# Patient Record
Sex: FEMALE | Race: WHITE | NOT HISPANIC OR LATINO | Employment: OTHER | URBAN - METROPOLITAN AREA
[De-identification: names, ages, dates, MRNs, and addresses within clinical notes are randomized per-mention and may not be internally consistent; named-entity substitution may affect disease eponyms.]

---

## 2017-02-07 ENCOUNTER — TRANSFERRED RECORDS (OUTPATIENT)
Dept: HEALTH INFORMATION MANAGEMENT | Facility: CLINIC | Age: 77
End: 2017-02-07

## 2017-03-03 ENCOUNTER — THERAPY VISIT (OUTPATIENT)
Dept: PHYSICAL THERAPY | Facility: CLINIC | Age: 77
End: 2017-03-03
Payer: COMMERCIAL

## 2017-03-03 DIAGNOSIS — R26.89 BALANCE PROBLEMS: Primary | ICD-10-CM

## 2017-03-03 DIAGNOSIS — M54.50 LUMBAR PAIN: ICD-10-CM

## 2017-03-03 PROCEDURE — 97110 THERAPEUTIC EXERCISES: CPT | Mod: GP | Performed by: PHYSICAL THERAPIST

## 2017-03-03 PROCEDURE — 97161 PT EVAL LOW COMPLEX 20 MIN: CPT | Mod: GP | Performed by: PHYSICAL THERAPIST

## 2017-03-03 NOTE — PROGRESS NOTES
Initial evaluation was completed.  Subjective:            Patient was seen for 3 visits last fall to improve gait and balance.  She was instructed in exercise and drills to perform at home and was doing this regularly.  She returns for follow up visit today stating that she performed the exercises last evening and noted gradual onset of right low back pain about an hour later.  She is unsure if the exercises caused the problem but notes 6/10 right LBP today with weight bearing on the right leg and with posture changes.  She is unsure how to proceed with the exercises and other ADL.  Pain radiates form the right low back to the buttock but not beyond.  She is comfortable sitting, standing or lying still.  Bending to dress is painful and limited.  She hs had similar flares of LBP in the past..        Pain is described as aching and is intermittent    Pain is worse during the day.   and relieved by rest, heat and NSAID's.  Since onset symptoms are unchanged.        General health as reported by patient is good.                      Red flags:  None as reported by the patient.                      Objective:    System         Lumbar/SI Evaluation  ROM:    AROM Lumbar:   Flexion:           Reach to knees with pain  Ext:                      Side Bend:        Left:  40%    Right:  40%  Rotation:           Left:  80%    Right:  80%  Side Glide:        Left:     Right:           Lumbar Myotomes:  normal                Lumbar Dermtomes:  normal                Neural Tension/Mobility:  Lumbar:  Normal                                                             General     ROS    Assessment/Plan:      Patient is a 76 year old female with lumbar complaints.  She appears to have a common flare of right LBP.  She has point tenderness of the LS junction.  Pain responded to gentle ROM and heat and will hopefully settle down in a week or 2.  I asked her to stop previous home exercises until the flare settles down.  Patient has the  following significant findings with corresponding treatment plan.                Diagnosis 1:  lumbar  Pain -  self management  Decreased ROM/flexibility - manual therapy, therapeutic exercise and home program  Decreased function - therapeutic activities and home program    Therapy Evaluation Codes:   1) History comprised of:   Personal factors that impact the plan of care:      None.    Comorbidity factors that impact the plan of care are:      Osteoarthritis.     Medications impacting care: Pain.  2) Examination of Body Systems comprised of:   Body structures and functions that impact the plan of care:      Lumbar spine.   Activity limitations that impact the plan of care are:      Bending, Walking and transitions.  3) Clinical presentation characteristics are:   Stable/Uncomplicated.  4) Decision-Making    Low complexity using standardized patient assessment instrument and/or measureable assessment of functional outcome.  Cumulative Therapy Evaluation is: Low complexity.    Previous and current functional limitations:  (See Goal Flow Sheet for this information)    Short term and Long term goals: (See Goal Flow Sheet for this information)     Communication ability:  Patient appears to be able to clearly communicate and understand verbal and written communication and follow directions correctly.  Treatment Explanation - The following has been discussed with the patient:   RX ordered/plan of care  Anticipated outcomes  Possible risks and side effects  This patient would benefit from PT intervention to resume normal activities.   Rehab potential is good.    Frequency:  1 X week, once daily  Duration:  for 2-5 weeks  Discharge Plan:  Achieve all LTG.  Independent in home treatment program.  Reach maximal therapeutic benefit.    Please refer to the daily flowsheet for treatment today, total treatment time and time spent performing 1:1 timed codes.

## 2017-03-03 NOTE — MR AVS SNAPSHOT
After Visit Summary   3/3/2017    Chiquita Berry    MRN: [id number]           Patient Information     Date Of Birth          1940        Visit Information        Provider Department      3/3/2017 12:40 PM Brayden Moser PT [facility] Physical Therapy        Today's Diagnoses     Balance problems    -  1    Lumbar pain           Follow-ups after your visit        Your next 10 appointments already scheduled     Mar 10, 2017 12:40 PM [facility] Spine with Brayden Moser PT   [facility] ([facility]  )    [address] [place] [id number] [phone]              Who to contact     If you have questions or need follow up information about today's clinic visit or your schedule please contact [facility] PHYSICAL THERAPY directly at [phone].  Normal or non-critical lab and imaging results will be communicated to you by MyChart, letter or phone within 4 business days after the clinic has received the results. If you do not hear from us within 7 days, please contact the clinic through [employer] or phone. If you have a critical or abnormal lab result, we will notify you by phone as soon as possible.  Submit refill requests through [employer] or call your pharmacy and they will forward the refill request to us. Please allow 3 business days for your refill to be completed.          Additional Information About Your Visit        MyChart Information     [employer] gives you secure access to your electronic health record. If you see a primary care provider, you can also send messages to your care team and make appointments. If you have questions, please call your primary care clinic.  If you do not have a primary care provider, please call [phone] and they will assist you.        Care EveryWhere ID     This is your Care EveryWhere ID. This could be used  by other organizations to access your [facility] medical records  [id number]        Your Vitals Were     Last Period                   (LMP Unknown)            Blood Pressure from Last 3 Encounters:   09/15/16 100/60   09/08/16 100/60   08/22/16 130/68    Weight from Last 3 Encounters:   09/15/16 59.4 kg (131 lb)   09/08/16 59.9 kg (132 lb)   08/22/16 59 kg (130 lb)              We Performed the Following     HC PT EVAL, LOW COMPLEXITY     HOT OR COLD PACKS THERAPY     [facility] INITIAL EVAL REPORT     THERAPEUTIC EXERCISES        Primary Care Provider Office Phone # Fax #    Christina Allie Crawford [phone]       [facility] [place] [facility] [address]  [facility] [id number]        Thank you!     Thank you for choosing [facility] PHYSICAL THERAP[facility]  for your care. Our goal is always to provide you with excellent care. Hearing back from our patients is one way we can continue to improve our services. Please take a few minutes to complete the written survey that you may receive in the mail after your visit with us. Thank you!             Your Updated Medication List - Protect others around you: Learn how to safely use, store and throw away your medicines at www.disposemymeds.org.          This list is accurate as of: 3/3/17  4:26 PM.  Always use your most recent med list.                   Brand Name Dispense Instructions for use    aspirin 81 MG tablet      Take 1 tablet by mouth daily.       atorvastatin 20 MG tablet    LIPITOR    90 tablet    Take 1 tablet (20 mg) by mouth daily       CALCIUM PO      Take  by mouth.       IBUPROFEN PO      Take 200 mg by mouth every 8 hours as needed       * MULTIVITAL Tabs      Take 1 tablet by mouth daily.       * PRESERVISION AREDS 2 Caps      Take 2 capsules by mouth daily       polyethylene glycol Packet    MIRALAX/GLYCOLAX     Take 1 packet by mouth daily       TYLENOL ARTHRITIS EXT RELIEF OR      Take 500 mg by mouth  daily       vitamin D 2000 UNITS tablet      Take 1 tablet by mouth daily.       zolpidem 5 MG tablet    AMBIEN    30 tablet    TAKE ONE TABLET BY MOUTH EVERY NIGHT AS NEEDED KEVIN       * Notice:  This list has 2 medication(s) that are the same as other medications prescribed for you. Read the directions carefully, and ask your doctor or other care provider to review them with you.

## 2017-03-06 NOTE — PROGRESS NOTES
Subjective:                                       Pertinent medical history includes:  Osteoarthritis and history of fractures.  Medical allergies: yes (Latex).  Other surgeries include:  Orthopedic surgery (Elbow).    Current occupation is Retired Nurse.                Oswestry Score: 24.44 %                 Objective:    System    Physical Exam    General     ROS    Assessment/Plan:

## 2017-03-10 ENCOUNTER — THERAPY VISIT (OUTPATIENT)
Dept: PHYSICAL THERAPY | Facility: CLINIC | Age: 77
End: 2017-03-10
Payer: COMMERCIAL

## 2017-03-10 DIAGNOSIS — M54.50 LUMBAR PAIN: ICD-10-CM

## 2017-03-10 DIAGNOSIS — R26.89 BALANCE PROBLEMS: ICD-10-CM

## 2017-03-10 PROCEDURE — 97140 MANUAL THERAPY 1/> REGIONS: CPT | Mod: GP | Performed by: PHYSICAL THERAPIST

## 2017-03-10 PROCEDURE — 97110 THERAPEUTIC EXERCISES: CPT | Mod: GP | Performed by: PHYSICAL THERAPIST

## 2017-03-24 ENCOUNTER — THERAPY VISIT (OUTPATIENT)
Dept: PHYSICAL THERAPY | Facility: CLINIC | Age: 77
End: 2017-03-24
Payer: COMMERCIAL

## 2017-03-24 DIAGNOSIS — M54.50 LUMBAR PAIN: ICD-10-CM

## 2017-03-24 DIAGNOSIS — R26.89 BALANCE PROBLEMS: ICD-10-CM

## 2017-03-24 PROCEDURE — 97112 NEUROMUSCULAR REEDUCATION: CPT | Mod: GP | Performed by: PHYSICAL THERAPIST

## 2017-03-24 PROCEDURE — 97110 THERAPEUTIC EXERCISES: CPT | Mod: GP | Performed by: PHYSICAL THERAPIST

## 2017-03-31 DIAGNOSIS — E78.5 HYPERLIPIDEMIA WITH TARGET LDL LESS THAN 100: Chronic | ICD-10-CM

## 2017-03-31 NOTE — TELEPHONE ENCOUNTER
Atorvastatin     Last Written Prescription Date: 1/28/16  Last Fill Quantity: 90, # refills: 4  Last Office Visit with [facility], P or [facility] prescribing provider: 9/15/16       Lab Results   Component Value Date    CHOL 151 06/08/2016     Lab Results   Component Value Date    HDL 51 06/08/2016     Lab Results   Component Value Date    LDL 86 06/08/2016     Lab Results   Component Value Date    TRIG 68 06/08/2016     Lab Results   Component Value Date    CHOLHDLRATIO 3.1 06/01/2015

## 2017-04-04 RX ORDER — ATORVASTATIN CALCIUM 20 MG/1
TABLET, FILM COATED ORAL
Qty: 90 TABLET | Refills: 3 | OUTPATIENT
Start: 2017-04-04

## 2017-04-05 DIAGNOSIS — E78.5 HYPERLIPIDEMIA WITH TARGET LDL LESS THAN 100: Chronic | ICD-10-CM

## 2017-04-05 NOTE — TELEPHONE ENCOUNTER
atorvastatin (LIPITOR) 20 MG tablet     Last Written Prescription Date: 1/28/2016  Last Fill Quantity: 90, # refills: 4  Last Office Visit with G, P or [facility] prescribing provider: 9/15/2016       Lab Results   Component Value Date    CHOL 151 06/08/2016     Lab Results   Component Value Date    HDL 51 06/08/2016     Lab Results   Component Value Date    LDL 86 06/08/2016     Lab Results   Component Value Date    TRIG 68 06/08/2016     Lab Results   Component Value Date    CHOLHDLRATIO 3.1 06/01/2015

## 2017-04-06 RX ORDER — ATORVASTATIN CALCIUM 20 MG/1
TABLET, FILM COATED ORAL
Qty: 90 TABLET | Refills: 0 | Status: SHIPPED | OUTPATIENT
Start: 2017-04-06 | End: 2017-06-26

## 2017-04-06 NOTE — TELEPHONE ENCOUNTER
Prescription approved per [facility], [facility] or [facility] refill protocol.  Carline Clarke RN  [facility] [employer]

## 2017-06-14 ENCOUNTER — HOSPITAL ENCOUNTER (OUTPATIENT)
Dept: MAMMOGRAPHY | Facility: CLINIC | Age: 77
Discharge: HOME | End: 2017-06-14
Attending: FAMILY MEDICINE | Admitting: FAMILY MEDICINE
Payer: MEDICARE

## 2017-06-14 DIAGNOSIS — Z12.31 VISIT FOR SCREENING MAMMOGRAM: ICD-10-CM

## 2017-06-14 PROCEDURE — 77063 BREAST TOMOSYNTHESIS BI: CPT

## 2017-06-23 ENCOUNTER — OFFICE VISIT (OUTPATIENT)
Dept: FAMILY MEDICINE | Facility: CLINIC | Age: 77
End: 2017-06-23
Payer: COMMERCIAL

## 2017-06-23 VITALS
BODY MASS INDEX: 24.48 KG/M2 | SYSTOLIC BLOOD PRESSURE: 98 MMHG | TEMPERATURE: 96.9 F | RESPIRATION RATE: 14 BRPM | HEART RATE: 79 BPM | HEIGHT: 62 IN | OXYGEN SATURATION: 98 % | DIASTOLIC BLOOD PRESSURE: 52 MMHG | WEIGHT: 133 LBS

## 2017-06-23 DIAGNOSIS — Z00.00 ROUTINE GENERAL MEDICAL EXAMINATION AT A HEALTH CARE FACILITY: Primary | ICD-10-CM

## 2017-06-23 DIAGNOSIS — L29.3 PRURITUS OF GENITAL ORGANS: ICD-10-CM

## 2017-06-23 DIAGNOSIS — E78.00 HYPERCHOLESTEROLEMIA: ICD-10-CM

## 2017-06-23 DIAGNOSIS — Z78.0 ASYMPTOMATIC POSTMENOPAUSAL STATUS: ICD-10-CM

## 2017-06-23 DIAGNOSIS — F51.01 PRIMARY INSOMNIA: Chronic | ICD-10-CM

## 2017-06-23 DIAGNOSIS — R73.02 GLUCOSE INTOLERANCE (IMPAIRED GLUCOSE TOLERANCE): ICD-10-CM

## 2017-06-23 LAB
ALT SERPL W P-5'-P-CCNC: 30 U/L (ref 0–50)
ANION GAP SERPL CALCULATED.3IONS-SCNC: 9 MMOL/L (ref 3–14)
BUN SERPL-MCNC: 14 MG/DL (ref 7–30)
CALCIUM SERPL-MCNC: 9.1 MG/DL (ref 8.5–10.1)
CHLORIDE SERPL-SCNC: 106 MMOL/L (ref 94–109)
CO2 SERPL-SCNC: 25 MMOL/L (ref 20–32)
CREAT SERPL-MCNC: 0.62 MG/DL (ref 0.52–1.04)
GFR SERPL CREATININE-BSD FRML MDRD: NORMAL ML/MIN/1.7M2
GLUCOSE SERPL-MCNC: 94 MG/DL (ref 70–99)
LDLC SERPL DIRECT ASSAY-MCNC: 102 MG/DL
POTASSIUM SERPL-SCNC: 3.9 MMOL/L (ref 3.4–5.3)
SODIUM SERPL-SCNC: 140 MMOL/L (ref 133–144)

## 2017-06-23 PROCEDURE — G0439 PPPS, SUBSEQ VISIT: HCPCS | Performed by: FAMILY MEDICINE

## 2017-06-23 PROCEDURE — 83721 ASSAY OF BLOOD LIPOPROTEIN: CPT | Performed by: FAMILY MEDICINE

## 2017-06-23 PROCEDURE — 80048 BASIC METABOLIC PNL TOTAL CA: CPT | Performed by: FAMILY MEDICINE

## 2017-06-23 PROCEDURE — 36415 COLL VENOUS BLD VENIPUNCTURE: CPT | Performed by: FAMILY MEDICINE

## 2017-06-23 PROCEDURE — 84460 ALANINE AMINO (ALT) (SGPT): CPT | Performed by: FAMILY MEDICINE

## 2017-06-23 RX ORDER — TRIAMCINOLONE ACETONIDE 5 MG/G
CREAM TOPICAL
Qty: 30 G | Refills: 1 | Status: SHIPPED | OUTPATIENT
Start: 2017-06-23 | End: 2019-02-19

## 2017-06-23 NOTE — PROGRESS NOTES
SUBJECTIVE:                                                            Chiquita Berry is a 76 year old female who presents for Preventive Visit.  Are you in the first 12 months of your Medicare Part B coverage?  No    Healthy Habits:    Do you get at least three servings of calcium containing foods daily (dairy, green leafy vegetables, etc.)? yes    Amount of exercise or daily activities, outside of work: 0 day(s) per week    Problems taking medications regularly No    Medication side effects: No    Have you had an eye exam in the past two years? yes    Do you see a dentist twice per year? no    Do you have sleep apnea, excessive snoring or daytime drowsiness?yes              Reviewed and updated as needed this visit by clinical staff  Tobacco  Allergies  Meds  Med Hx  Surg Hx  Fam Hx  Soc Hx        Reviewed and updated as needed this visit by Provider        Social History   Substance Use Topics     Smoking status: Never Smoker     Smokeless tobacco: Never Used     Alcohol use 0.0 oz/week     0 Standard drinks or equivalent per week      Comment: Occ glass of wine - less than once per month       The patient does not drink >3 drinks per day nor >7 drinks per week.    Today's PHQ-2 Score:   PHQ-2 ( 1999 [employer]) 6/23/2017 9/15/2016   Q1: Little interest or pleasure in doing things 0 0   Q2: Feeling down, depressed or hopeless 0 0   PHQ-2 Score 0 0   Q1: Little interest or pleasure in doing things - -   Q2: Feeling down, depressed or hopeless - -   PHQ-2 Score - -       Do you feel safe in your environment - Yes    Do you have a Health Care Directive?: Yes: Advance Directive has been received and scanned.    Current providers sharing in care for this patient include:   Patient Care Team:  Christina Crawford MD as PCP - General (Family Practice)      Hearing impairment: No    Ability to successfully perform activities of daily living: Yes, no assistance needed     Fall risk:  Fallen 2 or more  "times in the past year?: No  Any fall with injury in the past year?: No    Home safety:  none identified      The following health maintenance items are reviewed in Epic and correct as of today:  Health Maintenance   Topic Date Due     DEXA Q2 YR  04/25/2016     INFLUENZA VACCINE (SYSTEM ASSIGNED)  09/01/2017     FALL RISK ASSESSMENT  09/15/2017     MAMMO Q2 YR  06/14/2019     ADVANCE DIRECTIVE PLANNING Q5 YRS  12/11/2020     LIPID SCREEN Q5 YR FEMALE (SYSTEM ASSIGNED)  06/08/2021     TETANUS IMMUNIZATION (SYSTEM ASSIGNED)  01/27/2022     PNEUMOCOCCAL  Completed              ROS:  C: NEGATIVE for fever, chills, change in weight  I: NEGATIVE for worrisome rashes, moles or lesions  E: NEGATIVE for vision changes or irritation  E/M: NEGATIVE for ear, mouth and throat problems  R: NEGATIVE for significant cough or SOB  B: NEGATIVE for masses, tenderness or discharge  CV: NEGATIVE for chest pain, palpitations or peripheral edema  GI: NEGATIVE for nausea, abdominal pain, heartburn, or change in bowel habits  : NEGATIVE for frequency, dysuria, or hematuria  M: NEGATIVE for significant arthralgias or myalgia  N: NEGATIVE for weakness, dizziness or paresthesias  E: NEGATIVE for temperature intolerance, skin/hair changes  H: NEGATIVE for bleeding problems  P: NEGATIVE for changes in mood or affect    Problem list, Medication list, Allergies, and Medical/Social/Surgical histories reviewed in [facility] and updated as appropriate.  Labs reviewed in EPIC  OBJECTIVE:                                                            BP 98/52  Pulse 79  Temp 96.9  F (36.1  C) (Tympanic)  Resp 14  Ht 5' 2\" (1.575 m)  Wt 133 lb (60.3 kg)  LMP  (LMP Unknown)  SpO2 98%  Breastfeeding? No  BMI 24.33 kg/m2 Estimated body mass index is 24.33 kg/(m^2) as calculated from the following:    Height as of this encounter: 5' 2\" (1.575 m).    Weight as of this encounter: 133 lb (60.3 kg).  EXAM:   GENERAL APPEARANCE: healthy, alert and no " "distress  EYES: Eyes grossly normal to inspection, PERRL and conjunctivae and sclerae normal  NECK: no adenopathy, no asymmetry, masses, or scars and thyroid normal to palpation  RESP: lungs clear to auscultation - no rales, rhonchi or wheezes  BREAST: normal without masses, tenderness or nipple discharge and no palpable axillary masses or adenopathy  CV: regular rate and rhythm, normal S1 S2, no S3 or S4, no murmur, click or rub, no peripheral edema and peripheral pulses strong  ABDOMEN: soft, nontender, no hepatosplenomegaly, no masses and bowel sounds normal  MS: no musculoskeletal defects are noted and gait is age appropriate without ataxia  SKIN: no suspicious lesions or rashes  NEURO: Normal strength and tone, sensory exam grossly normal, mentation intact and speech normal  PSYCH: mentation appears normal and affect normal/bright  LYMPH: Negative CCOA nodes and thyroid and inguinal nodes       ASSESSMENT / PLAN:                                                                ICD-10-CM    1. Routine general medical examination at a health care facility Z00.00    2. Primary insomnia F51.01    3. Glucose intolerance (impaired glucose tolerance) R73.02 Basic metabolic panel   4. Hypercholesterolemia E78.00 ALT     LDL cholesterol direct   5. Pruritus of genital organs L29.3 triamcinolone (KENALOG) 0.5 % cream   6. Asymptomatic postmenopausal status Z78.0 DEXA HIP/PELVIS/SPINE - Future       End of Life Planning:  Patient currently has an advanced directive: Yes.  Practitioner is supportive of decision.    COUNSELING:  Reviewed preventive health counseling, as reflected in patient instructions       Regular exercise       Healthy diet/nutrition        Estimated body mass index is 24.33 kg/(m^2) as calculated from the following:    Height as of this encounter: 5' 2\" (1.575 m).    Weight as of this encounter: 133 lb (60.3 kg).     reports that she has never smoked. She has never used smokeless " tobacco.      Appropriate preventive services were discussed with this patient, including applicable screening as appropriate for cardiovascular disease, diabetes, osteopenia/osteoporosis, and glaucoma.  As appropriate for age/gender, discussed screening for colorectal cancer, prostate cancer, breast cancer, and cervical cancer. Checklist reviewing preventive services available has been given to the patient.    Reviewed patients plan of care and provided an AVS. The Basic Care Plan (routine screening as documented in Health Maintenance) for Chiquita meets the Care Plan requirement. This Care Plan has been established and reviewed with the Patient.    Counseling Resources:  ATP IV Guidelines  Pooled Cohorts Equation Calculator  Breast Cancer Risk Calculator  FRAX Risk Assessment  ICSI Preventive Guidelines  Dietary Guidelines for Americans, 2010  [employer]'s MyPlate  ASA Prophylaxis  Lung CA Screening    Patient Instructions     Preventive Health Recommendations    Female Ages 65 +    Yearly exam:     See your health care provider every year in order to  o Review health changes.   o Discuss preventive care.    o Review your medicines if your doctor has prescribed any.      You no longer need a yearly Pap test unless you've had an abnormal Pap test in the past 10 years. If you have vaginal symptoms, such as bleeding or discharge, be sure to talk with your provider about a Pap test.      Every 1 to 2 years, have a mammogram.  If you are over 69, talk with your health care provider about whether or not you want to continue having screening mammograms.      Every 10 years, have a colonoscopy. Or, have a yearly FIT test (stool test). These exams will check for colon cancer.       Have a cholesterol test every 5 years, or more often if your doctor advises it.       Have a diabetes test (fasting glucose) every three years. If you are at risk for diabetes, you should have this test more often.       At age 65, have a bone density  scan (DEXA) to check for osteoporosis (brittle bone disease).    Shots:    Get a flu shot each year.    Get a tetanus shot every 10 years.    Talk to your doctor about your pneumonia vaccines. There are now two you should receive - Pneumovax (PPSV 23) and Prevnar (PCV 13).    Talk to your doctor about the shingles vaccine.    Talk to your doctor about the hepatitis B vaccine.    Nutrition:     Eat at least 5 servings of fruits and vegetables each day.      Eat whole-grain bread, whole-wheat pasta and brown rice instead of white grains and rice.      Talk to your provider about Calcium and Vitamin D.     Lifestyle    Exercise at least 150 minutes a week (30 minutes a day, 5 days a week). This will help you control your weight and prevent disease.      Limit alcohol to one drink per day.      No smoking.       Wear sunscreen to prevent skin cancer.       See your dentist twice a year for an exam and cleaning.      See your eye doctor every 1 to 2 years to screen for conditions such as glaucoma, macular degeneration and cataracts.    1. Please do your breast exam every mo, when you  Change the  calendar page or set an alarm on your cell phone Do a  visual check for dimples, inversion or indentation or any different position of the nipple Feel manually  for any 1cm or larger  size mass ie about the size of an almond Be sure to cover the entire area of both breasts : this extends back to the back on either side and from the collar bone to the bottom of the breasts where you can begin to feel ribs.    2. . Schedule your mammo at [facility]  At [address] at [phone]      3. Antifungal cream  And hydrocortisone cream at the [employer]      Steroid creams   Over the counter hydrocortisone cream-weak steroid -may apply 2-4 X a day   Clobetasol is very strong   Could try the in between kenalog cream Rx   Decreases red, itch and scaling     Cover  The steroid cream with vaseline intensive care  Then kb  wrap and hold it on with a cut open  Sock or sleeve of a T-shirt         Christina Crawford MD  [facility]

## 2017-06-23 NOTE — MR AVS SNAPSHOT
After Visit Summary   6/23/2017    Chiquita Berry    MRN: [id number]           Patient Information     Date Of Birth          1940        Visit Information        Provider Department      6/23/2017 9:00 AM Christina Crawford MD [facility]        Today's Diagnoses     Routine general medical examination at a health care facility    -  1    Primary insomnia        Glucose intolerance (impaired glucose tolerance)        Hypercholesterolemia        Pruritus of genital organs        Asymptomatic postmenopausal status          Care Instructions      Preventive Health Recommendations    Female Ages 65 +    Yearly exam:     See your health care provider every year in order to  o Review health changes.   o Discuss preventive care.    o Review your medicines if your doctor has prescribed any.      You no longer need a yearly Pap test unless you've had an abnormal Pap test in the past 10 years. If you have vaginal symptoms, such as bleeding or discharge, be sure to talk with your provider about a Pap test.      Every 1 to 2 years, have a mammogram.  If you are over 69, talk with your health care provider about whether or not you want to continue having screening mammograms.      Every 10 years, have a colonoscopy. Or, have a yearly FIT test (stool test). These exams will check for colon cancer.       Have a cholesterol test every 5 years, or more often if your doctor advises it.       Have a diabetes test (fasting glucose) every three years. If you are at risk for diabetes, you should have this test more often.       At age 65, have a bone density scan (DEXA) to check for osteoporosis (brittle bone disease).    Shots:    Get a flu shot each year.    Get a tetanus shot every 10 years.    Talk to your doctor about your pneumonia vaccines. There are now two you should receive - Pneumovax (PPSV 23) and Prevnar (PCV 13).    Talk to your doctor about the shingles  vaccine.    Talk to your doctor about the hepatitis B vaccine.    Nutrition:     Eat at least 5 servings of fruits and vegetables each day.      Eat whole-grain bread, whole-wheat pasta and brown rice instead of white grains and rice.      Talk to your provider about Calcium and Vitamin D.     Lifestyle    Exercise at least 150 minutes a week (30 minutes a day, 5 days a week). This will help you control your weight and prevent disease.      Limit alcohol to one drink per day.      No smoking.       Wear sunscreen to prevent skin cancer.       See your dentist twice a year for an exam and cleaning.      See your eye doctor every 1 to 2 years to screen for conditions such as glaucoma, macular degeneration and cataracts.    1. Please do your breast exam every mo, when you  Change the  calendar page or set an alarm on your cell phone Do a  visual check for dimples, inversion or indentation or any different position of the nipple Feel manually  for any 1cm or larger  size mass ie about the size of an almond Be sure to cover the entire area of both breasts : this extends back to the back on either side and from the collar bone to the bottom of the breasts where you can begin to feel ribs.    2. . Schedule your mammo at [facility]  At [address] at [phone]      3. Antifungal cream  And hydrocortisone cream at the [employer]      Steroid creams   Over the counter hydrocortisone cream-weak steroid -may apply 2-4 X a day   Clobetasol is very strong   Could try the in between kenalog cream Rx   Decreases red, itch and scaling     Cover  The steroid cream with vaseline intensive care  Then saran wrap and hold it on with a cut open  Sock or sleeve of a T-shirt             Follow-ups after your visit        Future tests that were ordered for you today     Open Future Orders        Priority Expected Expires Ordered    DEXA HIP/PELVIS/SPINE - Future Routine  6/23/2018 6/23/2017            Who to contact      "If you have questions or need follow up information about today's clinic visit or your schedule please contact [facility] directly at [phone].  Normal or non-critical lab and imaging results will be communicated to you by MyChart, letter or phone within 4 business days after the clinic has received the results. If you do not hear from us within 7 days, please contact the clinic through [employer] or phone. If you have a critical or abnormal lab result, we will notify you by phone as soon as possible.  Submit refill requests through [employer] or call your pharmacy and they will forward the refill request to us. Please allow 3 business days for your refill to be completed.          Additional Information About Your Visit        [employer] Information     [employer] gives you secure access to your electronic health record. If you see a primary care provider, you can also send messages to your care team and make appointments. If you have questions, please call your primary care clinic.  If you do not have a primary care provider, please call [phone] and they will assist you.        Care EveryWhere ID     This is your Care EveryWhere ID. This could be used by other organizations to access your [place] medical records  [id number]        Your Vitals Were     Pulse Temperature Respirations Height Last Period Pulse Oximetry    79 96.9  F (36.1  C) (Tympanic) 14 5' 2\" (1.575 m) (LMP Unknown) 98%    Breastfeeding? BMI (Body Mass Index)                No 24.33 kg/m2           Blood Pressure from Last 3 Encounters:   06/23/17 98/52   09/15/16 100/60   09/08/16 100/60    Weight from Last 3 Encounters:   06/23/17 133 lb (60.3 kg)   09/15/16 131 lb (59.4 kg)   09/08/16 132 lb (59.9 kg)              We Performed the Following     ALT     Basic metabolic panel     LDL cholesterol direct          Today's Medication Changes          These changes are accurate as of: 6/23/17  9:58 AM.  If you have any " questions, ask your nurse or doctor.               Start taking these medicines.        Dose/Directions    triamcinolone 0.5 % cream   Commonly known as:  KENALOG   Used for:  Pruritus of genital organs   Started by:  Christina Crawford MD        Apply sparingly to affected area three times daily.   Quantity:  30 g   Refills:  1            Where to get your medicines      These medications were sent to [facility] [address]  [address] [place] [address], [facility] [address]     Phone:  [phone]     triamcinolone 0.5 % cream                Primary Care Provider Office Phone # Fax #    Christina Crawford [phone]       [facility] [address]  [facility] [address]        Equal Access to Services     GREGORY TAPIA AH: Hadii maximo ku hadasho Soomaali, waaxda luqadaha, qaybta kaalmada adeegyada, waxay pau salcedo . So [facility] [phone].    ATENCIÓN: Si habla español, tiene a skinner disposición servicios gratuitos de asistencia lingüística. Llame al [phone].    We comply with applicable federal civil rights laws and [place] laws. We do not discriminate on the basis of race, color, national origin, age, disability sex, sexual orientation or gender identity.            Thank you!     Thank you for choosing [facility]  for your care. Our goal is always to provide you with excellent care. Hearing back from our patients is one way we can continue to improve our services. Please take a few minutes to complete the written survey that you may receive in the mail after your visit with us. Thank you!             Your Updated Medication List - Protect others around you: Learn how to safely use, store and throw away your medicines at www.disposemymeds.org.          This list is accurate as of: 6/23/17  9:58 AM.  Always use your most recent med list.                   Brand Name Dispense Instructions  for use Diagnosis    aspirin 81 MG tablet      Take 1 tablet by mouth daily.        atorvastatin 20 MG tablet    LIPITOR    90 tablet    TAKE 1 TABLET (20 MG) BY MOUTH DAILY    Hyperlipidemia with target LDL less than 100       CALCIUM PO      Take  by mouth.        IBUPROFEN PO      Take 200 mg by mouth every 8 hours as needed        * MULTIVITAL Tabs      Take 1 tablet by mouth daily.        * PRESERVISION AREDS 2 Caps      Take 2 capsules by mouth daily        polyethylene glycol Packet    MIRALAX/GLYCOLAX     Take 1 packet by mouth daily        triamcinolone 0.5 % cream    KENALOG    30 g    Apply sparingly to affected area three times daily.    Pruritus of genital organs       TYLENOL ARTHRITIS EXT RELIEF OR      Take 500 mg by mouth daily        vitamin D 2000 UNITS tablet      Take 1 tablet by mouth daily.        zolpidem 5 MG tablet    AMBIEN    30 tablet    TAKE ONE TABLET BY MOUTH EVERY NIGHT AS NEEDED FORSLEEP    Transient insomnia       * Notice:  This list has 2 medication(s) that are the same as other medications prescribed for you. Read the directions carefully, and ask your doctor or other care provider to review them with you.

## 2017-06-23 NOTE — NURSING NOTE
"Chief Complaint   Patient presents with     Wellness Visit     BP 98/52  Pulse 79  Temp 96.9  F (36.1  C) (Tympanic)  Resp 14  Ht 5' 2\" (1.575 m)  Wt 133 lb (60.3 kg)  LMP  (LMP Unknown)  SpO2 98%  Breastfeeding? No  BMI 24.33 kg/m2 Estimated body mass index is 24.33 kg/(m^2) as calculated from the following:    Height as of this encounter: 5' 2\" (1.575 m).    Weight as of this encounter: 133 lb (60.3 kg).  BP completed using cuff size: regular   Jennifer Salmeron CMA    Health Maintenance Due   Topic Date Due     DEXA Q2 YR  04/25/2016     Health Maintenance reviewed at today's visit patient asked to schedule/complete:   None, Health Maintenance up to date.    "

## 2017-06-23 NOTE — PATIENT INSTRUCTIONS
Preventive Health Recommendations    Female Ages 65 +    Yearly exam:     See your health care provider every year in order to  o Review health changes.   o Discuss preventive care.    o Review your medicines if your doctor has prescribed any.      You no longer need a yearly Pap test unless you've had an abnormal Pap test in the past 10 years. If you have vaginal symptoms, such as bleeding or discharge, be sure to talk with your provider about a Pap test.      Every 1 to 2 years, have a mammogram.  If you are over 69, talk with your health care provider about whether or not you want to continue having screening mammograms.      Every 10 years, have a colonoscopy. Or, have a yearly FIT test (stool test). These exams will check for colon cancer.       Have a cholesterol test every 5 years, or more often if your doctor advises it.       Have a diabetes test (fasting glucose) every three years. If you are at risk for diabetes, you should have this test more often.       At age 65, have a bone density scan (DEXA) to check for osteoporosis (brittle bone disease).    Shots:    Get a flu shot each year.    Get a tetanus shot every 10 years.    Talk to your doctor about your pneumonia vaccines. There are now two you should receive - Pneumovax (PPSV 23) and Prevnar (PCV 13).    Talk to your doctor about the shingles vaccine.    Talk to your doctor about the hepatitis B vaccine.    Nutrition:     Eat at least 5 servings of fruits and vegetables each day.      Eat whole-grain bread, whole-wheat pasta and brown rice instead of white grains and rice.      Talk to your provider about Calcium and Vitamin D.     Lifestyle    Exercise at least 150 minutes a week (30 minutes a day, 5 days a week). This will help you control your weight and prevent disease.      Limit alcohol to one drink per day.      No smoking.       Wear sunscreen to prevent skin cancer.       See your dentist twice a year for an exam and cleaning.      See your  eye doctor every 1 to 2 years to screen for conditions such as glaucoma, macular degeneration and cataracts.    1. Please do your breast exam every mo, when you  Change the  calendar page or set an alarm on your cell phone Do a  visual check for dimples, inversion or indentation or any different position of the nipple Feel manually  for any 1cm or larger  size mass ie about the size of an almond Be sure to cover the entire area of both breasts : this extends back to the back on either side and from the collar bone to the bottom of the breasts where you can begin to feel ribs.    2. . Schedule your mammo at [facility]  At [address] at [phone]      3. Antifungal cream  And hydrocortisone cream at the [employer] [facility]      Steroid creams   Over the counter hydrocortisone cream-weak steroid -may apply 2-4 X a day   Clobetasol is very strong   Could try the in between kenalog cream Rx   Decreases red, itch and scaling     Cover  The steroid cream with vaseline intensive care  Then saran wrap and hold it on with a cut open  Sock or sleeve of a T-shirt

## 2017-06-23 NOTE — LETTER
[facility] [address]  [address] [facility]  [address]  [facility] [address]  [phone]                                                                                                           Chiquita Johanna Jamal  [address]   [facility] [address]    June 24, 2017      Dear Chiquita,    The results of your recent tests were reviewed and are enclosed.   They are all normal     THE FOLLOWING ARE EXPLANATIONS OF SOME OF OUR LAB TESTS     YOU DID NOT NECESSARILY HAVE ALL OF THESE DONE     Hgb is the blood iron level   WBC means White Blood Cells   Platelets are small blood cells that help with forming the blood clots along with other blood factors.   Electrolytes are Sodium, Potassium, Calcium, Magnesium, Phosphorus.   Liver tests are: AST, ALT, Bilirubin, Alkaline Phosphatase.   Kidney tests are Creatinine, GFR.   HDL Cholesterol - is the good cholesterol and it is good to have it high.   LDL cholesterol is the bad cholesterol and it is good to have it low.   It is recommended to have LDL less than 130 for people with hypertension and to have it less than 100 for people with heart disease, diabetes and chronic kidney disease.   Triglycerides are another type of lipid that can cause heart disease, like the cholesterol and should be kept low   Thyroid tests are TSH, T4, T3   Glucose is sugar.   A1c is a test that gives us an idea about how well was controlled the diabetes for the last 3 months.   PSA stands for Prostate Specific Antigen and it can be elevated with prostate cancer or prostate inflammation.     Please continue on the same medications     Cholesterol and sugar are just a little high   Results for orders placed or performed in visit on 06/23/17   ALT   Result Value Ref Range    ALT 30 0 - 50 U/L   LDL cholesterol direct   Result Value Ref Range    LDL Cholesterol Direct 102 (H) <100 mg/dL   Basic metabolic panel   Result Value Ref Range    Sodium 140  133 - 144 mmol/L    Potassium 3.9 3.4 - 5.3 mmol/L    Chloride 106 94 - 109 mmol/L    Carbon Dioxide 25 20 - 32 mmol/L    Anion Gap 9 3 - 14 mmol/L    Glucose 94 70 - 99 mg/dL    Urea Nitrogen 14 7 - 30 mg/dL    Creatinine 0.62 0.52 - 1.04 mg/dL    GFR Estimate >90  Non  GFR Calc   >60 mL/min/1.7m2    GFR Estimate If Black >90   GFR Calc   >60 mL/min/1.7m2    Calcium 9.1 8.5 - 10.1 mg/dL           Thank you for choosing [facility].  We appreciate the opportunity to serve you and look forward to supporting your healthcare needs in the future.    If you have any questions or concerns, please call me or my staff at [phone].      Sincerely,    Christina Crawford MD

## 2017-06-26 DIAGNOSIS — F51.02 TRANSIENT INSOMNIA: ICD-10-CM

## 2017-06-26 DIAGNOSIS — E78.5 HYPERLIPIDEMIA WITH TARGET LDL LESS THAN 100: Chronic | ICD-10-CM

## 2017-06-26 NOTE — TELEPHONE ENCOUNTER
Reason for Call: Medication refill:    Do you use a [facility]?  Name of the pharmacy and phone number for the current request: [facility], [place] - [address] [place] [address]    Name of the medication requested: atorvastatin (LIPITOR) 20 MG tablet & zolpidem (AMBIEN) 5 MG tablet    Other request: Patient was seen in office on 6/23/17 and refills were to be sent to the pharmacy at that time, however they were not.    Can we leave a detailed message on this number? YES    Phone number patient can be reached at: Home number on file [phone] (home)    Best Time: Anytime    Call taken on 6/26/2017 at 11:31 AM by Gagandeep Zee

## 2017-06-28 RX ORDER — ATORVASTATIN CALCIUM 20 MG/1
TABLET, FILM COATED ORAL
Qty: 90 TABLET | Refills: 3 | Status: SHIPPED | OUTPATIENT
Start: 2017-06-28 | End: 2018-05-31

## 2017-06-28 RX ORDER — ZOLPIDEM TARTRATE 5 MG/1
TABLET ORAL
Qty: 30 TABLET | Refills: 0 | Status: SHIPPED | OUTPATIENT
Start: 2017-06-28 | End: 2017-12-26

## 2017-06-28 NOTE — TELEPHONE ENCOUNTER
lipitor     Last Written Prescription Date: 4-6-17  Last Fill Quantity: 90, # refills: 0  Last Office Visit with [facility], [facility] or [facility] prescribing provider: 6-23-17       Lab Results   Component Value Date    CHOL 151 06/08/2016     Lab Results   Component Value Date    HDL 51 06/08/2016     Lab Results   Component Value Date     06/23/2017    LDL 86 06/08/2016     Lab Results   Component Value Date    TRIG 68 06/08/2016     Lab Results   Component Value Date    CHOLHDLRATIO 3.1 06/01/2015       ______________________________________________________  Controlled Substance Refill Request for Ambien  Problem List Complete:  No     PROVIDER TO CONSIDER COMPLETION OF PROBLEM LIST AND OVERVIEW/CONTROLLED SUBSTANCE AGREEMENT    Last Written Prescription Date:  12-9-16  Last Fill Quantity: 30,   # refills: 0    Last Office Visit with [facility] primary care provider: 6-23-17    Future Office visit:     Controlled substance agreement on file: No.     Processing:  Fax Rx to [facility]   checked in past 6 months?  No, route to RN     RX [place] reviewed:  reviewed- no concerns 6-28-17    [place]:  [email or web address]

## 2017-11-15 ENCOUNTER — TELEPHONE (OUTPATIENT)
Dept: FAMILY MEDICINE | Facility: CLINIC | Age: 77
End: 2017-11-15

## 2017-12-17 ENCOUNTER — OFFICE VISIT (OUTPATIENT)
Dept: URGENT CARE | Facility: URGENT CARE | Age: 77
End: 2017-12-17
Payer: COMMERCIAL

## 2017-12-17 VITALS
HEART RATE: 93 BPM | SYSTOLIC BLOOD PRESSURE: 139 MMHG | TEMPERATURE: 98 F | DIASTOLIC BLOOD PRESSURE: 76 MMHG | BODY MASS INDEX: 24.71 KG/M2 | OXYGEN SATURATION: 94 % | RESPIRATION RATE: 20 BRPM | WEIGHT: 135.1 LBS

## 2017-12-17 DIAGNOSIS — J18.9 PNEUMONIA OF RIGHT LOWER LOBE DUE TO INFECTIOUS ORGANISM: Primary | ICD-10-CM

## 2017-12-17 PROCEDURE — 99214 OFFICE O/P EST MOD 30 MIN: CPT | Performed by: FAMILY MEDICINE

## 2017-12-17 RX ORDER — ALBUTEROL SULFATE 90 UG/1
1-2 AEROSOL, METERED RESPIRATORY (INHALATION) EVERY 4 HOURS PRN
Qty: 1 INHALER | Refills: 0 | Status: SHIPPED | OUTPATIENT
Start: 2017-12-17 | End: 2019-02-19

## 2017-12-17 RX ORDER — DOXYCYCLINE 100 MG/1
100 CAPSULE ORAL 2 TIMES DAILY
Qty: 20 CAPSULE | Refills: 0 | Status: SHIPPED | OUTPATIENT
Start: 2017-12-17 | End: 2017-12-26

## 2017-12-17 NOTE — PROGRESS NOTES
SUBJECTIVE:  Chief Complaint   Patient presents with     URI     deep cough x 5 days which seems to get worse yesterday. nose bleed x today      Chiquita Berry is a 77 year old female who presents to the clinic today with a chief complaint of cough   for 5 day(s).  Has had new onset chest tightness, heaviness for 1 day  Patient denies shortness of breath., central chest pain., pleuritic chest pain and wheezing.  Her cough is described as persistent, daytime, nightime and productive mucoid.    The patient's symptoms are moderate and worsening.  Associated symptoms include malaise. The patient's symptoms are exacerbated by exercise  Patient has been using nothing  to improve symptoms.    Past Medical History:   Diagnosis Date     Anxiety      Cataract     bilateral     GERD (gastroesophageal reflux disease)      Hyperlipidemia LDL goal < 100      Insomnia     using ambien once a month     Kyphosis      Osteoporosis      Palpitations     resolved     Scapula fracture 4/5/2015    right - didn't need surgery - sling     Scoliosis        ALLERGIES:  Review of patient's allergies indicates no known allergies.      Current Outpatient Prescriptions on File Prior to Visit:  zolpidem (AMBIEN) 5 MG tablet TAKE ONE TABLET BY MOUTH EVERY NIGHT AS NEEDED FORSLEEP   atorvastatin (LIPITOR) 20 MG tablet TAKE 1 TABLET (20 MG) BY MOUTH DAILY   triamcinolone (KENALOG) 0.5 % cream Apply sparingly to affected area three times daily.   IBUPROFEN PO Take 200 mg by mouth every 8 hours as needed    polyethylene glycol (MIRALAX/GLYCOLAX) packet Take 1 packet by mouth daily   Multiple Vitamins-Minerals (MULTIVITAL) TABS Take 1 tablet by mouth daily.   Acetaminophen (TYLENOL ARTHRITIS EXT RELIEF OR) Take 500 mg by mouth daily    CALCIUM PO Take  by mouth.   aspirin 81 MG tablet Take 1 tablet by mouth daily.   Cholecalciferol (VITAMIN D) 2000 UNITS tablet Take 1 tablet by mouth daily.   Multiple Vitamins-Minerals (PRESERVISION AREDS 2) CAPS  Take 2 capsules by mouth daily     No current facility-administered medications on file prior to visit.     Social History   Substance Use Topics     Smoking status: Never Smoker     Smokeless tobacco: Never Used     Alcohol use 0.0 oz/week     0 Standard drinks or equivalent per week      Comment: Occ glass of wine - less than once per month       Family History   Problem Relation Age of Onset     Alzheimer Disease Mother      CEREBROVASCULAR DISEASE Mother      CANCER Father      stomach     Breast Cancer Sister      Breast Cancer Sister      DIABETES No family hx of      Coronary Artery Disease No family hx of      Hypertension No family hx of      Hyperlipidemia No family hx of      Colon Cancer No family hx of      Prostate Cancer No family hx of      Other Cancer No family hx of      Depression No family hx of      Anxiety Disorder No family hx of      MENTAL ILLNESS No family hx of      Substance Abuse No family hx of      Anesthesia Reaction No family hx of      Asthma No family hx of      OSTEOPOROSIS No family hx of      Genetic Disorder No family hx of      Thyroid Disease No family hx of      Obesity No family hx of      Unknown/Adopted No family hx of          ROS  CONSTITUTIONAL:NEGATIVE for fever, chills, change in weight  INTEGUMENTARY/SKIN: NEGATIVE for worrisome rashes, moles or lesions  EYES: NEGATIVE for vision changes or irritation  ENT/MOUTH: NEGATIVE for ear, mouth and throat problems  GI: NEGATIVE for nausea, abdominal pain, heartburn, or change in bowel habits    OBJECTIVE:  /76  Pulse 93  Temp 98  F (36.7  C) (Oral)  Resp 20  Wt 135 lb 1.6 oz (61.3 kg)  LMP  (LMP Unknown)  SpO2 94%  BMI 24.71 kg/m2  GENERAL APPEARANCE: alert, moderate distress and cooperative  EYES: EOMI,  PERRL, conjunctiva clear  HENT: ear canals and TM's normal.  Nose and mouth without ulcers, erythema or lesions  NECK: supple, nontender, no lymphadenopathy  RESP: crackles right base otherwise clear,  No  rales, ronchi, wheezes  CV: regular rates and rhythm, normal S1 S2, no murmur noted  NEURO: Normal strength and tone, sensory exam grossly normal,  normal speech and mentation  SKIN: no suspicious lesions or rashes    Chest X-ray:   Not performed-  Clinically she has pneumonia RLL    ASSESSMENT:    Pneumonia of right lower lobe due to infectious organism (H)      - doxycycline monohydrate 100 MG capsule; Take 1 capsule (100 mg) by mouth 2 times daily  - albuterol (PROAIR HFA/PROVENTIL HFA/VENTOLIN HFA) 108 (90 BASE) MCG/ACT Inhaler; Inhale 1-2 puffs into the lungs every 4 hours as needed for shortness of breath / dyspnea or wheezing       Symptomatic measures encouraged, humidified air, plenty of fluids.  Patient may consider OTC expectorant and/or cough suppressant to treat symptoms.  Return if worsening  Follow-up with primary care in 2-3 days for re-evaluation

## 2017-12-17 NOTE — PATIENT INSTRUCTIONS
Treating Pneumonia  Pneumonia is an infection of one or both of the lungs. Pneumonia:    Is usually caused by either a virus or a bacteria    Can be very serious, especially in infants, young children, and older adults. It s also serious for those with other long-term health problems or weakened immune systems.    Is sometimes treated at home and sometimes in the hospital    Antibiotic medicines  Antibiotics may be prescribed for pneumonia caused by bacteria. They may be pills (oral medicines), or shots (injections). Or they may be given by IV (intravenously) into a vein. If you are taking oral medicines at home:    Fill your prescription and start taking your medicine as soon as you can.    You will likely start to feel better in a day or 2, but don t stop taking the antibiotic.    Use a pill organizer to help you remember to take your medicine.    Let your healthcare provider know if you have side effects.    Take your medicine exactly as directed on the label. Talk to your provider or pharmacist if you have any questions.  Antiviral medicines  Antiviral medicine may be prescribed for pneumonia caused by a virus. For example, antiviral medicine may be prescribed for pneumonia caused by the flu virus. Antibiotics do not work against viruses. If you are taking antiviral medicine at home:    Fill your prescription and start taking your medicine as soon as you can.    Talk with your provider or pharmacist about possible side effects.    Take the medicine exactly as instructed.  To relieve symptoms  There are many medicines that can help relieve symptoms of pneumonia. Some are prescription and some are over-the-counter.  Your healthcare provider may recommend:    Acetaminophen or ibuprofen to lower your fever and to lessen headache or other pain    Cough medicine to loosen mucus or to reduce coughing  Make sure you check with your healthcare provider or pharmacist before taking any over-the-counter  medicines.  Special treatments  If you are hospitalized for pneumonia, you may have other therapies, including:    Inhaled medicines to help with breathing or chest congestion    Supplemental oxygen to increase low oxygen levels  Drink fluids and eat healthy  You should eat healthy to help your body fight the infection. Drinking a lot of fluids helps to replace fluids lost from fever and to loosen mucus in your chest.    Diet. Make healthy food choices, including fruits and vegetables, lean meats and other proteins, 100% whole grain and low- or no-fat dairy products.    Fluids. Drink at least 6 to 8 tall glasses a day. Water and 100% fruit or vegetable juice are best.  Get plenty of rest and sleep  You may be more tired than usual for a while. It is important to get enough sleep at night. It s also important to rest during the day. Talk with your healthcare provider if coughing or other symptoms are interfering with your sleep.  Preventing the spread of germs  The best thing you can do to prevent spreading germs is to wash your hands often. You should:    Rub your hand with soap and water for 20 to 30 seconds.    Clean in between your fingers, the backs of your hands, and around your nails.    Dry your hands on a separate towel or use paper towels.  You should also:    Keep alcohol-based hand  nearby.    Make sure you also clean surfaces that you touch. Use a product that kills all types of germs.    Stay away from others until you are feeling better.  When to call your healthcare provider  Call your healthcare provider if you have any of the following:    Symptoms get worse    Fever continues    Shortness of breath gets worse    Increased mucus or mucus that is darker in color    Coughing gets worse    Lips or fingers are bluish in color    Side effects from your medicine   Date Last Reviewed: 12/1/2016 2000-2017 [employer] [address] [place] [address], [place] [address]. All rights reserved.  This information is not intended as a substitute for professional medical care. Always follow your healthcare professional's instructions.

## 2017-12-17 NOTE — NURSING NOTE
"Chief Complaint   Patient presents with     URI     deep cough x 5 days which seems to get worse yesterday. nose bleed x today        Initial /76  Pulse 93  Temp 98  F (36.7  C) (Oral)  Resp 20  Wt 135 lb 1.6 oz (61.3 kg)  LMP  (LMP Unknown)  SpO2 94%  BMI 24.71 kg/m2 Estimated body mass index is 24.71 kg/(m^2) as calculated from the following:    Height as of 6/23/17: 5' 2\" (1.575 m).    Weight as of this encounter: 135 lb 1.6 oz (61.3 kg).  Medication Reconciliation: complete    "

## 2017-12-17 NOTE — MR AVS SNAPSHOT
After Visit Summary   12/17/2017    Chiquita Berry    MRN: [id number]           Patient Information     Date Of Birth          1940        Visit Information        Provider Department      12/17/2017 9:30 AM Surekha Ulloa MD [facility]        Today's Diagnoses     Pneumonia of right lower lobe due to infectious organism (H)    -  1      Care Instructions      Treating Pneumonia  Pneumonia is an infection of one or both of the lungs. Pneumonia:    Is usually caused by either a virus or a bacteria    Can be very serious, especially in infants, young children, and older adults. It s also serious for those with other long-term health problems or weakened immune systems.    Is sometimes treated at home and sometimes in the hospital    Antibiotic medicines  Antibiotics may be prescribed for pneumonia caused by bacteria. They may be pills (oral medicines), or shots (injections). Or they may be given by IV (intravenously) into a vein. If you are taking oral medicines at home:    Fill your prescription and start taking your medicine as soon as you can.    You will likely start to feel better in a day or 2, but don t stop taking the antibiotic.    Use a pill organizer to help you remember to take your medicine.    Let your healthcare provider know if you have side effects.    Take your medicine exactly as directed on the label. Talk to your provider or pharmacist if you have any questions.  Antiviral medicines  Antiviral medicine may be prescribed for pneumonia caused by a virus. For example, antiviral medicine may be prescribed for pneumonia caused by the flu virus. Antibiotics do not work against viruses. If you are taking antiviral medicine at home:    Fill your prescription and start taking your medicine as soon as you can.    Talk with your provider or pharmacist about possible side effects.    Take the medicine exactly as instructed.  To relieve symptoms  There  are many medicines that can help relieve symptoms of pneumonia. Some are prescription and some are over-the-counter.  Your healthcare provider may recommend:    Acetaminophen or ibuprofen to lower your fever and to lessen headache or other pain    Cough medicine to loosen mucus or to reduce coughing  Make sure you check with your healthcare provider or pharmacist before taking any over-the-counter medicines.  Special treatments  If you are hospitalized for pneumonia, you may have other therapies, including:    Inhaled medicines to help with breathing or chest congestion    Supplemental oxygen to increase low oxygen levels  Drink fluids and eat healthy  You should eat healthy to help your body fight the infection. Drinking a lot of fluids helps to replace fluids lost from fever and to loosen mucus in your chest.    Diet. Make healthy food choices, including fruits and vegetables, lean meats and other proteins, 100% whole grain and low- or no-fat dairy products.    Fluids. Drink at least 6 to 8 tall glasses a day. Water and 100% fruit or vegetable juice are best.  Get plenty of rest and sleep  You may be more tired than usual for a while. It is important to get enough sleep at night. It s also important to rest during the day. Talk with your healthcare provider if coughing or other symptoms are interfering with your sleep.  Preventing the spread of germs  The best thing you can do to prevent spreading germs is to wash your hands often. You should:    Rub your hand with soap and water for 20 to 30 seconds.    Clean in between your fingers, the backs of your hands, and around your nails.    Dry your hands on a separate towel or use paper towels.  You should also:    Keep alcohol-based hand  nearby.    Make sure you also clean surfaces that you touch. Use a product that kills all types of germs.    Stay away from others until you are feeling better.  When to call your healthcare provider  Call your healthcare  provider if you have any of the following:    Symptoms get worse    Fever continues    Shortness of breath gets worse    Increased mucus or mucus that is darker in color    Coughing gets worse    Lips or fingers are bluish in color    Side effects from your medicine   Date Last Reviewed: 12/1/2016 2000-2017 [employer] [address] [place] [address]. All rights reserved. This information is not intended as a substitute for professional medical care. Always follow your healthcare professional's instructions.                Follow-ups after your visit        Who to contact     If you have questions or need follow up information about today's clinic visit or your schedule please contact [facility] directly at [phone].  Normal or non-critical lab and imaging results will be communicated to you by [employer], letter or phone within 4 business days after the clinic has received the results. If you do not hear from us within 7 days, please contact the clinic through [employer] or phone. If you have a critical or abnormal lab result, we will notify you by phone as soon as possible.  Submit refill requests through [employer] or call your pharmacy and they will forward the refill request to us. Please allow 3 business days for your refill to be completed.          Additional Information About Your Visit        [employer] Information     [employer] gives you secure access to your electronic health record. If you see a primary care provider, you can also send messages to your care team and make appointments. If you have questions, please call your primary care clinic.  If you do not have a primary care provider, please call [phone] and they will assist you.        Care EveryWhere ID     This is your Care EveryWhere ID. This could be used by other organizations to access your [place] medical records  [id number]        Your Vitals Were     Pulse Temperature Respirations Last  Period Pulse Oximetry BMI (Body Mass Index)    93 98  F (36.7  C) (Oral) 20 (LMP Unknown) 94% 24.71 kg/m2       Blood Pressure from Last 3 Encounters:   12/17/17 139/76   06/23/17 98/52   09/15/16 100/60    Weight from Last 3 Encounters:   12/17/17 135 lb 1.6 oz (61.3 kg)   06/23/17 133 lb (60.3 kg)   09/15/16 131 lb (59.4 kg)              Today, you had the following     No orders found for display         Today's Medication Changes          These changes are accurate as of: 12/17/17 10:23 AM.  If you have any questions, ask your nurse or doctor.               Start taking these medicines.        Dose/Directions    albuterol 108 (90 BASE) MCG/ACT Inhaler   Commonly known as:  PROAIR HFA/PROVENTIL HFA/VENTOLIN HFA   Used for:  Pneumonia of right lower lobe due to infectious organism (H)   Started by:  Surekha Ulloa MD        Dose:  1-2 puff   Inhale 1-2 puffs into the lungs every 4 hours as needed for shortness of breath / dyspnea or wheezing   Quantity:  1 Inhaler   Refills:  0       doxycycline monohydrate 100 MG capsule   Used for:  Pneumonia of right lower lobe due to infectious organism (H)   Started by:  Surekha Ulloa MD        Dose:  100 mg   Take 1 capsule (100 mg) by mouth 2 times daily   Quantity:  20 capsule   Refills:  0            Where to get your medicines      These medications were sent to [address] [address]     Phone:  [phone]     albuterol 108 (90 BASE) MCG/ACT Inhaler    doxycycline monohydrate 100 MG capsule                Primary Care Provider Office Phone # Fax #    Christina Crawford [phone] [address]  [facility] [address]        Equal Access to Services     KEYLA TAPIA AH: Farida Hui, waaxda luqadaha, qaybta kaalmaalma jeffery, mike castillo. [facility] [phone].    ATENCIÓN: Si dawson daniels skinner  disposición servicios gratuitos de asistencia lingüística. Prem esquivel [phone].    We comply with applicable federal civil rights laws and [place] laws. We do not discriminate on the basis of race, color, national origin, age, disability, sex, sexual orientation, or gender identity.            Thank you!     Thank you for choosing [facility]  for your care. Our goal is always to provide you with excellent care. Hearing back from our patients is one way we can continue to improve our services. Please take a few minutes to complete the written survey that you may receive in the mail after your visit with us. Thank you!             Your Updated Medication List - Protect others around you: Learn how to safely use, store and throw away your medicines at www.disposemymeds.org.          This list is accurate as of: 12/17/17 10:23 AM.  Always use your most recent med list.                   Brand Name Dispense Instructions for use Diagnosis    albuterol 108 (90 BASE) MCG/ACT Inhaler    PROAIR HFA/PROVENTIL HFA/VENTOLIN HFA    1 Inhaler    Inhale 1-2 puffs into the lungs every 4 hours as needed for shortness of breath / dyspnea or wheezing    Pneumonia of right lower lobe due to infectious organism (H)       aspirin 81 MG tablet      Take 1 tablet by mouth daily.        atorvastatin 20 MG tablet    LIPITOR    90 tablet    TAKE 1 TABLET (20 MG) BY MOUTH DAILY    Hyperlipidemia with target LDL less than 100       CALCIUM PO      Take  by mouth.        doxycycline monohydrate 100 MG capsule     20 capsule    Take 1 capsule (100 mg) by mouth 2 times daily    Pneumonia of right lower lobe due to infectious organism (H)       IBUPROFEN PO      Take 200 mg by mouth every 8 hours as needed        * MULTIVITAL Tabs      Take 1 tablet by mouth daily.        * PRESERVISION AREDS 2 Caps      Take 2 capsules by mouth daily        polyethylene glycol Packet    MIRALAX/GLYCOLAX     Take 1 packet by mouth  daily        triamcinolone 0.5 % cream    KENALOG    30 g    Apply sparingly to affected area three times daily.    Pruritus of genital organs       TYLENOL ARTHRITIS EXT RELIEF OR      Take 500 mg by mouth daily        vitamin D 2000 UNITS tablet      Take 1 tablet by mouth daily.        zolpidem 5 MG tablet    AMBIEN    30 tablet    TAKE ONE TABLET BY MOUTH EVERY NIGHT AS NEEDED FORSLEEP    Transient insomnia       * Notice:  This list has 2 medication(s) that are the same as other medications prescribed for you. Read the directions carefully, and ask your doctor or other care provider to review them with you.

## 2017-12-26 ENCOUNTER — RADIANT APPOINTMENT (OUTPATIENT)
Dept: GENERAL RADIOLOGY | Facility: CLINIC | Age: 77
End: 2017-12-26
Attending: PHYSICIAN ASSISTANT
Payer: COMMERCIAL

## 2017-12-26 ENCOUNTER — OFFICE VISIT (OUTPATIENT)
Dept: FAMILY MEDICINE | Facility: CLINIC | Age: 77
End: 2017-12-26
Payer: COMMERCIAL

## 2017-12-26 VITALS
DIASTOLIC BLOOD PRESSURE: 72 MMHG | RESPIRATION RATE: 16 BRPM | OXYGEN SATURATION: 95 % | WEIGHT: 134 LBS | TEMPERATURE: 100.1 F | SYSTOLIC BLOOD PRESSURE: 128 MMHG | HEART RATE: 99 BPM | BODY MASS INDEX: 24.51 KG/M2

## 2017-12-26 DIAGNOSIS — Z87.01 HISTORY OF PNEUMONIA: Primary | ICD-10-CM

## 2017-12-26 DIAGNOSIS — F51.02 TRANSIENT INSOMNIA: ICD-10-CM

## 2017-12-26 PROCEDURE — 99214 OFFICE O/P EST MOD 30 MIN: CPT | Performed by: PHYSICIAN ASSISTANT

## 2017-12-26 PROCEDURE — 71020 XR CHEST 2 VW: CPT

## 2017-12-26 RX ORDER — ZOLPIDEM TARTRATE 5 MG/1
TABLET ORAL
Qty: 30 TABLET | Refills: 1 | Status: SHIPPED | OUTPATIENT
Start: 2017-12-26 | End: 2018-05-31
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
## 2017-12-26 NOTE — MR AVS SNAPSHOT
After Visit Summary   12/26/2017    Chiquita Berry    MRN: [id number]           Patient Information     Date Of Birth          1940        Visit Information        Provider Department      12/26/2017 1:30 PM Tami Vasquez PA-C [facility]        Today's Diagnoses     History of pneumonia    -  1    Transient insomnia           Follow-ups after your visit        Who to contact     If you have questions or need follow up information about today's clinic visit or your schedule please contact [facility] directly at [phone].  Normal or non-critical lab and imaging results will be communicated to you by MyChart, letter or phone within 4 business days after the clinic has received the results. If you do not hear from us within 7 days, please contact the clinic through DiObexhart or phone. If you have a critical or abnormal lab result, we will notify you by phone as soon as possible.  Submit refill requests through Digium or call your pharmacy and they will forward the refill request to us. Please allow 3 business days for your refill to be completed.          Additional Information About Your Visit        MyChart Information     Digium gives you secure access to your electronic health record. If you see a primary care provider, you can also send messages to your care team and make appointments. If you have questions, please call your primary care clinic.  If you do not have a primary care provider, please call [phone] and they will assist you.        Care EveryWhere ID     This is your Care EveryWhere ID. This could be used by other organizations to access your [place] medical records  [id number]        Your Vitals Were     Pulse Temperature Respirations Last Period Pulse Oximetry BMI (Body Mass Index)    99 100.1  F (37.8  C) (Tympanic) 16 (LMP Unknown) 95% 24.51 kg/m2       Blood Pressure from Last 3 Encounters:    12/26/17 128/72   12/17/17 139/76   06/23/17 98/52    Weight from Last 3 Encounters:   12/26/17 134 lb (60.8 kg)   12/17/17 135 lb 1.6 oz (61.3 kg)   06/23/17 133 lb (60.3 kg)              We Performed the Following     XR Chest 2 Views          Where to get your medicines      Some of these will need a paper prescription and others can be bought over the counter.  Ask your nurse if you have questions.     Bring a paper prescription for each of these medications     zolpidem 5 MG tablet          Primary Care Provider Office Phone # Fax #    Christina Allie Crawford [phone]       [address] [facility] [address]        Equal Access to Services     GREGORY TAPIA : Farida holdeno Sosanjuanita, waaxda luqadaha, qaybta kaalmada adeshelleyyaalma, mike castillo. So [facility] [phone].    ATENCIÓN: Si habla español, tiene a skinner disposición servicios gratuitos de asistencia lingüística. Llame al [phone].    We comply with applicable federal civil rights laws and [place] laws. We do not discriminate on the basis of race, color, national origin, age, disability, sex, sexual orientation, or gender identity.            Thank you!     Thank you for choosing [facility]  for your care. Our goal is always to provide you with excellent care. Hearing back from our patients is one way we can continue to improve our services. Please take a few minutes to complete the written survey that you may receive in the mail after your visit with us. Thank you!             Your Updated Medication List - Protect others around you: Learn how to safely use, store and throw away your medicines at www.disposemymeds.org.          This list is accurate as of: 12/26/17 11:59 PM.  Always use your most recent med list.                   Brand Name Dispense Instructions for use Diagnosis    albuterol 108 (90 BASE) MCG/ACT Inhaler    PROAIR HFA/PROVENTIL HFA/VENTOLIN HFA     1 Inhaler    Inhale 1-2 puffs into the lungs every 4 hours as needed for shortness of breath / dyspnea or wheezing    Pneumonia of right lower lobe due to infectious organism (H)       aspirin 81 MG tablet      Take 1 tablet by mouth daily.        atorvastatin 20 MG tablet    LIPITOR    90 tablet    TAKE 1 TABLET (20 MG) BY MOUTH DAILY    Hyperlipidemia with target LDL less than 100       CALCIUM PO      Take  by mouth.        IBUPROFEN PO      Take 200 mg by mouth every 8 hours as needed        * MULTIVITAL Tabs      Take 1 tablet by mouth daily.        * PRESERVISION AREDS 2 Caps      Take 2 capsules by mouth daily        polyethylene glycol Packet    MIRALAX/GLYCOLAX     Take 1 packet by mouth daily        triamcinolone 0.5 % cream    KENALOG    30 g    Apply sparingly to affected area three times daily.    Pruritus of genital organs       TYLENOL ARTHRITIS EXT RELIEF OR      Take 500 mg by mouth daily        vitamin D 2000 UNITS tablet      Take 1 tablet by mouth daily.        zolpidem 5 MG tablet    AMBIEN    30 tablet    TAKE ONE TABLET BY MOUTH EVERY NIGHT AS NEEDED FORSLEEP    Transient insomnia       * Notice:  This list has 2 medication(s) that are the same as other medications prescribed for you. Read the directions carefully, and ask your doctor or other care provider to review them with you.

## 2017-12-26 NOTE — NURSING NOTE
"Chief Complaint   Patient presents with     Urgent Care     f/u pne       Initial /72  Pulse 99  Temp 100.1  F (37.8  C) (Tympanic)  Resp 16  Wt 134 lb (60.8 kg)  LMP  (LMP Unknown)  SpO2 95%  BMI 24.51 kg/m2 Estimated body mass index is 24.51 kg/(m^2) as calculated from the following:    Height as of 6/23/17: 5' 2\" (1.575 m).    Weight as of this encounter: 134 lb (60.8 kg).  Medication Reconciliation: complete    "

## 2018-01-26 ENCOUNTER — DOCUMENTATION ONLY (OUTPATIENT)
Dept: OTHER | Facility: CLINIC | Age: 78
End: 2018-01-26

## 2018-01-26 DIAGNOSIS — Z71.89 ACP (ADVANCE CARE PLANNING): Chronic | ICD-10-CM

## 2018-03-23 ENCOUNTER — TRANSFERRED RECORDS (OUTPATIENT)
Dept: HEALTH INFORMATION MANAGEMENT | Facility: CLINIC | Age: 78
End: 2018-03-23

## 2018-05-04 ENCOUNTER — OFFICE VISIT (OUTPATIENT)
Dept: URGENT CARE | Facility: URGENT CARE | Age: 78
End: 2018-05-04
Payer: COMMERCIAL

## 2018-05-04 VITALS
SYSTOLIC BLOOD PRESSURE: 126 MMHG | HEART RATE: 100 BPM | TEMPERATURE: 100 F | WEIGHT: 134.9 LBS | BODY MASS INDEX: 24.67 KG/M2 | RESPIRATION RATE: 16 BRPM | DIASTOLIC BLOOD PRESSURE: 70 MMHG | OXYGEN SATURATION: 92 %

## 2018-05-04 DIAGNOSIS — R05.8 PRODUCTIVE COUGH: ICD-10-CM

## 2018-05-04 DIAGNOSIS — R50.9 FEVER AND CHILLS: Primary | ICD-10-CM

## 2018-05-04 DIAGNOSIS — M25.561 ACUTE PAIN OF RIGHT KNEE: ICD-10-CM

## 2018-05-04 DIAGNOSIS — Z87.01 HISTORY OF PNEUMONIA: ICD-10-CM

## 2018-05-04 PROCEDURE — 99214 OFFICE O/P EST MOD 30 MIN: CPT | Performed by: FAMILY MEDICINE

## 2018-05-04 RX ORDER — AZITHROMYCIN 250 MG/1
TABLET, FILM COATED ORAL
Qty: 6 TABLET | Refills: 0 | Status: SHIPPED | OUTPATIENT
Start: 2018-05-04 | End: 2019-02-19

## 2018-05-04 NOTE — MR AVS SNAPSHOT
After Visit Summary   5/4/2018    Chiquita Berry    MRN: [id number]           Patient Information     Date Of Birth          1940        Visit Information        Provider Department      5/4/2018 10:45 AM Gilberto Flores,  [facility]        Today's Diagnoses     Fever and chills    -  1    Productive cough        History of pneumonia        Acute pain of right knee           Follow-ups after your visit        Who to contact     If you have questions or need follow up information about today's clinic visit or your schedule please contact [facility] directly at [phone].  Normal or non-critical lab and imaging results will be communicated to you by Socratichart, letter or phone within 4 business days after the clinic has received the results. If you do not hear from us within 7 days, please contact the clinic through Socratichart or phone. If you have a critical or abnormal lab result, we will notify you by phone as soon as possible.  Submit refill requests through Sustainability Roundtable or call your pharmacy and they will forward the refill request to us. Please allow 3 business days for your refill to be completed.          Additional Information About Your Visit        MyChart Information     Sustainability Roundtable gives you secure access to your electronic health record. If you see a primary care provider, you can also send messages to your care team and make appointments. If you have questions, please call your primary care clinic.  If you do not have a primary care provider, please call [phone] and they will assist you.        Care EveryWhere ID     This is your Care EveryWhere ID. This could be used by other organizations to access your [place] medical records  [id number]        Your Vitals Were     Pulse Temperature Respirations Last Period Pulse Oximetry BMI (Body Mass Index)    100 100  F (37.8  C) (Oral) 16 (LMP Unknown) 92% 24.67 kg/m2       Blood  Pressure from Last 3 Encounters:   05/04/18 126/70   12/26/17 128/72   12/17/17 139/76    Weight from Last 3 Encounters:   05/04/18 134 lb 14.4 oz (61.2 kg)   12/26/17 134 lb (60.8 kg)   12/17/17 135 lb 1.6 oz (61.3 kg)              Today, you had the following     No orders found for display         Today's Medication Changes          These changes are accurate as of 5/4/18 11:08 AM.  If you have any questions, ask your nurse or doctor.               Start taking these medicines.        Dose/Directions    azithromycin 250 MG tablet   Commonly known as:  ZITHROMAX   Used for:  Fever and chills, Productive cough, History of pneumonia   Started by:  Gilberto Flores, DO        Two tablets first day, then one tablet daily for four days.   Quantity:  6 tablet   Refills:  0            Where to get your medicines      These medications were sent to [address] [address]     Phone:  [phone]     azithromycin 250 MG tablet                Primary Care Provider Office Phone # Fax #    Christina Allie Crawford [phone]       [address]  [facility] [address]        Equal Access to Services     GREGORY TAPIA AH: Hadii maximo ku hadasho Soomaali, waaxda luqadaha, qaybta kaalmada adeegyada, waxay pau castillo. So [facility] [phone].    ATENCIÓN: Si habla español, tiene a skinner disposición servicios gratuitos de asistencia lingüística. Llame al [phone].    We comply with applicable federal civil rights laws and [place] laws. We do not discriminate on the basis of race, color, national origin, age, disability, sex, sexual orientation, or gender identity.            Thank you!     Thank you for choosing [facility]  for your care. Our goal is always to provide you with excellent care. Hearing back from our patients is one way we can continue to improve our services. Please take a  few minutes to complete the written survey that you may receive in the mail after your visit with us. Thank you!             Your Updated Medication List - Protect others around you: Learn how to safely use, store and throw away your medicines at www.disposemymeds.org.          This list is accurate as of 5/4/18 11:08 AM.  Always use your most recent med list.                   Brand Name Dispense Instructions for use Diagnosis    albuterol 108 (90 Base) MCG/ACT Inhaler    PROAIR HFA/PROVENTIL HFA/VENTOLIN HFA    1 Inhaler    Inhale 1-2 puffs into the lungs every 4 hours as needed for shortness of breath / dyspnea or wheezing    Pneumonia of right lower lobe due to infectious organism (H)       aspirin 81 MG tablet      Take 1 tablet by mouth daily.        atorvastatin 20 MG tablet    LIPITOR    90 tablet    TAKE 1 TABLET (20 MG) BY MOUTH DAILY    Hyperlipidemia with target LDL less than 100       azithromycin 250 MG tablet    ZITHROMAX    6 tablet    Two tablets first day, then one tablet daily for four days.    Fever and chills, Productive cough, History of pneumonia       CALCIUM PO      Take  by mouth.        IBUPROFEN PO      Take 200 mg by mouth every 8 hours as needed        * MULTIVITAL Tabs      Take 1 tablet by mouth daily.        * PRESERVISION AREDS 2 Caps      Take 2 capsules by mouth daily        polyethylene glycol Packet    MIRALAX/GLYCOLAX     Take 1 packet by mouth daily        triamcinolone 0.5 % cream    KENALOG    30 g    Apply sparingly to affected area three times daily.    Pruritus of genital organs       TYLENOL ARTHRITIS EXT RELIEF OR      Take 500 mg by mouth daily        vitamin D 2000 units tablet      Take 1 tablet by mouth daily.        zolpidem 5 MG tablet    AMBIEN    30 tablet    TAKE ONE TABLET BY MOUTH EVERY NIGHT AS NEEDED FORSLEEP    Transient insomnia       * Notice:  This list has 2 medication(s) that are the same as other medications prescribed for you. Read the directions  carefully, and ask your doctor or other care provider to review them with you.

## 2018-05-04 NOTE — PROGRESS NOTES
SUBJECTIVE: Chiquita Berry is a 77 year old female presenting with a chief complaint of nasal congestion, cough  and rt knee pain.  Onset of symptoms was day(s) ago.  Course of illness is worsening.    Severity moderate  Current and Associated symptoms: none  Treatment measures tried include Tylenol/Ibuprofen.  Predisposing factors include None.    Past Medical History:   Diagnosis Date     Anxiety      Cataract     bilateral     GERD (gastroesophageal reflux disease)      Hyperlipidemia LDL goal < 100      Insomnia     using ambien once a month     Kyphosis      Osteoporosis      Palpitations     resolved     Scapula fracture 4/5/2015    right - didn't need surgery - sling     Scoliosis      No Known Allergies  Social History   Substance Use Topics     Smoking status: Never Smoker     Smokeless tobacco: Never Used     Alcohol use 0.0 oz/week     0 Standard drinks or equivalent per week      Comment: Occ glass of wine - less than once per month       ROS:  SKIN: no rash  GI: no vomiting    OBJECTIVE:  /70  Pulse 100  Temp 100  F (37.8  C) (Oral)  Resp 16  Wt 134 lb 14.4 oz (61.2 kg)  LMP  (LMP Unknown)  SpO2 92%  BMI 24.67 kg/m2   GENERAL APPEARANCE: healthy, alert and no distress  EYES: EOMI,  PERRL, conjunctiva clear  HENT: ear canals and TM's normal.  Nose and mouth without ulcers, erythema or lesions  NECK: supple, nontender, no lymphadenopathy  RESP: lungs clear to auscultation - no rales, rhonchi or wheezes  SKIN: no suspicious lesions or rashes  Rt knee pain with rom and palpation      ICD-10-CM    1. Fever and chills R50.9 azithromycin (ZITHROMAX) 250 MG tablet   2. Productive cough R05 azithromycin (ZITHROMAX) 250 MG tablet   3. History of pneumonia Z87.01 azithromycin (ZITHROMAX) 250 MG tablet   4. Acute pain of right knee M25.561      Ice/Tylenol  Fluids/Rest, f/u if worse/not any better

## 2018-05-07 ENCOUNTER — RADIANT APPOINTMENT (OUTPATIENT)
Dept: GENERAL RADIOLOGY | Facility: CLINIC | Age: 78
End: 2018-05-07
Attending: PHYSICIAN ASSISTANT
Payer: COMMERCIAL

## 2018-05-07 ENCOUNTER — OFFICE VISIT (OUTPATIENT)
Dept: URGENT CARE | Facility: URGENT CARE | Age: 78
End: 2018-05-07
Payer: COMMERCIAL

## 2018-05-07 VITALS
DIASTOLIC BLOOD PRESSURE: 68 MMHG | BODY MASS INDEX: 24.53 KG/M2 | RESPIRATION RATE: 16 BRPM | TEMPERATURE: 99.7 F | HEART RATE: 85 BPM | SYSTOLIC BLOOD PRESSURE: 120 MMHG | WEIGHT: 134.1 LBS | OXYGEN SATURATION: 97 %

## 2018-05-07 DIAGNOSIS — J84.10 PULMONARY FIBROSIS (H): ICD-10-CM

## 2018-05-07 DIAGNOSIS — Z87.01 HX OF BACTERIAL PNEUMONIA: ICD-10-CM

## 2018-05-07 DIAGNOSIS — R09.89 CHEST CONGESTION: ICD-10-CM

## 2018-05-07 DIAGNOSIS — R05.8 PRODUCTIVE COUGH: ICD-10-CM

## 2018-05-07 DIAGNOSIS — Z87.01 HX OF BACTERIAL PNEUMONIA: Primary | ICD-10-CM

## 2018-05-07 PROCEDURE — 99214 OFFICE O/P EST MOD 30 MIN: CPT | Performed by: PHYSICIAN ASSISTANT

## 2018-05-07 PROCEDURE — 71046 X-RAY EXAM CHEST 2 VIEWS: CPT | Mod: FY

## 2018-05-07 RX ORDER — PREDNISONE 10 MG/1
10 TABLET ORAL 3 TIMES DAILY
Qty: 15 TABLET | Refills: 0 | Status: SHIPPED | OUTPATIENT
Start: 2018-05-07 | End: 2019-02-19

## 2018-05-07 RX ORDER — AMOXICILLIN AND CLAVULANATE POTASSIUM 500; 125 MG/1; MG/1
1 TABLET, FILM COATED ORAL 3 TIMES DAILY
Qty: 30 TABLET | Refills: 0 | Status: SHIPPED | OUTPATIENT
Start: 2018-05-07 | End: 2018-05-31

## 2018-05-07 NOTE — MR AVS SNAPSHOT
After Visit Summary   5/7/2018    Chiquita Berry    MRN: [id number]           Patient Information     Date Of Birth          1940        Visit Information        Provider Department      5/7/2018 4:40 PM Jose Stinson PA-C [facility]        Today's Diagnoses     Hx of bacterial pneumonia    -  1    Chest congestion        Productive cough        Pulmonary fibrosis (H)           Follow-ups after your visit        Who to contact     If you have questions or need follow up information about today's clinic visit or your schedule please contact [facility] directly at [phone].  Normal or non-critical lab and imaging results will be communicated to you by Altair Prephart, letter or phone within 4 business days after the clinic has received the results. If you do not hear from us within 7 days, please contact the clinic through Altair Prephart or phone. If you have a critical or abnormal lab result, we will notify you by phone as soon as possible.  Submit refill requests through Qualiall or call your pharmacy and they will forward the refill request to us. Please allow 3 business days for your refill to be completed.          Additional Information About Your Visit        MyChart Information     Qualiall gives you secure access to your electronic health record. If you see a primary care provider, you can also send messages to your care team and make appointments. If you have questions, please call your primary care clinic.  If you do not have a primary care provider, please call [phone] and they will assist you.        Care EveryWhere ID     This is your Care EveryWhere ID. This could be used by other organizations to access your [place] medical records  [id number]        Your Vitals Were     Pulse Temperature Respirations Last Period Pulse Oximetry BMI (Body Mass Index)    85 99.7  F (37.6  C) (Oral) 16 (LMP Unknown) 97% 24.53 kg/m2       Blood  Pressure from Last 3 Encounters:   05/07/18 120/68   05/04/18 126/70   12/26/17 128/72    Weight from Last 3 Encounters:   05/07/18 134 lb 1.6 oz (60.8 kg)   05/04/18 134 lb 14.4 oz (61.2 kg)   12/26/17 134 lb (60.8 kg)                 Today's Medication Changes          These changes are accurate as of 5/7/18  5:21 PM.  If you have any questions, ask your nurse or doctor.               Start taking these medicines.        Dose/Directions    amoxicillin-clavulanate 500-125 MG per tablet   Commonly known as:  AUGMENTIN   Used for:  Hx of bacterial pneumonia, Chest congestion, Productive cough   Started by:  Jose Stinson PA-C        Dose:  1 tablet   Take 1 tablet by mouth 3 times daily   Quantity:  30 tablet   Refills:  0       predniSONE 10 MG tablet   Commonly known as:  DELTASONE   Used for:  Pulmonary fibrosis (H)   Started by:  Jose Stinson PA-C        Dose:  10 mg   Take 1 tablet (10 mg) by mouth 3 times daily for 5 days   Quantity:  15 tablet   Refills:  0            Where to get your medicines      These medications were sent to [address] [address]     Phone:  [phone]     amoxicillin-clavulanate 500-125 MG per tablet    predniSONE 10 MG tablet                Primary Care Provider Office Phone # Fax #    Christina Allie Crawford [phone]       [address] [facility] [address]        Equal Access to Services     [facility] AH: Hadii aad ku hadasho Soomaali, waaxda luqadaha, qaybta kaalmada adeegyada, waxay pau castillo. So [facility] [phone].    ATENCIÓN: Si habla español, tiene a skinner disposición servicios gratuitos de asistencia lingüística. Llame al [phone].    We comply with applicable federal civil rights laws and [place] laws. We do not discriminate on the basis of race, color, national origin, age, disability, sex, sexual orientation, or gender identity.             Thank you!     Thank you for choosing [facility]  for your care. Our goal is always to provide you with excellent care. Hearing back from our patients is one way we can continue to improve our services. Please take a few minutes to complete the written survey that you may receive in the mail after your visit with us. Thank you!             Your Updated Medication List - Protect others around you: Learn how to safely use, store and throw away your medicines at www.disposemymeds.org.          This list is accurate as of 5/7/18  5:21 PM.  Always use your most recent med list.                   Brand Name Dispense Instructions for use Diagnosis    albuterol 108 (90 Base) MCG/ACT Inhaler    PROAIR HFA/PROVENTIL HFA/VENTOLIN HFA    1 Inhaler    Inhale 1-2 puffs into the lungs every 4 hours as needed for shortness of breath / dyspnea or wheezing    Pneumonia of right lower lobe due to infectious organism (H)       amoxicillin-clavulanate 500-125 MG per tablet    AUGMENTIN    30 tablet    Take 1 tablet by mouth 3 times daily    Hx of bacterial pneumonia, Chest congestion, Productive cough       aspirin 81 MG tablet      Take 1 tablet by mouth daily.        atorvastatin 20 MG tablet    LIPITOR    90 tablet    TAKE 1 TABLET (20 MG) BY MOUTH DAILY    Hyperlipidemia with target LDL less than 100       azithromycin 250 MG tablet    ZITHROMAX    6 tablet    Two tablets first day, then one tablet daily for four days.    Fever and chills, Productive cough, History of pneumonia       CALCIUM PO      Take  by mouth.        IBUPROFEN PO      Take 200 mg by mouth every 8 hours as needed        * MULTIVITAL Tabs      Take 1 tablet by mouth daily.        * PRESERVISION AREDS 2 Caps      Take 2 capsules by mouth daily        polyethylene glycol Packet    MIRALAX/GLYCOLAX     Take 1 packet by mouth daily        predniSONE 10 MG tablet    DELTASONE    15 tablet    Take 1 tablet (10 mg) by mouth 3 times  daily for 5 days    Pulmonary fibrosis (H)       triamcinolone 0.5 % cream    KENALOG    30 g    Apply sparingly to affected area three times daily.    Pruritus of genital organs       TYLENOL ARTHRITIS EXT RELIEF OR      Take 500 mg by mouth daily        vitamin D 2000 units tablet      Take 1 tablet by mouth daily.        zolpidem 5 MG tablet    AMBIEN    30 tablet    TAKE ONE TABLET BY MOUTH EVERY NIGHT AS NEEDED FORSLEEP    Transient insomnia       * Notice:  This list has 2 medication(s) that are the same as other medications prescribed for you. Read the directions carefully, and ask your doctor or other care provider to review them with you.

## 2018-05-07 NOTE — PROGRESS NOTES
SUBJECTIVE:   Chiquita Berry is a 77 year old female presenting with a chief complaint of chest congestion, ongoing coughing that's deeper in her chest.  Onset of symptoms was over 1 week ago .  Course of illness is worsening.    Severity moderate  Current and Associated symptoms: chest congestion, productive cough at times, wheezing  Treatment measures tried include zpak.  Predisposing factors include hx of pneumonia, atelectasis and scarring on lungs.    Past Medical History:   Diagnosis Date     Anxiety      Cataract     bilateral     GERD (gastroesophageal reflux disease)      Hyperlipidemia LDL goal < 100      Insomnia     using ambien once a month     Kyphosis      Osteoporosis      Palpitations     resolved     Scapula fracture 4/5/2015    right - didn't need surgery - sling     Scoliosis      ALLERGIES   No Known Allergies      Social History   Substance Use Topics     Smoking status: Never Smoker     Smokeless tobacco: Never Used     Alcohol use 0.0 oz/week     0 Standard drinks or equivalent per week      Comment: Occ glass of wine - less than once per month       ROS:  CONSTITUTIONAL:NEGATIVE for fever, chills, change in weight  INTEGUMENTARY/SKIN: NEGATIVE for worrisome rashes, moles or lesions  ENT/MOUTH: POSITIVE for mild sinus congestion  RESP:Positive for productive cough, deep harsh cough  CV: NEGATIVE for chest pain, palpitations or peripheral edema  GI: NEGATIVE for nausea, abdominal pain, heartburn, or change in bowel habits  MUSCULOSKELETAL: NEGATIVE for significant arthralgias or myalgia  NEURO: NEGATIVE for weakness, dizziness or paresthesias    OBJECTIVE  :/68  Pulse 85  Temp 99.7  F (37.6  C) (Oral)  Resp 16  Wt 134 lb 1.6 oz (60.8 kg)  LMP  (LMP Unknown)  SpO2 97%  BMI 24.53 kg/m2  GENERAL APPEARANCE: healthy, alert and no distress  EYES: EOMI,  PERRL, conjunctiva clear  HENT: ear canals and TM's normal.  Nose and mouth without ulcers, erythema or lesions  NECK: supple,  nontender, no lymphadenopathy  RESP: lungs clear to auscultation - no rales, rhonchi or wheezes  CV: regular rates and rhythm, normal S1 S2, no murmur noted  ABDOMEN:  soft, nontender, no HSM or masses and bowel sounds normal  NEURO: Normal strength and tone, sensory exam grossly normal,  normal speech and mentation  SKIN: no suspicious lesions or rashes    Chest xray Negative for acute findings, read by Jose BURNETT at time of visit.    ASSESSMENT/PLAN:      ICD-10-CM    1. Hx of bacterial pneumonia Z87.01 XR Chest 2 Views     amoxicillin-clavulanate (AUGMENTIN) 500-125 MG per tablet   2. Chest congestion R09.89 XR Chest 2 Views     amoxicillin-clavulanate (AUGMENTIN) 500-125 MG per tablet   3. Productive cough R05 XR Chest 2 Views     amoxicillin-clavulanate (AUGMENTIN) 500-125 MG per tablet   4. Pulmonary fibrosis (H) J84.10 predniSONE (DELTASONE) 10 MG tablet       May use mucinex  Increase fluids  Follow up with PCP as needed  See orders in Epic

## 2018-05-09 ENCOUNTER — NURSE TRIAGE (OUTPATIENT)
Dept: NURSING | Facility: CLINIC | Age: 78
End: 2018-05-09

## 2018-05-09 ENCOUNTER — TELEPHONE (OUTPATIENT)
Dept: URGENT CARE | Facility: URGENT CARE | Age: 78
End: 2018-05-09

## 2018-05-09 NOTE — TELEPHONE ENCOUNTER
FYI: Notified pt of Provider Suyapa Soto Note.  Pt has decided to stop antibiotic because of diarrhea, but will continue taking prednisone. Pt states she will call back tomorrow if diarrhea has not gotten better.

## 2018-05-09 NOTE — TELEPHONE ENCOUNTER
Clinic Action Needed:  Yes, callback  [facility] Triage Call  Presenting Problem:    Chiquita has a respiratory infection and was given medication by MD Jose Stinson Augmentin and is having diarrhea.  Chiquita is requesting to speak with MD Stinson about Augmentin.    Chiquita also has questions regarding prednisone.  Please phone Chiquita at [phone].      Routed to:  RN Pool  Please be sure to close this encounter once this patient's issue/question has been addressed.    Chiquita Riddle RN/[facility]

## 2018-05-09 NOTE — TELEPHONE ENCOUNTER
Chiquita has a respiratory infection and was given medication by MD Jose Stinson Augmentin and is having diarrhea.  Chiquita is requesting to speak with MD Stinson about Augmentin.    Chiquita also has questions regarding prednisone.  Please phone Chiquita at [phone].

## 2018-05-10 ENCOUNTER — NURSE TRIAGE (OUTPATIENT)
Dept: NURSING | Facility: CLINIC | Age: 78
End: 2018-05-10

## 2018-05-10 NOTE — TELEPHONE ENCOUNTER
I called and spoke with someone in the urgent care.  Dr Stinson is gone for the day. Advised patient to call back tomorrow, Friday, to see if Dr Stinson can talk with her. The diarrhea has cleared. She had one loose stool today. Prednisone prescribed, only enough for three days dispensed from pharmacy. Pill bottle doesn't match what was dispensed. Two pills to take today then she's done with the prednisone. She hasn't started the replacement antibiotic. She wants to wait until she can talk with Dr Stinson.  She did have 4 days of a z pack so far. She still has a cough and denies shortness of breath.  She did sleep all night last night.  Advised to call back tomorrow morning around 11 a.m. If she hasn't heard from Dr Stinson by then. Advised to call back if anything worsens or changes.   Nayely Blanc RN-[facility]

## 2018-05-10 NOTE — TELEPHONE ENCOUNTER
"\"They said to phone back today and talk to Dr Stinson today.\" One day of antibiotic then diarrhea all night so stopped the antibiotic. Started probiotics. Needs to talk about prednisone and does she start another antibiotic? Dr Stinson, please call her.  Please call patient before 12 noon or she will call back.  Nayely Blanc RN-[facility]  "

## 2018-05-10 NOTE — TELEPHONE ENCOUNTER
They said to phone back today and talk to Dr Stinson today. One day of antibiotic then diarrhea all night so stopped the antibiotic. Started probiotics. Needs to talk about prednisone and does she start another antibiotic? Dr Stinson, please call her. Epic encounter added to and rerouted to the urgent care for Dr Stinson to call the patient back.  Nayely Blanc RN-[facility]

## 2018-05-11 ENCOUNTER — TELEPHONE (OUTPATIENT)
Dept: URGENT CARE | Facility: URGENT CARE | Age: 78
End: 2018-05-11

## 2018-05-11 ENCOUNTER — NURSE TRIAGE (OUTPATIENT)
Dept: NURSING | Facility: CLINIC | Age: 78
End: 2018-05-11

## 2018-05-11 NOTE — TELEPHONE ENCOUNTER
Clinic Action Needed:  Yes, callback  FNA Triage Call  Presenting Problem:    Chiquita was into [facility] on Monday and has questions on medication Amox/clav and is taking and then developed severe diarrhea and she stopped taking medication.  Chiquita was also prescribed Prednisone and finished medication Chiquita was given only three days.  Pill bottle said 1 tablet by mouth 3 times daily for five days, but there were only enough pills  For three days.  FNA contacted [facility] and RN will speak with MD and call patient back.  Chiquita is requesting to speak with MD Jose Stinson.  Please phone Chiquita.      Routed to:  RN Pool  Please be sure to close this encounter once this patient's issue/question has been addressed.    Chiquita Riddle RN/[place] [facility]

## 2018-05-11 NOTE — TELEPHONE ENCOUNTER
Routing to melissa porras, pt wants to know what she should do next. She wants to speak with Dr. melissa porras. Please review and advise.

## 2018-05-11 NOTE — TELEPHONE ENCOUNTER
Chiquita was into [facility] on Monday and has questions on medication Amox/clav and is taking and then developed severe diarrhea and she stopped taking medication.  Chiquita was also prescribed Prednisone and finished medication Chiquita was given only three days.  Pill bottle said 1 tablet by mouth 3 times daily for five days, but there were only enough pills  For three days.  FNA contacted [facility] and RN will speak with MD and call patient back.  Chiquita is requesting to speak with MD Jose Stinson.  Please phone Chiquita.

## 2018-05-12 RX ORDER — AMOXICILLIN 500 MG/1
500 CAPSULE ORAL 3 TIMES DAILY
Qty: 30 CAPSULE | Refills: 0 | Status: SHIPPED | OUTPATIENT
Start: 2018-05-12 | End: 2018-05-31

## 2018-05-31 ENCOUNTER — OFFICE VISIT (OUTPATIENT)
Dept: FAMILY MEDICINE | Facility: CLINIC | Age: 78
End: 2018-05-31
Payer: COMMERCIAL

## 2018-05-31 VITALS
DIASTOLIC BLOOD PRESSURE: 60 MMHG | HEIGHT: 62 IN | SYSTOLIC BLOOD PRESSURE: 120 MMHG | TEMPERATURE: 98.8 F | WEIGHT: 131 LBS | HEART RATE: 83 BPM | BODY MASS INDEX: 24.11 KG/M2 | OXYGEN SATURATION: 94 % | RESPIRATION RATE: 18 BRPM

## 2018-05-31 DIAGNOSIS — G47.00 PERSISTENT INSOMNIA: ICD-10-CM

## 2018-05-31 DIAGNOSIS — R19.7 DIARRHEA, UNSPECIFIED TYPE: ICD-10-CM

## 2018-05-31 DIAGNOSIS — R73.02 GLUCOSE INTOLERANCE (IMPAIRED GLUCOSE TOLERANCE): ICD-10-CM

## 2018-05-31 DIAGNOSIS — E78.00 HYPERCHOLESTEROLEMIA: ICD-10-CM

## 2018-05-31 DIAGNOSIS — R26.9 ABNORMAL GAIT: ICD-10-CM

## 2018-05-31 DIAGNOSIS — Z78.9 NONSMOKER: ICD-10-CM

## 2018-05-31 DIAGNOSIS — J20.9 ACUTE BRONCHITIS, UNSPECIFIED ORGANISM: Primary | ICD-10-CM

## 2018-05-31 DIAGNOSIS — R09.82 POST-NASAL DRIP: ICD-10-CM

## 2018-05-31 DIAGNOSIS — R26.89 BALANCE PROBLEMS: ICD-10-CM

## 2018-05-31 LAB
ALT SERPL W P-5'-P-CCNC: 26 U/L (ref 0–50)
GLUCOSE SERPL-MCNC: 101 MG/DL (ref 70–99)
LDLC SERPL DIRECT ASSAY-MCNC: 106 MG/DL

## 2018-05-31 PROCEDURE — 82947 ASSAY GLUCOSE BLOOD QUANT: CPT | Performed by: FAMILY MEDICINE

## 2018-05-31 PROCEDURE — 36415 COLL VENOUS BLD VENIPUNCTURE: CPT | Performed by: FAMILY MEDICINE

## 2018-05-31 PROCEDURE — 84460 ALANINE AMINO (ALT) (SGPT): CPT | Performed by: FAMILY MEDICINE

## 2018-05-31 PROCEDURE — 99214 OFFICE O/P EST MOD 30 MIN: CPT | Performed by: FAMILY MEDICINE

## 2018-05-31 PROCEDURE — 83721 ASSAY OF BLOOD LIPOPROTEIN: CPT | Performed by: FAMILY MEDICINE

## 2018-05-31 RX ORDER — ZOLPIDEM TARTRATE 5 MG/1
TABLET ORAL
Qty: 30 TABLET | Refills: 0 | Status: SHIPPED | OUTPATIENT
Start: 2018-05-31 | End: 2019-08-13

## 2018-05-31 RX ORDER — ATORVASTATIN CALCIUM 20 MG/1
TABLET, FILM COATED ORAL
Qty: 90 TABLET | Refills: 3 | Status: SHIPPED | OUTPATIENT
Start: 2018-05-31 | End: 2019-04-16

## 2018-05-31 NOTE — MR AVS SNAPSHOT
After Visit Summary   5/31/2018    Chiquita Berry    MRN: [id number]           Patient Information     Date Of Birth          1940        Visit Information        Provider Department      5/31/2018 10:20 AM Christina Crawford MD [facility]        Today's Diagnoses     Acute bronchitis, unspecified organism    -  1    Post-nasal drip        Nonsmoker        Glucose intolerance (impaired glucose tolerance)        Hypercholesterolemia        Balance problems        Abnormal gait          Care Instructions    1.  Eat calcium : dairy and greens  Do not take calcium in pill form as it can plaque on the heart arteries and cause kidney stones    2. Shingrex is a 2 shot series that prevents shingles 97% of the time, as opposed to the old shingles shot that only prevented it at 40-50%  It costs less for medicare at a pharmacy  You should get it starting at 50 yrs old     3.  Run a cold air vaporizer as much as possible. If you cannot,  boil water and breath the warm vapors 2-3 times a day to try to open up the sinuses take 2400mgm of guaifenesin per 24 hours   You can do this by taking  Mucinex plain blue  1200 mg  One tablet twice a day (This may come as 600mg/tablet and you need to take 2 tabs twice a day) or you could buy the cheaper  generic 400mgm / tab and take 2 tablets 3 x a day or 1 and 1/2 tablets 4 x a day . .Guaifenesin is  the major component of most cough syrups, because it makes the mucus less thick, and therefore it drains out better and you are less likely to cough from it dripping on the back of your throat.  Irrigate the  nose with plain water under the kitchen sink faucet or the shower.  Nilda pots, spray bottles, etc accumulate bacteria and are not recommended.   The tickle in the throat is also helped by gargling with vinegar and honey mixture, or pop or mouth wash as these coat the throat.  Please try to rinse teeth with water after using  these .   Do not use sudafed or pseudephedrine as it dries the mucus up so it is harder to get it out, and it can raise your BP               Follow-ups after your visit        Additional Services     [facility] PT, HAND, AND CHIROPRACTIC REFERRAL       **This order will print in the [facility] Scheduling Office**    Physical Therapy, Hand Therapy and Chiropractic Care are available through:    *[facility]  *[facility]  *[facility]    Call one number to schedule at any of the above locations: [phone].    Your provider has referred you to: As Indicated:     Indication/Reason for Referral:   Onset of Illness:   Therapy Orders: Evaluate and Treat  Special Programs:   Special Request:     Khris Pinedo      Additional Comments for the Therapist or Chiropractor:     Please be aware that coverage of these services is subject to the terms and limitations of your health insurance plan.  Call member services at your health plan with any benefit or coverage questions.      Please bring the following to your appointment:    *Your personal calendar for scheduling future appointments  *Comfortable clothing                  Who to contact     If you have questions or need follow up information about today's clinic visit or your schedule please contact [facility] directly at [phone].  Normal or non-critical lab and imaging results will be communicated to you by MyChart, letter or phone within 4 business days after the clinic has received the results. If you do not hear from us within 7 days, please contact the clinic through MyChart or phone. If you have a critical or abnormal lab result, we will notify you by phone as soon as possible.  Submit refill requests through [employer] or call your pharmacy and they will forward the refill request to us. Please allow 3 business days for your refill to be completed.          Additional Information About Your  "Visit        MyChart Information     [employer] gives you secure access to your electronic health record. If you see a primary care provider, you can also send messages to your care team and make appointments. If you have questions, please call your primary care clinic.  If you do not have a primary care provider, please call [phone] and they will assist you.        Care EveryWhere ID     This is your Care EveryWhere ID. This could be used by other organizations to access your [place] medical records  [id number]        Your Vitals Were     Pulse Temperature Respirations Height Last Period Pulse Oximetry    83 98.8  F (37.1  C) (Tympanic) 18 5' 2\" (1.575 m) (LMP Unknown) 94%    Breastfeeding? BMI (Body Mass Index)                No 23.96 kg/m2           Blood Pressure from Last 3 Encounters:   05/31/18 120/60   05/07/18 120/68   05/04/18 126/70    Weight from Last 3 Encounters:   05/31/18 131 lb (59.4 kg)   05/07/18 134 lb 1.6 oz (60.8 kg)   05/04/18 134 lb 14.4 oz (61.2 kg)              We Performed the Following     ALT     Glucose     [facility] PT, HAND, AND CHIROPRACTIC REFERRAL     LDL cholesterol direct        Primary Care Provider Office Phone # Fax #    Christina Allie Crawford [phone]       [address] [facility] [address]        Equal Access to Services     [facility]: Hadii aad ku hadasho Soomaali, waaxda luqadaha, qaybta kaalmada adeegyada, mike salcedo . So [facility] [phone].    ATENCIÓN: Si habla español, tiene a skinner disposición servicios gratuitos de asistencia lingüística. Llame al [phone].    We comply with applicable federal civil rights laws and [place] laws. We do not discriminate on the basis of race, color, national origin, age, disability, sex, sexual orientation, or gender identity.            Thank you!     Thank you for choosing [facility]  for your care. Our goal is always to provide you with " excellent care. Hearing back from our patients is one way we can continue to improve our services. Please take a few minutes to complete the written survey that you may receive in the mail after your visit with us. Thank you!             Your Updated Medication List - Protect others around you: Learn how to safely use, store and throw away your medicines at www.disposemymeds.org.          This list is accurate as of 5/31/18 10:38 AM.  Always use your most recent med list.                   Brand Name Dispense Instructions for use Diagnosis    albuterol 108 (90 Base) MCG/ACT Inhaler    PROAIR HFA/PROVENTIL HFA/VENTOLIN HFA    1 Inhaler    Inhale 1-2 puffs into the lungs every 4 hours as needed for shortness of breath / dyspnea or wheezing    Pneumonia of right lower lobe due to infectious organism (H)       aspirin 81 MG tablet      Take 1 tablet by mouth daily.        atorvastatin 20 MG tablet    LIPITOR    90 tablet    TAKE 1 TABLET (20 MG) BY MOUTH DAILY    Hyperlipidemia with target LDL less than 100       CALCIUM PO      Take  by mouth.        IBUPROFEN PO      Take 200 mg by mouth every 8 hours as needed        * MULTIVITAL Tabs      Take 1 tablet by mouth daily.        * PRESERVISION AREDS 2 Caps      Take 2 capsules by mouth daily        polyethylene glycol Packet    MIRALAX/GLYCOLAX     Take 1 packet by mouth daily        triamcinolone 0.5 % cream    KENALOG    30 g    Apply sparingly to affected area three times daily.    Pruritus of genital organs       TYLENOL ARTHRITIS EXT RELIEF OR      Take 500 mg by mouth daily        vitamin D 2000 units tablet      Take 1 tablet by mouth daily.        zolpidem 5 MG tablet    AMBIEN    30 tablet    TAKE ONE TABLET BY MOUTH EVERY NIGHT AS NEEDED FORSLEEP    Transient insomnia       * Notice:  This list has 2 medication(s) that are the same as other medications prescribed for you. Read the directions carefully, and ask your doctor or other care provider to review them  with you.

## 2018-05-31 NOTE — LETTER
May 31, 2018      Chiquita Berry  [address]   [facility] [address]        Dear ,    We are writing to inform you of your test results.    They are all normal     THE FOLLOWING ARE EXPLANATIONS OF SOME OF OUR LAB TESTS     YOU DID NOT NECESSARILY HAVE ALL OF THESE DONE     Hgb is the blood iron level   WBC means White Blood Cells   Platelets are small blood cells that help with forming the blood clots along with other blood factors.   Electrolytes are Sodium, Potassium, Calcium, Magnesium, Phosphorus.   Liver tests are: AST, ALT, Bilirubin, Alkaline Phosphatase.   Kidney tests are Creatinine, GFR.   HDL Cholesterol - is the good cholesterol and it is good to have it high.   LDL cholesterol is the bad cholesterol and it is good to have it low.   It is recommended to have LDL less than 130 for people with hypertension and to have it less than 100 for people with heart disease, diabetes and chronic kidney disease.   Triglycerides are another type of lipid that can cause heart disease, like the cholesterol and should be kept low   Thyroid tests are TSH, T4, T3   Glucose is sugar.   A1c is a test that gives us an idea about how well was controlled the diabetes for the last 3 months.   PSA stands for Prostate Specific Antigen and it can be elevated with prostate cancer or prostate inflammation.     Please continue on the same medications     All normal or pretty close !!!     Resulted Orders   LDL cholesterol direct   Result Value Ref Range    LDL Cholesterol Direct 106 (H) <100 mg/dL      Comment:      Above desirable:  100-129 mg/dl  Borderline High:  130-159 mg/dL  High:             160-189 mg/dL  Very high:       >189 mg/dl     ALT   Result Value Ref Range    ALT 26 0 - 50 U/L   Glucose   Result Value Ref Range    Glucose 101 (H) 70 - 99 mg/dL      Comment:      Fasting specimen       If you have any questions or concerns, please call the clinic at the number listed above.        Sincerely,        Christina Crawford MD

## 2018-05-31 NOTE — PROGRESS NOTES
.  Please see attached lab results  They are all normal     THE FOLLOWING ARE EXPLANATIONS OF SOME OF OUR LAB TESTS     YOU DID NOT NECESSARILY HAVE ALL OF THESE DONE     Hgb is the blood iron level  WBC means White Blood Cells  Platelets are small blood cells that help with forming the blood clots along with other blood factors.  Electrolytes are Sodium, Potassium, Calcium, Magnesium, Phosphorus.  Liver tests are: AST, ALT, Bilirubin, Alkaline Phosphatase.  Kidney tests are Creatinine, GFR.  HDL Cholesterol - is the good cholesterol and it is good to have it high.  LDL cholesterol is the bad cholesterol and it is good to have it low.  It is recommended to have LDL less than 130 for people with hypertension and to have it less than 100 for people with heart disease, diabetes and chronic kidney disease.  Triglycerides are another type of lipid that can cause heart disease, like the cholesterol and should be kept low   Thyroid tests are TSH, T4, T3  Glucose is sugar.  A1c is a test that gives us an idea about how well was controlled the diabetes for the last 3 months.   PSA stands for Prostate Specific Antigen and it can be elevated with prostate cancer or prostate inflammation.    Please continue on the same medications    All normal or pretty close !!!

## 2018-05-31 NOTE — PATIENT INSTRUCTIONS
1.  Eat calcium : dairy and greens  Do not take calcium in pill form as it can plaque on the heart arteries and cause kidney stones    2. Shingrex is a 2 shot series that prevents shingles 97% of the time, as opposed to the old shingles shot that only prevented it at 40-50%  It costs less for medicare at a pharmacy  You should get it starting at 50 yrs old     3.  Run a cold air vaporizer as much as possible. If you cannot,  boil water and breath the warm vapors 2-3 times a day to try to open up the sinuses take 2400mgm of guaifenesin per 24 hours   You can do this by taking  Mucinex plain blue  1200 mg  One tablet twice a day (This may come as 600mg/tablet and you need to take 2 tabs twice a day) or you could buy the cheaper  generic 400mgm / tab and take 2 tablets 3 x a day or 1 and 1/2 tablets 4 x a day . .Guaifenesin is  the major component of most cough syrups, because it makes the mucus less thick, and therefore it drains out better and you are less likely to cough from it dripping on the back of your throat.  Irrigate the  nose with plain water under the kitchen sink faucet or the shower.  Nilda pots, spray bottles, etc accumulate bacteria and are not recommended.   The tickle in the throat is also helped by gargling with vinegar and honey mixture, or pop or mouth wash as these coat the throat.  Please try to rinse teeth with water after using these .   Do not use sudafed or pseudephedrine as it dries the mucus up so it is harder to get it out, and it can raise your BP     Discussed all with pt  And the patient expresses understanding  This was a virus and now hangs on with PND   The abx did not work so not a bacteria    All CXRs = wnl

## 2018-05-31 NOTE — NURSING NOTE
"Chief Complaint   Patient presents with     URI     /60  Pulse 83  Temp 98.8  F (37.1  C) (Tympanic)  Resp 18  Ht 5' 2\" (1.575 m)  Wt 131 lb (59.4 kg)  LMP  (LMP Unknown)  SpO2 94%  Breastfeeding? No  BMI 23.96 kg/m2 Estimated body mass index is 23.96 kg/(m^2) as calculated from the following:    Height as of this encounter: 5' 2\" (1.575 m).    Weight as of this encounter: 131 lb (59.4 kg).  BP completed using cuff size: regular   Jennifer Salmeron CMA    Health Maintenance Due   Topic Date Due     DEXA Q2 YR  04/25/2016     FALL RISK ASSESSMENT  06/23/2018     Health Maintenance reviewed at today's visit patient asked to schedule/complete:   None, Health Maintenance up to date.    "

## 2018-05-31 NOTE — PROGRESS NOTES
SUBJECTIVE:   Chiquita Berry is a 77 year old female who presents to clinic today for the following health issues:    ED/UC Follow-Up    Facility:  [facility]  Date of visit: 05/4/18 and 05/07/18  Reason for visit: URI  Current Status: Getting Better    ENT Symptoms             Symptoms: cc Present Absent Comment   Fever/Chills  X     Fatigue  X     Muscle Aches  X     Eye Irritation   X    Sneezing   X    Nasal Varinder/Drg   X    Sinus Pressure/Pain   X    Loss of smell   X    Dental pain   X    Sore Throat   X    Swollen Glands   X    Ear Pain/Fullness   X    Cough  X     Wheeze   X    Chest Pain   X    Shortness of breath   X    Rash   X    Other  X  Diarrhea after augmentin      Symptom duration:  05/01/18   Symptom severity:  Severe   Treatments tried:  Azithromycin, Amoxicillin and Augmentin--> diarrhea   None made a difference    Contacts:  None     Diarrhea from the Augmentin       Duration: since 5-7-18     Description:       Consistency of stool: runny and loose       Blood in stool: no        Number of loose stools past 24 hours: 6    Intensity:  moderate    Accompanying signs and symptoms:       Fever: no        Nausea/vomitting: no        Abdominal pain: no        Weight loss: no     History (recent antibiotics or travel/ill contacts/med changes/testing done): no    Precipitating or alleviating factors: stopped augmentin    Therapies tried and outcome: 0    Insomnia      Duration: life long but worse with recent cough     Description  Frequency of insomnia:  nightly  Time to fall asleep: 30 minutes  Middle of night awakening:  nightly  Early morning awakening:  nightly    Accompanying signs and symptoms:  none    History  Similar episodes in past:  YES  Previous evaluation/sleep study:  no     Precipitating or alleviating factors:  New stressful situation: no   Caffeine intake after lunchtime: no   OTC decongestants: no   Any new medications: no       Therapies tried and outcome: none      Glucose  Intolerance      Patient is checking blood sugars: not at all    FBS to 110    Diabetic concerns: None     Symptoms of hypoglycemia (low blood sugar): none     Paresthesias (numbness or burning in feet) or sores: No     Date of last diabetic eye exam: 2017    BP Readings from Last 2 Encounters:   05/31/18 120/60   05/07/18 120/68     LDL Cholesterol Calculated (mg/dL)   Date Value   06/08/2016 86   12/11/2015 93     LDL Cholesterol Direct (mg/dL)   Date Value   05/31/2018 106 (H)   06/23/2017 102 (H)     Hyperlipidemia:LDL  Follow-Up      Rate your low fat/cholesterol diet?: good    Taking statin?  Yes, no muscle aches from 20mgm atorvastatin    Other lipid medications/supplements?:  None    BALANCE & GAIT DISTURBANCE      With hx of frequent falls  -really helped by PT bal/strength/gait training a yr ago  -is falling more and more frequently again        Problem list and histories reviewed & adjusted, as indicated.  Additional history: as documented    Labs reviewed in EPIC    Reviewed and updated as needed this visit by clinical staff  Tobacco  Allergies  Meds  Problems       Reviewed and updated as needed this visit by Provider  Allergies  Meds  Problems         ROS:  CONSTITUTIONAL: NEGATIVE for fever, chills, change in weight  INTEGUMENTARY/SKIN: NEGATIVE for worrisome rashes, moles or lesions  EYES: NEGATIVE for vision changes or irritation  ENT/MOUTH: NEGATIVE for ear, mouth and throat problems  RESP:POSITIVE for , cough-non productive and cough-productive  BREAST: NEGATIVE for masses, tenderness or discharge  CV: NEGATIVE for chest pain, palpitations or peripheral edema  GI: NEGATIVE for nausea, abdominal pain, heartburn, or change in bowel habits  : NEGATIVE for frequency, dysuria, or hematuria  MUSCULOSKELETAL: NEGATIVE for significant arthralgias or myalgia  NEURO: NEGATIVE for weakness, dizziness or paresthesias--more falls   ENDOCRINE: NEGATIVE for temperature intolerance, skin/hair  "changes  HEME: NEGATIVE for bleeding problems  PSYCHIATRIC: NEGATIVE for changes in mood or affect    OBJECTIVE:     /60  Pulse 83  Temp 98.8  F (37.1  C) (Tympanic)  Resp 18  Ht 5' 2\" (1.575 m)  Wt 131 lb (59.4 kg)  LMP  (LMP Unknown)  SpO2 94%  Breastfeeding? No  BMI 23.96 kg/m2  Body mass index is 23.96 kg/(m^2).  GENERAL: healthy, alert and no distress; slim elderly energetic   EYES: Eyes grossly normal to inspection, PERRL and conjunctivae and sclerae normal  HENT: ear canals and TM's normal, nose and mouth without ulcers or lesions  NECK: no adenopathy, no asymmetry, masses, or scars and thyroid normal to palpation  RESP: lungs clear to auscultation - no rales, rhonchi or wheezes  CV: regular rate and rhythm, normal S1 S2, no S3 or S4, no murmur, click or rub, no peripheral edema and peripheral pulses strong  MS: no gross musculoskeletal defects noted, no edema  SKIN: no suspicious lesions or rashes  NEURO: Normal strength and tone, mentation intact and speech normal  PSYCH: mentation appears normal, affect normal/bright    Diagnostic Test Results:  Results for orders placed or performed in visit on 05/31/18   LDL cholesterol direct   Result Value Ref Range    LDL Cholesterol Direct 106 (H) <100 mg/dL   ALT   Result Value Ref Range    ALT 26 0 - 50 U/L   Glucose   Result Value Ref Range    Glucose 101 (H) 70 - 99 mg/dL       ASSESSMENT/PLAN:               ICD-10-CM    1. Acute bronchitis, unspecified organism J20.9    2. Post-nasal drip R09.82    3. Diarrhea, unspecified type from augmentin R19.7    4. Nonsmoker Z78.9    5. Glucose intolerance (impaired glucose tolerance) R73.02 Glucose   6. Hypercholesterolemia E78.00 LDL cholesterol direct     ALT   7. Balance problems R26.89 [facility] REFERRAL   8. Abnormal gait R26.9 [facility] PT, HAND, AND CHIROPRACTIC REFERRAL   9. Persistent insomnia G47.00        Patient Instructions   1.  Eat calcium : dairy and greens  Do not take " calcium in pill form as it can plaque on the heart arteries and cause kidney stones    2. Shingrex is a 2 shot series that prevents shingles 97% of the time, as opposed to the old shingles shot that only prevented it at 40-50%  It costs less for medicare at a pharmacy  You should get it starting at 50 yrs old     3.  Run a cold air vaporizer as much as possible. If you cannot,  boil water and breath the warm vapors 2-3 times a day to try to open up the sinuses take 2400mgm of guaifenesin per 24 hours   You can do this by taking  Mucinex plain blue  1200 mg  One tablet twice a day (This may come as 600mg/tablet and you need to take 2 tabs twice a day) or you could buy the cheaper  generic 400mgm / tab and take 2 tablets 3 x a day or 1 and 1/2 tablets 4 x a day . .Guaifenesin is  the major component of most cough syrups, because it makes the mucus less thick, and therefore it drains out better and you are less likely to cough from it dripping on the back of your throat.  Irrigate the  nose with plain water under the kitchen sink faucet or the shower.  Nilda pots, spray bottles, etc accumulate bacteria and are not recommended.   The tickle in the throat is also helped by gargling with vinegar and honey mixture, or pop or mouth wash as these coat the throat.  Please try to rinse teeth with water after using these .   Do not use sudafed or pseudephedrine as it dries the mucus up so it is harder to get it out, and it can raise your BP     Discussed all with pt  And the patient expresses understanding  This was a virus and now hangs on with PND   The abx did not work so not a bacteria    All CXRs = wnl       Christina Crawford MD  [facility]    DISCUSSION    -pt with chronic cough no  help with any abx   -so is obviously viral and a cough from PND   -needs to do the above to get rid of the mucus and explained to pt     Christina Crawford MD

## 2018-06-05 ENCOUNTER — THERAPY VISIT (OUTPATIENT)
Dept: PHYSICAL THERAPY | Facility: CLINIC | Age: 78
End: 2018-06-05
Attending: FAMILY MEDICINE
Payer: COMMERCIAL

## 2018-06-05 DIAGNOSIS — R26.89 BALANCE PROBLEMS: ICD-10-CM

## 2018-06-05 DIAGNOSIS — M62.81 GENERALIZED MUSCLE WEAKNESS: Primary | ICD-10-CM

## 2018-06-05 PROCEDURE — 97161 PT EVAL LOW COMPLEX 20 MIN: CPT | Mod: GP | Performed by: PHYSICAL THERAPIST

## 2018-06-05 PROCEDURE — 97110 THERAPEUTIC EXERCISES: CPT | Mod: GP | Performed by: PHYSICAL THERAPIST

## 2018-06-05 NOTE — PROGRESS NOTES
[facility] Initial Evaluation  Subjective:  Patient is a 77 year old female presenting with rehab general hpi. The history is provided by the patient. No  was used.   General   Condition requiring PT:  Poor balance and weakness  Associated condition:  Other  Chronicity:  New  Onset date of current episode/exacerbation comment:  Patient reports having a respiratory infection 05/01/2018 that continued through the month of May--became weakened throughout this time.  She reports no falls.  She lives alone in an apartment where she does not need to do stairs.  She has noticed slight balance issues since being sick--notices her path wavers a bit when she walks  Site of Pain: LBP.  Pain quality:  Aching  Frequency:  Intermittent  Number scale:  2/10  Associated symptoms:  Loss of strength  Pain is:  The same all the time  Exacerbated by: walking 25 minutes (previously able to do 30 minutes), stairs, difficulty getting up from lower surfaces.  Symptoms relieved by:  Nothing  Progression since onset:  Unchanged  Special testing: none.  Previous treatment: none.  General health as reported by patient:  Good  Please check all that apply to your current or past medical history:  Osteoarthritis and history of fractures  Medical allergies:  No  Other surgeries:  Orthopedic surgery (elbow)  Occupation comment:  Retired  Barriers at home/work:  Nothing  Red flags:  None as reported by the patient                      Objective:    Gait:  Flexed posture, lean to L, decreased ayan              Physical Exam        General Evaluation:        Lower Extremity Strength:  normal (with MMT; functional weakness noted with squatting, steps, and gait)                      Balance:    Single Leg Stance--Eyes Open:  Left: 18/30 sec    Right: 12/30 sec      Sit to Stand Test: 13/reps    Posture:  Posture wnl general: poor posture in sitting, flexed.                                                ROS    Assessment/Plan:    Patient is a 77 year old female with weakness and balance complaints.  She has no history of falls.  She has weakness in her LEs most noted with functional mobility of squatting, steps, and walking.  Balance tests are fairly good and not indicative of any major falls risk at this time.  She would benefit from treatment focusing on general LE strengthening and balance exercises to improve her stability with mobility and gait.      Patient has the following significant findings with corresponding treatment plan.                Diagnosis 1:  Weakness and balance deficits  Decreased ROM/flexibility - therapeutic exercise and home program  Decreased strength - therapeutic exercise, therapeutic activities and home program  Decreased function - therapeutic activities and home program  Impaired posture - neuro re-education and home program    Therapy Evaluation Codes:   1) History comprised of:   Personal factors that impact the plan of care:      None.    Comorbidity factors that impact the plan of care are:      None.     Medications impacting care: None.  2) Examination of Body Systems comprised of:   Body structures and functions that impact the plan of care:      LEs.   Activity limitations that impact the plan of care are:      Standing and Walking.  3) Clinical presentation characteristics are:   Stable/Uncomplicated.  4) Decision-Making    Low complexity using standardized patient assessment instrument and/or measureable assessment of functional outcome.  Cumulative Therapy Evaluation is: Low complexity.    Previous and current functional limitations:  (See Goal Flow Sheet for this information)    Short term and Long term goals: (See Goal Flow Sheet for this information)     Communication ability:  Patient appears to be able to clearly communicate and understand verbal and written communication and follow directions correctly.  Treatment Explanation - The following has been discussed  with the patient:   RX ordered/plan of care  Anticipated outcomes  Possible risks and side effects  This patient would benefit from PT intervention to resume normal activities.   Rehab potential is good.    Frequency:  1 X week, once daily  Duration:  for 4 weeks tapering to 2 X a month over 1 month  Discharge Plan:  Achieve all LTG.  Independent in home treatment program.  Reach maximal therapeutic benefit.    Please refer to the daily flowsheet for treatment today, total treatment time and time spent performing 1:1 timed codes.

## 2018-06-05 NOTE — MR AVS SNAPSHOT
After Visit Summary   6/5/2018    Chiquita Berry    MRN: [id number]           Patient Information     Date Of Birth          1940        Visit Information        Provider Department      6/5/2018 10:00 AM Anjelica Martinez, URSULA [facility]        Today's Diagnoses     Generalized muscle weakness    -  1    Balance problems           Follow-ups after your visit        Your next 10 appointments already scheduled     Jun 12, 2018 11:40 AM CDT   ELIO Extremity with Anjelica Martinez PT   [facility] ([facility]  )    [address] [place] [address] [address]  [facility] [id number] [phone]            Jun 19, 2018  1:20 PM CDT   ELIO Extremity with Anjelica Martinez PT   [facility] ([facility]  )    [address] [place] [address] [address]  [facility] [id number] [phone]              Who to contact     If you have questions or need follow up information about today's clinic visit or your schedule please contact [facility] directly at [phone].  Normal or non-critical lab and imaging results will be communicated to you by [employer], letter or phone within 4 business days after the clinic has received the results. If you do not hear from us within 7 days, please contact the clinic through [employer] or phone. If you have a critical or abnormal lab result, we will notify you by phone as soon as possible.  Submit refill requests through [employer] or call your pharmacy and they will forward the refill request to us. Please allow 3 business days for your refill to be completed.          Additional Information About Your Visit        MyChart Information     [employer] gives you secure access to your electronic health record. If you see a primary care provider, you can also send messages to your care team and make appointments. If  you have questions, please call your primary care clinic.  If you do not have a primary care provider, please call [phone] and they will assist you.        Care EveryWhere ID     This is your Care EveryWhere ID. This could be used by other organizations to access your [facility] medical records  [id number]        Your Vitals Were     Last Period                   (LMP Unknown)            Blood Pressure from Last 3 Encounters:   05/31/18 120/60   05/07/18 120/68   05/04/18 126/70    Weight from Last 3 Encounters:   05/31/18 59.4 kg (131 lb)   05/07/18 60.8 kg (134 lb 1.6 oz)   05/04/18 61.2 kg (134 lb 14.4 oz)              We Performed the Following     ELIO Inital Eval Report     PT Eval, Low Complexity (620[address])     Therapeutic Exercises        Primary Care Provider Office Phone # Fax #    Christina Allie Crawford [phone]       [address] [facility] [address]        Equal Access to Services     GREGORY TAPIA : Hadii aad ku hadasho Soomaali, waaxda luqadaha, qaybta kaalmada adeegyada, waxay pau salcedo . So [facility] [phone].    ATENCIÓN: Si habla español, tiene a skinner disposición servicios gratuitos de asistencia lingüística. Llame al [phone].    We comply with applicable federal civil rights laws and [place] laws. We do not discriminate on the basis of race, color, national origin, age, disability, sex, sexual orientation, or gender identity.            Thank you!     Thank you for choosing [facility]  for your care. Our goal is always to provide you with excellent care. Hearing back from our patients is one way we can continue to improve our services. Please take a few minutes to complete the written survey that you may receive in the mail after your visit with us. Thank you!             Your Updated Medication List - Protect others around you: Learn how to safely use, store and throw away your  medicines at www.disposemymeds.org.          This list is accurate as of 6/5/18  4:35 PM.  Always use your most recent med list.                   Brand Name Dispense Instructions for use Diagnosis    albuterol 108 (90 Base) MCG/ACT Inhaler    PROAIR HFA/PROVENTIL HFA/VENTOLIN HFA    1 Inhaler    Inhale 1-2 puffs into the lungs every 4 hours as needed for shortness of breath / dyspnea or wheezing    Pneumonia of right lower lobe due to infectious organism (H)       aspirin 81 MG tablet      Take 1 tablet by mouth daily.        atorvastatin 20 MG tablet    LIPITOR    90 tablet    TAKE 1 TABLET (20 MG) BY MOUTH DAILY        IBUPROFEN PO      Take 200 mg by mouth every 8 hours as needed        * MULTIVITAL Tabs      Take 1 tablet by mouth daily.        * PRESERVISION AREDS 2 Caps      Take 2 capsules by mouth daily        polyethylene glycol Packet    MIRALAX/GLYCOLAX     Take 1 packet by mouth daily        triamcinolone 0.5 % cream    KENALOG    30 g    Apply sparingly to affected area three times daily.    Pruritus of genital organs       TYLENOL ARTHRITIS EXT RELIEF OR      Take 500 mg by mouth daily        vitamin D 2000 units tablet      Take 1 tablet by mouth daily.        zolpidem 5 MG tablet    AMBIEN    30 tablet    TAKE ONE TABLET BY MOUTH EVERY NIGHT AS NEEDED FORSLEEP        * Notice:  This list has 2 medication(s) that are the same as other medications prescribed for you. Read the directions carefully, and ask your doctor or other care provider to review them with you.

## 2018-06-12 ENCOUNTER — THERAPY VISIT (OUTPATIENT)
Dept: PHYSICAL THERAPY | Facility: CLINIC | Age: 78
End: 2018-06-12
Payer: COMMERCIAL

## 2018-06-12 DIAGNOSIS — M62.81 GENERALIZED MUSCLE WEAKNESS: ICD-10-CM

## 2018-06-12 DIAGNOSIS — R26.89 BALANCE PROBLEMS: ICD-10-CM

## 2018-06-12 PROCEDURE — 97530 THERAPEUTIC ACTIVITIES: CPT | Mod: GP | Performed by: PHYSICAL THERAPIST

## 2018-06-12 PROCEDURE — 97112 NEUROMUSCULAR REEDUCATION: CPT | Mod: GP | Performed by: PHYSICAL THERAPIST

## 2018-06-12 PROCEDURE — 97110 THERAPEUTIC EXERCISES: CPT | Mod: GP | Performed by: PHYSICAL THERAPIST

## 2018-07-03 ENCOUNTER — THERAPY VISIT (OUTPATIENT)
Dept: PHYSICAL THERAPY | Facility: CLINIC | Age: 78
End: 2018-07-03
Payer: COMMERCIAL

## 2018-07-03 DIAGNOSIS — M62.81 GENERALIZED MUSCLE WEAKNESS: ICD-10-CM

## 2018-07-03 DIAGNOSIS — R26.89 BALANCE PROBLEMS: ICD-10-CM

## 2018-07-03 PROCEDURE — 97530 THERAPEUTIC ACTIVITIES: CPT | Mod: GP | Performed by: PHYSICAL THERAPIST

## 2018-07-03 PROCEDURE — 97112 NEUROMUSCULAR REEDUCATION: CPT | Mod: GP | Performed by: PHYSICAL THERAPIST

## 2018-07-03 PROCEDURE — 97110 THERAPEUTIC EXERCISES: CPT | Mod: GP | Performed by: PHYSICAL THERAPIST

## 2018-07-03 NOTE — MR AVS SNAPSHOT
"              After Visit Summary   7/3/2018    Chiquita Berry    MRN: [id number]           Patient Information     Date Of Birth          1940        Visit Information        Provider Department      7/3/2018 11:40 AM Anjelica Martinez PT [place] [facility]        Today's Diagnoses     Generalized muscle weakness        Balance problems           Follow-ups after your visit        Your next 10 appointments already scheduled     Jul 06, 2018 10:30 AM CDT   MA SCREENING BILATERAL W/ VONNIE with [id number]   [facility] ([facility])    [address] [place] [address], [address]  [facility] [id number] [phone]           Three-dimensional (3D) mammograms are available at [place] locations in [facility], [place], [place], [facility], [place], [place], and [place]. [facility] locations include [place] and [facility] in [place]. Benefits of 3D mammograms include: - Improved rate of cancer detection - Decreases your chance of having to go back for more tests, which means fewer: - \"False-positive\" results (This means that there is an abnormal area but it isn't cancer.) - Invasive testing procedures, such as a biopsy or surgery - Can provide clearer images of the breast if you have dense breast tissue. 3D mammography is an optional exam that anyone can have with a 2D mammogram. It doesn't replace or take the place of a 2D mammogram. 2D mammograms remain an effective screening test for all women.  Not all insurance companies cover the cost of a 3D mammogram. Check with your insurance.              Who to contact     If you have questions or need follow up information about today's clinic visit or your schedule please contact [place] [facility] directly at [phone].  Normal or non-critical lab and imaging results will be communicated to you by MyChart, letter or phone " within 4 business days after the clinic has received the results. If you do not hear from us within 7 days, please contact the clinic through XIPWIRE or phone. If you have a critical or abnormal lab result, we will notify you by phone as soon as possible.  Submit refill requests through [employer]IPWIRE or call your pharmacy and they will forward the refill request to us. Please allow 3 business days for your refill to be completed.          Additional Information About Your Visit        PingThingsharEmbedly Information     XIPWIRE gives you secure access to your electronic health record. If you see a primary care provider, you can also send messages to your care team and make appointments. If you have questions, please call your primary care clinic.  If you do not have a primary care provider, please call [phone] and they will assist you.        Care EveryWhere ID     This is your Care EveryWhere ID. This could be used by other organizations to access your [place] medical records  [id number]        Your Vitals Were     Last Period                   (LMP Unknown)            Blood Pressure from Last 3 Encounters:   05/31/18 120/60   05/07/18 120/68   05/04/18 126/70    Weight from Last 3 Encounters:   05/31/18 59.4 kg (131 lb)   05/07/18 60.8 kg (134 lb 1.6 oz)   05/04/18 61.2 kg (134 lb 14.4 oz)              We Performed the Following     ELIO Progress Notes Report     Neuromuscular Re-Education     Therapeutic Activities     Therapeutic Exercises        Primary Care Provider Office Phone # Fax #    Christina Allie Crawford [phone]       [address] [facility] [address]        Equal Access to Services     KEYLA TAPIA : Hadii aad ku hadasho Soomaali, waaxda luqadaha, qaybta kaalmada zohraegmonique, mike salcedo . So [facility] [phone].    ATENCIÓN: Si habla español, tiene a skinner disposición servicios gratuitos de asistencia lingüística. Llame al [phone].    We comply with  applicable federal civil rights laws and [place] laws. We do not discriminate on the basis of race, color, national origin, age, disability, sex, sexual orientation, or gender identity.            Thank you!     Thank you for choosing [place] [facility]  for your care. Our goal is always to provide you with excellent care. Hearing back from our patients is one way we can continue to improve our services. Please take a few minutes to complete the written survey that you may receive in the mail after your visit with us. Thank you!             Your Updated Medication List - Protect others around you: Learn how to safely use, store and throw away your medicines at www.disposemymeds.org.          This list is accurate as of 7/3/18  4:52 PM.  Always use your most recent med list.                   Brand Name Dispense Instructions for use Diagnosis    albuterol 108 (90 Base) MCG/ACT Inhaler    PROAIR HFA/PROVENTIL HFA/VENTOLIN HFA    1 Inhaler    Inhale 1-2 puffs into the lungs every 4 hours as needed for shortness of breath / dyspnea or wheezing    Pneumonia of right lower lobe due to infectious organism (H)       aspirin 81 MG tablet      Take 1 tablet by mouth daily.        atorvastatin 20 MG tablet    LIPITOR    90 tablet    TAKE 1 TABLET (20 MG) BY MOUTH DAILY        IBUPROFEN PO      Take 200 mg by mouth every 8 hours as needed        * MULTIVITAL Tabs      Take 1 tablet by mouth daily.        * PRESERVISION AREDS 2 Caps      Take 2 capsules by mouth daily        polyethylene glycol Packet    MIRALAX/GLYCOLAX     Take 1 packet by mouth daily        triamcinolone 0.5 % cream    KENALOG    30 g    Apply sparingly to affected area three times daily.    Pruritus of genital organs       TYLENOL ARTHRITIS EXT RELIEF OR      Take 500 mg by mouth daily        vitamin D 2000 units tablet      Take 1 tablet by mouth daily.        zolpidem 5 MG tablet    AMBIEN    30 tablet    TAKE  ONE TABLET BY MOUTH EVERY NIGHT AS NEEDED KEVIN        * Notice:  This list has 2 medication(s) that are the same as other medications prescribed for you. Read the directions carefully, and ask your doctor or other care provider to review them with you.

## 2018-07-03 NOTE — PROGRESS NOTES
"Subjective:  HPI                    Objective:  System    Physical Exam    General     ROS    Assessment/Plan:    DISCHARGE REPORT    Progress reporting period is from 06/05/2018 to 07/03/2018.       SUBJECTIVE  Subjective: Patient feels she is doing pretty well and has few complaints at this time.  She reports walking 30-40 minutes daily while on her trip out to [place].  She notices she feels like she is more careful at times with walking, especially if she is walking faster or on something uneven.  She had to push her sister in a wheelchair while on her trip and reports this made her LB sore.      Current Pain level: 2/10 (LB).     Initial Pain level: 2/10.   Changes in function:  Adverse reaction to treatment or activity: None    OBJECTIVE  Objective: Balance:  SLS, floor, EO R=32\", L=28\".  Gait:  steady pattern without assistive device, flexed posture, especially thoracic.       ASSESSMENT/PLAN  Patient has been seen for 3 visits with treatment focusing on LE strengthening and balance exercises.  Balance has improved since her initial visit, and she has progressed her repetitions with strengthening exercises.  her gait pattern is steady.  She has a good HEP for continued strength and balance work and should do well continuing with this independently at this point.    Updated problem list and treatment plan: Diagnosis 1:  LE weakness and balance  Decreased function - home program  Impaired posture - therapeutic activities  STG/LTGs have been met or progress has been made towards goals:  Yes (See Goal flow sheet completed today.)  Assessment of Progress: The patient's condition is improving.  Self Management Plans:  Patient has been instructed in a home treatment program.  Patient is independent in a home treatment program.  Patient  has been instructed in self management of symptoms.  Patient is independent in self management of symptoms.  Chiquita continues to require the following intervention to meet STG and " LTG's:  PT intervention is no longer required to meet STG/LTG.    Recommendations:  This patient is ready to be discharged from therapy and continue their home treatment program.    Please refer to the daily flowsheet for treatment today, total treatment time and time spent performing 1:1 timed codes.

## 2018-07-06 ENCOUNTER — HOSPITAL ENCOUNTER (OUTPATIENT)
Dept: MAMMOGRAPHY | Facility: CLINIC | Age: 78
Discharge: HOME | End: 2018-07-06
Attending: FAMILY MEDICINE | Admitting: FAMILY MEDICINE
Payer: MEDICARE

## 2018-07-06 DIAGNOSIS — Z12.31 VISIT FOR SCREENING MAMMOGRAM: ICD-10-CM

## 2018-07-06 PROCEDURE — 77067 SCR MAMMO BI INCL CAD: CPT

## 2019-02-04 ENCOUNTER — OFFICE VISIT (OUTPATIENT)
Dept: CARDIOLOGY | Facility: CLINIC | Age: 79
End: 2019-02-04
Payer: COMMERCIAL

## 2019-02-04 ENCOUNTER — TELEPHONE (OUTPATIENT)
Dept: CARDIOLOGY | Facility: CLINIC | Age: 79
End: 2019-02-04

## 2019-02-04 VITALS
DIASTOLIC BLOOD PRESSURE: 77 MMHG | HEART RATE: 112 BPM | SYSTOLIC BLOOD PRESSURE: 123 MMHG | WEIGHT: 135 LBS | BODY MASS INDEX: 24.84 KG/M2 | HEIGHT: 62 IN

## 2019-02-04 DIAGNOSIS — R00.2 PALPITATIONS: ICD-10-CM

## 2019-02-04 DIAGNOSIS — R00.0 SINUS TACHYCARDIA: Primary | ICD-10-CM

## 2019-02-04 DIAGNOSIS — E78.00 HYPERCHOLESTEROLEMIA: ICD-10-CM

## 2019-02-04 PROCEDURE — 99214 OFFICE O/P EST MOD 30 MIN: CPT | Performed by: INTERNAL MEDICINE

## 2019-02-04 PROCEDURE — 93000 ELECTROCARDIOGRAM COMPLETE: CPT | Performed by: INTERNAL MEDICINE

## 2019-02-04 ASSESSMENT — MIFFLIN-ST. JEOR: SCORE: 1040.61

## 2019-02-04 NOTE — LETTER
2/4/2019    Christina Crawford MD  [address]  [facility] [address]    RE: Chiquita Berry       Dear Colleague,    I had the pleasure of seeing Chiquita Berry in the [facility].    HPI and Plan:   Thank you for allowing me to participate in the care of this delightful patient.  As you know, Chiquita is a 78-year-old female with a history of palpitations in the past with no significant arrhythmias found on the monitor.  Her last visit with us was more than 2 years ago.    A week ago patient workup to go to the bathroom and noted her heart rate was 110 bpm whereas baseline would be in the 80s.  It subsided after several minutes.  This morning around 4:00 she had a similar sensation which persisted until her arrival to our clinic.  EKG show sinus rhythm at a rate 102 bpm with a chronic left bundle branch block.  Chiquita otherwise denies having chest pain, discomfort, shortness of breath, orthopnea or PND.    I reassured Chiquita that given her known normal cardiac structure and function what ever the underlying cause is likely to be benign.  Patient was quite reassured after this discussion.  In the meantime I would like her to wear a 2 weeks Zio patch monitor and will call her with the result and treatment options going forward.  Orders Placed This Encounter   Procedures     EKG 12-lead complete w/read - Clinics (performed today)     Zio Patch Holter Adult Pediatric Greater than 48 hrs       No orders of the defined types were placed in this encounter.      There are no discontinued medications.      Encounter Diagnoses   Name Primary?     Hypercholesterolemia      Sinus tachycardia since 2002 w workup in 2013 and 6-16  Yes     Palpitations        CURRENT MEDICATIONS:  Current Outpatient Medications   Medication Sig Dispense Refill     Acetaminophen (TYLENOL ARTHRITIS EXT RELIEF OR) Take 500 mg by mouth daily        aspirin 81 MG tablet Take 1 tablet by mouth daily.       atorvastatin  (LIPITOR) 20 MG tablet TAKE 1 TABLET (20 MG) BY MOUTH DAILY 90 tablet 3     Cholecalciferol (VITAMIN D) 2000 UNITS tablet Take 1 tablet by mouth daily.       IBUPROFEN PO Take 200 mg by mouth every 8 hours as needed        Multiple Vitamins-Minerals (MULTIVITAL) TABS Take 1 tablet by mouth daily.       Multiple Vitamins-Minerals (PRESERVISION AREDS 2) CAPS Take 2 capsules by mouth daily       polyethylene glycol (MIRALAX/GLYCOLAX) packet Take 1 packet by mouth daily       zolpidem (AMBIEN) 5 MG tablet TAKE ONE TABLET BY MOUTH EVERY NIGHT AS NEEDED FORSLEEP 30 tablet 0     albuterol (PROAIR HFA/PROVENTIL HFA/VENTOLIN HFA) 108 (90 BASE) MCG/ACT Inhaler Inhale 1-2 puffs into the lungs every 4 hours as needed for shortness of breath / dyspnea or wheezing (Patient not taking: Reported on 2/4/2019) 1 Inhaler 0     triamcinolone (KENALOG) 0.5 % cream Apply sparingly to affected area three times daily. (Patient not taking: Reported on 2/4/2019) 30 g 1       ALLERGIES     Allergies   Allergen Reactions     Augmentin Diarrhea       PAST MEDICAL HISTORY:  Past Medical History:   Diagnosis Date     Anxiety      Cataract     bilateral     GERD (gastroesophageal reflux disease)      Hyperlipidemia LDL goal < 100      Insomnia     using ambien once a month     Kyphosis      Osteoporosis      Palpitations     resolved     Scapula fracture 4/5/2015    right - didn't need surgery - sling     Scoliosis        PAST SURGICAL HISTORY:  Past Surgical History:   Procedure Laterality Date     C RAD RESEC TONSIL/PILLARS  1941     CATARACT IOL, RT/LT Right 12/2012    right eye     DENTAL SURGERY  1959    widsom teeth     FRACTURE SURGERY  12/2005    fractured humerus and ulna repair     PHACOEMULSIFICATION CLEAR CORNEA WITH STANDARD INTRAOCULAR LENS IMPLANT Left 11/16/2015    Procedure: PHACOEMULSIFICATION CLEAR CORNEA WITH STANDARD INTRAOCULAR LENS IMPLANT;  Surgeon: Latrell Jessica MD;  Location: [facility]       FAMILY HISTORY:  Family  History   Problem Relation Age of Onset     Alzheimer Disease Mother      Cerebrovascular Disease Mother      Cancer Father         stomach     Breast Cancer Sister      Breast Cancer Sister      Diabetes No family hx of      Coronary Artery Disease No family hx of      Hypertension No family hx of      Hyperlipidemia No family hx of      Colon Cancer No family hx of      Prostate Cancer No family hx of      Other Cancer No family hx of      Depression No family hx of      Anxiety Disorder No family hx of      Mental Illness No family hx of      Substance Abuse No family hx of      Anesthesia Reaction No family hx of      Asthma No family hx of      Osteoporosis No family hx of      Genetic Disorder No family hx of      Thyroid Disease No family hx of      Obesity No family hx of      Unknown/Adopted No family hx of        SOCIAL HISTORY:  Social History     Socioeconomic History     Marital status:      Spouse name: None     Number of children: None     Years of education: None     Highest education level: None   Social Needs     Financial resource strain: None     Food insecurity - worry: None     Food insecurity - inability: None     Transportation needs - medical: None     Transportation needs - non-medical: None   Occupational History     None   Tobacco Use     Smoking status: Never Smoker     Smokeless tobacco: Never Used   Substance and Sexual Activity     Alcohol use: Yes     Alcohol/week: 0.0 oz     Comment: Occ glass of wine - less than once per month     Drug use: No     Sexual activity: No     Comment: never sexually active   Other Topics Concern     Parent/sibling w/ CABG, MI or angioplasty before 65F 55M? No      Service Not Asked     Blood Transfusions Not Asked     Caffeine Concern Not Asked     Occupational Exposure Not Asked     Hobby Hazards Not Asked     Sleep Concern Not Asked     Stress Concern Not Asked     Weight Concern Not Asked     Special Diet No     Back Care Not Asked  "    Exercise Yes     Comment:  everyday     Bike Helmet Not Asked     Seat Belt Not Asked     Self-Exams Not Asked   Social History Narrative     None       Review of Systems:  Skin:  Negative       Eyes:  Positive for glasses    ENT:  Negative      Respiratory:  Negative       Cardiovascular:    palpitations;Positive for    Gastroenterology: Negative      Genitourinary:  Negative      Musculoskeletal:  Positive for arthritis    Neurologic:  Negative      Psychiatric:  Negative      Heme/Lymph/Imm:  Negative      Endocrine:  Negative        Physical Exam:  Vitals: /77   Pulse 112   Ht 1.575 m (5' 2\")   Wt 61.2 kg (135 lb)   LMP  (LMP Unknown)   BMI 24.69 kg/m       Constitutional:  cooperative, alert and oriented, well developed, well nourished, in no acute distress        Skin:  warm and dry to the touch, no apparent skin lesions or masses noted          Head:  normocephalic, no masses or lesions        Eyes:  pupils equal and round, conjunctivae and lids unremarkable, sclera white, no xanthalasma, EOMS intact, no nystagmus        Lymph:No Cervical lymphadenopathy present     ENT:  no pallor or cyanosis, dentition good        Neck:  carotid pulses are full and equal bilaterally, JVP normal, no carotid bruit        Respiratory:  normal breath sounds, clear to auscultation, normal A-P diameter, normal symmetry, normal respiratory excursion, no use of accessory muscles         Cardiac: regular rhythm, normal S1/S2, no S3 or S4, apical impulse not displaced, no murmurs, gallops or rubs                pulses full and equal, no bruits auscultated                                        GI:  abdomen soft, non-tender, BS normoactive, no mass, no HSM, no bruits        Extremities and Muscular Skeletal:  no deformities, clubbing, cyanosis, erythema observed              Neurological:  no gross motor deficits        Psych:    Anxious        Thank you for allowing me to participate in the care of your " patient.    Sincerely,     Tomás Hill MD     [facility]

## 2019-02-04 NOTE — TELEPHONE ENCOUNTER
Patient transferred from triage.  Reporting she woke up this morning at 4am with elevated HR >110 and reports HR has not come down.  Also had episode last week lasting shorter period of time.  Denies any other symptoms at this time.  Requesting appt for evaluation.  Last OV 8/22/16 identifying that patient has history of sinus tachycardia and mild valvular disease did wear a 30 day cardiac monitor starting on 7/15 no final report noted in chart.  It was noted in OV note that monitor showed isolated episode for paroxysmal VT.  Spoke to medical records and they do not have final report  Also spoke to amb EKG and they referred me to get final report from [facility].  Called [facility] and spoke to Connie requesting document.  Provided number and fax number of clinic to obtained document.  ASHLYN Murcia

## 2019-02-04 NOTE — LETTER
2/4/2019    Christina Crawford MD  [address]  [facility] [address]    RE: Chiquita Berry       Dear Colleague,    I had the pleasure of seeing Chiquita Berry in the [facility].    HPI and Plan:   Thank you for allowing me to participate in the care of this delightful patient.  As you know, Chiquita is a 78-year-old female with a history of palpitations in the past with no significant arrhythmias found on the monitor.  Her last visit with us was more than 2 years ago.    A week ago patient workup to go to the bathroom and noted her heart rate was 110 bpm whereas baseline would be in the 80s.  It subsided after several minutes.  This morning around 4:00 she had a similar sensation which persisted until her arrival to our clinic.  EKG show sinus rhythm at a rate 102 bpm with a chronic left bundle branch block.  Chiquita otherwise denies having chest pain, discomfort, shortness of breath, orthopnea or PND.    I reassured Chiquita that given her known normal cardiac structure and function what ever the underlying cause is likely to be benign.  Patient was quite reassured after this discussion.  In the meantime I would like her to wear a 2 weeks Zio patch monitor and will call her with the result and treatment options going forward.  Orders Placed This Encounter   Procedures     EKG 12-lead complete w/read - Clinics (performed today)     Zio Patch Holter Adult Pediatric Greater than 48 hrs       No orders of the defined types were placed in this encounter.      There are no discontinued medications.      Encounter Diagnoses   Name Primary?     Hypercholesterolemia      Sinus tachycardia since 2002 w workup in 2013 and 6-16  Yes     Palpitations        CURRENT MEDICATIONS:  Current Outpatient Medications   Medication Sig Dispense Refill     Acetaminophen (TYLENOL ARTHRITIS EXT RELIEF OR) Take 500 mg by mouth daily        aspirin 81 MG tablet Take 1 tablet by mouth daily.       atorvastatin  (LIPITOR) 20 MG tablet TAKE 1 TABLET (20 MG) BY MOUTH DAILY 90 tablet 3     Cholecalciferol (VITAMIN D) 2000 UNITS tablet Take 1 tablet by mouth daily.       IBUPROFEN PO Take 200 mg by mouth every 8 hours as needed        Multiple Vitamins-Minerals (MULTIVITAL) TABS Take 1 tablet by mouth daily.       Multiple Vitamins-Minerals (PRESERVISION AREDS 2) CAPS Take 2 capsules by mouth daily       polyethylene glycol (MIRALAX/GLYCOLAX) packet Take 1 packet by mouth daily       zolpidem (AMBIEN) 5 MG tablet TAKE ONE TABLET BY MOUTH EVERY NIGHT AS NEEDED FORSLEEP 30 tablet 0     albuterol (PROAIR HFA/PROVENTIL HFA/VENTOLIN HFA) 108 (90 BASE) MCG/ACT Inhaler Inhale 1-2 puffs into the lungs every 4 hours as needed for shortness of breath / dyspnea or wheezing (Patient not taking: Reported on 2/4/2019) 1 Inhaler 0     triamcinolone (KENALOG) 0.5 % cream Apply sparingly to affected area three times daily. (Patient not taking: Reported on 2/4/2019) 30 g 1       ALLERGIES     Allergies   Allergen Reactions     Augmentin Diarrhea       PAST MEDICAL HISTORY:  Past Medical History:   Diagnosis Date     Anxiety      Cataract     bilateral     GERD (gastroesophageal reflux disease)      Hyperlipidemia LDL goal < 100      Insomnia     using ambien once a month     Kyphosis      Osteoporosis      Palpitations     resolved     Scapula fracture 4/5/2015    right - didn't need surgery - sling     Scoliosis        PAST SURGICAL HISTORY:  Past Surgical History:   Procedure Laterality Date     C RAD RESEC TONSIL/PILLARS  1941     CATARACT IOL, RT/LT Right 12/2012    right eye     DENTAL SURGERY  1959    widsom teeth     FRACTURE SURGERY  12/2005    fractured humerus and ulna repair     PHACOEMULSIFICATION CLEAR CORNEA WITH STANDARD INTRAOCULAR LENS IMPLANT Left 11/16/2015    Procedure: PHACOEMULSIFICATION CLEAR CORNEA WITH STANDARD INTRAOCULAR LENS IMPLANT;  Surgeon: Latrell Jessica MD;  Location: [facility]       FAMILY HISTORY:  Family  History   Problem Relation Age of Onset     Alzheimer Disease Mother      Cerebrovascular Disease Mother      Cancer Father         stomach     Breast Cancer Sister      Breast Cancer Sister      Diabetes No family hx of      Coronary Artery Disease No family hx of      Hypertension No family hx of      Hyperlipidemia No family hx of      Colon Cancer No family hx of      Prostate Cancer No family hx of      Other Cancer No family hx of      Depression No family hx of      Anxiety Disorder No family hx of      Mental Illness No family hx of      Substance Abuse No family hx of      Anesthesia Reaction No family hx of      Asthma No family hx of      Osteoporosis No family hx of      Genetic Disorder No family hx of      Thyroid Disease No family hx of      Obesity No family hx of      Unknown/Adopted No family hx of        SOCIAL HISTORY:  Social History     Socioeconomic History     Marital status:      Spouse name: None     Number of children: None     Years of education: None     Highest education level: None   Social Needs     Financial resource strain: None     Food insecurity - worry: None     Food insecurity - inability: None     Transportation needs - medical: None     Transportation needs - non-medical: None   Occupational History     None   Tobacco Use     Smoking status: Never Smoker     Smokeless tobacco: Never Used   Substance and Sexual Activity     Alcohol use: Yes     Alcohol/week: 0.0 oz     Comment: Occ glass of wine - less than once per month     Drug use: No     Sexual activity: No     Comment: never sexually active   Other Topics Concern     Parent/sibling w/ CABG, MI or angioplasty before 65F 55M? No      Service Not Asked     Blood Transfusions Not Asked     Caffeine Concern Not Asked     Occupational Exposure Not Asked     Hobby Hazards Not Asked     Sleep Concern Not Asked     Stress Concern Not Asked     Weight Concern Not Asked     Special Diet No     Back Care Not Asked  "    Exercise Yes     Comment:  everyday     Bike Helmet Not Asked     Seat Belt Not Asked     Self-Exams Not Asked   Social History Narrative     None       Review of Systems:  Skin:  Negative       Eyes:  Positive for glasses    ENT:  Negative      Respiratory:  Negative       Cardiovascular:    palpitations;Positive for    Gastroenterology: Negative      Genitourinary:  Negative      Musculoskeletal:  Positive for arthritis    Neurologic:  Negative      Psychiatric:  Negative      Heme/Lymph/Imm:  Negative      Endocrine:  Negative        Physical Exam:  Vitals: /77   Pulse 112   Ht 1.575 m (5' 2\")   Wt 61.2 kg (135 lb)   LMP  (LMP Unknown)   BMI 24.69 kg/m       Constitutional:  cooperative, alert and oriented, well developed, well nourished, in no acute distress        Skin:  warm and dry to the touch, no apparent skin lesions or masses noted          Head:  normocephalic, no masses or lesions        Eyes:  pupils equal and round, conjunctivae and lids unremarkable, sclera white, no xanthalasma, EOMS intact, no nystagmus        Lymph:No Cervical lymphadenopathy present     ENT:  no pallor or cyanosis, dentition good        Neck:  carotid pulses are full and equal bilaterally, JVP normal, no carotid bruit        Respiratory:  normal breath sounds, clear to auscultation, normal A-P diameter, normal symmetry, normal respiratory excursion, no use of accessory muscles         Cardiac: regular rhythm, normal S1/S2, no S3 or S4, apical impulse not displaced, no murmurs, gallops or rubs                pulses full and equal, no bruits auscultated                                        GI:  abdomen soft, non-tender, BS normoactive, no mass, no HSM, no bruits        Extremities and Muscular Skeletal:  no deformities, clubbing, cyanosis, erythema observed              Neurological:  no gross motor deficits        Psych:    Anxious      CC  No referring provider defined for this encounter.                Thank you " for allowing me to participate in the care of your patient.      Sincerely,     Tomás Hill MD     [facility]    cc:   No referring provider defined for this encounter.

## 2019-02-04 NOTE — PROGRESS NOTES
HPI and Plan:   Thank you for allowing me to participate in the care of this delightful patient.  As you know, Chiquita is a 78-year-old female with a history of palpitations in the past with no significant arrhythmias found on the monitor.  Her last visit with us was more than 2 years ago.    A week ago patient workup to go to the bathroom and noted her heart rate was 110 bpm whereas baseline would be in the 80s.  It subsided after several minutes.  This morning around 4:00 she had a similar sensation which persisted until her arrival to our clinic.  EKG show sinus rhythm at a rate 102 bpm with a chronic left bundle branch block.  Chiquita otherwise denies having chest pain, discomfort, shortness of breath, orthopnea or PND.    I reassured Chiquita that given her known normal cardiac structure and function what ever the underlying cause is likely to be benign.  Patient was quite reassured after this discussion.  In the meantime I would like her to wear a 2 weeks Zio patch monitor and will call her with the result and treatment options going forward.  Orders Placed This Encounter   Procedures     EKG 12-lead complete w/read - Clinics (performed today)     Zio Patch Holter Adult Pediatric Greater than 48 hrs       No orders of the defined types were placed in this encounter.      There are no discontinued medications.      Encounter Diagnoses   Name Primary?     Hypercholesterolemia      Sinus tachycardia since 2002 w workup in 2013 and 6-16  Yes     Palpitations        CURRENT MEDICATIONS:  Current Outpatient Medications   Medication Sig Dispense Refill     Acetaminophen (TYLENOL ARTHRITIS EXT RELIEF OR) Take 500 mg by mouth daily        aspirin 81 MG tablet Take 1 tablet by mouth daily.       atorvastatin (LIPITOR) 20 MG tablet TAKE 1 TABLET (20 MG) BY MOUTH DAILY 90 tablet 3     Cholecalciferol (VITAMIN D) 2000 UNITS tablet Take 1 tablet by mouth daily.       IBUPROFEN PO Take 200 mg by mouth every 8 hours as needed         Multiple Vitamins-Minerals (MULTIVITAL) TABS Take 1 tablet by mouth daily.       Multiple Vitamins-Minerals (PRESERVISION AREDS 2) CAPS Take 2 capsules by mouth daily       polyethylene glycol (MIRALAX/GLYCOLAX) packet Take 1 packet by mouth daily       zolpidem (AMBIEN) 5 MG tablet TAKE ONE TABLET BY MOUTH EVERY NIGHT AS NEEDED FORSLEEP 30 tablet 0     albuterol (PROAIR HFA/PROVENTIL HFA/VENTOLIN HFA) 108 (90 BASE) MCG/ACT Inhaler Inhale 1-2 puffs into the lungs every 4 hours as needed for shortness of breath / dyspnea or wheezing (Patient not taking: Reported on 2/4/2019) 1 Inhaler 0     triamcinolone (KENALOG) 0.5 % cream Apply sparingly to affected area three times daily. (Patient not taking: Reported on 2/4/2019) 30 g 1       ALLERGIES     Allergies   Allergen Reactions     Augmentin Diarrhea       PAST MEDICAL HISTORY:  Past Medical History:   Diagnosis Date     Anxiety      Cataract     bilateral     GERD (gastroesophageal reflux disease)      Hyperlipidemia LDL goal < 100      Insomnia     using ambien once a month     Kyphosis      Osteoporosis      Palpitations     resolved     Scapula fracture 4/5/2015    right - didn't need surgery - sling     Scoliosis        PAST SURGICAL HISTORY:  Past Surgical History:   Procedure Laterality Date     C RAD RESEC TONSIL/PILLARS  1941     CATARACT IOL, RT/LT Right 12/2012    right eye     DENTAL SURGERY  1959    widsom teeth     FRACTURE SURGERY  12/2005    fractured humerus and ulna repair     PHACOEMULSIFICATION CLEAR CORNEA WITH STANDARD INTRAOCULAR LENS IMPLANT Left 11/16/2015    Procedure: PHACOEMULSIFICATION CLEAR CORNEA WITH STANDARD INTRAOCULAR LENS IMPLANT;  Surgeon: Latrell Jessica MD;  Location: [facility]       FAMILY HISTORY:  Family History   Problem Relation Age of Onset     Alzheimer Disease Mother      Cerebrovascular Disease Mother      Cancer Father         stomach     Breast Cancer Sister      Breast Cancer Sister      Diabetes No family hx of       Coronary Artery Disease No family hx of      Hypertension No family hx of      Hyperlipidemia No family hx of      Colon Cancer No family hx of      Prostate Cancer No family hx of      Other Cancer No family hx of      Depression No family hx of      Anxiety Disorder No family hx of      Mental Illness No family hx of      Substance Abuse No family hx of      Anesthesia Reaction No family hx of      Asthma No family hx of      Osteoporosis No family hx of      Genetic Disorder No family hx of      Thyroid Disease No family hx of      Obesity No family hx of      Unknown/Adopted No family hx of        SOCIAL HISTORY:  Social History     Socioeconomic History     Marital status:      Spouse name: None     Number of children: None     Years of education: None     Highest education level: None   Social Needs     Financial resource strain: None     Food insecurity - worry: None     Food insecurity - inability: None     Transportation needs - medical: None     Transportation needs - non-medical: None   Occupational History     None   Tobacco Use     Smoking status: Never Smoker     Smokeless tobacco: Never Used   Substance and Sexual Activity     Alcohol use: Yes     Alcohol/week: 0.0 oz     Comment: Occ glass of wine - less than once per month     Drug use: No     Sexual activity: No     Comment: never sexually active   Other Topics Concern     Parent/sibling w/ CABG, MI or angioplasty before 65F 55M? No      Service Not Asked     Blood Transfusions Not Asked     Caffeine Concern Not Asked     Occupational Exposure Not Asked     Hobby Hazards Not Asked     Sleep Concern Not Asked     Stress Concern Not Asked     Weight Concern Not Asked     Special Diet No     Back Care Not Asked     Exercise Yes     Comment:  everyday     Bike Helmet Not Asked     Seat Belt Not Asked     Self-Exams Not Asked   Social History Narrative     None       Review of Systems:  Skin:  Negative       Eyes:  Positive for glasses   "  ENT:  Negative      Respiratory:  Negative       Cardiovascular:    palpitations;Positive for    Gastroenterology: Negative      Genitourinary:  Negative      Musculoskeletal:  Positive for arthritis    Neurologic:  Negative      Psychiatric:  Negative      Heme/Lymph/Imm:  Negative      Endocrine:  Negative        Physical Exam:  Vitals: /77   Pulse 112   Ht 1.575 m (5' 2\")   Wt 61.2 kg (135 lb)   LMP  (LMP Unknown)   BMI 24.69 kg/m      Constitutional:  cooperative, alert and oriented, well developed, well nourished, in no acute distress        Skin:  warm and dry to the touch, no apparent skin lesions or masses noted          Head:  normocephalic, no masses or lesions        Eyes:  pupils equal and round, conjunctivae and lids unremarkable, sclera white, no xanthalasma, EOMS intact, no nystagmus        Lymph:No Cervical lymphadenopathy present     ENT:  no pallor or cyanosis, dentition good        Neck:  carotid pulses are full and equal bilaterally, JVP normal, no carotid bruit        Respiratory:  normal breath sounds, clear to auscultation, normal A-P diameter, normal symmetry, normal respiratory excursion, no use of accessory muscles         Cardiac: regular rhythm, normal S1/S2, no S3 or S4, apical impulse not displaced, no murmurs, gallops or rubs                pulses full and equal, no bruits auscultated                                        GI:  abdomen soft, non-tender, BS normoactive, no mass, no HSM, no bruits        Extremities and Muscular Skeletal:  no deformities, clubbing, cyanosis, erythema observed              Neurological:  no gross motor deficits        Psych:    Anxious      CC  No referring provider defined for this encounter.              "

## 2019-02-06 ENCOUNTER — HOSPITAL ENCOUNTER (OUTPATIENT)
Dept: CARDIOLOGY | Facility: CLINIC | Age: 79
Discharge: HOME | End: 2019-02-06
Attending: INTERNAL MEDICINE | Admitting: INTERNAL MEDICINE
Payer: COMMERCIAL

## 2019-02-06 DIAGNOSIS — R00.2 PALPITATIONS: ICD-10-CM

## 2019-02-06 PROCEDURE — 0296T ZIO PATCH HOLTER ADULT PEDIATRIC GREATER THAN 48 HRS: CPT

## 2019-02-06 PROCEDURE — 0298T ZIO PATCH HOLTER ADULT PEDIATRIC GREATER THAN 48 HRS: CPT | Performed by: INTERNAL MEDICINE

## 2019-02-15 ENCOUNTER — HOSPITAL ENCOUNTER (EMERGENCY)
Facility: CLINIC | Age: 79
Discharge: HOME | End: 2019-02-15
Attending: EMERGENCY MEDICINE | Admitting: EMERGENCY MEDICINE
Payer: COMMERCIAL

## 2019-02-15 ENCOUNTER — TELEPHONE (OUTPATIENT)
Dept: CARDIOLOGY | Facility: CLINIC | Age: 79
End: 2019-02-15

## 2019-02-15 VITALS
HEART RATE: 99 BPM | HEIGHT: 62 IN | BODY MASS INDEX: 24.29 KG/M2 | DIASTOLIC BLOOD PRESSURE: 81 MMHG | WEIGHT: 132 LBS | SYSTOLIC BLOOD PRESSURE: 158 MMHG | TEMPERATURE: 99 F | RESPIRATION RATE: 16 BRPM | OXYGEN SATURATION: 96 %

## 2019-02-15 DIAGNOSIS — R00.2 PALPITATIONS: ICD-10-CM

## 2019-02-15 LAB
ANION GAP SERPL CALCULATED.3IONS-SCNC: 4 MMOL/L (ref 3–14)
BASOPHILS # BLD AUTO: 0 10E9/L (ref 0–0.2)
BASOPHILS NFR BLD AUTO: 0.4 %
BUN SERPL-MCNC: 16 MG/DL (ref 7–30)
CALCIUM SERPL-MCNC: 8.8 MG/DL (ref 8.5–10.1)
CHLORIDE SERPL-SCNC: 109 MMOL/L (ref 94–109)
CO2 SERPL-SCNC: 29 MMOL/L (ref 20–32)
CREAT SERPL-MCNC: 0.64 MG/DL (ref 0.52–1.04)
DIFFERENTIAL METHOD BLD: NORMAL
EOSINOPHIL # BLD AUTO: 0 10E9/L (ref 0–0.7)
EOSINOPHIL NFR BLD AUTO: 0.4 %
ERYTHROCYTE [DISTWIDTH] IN BLOOD BY AUTOMATED COUNT: 13.4 % (ref 10–15)
GFR SERPL CREATININE-BSD FRML MDRD: 85 ML/MIN/{1.73_M2}
GLUCOSE SERPL-MCNC: 116 MG/DL (ref 70–99)
HCT VFR BLD AUTO: 39.2 % (ref 35–47)
HGB BLD-MCNC: 13.3 G/DL (ref 11.7–15.7)
IMM GRANULOCYTES # BLD: 0 10E9/L (ref 0–0.4)
IMM GRANULOCYTES NFR BLD: 0.2 %
LYMPHOCYTES # BLD AUTO: 2.2 10E9/L (ref 0.8–5.3)
LYMPHOCYTES NFR BLD AUTO: 20.8 %
MCH RBC QN AUTO: 32.6 PG (ref 26.5–33)
MCHC RBC AUTO-ENTMCNC: 33.9 G/DL (ref 31.5–36.5)
MCV RBC AUTO: 96 FL (ref 78–100)
MONOCYTES # BLD AUTO: 0.6 10E9/L (ref 0–1.3)
MONOCYTES NFR BLD AUTO: 5.2 %
NEUTROPHILS # BLD AUTO: 7.7 10E9/L (ref 1.6–8.3)
NEUTROPHILS NFR BLD AUTO: 73 %
NRBC # BLD AUTO: 0 10*3/UL
NRBC BLD AUTO-RTO: 0 /100
PLATELET # BLD AUTO: 279 10E9/L (ref 150–450)
POTASSIUM SERPL-SCNC: 3.7 MMOL/L (ref 3.4–5.3)
RBC # BLD AUTO: 4.08 10E12/L (ref 3.8–5.2)
SODIUM SERPL-SCNC: 142 MMOL/L (ref 133–144)
TROPONIN I SERPL-MCNC: <0.015 UG/L (ref 0–0.04)
WBC # BLD AUTO: 10.5 10E9/L (ref 4–11)

## 2019-02-15 PROCEDURE — 84484 ASSAY OF TROPONIN QUANT: CPT | Performed by: EMERGENCY MEDICINE

## 2019-02-15 PROCEDURE — 85025 COMPLETE CBC W/AUTO DIFF WBC: CPT | Performed by: EMERGENCY MEDICINE

## 2019-02-15 PROCEDURE — 99284 EMERGENCY DEPT VISIT MOD MDM: CPT | Mod: 25

## 2019-02-15 PROCEDURE — 80048 BASIC METABOLIC PNL TOTAL CA: CPT | Performed by: EMERGENCY MEDICINE

## 2019-02-15 PROCEDURE — 25000128 H RX IP 250 OP 636: Performed by: EMERGENCY MEDICINE

## 2019-02-15 PROCEDURE — 96360 HYDRATION IV INFUSION INIT: CPT

## 2019-02-15 PROCEDURE — 93005 ELECTROCARDIOGRAM TRACING: CPT

## 2019-02-15 RX ADMIN — SODIUM CHLORIDE 1000 ML: 9 INJECTION, SOLUTION INTRAVENOUS at 15:52

## 2019-02-15 ASSESSMENT — ENCOUNTER SYMPTOMS
ABDOMINAL PAIN: 0
DIARRHEA: 0
PALPITATIONS: 1
COUGH: 1
SHORTNESS OF BREATH: 0
VOMITING: 0
FEVER: 1

## 2019-02-15 ASSESSMENT — MIFFLIN-ST. JEOR: SCORE: 1027

## 2019-02-15 NOTE — ED AVS SNAPSHOT
Emergency Department  [address] [place] [id number]  Phone:  [phone]  Fax:  [phone]                                    Chiquita Berry   MRN: [id number]    Department:   Emergency Department   Date of Visit:  2/15/2019           After Visit Summary Signature Page    I have received my discharge instructions, and my questions have been answered. I have discussed any challenges I see with this plan with the nurse or doctor.    ..........................................................................................................................................  Patient/Patient Representative Signature      ..........................................................................................................................................  Patient Representative Print Name and Relationship to Patient    ..................................................               ................................................  Date                                   Time    ..........................................................................................................................................  Reviewed by Signature/Title    ...................................................              ..............................................  Date                                               Time          22EPIC Rev 08/18

## 2019-02-15 NOTE — ED PROVIDER NOTES
"  History     Chief Complaint:  Palpitations    HPI   Chiquita Berry is a 78 year old female with a history of hyperlipidemia and anxiety who presents to the ED for evaluation of palpitations. The patient reports that she first noticed the onset of palpitations and elevated heart rate 1.5 weeks ago, on 2/4. She describes these as intermittent and worse in the morning, and they typically resolve with deep breathing and \"bearing down.\" The patient was evaluated for these by her cardiologist, Dr. Simmons, who placed her on a ZioPatch monitor until 2/19. Her palpitations have not been present again until 2 days ago, when she states she developed recurrent palpitations after waking up and going to the bathroom. Today, the patient reports that she was woken from sleep by her palpitations. These were associated with an elevated heart rate in the 120s that persisted over the course of the morning, prompting her to seek evaluation in the ED. Here, the patient states that her palpitations have since resolved. She does report some low-grade fevers, congestion, and cough for the past few days. However, she denies any associated chest pain, shortness of breath, diarrhea, abdominal pain, vomiting, or leg swelling. She also denies any recent medication changes, alcohol use, or caffeine use. Of note, the patient states that she wore a ZioPatch monitor 2 years ago for similar palpitations, which showed evidence of a single PVC per patient report. She does have a cardiology follow up scheduled for 2/27 to review her most recent ZioPatch findings.    Cardiac/PE/DVT Risk Factors:  The patient has a history of hyperlipidemia. She reports no family history of cardiopulmonary issues. The patient denies any personal or familial history of PE, DVT, or clotting disorder. The patient reports no recent travel, surgery, or other immobilizations.  She denies any estrogen use.    Allergies:  Augmentin    Medications:  " "  Albuterol  Aspirin  Lipitor  Miralax  Kenalog  Ambien     Past Medical History:    Anxiety  Cataracts  GERD  Hyperlipidemia  Insomnia  Kyphosis  Osteoporosis  Scoliosis  Mitral regurgitation  Yeast infection  Onychomycosis  Glucose intolerance     Past Surgical History:    T&A  Right cataract IOL  [place] teeth removal  Humerus and ulna repair  Left phacoemulsification clear cornea with standard IOL     Family History:    Alzheimer disease  Cerebrovascular disease  Stomach cancer  Breast cancer    Social History:  Negative for tobacco use.  Occasional alcohol use.    Marital Status:   [4]     Review of Systems   Constitutional: Positive for fever.   HENT: Positive for congestion.    Respiratory: Positive for cough. Negative for shortness of breath.    Cardiovascular: Positive for palpitations. Negative for chest pain and leg swelling.   Gastrointestinal: Negative for abdominal pain, diarrhea and vomiting.   All other systems reviewed and are negative.      Physical Exam     Patient Vitals for the past 24 hrs:   BP Temp Temp src Pulse Heart Rate Resp SpO2 Height Weight   02/15/19 1630 158/81 -- -- 99 104 16 96 % -- --   02/15/19 1600 140/48 -- -- 103 99 13 95 % -- --   02/15/19 1530 -- -- -- -- 106 20 90 % -- --   02/15/19 1500 133/72 -- -- 100 98 17 92 % -- --   02/15/19 1438 160/74 99  F (37.2  C) Oral 112 -- 16 95 % 1.575 m (5' 2\") 59.9 kg (132 lb)        Physical Exam  VS: Reviewed per above  HENT: Mucous membranes moist  EYES: sclera anicteric  CV: Rate as noted, regular rhythm.   RESP: Effort normal. Breath sounds are normal bilaterally.  GI: no tenderness, not distended.  NEURO: Alert, moving all extremities  MSK: No deformity of the extremities, no LE edema  SKIN: Warm and dry    Emergency Department Course   ECG:  Indication: Palpitations  Time: 1426  Vent. Rate 115 bpm. MD interval 152. QRS duration 124. QT/QTc 362/500. P-R-T axis 55 -67 82.  Sinus tachycardia. Possible left atrial enlargement. " Left axis deviation. Possible lateral infarct, age undetermined. Abnormal ECG. Read time: 1524    Laboratory:  CBC: WBC: 10.5, HGB: 13.3, PLT: 279  BMP: Glucose 116 (H), o/w WNL (Creatinine: 0.64)    1549 Troponin: <0.015      Interventions:  1552 NS 1L IV    Emergency Department Course:  EKG obtained in the ED, see results above.     Nursing notes and vitals reviewed. (1527) I performed an exam of the patient as documented above.     IV inserted. Medicine administered as documented above. Blood drawn. This was sent to the lab for further testing, results above.    (1642) I rechecked the patient and discussed the results of her workup thus far.     Findings and plan explained to the Patient. Patient discharged home with instructions regarding supportive care, medications, and reasons to return. The importance of close follow-up was reviewed.     I personally reviewed the laboratory results with the Patient and answered all related questions prior to discharge.         Impression & Plan      Medical Decision Making:  Patient presents to the ER for evaluation of palpitations.  Initial vital signs notable for heart rate of 112.  Exam is unremarkable.  EKG reveals a known left bundle branch block, sinus rhythm with heart rate of 115 but no specific signs of ischemia.  A single troponin was negative.  Without chest pain I have a low suspicion for ACS.  I considered PE although she has no shortness of breath or chest pain.  Electrolytes are unremarkable, hemoglobin is stable.  I did give the patient a liter of IV fluids in the event that she had some hypovolemia contributing to her mild tachycardia.  There is no fever or infectious symptoms to suggest occult infectious process.  Patient does have Zio patch currently which she plans to have interpreted in 4 days.  I think at this point in time patient can follow-up in the outpatient setting.  Close return precautions were discussed.    Diagnosis:    ICD-10-CM    1.  Palpitations R00.2        Disposition:  discharged to home    Scribe Disclosure:  I, Manuela Posada, am serving as a scribe on 2/15/2019 at 3:27 PM to personally document services performed by Jose Phillip MD based on my observations and the provider's statements to me.      Manuela Posada  2/15/2019    EMERGENCY DEPARTMENT       Jose Phillip MD  02/15/19 [id number]

## 2019-02-15 NOTE — DISCHARGE INSTRUCTIONS
Discharge Instructions  Palpitations    Palpitations are an unusual awareness of your heartbeat. People often describe this as the heart skipping, fluttering, racing, irregular, or pounding. At this time, your provider has found no signs that your palpitations are due to a serious or life-threatening condition. However, sometimes there is a serious problem that does not show up right away.    Palpitations can be caused by caffeine, cigarettes, diet pills, energy drinks or supplements, other stimulants, and medications and street drugs. They can also be caused by anxiety, hormone conditions such as high thyroid, and other medical conditions. Sometimes they are a sign of abnormal rhythm in the heart. At this time, your provider did not find any dangerous cause of your symptoms.    Generally, every Emergency Department visit should have a follow-up clinic visit with either a primary or a specialty clinic/provider. Please follow-up as instructed by your emergency provider today.    Return to the Emergency Department if:  You get chest pain or tightness.  You are short of breath.  You get very weak or tired.  You pass out or faint.  Your heart rate is over 120 beats per minute for more than 10 minutes while you are resting.   You have anything else that worries you.    What can I do to help myself?  Fill any prescriptions the provider gave you and take them right away.   Follow your provider?s instructions about the prescription medicines you are on. Sometimes the provider may tell you to stop taking a medicine or change the dose.  If you smoke, this may be a good time to quit! The less you can smoke, the better.  Do not use energy drinks, diet pills, or stimulants. Limit your use of caffeine.  If you were given a prescription for medicine here today, be sure to read all of the information (including the package insert) that comes with your prescription.  This will include important information about the medicine, its  side effects, and any warnings that you need to know about.  The pharmacist who fills the prescription can provide more information and answer questions you may have about the medicine.  If you have questions or concerns that the pharmacist cannot address, please call or return to the Emergency Department.     Remember that you can always come back to the Emergency Department if you are not able to see your regular provider in the amount of time listed above, if you get any new symptoms, or if there is anything that worries you.

## 2019-02-15 NOTE — TELEPHONE ENCOUNTER
Patient called reporting she has noted her HR has been more elevated the last couple of days getting as high as 128. Not sure if it is related to recurrent cold symptoms patient experiencing . Requesting appt.  Informed patient no available appt today.  Recommended that if she was not able to tolerate her symptoms to try to get into her PMD for evaluation or go to the ED.   Denies CP, SOB,  dizziness or lightheadedness.  Patient recently seen by Dr Simmons on 2/4 for recurrent palpitations.  It was noted per note known normal cardiac structure and function what ever the underlying cause is likely to be benign.  HR at OV was 112 and EKG was NSR at 102 bpm.  14 day ZP was placed on 2/6 for further evaluation.  Patient denies feeling anxious at this time.  It was noted that during conversation patient did not know what she should do.  Offered ambulatory EKG and appt next week.  Patient did accept appt with KELSEY on 2/19.  Instructed patient to go to ED if she felt she could not tolerate symptoms.  Patient provided verbal understanding.  Time spent on phone with patient was 30 minutes.  ASHLYN Murcia

## 2019-02-15 NOTE — TELEPHONE ENCOUNTER
Patient left voicemail indicating she wanted to come in for EKG.  Called patient back to schedule this and she indicated she was going to the ER as she felt her HR was more irregular and wanted this to get checked out.  ASHLYN Murcia

## 2019-02-16 LAB — INTERPRETATION ECG - MUSE: NORMAL

## 2019-02-19 ENCOUNTER — OFFICE VISIT (OUTPATIENT)
Dept: CARDIOLOGY | Facility: CLINIC | Age: 79
End: 2019-02-19
Payer: COMMERCIAL

## 2019-02-19 VITALS
HEART RATE: 86 BPM | WEIGHT: 134.1 LBS | HEIGHT: 62 IN | SYSTOLIC BLOOD PRESSURE: 139 MMHG | BODY MASS INDEX: 24.68 KG/M2 | DIASTOLIC BLOOD PRESSURE: 75 MMHG

## 2019-02-19 DIAGNOSIS — R00.2 PALPITATIONS: Primary | ICD-10-CM

## 2019-02-19 PROCEDURE — 99214 OFFICE O/P EST MOD 30 MIN: CPT | Performed by: NURSE PRACTITIONER

## 2019-02-19 RX ORDER — MOXIFLOXACIN 5 MG/ML
1 SOLUTION/ DROPS OPHTHALMIC DAILY
COMMUNITY
Start: 2012-12-04 | End: 2020-12-04

## 2019-02-19 ASSESSMENT — MIFFLIN-ST. JEOR: SCORE: 1036.52

## 2019-02-19 NOTE — PROGRESS NOTES
"HPI:  Chiquita Berry is a 78 year old female who presents after wearing a zio patch for palpitations and tachycardia.  Her past medical history includes hypercholesteremia.  She is a patient of Dr. Simmons's.      In 2016, she wore a zio patch for symptoms associated with palpitations.   She recently saw Dr. Simmons on 2/4/19, with complaints of palpitations her EKG revealed sinus rhythm with left bundle branch block and a ventricular rate of 102 bpm.   She was asked to wear a Zio patch to further evaluate symptoms and heart rhythm.   On 12/15/19 she was in the emergency room for evaluation of palpitations, she did not have a rise in troponin she received a liter of fluid and she was discharged.    ECHO (9/2013) revealed EF 60-65%, mild MR.    She previously seen Dr. Simmons on 2/4/19 with complaints of palpitations and a 14-day Zio patch was placed .  She presented to the emergency room on 2/14/19 for palpitations which felt irregular and her HR was in the 120's.  When she arrived an EKG was done which showed left bundle branch block with a ventricular rate of 115 bpm, her troponin was negative she received a liter of fluid and was discharged.  For the last few days she states she has a feeling better and she is getting over her \"upper respiratory infection\".  At this time she denies chest pain or pressure, dizziness, dyspnea at rest or with exertion, orthopnea, PND, abdominal pain, abdominal or pedal edema.  She denies signs/symptoms of stroke such as visual disturbance, difficulty speaking, facial drooping, confusion, problems with gait, or any new numbness or weakness.      ASSESSMENT AND PLAN    Palpitations  We discussed in great length palpitations.  At this point we are not going to make any interventions.  We will await Zio patch results prior to making any decisions on plan of care.  I tried to provide reassurance that she can continue her normal activity.      Recommendations:    Follow-up based on Zio patch " results      Patient expresses understanding and agreement with the plan.     I appreciate the chance to help with Chiquita Berry Please let me know if you have any questions or concerns.    NIMCO Bojorquez, CNP    This note was completed in part using Dragon voice recognition software. Although reviewed after completion, some word and grammatical errors may occur.    No orders of the defined types were placed in this encounter.    Orders Placed This Encounter   Medications     moxifloxacin (VIGAMOX) 0.5 % ophthalmic solution     Sig: Place 1 drop into both eyes daily     Medications Discontinued During This Encounter   Medication Reason     triamcinolone (KENALOG) 0.5 % cream      predniSONE (DELTASONE) 10 MG tablet      albuterol (PROAIR HFA/PROVENTIL HFA/VENTOLIN HFA) 108 (90 BASE) MCG/ACT Inhaler      azithromycin (ZITHROMAX) 250 MG tablet          No diagnosis found.    CURRENT MEDICATIONS:  Current Outpatient Medications   Medication Sig Dispense Refill     Acetaminophen (TYLENOL ARTHRITIS EXT RELIEF OR) Take 500 mg by mouth daily        aspirin 81 MG tablet Take 1 tablet by mouth daily.       atorvastatin (LIPITOR) 20 MG tablet TAKE 1 TABLET (20 MG) BY MOUTH DAILY 90 tablet 3     Cholecalciferol (VITAMIN D) 2000 UNITS tablet Take 1 tablet by mouth daily.       IBUPROFEN PO Take 200 mg by mouth every 8 hours as needed        moxifloxacin (VIGAMOX) 0.5 % ophthalmic solution Place 1 drop into both eyes daily       Multiple Vitamins-Minerals (MULTIVITAL) TABS Take 1 tablet by mouth daily.       Multiple Vitamins-Minerals (PRESERVISION AREDS 2) CAPS Take 2 capsules by mouth daily       polyethylene glycol (MIRALAX/GLYCOLAX) packet Take 1 packet by mouth daily       zolpidem (AMBIEN) 5 MG tablet TAKE ONE TABLET BY MOUTH EVERY NIGHT AS NEEDED FORSLEEP 30 tablet 0       ALLERGIES     Allergies   Allergen Reactions     Augmentin Diarrhea       PAST MEDICAL HISTORY:  Past Medical History:   Diagnosis Date      Anxiety      Cataract     bilateral     GERD (gastroesophageal reflux disease)      Hyperlipidemia LDL goal < 100      Insomnia     using ambien once a month     Kyphosis      Osteoporosis      Palpitations     resolved     Scapula fracture 4/5/2015    right - didn't need surgery - sling     Scoliosis        PAST SURGICAL HISTORY:  Past Surgical History:   Procedure Laterality Date     C RAD RESEC TONSIL/PILLARS  1941     CATARACT IOL, RT/LT Right 12/2012    right eye     DENTAL SURGERY  1959    widsom teeth     FRACTURE SURGERY  12/2005    fractured humerus and ulna repair     PHACOEMULSIFICATION CLEAR CORNEA WITH STANDARD INTRAOCULAR LENS IMPLANT Left 11/16/2015    Procedure: PHACOEMULSIFICATION CLEAR CORNEA WITH STANDARD INTRAOCULAR LENS IMPLANT;  Surgeon: Latrell Jessica MD;  Location: [facility]       FAMILY HISTORY:  Family History   Problem Relation Age of Onset     Alzheimer Disease Mother      Cerebrovascular Disease Mother      Cancer Father         stomach     Breast Cancer Sister      Breast Cancer Sister      Diabetes No family hx of      Coronary Artery Disease No family hx of      Hypertension No family hx of      Hyperlipidemia No family hx of      Colon Cancer No family hx of      Prostate Cancer No family hx of      Other Cancer No family hx of      Depression No family hx of      Anxiety Disorder No family hx of      Mental Illness No family hx of      Substance Abuse No family hx of      Anesthesia Reaction No family hx of      Asthma No family hx of      Osteoporosis No family hx of      Genetic Disorder No family hx of      Thyroid Disease No family hx of      Obesity No family hx of      Unknown/Adopted No family hx of        SOCIAL HISTORY:  Social History     Socioeconomic History     Marital status:      Spouse name: None     Number of children: None     Years of education: None     Highest education level: None   Social Needs     Financial resource strain: None     Food insecurity -  "worry: None     Food insecurity - inability: None     Transportation needs - medical: None     Transportation needs - non-medical: None   Occupational History     None   Tobacco Use     Smoking status: Never Smoker     Smokeless tobacco: Never Used   Substance and Sexual Activity     Alcohol use: Yes     Alcohol/week: 0.0 oz     Comment: Occ glass of wine - less than once per month     Drug use: No     Sexual activity: No     Comment: never sexually active   Other Topics Concern     Parent/sibling w/ CABG, MI or angioplasty before 65F 55M? No      Service Not Asked     Blood Transfusions Not Asked     Caffeine Concern Not Asked     Occupational Exposure Not Asked     Hobby Hazards Not Asked     Sleep Concern Not Asked     Stress Concern Not Asked     Weight Concern Not Asked     Special Diet No     Back Care Not Asked     Exercise Yes     Comment:  everyday     Bike Helmet Not Asked     Seat Belt Not Asked     Self-Exams Not Asked   Social History Narrative     None       Review of Systems:  Skin:  Negative     Eyes:  Positive for glasses;cataracts;glaucoma  ENT:  Positive for nasal congestion;postnasal drainage;earache  Respiratory:  Negative for shortness of breath;dyspnea on exertion;sleep apnea;CPAP;cough;wheezing  Cardiovascular:  chest pain;Negative for;edema;exercise intolerance;lightheadedness;dizziness Positive for;palpitations;fatigue  Gastroenterology: Negative    Genitourinary:  Negative    Musculoskeletal:  Positive for arthritis  Neurologic:  Negative    Psychiatric:       Heme/Lymph/Imm:  Negative    Endocrine:  Negative      Physical Exam:  Vitals: /75   Pulse 86   Ht 1.575 m (5' 2\")   Wt 60.8 kg (134 lb 1.6 oz)   LMP  (LMP Unknown)   BMI 24.53 kg/m      Constitutional:  cooperative, alert and oriented, well developed, well nourished, in no acute distress        Skin:  warm and dry to the touch, no apparent skin lesions or masses noted        Head:  normocephalic, no masses or " lesions        Eyes:  pupils equal and round        ENT:  no pallor or cyanosis        Neck:  JVP normal        Chest:  clear to auscultation        Cardiac: regular rhythm                  Abdomen:  abdomen soft obese      Vascular: pulses full and equal, no bruits auscultated                                      Extremities and Back:  no edema;no deformities, clubbing, cyanosis, erythema observed        Neurological:  no gross motor deficits          Recent Lab Results:  LIPID RESULTS:  Lab Results   Component Value Date    CHOL 151 06/08/2016    HDL 51 06/08/2016     (H) 05/31/2018    LDL 86 06/08/2016    TRIG 68 06/08/2016    CHOLHDLRATIO 3.1 06/01/2015       LIVER ENZYME RESULTS:  Lab Results   Component Value Date    AST 32 12/11/2015    ALT 26 05/31/2018       CBC RESULTS:  Lab Results   Component Value Date    WBC 10.5 02/15/2019    RBC 4.08 02/15/2019    HGB 13.3 02/15/2019    HCT 39.2 02/15/2019    MCV 96 02/15/2019    MCH 32.6 02/15/2019    MCHC 33.9 02/15/2019    RDW 13.4 02/15/2019     02/15/2019       BMP RESULTS:  Lab Results   Component Value Date     02/15/2019    POTASSIUM 3.7 02/15/2019    CHLORIDE 109 02/15/2019    CO2 29 02/15/2019    ANIONGAP 4 02/15/2019     (H) 02/15/2019    BUN 16 02/15/2019    CR 0.64 02/15/2019    GFRESTIMATED 85 02/15/2019    GFRESTBLACK >90 02/15/2019    SAGE 8.8 02/15/2019        A1C RESULTS:  No results found for: A1C    INR RESULTS:  No results found for: INR        CC  No referring provider defined for this encounter.

## 2019-02-19 NOTE — TELEPHONE ENCOUNTER
After the patient left the clinic today she asked rooming staff if she could resume normal exercises by walking the hallway and doing steps.  Are you able to call her back today and inform her yes she can resume her normal exercises and I would do so.    Thank you,    Radha

## 2019-02-19 NOTE — TELEPHONE ENCOUNTER
Pt made aware that she is ok to resume her normal activities prior to her high heart rate.  Pt thought so, but wanted to make sure.  Did make pt aware that if heart rate is elevated it would be best not to exercise a those time. Pt states understanding. Nora

## 2019-02-19 NOTE — LETTER
"2/19/2019    Christina Crawford MD  [address]  [facility] [address]    RE: Chiquita Berry       Dear Colleague,    I had the pleasure of seeing Chiquita Berry in the [facility].    HPI:  Chiquita Berry is a 78 year old female who presents after wearing a zio patch for palpitations and tachycardia.  Her past medical history includes hypercholesteremia.  She is a patient of Dr. Simmons's.      In 2016, she wore a zio patch for symptoms associated with palpitations.   She recently saw Dr. Simmons on 2/4/19, with complaints of palpitations her EKG revealed sinus rhythm with left bundle branch block and a ventricular rate of 102 bpm.   She was asked to wear a Zio patch to further evaluate symptoms and heart rhythm.   On 12/15/19 she was in the emergency room for evaluation of palpitations, she did not have a rise in troponin she received a liter of fluid and she was discharged.    ECHO (9/2013) revealed EF 60-65%, mild MR.    She previously seen Dr. Simmons on 2/4/19 with complaints of palpitations and a 14-day Zio patch was placed .  She presented to the emergency room on 2/14/19 for palpitations which felt irregular and her HR was in the 120's.  When she arrived an EKG was done which showed left bundle branch block with a ventricular rate of 115 bpm, her troponin was negative she received a liter of fluid and was discharged.  For the last few days she states she has a feeling better and she is getting over her \"upper respiratory infection\".  At this time she denies chest pain or pressure, dizziness, dyspnea at rest or with exertion, orthopnea, PND, abdominal pain, abdominal or pedal edema.  She denies signs/symptoms of stroke such as visual disturbance, difficulty speaking, facial drooping, confusion, problems with gait, or any new numbness or weakness.      ASSESSMENT AND PLAN    Palpitations  We discussed in great length palpitations.  At this point we are not going to make any " interventions.  We will await Zio patch results prior to making any decisions on plan of care.  I tried to provide reassurance that she can continue her normal activity.      Recommendations:    Follow-up based on Zio patch results      Patient expresses understanding and agreement with the plan.     I appreciate the chance to help with Chiquita Berry Please let me know if you have any questions or concerns.    Radha Boogie, APRN, CNP    This note was completed in part using Dragon voice recognition software. Although reviewed after completion, some word and grammatical errors may occur.    No orders of the defined types were placed in this encounter.    Orders Placed This Encounter   Medications     moxifloxacin (VIGAMOX) 0.5 % ophthalmic solution     Sig: Place 1 drop into both eyes daily     Medications Discontinued During This Encounter   Medication Reason     triamcinolone (KENALOG) 0.5 % cream      predniSONE (DELTASONE) 10 MG tablet      albuterol (PROAIR HFA/PROVENTIL HFA/VENTOLIN HFA) 108 (90 BASE) MCG/ACT Inhaler      azithromycin (ZITHROMAX) 250 MG tablet          No diagnosis found.    CURRENT MEDICATIONS:  Current Outpatient Medications   Medication Sig Dispense Refill     Acetaminophen (TYLENOL ARTHRITIS EXT RELIEF OR) Take 500 mg by mouth daily        aspirin 81 MG tablet Take 1 tablet by mouth daily.       atorvastatin (LIPITOR) 20 MG tablet TAKE 1 TABLET (20 MG) BY MOUTH DAILY 90 tablet 3     Cholecalciferol (VITAMIN D) 2000 UNITS tablet Take 1 tablet by mouth daily.       IBUPROFEN PO Take 200 mg by mouth every 8 hours as needed        moxifloxacin (VIGAMOX) 0.5 % ophthalmic solution Place 1 drop into both eyes daily       Multiple Vitamins-Minerals (MULTIVITAL) TABS Take 1 tablet by mouth daily.       Multiple Vitamins-Minerals (PRESERVISION AREDS 2) CAPS Take 2 capsules by mouth daily       polyethylene glycol (MIRALAX/GLYCOLAX) packet Take 1 packet by mouth daily       zolpidem (AMBIEN) 5  MG tablet TAKE ONE TABLET BY MOUTH EVERY NIGHT AS NEEDED FORSLEEP 30 tablet 0       ALLERGIES     Allergies   Allergen Reactions     Augmentin Diarrhea       PAST MEDICAL HISTORY:  Past Medical History:   Diagnosis Date     Anxiety      Cataract     bilateral     GERD (gastroesophageal reflux disease)      Hyperlipidemia LDL goal < 100      Insomnia     using ambien once a month     Kyphosis      Osteoporosis      Palpitations     resolved     Scapula fracture 4/5/2015    right - didn't need surgery - sling     Scoliosis        PAST SURGICAL HISTORY:  Past Surgical History:   Procedure Laterality Date     C RAD RESEC TONSIL/PILLARS  1941     CATARACT IOL, RT/LT Right 12/2012    right eye     DENTAL SURGERY  1959    widsom teeth     FRACTURE SURGERY  12/2005    fractured humerus and ulna repair     PHACOEMULSIFICATION CLEAR CORNEA WITH STANDARD INTRAOCULAR LENS IMPLANT Left 11/16/2015    Procedure: PHACOEMULSIFICATION CLEAR CORNEA WITH STANDARD INTRAOCULAR LENS IMPLANT;  Surgeon: Latrell Jessica MD;  Location: [facility]       FAMILY HISTORY:  Family History   Problem Relation Age of Onset     Alzheimer Disease Mother      Cerebrovascular Disease Mother      Cancer Father         stomach     Breast Cancer Sister      Breast Cancer Sister      Diabetes No family hx of      Coronary Artery Disease No family hx of      Hypertension No family hx of      Hyperlipidemia No family hx of      Colon Cancer No family hx of      Prostate Cancer No family hx of      Other Cancer No family hx of      Depression No family hx of      Anxiety Disorder No family hx of      Mental Illness No family hx of      Substance Abuse No family hx of      Anesthesia Reaction No family hx of      Asthma No family hx of      Osteoporosis No family hx of      Genetic Disorder No family hx of      Thyroid Disease No family hx of      Obesity No family hx of      Unknown/Adopted No family hx of        SOCIAL HISTORY:  Social History     Socioeconomic  "History     Marital status:      Spouse name: None     Number of children: None     Years of education: None     Highest education level: None   Social Needs     Financial resource strain: None     Food insecurity - worry: None     Food insecurity - inability: None     Transportation needs - medical: None     Transportation needs - non-medical: None   Occupational History     None   Tobacco Use     Smoking status: Never Smoker     Smokeless tobacco: Never Used   Substance and Sexual Activity     Alcohol use: Yes     Alcohol/week: 0.0 oz     Comment: Occ glass of wine - less than once per month     Drug use: No     Sexual activity: No     Comment: never sexually active   Other Topics Concern     Parent/sibling w/ CABG, MI or angioplasty before 65F 55M? No      Service Not Asked     Blood Transfusions Not Asked     Caffeine Concern Not Asked     Occupational Exposure Not Asked     Hobby Hazards Not Asked     Sleep Concern Not Asked     Stress Concern Not Asked     Weight Concern Not Asked     Special Diet No     Back Care Not Asked     Exercise Yes     Comment:  everyday     Bike Helmet Not Asked     Seat Belt Not Asked     Self-Exams Not Asked   Social History Narrative     None       Review of Systems:  Skin:  Negative     Eyes:  Positive for glasses;cataracts;glaucoma  ENT:  Positive for nasal congestion;postnasal drainage;earache  Respiratory:  Negative for shortness of breath;dyspnea on exertion;sleep apnea;CPAP;cough;wheezing  Cardiovascular:  chest pain;Negative for;edema;exercise intolerance;lightheadedness;dizziness Positive for;palpitations;fatigue  Gastroenterology: Negative    Genitourinary:  Negative    Musculoskeletal:  Positive for arthritis  Neurologic:  Negative    Psychiatric:       Heme/Lymph/Imm:  Negative    Endocrine:  Negative      Physical Exam:  Vitals: /75   Pulse 86   Ht 1.575 m (5' 2\")   Wt 60.8 kg (134 lb 1.6 oz)   LMP  (LMP Unknown)   BMI 24.53 kg/m   "     Constitutional:  cooperative, alert and oriented, well developed, well nourished, in no acute distress        Skin:  warm and dry to the touch, no apparent skin lesions or masses noted        Head:  normocephalic, no masses or lesions        Eyes:  pupils equal and round        ENT:  no pallor or cyanosis        Neck:  JVP normal        Chest:  clear to auscultation        Cardiac: regular rhythm                  Abdomen:  abdomen soft obese      Vascular: pulses full and equal, no bruits auscultated                                      Extremities and Back:  no edema;no deformities, clubbing, cyanosis, erythema observed        Neurological:  no gross motor deficits          Recent Lab Results:  LIPID RESULTS:  Lab Results   Component Value Date    CHOL 151 06/08/2016    HDL 51 06/08/2016     (H) 05/31/2018    LDL 86 06/08/2016    TRIG 68 06/08/2016    CHOLHDLRATIO 3.1 06/01/2015       LIVER ENZYME RESULTS:  Lab Results   Component Value Date    AST 32 12/11/2015    ALT 26 05/31/2018       CBC RESULTS:  Lab Results   Component Value Date    WBC 10.5 02/15/2019    RBC 4.08 02/15/2019    HGB 13.3 02/15/2019    HCT 39.2 02/15/2019    MCV 96 02/15/2019    MCH 32.6 02/15/2019    MCHC 33.9 02/15/2019    RDW 13.4 02/15/2019     02/15/2019       BMP RESULTS:  Lab Results   Component Value Date     02/15/2019    POTASSIUM 3.7 02/15/2019    CHLORIDE 109 02/15/2019    CO2 29 02/15/2019    ANIONGAP 4 02/15/2019     (H) 02/15/2019    BUN 16 02/15/2019    CR 0.64 02/15/2019    GFRESTIMATED 85 02/15/2019    GFRESTBLACK >90 02/15/2019    SAGE 8.8 02/15/2019        A1C RESULTS:  No results found for: A1C    INR RESULTS:  No results found for: INR        Thank you for allowing me to participate in the care of your patient.    Sincerely,     NIMCO Miramontes [facility]

## 2019-02-19 NOTE — LETTER
"2/19/2019    Christina Crawford MD  [address]  [facility] [address]    RE: Chiquita Berry       Dear Colleague,    I had the pleasure of seeing Chiquita Berry in the [facility].    HPI:  Chiquita Berry is a 78 year old female who presents after wearing a zio patch for palpitations and tachycardia.  Her past medical history includes hypercholesteremia.  She is a patient of Dr. Simmons's.      In 2016, she wore a zio patch for symptoms associated with palpitations.   She recently saw Dr. Simmons on 2/4/19, with complaints of palpitations her EKG revealed sinus rhythm with left bundle branch block and a ventricular rate of 102 bpm.   She was asked to wear a Zio patch to further evaluate symptoms and heart rhythm.   On 12/15/19 she was in the emergency room for evaluation of palpitations, she did not have a rise in troponin she received a liter of fluid and she was discharged.    ECHO (9/2013) revealed EF 60-65%, mild MR.    She previously seen Dr. Simmons on 2/4/19 with complaints of palpitations and a 14-day Zio patch was placed .  She presented to the emergency room on 2/14/19 for palpitations which felt irregular and her HR was in the 120's.  When she arrived an EKG was done which showed left bundle branch block with a ventricular rate of 115 bpm, her troponin was negative she received a liter of fluid and was discharged.  For the last few days she states she has a feeling better and she is getting over her \"upper respiratory infection\".  At this time she denies chest pain or pressure, dizziness, dyspnea at rest or with exertion, orthopnea, PND, abdominal pain, abdominal or pedal edema.  She denies signs/symptoms of stroke such as visual disturbance, difficulty speaking, facial drooping, confusion, problems with gait, or any new numbness or weakness.      ASSESSMENT AND PLAN    Palpitations  We discussed in great length palpitations.  At this point we are not going to make any " interventions.  We will await Zio patch results prior to making any decisions on plan of care.  I tried to provide reassurance that she can continue her normal activity.      Recommendations:    Follow-up based on Zio patch results      Patient expresses understanding and agreement with the plan.     I appreciate the chance to help with Chiquita Berry Please let me know if you have any questions or concerns.    Radha Boogie, APRN, CNP    This note was completed in part using Dragon voice recognition software. Although reviewed after completion, some word and grammatical errors may occur.    No orders of the defined types were placed in this encounter.    Orders Placed This Encounter   Medications     moxifloxacin (VIGAMOX) 0.5 % ophthalmic solution     Sig: Place 1 drop into both eyes daily     Medications Discontinued During This Encounter   Medication Reason     triamcinolone (KENALOG) 0.5 % cream      predniSONE (DELTASONE) 10 MG tablet      albuterol (PROAIR HFA/PROVENTIL HFA/VENTOLIN HFA) 108 (90 BASE) MCG/ACT Inhaler      azithromycin (ZITHROMAX) 250 MG tablet          No diagnosis found.    CURRENT MEDICATIONS:  Current Outpatient Medications   Medication Sig Dispense Refill     Acetaminophen (TYLENOL ARTHRITIS EXT RELIEF OR) Take 500 mg by mouth daily        aspirin 81 MG tablet Take 1 tablet by mouth daily.       atorvastatin (LIPITOR) 20 MG tablet TAKE 1 TABLET (20 MG) BY MOUTH DAILY 90 tablet 3     Cholecalciferol (VITAMIN D) 2000 UNITS tablet Take 1 tablet by mouth daily.       IBUPROFEN PO Take 200 mg by mouth every 8 hours as needed        moxifloxacin (VIGAMOX) 0.5 % ophthalmic solution Place 1 drop into both eyes daily       Multiple Vitamins-Minerals (MULTIVITAL) TABS Take 1 tablet by mouth daily.       Multiple Vitamins-Minerals (PRESERVISION AREDS 2) CAPS Take 2 capsules by mouth daily       polyethylene glycol (MIRALAX/GLYCOLAX) packet Take 1 packet by mouth daily       zolpidem (AMBIEN) 5  MG tablet TAKE ONE TABLET BY MOUTH EVERY NIGHT AS NEEDED FORSLEEP 30 tablet 0       ALLERGIES     Allergies   Allergen Reactions     Augmentin Diarrhea       PAST MEDICAL HISTORY:  Past Medical History:   Diagnosis Date     Anxiety      Cataract     bilateral     GERD (gastroesophageal reflux disease)      Hyperlipidemia LDL goal < 100      Insomnia     using ambien once a month     Kyphosis      Osteoporosis      Palpitations     resolved     Scapula fracture 4/5/2015    right - didn't need surgery - sling     Scoliosis        PAST SURGICAL HISTORY:  Past Surgical History:   Procedure Laterality Date     C RAD RESEC TONSIL/PILLARS  1941     CATARACT IOL, RT/LT Right 12/2012    right eye     DENTAL SURGERY  1959    widsom teeth     FRACTURE SURGERY  12/2005    fractured humerus and ulna repair     PHACOEMULSIFICATION CLEAR CORNEA WITH STANDARD INTRAOCULAR LENS IMPLANT Left 11/16/2015    Procedure: PHACOEMULSIFICATION CLEAR CORNEA WITH STANDARD INTRAOCULAR LENS IMPLANT;  Surgeon: Latrell Jessica MD;  Location: [facility]       FAMILY HISTORY:  Family History   Problem Relation Age of Onset     Alzheimer Disease Mother      Cerebrovascular Disease Mother      Cancer Father         stomach     Breast Cancer Sister      Breast Cancer Sister      Diabetes No family hx of      Coronary Artery Disease No family hx of      Hypertension No family hx of      Hyperlipidemia No family hx of      Colon Cancer No family hx of      Prostate Cancer No family hx of      Other Cancer No family hx of      Depression No family hx of      Anxiety Disorder No family hx of      Mental Illness No family hx of      Substance Abuse No family hx of      Anesthesia Reaction No family hx of      Asthma No family hx of      Osteoporosis No family hx of      Genetic Disorder No family hx of      Thyroid Disease No family hx of      Obesity No family hx of      Unknown/Adopted No family hx of        SOCIAL HISTORY:  Social History     Socioeconomic  "History     Marital status:      Spouse name: None     Number of children: None     Years of education: None     Highest education level: None   Social Needs     Financial resource strain: None     Food insecurity - worry: None     Food insecurity - inability: None     Transportation needs - medical: None     Transportation needs - non-medical: None   Occupational History     None   Tobacco Use     Smoking status: Never Smoker     Smokeless tobacco: Never Used   Substance and Sexual Activity     Alcohol use: Yes     Alcohol/week: 0.0 oz     Comment: Occ glass of wine - less than once per month     Drug use: No     Sexual activity: No     Comment: never sexually active   Other Topics Concern     Parent/sibling w/ CABG, MI or angioplasty before 65F 55M? No      Service Not Asked     Blood Transfusions Not Asked     Caffeine Concern Not Asked     Occupational Exposure Not Asked     Hobby Hazards Not Asked     Sleep Concern Not Asked     Stress Concern Not Asked     Weight Concern Not Asked     Special Diet No     Back Care Not Asked     Exercise Yes     Comment:  everyday     Bike Helmet Not Asked     Seat Belt Not Asked     Self-Exams Not Asked   Social History Narrative     None       Review of Systems:  Skin:  Negative     Eyes:  Positive for glasses;cataracts;glaucoma  ENT:  Positive for nasal congestion;postnasal drainage;earache  Respiratory:  Negative for shortness of breath;dyspnea on exertion;sleep apnea;CPAP;cough;wheezing  Cardiovascular:  chest pain;Negative for;edema;exercise intolerance;lightheadedness;dizziness Positive for;palpitations;fatigue  Gastroenterology: Negative    Genitourinary:  Negative    Musculoskeletal:  Positive for arthritis  Neurologic:  Negative    Psychiatric:       Heme/Lymph/Imm:  Negative    Endocrine:  Negative      Physical Exam:  Vitals: /75   Pulse 86   Ht 1.575 m (5' 2\")   Wt 60.8 kg (134 lb 1.6 oz)   LMP  (LMP Unknown)   BMI 24.53 kg/m   "     Constitutional:  cooperative, alert and oriented, well developed, well nourished, in no acute distress        Skin:  warm and dry to the touch, no apparent skin lesions or masses noted        Head:  normocephalic, no masses or lesions        Eyes:  pupils equal and round        ENT:  no pallor or cyanosis        Neck:  JVP normal        Chest:  clear to auscultation        Cardiac: regular rhythm                  Abdomen:  abdomen soft obese      Vascular: pulses full and equal, no bruits auscultated                                      Extremities and Back:  no edema;no deformities, clubbing, cyanosis, erythema observed        Neurological:  no gross motor deficits          Recent Lab Results:  LIPID RESULTS:  Lab Results   Component Value Date    CHOL 151 06/08/2016    HDL 51 06/08/2016     (H) 05/31/2018    LDL 86 06/08/2016    TRIG 68 06/08/2016    CHOLHDLRATIO 3.1 06/01/2015       LIVER ENZYME RESULTS:  Lab Results   Component Value Date    AST 32 12/11/2015    ALT 26 05/31/2018       CBC RESULTS:  Lab Results   Component Value Date    WBC 10.5 02/15/2019    RBC 4.08 02/15/2019    HGB 13.3 02/15/2019    HCT 39.2 02/15/2019    MCV 96 02/15/2019    MCH 32.6 02/15/2019    MCHC 33.9 02/15/2019    RDW 13.4 02/15/2019     02/15/2019       BMP RESULTS:  Lab Results   Component Value Date     02/15/2019    POTASSIUM 3.7 02/15/2019    CHLORIDE 109 02/15/2019    CO2 29 02/15/2019    ANIONGAP 4 02/15/2019     (H) 02/15/2019    BUN 16 02/15/2019    CR 0.64 02/15/2019    GFRESTIMATED 85 02/15/2019    GFRESTBLACK >90 02/15/2019    SAGE 8.8 02/15/2019        A1C RESULTS:  No results found for: A1C    INR RESULTS:  No results found for: INR        CC  No referring provider defined for this encounter.                  Thank you for allowing me to participate in the care of your patient.      Sincerely,     NIMCO [facility]ontes [facility]    cc:   No  referring provider defined for this encounter.

## 2019-03-05 ENCOUNTER — TELEPHONE (OUTPATIENT)
Dept: CARDIOLOGY | Facility: CLINIC | Age: 79
End: 2019-03-05

## 2019-03-05 NOTE — TELEPHONE ENCOUNTER
Patient called x2 inquiring about results of ZP.  Will reach out to Dr Simmons and have him review.  ASHLYN Murcia

## 2019-03-05 NOTE — TELEPHONE ENCOUNTER
Spoke to patient regarding results of 14 day ZP showing no significant arrhythmias.  Symptoms not a/w specific arrhythmias.  She has no arrhythmias to explain sx.  Patient reports symptoms subsided after her URI.  Also she knows that when she is anxious she feels her heart racing and is working on techniques to calm herself down. Patient provided verbal understanding of above and is very happy that there is nothing serious.  ASHLYN Murcia

## 2019-03-05 NOTE — TELEPHONE ENCOUNTER
No significant arrhythmias noted on her monitor. Her sxs not a/w specific arrhythmias. It does seems that she has no arrhythmias to explain her sxs.

## 2019-04-16 ENCOUNTER — OFFICE VISIT (OUTPATIENT)
Dept: FAMILY MEDICINE | Facility: CLINIC | Age: 79
End: 2019-04-16
Payer: COMMERCIAL

## 2019-04-16 VITALS
WEIGHT: 134 LBS | HEART RATE: 91 BPM | TEMPERATURE: 98.7 F | HEIGHT: 62 IN | BODY MASS INDEX: 24.66 KG/M2 | DIASTOLIC BLOOD PRESSURE: 60 MMHG | SYSTOLIC BLOOD PRESSURE: 100 MMHG | OXYGEN SATURATION: 97 % | RESPIRATION RATE: 12 BRPM

## 2019-04-16 DIAGNOSIS — E78.00 HYPERCHOLESTEROLEMIA: ICD-10-CM

## 2019-04-16 DIAGNOSIS — Z00.00 ROUTINE GENERAL MEDICAL EXAMINATION AT A HEALTH CARE FACILITY: Primary | ICD-10-CM

## 2019-04-16 DIAGNOSIS — R73.02 GLUCOSE INTOLERANCE (IMPAIRED GLUCOSE TOLERANCE): ICD-10-CM

## 2019-04-16 DIAGNOSIS — R00.0 SINUS TACHYCARDIA: ICD-10-CM

## 2019-04-16 DIAGNOSIS — F41.9 ANXIETY: ICD-10-CM

## 2019-04-16 DIAGNOSIS — F51.01 PRIMARY INSOMNIA: ICD-10-CM

## 2019-04-16 DIAGNOSIS — F32.5 MAJOR DEPRESSION IN COMPLETE REMISSION (H): ICD-10-CM

## 2019-04-16 PROCEDURE — 80061 LIPID PANEL: CPT | Performed by: FAMILY MEDICINE

## 2019-04-16 PROCEDURE — 84443 ASSAY THYROID STIM HORMONE: CPT | Performed by: FAMILY MEDICINE

## 2019-04-16 PROCEDURE — 80048 BASIC METABOLIC PNL TOTAL CA: CPT | Performed by: FAMILY MEDICINE

## 2019-04-16 PROCEDURE — 99214 OFFICE O/P EST MOD 30 MIN: CPT | Mod: 25 | Performed by: FAMILY MEDICINE

## 2019-04-16 PROCEDURE — 84460 ALANINE AMINO (ALT) (SGPT): CPT | Performed by: FAMILY MEDICINE

## 2019-04-16 PROCEDURE — G0439 PPPS, SUBSEQ VISIT: HCPCS | Performed by: FAMILY MEDICINE

## 2019-04-16 PROCEDURE — 36415 COLL VENOUS BLD VENIPUNCTURE: CPT | Performed by: FAMILY MEDICINE

## 2019-04-16 RX ORDER — TRAZODONE HYDROCHLORIDE 50 MG/1
50 TABLET, FILM COATED ORAL AT BEDTIME
Qty: 30 TABLET | Refills: 0 | Status: SHIPPED | OUTPATIENT
Start: 2019-04-16 | End: 2020-12-04

## 2019-04-16 RX ORDER — ZOLPIDEM TARTRATE 5 MG/1
TABLET ORAL
Qty: 30 TABLET | Refills: 0 | Status: CANCELLED | OUTPATIENT
Start: 2019-04-16

## 2019-04-16 RX ORDER — ATORVASTATIN CALCIUM 20 MG/1
TABLET, FILM COATED ORAL
Qty: 90 TABLET | Refills: 1 | Status: SHIPPED | OUTPATIENT
Start: 2019-04-16 | End: 2020-01-02

## 2019-04-16 ASSESSMENT — ANXIETY QUESTIONNAIRES
IF YOU CHECKED OFF ANY PROBLEMS ON THIS QUESTIONNAIRE, HOW DIFFICULT HAVE THESE PROBLEMS MADE IT FOR YOU TO DO YOUR WORK, TAKE CARE OF THINGS AT HOME, OR GET ALONG WITH OTHER PEOPLE: NOT DIFFICULT AT ALL
2. NOT BEING ABLE TO STOP OR CONTROL WORRYING: SEVERAL DAYS
GAD7 TOTAL SCORE: 3
3. WORRYING TOO MUCH ABOUT DIFFERENT THINGS: NOT AT ALL
1. FEELING NERVOUS, ANXIOUS, OR ON EDGE: SEVERAL DAYS
5. BEING SO RESTLESS THAT IT IS HARD TO SIT STILL: NOT AT ALL
6. BECOMING EASILY ANNOYED OR IRRITABLE: NOT AT ALL
7. FEELING AFRAID AS IF SOMETHING AWFUL MIGHT HAPPEN: NOT AT ALL

## 2019-04-16 ASSESSMENT — MIFFLIN-ST. JEOR: SCORE: 1036.07

## 2019-04-16 ASSESSMENT — ACTIVITIES OF DAILY LIVING (ADL): CURRENT_FUNCTION: NO ASSISTANCE NEEDED

## 2019-04-16 ASSESSMENT — PATIENT HEALTH QUESTIONNAIRE - PHQ9
5. POOR APPETITE OR OVEREATING: SEVERAL DAYS
SUM OF ALL RESPONSES TO PHQ QUESTIONS 1-9: 1

## 2019-04-16 NOTE — PATIENT INSTRUCTIONS
1. Shingrex is a 2 shot series that prevents shingles 97% of the time, as opposed to the old shingles shot that only prevented it at 40-50%  It costs less for medicare at a pharmacy  You should get it starting at 50 yrs old get the 2nd shot 5-6 mo after the first one  You are due for your 2nd one     2. Please do your breast exam every mo, when you  Change the  calendar page or set an alarm on your cell phone Do a  visual check for dimples, inversion or indentation or any different position of the nipple Feel manually  for any 1cm or larger  size mass ie about the size of an almond Be sure to cover the entire area of both breasts : this extends back to the back on either side and from the collar bone to the bottom of the breasts where you can begin to feel ribs.  Men are advised to do this exam also as they now have a higher rate of breast cancer , like women do .     3. . Schedule your mammo at [facility]  At [address] at [phone]      4. No difference was  Noted by patients in a double blind study when given codeine, tylenol ( acetaminophen) or ibuprofen (all in identical pills). They felt no difference in pain relief. Since ibuprofen and the NSAIDs  causes kidney damage, esophageal damage with heartburn, and can increase the risk of esophageal and stomach cancer and ulcers,and colonic strictures. They also cause increased risk of heart attack .   I recommend that you use tylenol(acetaminophen) for pain. Use the acetaminophen ES  Which has 500mgm/tablet You can take up to 2 tablets 4 times a day as need for pain.  If this is not enough, you can add in ibuprofen or aleve(naprosyn) with 2 glasses of fluid and some food-to protect the stomach and esophagus. Please let us know if you are continuing to take ibuprofen or aleve, as we will need to periodically check your kidney function with a blood test.    5. Stop the ambien  And start   Trazodone 50 mgm or 1/2 tab = 25mgm and do prn sleep but  consider every nite as it is an antianxiety/antidepressant   Use as much melatonin with it as you like     6. The only way known to prevent diabetes or keep it from getting worse is exercise, 20-40 minutes 3 times a day around the time of meals as your insulin is wearing out  You need to get rid of the sugar using your muscles

## 2019-04-16 NOTE — LETTER
April 18, 2019      Chiquita Berry  [address]   [facility] [address]        Dear ,    We are writing to inform you of your test results.    They are all normal     THE FOLLOWING ARE EXPLANATIONS OF SOME OF OUR LAB TESTS     YOU DID NOT NECESSARILY HAVE ALL OF THESE DONE     Hgb is the blood iron level   WBC means White Blood Cells   Platelets are small blood    cells that help with forming the blood clots along with other blood factors.   Electrolytes are Sodium, Potassium, Calcium, Magnesium, Phosphorus.   Liver tests are: AST, ALT, Bilirubin, Alkaline Phosphatase.   Kidney tests are Creatinine, GFR.   HDL Choles   terol - is the good cholesterol and it is good to have it high.   LDL cholesterol is the bad cholesterol and it is good to have it low.   It is recommended to have LDL less than 130 for people with hypertension and to have it less than 100 for people with   heart disease, diabetes and chronic kidney disease.   Triglycerides are another type of lipid that can cause heart disease, like the cholesterol and should be kept low   Thyroid tests are TSH, T4, T3   Glucose is sugar.   A1c is a test that gives us an idea    about how well was controlled the diabetes for the last 3 months.   PSA stands for Prostate Specific Antigen and it can be elevated with prostate cancer or prostate inflammation.     Please continue on the same medications     Resulted Orders   Lipid panel reflex to direct LDL Fasting   Result Value Ref Range    Cholesterol 167 <200 mg/dL    Triglycerides 115 <150 mg/dL    HDL Cholesterol 45 (L) >49 mg/dL    LDL Cholesterol Calculated 99 <100 mg/dL      Comment:      Desirable:       <100 mg/dl    Non HDL Cholesterol 122 <130 mg/dL   ALT   Result Value Ref Range    ALT 22 0 - 50 U/L   TSH with free T4 reflex   Result Value Ref Range    TSH 3.10 0.40 - 4.00 mU/L   Basic metabolic panel   Result Value Ref Range    Sodium 139 133 - 144 mmol/L    Potassium 4.1  3.4 - 5.3 mmol/L    Chloride 107 94 - 109 mmol/L    Carbon Dioxide 26 20 - 32 mmol/L    Anion Gap 6 3 - 14 mmol/L    Glucose 89 70 - 99 mg/dL    Urea Nitrogen 16 7 - 30 mg/dL    Creatinine 0.63 0.52 - 1.04 mg/dL    GFR Estimate 85 >60 mL/min/[1.73_m2]      Comment:      Non  GFR Calc  Starting 12/18/2018, serum creatinine based estimated GFR (eGFR) will be   calculated using the Chronic Kidney Disease Epidemiology Collaboration   (CKD-EPI) equation.      GFR Estimate If Black >90 >60 mL/min/[1.73_m2]      Comment:       GFR Calc  Starting 12/18/2018, serum creatinine based estimated GFR (eGFR) will be   calculated using the Chronic Kidney Disease Epidemiology Collaboration   (CKD-EPI) equation.      Calcium 8.8 8.5 - 10.1 mg/dL       If you have any questions or concerns, please call the clinic at the number listed above.       Sincerely,        Christina Crawford MD

## 2019-04-16 NOTE — PROGRESS NOTES
"SUBJECTIVE:   Chiquita Berry is a 78 year old female who presents for Preventive Visit.    Are you in the first 12 months of your Medicare coverage?  No    Healthy Habits:     In general, how would you rate your overall health?  Good    Frequency of exercise:  6-7 days/week    Duration of exercise:  30-45 minutes    Do you usually eat at least 4 servings of fruit and vegetables a day, include whole grains    & fiber and avoid regularly eating high fat or \"junk\" foods?  Yes    Taking medications regularly:  Yes    Barriers to taking medications:  None    Medication side effects:  None    Ability to successfully perform activities of daily living:  No assistance needed    Home Safety:  No safety concerns identified    Hearing Impairment:  No hearing concerns    In the past 6 months, have you been bothered by leaking of urine? Yes    In general, how would you rate your overall mental or emotional health?  Good      PHQ-2 Total Score: 0    Additional concerns today:  No    Do you feel safe in your environment? Yes    Do you have a Health Care Directive? Yes: Advance Directive has been received and scanned.    Fall risk  Fallen 2 or more times in the past year?: No  Any fall with injury in the past year?: No    Cognitive Screening   1) Repeat 3 items (Leader, Season, Table)    2) Clock draw: NORMAL  3) 3 item recall: Recalls 3 objects  Results: 3 items recalled: COGNITIVE IMPAIRMENT LESS LIKELY    Mini-CogTM Copyright [employer]. Licensed by the author for use in [facility]; reprinted with permission (jayesh@.[facility]). All rights reserved.      Do you have sleep apnea, excessive snoring or daytime drowsiness?: yes    Reviewed and updated as needed this visit by clinical staff  Tobacco  Allergies  Meds  Problems  Med Hx  Surg Hx  Fam Hx  Soc Hx          Reviewed and updated as needed this visit by Provider        Social History     Tobacco Use     Smoking status: Never Smoker     Smokeless tobacco: " Never Used   Substance Use Topics     Alcohol use: Yes     Alcohol/week: 0.0 oz     Comment: Occ glass of wine - less than once per month     If you drink alcohol do you typically have >3 drinks per day or >7 drinks per week? No    Alcohol Use 4/16/2019   Prescreen: >3 drinks/day or >7 drinks/week? Not Applicable   Prescreen: >3 drinks/day or >7 drinks/week? -   No flowsheet data found.    Insomnia      Duration: lifelong    Worse with anxiety     Description  Frequency of insomnia:  nightly  Time to fall asleep: 45 minutes  Middle of night awakening:  nightly  Early morning awakening:  nightly    Accompanying signs and symptoms:  pain    History  Similar episodes in past:  YES  Previous evaluation/sleep study:  no     Precipitating or alleviating factors:  New stressful situation: no   Caffeine intake after lunchtime: no   OTC decongestants: no   Any new medications: no     Therapies tried and outcome: ambien 5mgm 1/2 tab q 2-7 nites     No helpw melatonin--helped her relaxation but still anxious     Depression and Anxiety Follow-Up    Status since last visit: No change -in remission     Other associated symptoms:None    Complicating factors:     Significant life event: No     Current substance abuse: None    PHQ-9  English=0  PHQ-9   Any Language  LETITIA-7=3  Suicide Assessment Five-step Evaluation and Treatment (SAFE-T)    Current providers sharing in care for this patient include:   Patient Care Team:  Christina Crawford MD as PCP - General (Family Practice)  Christina Crawford MD as Assigned PCP    The following health maintenance items are reviewed in Epic and correct as of today:  Health Maintenance   Topic Date Due     DEXA Q2 YR  04/25/2016     FALL RISK ASSESSMENT  06/23/2018     INFLUENZA VACCINE (1) 09/01/2018     PHQ-9 Q6 MONTHS  10/16/2019     MEDICARE ANNUAL WELLNESS VISIT  04/12/2020     LIPID MONITORING Q1 YEAR  04/16/2020     MAMMO Q2 YR  07/06/2020     DTAP/TDAP/TD IMMUNIZATION (2  "- Td) 01/27/2022     ADVANCE DIRECTIVE PLANNING Q5 YRS  01/26/2023     DEPRESSION ACTION PLAN  Completed     PNEUMOCOCCAL IMMUNIZATION 65+ LOW/MEDIUM RISK  Completed     ZOSTER IMMUNIZATION  Completed     IPV IMMUNIZATION  Aged Out     MENINGITIS IMMUNIZATION  Aged Out     Labs reviewed in EPIC      Review of Systems  CONSTITUTIONAL: NEGATIVE for fever, chills, change in weight  INTEGUMENTARY/SKIN: NEGATIVE for worrisome rashes, moles or lesions  EYES: NEGATIVE for vision changes or irritation  ENT/MOUTH: NEGATIVE for ear, mouth and throat problems  RESP: NEGATIVE for significant cough or SOB  BREAST: NEGATIVE for masses, tenderness or discharge  CV: NEGATIVE for chest pain, palpitations or peripheral edema  GI: NEGATIVE for nausea, abdominal pain, heartburn, or change in bowel habits  : NEGATIVE for frequency, dysuria, or hematuria  MUSCULOSKELETAL: NEGATIVE for significant arthralgias ; stiff  myalgia  NEURO: NEGATIVE for weakness, dizziness or paresthesias  ENDOCRINE: NEGATIVE for temperature intolerance, skin/hair changes  HEME: NEGATIVE for bleeding problems  PSYCHIATRIC: NEGATIVE for changes in mood or affect    OBJECTIVE:   /60   Pulse 91   Temp 98.7  F (37.1  C) (Tympanic)   Resp 12   Ht 1.575 m (5' 2\")   Wt 60.8 kg (134 lb)   LMP  (LMP Unknown)   SpO2 97%   Breastfeeding? No   BMI 24.51 kg/m   Estimated body mass index is 24.51 kg/m  as calculated from the following:    Height as of this encounter: 1.575 m (5' 2\").    Weight as of this encounter: 60.8 kg (134 lb).  Physical Exam  GENERAL APPEARANCE: healthy, alert and no distress  EYES: Eyes grossly normal to inspection, PERRL and conjunctivae and sclerae normal  NECK: no adenopathy, no asymmetry, masses, or scars and thyroid normal to palpation  RESP: lungs clear to auscultation - no rales, rhonchi or wheezes  BREAST: normal without masses, tenderness or nipple discharge and no palpable axillary masses or adenopathy  CV: regular rate and " rhythm, normal S1 S2, no S3 or S4, no murmur, click or rub, no peripheral edema and peripheral pulses strong  ABDOMEN: soft, nontender, no hepatosplenomegaly, no masses and bowel sounds normal  MS: no musculoskeletal defects are noted and gait is age appropriate without ataxia  SKIN: no suspicious lesions or rashes  NEURO: Normal strength and tone, sensory exam grossly normal, mentation intact and speech normal  PSYCH: mentation appears normal and affect normal/bright    Diagnostic Test Results:  Results for orders placed or performed during the hospital encounter of 02/15/19   CBC with platelets differential   Result Value Ref Range    WBC 10.5 4.0 - 11.0 10e9/L    RBC Count 4.08 3.8 - 5.2 10e12/L    Hemoglobin 13.3 11.7 - 15.7 g/dL    Hematocrit 39.2 35.0 - 47.0 %    MCV 96 78 - 100 fl    MCH 32.6 26.5 - 33.0 pg    MCHC 33.9 31.5 - 36.5 g/dL    RDW 13.4 10.0 - 15.0 %    Platelet Count 279 150 - 450 10e9/L    Diff Method Automated Method     % Neutrophils 73.0 %    % Lymphocytes 20.8 %    % Monocytes 5.2 %    % Eosinophils 0.4 %    % Basophils 0.4 %    % Immature Granulocytes 0.2 %    Nucleated RBCs 0 0 /100    Absolute Neutrophil 7.7 1.6 - 8.3 10e9/L    Absolute Lymphocytes 2.2 0.8 - 5.3 10e9/L    Absolute Monocytes 0.6 0.0 - 1.3 10e9/L    Absolute Eosinophils 0.0 0.0 - 0.7 10e9/L    Absolute Basophils 0.0 0.0 - 0.2 10e9/L    Abs Immature Granulocytes 0.0 0 - 0.4 10e9/L    Absolute Nucleated RBC 0.0    Basic metabolic panel   Result Value Ref Range    Sodium 142 133 - 144 mmol/L    Potassium 3.7 3.4 - 5.3 mmol/L    Chloride 109 94 - 109 mmol/L    Carbon Dioxide 29 20 - 32 mmol/L    Anion Gap 4 3 - 14 mmol/L    Glucose 116 (H) 70 - 99 mg/dL    Urea Nitrogen 16 7 - 30 mg/dL    Creatinine 0.64 0.52 - 1.04 mg/dL    GFR Estimate 85 >60 mL/min/[1.73_m2]    GFR Estimate If Black >90 >60 mL/min/[1.73_m2]    Calcium 8.8 8.5 - 10.1 mg/dL   Troponin I   Result Value Ref Range    Troponin I ES <0.015 0.000 - 0.045 ug/L   EKG  "12-lead, tracing only   Result Value Ref Range    Interpretation ECG Click View Image link to view waveform and result        ASSESSMENT / PLAN:       ICD-10-CM    1. Routine general medical examination at a health care facility Z00.00    2. Primary insomnia F51.01 traZODone (DESYREL) 50 MG tablet   3. Anxiety F41.9    4. 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 Hypercholesterolemia E78.00 Lipid panel reflex to direct LDL Fasting     ALT     atorvastatin (LIPITOR) 20 MG tablet   6. Glucose intolerance (impaired glucose tolerance) R73.02 Basic metabolic panel   7. Sinus tachycardia since 2002 w workup in 2013 and 6-16  R00.0 TSH with free T4 reflex       End of Life Planning:  Patient currently has an advanced directive: Yes.  Practitioner is supportive of decision.    COUNSELING:  Reviewed preventive health counseling, as reflected in patient instructions       Regular exercise       Healthy diet/nutrition       Vision screening    BP Readings from Last 1 Encounters:   04/16/19 100/60     Estimated body mass index is 24.51 kg/m  as calculated from the following:    Height as of this encounter: 1.575 m (5' 2\").    Weight as of this encounter: 60.8 kg (134 lb).           reports that she has never smoked. She has never used smokeless tobacco.      Appropriate preventive services were discussed with this patient, including applicable screening as appropriate for cardiovascular disease, diabetes, osteopenia/osteoporosis, and glaucoma.  As appropriate for age/gender, discussed screening for colorectal cancer, prostate cancer, breast cancer, and cervical cancer. Checklist reviewing preventive services available has been given to the patient.    Reviewed patients plan of care and provided an AVS. The Basic Care Plan (routine screening as documented in Health Maintenance) for Chiquita meets the Care Plan requirement. This Care Plan has been established and reviewed with the Patient    Patient Instructions   1. " Shingrex is a 2 shot series that prevents shingles 97% of the time, as opposed to the old shingles shot that only prevented it at 40-50%  It costs less for medicare at a pharmacy  You should get it starting at 50 yrs old get the 2nd shot 5-6 mo after the first one  You are due for your 2nd one     2. Please do your breast exam every mo, when you  Change the  calendar page or set an alarm on your cell phone Do a  visual check for dimples, inversion or indentation or any different position of the nipple Feel manually  for any 1cm or larger  size mass ie about the size of an almond Be sure to cover the entire area of both breasts : this extends back to the back on either side and from the collar bone to the bottom of the breasts where you can begin to feel ribs.  Men are advised to do this exam also as they now have a higher rate of breast cancer , like women do .     3. . Schedule your mammo at [facility]  At [address] at [phone]      4. No difference was  Noted by patients in a double blind study when given codeine, tylenol ( acetaminophen) or ibuprofen (all in identical pills). They felt no difference in pain relief. Since ibuprofen and the NSAIDs  causes kidney damage, esophageal damage with heartburn, and can increase the risk of esophageal and stomach cancer and ulcers,and colonic strictures. They also cause increased risk of heart attack .   I recommend that you use tylenol(acetaminophen) for pain. Use the acetaminophen ES  Which has 500mgm/tablet You can take up to 2 tablets 4 times a day as need for pain.  If this is not enough, you can add in ibuprofen or aleve(naprosyn) with 2 glasses of fluid and some food-to protect the stomach and esophagus. Please let us know if you are continuing to take ibuprofen or aleve, as we will need to periodically check your kidney function with a blood test.    5. Stop the ambien  And start   Trazodone 50 mgm or 1/2 tab = 25mgm and do prn sleep but  consider every nite as it is an antianxiety/antidepressant   Use as much melatonin with it as you like     6. The only way known to prevent diabetes or keep it from getting worse is exercise, 20-40 minutes 3 times a day around the time of meals as your insulin is wearing out  You need to get rid of the sugar using your muscles       Discussed all with pt  And the patient expresses understanding  That she still has insomnia on an addictive drug = ambien which also is borderline not safe at her elderly age  Also has anxiety , whichshe states is the main caus e of the insomnia   Will try trazodone for all the above and explained to pt that it is nonaddictive , doesn't have the wakening at nite side effects she has experienced and is for anxiety and depression  Needs to considr taking it every nite , not just PRN   .    Counseling Resources:  ATP IV Guidelines  Pooled Cohorts Equation Calculator  Breast Cancer Risk Calculator  FRAX Risk Assessment  ICSI Preventive Guidelines  Dietary Guidelines for Americans, 2010  USDA's MyPlate  ASA Prophylaxis  Lung CA Screening    Christina Crawford MD  [facility]    Identified Health Risks:

## 2019-04-16 NOTE — LETTER
My Depression Action Plan  Name: Chiquita Berry   Date of Birth 1940  Date: 4/16/2019    My doctor: Christina Crawford   My clinic: [address] [address]  [phone]          GREEN    ZONE   Good Control    What it looks like:     Things are going generally well. You have normal up s and down s. You may even feel depressed from time to time, but bad moods usually last less than a day.   What you need to do:  1. Continue to care for yourself (see self care plan)  2. Check your depression survival kit and update it as needed  3. Follow your physician s recommendations including any medication.  4. Do not stop taking medication unless you consult with your physician first.           YELLOW         ZONE Getting Worse    What it looks like:     Depression is starting to interfere with your life.     It may be hard to get out of bed; you may be starting to isolate yourself from others.    Symptoms of depression are starting to last most all day and this has happened for several days.     You may have suicidal thoughts but they are not constant.   What you need to do:     1. Call your care team, your response to treatment will improve if you keep your care team informed of your progress. Yellow periods are signs an adjustment may need to be made.     2. Continue your self-care, even if you have to fake it!    3. Talk to someone in your support network    4. Open up your depression survival kit           RED    ZONE Medical Alert - Get Help    What it looks like:     Depression is seriously interfering with your life.     You may experience these or other symptoms: You can t get out of bed most days, can t work or engage in other necessary activities, you have trouble taking care of basic hygiene, or basic responsibilities, thoughts of suicide or death that will not go away, self-injurious behavior.     What  you need to do:  1. Call your care team and request a same-day appointment. If they are not available (weekends or after hours) call your local crisis line, emergency room or 911.            Depression Self Care Plan / Survival Kit    Self-Care for Depression  Here s the deal. Your body and mind are really not as separate as most people think.  What you do and think affects how you feel and how you feel influences what you do and think. This means if you do things that people who feel good do, it will help you feel better.  Sometimes this is all it takes.  There is also a place for medication and therapy depending on how severe your depression is, so be sure to consult with your medical provider and/ or Behavioral Health Consultant if your symptoms are worsening or not improving.     In order to better manage my stress, I will:    Exercise  Get some form of exercise, every day. This will help reduce pain and release endorphins, the  feel good  chemicals in your brain. This is almost as good as taking antidepressants!  This is not the same as joining a gym and then never going! (they count on that by the way ) It can be as simple as just going for a walk or doing some gardening, anything that will get you moving.      Hygiene   Maintain good hygiene (Get out of bed in the morning, Make your bed, Brush your teeth, Take a shower, and Get dressed like you were going to work, even if you are unemployed).  If your clothes don't fit try to get ones that do.    Diet  I will strive to eat foods that are good for me, drink plenty of water, and avoid excessive sugar, caffeine, alcohol, and other mood-altering substances.  Some foods that are helpful in depression are: complex carbohydrates, B vitamins, flaxseed, fish or fish oil, fresh fruits and vegetables.    Psychotherapy  I agree to participate in Individual Therapy (if recommended).    Medication  If prescribed medications, I agree to take them.  Missing doses can result  in serious side effects.  I understand that drinking alcohol, or other illicit drug use, may cause potential side effects.  I will not stop my medication abruptly without first discussing it with my provider.    Staying Connected With Others  I will stay in touch with my friends, family members, and my primary care provider/team.    Use your imagination  Be creative.  We all have a creative side; it doesn t matter if it s oil painting, sand castles, or mud pies! This will also kick up the endorphins.    Witness Beauty  (AKA stop and smell the roses) Take a look outside, even in mid-winter. Notice colors, textures. Watch the squirrels and birds.     Service to others  Be of service to others.  There is always someone else in need.  By helping others we can  get out of ourselves  and remember the really important things.  This also provides opportunities for practicing all the other parts of the program.    Humor  Laugh and be silly!  Adjust your TV habits for less news and crime-drama and more comedy.    Control your stress  Try breathing deep, massage therapy, biofeedback, and meditation. Find time to relax each day.     My support system    Clinic Contact:  Phone number:    Contact 1:  Phone number:    Contact 2:  Phone number:    Amish/:  Phone number:    Therapist:  Phone number:    Local crisis center:    Phone number:    Other community support:  Phone number:

## 2019-04-16 NOTE — NURSING NOTE
"Chief Complaint   Patient presents with     Medicare Visit     /60   Pulse 91   Temp 98.7  F (37.1  C) (Tympanic)   Resp 12   Ht 1.575 m (5' 2\")   Wt 60.8 kg (134 lb)   LMP  (LMP Unknown)   SpO2 97%   Breastfeeding? No   BMI 24.51 kg/m   Estimated body mass index is 24.51 kg/m  as calculated from the following:    Height as of this encounter: 1.575 m (5' 2\").    Weight as of this encounter: 60.8 kg (134 lb).  BP completed using cuff size: regular   Jennifer Salmeron CMA    Health Maintenance Due   Topic Date Due     DEXA Q2 YR  04/25/2016     FALL RISK ASSESSMENT  06/23/2018     INFLUENZA VACCINE (1) 09/01/2018     ZOSTER IMMUNIZATION (3 of 3) 11/12/2018     Health Maintenance reviewed at today's visit patient asked to schedule/complete:   Immunizations:  Patient agrees to schedule    "

## 2019-04-17 LAB
ALT SERPL W P-5'-P-CCNC: 22 U/L (ref 0–50)
CHOLEST SERPL-MCNC: 167 MG/DL
HDLC SERPL-MCNC: 45 MG/DL
LDLC SERPL CALC-MCNC: 99 MG/DL
NONHDLC SERPL-MCNC: 122 MG/DL
TRIGL SERPL-MCNC: 115 MG/DL
TSH SERPL DL<=0.005 MIU/L-ACNC: 3.1 MU/L (ref 0.4–4)

## 2019-04-17 ASSESSMENT — ANXIETY QUESTIONNAIRES: GAD7 TOTAL SCORE: 3

## 2019-04-18 LAB
ANION GAP SERPL CALCULATED.3IONS-SCNC: 6 MMOL/L (ref 3–14)
BUN SERPL-MCNC: 16 MG/DL (ref 7–30)
CALCIUM SERPL-MCNC: 8.8 MG/DL (ref 8.5–10.1)
CHLORIDE SERPL-SCNC: 107 MMOL/L (ref 94–109)
CO2 SERPL-SCNC: 26 MMOL/L (ref 20–32)
CREAT SERPL-MCNC: 0.63 MG/DL (ref 0.52–1.04)
GFR SERPL CREATININE-BSD FRML MDRD: 85 ML/MIN/{1.73_M2}
GLUCOSE SERPL-MCNC: 89 MG/DL (ref 70–99)
POTASSIUM SERPL-SCNC: 4.1 MMOL/L (ref 3.4–5.3)
SODIUM SERPL-SCNC: 139 MMOL/L (ref 133–144)

## 2019-07-10 ENCOUNTER — HOSPITAL ENCOUNTER (OUTPATIENT)
Dept: MAMMOGRAPHY | Facility: CLINIC | Age: 79
Discharge: HOME | End: 2019-07-10
Attending: FAMILY MEDICINE | Admitting: FAMILY MEDICINE
Payer: COMMERCIAL

## 2019-07-10 DIAGNOSIS — Z12.31 VISIT FOR SCREENING MAMMOGRAM: ICD-10-CM

## 2019-07-10 PROCEDURE — 77063 BREAST TOMOSYNTHESIS BI: CPT

## 2019-08-13 ENCOUNTER — OFFICE VISIT (OUTPATIENT)
Dept: FAMILY MEDICINE | Facility: CLINIC | Age: 79
End: 2019-08-13
Payer: COMMERCIAL

## 2019-08-13 VITALS
BODY MASS INDEX: 24.29 KG/M2 | HEIGHT: 62 IN | TEMPERATURE: 98.5 F | OXYGEN SATURATION: 95 % | WEIGHT: 132 LBS | DIASTOLIC BLOOD PRESSURE: 80 MMHG | RESPIRATION RATE: 14 BRPM | SYSTOLIC BLOOD PRESSURE: 120 MMHG | HEART RATE: 68 BPM

## 2019-08-13 DIAGNOSIS — M85.80 OSTEOPENIA, UNSPECIFIED LOCATION: ICD-10-CM

## 2019-08-13 DIAGNOSIS — J84.10 PULMONARY FIBROSIS (H): ICD-10-CM

## 2019-08-13 DIAGNOSIS — J41.0 SIMPLE CHRONIC BRONCHITIS (H): Primary | ICD-10-CM

## 2019-08-13 DIAGNOSIS — L57.0 AK (ACTINIC KERATOSIS): ICD-10-CM

## 2019-08-13 DIAGNOSIS — F32.5 MAJOR DEPRESSION IN COMPLETE REMISSION (H): ICD-10-CM

## 2019-08-13 DIAGNOSIS — R09.02 HYPOXIA: ICD-10-CM

## 2019-08-13 DIAGNOSIS — F51.01 PRIMARY INSOMNIA: ICD-10-CM

## 2019-08-13 PROCEDURE — 99214 OFFICE O/P EST MOD 30 MIN: CPT | Performed by: FAMILY MEDICINE

## 2019-08-13 RX ORDER — LATANOPROST 50 UG/ML
1 SOLUTION/ DROPS OPHTHALMIC EVERY EVENING
Refills: 2 | COMMUNITY
Start: 2019-08-12

## 2019-08-13 ASSESSMENT — ANXIETY QUESTIONNAIRES
2. NOT BEING ABLE TO STOP OR CONTROL WORRYING: NOT AT ALL
7. FEELING AFRAID AS IF SOMETHING AWFUL MIGHT HAPPEN: NOT AT ALL
5. BEING SO RESTLESS THAT IT IS HARD TO SIT STILL: NOT AT ALL
GAD7 TOTAL SCORE: 0
1. FEELING NERVOUS, ANXIOUS, OR ON EDGE: NOT AT ALL
IF YOU CHECKED OFF ANY PROBLEMS ON THIS QUESTIONNAIRE, HOW DIFFICULT HAVE THESE PROBLEMS MADE IT FOR YOU TO DO YOUR WORK, TAKE CARE OF THINGS AT HOME, OR GET ALONG WITH OTHER PEOPLE: NOT DIFFICULT AT ALL
6. BECOMING EASILY ANNOYED OR IRRITABLE: NOT AT ALL
3. WORRYING TOO MUCH ABOUT DIFFERENT THINGS: NOT AT ALL

## 2019-08-13 ASSESSMENT — PATIENT HEALTH QUESTIONNAIRE - PHQ9
SUM OF ALL RESPONSES TO PHQ QUESTIONS 1-9: 2
5. POOR APPETITE OR OVEREATING: NOT AT ALL

## 2019-08-13 ASSESSMENT — MIFFLIN-ST. JEOR: SCORE: 1027

## 2019-08-13 NOTE — PROGRESS NOTES
Subjective     Chiquita Berry is a 78 year old female who presents to clinic today for the following health issues:    HPI     ENT Symptoms             Symptoms: cc Present Absent Comment   Fever/Chills   x    Fatigue  x     Muscle Aches   x    Eye Irritation   x    Sneezing   x    Nasal Varinder/Drg   x    Sinus Pressure/Pain   x    Loss of smell   x    Dental pain   x    Sore Throat   x    Swollen Glands   x    Ear Pain/Fullness   x    Cough  x     Wheeze       Chest Pain  x     Shortness of breath   x    Rash   x    Other   x      Symptom duration:  x 4 weeks   Symptom severity:  Moderate   Treatments tried:  Mucinex  1200mgm bid for 10 d    Contacts:  None   Hx of 4-6 wk URIs in past --last = 2017  Is getting better   Nonsmoker   Has Pulmonary Fibrosis by hx yrs ago but 5-18 CXR = wnl  HYPOXIA   - 957%   -chronic     Insomnia      Duration: lifelong     Description  Frequency of insomnia:  nightly  Time to fall asleep: 30 minutes  Middle of night awakening:  nightly  Early morning awakening:  nightly    Accompanying signs and symptoms:  none    History  Similar episodes in past:  YES  Previous evaluation/sleep study:  no     Precipitating or alleviating factors:  New stressful situation: no   Caffeine intake after lunchtime: no   OTC decongestants: no   Any new medications: no     Therapies tried and outcome: ambien in past    3 melatonin    Avoids screens at nite     Trazodone --> dizzy at 50mgm so will try 25mgm     Rash: Seborrheic Keratoses       Duration: yrs     Description  Location: trunkal   Itching: mild    Intensity:  mild    Accompanying signs and symptoms: None    History (similar episodes/previous evaluation): None    Precipitating or alleviating factors:  New exposures:  None  Recent travel: no      Therapies tried and outcome: none    OSTEOPENIA       Duration:  Per hx from pt per DEXA yrs ago    Description (location/character/radiation): above     Intensity:  mild, no fractures     Accompanying  signs and symptoms: 0    History (similar episodes/previous evaluation): None    Precipitating or alleviating factors: age    Therapies tried and outcome: None           Depression and Anxiety Follow-Up    How are you doing with your depression since your last visit? Improved to remission    How are you doing with your anxiety since your last visit?  Improved     Are you having other symptoms that might be associated with depression or anxiety? No    Have you had a significant life event? No     Do you have any concerns with your use of alcohol or other drugs? No    Social History     Tobacco Use     Smoking status: Never Smoker     Smokeless tobacco: Never Used   Substance Use Topics     Alcohol use: Yes     Alcohol/week: 0.0 oz     Comment: Occ glass of wine - less than once per month     Drug use: No     PHQ 4/16/2019 8/13/2019   PHQ-9 Total Score 1 2   Q9: Thoughts of better off dead/self-harm past 2 weeks Not at all Not at all     LETITIA-7 SCORE 4/16/2019 8/13/2019   Total Score 3 0           Suicide Assessment Five-step Evaluation and Treatment (SAFE-T)  Reviewed and updated as needed this visit by Provider  Tobacco  Allergies  Meds  Problems  Med Hx  Surg Hx  Fam Hx         Review of Systems   ROS COMP: CONSTITUTIONAL: NEGATIVE for fever, chills, change in weight  INTEGUMENTARY/SKIN: NEGATIVE for worrisome rashes, moles or lesions POS scaley lesionsof trunk   EYES: NEGATIVE for vision changes or irritation  ENT/MOUTH: NEGATIVE for ear, mouth and throat problems  RESP:POSITIVE for , cough-productive and Hx chronic bronchitis  BREAST: NEGATIVE for masses, tenderness or discharge  CV: NEGATIVE for chest pain, palpitations or peripheral edema  GI: NEGATIVE for nausea, abdominal pain, heartburn, or change in bowel habits  : NEGATIVE for frequency, dysuria, or hematuria  MUSCULOSKELETAL: NEGATIVE for significant arthralgias or myalgia  NEURO: NEGATIVE for weakness, dizziness or paresthesias  ENDOCRINE:  "NEGATIVE for temperature intolerance, skin/hair changes  HEME: NEGATIVE for bleeding problems  PSYCHIATRIC: NEGATIVE for changes in mood or affect      Objective    /80   Pulse 68   Temp 98.5  F (36.9  C) (Tympanic)   Resp 14   Ht 1.575 m (5' 2\")   Wt 59.9 kg (132 lb)   LMP  (LMP Unknown)   SpO2 95%   Breastfeeding? No   BMI 24.14 kg/m    Body mass index is 24.14 kg/m .  Physical Exam   GENERAL: healthy, alert and no distress  EYES: Eyes grossly normal to inspection, PERRL and conjunctivae and sclerae normal  HENT: ear canals and TM's normal, nose and mouth without ulcers or lesions  NECK: no adenopathy, no asymmetry, masses, or scars and thyroid normal to palpation  RESP: lungs clear to auscultation - no rales, rhonchi or wheezes  CV: regular rate and rhythm, normal S1 S2, no S3 or S4, no murmur, click or rub, no peripheral edema and peripheral pulses strong  MS: no gross musculoskeletal defects noted, no edema  SKIN: no suspicious lesions or rashes   LYMPH: Negative CCOA nodes and thyroid and inguinal nodes   NEURO: Normal strength and tone, mentation intact and speech normal  PSYCH: mentation appears normal, affect normal/bright    Diagnostic Test Results:  Labs reviewed in Epic        Assessment & Plan       ICD-10-CM    1. Simple chronic bronchitis (H)w exacerbation for 4 wks  J41.0 COPD ACTION PLAN   2. Pulmonary fibrosis (H) in past w wnl CXR in 5-18  J84.10    3. Hypoxia-chronic at 96-7% R09.02    4. AK (actinic keratosis) L57.0    5. Osteopenia, unspecified location M85.80    6. Primary insomnia F51.01    7. 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  Run a cold air vaporizer as much as possible. If you cannot,  boil water and breath the warm vapors 2-3 times a day to try to open up the sinuses take 2400mgm of guaifenesin per 24 hours   You can do this by taking  Mucinex plain blue  1200 mg  One tablet twice a day (This may come as 600mg/tablet and " you need to take 2 tabs twice a day) or you could buy the cheaper  generic 400mgm / tab and take 2 tablets 3 x a day or 1 and 1/2 tablets 4 x a day . .Guaifenesin is  the major component of most cough syrups, because it makes the mucus less thick, and therefore it drains out better and you are less likely to cough from it dripping on the back of your throat.  Irrigate the  nose with plain water under the kitchen sink faucet or the shower.  Nilda pots, spray bottles, etc accumulate bacteria and are not recommended.   The tickle in the throat is also helped by gargling with vinegar and honey mixture, or pop or mouth wash as these coat the throat.  Please try to rinse teeth with water after using these .   Do not use sudafed or pseudephedrine as it dries the mucus up so it is harder to get it out, and it can raise your BP     3. You can take as much melatonin as you need & try 1/2 of;the 50mgm trazodone     discussed :  1. As is getting better, will not give MDI or abx   2. Osteopenia   As no fractures and doesn't want to take th meds, will not get a DEXA   3  Insomnia   -will try the 1/2 of 50mgm trazodone and melatonin now at 3 tabs--can increase this     Return in about 3 months (around 11/13/2019) for BP Recheck, cholesterol, impaired glucose.    Christina Crawford MD  [facility]

## 2019-08-13 NOTE — PATIENT INSTRUCTIONS
1.  Run a cold air vaporizer as much as possible. If you cannot,  boil water and breath the warm vapors 2-3 times a day to try to open up the sinuses take 2400mgm of guaifenesin per 24 hours   You can do this by taking  Mucinex plain blue  1200 mg  One tablet twice a day (This may come as 600mg/tablet and you need to take 2 tabs twice a day) or you could buy the cheaper  generic 400mgm / tab and take 2 tablets 3 x a day or 1 and 1/2 tablets 4 x a day . .Guaifenesin is  the major component of most cough syrups, because it makes the mucus less thick, and therefore it drains out better and you are less likely to cough from it dripping on the back of your throat.  Irrigate the  nose with plain water under the kitchen sink faucet or the shower.  Nilda pots, spray bottles, etc accumulate bacteria and are not recommended.   The tickle in the throat is also helped by gargling with vinegar and honey mixture, or pop or mouth wash as these coat the throat.  Please try to rinse teeth with water after using these .   Do not use sudafed or pseudephedrine as it dries the mucus up so it is harder to get it out, and it can raise your BP     3. You can take as much melatonin as you need & try 1/2 of;the 50mgm trazodone

## 2019-08-13 NOTE — NURSING NOTE
"Chief Complaint   Patient presents with     URI     /80   Pulse 68   Temp 98.5  F (36.9  C) (Tympanic)   Resp 14   Ht 1.575 m (5' 2\")   Wt 59.9 kg (132 lb)   LMP  (LMP Unknown)   SpO2 95%   Breastfeeding? No   BMI 24.14 kg/m   Estimated body mass index is 24.14 kg/m  as calculated from the following:    Height as of this encounter: 1.575 m (5' 2\").    Weight as of this encounter: 59.9 kg (132 lb).  BP completed using cuff size: regular   Jennifer Salmeron CMA    Health Maintenance Due   Topic Date Due     DEXA  04/25/2016     Health Maintenance reviewed at today's visit patient asked to schedule/complete:   None, Health Maintenance up to date.    "

## 2019-08-13 NOTE — LETTER
My COPD Action Plan     Name: Chiquita Berry    YOB: 1940   Date: 8/13/2019    My doctor: Christina Crawford MD   My clinic: [address] [id number] [phone]  My Controller Medicine: 0   Dose: 0     My Rescue Medicine: 0   Dose: 0     My Flare Up Medicine: 0   Dose: 0     My COPD Severity: Mild = FeV1 > 80%      Use of Oxygen: Oxygen Not Prescribed      Make sure you've had your pneumonia   vaccines.          GREEN ZONE       Doing well today      Usual level of activity and exercise    Usual amount of cough and mucus    No shortness of breath    Usual level of health (thinking clearly, sleeping well, feel like eating) Actions:      Take daily medicines    Use oxygen as prescribed    Follow regular exercise and diet plan    Avoid cigarette smoke and other irritants that harm the lungs           YELLOW ZONE          Having a bad day or flare up      Short of breath more than usual    A lot more sputum (mucus) than usual    Sputum looks yellow, green, tan, brown or bloody    More coughing or wheezing    Fever or chills    Less energy; trouble completing activities    Trouble thinking or focusing    Using quick relief inhaler or nebulizer more often    Poor sleep; symptoms wake me up    Do not feel like eating Actions:      Get plenty of rest    Take daily medicines    Use quick relief inhaler every -- hours    If you use oxygen, call you doctor to see if you should adjust your oxygen    Do breathing exercises or other things to help you relax    Let a loved one, friend or neighbor know you are feeling worse    Call your care team if you have 2 or more symptoms.  Start taking steroids or antibiotics if directed by your care team           RED ZONE       Need medical care now      Severe shortness of breath (feel you can't breathe)    Fever, chills    Not enough breath to do any activity    Trouble coughing up  mucus, walking or talking    Blood in mucus    Frequent coughing   Rescue medicines are not working    Not able to sleep because of breathing    Feel confused or drowsy    Chest pain    Actions:      Call your health care team.  If you cannot reach your care team, call 911 or go to the emergency room.        Annual Reminders:  Meet with Care Team, Flu Shot every Fall  Pharmacy: [place], [place] - [address] [place] [address]

## 2019-08-14 ASSESSMENT — ANXIETY QUESTIONNAIRES: GAD7 TOTAL SCORE: 0

## 2019-10-08 ENCOUNTER — OFFICE VISIT (OUTPATIENT)
Dept: FAMILY MEDICINE | Facility: CLINIC | Age: 79
End: 2019-10-08
Payer: COMMERCIAL

## 2019-10-08 ENCOUNTER — DOCUMENTATION ONLY (OUTPATIENT)
Dept: OTHER | Facility: CLINIC | Age: 79
End: 2019-10-08

## 2019-10-08 VITALS
RESPIRATION RATE: 14 BRPM | WEIGHT: 135 LBS | HEART RATE: 76 BPM | SYSTOLIC BLOOD PRESSURE: 124 MMHG | DIASTOLIC BLOOD PRESSURE: 70 MMHG | TEMPERATURE: 99.1 F | BODY MASS INDEX: 24.69 KG/M2

## 2019-10-08 DIAGNOSIS — M41.9 SCOLIOSIS OF LUMBOSACRAL SPINE, UNSPECIFIED SCOLIOSIS TYPE: ICD-10-CM

## 2019-10-08 DIAGNOSIS — M40.205 KYPHOSIS OF THORACOLUMBAR REGION, UNSPECIFIED KYPHOSIS TYPE: ICD-10-CM

## 2019-10-08 DIAGNOSIS — E78.00 HYPERCHOLESTEROLEMIA: ICD-10-CM

## 2019-10-08 DIAGNOSIS — R26.9 ABNORMAL GAIT: ICD-10-CM

## 2019-10-08 DIAGNOSIS — M54.41 ACUTE BILATERAL LOW BACK PAIN WITH RIGHT-SIDED SCIATICA: Primary | ICD-10-CM

## 2019-10-08 DIAGNOSIS — R73.01 IMPAIRED FASTING GLUCOSE: ICD-10-CM

## 2019-10-08 PROCEDURE — 84460 ALANINE AMINO (ALT) (SGPT): CPT | Performed by: FAMILY MEDICINE

## 2019-10-08 PROCEDURE — 36415 COLL VENOUS BLD VENIPUNCTURE: CPT | Performed by: FAMILY MEDICINE

## 2019-10-08 PROCEDURE — 99214 OFFICE O/P EST MOD 30 MIN: CPT | Performed by: FAMILY MEDICINE

## 2019-10-08 PROCEDURE — 82947 ASSAY GLUCOSE BLOOD QUANT: CPT | Performed by: FAMILY MEDICINE

## 2019-10-08 PROCEDURE — 80061 LIPID PANEL: CPT | Performed by: FAMILY MEDICINE

## 2019-10-08 NOTE — PROGRESS NOTES
Subjective     Chiquita Berry is a 79 year old female who presents to clinic today for the following health issues:    HPI   Musculoskeletal problem/pain: Rt hip Pain   With Hx of LBP recur for yrs -tho now quiet   With lifelong  Severe Kyphoscoliosis causing abnormal gait       Duration: 2 weeks of Rt hip pain     Description  Location: rt hip and rt knee pain    Intensity:  moderate    Accompanying signs and symptoms: pain came on when helping sister clean out her house, dull ache, muscle tightness and spasms    History  Previous similar problem: no   Previous evaluation:  none    Precipitating or alleviating factors:  Trauma or overuse: no   Aggravating factors include: walking and climbing stairs    Therapies tried and outcome: acetaminophen    Glucose Intolerance   Follow-up      How often are you checking your blood sugar? Not at all    HI fbs     What time of day are you checking your blood sugars (select all that apply)?      Have you had any blood sugars above 200?  No    Have you had any blood sugars below 70?  No    What symptoms do you notice when your blood sugar is low?  None    What concerns do you have today about your diabetes? None     Do you have any of these symptoms? (Select all that apply)  No numbness or tingling in feet.  No redness, sores or blisters on feet.  No complaints of excessive thirst.  No reports of blurry vision.  No significant changes to weight.     Have you had a diabetic eye exam in the last 12 months? No    BP Readings from Last 2 Encounters:   10/08/19 124/70   08/13/19 120/80     LDL Cholesterol Calculated (mg/dL)   Date Value   10/08/2019 90   04/16/2019 99       Diabetes Management Resources  Hyperlipidemia>LDL Follow-Up      Are you having any of the following symptoms? (Select all that apply)  No complaints of shortness of breath, chest pain or pressure.  No increased sweating or nausea with activity.  No left-sided neck or arm pain.  No complaints of pain in calves  when walking 1-2 blocks.    Are you regularly taking any medication or supplement to lower your cholesterol?   Yes- lipitor 20 mgm    Are you having muscle aches or other side effects that you think could be caused by your cholesterol lowering medication?  No            Reviewed and updated as needed this visit by Provider         Review of Systems   ROS COMP: CONSTITUTIONAL: NEGATIVE for fever, chills, change in weight  INTEGUMENTARY/SKIN: NEGATIVE for worrisome rashes, moles or lesions  EYES: NEGATIVE for vision changes or irritation  ENT/MOUTH: NEGATIVE for ear, mouth and throat problems  RESP: NEGATIVE for significant cough or SOB  BREAST: NEGATIVE for masses, tenderness or discharge  CV: NEGATIVE for chest pain, palpitations or peripheral edema  GI: NEGATIVE for nausea, abdominal pain, heartburn, or change in bowel habits  : NEGATIVE for frequency, dysuria, or hematuria  MUSCULOSKELETAL:POSITIVE  for , back pain and joint stiffness Rt hip   NEURO: NEGATIVE for weakness, dizziness or paresthesias  ENDOCRINE: NEGATIVE for temperature intolerance, skin/hair changes  HEME: NEGATIVE for bleeding problems  PSYCHIATRIC: NEGATIVE for changes in mood or affect      Objective    LMP  (LMP Unknown)   There is no height or weight on file to calculate BMI.  Physical Exam   GENERAL: healthy, alert and no distress  EYES: Eyes grossly normal to inspection, PERRL and conjunctivae and sclerae normal  RESP: lungs clear to auscultation - no rales, rhonchi or wheezes  CV: regular rate and rhythm, normal S1 S2, no S3 or S4, no murmur, click or rub, no peripheral edema and peripheral pulses strong  MS: no gross musculoskeletal defects noted, no edema POS kyphoscoliosis with abnormal gait   SKIN: no suspicious lesions or rashes  NEURO: Normal strength and tone, mentation intact and speech normal  PSYCH: mentation appears normal, affect normal/bright    Diagnostic Test Results:  Labs reviewed in Epic        Assessment & Plan        ICD-10-CM    1. Acute bilateral low back pain since 2000  with right-sided sciatica since lifting late 9-19 M54.41 [facility]   2. Kyphosis of thoracolumbar region, unspecified kyphosis type M40.205 [facility]   3. Scoliosis of lumbosacral spine, unspecified scoliosis type M41.9 [facility]   4. Abnormal gait R26.9 [facility]   5. Impaired fasting glucose R73.01 Glucose   6. Hypercholesterolemia E78.00 ALT     Lipid panel reflex to direct LDL Fasting          Patient Instructions   1. Get your hi dose flu shot           Return in about 7 months (around 5/8/2020) for Physical Exam.    Christina Crawford MD  [facility]      DISCUSSION   the below is the sequence of events causing the Rt hip pain :  1. Abnormal gait from the below   2. Sciatica from the below   Musculoskeletal problem/pain: Rt hip Pain   With Hx of LBP recur for yrs -tho now quiet   With lifelong  Severe Kyphoscoliosis causing abnormal gait

## 2019-10-08 NOTE — LETTER
October 10, 2019      Chiquita Berry  [address]   [facility] [address]        Dear ,    We are writing to inform you of your test results.    Please see attached lab results   They are all normal     THE FOLLOWING ARE EXPLANATIONS OF SOME OF OUR LAB TESTS     YOU DID NOT NECESSARILY HAVE ALL OF THESE DONE     Hgb is the blood iron level   WBC means White Blood Cells   Platelets are small blood    cells that help with forming the blood clots along with other blood factors.   Electrolytes are Sodium, Potassium, Calcium, Magnesium, Phosphorus.   Liver tests are: AST, ALT, Bilirubin, Alkaline Phosphatase.   Kidney tests are Creatinine, GFR.   HDL Choles   terol - is the good cholesterol and it is good to have it high.   LDL cholesterol is the bad cholesterol and it is good to have it low.   It is recommended to have LDL less than 130 for people with hypertension and to have it less than 100 for people with   heart disease, diabetes and chronic kidney disease.   Triglycerides are another type of lipid that can cause heart disease, like the cholesterol and should be kept low  ####these are only a little high and do vary with diet ###     Thyroid tests are TSH, T4, T3   Glucose is sugar.   A1c is a test that gives us an idea    about how well was controlled the diabetes for the last 3 months.   PSA stands for Prostate Specific Antigen and it can be elevated with prostate cancer or prostate inflammation.     Please continue on the same medications     Resulted Orders   ALT   Result Value Ref Range    ALT 32 0 - 50 U/L   Lipid panel reflex to direct LDL Fasting   Result Value Ref Range    Cholesterol 174 <200 mg/dL    Triglycerides 197 (H) <150 mg/dL      Comment:      Borderline high:  150-199 mg/dl  High:             200-499 mg/dl  Very high:       >499 mg/dl      HDL Cholesterol 45 (L) >49 mg/dL    LDL Cholesterol Calculated 90 <100 mg/dL      Comment:      Desirable:       <100  mg/dl    Non HDL Cholesterol 129 <130 mg/dL   Glucose   Result Value Ref Range    Glucose 79 70 - 99 mg/dL       If you have any questions or concerns, please call the clinic at the number listed above.       Sincerely,        Christina Crawford MD

## 2019-10-09 ENCOUNTER — TELEPHONE (OUTPATIENT)
Dept: FAMILY MEDICINE | Facility: CLINIC | Age: 79
End: 2019-10-09

## 2019-10-09 ENCOUNTER — THERAPY VISIT (OUTPATIENT)
Dept: PHYSICAL THERAPY | Facility: CLINIC | Age: 79
End: 2019-10-09
Attending: FAMILY MEDICINE
Payer: COMMERCIAL

## 2019-10-09 DIAGNOSIS — R26.9 ABNORMAL GAIT: ICD-10-CM

## 2019-10-09 DIAGNOSIS — M41.9 SCOLIOSIS OF LUMBOSACRAL SPINE, UNSPECIFIED SCOLIOSIS TYPE: ICD-10-CM

## 2019-10-09 DIAGNOSIS — M54.41 ACUTE RIGHT-SIDED LOW BACK PAIN WITH RIGHT-SIDED SCIATICA: ICD-10-CM

## 2019-10-09 DIAGNOSIS — M40.205 KYPHOSIS OF THORACOLUMBAR REGION, UNSPECIFIED KYPHOSIS TYPE: ICD-10-CM

## 2019-10-09 DIAGNOSIS — G89.29 CHRONIC LEFT SHOULDER PAIN: Primary | ICD-10-CM

## 2019-10-09 DIAGNOSIS — M54.41 ACUTE BILATERAL LOW BACK PAIN WITH RIGHT-SIDED SCIATICA: ICD-10-CM

## 2019-10-09 DIAGNOSIS — M25.512 CHRONIC LEFT SHOULDER PAIN: Primary | ICD-10-CM

## 2019-10-09 LAB
ALT SERPL W P-5'-P-CCNC: 32 U/L (ref 0–50)
CHOLEST SERPL-MCNC: 174 MG/DL
GLUCOSE SERPL-MCNC: 79 MG/DL (ref 70–99)
HDLC SERPL-MCNC: 45 MG/DL
LDLC SERPL CALC-MCNC: 90 MG/DL
NONHDLC SERPL-MCNC: 129 MG/DL
TRIGL SERPL-MCNC: 197 MG/DL

## 2019-10-09 PROCEDURE — 97110 THERAPEUTIC EXERCISES: CPT | Mod: GP | Performed by: PHYSICAL THERAPIST

## 2019-10-09 PROCEDURE — 97161 PT EVAL LOW COMPLEX 20 MIN: CPT | Mod: GP | Performed by: PHYSICAL THERAPIST

## 2019-10-09 NOTE — TELEPHONE ENCOUNTER
Reason for Call: Request for an order or referral:    Order or referral being requested: REFERRAL    Date needed: as soon as possible    Has the patient been seen by the PCP for this problem? YES    Additional comments: pt has order for [facility] alread for lower back but wants order amended to add left shoulder also.    Phone number Patient can be reached at:  Home number on file [phone] (home)    Best Time:  any    Can we leave a detailed message on this number?  YES    Call taken on 10/9/2019 at 2:33 PM by ADRIANA MALONE

## 2019-10-09 NOTE — PROGRESS NOTES
[facility] Initial Evaluation -- Lumbar    Date: October 9, 2019  Chiquita Berry is a 79 year old female with a lumbar condition.   Referral: GP  Work mechanical stresses:  retired  Employment status:  retired  Leisure mechanical stresses: walking, normal household activities  Functional disability score (EITAN/STarT Back):  26%, 3/9--LOW  VAS score (0-10): 0-4/10  Patient goals/expectations:  To get the pain to go away.    HISTORY:    Present symptoms: R hip/buttock, knee, occasionally R calf  Pain quality (sharp/shooting/stabbing/aching/burning/cramping):  aching   Paresthesia (yes/no):  no    Present since (onset date): 09/23/2019.     Symptoms (improving/unchanging/worsening):  improving.     Symptoms commenced as a result of: moving boxes   Condition occurred in the following environment:   At her sister's house     Symptoms at onset (back/thigh/leg): R hip/buttock  Constant symptoms (back/thigh/leg): none  Intermittent symptoms (back/thigh/leg): R hip/buttock, R knee    Symptoms are made worse with the following: Always Rising, walking 30 minutes, always stairs, sometimes bending   Symptoms are made better with the following: Sometimes Sitting    Disturbed sleep (yes/no):  Yes, wakes occasionally--losing less than 1 hour/night Sleeping postures (prone/sup/side R/L): L side, supine    Previous episodes (0/1-5/6-10/11+): 3-4 Year of first episode: many years ago    Previous history: ongoing pain and stiffness in LB for years  Previous treatments: PT--helpful      Specific Questions:  Cough/Sneeze/Strain (pos/neg): neg  Bowel/Bladder (normal/abnormal): normal  Gait (normal/abnormal): abnormal--flexed posture, kyphotic, increased lateral sway  Medications (nil/NSAIDS/analg/steroids/anticoag/other):  Other - cholesterol  Medical allergies:  none  General health (excellent/good/fair/poor):  good  Pertinent medical history:  History of fractures and Osteoarthritis  Imaging  "(None/Xray/MRI/Other):  None recent  Recent or major surgery (yes/no):  No--history of elbow surgery  Night pain (yes/no): no  Accidents (yes/no): no  Unexplained weight loss (yes/no): no  Barriers at home: no  Other red flags: no    EXAMINATION    Posture:   Sitting (good/fair/poor): poor  Standing (good/fair/poor):poor  Lordosis (red/acc/normal): red  Correction of posture (better/worse/no effect): NE    Lateral Shift (right/left/nil): nil  Relevant (yes/no):  na  Other Observations: increased thoracic kyphosis, scoliosis L convex    Neurological:    Motor deficit:  normal  Reflexes:  Not assessed  Sensory deficit:  normal  Dural signs:  Not assessed    Movement Loss:   Santhosh Mod Min Nil Pain   Flexion    x NE   Extension x    NE--\"feels good\"   Side Gliding R    x NE   Side Gliding L    x NE     Test Movements:   During: produces, abolishes, increases, decreases, no effect, centralizing, peripheralizing   After: better, worse, no better, no worse, no effect, centralized, peripheralized    Pretest symptoms standing:    Symptoms During Symptoms After ROM increased ROM decreased No Effect   FIS        Rep FIS        EIS        Rep EIS        Pretest symptoms lying: R LB/buttock pain 2/10    Symptoms During Symptoms After ROM increased ROM decreased No Effect   DEVIKA        Rep DEVIKA        EIL        Rep EIL Repeated CHARISSA--decreases Better      x   If required, pretest symptoms:    Symptoms During Symptoms After ROM increased ROM decreased No Effect   SGIS - R        Rep SGIS - R        SGIS - L        Rep SGIS - L          Static Tests:  Sitting slouched:    Sitting erect:    Standing slouched   Standing erect:    Lying prone in extension:  Decreases but then increases with sustained--NW after Long sitting:      Other Tests:     Provisional Classification:  Derangement - Asymmetrical, unilateral, symptoms below knee    Principle of Management:  Education:  Posture--avoid flexion, avoid recliner and soft chairs, use of " lumbar roll in sitting, POC, treatment rationale   Equipment provided:  none  Mechanical therapy (Y/N):  y   Extension principle:  Repeated CHARISSA x10 reps, every 2 hours, may do ANA when unable to lie down  Lateral Principle:    Flexion principle:    Other:      ASSESSMENT/PLAN:    Patient is a 79 year old female with lumbar complaints.  Provisional classification of derangement with directional preference for extension.  She had decreased pain which remained better after repeated prone on elbows exercises today.  She will try at home to asses further.  Treatment will focus on directional preference exercises to decrease pain and improve mobility and function.    Patient has the following significant findings with corresponding treatment plan.                Diagnosis 1:  R LB/buttock, knee  Pain -  self management, education, directional preference exercise and home program  Decreased ROM/flexibility - manual therapy, therapeutic exercise and home program  Decreased function - therapeutic activities and home program  Impaired posture - neuro re-education and home program    Cumulative Therapy Evaluation is: Low complexity.    Previous and current functional limitations:  (See Goal Flow Sheet for this information)    Short term and Long term goals: (See Goal Flow Sheet for this information)     Communication ability:  Patient appears to be able to clearly communicate and understand verbal and written communication and follow directions correctly.  Treatment Explanation - The following has been discussed with the patient:   RX ordered/plan of care  Anticipated outcomes  Possible risks and side effects  This patient would benefit from PT intervention to resume normal activities.   Rehab potential is good.    Frequency:  1 X week, once daily  Duration:  for 2 weeks tapering to 2 X a month over 2 months  Discharge Plan:  Achieve all LTG.  Independent in home treatment program.  Reach maximal therapeutic  benefit.    Please refer to the daily flowsheet for treatment today, total treatment time and time spent performing 1:1 timed codes.

## 2019-10-10 NOTE — RESULT ENCOUNTER NOTE
.  Please see attached lab results  They are all normal     THE FOLLOWING ARE EXPLANATIONS OF SOME OF OUR LAB TESTS     YOU DID NOT NECESSARILY HAVE ALL OF THESE DONE     Hgb is the blood iron level  WBC means White Blood Cells  Platelets are small blood   cells that help with forming the blood clots along with other blood factors.  Electrolytes are Sodium, Potassium, Calcium, Magnesium, Phosphorus.  Liver tests are: AST, ALT, Bilirubin, Alkaline Phosphatase.  Kidney tests are Creatinine, GFR.  HDL Choles  terol - is the good cholesterol and it is good to have it high.  LDL cholesterol is the bad cholesterol and it is good to have it low.  It is recommended to have LDL less than 130 for people with hypertension and to have it less than 100 for people with   heart disease, diabetes and chronic kidney disease.  Triglycerides are another type of lipid that can cause heart disease, like the cholesterol and should be kept low  ####these are only a little high and do vary with diet ###    Thyroid tests are TSH, T4, T3  Glucose is sugar.  A1c is a test that gives us an idea   about how well was controlled the diabetes for the last 3 months.   PSA stands for Prostate Specific Antigen and it can be elevated with prostate cancer or prostate inflammation.    Please continue on the same medications

## 2019-10-16 ENCOUNTER — THERAPY VISIT (OUTPATIENT)
Dept: PHYSICAL THERAPY | Facility: CLINIC | Age: 79
End: 2019-10-16
Attending: FAMILY MEDICINE
Payer: COMMERCIAL

## 2019-10-16 DIAGNOSIS — M54.41 ACUTE RIGHT-SIDED LOW BACK PAIN WITH RIGHT-SIDED SCIATICA: ICD-10-CM

## 2019-10-16 PROCEDURE — 97110 THERAPEUTIC EXERCISES: CPT | Mod: GP | Performed by: PHYSICAL THERAPIST

## 2019-10-24 ENCOUNTER — THERAPY VISIT (OUTPATIENT)
Dept: PHYSICAL THERAPY | Facility: CLINIC | Age: 79
End: 2019-10-24
Attending: FAMILY MEDICINE
Payer: COMMERCIAL

## 2019-10-24 DIAGNOSIS — G89.29 CHRONIC LEFT SHOULDER PAIN: ICD-10-CM

## 2019-10-24 DIAGNOSIS — M25.512 CHRONIC LEFT SHOULDER PAIN: ICD-10-CM

## 2019-10-24 DIAGNOSIS — M54.41 ACUTE RIGHT-SIDED LOW BACK PAIN WITH RIGHT-SIDED SCIATICA: ICD-10-CM

## 2019-10-24 PROCEDURE — 97161 PT EVAL LOW COMPLEX 20 MIN: CPT | Mod: GP | Performed by: PHYSICAL THERAPIST

## 2019-10-24 PROCEDURE — 97110 THERAPEUTIC EXERCISES: CPT | Mod: GP | Performed by: PHYSICAL THERAPIST

## 2019-10-24 NOTE — PROGRESS NOTES
[facility] Initial Evaluation -- Upper Extremity    Evaluation Date: October 24, 2019  Chiquita Berry is a 79 year old female with a L shoulder condition.   Referral: IM  Work mechanical stresses: retired  Employment status:  retired  Leisure mechanical stresses: walking  Functional disability score (SPADI):   VAS score (0-10): 0-4/10  Handedness (R/L):  R  Patient goals/expectations:  To be able to move my arm without pain    HISTORY    Present symptoms: L lateral shoulder, upper arm.    Pain quality (sharp/shooting/stabbing/aching/burning/cramping):  sharp    Present since (onset date):  Many years--MD orders dated 10/09/2019   Symptoms (improving/unchanging/worsening):  unchanging.    Symptoms commenced as a result of: unknown   Condition occurred in the following environment: unknown    Symptoms at onset: L shoulder and upper arm pain  Paresthesia (yes/no):  no  Spinal history: yes, neck   Cough/Sneeze (pos/neg):  neg    Constant symptoms: none   Intermittent symptoms: L shoulder, upper arm    Symptoms are worse with the following: lifting L arm out to the side, reaching behind her back   Symptoms are better with the following: Other - not moving L arm    Continued use makes the pain (better/worse/no effect): no effect    Disturbed night (yes/no):  no    Pain at rest (yes/no): no  Site (neck/shoulder/elbow/wrist/hand): na    Other questions (swelling/catching/clicking/locking/subluxing):  none    Previous episodes: ongoing L shoulder pain for years  Previous treatments: none    Specific Questions:  General health (excellent/good/fair/poor):  good  Pertinent medical history includes: History of fractures and Osteoarthritis  Medications (nil/NSAIDS/analg/steroids/anticoag/other):  Other - cholesterol  Medical allergies:  none  Imaging (None/Xray/MRI/Other): none  Recent or major surgery (yes/no): no  Night pain (yes/no): no  Accidents (yes/no): no  Unexplained weight loss (yes/no):  no  Barriers at home: no  Other red flags: no    Sites for physical examination (neck/shoulder/elbow/wrist/hand): L shoulder    EXAMINATION    Posture:  Sitting (good/fair/poor): poor  Correction of posture (better/worse/no effect/NA): na  Standing (good/fair/poor): fair  Other observations:  Kyphotic, fwd head posture    Neurological (NA/motor/sensory/reflexes/dural): na    Baselines (pain or functional activity): L shoulder pain with abduction, IR/ext    Extremities (Shoulder/Elbow/Wrist/Hand): L shoulder    Movement Loss Santhosh Mod Min Nil Pain   Flexion   x  ERP   Extension   x  ERP   Abduction   x  ERP, PDM   Internal Rotation   x  ERP   External Rotation    x NE     Supination        Pronation        Radial Deviation        Ulnar Deviation           Passive Movement (+/- overpressure)/(PDM/ERP):  ERP flexion and abduction OP, IR/ext OP, mild limitations in flex, abd, IR/ext, and ext  Resisted Test Response (pain): Strength L shoulder 5/5 flex, abd, IR, ER--mild pain resisted flexion and abduction  Other Tests: none    Spine:  Movement Loss:   Effect of repeated movements:   Effect of static positioning:   Spine testing (not relevant/relevant/secondary problem): not relevant    Baseline Symptoms: no L shoulder pain at rest  Repeated Tests Symptom Response Mechanical Response   Active/Passive movement, resisted test, functional test During - Produce, Abolish, Increase, Decrease, NE After -   Better, Worse, NB, NW, NE Effect -   ? or ? ROM, strength or key functional test No Effect   Repeated L shoulder extension by PT in standing No Effect, increases extension ROM    Better    Less pain flexion and IR/ext, improved IR/ext ROM    Repeated L shoulder extension with wand No Effect, increases extension ROM    Better    Less pain flexion and IR/ext, improved IR/ext ROM                  Effect of static positioning                  Provisional Classification (Extremity/Spine): Extremity - Derangement      Principle of  Management:  Education:  POC, treatment rationale, shoulder derangement  Equipment provided:  none  Exercise and dosage:  Repeated L shoulder extension with wand x10-15 reps, 4-5x/day    ASSESSMENT/PLAN:    Patient is a 79 year old female with left side shoulder complaints.  Provisional classification of shoulder derangement with directional preference for extension.  She had a good response to repeated L shoulder extension exercises with decreased pain and improved ROM noted.  She should make good progress with directional preference exercises to improve mechanics, decrease pain, and improve mobility and function.      Patient has the following significant findings with corresponding treatment plan.                Diagnosis 1:  L shoulder pain  Pain -  self management, education, directional preference exercise and home program  Decreased ROM/flexibility - therapeutic exercise and home program  Decreased function - therapeutic activities and home program  Impaired posture - neuro re-education and home program    Cumulative Therapy Evaluation is: Low complexity.    Previous and current functional limitations:  (See Goal Flow Sheet for this information)    Short term and Long term goals: (See Goal Flow Sheet for this information)     Communication ability:  Patient appears to be able to clearly communicate and understand verbal and written communication and follow directions correctly.  Treatment Explanation - The following has been discussed with the patient:   RX ordered/plan of care  Anticipated outcomes  Possible risks and side effects  This patient would benefit from PT intervention to resume normal activities.   Rehab potential is good.    Frequency:  1 X week, once daily  Duration:  for 4 weeks tapering to 2 X a month over 1 month  Discharge Plan:  Achieve all LTG.  Independent in home treatment program.  Reach maximal therapeutic benefit.    Please refer to the daily flowsheet for treatment today, total  treatment time and time spent performing 1:1 timed codes.

## 2019-10-31 ENCOUNTER — THERAPY VISIT (OUTPATIENT)
Dept: PHYSICAL THERAPY | Facility: CLINIC | Age: 79
End: 2019-10-31
Payer: COMMERCIAL

## 2019-10-31 DIAGNOSIS — G89.29 CHRONIC LEFT SHOULDER PAIN: ICD-10-CM

## 2019-10-31 DIAGNOSIS — M54.41 ACUTE RIGHT-SIDED LOW BACK PAIN WITH RIGHT-SIDED SCIATICA: ICD-10-CM

## 2019-10-31 DIAGNOSIS — M25.512 CHRONIC LEFT SHOULDER PAIN: ICD-10-CM

## 2019-10-31 PROCEDURE — 97110 THERAPEUTIC EXERCISES: CPT | Mod: GP | Performed by: PHYSICAL THERAPIST

## 2019-10-31 PROCEDURE — 97530 THERAPEUTIC ACTIVITIES: CPT | Mod: GP | Performed by: PHYSICAL THERAPIST

## 2019-11-02 ENCOUNTER — HEALTH MAINTENANCE LETTER (OUTPATIENT)
Age: 79
End: 2019-11-02

## 2019-12-02 ENCOUNTER — THERAPY VISIT (OUTPATIENT)
Dept: PHYSICAL THERAPY | Facility: CLINIC | Age: 79
End: 2019-12-02
Payer: COMMERCIAL

## 2019-12-02 DIAGNOSIS — G89.29 CHRONIC LEFT SHOULDER PAIN: ICD-10-CM

## 2019-12-02 DIAGNOSIS — M25.512 CHRONIC LEFT SHOULDER PAIN: ICD-10-CM

## 2019-12-02 DIAGNOSIS — M54.41 ACUTE RIGHT-SIDED LOW BACK PAIN WITH RIGHT-SIDED SCIATICA: ICD-10-CM

## 2019-12-02 PROCEDURE — 97110 THERAPEUTIC EXERCISES: CPT | Mod: GP | Performed by: PHYSICAL THERAPIST

## 2019-12-02 PROCEDURE — 97112 NEUROMUSCULAR REEDUCATION: CPT | Mod: GP | Performed by: PHYSICAL THERAPIST

## 2019-12-02 PROCEDURE — 97530 THERAPEUTIC ACTIVITIES: CPT | Mod: GP | Performed by: PHYSICAL THERAPIST

## 2019-12-02 NOTE — PROGRESS NOTES
"Subjective:  HPI                     Objective:  System    Physical Exam    General     ROS    Assessment/Plan:    PROGRESS  REPORT    Progress reporting period is from 10/09/2019 to 12/02/2019.       SUBJECTIVE  Subjective: \"The sciatica pain is mostly gone.  I have some days where I still feel it, but it is so much better.\"  Patient reports the pain in her R side that she feels now is mostly in the R LB/buttock area if she feels it at all.  She is able to walk for 30 minutes without significant pain.  She can do stairs now reciprocally without R knee pain.  Her L shoulder is doing better, but she still has pain here at times.  Reaching behind her back is her main complaint-mild pain 2/10.  She feels her exercises are helpful and plans to continue with them independently at this point.      Current Pain level: 0/10.     Initial Pain level: (0-4/10).   Changes in function:  Yes, improved walking tolerance, improved stairs without pain, improved reaching.  Adverse reaction to treatment or activity: None    OBJECTIVE  Objective: L shoulder pain with IR/ext and ROM to T10.  Improved to T8-9 after repeated IR/ext.  Pt to do at home x10 reps, 3-4x/day.  IR/ext also improved after repeated t-ext in sitting.  Pt to try at home.  Lumbar AROM:  flexion WNL, NE; extension 50%, NE.       ASSESSMENT/PLAN  Patient has been seen for 5 visits to address R lumbar radiculopathy and 3 visits to address L shoulder pain.  She has made good progress in both areas with directional preference exercises.  She reports decreased pain and improved function overall as a result of the exercises.  She has a good understanding of her HEP and should do well continuing with this independently at this point.  She will follow-up only if needed and will be discharged if no additional visits are scheduled within the next 1-2 months.    Updated problem list and treatment plan: Diagnosis 1:  R lumbar radiculopathy  Pain -  self management, education, " directional preference exercise and home program  Decreased ROM/flexibility - home program  Decreased function - home program  Impaired posture - home program  Diagnosis 2:  L shoulder pain   Pain -  self management, education, directional preference exercise and home program  Decreased ROM/flexibility - home program  Decreased function - home program  Impaired posture - home program  STG/LTGs have been met or progress has been made towards goals:  Yes (See Goal flow sheet completed today.)  Assessment of Progress: The patient's condition is improving.  Self Management Plans:  Patient has been instructed in a home treatment program.  Patient is independent in a home treatment program.  Patient  has been instructed in self management of symptoms.  Patient is independent in self management of symptoms.  Chiquita continues to require the following intervention to meet STG and LTG's:  PT intervention is no longer required to meet STG/LTG.    Recommendations:  Patient will continue with her HEP independently at this point and return for a follow-up visit if needed.  If no further follow-up visits are scheduled, patient will be discharged from PT.      Please refer to the daily flowsheet for treatment today, total treatment time and time spent performing 1:1 timed codes.

## 2019-12-31 DIAGNOSIS — E78.00 HYPERCHOLESTEROLEMIA: ICD-10-CM

## 2020-01-02 RX ORDER — ATORVASTATIN CALCIUM 20 MG/1
TABLET, FILM COATED ORAL
Qty: 90 TABLET | Refills: 1 | Status: SHIPPED | OUTPATIENT
Start: 2020-01-02 | End: 2020-07-02

## 2020-01-02 NOTE — TELEPHONE ENCOUNTER
"Requested Prescriptions   Pending Prescriptions Disp Refills     atorvastatin (LIPITOR) 20 MG tablet    Last Written Prescription Date:  04/16/2019  Last Fill Quantity: 90 tablet,  # refills: 1   Last office visit: 10/8/2019 with prescribing provider:  JACQUELIN Crawford   Future Office Visit:     90 tablet 1     Sig: TAKE 1 TABLET (20 MG) BY MOUTH DAILY       Statins Protocol Passed - 12/31/2019  4:31 PM        Passed - LDL on file in past 12 months     Recent Labs   Lab Test 10/08/19  1200   LDL 90             Passed - No abnormal creatine kinase in past 12 months     No lab results found.             Passed - Recent (12 mo) or future (30 days) visit within the authorizing provider's specialty     Patient has had an office visit with the authorizing provider or a provider within the authorizing providers department within the previous 12 mos or has a future within next 30 days. See \"Patient Info\" tab in inbasket, or \"Choose Columns\" in Meds & Orders section of the refill encounter.              Passed - Medication is active on med list        Passed - Patient is age 18 or older        Passed - No active pregnancy on record        Passed - No positive pregnancy test in past 12 months           "

## 2020-03-26 NOTE — PROGRESS NOTES
Patient is discharged from PT per progress note dated 12/02/2019 as she did not require further follow-up visits.

## 2020-07-02 DIAGNOSIS — E78.00 HYPERCHOLESTEROLEMIA: ICD-10-CM

## 2020-07-02 RX ORDER — ATORVASTATIN CALCIUM 20 MG/1
TABLET, FILM COATED ORAL
Qty: 90 TABLET | Refills: 0 | Status: SHIPPED | OUTPATIENT
Start: 2020-07-02 | End: 2020-10-01

## 2020-07-22 ENCOUNTER — HOSPITAL ENCOUNTER (OUTPATIENT)
Dept: MAMMOGRAPHY | Facility: CLINIC | Age: 80
Discharge: HOME | End: 2020-07-22
Attending: FAMILY MEDICINE | Admitting: FAMILY MEDICINE
Payer: COMMERCIAL

## 2020-07-22 DIAGNOSIS — Z12.31 VISIT FOR SCREENING MAMMOGRAM: ICD-10-CM

## 2020-07-22 PROCEDURE — 77067 SCR MAMMO BI INCL CAD: CPT

## 2020-08-27 ENCOUNTER — TRANSFERRED RECORDS (OUTPATIENT)
Dept: HEALTH INFORMATION MANAGEMENT | Facility: CLINIC | Age: 80
End: 2020-08-27

## 2020-09-14 ENCOUNTER — VIRTUAL VISIT (OUTPATIENT)
Dept: URGENT CARE | Facility: CLINIC | Age: 80
End: 2020-09-14
Payer: COMMERCIAL

## 2020-09-14 ENCOUNTER — NURSE TRIAGE (OUTPATIENT)
Dept: NURSING | Facility: CLINIC | Age: 80
End: 2020-09-14

## 2020-09-14 DIAGNOSIS — R05.9 COUGH: Primary | ICD-10-CM

## 2020-09-14 PROCEDURE — 99213 OFFICE O/P EST LOW 20 MIN: CPT | Mod: 95 | Performed by: PHYSICIAN ASSISTANT

## 2020-09-14 NOTE — PROGRESS NOTES
"Chiquita Berry is a 80 year old female who is being evaluated via a billable telephone visit.      The patient has been notified of following:     \"This telephone visit will be conducted via a call between you and your physician/provider. We have found that certain health care needs can be provided without the need for a physical exam.  This service lets us provide the care you need with a short phone conversation.  If a prescription is necessary we can send it directly to your pharmacy.  If lab work is needed we can place an order for that and you can then stop by our lab to have the test done at a later time.    Telephone visits are billed at different rates depending on your insurance coverage. During this emergency period, for some insurers they may be billed the same as an in-person visit.  Please reach out to your insurance provider with any questions.    If during the course of the call the physician/provider feels a telephone visit is not appropriate, you will not be charged for this service.\"    Patient has given verbal consent for Telephone visit?  Yes    What phone number would you like to be contacted at? [phone]    How would you like to obtain your AVS? Mail a copy    Subjective   Chiquita Berry is a 80 year old female who presents via phone visit today for the following health issues:  HPI  Acute Illness  Acute illness concerns:   Onset/Duration: 1week  Symptoms:  Fever: no  Chills/Sweats: no  Headache (location?): no  Sinus Pressure: no  Conjunctivitis:  no  Ear Pain: no  Rhinorrhea: no  Congestion: no  Sore Throat: no  Cough: YES-non-productive with post nasal drainage.  No shortness of breath  Wheeze: no  Decreased Appetite: no  Nausea: no  Vomiting: no  Diarrhea: no  Dysuria/Freq.: no  Dysuria or Hematuria: no  Fatigue/Achiness: no  Sick/Strep Exposure: no  Therapies tried and outcome: rest and fluids with some relief    Past Medical History:   Diagnosis Date     Anxiety      Cataract  "    bilateral     GERD (gastroesophageal reflux disease)      Hyperlipidemia LDL goal < 100      Insomnia     using ambien once a month     Kyphosis      Osteoporosis      Palpitations     resolved     Scapula fracture 4/5/2015    right - didn't need surgery - sling     Scoliosis         Allergies   Allergen Reactions     Augmentin Diarrhea     Current Outpatient Medications   Medication     Acetaminophen (TYLENOL ARTHRITIS EXT RELIEF OR)     aspirin 81 MG tablet     atorvastatin (LIPITOR) 20 MG tablet     Cholecalciferol (VITAMIN D) 2000 UNITS tablet     IBUPROFEN PO     latanoprost (XALATAN) 0.005 % ophthalmic solution     melatonin 3 MG CAPS     moxifloxacin (VIGAMOX) 0.5 % ophthalmic solution     Multiple Vitamins-Minerals (MULTIVITAL) TABS     Multiple Vitamins-Minerals (PRESERVISION AREDS 2) CAPS     polyethylene glycol (MIRALAX/GLYCOLAX) packet     traZODone (DESYREL) 50 MG tablet     No current facility-administered medications for this visit.        Review of Systems   CONSTITUTIONAL: NEGATIVE for fever, chills, change in weight  INTEGUMENTARY/SKIN: NEGATIVE for worrisome rashes, moles or lesions  EYES: NEGATIVE for vision changes or irritation  ENT/MOUTH: NEGATIVE for ear, mouth and throat problems  CV: NEGATIVE for chest pain, palpitations or peripheral edema  GI: NEGATIVE for nausea, abdominal pain, heartburn, or change in bowel habits  MUSCULOSKELETAL: NEGATIVE for significant arthralgias or myalgia     Objective      Vitals:  No vitals were obtained today due to virtual visit.  healthy, alert, no distress and cooperative  PSYCH: Alert and oriented times 3; coherent speech, normal   rate and volume, able to articulate logical thoughts, able   to abstract reason, no tangential thoughts, no hallucinations   or delusions  Her affect is normal and pleasant  RESP: No cough, no audible wheezing, able to talk in full sentences  Remainder of exam unable to be completed due to telephone visits      Assessment &  Plan   Cough:  This appears to be mild with post nasal drainage present.  No fevers, shortness of breath, fevers or hemoptysis. Will send for COVID19 testing.  Tylenol as needed for pain/fever, robitussin as needed for cough.  Recommend self quarantine until it has been at least 14days since onset of symptoms with improvement and until she has been fever free for 3days without the use of anti-pyretics.  To the ER if worsening cough, shortness of breath, wheezing, fevers or chest pain.  -     Symptomatic COVID-19 Virus (Coronavirus) by PCR; Future           Roxanne See NELSON Pemberton  [facility]    Phone call duration:  12 minutes              .

## 2020-09-14 NOTE — TELEPHONE ENCOUNTER
"Patient calling requesting to know if she should have COVID 19 testing?  Reporting \"dry cough.\" Symptoms starting 9/13/20. Cough is dry and occasional.  Afebrile.    Transferred to Central Scheduling.    Mishel Zepeda RN  [facility]        COVID 19 Nurse Triage Plan/Patient Instructions    Please be aware that novel coronavirus (COVID-19) may be circulating in the community. If you develop symptoms such as fever, cough, or SOB or if you have concerns about the presence of another infection including coronavirus (COVID-19), please contact your health care provider or visit [email or web address].     Disposition/Instructions    Virtual Visit with provider recommended. Reference Visit Selection Guide.    Thank you for taking steps to prevent the spread of this virus.  o Limit your contact with others.  o Wear a simple mask to cover your cough.  o Wash your hands well and often.    Resources    [facility]: About COVID-19: [email or web address]    CDC: What to Do If You're Sick: www.cdc.gov/coronavirus/2019-ncov/about/steps-when-sick.html    CDC: Ending Home Isolation: www.cdc.gov/coronavirus/2019-ncov/hcp/disposition-in-home-patients.html     CDC: Caring for Someone: www.cdc.gov/coronavirus/2019-ncov/if-you-are-sick/care-for-someone.html     [facility]: Interim Guidance for Hospital Discharge to Home: www.health.[facility].mn.us/diseases/coronavirus/hcp/hospdischarge.pdf    [facility] clinical trials (COVID-19 research studies): clinicalaffairs.[facility]/n-clinical-trials     Below are the COVID-19 hotlines at the [place] Department of Health ([facility]). Interpreters are available.   o For health questions: Call 855-265-3179 or 1-157.955.3579 (7 a.m. to 7 p.m.)  o For questions about schools and childcare: Call 118-576-8085 or 1-975.484.5475 (7 a.m. to 7 p.m.)                        Reason for Disposition    HIGH RISK patient (e.g., age > 64 years, diabetes, heart or lung disease, weak immune " system) (Exception: Has already been evaluated by healthcare provider and has no new or worsening symptoms)    Additional Information    Negative: SEVERE difficulty breathing (e.g., struggling for each breath, speaks in single words)    Negative: Difficult to awaken or acting confused (e.g., disoriented, slurred speech)    Negative: Bluish (or gray) lips or face now    Negative: Shock suspected (e.g., cold/pale/clammy skin, too weak to stand, low BP, rapid pulse)    Negative: Sounds like a life-threatening emergency to the triager    Negative: [1] COVID-19 exposure AND [2] no symptoms    Negative: COVID-19 and Breastfeeding, questions about    Negative: [1] Adult with possible COVID-19 symptoms AND [2] triager concerned about severity of symptoms or other causes    Negative: SEVERE or constant chest pain or pressure (Exception: mild central chest pain, present only when coughing)    Negative: MODERATE difficulty breathing (e.g., speaks in phrases, SOB even at rest, pulse 100-120)    Negative: Patient sounds very sick or weak to the triager    Negative: MILD difficulty breathing (e.g., minimal/no SOB at rest, SOB with walking, pulse <100)    Negative: Chest pain or pressure    Negative: Fever > 103 F (39.4 C)    Negative: [1] Fever > 101 F (38.3 C) AND [2] age > 60    Negative: [1] Fever > 100.0 F (37.8 C) AND [2] bedridden (e.g., nursing home patient, CVA, chronic illness, recovering from surgery)    Protocols used: CORONAVIRUS (COVID-19) DIAGNOSED OR [id number] 8.4.20

## 2020-09-15 DIAGNOSIS — R05.9 COUGH: ICD-10-CM

## 2020-09-15 PROCEDURE — U0003 INFECTIOUS AGENT DETECTION BY NUCLEIC ACID (DNA OR RNA); SEVERE ACUTE RESPIRATORY SYNDROME CORONAVIRUS 2 (SARS-COV-2) (CORONAVIRUS DISEASE [COVID-19]), AMPLIFIED PROBE TECHNIQUE, MAKING USE OF HIGH THROUGHPUT TECHNOLOGIES AS DESCRIBED BY CMS-2020-01-R: HCPCS | Performed by: PHYSICIAN ASSISTANT

## 2020-09-16 LAB
SARS-COV-2 RNA SPEC QL NAA+PROBE: NOT DETECTED
SPECIMEN SOURCE: NORMAL

## 2020-10-01 DIAGNOSIS — E78.00 HYPERCHOLESTEROLEMIA: ICD-10-CM

## 2020-10-01 RX ORDER — ATORVASTATIN CALCIUM 20 MG/1
TABLET, FILM COATED ORAL
Qty: 90 TABLET | Refills: 1 | Status: SHIPPED | OUTPATIENT
Start: 2020-10-01 | End: 2020-12-04

## 2020-10-18 ENCOUNTER — ALLIED HEALTH/NURSE VISIT (OUTPATIENT)
Dept: NURSING | Facility: CLINIC | Age: 80
End: 2020-10-18
Payer: COMMERCIAL

## 2020-10-18 DIAGNOSIS — Z23 NEED FOR PROPHYLACTIC VACCINATION AND INOCULATION AGAINST INFLUENZA: Primary | ICD-10-CM

## 2020-10-18 PROCEDURE — G0008 ADMIN INFLUENZA VIRUS VAC: HCPCS

## 2020-10-18 PROCEDURE — 90662 IIV NO PRSV INCREASED AG IM: CPT

## 2020-11-14 ENCOUNTER — HEALTH MAINTENANCE LETTER (OUTPATIENT)
Age: 80
End: 2020-11-14

## 2020-11-18 ENCOUNTER — TELEPHONE (OUTPATIENT)
Dept: FAMILY MEDICINE | Facility: CLINIC | Age: 80
End: 2020-11-18

## 2020-11-18 NOTE — TELEPHONE ENCOUNTER
"Reason for Call:  Other call back    Detailed comments: Pt got a [employer] message that stated \"You have 1 or more health services needed\".  Pt wants to know what services she needs.    Phone Number Patient can be reached at: Home number on file [phone] (home)    Best Time: anytime    Can we leave a detailed message on this number? YES    Call taken on 11/18/2020 at 12:32 PM by MOSES LOPEZ    "

## 2020-11-18 NOTE — TELEPHONE ENCOUNTER
Called pt and let her know she is due for annual wellness, dexa, and lipid.  Pt is transferring care to [facility], she will call and make appointments there.

## 2020-12-03 PROBLEM — R09.02 HYPOXIA: Status: RESOLVED | Noted: 2019-08-13 | Resolved: 2020-12-03

## 2020-12-03 PROBLEM — G89.29 CHRONIC LEFT SHOULDER PAIN: Status: RESOLVED | Noted: 2019-10-24 | Resolved: 2020-12-03

## 2020-12-03 PROBLEM — M54.50 LUMBAR PAIN: Status: RESOLVED | Noted: 2017-03-03 | Resolved: 2020-12-03

## 2020-12-03 PROBLEM — M54.41 ACUTE RIGHT-SIDED LOW BACK PAIN WITH RIGHT-SIDED SCIATICA: Status: RESOLVED | Noted: 2019-10-09 | Resolved: 2020-12-03

## 2020-12-03 PROBLEM — M25.512 CHRONIC LEFT SHOULDER PAIN: Status: RESOLVED | Noted: 2019-10-24 | Resolved: 2020-12-03

## 2020-12-03 PROBLEM — M54.41 ACUTE BILATERAL LOW BACK PAIN WITH RIGHT-SIDED SCIATICA: Status: RESOLVED | Noted: 2019-10-08 | Resolved: 2020-12-03

## 2020-12-03 PROBLEM — J84.10 PULMONARY FIBROSIS (H): Status: RESOLVED | Noted: 2019-08-13 | Resolved: 2020-12-03

## 2020-12-03 PROBLEM — G47.00 PERSISTENT INSOMNIA: Status: RESOLVED | Noted: 2018-05-31 | Resolved: 2020-12-03

## 2020-12-03 PROBLEM — Z78.9 NONSMOKER: Status: RESOLVED | Noted: 2018-05-31 | Resolved: 2020-12-03

## 2020-12-03 PROBLEM — M62.81 GENERALIZED MUSCLE WEAKNESS: Status: RESOLVED | Noted: 2018-06-05 | Resolved: 2020-12-03

## 2020-12-03 PROBLEM — E78.00 HYPERCHOLESTEROLEMIA: Status: RESOLVED | Noted: 2018-05-31 | Resolved: 2020-12-03

## 2020-12-04 ENCOUNTER — VIRTUAL VISIT (OUTPATIENT)
Dept: INTERNAL MEDICINE | Facility: CLINIC | Age: 80
End: 2020-12-04
Payer: COMMERCIAL

## 2020-12-04 DIAGNOSIS — Z76.89 ENCOUNTER TO ESTABLISH CARE: ICD-10-CM

## 2020-12-04 DIAGNOSIS — E55.9 VITAMIN D DEFICIENCY: Primary | ICD-10-CM

## 2020-12-04 DIAGNOSIS — Z13.1 SCREENING FOR DIABETES MELLITUS: ICD-10-CM

## 2020-12-04 DIAGNOSIS — Z78.0 ASYMPTOMATIC MENOPAUSE: ICD-10-CM

## 2020-12-04 PROCEDURE — 99212 OFFICE O/P EST SF 10 MIN: CPT | Mod: 95 | Performed by: INTERNAL MEDICINE

## 2020-12-04 SDOH — ECONOMIC STABILITY: TRANSPORTATION INSECURITY
IN THE PAST 12 MONTHS, HAS LACK OF TRANSPORTATION KEPT YOU FROM MEETINGS, WORK, OR FROM GETTING THINGS NEEDED FOR DAILY LIVING?: NOT ASKED

## 2020-12-04 SDOH — ECONOMIC STABILITY: FOOD INSECURITY: WITHIN THE PAST 12 MONTHS, YOU WORRIED THAT YOUR FOOD WOULD RUN OUT BEFORE YOU GOT MONEY TO BUY MORE.: NOT ASKED

## 2020-12-04 SDOH — ECONOMIC STABILITY: INCOME INSECURITY: HOW HARD IS IT FOR YOU TO PAY FOR THE VERY BASICS LIKE FOOD, HOUSING, MEDICAL CARE, AND HEATING?: NOT ASKED

## 2020-12-04 SDOH — HEALTH STABILITY: MENTAL HEALTH: HOW MANY STANDARD DRINKS CONTAINING ALCOHOL DO YOU HAVE ON A TYPICAL DAY?: 1 OR 2

## 2020-12-04 SDOH — ECONOMIC STABILITY: FOOD INSECURITY: WITHIN THE PAST 12 MONTHS, THE FOOD YOU BOUGHT JUST DIDN'T LAST AND YOU DIDN'T HAVE MONEY TO GET MORE.: NOT ASKED

## 2020-12-04 SDOH — HEALTH STABILITY: MENTAL HEALTH: HOW OFTEN DO YOU HAVE 6 OR MORE DRINKS ON ONE OCCASION?: NEVER

## 2020-12-04 SDOH — ECONOMIC STABILITY: TRANSPORTATION INSECURITY
IN THE PAST 12 MONTHS, HAS THE LACK OF TRANSPORTATION KEPT YOU FROM MEDICAL APPOINTMENTS OR FROM GETTING MEDICATIONS?: NOT ASKED

## 2020-12-04 SDOH — HEALTH STABILITY: MENTAL HEALTH: HOW OFTEN DO YOU HAVE A DRINK CONTAINING ALCOHOL?: MONTHLY OR LESS

## 2020-12-04 NOTE — PROGRESS NOTES
"Chiquita Berry is a 80 year old female who is being evaluated via a billable telephone visit.      The patient has been notified of following:     \"This telephone visit will be conducted via a call between you and your physician/provider. We have found that certain health care needs can be provided without the need for a physical exam.  This service lets us provide the care you need with a short phone conversation.  If a prescription is necessary we can send it directly to your pharmacy.  If lab work is needed we can place an order for that and you can then stop by our lab to have the test done at a later time.    Telephone visits are billed at different rates depending on your insurance coverage. During this emergency period, for some insurers they may be billed the same as an in-person visit.  Please reach out to your insurance provider with any questions.    If during the course of the call the physician/provider feels a telephone visit is not appropriate, you will not be charged for this service.\"    Patient has given verbal consent for Telephone visit?  Yes    What phone number would you like to be contacted at? [phone]    How would you like to obtain your AVS? MyChart    TELEPHONE VISIT                                                      SUBJECTIVE                                                      HPI: Chiquita Berry is a very pleasant 80 year old female who requested a telephone visit to establish care:    PMH, PSH, FH, SH, medications, allergies, immunizations, and preventative health measures reviewed and updated as appropriate.    Patient is due for fasting labs and a DEXA, though she is hesitant to restart Fosamax if it is indicated.    ASSESSMENT/PLAN                                                      (E55.9) Vitamin D deficiency  (primary encounter diagnosis)  (Z13.1) Screening for diabetes mellitus  Plan: Fasting labs ordered- patient will be contacted to schedule.      (Z78.0) " Asymptomatic menopause  Plan: DEXA ordered - patient will be contacted to schedule.      (Z76.89) Encounter to establish care  Comment: PMH, PSH, FH, SH, medications, allergies, immunizations, and preventative health measures reviewed and updated as appropriate.    Total time of call between patient and provider was 8 minutes.    Vesna Olivera MD   [address] [address]  T: [phone], F: [phone]    (Note was completed, in part, with [employer] voice-recognition software. Documentation reviewed, but some grammatical, spelling, and word errors may remain.)

## 2020-12-21 ENCOUNTER — ANCILLARY PROCEDURE (OUTPATIENT)
Dept: BONE DENSITY | Facility: CLINIC | Age: 80
End: 2020-12-21
Attending: INTERNAL MEDICINE
Payer: COMMERCIAL

## 2020-12-21 DIAGNOSIS — Z78.0 ASYMPTOMATIC MENOPAUSE: ICD-10-CM

## 2020-12-21 DIAGNOSIS — E55.9 VITAMIN D DEFICIENCY: ICD-10-CM

## 2020-12-21 DIAGNOSIS — Z13.1 SCREENING FOR DIABETES MELLITUS: ICD-10-CM

## 2020-12-21 LAB
ALBUMIN SERPL-MCNC: 3.6 G/DL (ref 3.4–5)
ALP SERPL-CCNC: 85 U/L (ref 40–150)
ALT SERPL W P-5'-P-CCNC: 24 U/L (ref 0–50)
ANION GAP SERPL CALCULATED.3IONS-SCNC: 1 MMOL/L (ref 3–14)
AST SERPL W P-5'-P-CCNC: 23 U/L (ref 0–45)
BILIRUB SERPL-MCNC: 0.5 MG/DL (ref 0.2–1.3)
BUN SERPL-MCNC: 16 MG/DL (ref 7–30)
CALCIUM SERPL-MCNC: 9 MG/DL (ref 8.5–10.1)
CHLORIDE SERPL-SCNC: 108 MMOL/L (ref 94–109)
CO2 SERPL-SCNC: 31 MMOL/L (ref 20–32)
CREAT SERPL-MCNC: 0.66 MG/DL (ref 0.52–1.04)
DEPRECATED CALCIDIOL+CALCIFEROL SERPL-MC: 40 UG/L (ref 20–75)
GFR SERPL CREATININE-BSD FRML MDRD: 83 ML/MIN/{1.73_M2}
GLUCOSE SERPL-MCNC: 102 MG/DL (ref 70–99)
POTASSIUM SERPL-SCNC: 4.8 MMOL/L (ref 3.4–5.3)
PROT SERPL-MCNC: 7.3 G/DL (ref 6.8–8.8)
SODIUM SERPL-SCNC: 140 MMOL/L (ref 133–144)

## 2020-12-21 PROCEDURE — 36415 COLL VENOUS BLD VENIPUNCTURE: CPT | Performed by: INTERNAL MEDICINE

## 2020-12-21 PROCEDURE — 82306 VITAMIN D 25 HYDROXY: CPT | Performed by: INTERNAL MEDICINE

## 2020-12-21 PROCEDURE — 77085 DXA BONE DENSITY AXL VRT FX: CPT | Performed by: INTERNAL MEDICINE

## 2020-12-21 PROCEDURE — 80053 COMPREHEN METABOLIC PANEL: CPT | Performed by: INTERNAL MEDICINE

## 2021-02-01 ENCOUNTER — VIRTUAL VISIT (OUTPATIENT)
Dept: INTERNAL MEDICINE | Facility: CLINIC | Age: 81
End: 2021-02-01
Payer: COMMERCIAL

## 2021-02-01 DIAGNOSIS — M81.0 AGE-RELATED OSTEOPOROSIS WITHOUT CURRENT PATHOLOGICAL FRACTURE: Primary | ICD-10-CM

## 2021-02-01 PROCEDURE — 99213 OFFICE O/P EST LOW 20 MIN: CPT | Mod: 95 | Performed by: INTERNAL MEDICINE

## 2021-02-01 RX ORDER — ALENDRONATE SODIUM 70 MG/1
70 TABLET ORAL
Qty: 12 TABLET | Refills: 3 | Status: SHIPPED | OUTPATIENT
Start: 2021-02-01 | End: 2022-04-12

## 2021-02-01 NOTE — PROGRESS NOTES
TELEPHONE VISIT                                                      ASSESSMENT/PLAN                                                      (M81.0) Age-related osteoporosis without current pathological fracture  (primary encounter diagnosis)  Plan: START Fosamax 70 mg weekly; follow-up DEXA in 2 years.    Total time of call between patient and provider was 10 minutes.    Vesna Olivera MD   [address] [address]  T: [phone], F: [phone]    SUBJECTIVE                                                      Chiquita Berry is a very pleasant 80 year old female who requested a telephone visit to discuss her recent results:    Labs and DEXA results reviewed. Labs within normal limits. DEXA demonstrated osteoporosis and treatment is recommended.  Patient has been on Fosamax twice in the past, currently on holiday. Has tolerated Fosamax - no adverse side effects.    ---    (Note was completed, in part, with [employer] voice-recognition software. Documentation reviewed, but some grammatical, spelling, and word errors may remain.)

## 2021-02-12 ENCOUNTER — IMMUNIZATION (OUTPATIENT)
Dept: NURSING | Facility: CLINIC | Age: 81
End: 2021-02-12
Payer: COMMERCIAL

## 2021-02-12 PROCEDURE — 91300 PR COVID VAC PFIZER DIL RECON 30 MCG/0.3 ML IM: CPT

## 2021-02-12 PROCEDURE — 0001A PR COVID VAC PFIZER DIL RECON 30 MCG/0.3 ML IM: CPT

## 2021-03-05 ENCOUNTER — IMMUNIZATION (OUTPATIENT)
Dept: NURSING | Facility: CLINIC | Age: 81
End: 2021-03-05
Attending: INTERNAL MEDICINE
Payer: COMMERCIAL

## 2021-03-05 PROCEDURE — 91300 PR COVID VAC PFIZER DIL RECON 30 MCG/0.3 ML IM: CPT

## 2021-03-05 PROCEDURE — 0002A PR COVID VAC PFIZER DIL RECON 30 MCG/0.3 ML IM: CPT

## 2021-07-17 ENCOUNTER — OFFICE VISIT (OUTPATIENT)
Dept: URGENT CARE | Facility: URGENT CARE | Age: 81
End: 2021-07-17
Payer: COMMERCIAL

## 2021-07-17 VITALS
BODY MASS INDEX: 24.69 KG/M2 | WEIGHT: 135 LBS | DIASTOLIC BLOOD PRESSURE: 78 MMHG | TEMPERATURE: 98.4 F | SYSTOLIC BLOOD PRESSURE: 145 MMHG | HEART RATE: 93 BPM

## 2021-07-17 DIAGNOSIS — M62.830 BACK MUSCLE SPASM: Primary | ICD-10-CM

## 2021-07-17 PROCEDURE — 99214 OFFICE O/P EST MOD 30 MIN: CPT | Performed by: PHYSICIAN ASSISTANT

## 2021-07-17 RX ORDER — METHOCARBAMOL 750 MG/1
750 TABLET, FILM COATED ORAL 4 TIMES DAILY PRN
Qty: 30 TABLET | Refills: 0 | Status: SHIPPED | OUTPATIENT
Start: 2021-07-17 | End: 2021-07-29

## 2021-07-17 RX ORDER — NAPROXEN 500 MG/1
500 TABLET ORAL 2 TIMES DAILY WITH MEALS
Qty: 60 TABLET | Refills: 0 | Status: ON HOLD | OUTPATIENT
Start: 2021-07-17 | End: 2021-08-02

## 2021-07-17 RX ORDER — IBUPROFEN 200 MG
400 TABLET ORAL EVERY 4 HOURS PRN
COMMUNITY
End: 2021-08-06

## 2021-07-17 NOTE — PATIENT INSTRUCTIONS
(M62.177) Back muscle spasm  (primary encounter diagnosis)  Comment:   Plan: naproxen (NAPROSYN) 500 MG tablet,         methocarbamol (ROBAXIN) 750 MG tablet,         acetaminophen-codeine (TYLENOL #3) 300-30 MG         tablet          Ice to area over thin cloth 20 minutes on and off today     Gentle stretches - start tomorrow if tolerated.     Keep follow up appointment with Dr. Olivera for 7/20/21

## 2021-07-17 NOTE — PROGRESS NOTES
Patient presents with:  Urgent Care: back spasms pain worse since yesterday.      (M62.830) Back muscle spasm  (primary encounter diagnosis)  Comment:   Plan: naproxen (NAPROSYN) 500 MG tablet,         methocarbamol (ROBAXIN) 750 MG tablet,         acetaminophen-codeine (TYLENOL #3) 300-30 MG         tablet          Ice to area over thin cloth 20 minutes on and off today     Gentle stretches - start tomorrow if tolerated.     Keep follow up appointment with Dr. Olivera for 7/20/21.  May benefit from PT        SUBJECTIVE:   Chiquita Berry is a 80 year old female who presents today with low back pain and spasms onset yesterday.  Did do a lot of lifting with groceries and then sat at her computer when she was at home.  Back pain kept her from sleeping well last night.        Past Medical History:   Diagnosis Date     Glaucoma suspect, bilateral      Osteoporosis          Current Outpatient Medications   Medication Sig Dispense Refill     Multiple Vitamins-Iron (DAILY-NIK/IRON/BETA-CAROTENE) TABS TAKE 1 TABLET BY MOUTH DAILY. (Patient not taking: Reported on 10/19/2020) 30 tablet 7     Social History     Tobacco Use     Smoking status: Never Smoker     Smokeless tobacco: Never Used   Substance Use Topics     Alcohol use: Not on file     Family History   Problem Relation Age of Onset     Diabetes Mother      Diabetes Father          ROS:    10 point ROS of systems including Constitutional, Eyes, Respiratory, Cardiovascular, Gastroenterology, Genitourinary, Integumentary, Muscularskeletal, Psychiatric ,neurological were all negative except for pertinent positives noted in my HPI       OBJECTIVE:  BP (!) 145/78   Pulse 93   Temp 98.4  F (36.9  C) (Tympanic)   Wt 61.2 kg (135 lb)   LMP  (LMP Unknown)   BMI 24.69 kg/m    Physical Exam:  GENERAL APPEARANCE: healthy, alert and no distress  RESP: lungs clear to auscultation - no rales, rhonchi or wheezes  CV: regular rates and rhythm, normal S1 S2, no murmur  noted  ABDOMEN:  soft, nontender, no HSM or masses and bowel sounds normal  NEURO: Normal strength and tone, sensory exam grossly normal,  normal speech and mentation  SKIN: no suspicious lesions or rashes  BACK: scoliosis and kyphosis.  Non tender over vertebral bodies.  Paraspinous muscles tender in lumbar region.

## 2021-07-20 ENCOUNTER — TELEPHONE (OUTPATIENT)
Dept: INTERNAL MEDICINE | Facility: CLINIC | Age: 81
End: 2021-07-20

## 2021-07-20 ENCOUNTER — OFFICE VISIT (OUTPATIENT)
Dept: INTERNAL MEDICINE | Facility: CLINIC | Age: 81
End: 2021-07-20
Payer: COMMERCIAL

## 2021-07-20 ENCOUNTER — ANCILLARY PROCEDURE (OUTPATIENT)
Dept: GENERAL RADIOLOGY | Facility: CLINIC | Age: 81
End: 2021-07-20
Attending: INTERNAL MEDICINE
Payer: COMMERCIAL

## 2021-07-20 VITALS
RESPIRATION RATE: 16 BRPM | SYSTOLIC BLOOD PRESSURE: 148 MMHG | BODY MASS INDEX: 24.84 KG/M2 | DIASTOLIC BLOOD PRESSURE: 70 MMHG | HEIGHT: 62 IN | WEIGHT: 135 LBS | OXYGEN SATURATION: 96 % | HEART RATE: 86 BPM | TEMPERATURE: 99.6 F

## 2021-07-20 DIAGNOSIS — M81.0 AGE-RELATED OSTEOPOROSIS WITHOUT CURRENT PATHOLOGICAL FRACTURE: ICD-10-CM

## 2021-07-20 DIAGNOSIS — M54.42 ACUTE BILATERAL LOW BACK PAIN WITH LEFT-SIDED SCIATICA: Primary | ICD-10-CM

## 2021-07-20 DIAGNOSIS — M54.42 ACUTE BILATERAL LOW BACK PAIN WITH LEFT-SIDED SCIATICA: ICD-10-CM

## 2021-07-20 PROCEDURE — 99213 OFFICE O/P EST LOW 20 MIN: CPT | Performed by: INTERNAL MEDICINE

## 2021-07-20 PROCEDURE — 72100 X-RAY EXAM L-S SPINE 2/3 VWS: CPT | Performed by: RADIOLOGY

## 2021-07-20 RX ORDER — PREDNISONE 20 MG/1
TABLET ORAL
Qty: 20 TABLET | Refills: 0 | Status: SHIPPED | OUTPATIENT
Start: 2021-07-20 | End: 2021-07-29

## 2021-07-20 ASSESSMENT — MIFFLIN-ST. JEOR: SCORE: 1030.61

## 2021-07-20 NOTE — PROGRESS NOTES
"  ASSESSMENT/PLAN                                                      (M54.42) Acute bilateral low back pain with left-sided sciatica  (primary encounter diagnosis)  (M81.0) Age-related osteoporosis without current pathological fracture  Comment: patient is at risk for compression fracture.  Plan: lumbar series today; if series is negative for an acute compression fracture, will prescribe a course of oral steroids and refer for physical therapy.    Vesna Olivera MD   [address] [address]  T: [phone], F: [phone]    SUBJECTIVE                                                      Chiquita Berry is a very pleasant 80 year old female who presents for urgent care follow-up:    Patient was seen in urgent care over the weekend for acute onset low back pain and spasms.  Pain and spasm started after lifting a lot of groceries.  Patient was prescribed naproxen, Robaxin, and Tylenol 3 with no relief of symptoms. Patient has been relying on a walker and wheelchair to get around (was ambulating independently prior to this).    PMH significant for osteoporosis on Fosamax.    OBJECTIVE                                                      BP (!) 148/70 (BP Location: Left arm, Patient Position: Chair, Cuff Size: Adult Regular)   Pulse 86   Temp 99.6  F (37.6  C) (Temporal)   Resp 16   Ht 1.575 m (5' 2\")   Wt 61.2 kg (135 lb)   LMP  (LMP Unknown)   SpO2 96%   BMI 24.69 kg/m    Constitutional: well-appearing  Thoracic/lumbar spine: severe scoliosis noted; no crepitus or step-off; no spinal or paraspinal tenderness to palpation  Musculoskeletal: seated in wheelchair; able to stand briefly before pain worsens    ---    (Note documentation was completed, in part, with [employer] voice-recognition software. Documentation was reviewed, but some grammatical, spelling, and word errors may remain.)    "

## 2021-07-23 ENCOUNTER — NURSE TRIAGE (OUTPATIENT)
Dept: INTERNAL MEDICINE | Facility: CLINIC | Age: 81
End: 2021-07-23

## 2021-07-23 DIAGNOSIS — M54.42 ACUTE BILATERAL LOW BACK PAIN WITH LEFT-SIDED SCIATICA: Primary | ICD-10-CM

## 2021-07-23 RX ORDER — TRAMADOL HYDROCHLORIDE 50 MG/1
50-100 TABLET ORAL EVERY 6 HOURS PRN
Qty: 30 TABLET | Refills: 0 | Status: SHIPPED | OUTPATIENT
Start: 2021-07-23 | End: 2021-07-26

## 2021-07-23 NOTE — TELEPHONE ENCOUNTER
Don't think over the counter topicals will help if steroid is not helping.    Will send in some Ultram for pain.

## 2021-07-23 NOTE — TELEPHONE ENCOUNTER
If the prednisone is not helping, she may stop it.     No need to restart muscle relaxer if it didn't help.

## 2021-07-23 NOTE — TELEPHONE ENCOUNTER
Pt is aware and will get prescription .continue Prednisone also ? Should muscle relaxer ? Has gotten a little worse today .Kasia Soto RN

## 2021-07-23 NOTE — TELEPHONE ENCOUNTER
Pt called back, relayed Dr. Olivera's message below.     Pt states that she will keep taking prednisone taper as she thinks it is helping. Will call back on Monday to let us know how she is doing.     Modesta Limon RN

## 2021-07-23 NOTE — TELEPHONE ENCOUNTER
"PCP: patient reports no improvement in pain as office visit on 7/20/21. Patient started prednisone pack, reports understanding prednisone may take a while to start working, asking asdivse for pain management over the weekend. States they are taking tylenol 500 mg 3x a day. Patient is out of tylenol 3 (states it has helped patient with pain before bed, does not take during the day) but is on last pill today. Writer advised heat. Patient reports they stopped taking robaxin, because it previously didn't help with pain. Advised during appt to not atake ibuprofen and aleeve. Patient has PT appt next week. Would a topical analgesia help (ie lidocaine, paient wondering about salonpas)? Please advise.    Patient saw dr Olivera Tuesday  Taking prednisone, on day 3.   Reports experiencing pain.   Using ice, and tylenol  Tylenol: 3x a day 500 mg.  Ibuprofen, on prednisone, to not take.  Robaxin: stopped, not doing any good, prednisone would really help.   Stretches: 'trying to do that\" has PT appt next week.   Stopped aleeve,   Salonpause on back?   Tried heat- will try that.  7/10 with movement - similar pain to when seeing Dr. Garcia   Anything more I should do.   Tylenol with codeine, helps sleep help with pain      Preferred pharmacy: [address]    Callback: [phone]- okay to leave detailed VM    Walter London RN  [facility]    Reason for Disposition    Back pain    Additional Information    Negative: Passed out (i.e., fainted, collapsed and was not responding)    Negative: Shock suspected (e.g., cold/pale/clammy skin, too weak to stand, low BP, rapid pulse)    Negative: Sounds like a life-threatening emergency to the triager    Negative: Major injury to the back (e.g., MVA, fall > 10 feet or 3 meters, penetrating injury, etc.)    Negative: Pain in the upper back over the ribs (rib cage) that radiates (travels) into the chest    Negative: Pain in the upper back " over the ribs (rib cage) and worsened by coughing (or clearly increases with breathing)    Negative: SEVERE back pain of sudden onset and age > 60    Negative: SEVERE abdominal pain (e.g., excruciating)    Negative: Abdominal pain and age > 60    Negative: Unable to urinate (or only a few drops) and bladder feels very full    Negative: Loss of bladder or bowel control (urine or bowel incontinence; wetting self, leaking stool) of new onset    Negative: Numbness (loss of sensation) in groin or rectal area    Negative: Pain radiates into groin, scrotum    Negative: Blood in urine (red, pink, or tea-colored)    Negative: Vomiting and pain over lower ribs of back (i.e., flank - kidney area)    Negative: Weakness of a leg or foot (e.g., unable to bear weight, dragging foot)    Negative: Patient sounds very sick or weak to the triager    Negative: Fever > 100.4 F (38.0 C) and flank pain    Negative: Pain or burning with passing urine (urination)    Negative: SEVERE back pain (e.g., excruciating, unable to do any normal activities) and not improved after pain medicine and CARE ADVICE    Negative: Numbness in an arm or hand (i.e., loss of sensation) and upper back pain    Negative: Numbness in a leg or foot (i.e., loss of sensation)    Negative: High-risk adult (e.g., history of cancer, history of HIV, or history of IV drug abuse)    Negative: Painful rash with multiple small blisters grouped together (i.e., dermatomal distribution or 'band' or 'stripe')    Negative: Pain radiates into the thigh or further down the leg, and in both legs    Negative: Age > 50 and no history of prior similar back pain    Negative: MODERATE back pain (e.g., interferes with normal activities) and present > 3 days    Negative: Pain radiates into the thigh or further down the leg    Negative: Patient wants to be seen    Negative: Back pain lasts > 2 weeks    Negative: Back pain is a chronic symptom (recurrent or ongoing AND lasting > 4  weeks)    Protocols used: BACK PAIN-A-[place]

## 2021-07-26 ENCOUNTER — TELEPHONE (OUTPATIENT)
Dept: INTERNAL MEDICINE | Facility: CLINIC | Age: 81
End: 2021-07-26

## 2021-07-26 NOTE — TELEPHONE ENCOUNTER
Pt calling with an update for Dr. Olivera.     Pt was prescribed Tramadol for back pain; Pt has been taking 50 mg at night and it is helping her back pain. Pt has her first PT appointment this Thursday 7-29-21.    Routing to PCP as ELIZABETH Limon RN

## 2021-07-27 ENCOUNTER — NURSE TRIAGE (OUTPATIENT)
Dept: INTERNAL MEDICINE | Facility: CLINIC | Age: 81
End: 2021-07-27

## 2021-07-27 DIAGNOSIS — M54.42 ACUTE BILATERAL LOW BACK PAIN WITH LEFT-SIDED SCIATICA: Primary | ICD-10-CM

## 2021-07-27 NOTE — TELEPHONE ENCOUNTER
"S: \" Tramadol is not helping her back pain.     O: Patient's  has back spasms started 10 days ago. She has applied a heating pad to her lower back and taking Tramadol  last night and two at lunch time. I'm sleeping on the living room sofa because I cannot get out of bed with my back pain.   A: Lower back, pain at 8. Interfering with ADLs. PT appointment  scheduled on Thursdays.   P: Phone call with Dr. Olivera scheduled at 10:20 am  on Thursday. Encourage patient to continue to take Tramadol as prescribed.   Dr. Olivera, is there a medication change needed for the patient's back pain?         Reason for Disposition    [1] SEVERE back pain (e.g., excruciating, unable to do any normal activities) AND [2] not improved 2 hours after pain medicine    [1] Pain radiates into the thigh or further down the leg AND [2] both legs    [1] Pain radiates into the thigh or further down the leg AND [2] one leg    [1] Age > 50 AND [2] no history of prior similar back pain    [1] MODERATE back pain (e.g., interferes with normal activities) AND [2] present > 3 days    Additional Information    Negative: [1] Fever > 100.0 F (37.8 C) AND [2] flank pain (i.e., in side, below ribs and above hip)    Negative: [1] Pain or burning with passing urine (urination) AND [2] flank pain (i.e., in side, below ribs and above hip)    Protocols used: BACK PAIN-A-    Asia Mckeon RN  -[facility]     "

## 2021-07-28 NOTE — TELEPHONE ENCOUNTER
I have referred her to an orthopedic spine specialist for further evaluation - she will be contacted to schedule an appointment.    I do not have any further recommendations - we have tried multiple medications and physical therapy - all to no avail. Meeting with a specialist is the next step.    (unfortunately I'm not sure a phone visit will be much help and can probably be cancelled)

## 2021-07-29 ENCOUNTER — THERAPY VISIT (OUTPATIENT)
Dept: PHYSICAL THERAPY | Facility: CLINIC | Age: 81
End: 2021-07-29
Payer: COMMERCIAL

## 2021-07-29 ENCOUNTER — VIRTUAL VISIT (OUTPATIENT)
Dept: INTERNAL MEDICINE | Facility: CLINIC | Age: 81
End: 2021-07-29
Payer: COMMERCIAL

## 2021-07-29 DIAGNOSIS — M54.42 LEFT-SIDED LOW BACK PAIN WITH LEFT-SIDED SCIATICA: ICD-10-CM

## 2021-07-29 DIAGNOSIS — M54.42 ACUTE BILATERAL LOW BACK PAIN WITH LEFT-SIDED SCIATICA: Primary | ICD-10-CM

## 2021-07-29 DIAGNOSIS — M54.42 ACUTE BILATERAL LOW BACK PAIN WITH LEFT-SIDED SCIATICA: ICD-10-CM

## 2021-07-29 PROCEDURE — 97161 PT EVAL LOW COMPLEX 20 MIN: CPT | Mod: GP | Performed by: PHYSICAL THERAPIST

## 2021-07-29 PROCEDURE — 99213 OFFICE O/P EST LOW 20 MIN: CPT | Mod: 95 | Performed by: INTERNAL MEDICINE

## 2021-07-29 PROCEDURE — 97110 THERAPEUTIC EXERCISES: CPT | Mod: GP | Performed by: PHYSICAL THERAPIST

## 2021-07-29 RX ORDER — TRAMADOL HYDROCHLORIDE 50 MG/1
50 TABLET ORAL EVERY 6 HOURS PRN
Qty: 30 TABLET | Refills: 1 | Status: ON HOLD | OUTPATIENT
Start: 2021-07-29 | End: 2021-08-02

## 2021-07-29 NOTE — PROGRESS NOTES
TELEPHONE VISIT                                                      ASSESSMENT/PLAN                                                      (M54.42) Acute bilateral low back pain with left-sided sciatica  (primary encounter diagnosis)  Comment: lumbar series demonstrates significant degenerative changes throughout, but no acute pathology; no improvement with NSAIDs, oral steroids, Tylenol 3, and muscle relaxers; mild relief with Ultram.  Plan: refills of Ultram provided; patient is welcome to retry naproxen as needed; patient encouraged to continue to attend physical therapy and perform her home exercise program regularly; spine specialist evaluation scheduled for Monday.    Total time of call between patient and provider was 5 minutes. Provider location: office. Patient location: home.    Vesna Olivera MD   [place] [address] [address] [place] [address]  T: [phone], F: [phone]    SUBJECTIVE                                                      Chiquita Berry is a very pleasant 80 year old female who requested a telephone visit to discuss ongoing back pain:    Patient was seen in urgent care 7/17/2021 for acute onset low back pain. Back pain started after lifting a lot of groceries.  Patient was prescribed naproxen, Robaxin, and Tylenol 3 with no relief of symptoms. Patient was seen by me 7/20/2021 for ongoing back pain. Lumbar series demonstrated degenerative changes throughout, but no acute pathology. Patient was prescribed an oral steroid taper and referred her for physical therapy. Patient contacted me several days later to report no improvement in her back pain with the oral steroid taper. Oral steroid taper stopped and patient was prescribed Ultram. Patient reported no improvement with Ultram and today's appointment was scheduled.    In the interim, patient was referred to neurosurgery for further evaluation and has an appointment scheduled for Monday.    Our telephone visit  was conducted while patient was in the middle of her physical therapy session. Patient reports that she actually did have some mild relief of symptoms with Ultram and needs a refill. Patient is also interested in restarting naproxen, which had been prescribed before to no avail.    ---    (Note was completed, in part, with [employer] voice-recognition software. Documentation reviewed, but some grammatical, spelling, and word errors may remain.)

## 2021-07-29 NOTE — PROGRESS NOTES
"Physical Therapy Initial Evaluation  Subjective:  The history is provided by the patient. No  was used.   Therapist Generated HPI Evaluation  Problem details: Patient was seen in urgent care 7-17-21 for onset of LBP and spasms starting 7-16-21 after lifting groceries and then sitting working on her computer.   Patient was prescribed medication in urgent care without relief.  Patient is now needing to use a walker and wheelchair to get around (was ambulating independently without assistive device prior).  Currently pain is across the LB, left buttock, left LE to the calf.  Pain is constant the the knee 3-8/10, \"achy\" and calf pain is intermittent.  She denies pain right LE.  Symptoms increase with sitting >hour, transfers from sit-stand and supine to sit, immediately with standing (unable to tolerate > a few minutes), losing 3+ hours sleep/night, immediately with walking (even with the walker).  Symptoms decrease with heating pad (used ice initially, slight temporary relief with flexion in sitting (has been doing several times/day). Trial of steroid dosepack, no help.  Has appt with spine specialist 8-2-21.  Patient lives in an apartment by herself, with neighbors to help (have been helping with meal prep).  Patient typically walks about 1 hour/day, has not been able to do.  .                     Pain is the same all the time.  Since onset symptoms are gradually worsening.     Special tests included:  X-ray.  Past treatment: PT for right sciatica late 2019.   Barriers include:  Lives alone.    Patient Health History           General health as reported by patient is good.  Pertinent medical history includes: history of fractures and osteoporosis (elbow and hand).   Red flags:  None as reported by patient.  Medical allergies: none.   Surgeries include:  Orthopedic surgery. Other surgery history details: elbow.    Current medications:  Pain medication.    Current occupation is Retired .              "                          Objective:  Standing Alignment:    Cervical/Thoracic:  Thoracic kyphosis increased and convex thoracic scoliosis R    Lumbar:  Convex scoliosis L (stands and walks with trunk-flexed position; burns from heating pain lumbar )            Gait:  Arrived in wheelchair,;able to stand/walk <2' with walker, unable to stand erect or bear full weight through left LE, maintains ~20 degrees trunk flexion in standing/walking                 Lumbar/SI Evaluation  ROM:    AROM Lumbar:   Flexion:        Mod restriction  Ext:                    10% from neutral   Side Bend:        Left:     Right:   Rotation:           Left:     Right:   Side Glide:        Left:     Right:           Lumbar Myotomes:  not assessed (due to pain)                  Neural Tension/Mobility:      Left side:Slump  negative.     Right side:   Slump  negative.                                                    Moderate difficulty, assist bilateral UEs for sit-stand transfer    Symptoms prior to test movements:  Pain LB to left knee  Correction of sitting posture with small lumbar roll:  Slight decrease pain   ANA with hips against counter:  Minimal ROM, no effect, unable to tolerate standing  Flexion/rotation in left sidelying:  Abolish LE pain, decrease LBP/no better      Assessment/Plan:    Patient is a 80 year old female with lumbar complaints.    Patient has the following significant findings with corresponding treatment plan.                Diagnosis 1:  LBP with left sciatica    Pain -  hot/cold therapy, self management, education and home program  Decreased ROM/flexibility - therapeutic exercise and home program  Inflammation - cold therapy and self management/home program  Impaired gait - assistive devices and home program  Decreased function - therapeutic activities and home program  Impaired posture - neuro re-education and home program    Cumulative Therapy Evaluation is: Low complexity.    Previous and current functional  limitations:  (See Goal Flow Sheet for this information)    Short term and Long term goals: (See Goal Flow Sheet for this information)     Communication ability:  Patient appears to be able to clearly communicate and understand verbal and written communication and follow directions correctly.  Treatment Explanation - The following has been discussed with the patient:   RX ordered/plan of care  Anticipated outcomes  Possible risks and side effects  This patient would benefit from PT intervention to resume normal activities.   Rehab potential is fair.    Frequency:  1 X week, once daily  Duration:  for 6 weeks  Discharge Plan:  Achieve all LTG.  Independent in home treatment program.  Reach maximal therapeutic benefit.    Please refer to the daily flowsheet for treatment today, total treatment time and time spent performing 1:1 timed codes.

## 2021-07-31 ENCOUNTER — HOSPITAL ENCOUNTER (OUTPATIENT)
Facility: CLINIC | Age: 81
Setting detail: OBSERVATION
Discharge: NURSING, REHAB OR LONG-TERM CARE | End: 2021-08-05
Attending: PHYSICIAN ASSISTANT | Admitting: HOSPITALIST
Payer: COMMERCIAL

## 2021-07-31 ENCOUNTER — APPOINTMENT (OUTPATIENT)
Dept: MRI IMAGING | Facility: CLINIC | Age: 81
End: 2021-07-31
Attending: PHYSICIAN ASSISTANT
Payer: COMMERCIAL

## 2021-07-31 DIAGNOSIS — M54.42 ACUTE LEFT-SIDED LOW BACK PAIN WITH LEFT-SIDED SCIATICA: ICD-10-CM

## 2021-07-31 DIAGNOSIS — Z29.9 PREVENTIVE MEASURE: Primary | ICD-10-CM

## 2021-07-31 DIAGNOSIS — S32.040A CLOSED COMPRESSION FRACTURE OF L4 LUMBAR VERTEBRA, INITIAL ENCOUNTER (H): ICD-10-CM

## 2021-07-31 DIAGNOSIS — M54.42 BILATERAL LOW BACK PAIN WITH LEFT-SIDED SCIATICA: ICD-10-CM

## 2021-07-31 PROCEDURE — G0378 HOSPITAL OBSERVATION PER HR: HCPCS

## 2021-07-31 PROCEDURE — 99220 PR INITIAL OBSERVATION CARE,LEVEL III: CPT | Performed by: HOSPITALIST

## 2021-07-31 PROCEDURE — C9803 HOPD COVID-19 SPEC COLLECT: HCPCS

## 2021-07-31 PROCEDURE — 87635 SARS-COV-2 COVID-19 AMP PRB: CPT | Performed by: HOSPITALIST

## 2021-07-31 PROCEDURE — 250N000013 HC RX MED GY IP 250 OP 250 PS 637: Performed by: HOSPITALIST

## 2021-07-31 PROCEDURE — 250N000013 HC RX MED GY IP 250 OP 250 PS 637: Performed by: PHYSICIAN ASSISTANT

## 2021-07-31 PROCEDURE — 99207 PR CDG-MDM COMPONENT: MEETS MODERATE - UP CODED: CPT | Performed by: HOSPITALIST

## 2021-07-31 PROCEDURE — 99285 EMERGENCY DEPT VISIT HI MDM: CPT | Mod: 25

## 2021-07-31 PROCEDURE — 72148 MRI LUMBAR SPINE W/O DYE: CPT

## 2021-07-31 RX ORDER — POLYETHYLENE GLYCOL 3350 17 G/17G
17 POWDER, FOR SOLUTION ORAL DAILY PRN
Status: DISCONTINUED | OUTPATIENT
Start: 2021-07-31 | End: 2021-08-05 | Stop reason: HOSPADM

## 2021-07-31 RX ORDER — BISACODYL 10 MG
10 SUPPOSITORY, RECTAL RECTAL DAILY PRN
Status: DISCONTINUED | OUTPATIENT
Start: 2021-07-31 | End: 2021-08-05 | Stop reason: HOSPADM

## 2021-07-31 RX ORDER — LIDOCAINE 40 MG/G
CREAM TOPICAL
Status: DISCONTINUED | OUTPATIENT
Start: 2021-07-31 | End: 2021-08-05 | Stop reason: HOSPADM

## 2021-07-31 RX ORDER — LIDOCAINE 4 G/G
1 PATCH TOPICAL
Status: DISCONTINUED | OUTPATIENT
Start: 2021-07-31 | End: 2021-08-05 | Stop reason: HOSPADM

## 2021-07-31 RX ORDER — NALOXONE HYDROCHLORIDE 0.4 MG/ML
0.2 INJECTION, SOLUTION INTRAMUSCULAR; INTRAVENOUS; SUBCUTANEOUS
Status: DISCONTINUED | OUTPATIENT
Start: 2021-07-31 | End: 2021-08-05 | Stop reason: HOSPADM

## 2021-07-31 RX ORDER — NALOXONE HYDROCHLORIDE 0.4 MG/ML
0.4 INJECTION, SOLUTION INTRAMUSCULAR; INTRAVENOUS; SUBCUTANEOUS
Status: DISCONTINUED | OUTPATIENT
Start: 2021-07-31 | End: 2021-08-05 | Stop reason: HOSPADM

## 2021-07-31 RX ORDER — IBUPROFEN 600 MG/1
600 TABLET, FILM COATED ORAL EVERY 6 HOURS PRN
Status: DISCONTINUED | OUTPATIENT
Start: 2021-07-31 | End: 2021-08-05 | Stop reason: HOSPADM

## 2021-07-31 RX ORDER — ACETAMINOPHEN 650 MG/1
650 SUPPOSITORY RECTAL EVERY 6 HOURS PRN
Status: DISCONTINUED | OUTPATIENT
Start: 2021-07-31 | End: 2021-08-05 | Stop reason: HOSPADM

## 2021-07-31 RX ORDER — OXYCODONE HYDROCHLORIDE 5 MG/1
5-10 TABLET ORAL EVERY 4 HOURS PRN
Status: DISCONTINUED | OUTPATIENT
Start: 2021-07-31 | End: 2021-08-05 | Stop reason: HOSPADM

## 2021-07-31 RX ORDER — NAPROXEN 500 MG/1
500 TABLET ORAL ONCE
Status: COMPLETED | OUTPATIENT
Start: 2021-07-31 | End: 2021-07-31

## 2021-07-31 RX ORDER — ACETAMINOPHEN 325 MG/1
650 TABLET ORAL EVERY 6 HOURS PRN
Status: DISCONTINUED | OUTPATIENT
Start: 2021-07-31 | End: 2021-08-05 | Stop reason: HOSPADM

## 2021-07-31 RX ORDER — LIDOCAINE 4 G/G
1 PATCH TOPICAL ONCE
Status: COMPLETED | OUTPATIENT
Start: 2021-07-31 | End: 2021-08-01

## 2021-07-31 RX ORDER — ONDANSETRON 2 MG/ML
4 INJECTION INTRAMUSCULAR; INTRAVENOUS EVERY 6 HOURS PRN
Status: DISCONTINUED | OUTPATIENT
Start: 2021-07-31 | End: 2021-08-05 | Stop reason: HOSPADM

## 2021-07-31 RX ORDER — HYDROMORPHONE HCL IN WATER/PF 6 MG/30 ML
.2-.3 PATIENT CONTROLLED ANALGESIA SYRINGE INTRAVENOUS
Status: DISCONTINUED | OUTPATIENT
Start: 2021-07-31 | End: 2021-08-05 | Stop reason: HOSPADM

## 2021-07-31 RX ORDER — ONDANSETRON 4 MG/1
4 TABLET, ORALLY DISINTEGRATING ORAL EVERY 6 HOURS PRN
Status: DISCONTINUED | OUTPATIENT
Start: 2021-07-31 | End: 2021-08-05 | Stop reason: HOSPADM

## 2021-07-31 RX ORDER — POLYETHYLENE GLYCOL 400 2.5 MG/ML
1 SOLUTION/ DROPS OPHTHALMIC DAILY
COMMUNITY

## 2021-07-31 RX ADMIN — NAPROXEN 500 MG: 500 TABLET ORAL at 17:33

## 2021-07-31 RX ADMIN — ACETAMINOPHEN 650 MG: 325 TABLET, FILM COATED ORAL at 23:50

## 2021-07-31 RX ADMIN — OXYCODONE HYDROCHLORIDE 5 MG: 5 TABLET ORAL at 23:50

## 2021-07-31 RX ADMIN — OXYCODONE HYDROCHLORIDE 2.5 MG: 5 TABLET ORAL at 16:08

## 2021-07-31 RX ADMIN — MELATONIN 5 MG TABLET 5 MG: at 23:50

## 2021-07-31 RX ADMIN — OXYCODONE HYDROCHLORIDE 2.5 MG: 5 TABLET ORAL at 17:33

## 2021-07-31 RX ADMIN — LIDOCAINE 1 PATCH: 560 PATCH PERCUTANEOUS; TOPICAL; TRANSDERMAL at 16:08

## 2021-07-31 RX ADMIN — LIDOCAINE 1 PATCH: 246 PATCH TOPICAL at 23:57

## 2021-07-31 ASSESSMENT — ENCOUNTER SYMPTOMS
BACK PAIN: 1
FEVER: 0
NUMBNESS: 1
CHILLS: 0
WEAKNESS: 0

## 2021-07-31 ASSESSMENT — MIFFLIN-ST. JEOR: SCORE: 998.86

## 2021-07-31 NOTE — ED PROVIDER NOTES
History     Chief Complaint:  Back pain    HPI   Chiquita Berry is a 80 year old female who presents with presents emergency department with low back pain.  Patient reports approximately 4 weeks ago she was lifting a heavy pan and had onset of low back pain that has progressively worsened.  She reports radiation of pain down the left leg with intermittent numbness.  Denies focal weakness.  She reports increased difficulty getting around her apartment.  She states she uses a walker and has support of family and friends.  She has been taking Tylenol and tramadol every 6 hours with little relief of symptoms.  She has an appointment with a back specialist on Monday and presents to the emergency department due to intolerable pain and feeling unsafe at home.  Denies loss of bladder/bowel control     Lumbar XR, 7/20/21  IMPRESSION: Five lumbar type vertebrae. Focal kyphosis of the spine  centered at the thoracolumbar junction (T12-L1). Mild left convex  curvature of the lumbar spine centered at L2-L3. Mild degenerative  retrolisthesis of L2 upon L3. Minimal degenerative retrolisthesis of  L1 upon L2, L3 upon L4 and L4 upon L5. Alignment otherwise normal.  Vertebral body heights normal. No evidence for fracture. Loss of  intervertebral disc space height and severe degenerative endplate  spurring at L1-L2, L2-L3 and L3-L4. Facet arthropathy at L4-L5 and  L5-S1.     GAGE VERDUGO MD     Allergies:  Augmentin     Medications:    Acetaminophen (TYLENOL PO)  alendronate (FOSAMAX) 70 MG tablet  Cholecalciferol (VITAMIN D) 2000 UNITS tablet  ibuprofen (ADVIL/MOTRIN) 200 MG tablet  latanoprost (XALATAN) 0.005 % ophthalmic solution  melatonin 3 MG CAPS  Multiple Vitamins-Minerals (PRESERVISION AREDS 2) CAPS  naproxen (NAPROSYN) 500 MG tablet  traMADol (ULTRAM) 50 MG tablet      Past Medical History:    Past Medical History:   Diagnosis Date     Glaucoma suspect, bilateral      Osteoporosis        Past Surgical History:     Past Surgical History:   Procedure Laterality Date     CATARACT IOL, RT/LT Right 2012     OPEN REDUCTION INTERNAL FIXATION HUMERUS DISTAL Right      PHACOEMULSIFICATION CLEAR CORNEA WITH STANDARD INTRAOCULAR LENS IMPLANT Left 11/16/2015    Procedure: PHACOEMULSIFICATION CLEAR CORNEA WITH STANDARD INTRAOCULAR LENS IMPLANT;  Surgeon: Latrell Jessica MD;  Location: [facility]     TONSILLECTOMY & ADENOIDECTOMY          Family History:    family history includes Breast Cancer in her sister; Dementia in her mother; Myocardial Infarction in her father; Stomach Cancer in her father.     Social History:  Patient lives independently.  She was dropped off to the emergency department by a friend.  PCP: Vesna Olivera     Review of Systems   Constitutional: Negative for chills and fever.   Musculoskeletal: Positive for back pain.   Neurological: Positive for numbness. Negative for weakness.   All other systems reviewed and are negative.      Physical Exam     Patient Vitals for the past 24 hrs:   BP Temp Temp src Pulse SpO2 Height Weight   07/31/21 1935 -- -- -- -- 97 % -- --   07/31/21 1934 137/77 -- -- 87 -- -- --   07/31/21 1558 (!) 146/59 97.9  F (36.6  C) Temporal 97 95 % 1.524 m (5') 61.2 kg (135 lb)        General: Sitting in wheelchair. No acute distress.   Head:  Scalp is atraumatic.        Neck:  Normal range of motion.   CV:  Brisk capillary refill to the distal extremities, 2+ DP pulses bilaterally.  Resp:  Non-labored, no retractions or accessory muscle use.  GI:  Abdomen is soft, no distension, no tenderness.   MS:  Normal range of motion. No midline cervical, thoracic, or lumbar tenderness.  Left-sided lumbar paraspinal tenderness.  Skin:  Warm and dry. No rash.   Neuro:  GCS 15; 5/5 strength throughout the bilateral lower extremities (hip flexion/extension, knee flexion/extension, DF/PF); sensation intact to light touch throughout L2-S1 distributions to the lower extremities; 2+ DTRs to the bilateral lower  extremities (patellar, achilles); unable to ambulate with walker due to pain.   Psych:  Awake. Alert.  Appropriate interactions.     Emergency Department Course       Imaging:  Lumbar spine MRI w/o contrast   Preliminary Result   IMPRESSION:   1. Recent-appearing (acute-to-subacute) L4 compression fracture   primarily involving the inferior endplate. Minimal   retropulsion/buckling of the posterior inferior endplate along the   ventral aspect of the spinal canal without significant associated   spinal canal stenosis.   2. Multilevel degenerative changes, as described.   3. No high-grade spinal canal stenosis. Mild multilevel lateral recess   stenosis, as detailed.   4. Mild to moderate foraminal moderate left neural foraminal stenosis   at L4-L5. Mild/mild to moderate multilevel neural foraminal narrowing   elsewhere, as detailed.   5. Mild multilevel spondylolisthesis in the sagittal plane. Moderately   exaggerated kyphosis of the thoracolumbar junction. Mild to moderate   levoconvex curvature.         All imaging results were discussed with the patient who voiced understanding of the findings.    Interventions:  Medications   Lidocaine (LIDOCARE) 4 % Patch 1 patch (1 patch Transdermal Patch/Med Applied 7/31/21 1608)   oxyCODONE IR (ROXICODONE) half-tab 2.5 mg (2.5 mg Oral Given 7/31/21 1608)   oxyCODONE IR (ROXICODONE) half-tab 2.5 mg (2.5 mg Oral Given 7/31/21 1733)   naproxen (NAPROSYN) tablet 500 mg (500 mg Oral Given 7/31/21 1733)        Emergency Department Course:  Past medical records, nursing notes, and vitals reviewed.    I performed an exam of the patient and obtained history, as documented above.    The patient was sent for a lumbar MRI while in the emergency department, findings above.     2030: I rechecked the patient and updated on findings.  2045: Attempted ambulation with walker, though patient was unable to stand secondary to pain.    Patient admitted under the care of Dr. Valentine.    Impression &  Plan      Medical Decision Making:  Chiquita Berry is a 80 year old female presents emergency department with worsening low back pain.  She has been taking Tylenol and tramadol for the pain and over the last few days notes intolerable pain.  She has had difficulty completing activities of daily living and presents due to feeling unsafe at home.  Patient took Tylenol prior to arrival.  Added on naproxen along with oxycodone.  MRI reveals a recent appearing L4 compression fracture.  There is multilevel degenerative changes.  There is no evidence of high-grade spinal canal stenosis.  She felt little pain lying flat, though we attempted ambulation with a walker and was unable to ambulate due to pain.  The patient lives independently and I am concerned about the patient safety going home.  Plan for admission for pain control and PT/OT consult.  The patient does have an appointment on Monday with a back specialist.  I discussed plan with Dr. Valentine who graciously accepted care of the patient.    Diagnosis:    ICD-10-CM    1. Closed compression fracture of L4 lumbar vertebra, initial encounter (H)  S32.040A    2. Bilateral low back pain with left-sided sciatica  M54.42         Disposition:   Admit to observation.    Discharge Medications:  New Prescriptions    No medications on file        7/31/2021   Shayy Cabrera, Shayy Rosas PA-C  07/31/21 7747

## 2021-08-01 ENCOUNTER — APPOINTMENT (OUTPATIENT)
Dept: PHYSICAL THERAPY | Facility: CLINIC | Age: 81
End: 2021-08-01
Attending: INTERNAL MEDICINE
Payer: COMMERCIAL

## 2021-08-01 LAB
ANION GAP SERPL CALCULATED.3IONS-SCNC: 2 MMOL/L (ref 3–14)
BASOPHILS # BLD AUTO: 0.1 10E3/UL (ref 0–0.2)
BASOPHILS NFR BLD AUTO: 1 %
BUN SERPL-MCNC: 14 MG/DL (ref 7–30)
CALCIUM SERPL-MCNC: 8.2 MG/DL (ref 8.5–10.1)
CHLORIDE BLD-SCNC: 106 MMOL/L (ref 94–109)
CO2 SERPL-SCNC: 31 MMOL/L (ref 20–32)
CREAT SERPL-MCNC: 0.65 MG/DL (ref 0.52–1.04)
EOSINOPHIL # BLD AUTO: 0.5 10E3/UL (ref 0–0.7)
EOSINOPHIL NFR BLD AUTO: 6 %
ERYTHROCYTE [DISTWIDTH] IN BLOOD BY AUTOMATED COUNT: 14 % (ref 10–15)
GFR SERPL CREATININE-BSD FRML MDRD: 84 ML/MIN/1.73M2
GLUCOSE BLD-MCNC: 103 MG/DL (ref 70–99)
HCT VFR BLD AUTO: 37.9 % (ref 35–47)
HGB BLD-MCNC: 12.6 G/DL (ref 11.7–15.7)
IMM GRANULOCYTES # BLD: 0 10E3/UL
IMM GRANULOCYTES NFR BLD: 1 %
LYMPHOCYTES # BLD AUTO: 2 10E3/UL (ref 0.8–5.3)
LYMPHOCYTES NFR BLD AUTO: 25 %
MCH RBC QN AUTO: 32.6 PG (ref 26.5–33)
MCHC RBC AUTO-ENTMCNC: 33.2 G/DL (ref 31.5–36.5)
MCV RBC AUTO: 98 FL (ref 78–100)
MONOCYTES # BLD AUTO: 0.9 10E3/UL (ref 0–1.3)
MONOCYTES NFR BLD AUTO: 11 %
NEUTROPHILS # BLD AUTO: 4.8 10E3/UL (ref 1.6–8.3)
NEUTROPHILS NFR BLD AUTO: 56 %
NRBC # BLD AUTO: 0 10E3/UL
NRBC BLD AUTO-RTO: 0 /100
PLATELET # BLD AUTO: 345 10E3/UL (ref 150–450)
POTASSIUM BLD-SCNC: 3.7 MMOL/L (ref 3.4–5.3)
RBC # BLD AUTO: 3.87 10E6/UL (ref 3.8–5.2)
SARS-COV-2 RNA RESP QL NAA+PROBE: NEGATIVE
SODIUM SERPL-SCNC: 139 MMOL/L (ref 133–144)
WBC # BLD AUTO: 8.3 10E3/UL (ref 4–11)

## 2021-08-01 PROCEDURE — 99203 OFFICE O/P NEW LOW 30 MIN: CPT | Performed by: PHYSICIAN ASSISTANT

## 2021-08-01 PROCEDURE — 97161 PT EVAL LOW COMPLEX 20 MIN: CPT | Mod: GP

## 2021-08-01 PROCEDURE — 85025 COMPLETE CBC W/AUTO DIFF WBC: CPT | Performed by: HOSPITALIST

## 2021-08-01 PROCEDURE — G0378 HOSPITAL OBSERVATION PER HR: HCPCS

## 2021-08-01 PROCEDURE — 97530 THERAPEUTIC ACTIVITIES: CPT | Mod: GP

## 2021-08-01 PROCEDURE — 36415 COLL VENOUS BLD VENIPUNCTURE: CPT | Performed by: HOSPITALIST

## 2021-08-01 PROCEDURE — 250N000013 HC RX MED GY IP 250 OP 250 PS 637: Performed by: HOSPITALIST

## 2021-08-01 PROCEDURE — 99226 PR SUBSEQUENT OBSERVATION CARE,LEVEL III: CPT | Performed by: INTERNAL MEDICINE

## 2021-08-01 PROCEDURE — 82374 ASSAY BLOOD CARBON DIOXIDE: CPT | Performed by: HOSPITALIST

## 2021-08-01 PROCEDURE — L0456 TLSO FLEX TRNK SJ-SS PRE CST: HCPCS

## 2021-08-01 PROCEDURE — 97116 GAIT TRAINING THERAPY: CPT | Mod: GP

## 2021-08-01 RX ORDER — ANTIOX #8/OM3/DHA/EPA/LUT/ZEAX 250-2.5 MG
2 CAPSULE ORAL DAILY
Status: DISCONTINUED | OUTPATIENT
Start: 2021-08-01 | End: 2021-08-05 | Stop reason: HOSPADM

## 2021-08-01 RX ORDER — LATANOPROST 50 UG/ML
1 SOLUTION/ DROPS OPHTHALMIC EVERY EVENING
Status: DISCONTINUED | OUTPATIENT
Start: 2021-08-01 | End: 2021-08-05 | Stop reason: HOSPADM

## 2021-08-01 RX ADMIN — ACETAMINOPHEN 650 MG: 325 TABLET, FILM COATED ORAL at 11:33

## 2021-08-01 RX ADMIN — OXYCODONE HYDROCHLORIDE 5 MG: 5 TABLET ORAL at 05:32

## 2021-08-01 RX ADMIN — LIDOCAINE 1 PATCH: 246 PATCH TOPICAL at 20:27

## 2021-08-01 RX ADMIN — LATANOPROST 1 DROP: 50 SOLUTION/ DROPS OPHTHALMIC at 22:35

## 2021-08-01 RX ADMIN — IBUPROFEN 600 MG: 600 TABLET ORAL at 22:34

## 2021-08-01 RX ADMIN — IBUPROFEN 600 MG: 600 TABLET ORAL at 14:53

## 2021-08-01 RX ADMIN — OXYCODONE HYDROCHLORIDE 5 MG: 5 TABLET ORAL at 22:34

## 2021-08-01 RX ADMIN — POLYETHYLENE GLYCOL 3350 17 G: 17 POWDER, FOR SOLUTION ORAL at 08:42

## 2021-08-01 RX ADMIN — ACETAMINOPHEN 650 MG: 325 TABLET, FILM COATED ORAL at 05:31

## 2021-08-01 RX ADMIN — OXYCODONE HYDROCHLORIDE 5 MG: 5 TABLET ORAL at 12:37

## 2021-08-01 RX ADMIN — IBUPROFEN 600 MG: 600 TABLET ORAL at 01:30

## 2021-08-01 RX ADMIN — ACETAMINOPHEN 650 MG: 325 TABLET, FILM COATED ORAL at 17:57

## 2021-08-01 RX ADMIN — OXYCODONE HYDROCHLORIDE 5 MG: 5 TABLET ORAL at 16:20

## 2021-08-01 RX ADMIN — MELATONIN 5 MG TABLET 5 MG: at 22:34

## 2021-08-01 RX ADMIN — OXYCODONE HYDROCHLORIDE 5 MG: 5 TABLET ORAL at 09:33

## 2021-08-01 RX ADMIN — IBUPROFEN 600 MG: 600 TABLET ORAL at 08:42

## 2021-08-01 NOTE — PROGRESS NOTES
OBS GOALS    -diagnostic tests and consults completed and resulted: NOT MET; neurosurgery and CC/SW consults. AM labs to be drawn.     -vital signs normal or at patient baseline: MET    -tolerating oral intake to maintain hydration: MET    -adequate pain control on oral analgesics: MET; will continue to monitor    -returns to baseline functional status: NOT MET; likely will need PT consult    -safe disposition plan has been identified: NOT MET

## 2021-08-01 NOTE — CONSULTS
[facility]    Neurosurgery Consultation     Date of Admission:  7/31/2021  Date of Consult (When I saw the patient): 08/01/21    Assessment & Plan   Chiquita Berry is a 80 year old female who was admitted on 7/31/2021. I was asked to see the patient for L4 compression fracture.  She states that 3 weeks ago, she was lifting some heavy bags of groceries, when she noticed sudden onset pain in her low back.  She did seek treatment with her primary care physician, was given a steroid taper.  This provided temporary improvement.  She then was referred to the spine and brain clinic, but states that over this weekend, her symptoms began to get so bad that she decided she could not wait until her appointment this week.  She presented to the ED for evaluation.  Imaging revealed an acute to subacute compression fracture at L4, with, with approximately 20 to 30% collapse of vertebral body height.  No significant spinal canal stenosis or canal compromise is noted.  She has felt better since being admitted, with the administration of some IV pain medication and some muscle relaxers.  She denies any radiating leg pain or paresthesias at present.  She denies any focal weaknesses in the lower extremes.  No problems with bowel or bladder function.    At this point, we would recommend obtaining a McArthur brace for further support.  Once this is fit, she can work on regaining some mobility with physical and Occupational Therapy.  She can certainly follow-up with us in the office in 6 weeks with repeat imaging.  If her pain does not improve during that interval, we can consider getting her set up for kyphoplasty.  She voiced agreement and understanding.          I have discussed the following assessment and plan with Dr. Garza, who is in agreement with the initial plan and will follow up with further consultation recommendations.    Jose Daniel ABARCA [facility] Neurosurgery  [facility]  [address]  [address] [place] [id number]    Tel [phone]  Pager [phone]        Code Status    Full Code    Reason for Consult   Reason for consult: Back pain    Primary Care Physician   Vesna Olivera    Chief Complaint   Back pain    History is obtained from the patient and electronic health record    History of Present Illness   Chiquita Berry is a 80 year old female who presents with L4 compression fracture.  She states that 3 weeks ago, she was lifting some heavy bags of groceries, when she noticed sudden onset pain in her low back.  She did seek treatment with her primary care physician, was given a steroid taper.  This provided temporary improvement.  She then was referred to the spine and brain clinic, but states that over this weekend, her symptoms began to get so bad that she decided she could not wait until her appointment this week.  She presented to the ED for evaluation.  Imaging revealed an acute to subacute compression fracture at L4, with, with approximately 20 to 30% collapse of vertebral body height.  No significant spinal canal stenosis or canal compromise is noted.  She has felt better since being admitted, with the administration of some IV pain medication and some muscle relaxers.  She denies any radiating leg pain or paresthesias at present.  She denies any focal weaknesses in the lower extremes.  No problems with bowel or bladder function.    Past Medical History   I have reviewed this patient's medical history and updated it with pertinent information if needed.   Past Medical History:   Diagnosis Date     Anxiety      Dyslipidemia      Esophageal reflux      Glaucoma suspect, bilateral      Kyphosis      Mitral regurgitation      Osteoporosis      Palpitations      Scoliosis        Past Surgical History   I have reviewed this patient's surgical history and updated it with pertinent information if needed.  Past Surgical History:   Procedure Laterality  Date     CATARACT IOL, RT/LT Right 2012     OPEN REDUCTION INTERNAL FIXATION HUMERUS DISTAL Right      PHACOEMULSIFICATION CLEAR CORNEA WITH STANDARD INTRAOCULAR LENS IMPLANT Left 11/16/2015    Procedure: PHACOEMULSIFICATION CLEAR CORNEA WITH STANDARD INTRAOCULAR LENS IMPLANT;  Surgeon: Latrell Jessica MD;  Location: [facility]     TONSILLECTOMY & ADENOIDECTOMY         Prior to Admission Medications   Prior to Admission Medications   Prescriptions Last Dose Informant Patient Reported? Taking?   Acetaminophen (TYLENOL PO) prn Self Yes Yes   Sig: Take 500 mg by mouth as needed for mild pain or fever   Cholecalciferol (VITAMIN D) 2000 UNITS tablet 7/31/2021 at Unknown time Self Yes Yes   Sig: Take 1 tablet by mouth daily.   Multiple Vitamin (MULTIVITAMIN ADULT PO) 7/31/2021 at Unknown time Self Yes Yes   Sig: Take 1 tablet by mouth daily   Multiple Vitamins-Minerals (PRESERVISION AREDS 2) CAPS 7/31/2021 at Unknown time Self Yes Yes   Sig: Take 2 capsules by mouth daily   alendronate (FOSAMAX) 70 MG tablet 7/24/2021 at Unknown time Self No Yes   Sig: Take 1 tablet (70 mg) by mouth every 7 days   ibuprofen (ADVIL/MOTRIN) 200 MG tablet prn Self Yes Yes   Sig: Take 400 mg by mouth every 4 hours as needed for mild pain   latanoprost (XALATAN) 0.005 % ophthalmic solution 7/30/2021 at Unknown time Self Yes Yes   Sig: Place 1 drop into both eyes every evening    melatonin 3 MG CAPS prn Self Yes Yes   Sig: Take 3 mg by mouth nightly as needed    naproxen (NAPROSYN) 500 MG tablet 7/31/2021 at Unknown time Self No Yes   Sig: Take 1 tablet (500 mg) by mouth 2 times daily (with meals)   polyethylene glycol 400 (BLINK TEARS) 0.25 % SOLN ophthalmic solution prn Self Yes Yes   Sig: Place 1 drop into both eyes daily as needed for dry eyes   traMADol (ULTRAM) 50 MG tablet 7/31/2021 at Unknown time Self No Yes   Sig: Take 1 tablet (50 mg) by mouth every 6 hours as needed for severe pain      Facility-Administered Medications: None      Allergies   Allergies   Allergen Reactions     Augmentin Diarrhea       Social History   I have reviewed this patient's social history and updated it with pertinent information if needed. Chiquita Berry  reports that she has never smoked. She has never used smokeless tobacco. She reports current alcohol use. She reports that she does not use drugs.    Family History   I have reviewed this patient's family history and updated it with pertinent information if needed.   Family History   Problem Relation Age of Onset     Dementia Mother      Myocardial Infarction Father         later in life     Stomach Cancer Father      Breast Cancer Sister         x2 sisters (post-menopausal)     Diabetes No family hx of      Cerebrovascular Disease No family hx of      Coronary Artery Disease Early Onset No family hx of      Ovarian Cancer No family hx of      Colon Cancer No family hx of        Review of Systems    10 point review of systems is negative with the exception of HPI and PMH.     Physical Exam   Temp: 97.6  F (36.4  C) Temp src: Oral BP: (!) 142/49 Pulse: 79   Resp: 16 SpO2: 93 % O2 Device: None (Room air)    Vital Signs with Ranges  Temp:  [97.2  F (36.2  C)-98  F (36.7  C)] 97.6  F (36.4  C)  Pulse:  [78-97] 79  Resp:  [14-16] 16  BP: (130-146)/(48-77) 142/49  SpO2:  [93 %-97 %] 93 %  135 lbs 0 oz     , Blood pressure (!) 142/49, pulse 79, temperature 97.6  F (36.4  C), temperature source Oral, resp. rate 16, height 1.524 m (5'), weight 61.2 kg (135 lb), SpO2 93 %, not currently breastfeeding.  135 lbs 0 oz  HEENT:  Normocephalic, atraumatic.  PERRLA.  EOM s intact.   Neck:  Supple, non-tender, without lymphadenopathy.  Heart:  No peripheral edema  Lungs:  No SOB  Abdomen:  Soft, non-tender, non-distended.  Skin:  Warm and dry, good capillary refill.  Extremities:  Good radial and dorsalis pedis pulses bilaterally, no edema, cyanosis or clubbing.    NEUROLOGICAL EXAMINATION:     Mental status:  Alert and  Oriented x 3, speech is fluent.  Cranial nerves:  II-XII intact.   Motor:  Strength is 5/5 throughout the upper and lower extremities   Hip Flexor:                Right: 5/5  Left:  5/5  Hip Adductor:             Right:  5/5  Left:  5/5  Hip Abductor:             Right:  5/5  Left:  5/5  Gastroc Soleus:        Right:  5/5  Left:  5/5  Tib/Ant:                      Right:  5/5  Left:  5/5  EHL:                     Right:  5/5  Left:  5/5  Sensation:  intact  Reflexes:   Negative Babinski.  Negative Clonus.          Data   All new lab and imaging data was personally reviewed by me.      MRI:IMPRESSION:  1. Recent-appearing (acute-to-subacute) L4 compression fracture  primarily involving the inferior endplate. Minimal  retropulsion/buckling of the posterior inferior endplate along the  ventral aspect of the spinal canal without significant associated  spinal canal stenosis.  2. Multilevel degenerative changes, as described.  3. No high-grade spinal canal stenosis. Mild multilevel lateral recess  stenosis, as detailed.  4. Mild/mild to moderate multilevel neural foraminal stenosis, as  described.  5. Mild multilevel spondylolisthesis in the sagittal plane. Moderately  exaggerated kyphosis of the thoracolumbar junction. Mild to moderate  levoconvex curvature.    CBC RESULTS:   Recent Labs   Lab Test 08/01/21  0644   WBC 8.3   RBC 3.87   HGB 12.6   HCT 37.9   MCV 98   MCH 32.6   MCHC 33.2   RDW 14.0        Basic Metabolic Panel:  Lab Results   Component Value Date     08/01/2021     12/21/2020      Lab Results   Component Value Date    POTASSIUM 3.7 08/01/2021    POTASSIUM 4.8 12/21/2020     Lab Results   Component Value Date    CHLORIDE 106 08/01/2021    CHLORIDE 108 12/21/2020     Lab Results   Component Value Date    SAGE 8.2 08/01/2021    SAGE 9.0 12/21/2020     Lab Results   Component Value Date    CO2 31 08/01/2021    CO2 31 12/21/2020     Lab Results   Component Value Date    BUN 14 08/01/2021     BUN 16 12/21/2020     Lab Results   Component Value Date    CR 0.65 08/01/2021    CR 0.66 12/21/2020     Lab Results   Component Value Date     08/01/2021     12/21/2020     INR:  No results found for: INR

## 2021-08-01 NOTE — PROGRESS NOTES
Arrived from ED 2300. A&Ox4. VSS on RA. Lower back pain radiates down L leg into calf. Pain controlled w/ oxy, tylenol, ibuprofen, and lidocaine patch. Skin intact--quarter-sized bruise R buttock, redness to lower vertebrae pt reports are burns from heating up her rice pack too hot at home. Pivot to [facility] [place] [place] Ax1-2 w/ GB and walker. Repositions self in bed w/ light assistance. Reg diet. IV SL. Tele: NSR. AM labs pending. Plan for neurosurgery, PT, and SW/CC consults.

## 2021-08-01 NOTE — PLAN OF CARE
[facility]      OUTPATIENT PHYSICAL THERAPY EVALUATION  PLAN OF TREATMENT FOR OUTPATIENT REHABILITATION  (COMPLETE FOR INITIAL CLAIMS ONLY)  Patient's Last Name, First Name, M.I.  YOB: 1940  Chiquita Berry                        Provider's Name  [facility] Medical Record No.  [id number]                               Onset Date:  07/31/21   Start of Care Date:  08/01/21      Type:     _X_PT   ___OT   ___SLP Medical Diagnosis:  Compresssion fracture                        PT Diagnosis:  Impaired ambulation   Visits from SOC:  1   _________________________________________________________________________________  Plan of Treatment/Functional Goals    Planned Interventions: balance training, bed mobility training, home exercise program, gait training, patient/family education, strengthening, transfer training     Goals: See Physical Therapy Goals on Care Plan in [employer] electronic health record.    Therapy Frequency: 3x/week  Predicted Duration of Therapy Intervention: 1 week  _________________________________________________________________________________    I CERTIFY THE NEED FOR THESE SERVICES FURNISHED UNDER        THIS PLAN OF TREATMENT AND WHILE UNDER MY CARE     (Physician co-signature of this document indicates review and certification of the therapy plan).                Certification date from: 08/01/21, Certification date to: 08/08/21    Referring Physician: Dr. Valentine            Initial Assessment        See Physical Therapy evaluation dated 08/01/21 in Epic electronic health record.

## 2021-08-01 NOTE — PROGRESS NOTES
Here to fit Jewitt orthosis.  I tempted fit of Jewitt style orthosis but I didn't have one that was short enough or adjustable that would accommodate the patient's torso without causing pressure at the sternal notch area.  I fit patient with backpack style tlso.  Wear, care and precautions given as well as contact information.  I put in page to neurosx on call to get auth for the change in orthosis.  Fernando GARNER.

## 2021-08-01 NOTE — PROGRESS NOTES
RECEIVING UNIT ED HANDOFF REVIEW    ED Nurse Handoff Report was reviewed by: Starr Fenton RN on July 31, 2021 at 10:17 PM

## 2021-08-01 NOTE — PROGRESS NOTES
[facility]    Hospitalist Progress Note    Assessment & Plan   Chiquita Berry is a 80 year old female with PMHx of dyslipidemia, GERD kyphosis and scoliosis who was admitted under observation status on 7/31/2021 with intractable lower radicular back pain with findings of an acute L4 compression fracture.    Acute to sub-acute L4 compression fracture  * Developed new onset low back pan that started on 7/16/21 after she had been lifting heavy bags of groceries. Described pain in her middle and bilateral lower back, worse on the left side and with radiation down her left leg into the calf muscle. Seen in UC on 7/17 and by PCP on 7/20. Xrays showed DJD. Was initially managed with a steroid taper, NSAIDs, Tylenol #3 and muscle relaxants. Had been doing PT at home but limited ability to participate dt pain. Saw PCP in clinic on 7/29, Tramadol was added. No significant relief was noted so she was ultimately referred to neurosurgery for evaluation.   * Pain continued to worsen at home and she was unable to perform ADLs, prompting evaluation in the ED on 7/31.   * MRI obtained in ED, showed a recent appearing (acute to subacute) L4 compression fracture primarily involving the inferior endplate with minimal retropulsion and without significant associated spinal canal stenosis  * Pain control started on admission with lidoderm patch, Tylenol, ibuprofen, oxycodone and prn IV dilaudid  -- cont pain control with lidoderm patch, prn Tylenol, prn Ibuprofen and prn oxycodone; consider addition of muscle relaxant if spasms persist  -- neurosurgery consult pending  -- PT consulted, anticipate TCU stay will be needed     History of palpitations, mild mitral regurgitation  Had been seen by EP cardiology in the past for evaluation of palpitations. Last echo in 2013 showed preserved LVEF and mild mitral regurgitation. Event monitor did not show any evidence of arrhythmias. Last Zio patch in 2019 showed rare  PACs/PVCs  -- no arrhythmias noted on telemetry so dc'd today     Dyslipidemia  GERD  Not on any specific medications for these conditions.     FEN: no IVFs, lytes stable, regular diet  DVT Prophylaxis: PCDs  Code Status: Full Code    Disposition: Await input per neurosurgery. PT to assess today but anticipate she will need a TCU stay. Likely discharge in 1-2d. SW following.     Hannah Josselinjennifer Aleman    Interval History   Seen this morning. Generally feeling well. Pain better managed. Some spasms. No cp/sob/cough, abd pain/n/v    -Data reviewed today: I reviewed all new labs and imaging results over the last 24 hours. I personally reviewed no images or EKG's today.    Physical Exam   Temp: 97.5  F (36.4  C) Temp src: Oral BP: (!) 145/54 Pulse: 80   Resp: 16 SpO2: 96 % O2 Device: None (Room air)    Vitals:    07/31/21 1558   Weight: 61.2 kg (135 lb)     Vital Signs with Ranges  Temp:  [97.2  F (36.2  C)-98  F (36.7  C)] 97.5  F (36.4  C)  Pulse:  [78-97] 80  Resp:  [14-16] 16  BP: (130-146)/(48-77) 145/54  SpO2:  [93 %-97 %] 96 %  No intake/output data recorded.    Constitutional: Resting comfortably, alert and conversing appropriately, NAD  Respiratory: CTAB, no wheeze/rales/rhonchi, no increased work of breathing  Cardiovascular: HRRR, no MGR, no LE edema  GI: S, NT, ND, +BS  Skin/Integumen: warm/dry  Other:      Medications       lidocaine  1 patch Transdermal Q24h    And     lidocaine   Transdermal Q8H     sodium chloride (PF)  3 mL Intracatheter Q8H       Data   Recent Labs   Lab 08/01/21  0644   WBC 8.3   HGB 12.6   MCV 98         POTASSIUM 3.7   CHLORIDE 106   CO2 31   BUN 14   CR 0.65   ANIONGAP 2*   SAGE 8.2*   *       Recent Results (from the past 24 hour(s))   Lumbar spine MRI w/o contrast    Narrative    MRI LUMBAR SPINE WITHOUT CONTRAST   7/31/2021 7:00 PM     HISTORY: Worsening right-sided low back pain with radiculopathy.    TECHNIQUE: Multiplanar multisequence MRI of the lumbar  spine without  contrast.     COMPARISON: Lumbar spine radiographs dated 7/20/2021.     FINDINGS: Nomenclature is based on 5 lumbar vertebral bodies. There  appears to be a recent (potentially acute to subacute) fracture of the  L4 vertebral body with up to approximately 20-30% vertebral body  height loss. There is a fluid-filled fracture cleft along the anterior  aspect of the inferior endplate. There is mild marrow edema in the  vertebral body, which is probably reactive to the fracture. There is  minimal posterior buckling/retropulsion of the posterior L4 inferior  endplate along the ventral aspect of the spinal canal, without  significant associated spinal canal stenosis/compromise.    Multilevel Schmorl's node/degenerative endplate irregularities are  seen elsewhere, which appear chronic. There is no other significant  vertebral body height loss in the lumbar spine. There is moderately  exaggerated kyphosis at the thoracolumbar junction and mild to  moderate levoconvex curvature centered at approximately L2-L3. There  is also mild presumed degenerative retrolisthesis of L1 on L2 and L2  on L3 and minimal degenerative anterolisthesis of L5 on S1. There is  close apposition of the spinous processes in the mid to lower lumbar  spine, particularly from L3 through L5, nonspecific, but which can be  seen in Baastrup's syndrome in the appropriate setting.    Modic type I degenerative endplate signal changes at T11-T12, T12-L1,  L1-L2 and L3-L4. Modic type II degenerative endplate signal changes at  T11-T12, T12-L1, L1-L2 and L2-L3 predominantly. Diffuse intervertebral  disc desiccation. Normal appearance of the distal spinal cord with the  conus terminating at L1. There appears to be a small nonspecific T2  hyperintense ovoid lesion measuring 9-10 mm in the inferior aspect of  the spleen (series 8 image 3), possibly representing a cyst.  Lobulated/bilobed T2 hyperintense lesion along the inferior pole of  the right  kidney presumably represents a cyst. There is some degree of  fatty atrophy of the lower posterior paraspinous musculature.    Segmental analysis:  T12-L1: Mild disc height loss. No herniation. Posterior central  annular fissure. Mild facet arthropathy. No significant spinal canal  or neural foraminal stenosis.    L1-L2: Moderate disc height loss. Disc bulge with posterior endplate  osteophytic ridging slightly eccentric to the right. Normal facets.  Mild right lateral recess stenosis. No central spinal canal stenosis.  Minimal/mild right neural foraminal narrowing. The left neural foramen  is patent.    L2-L3: Moderate disc height loss. Diffuse disc bulge with posterior  endplate osteophytic ridging which appears relatively symmetric.  Normal facets. No significant spinal canal stenosis. There appears to  be mild right neural foraminal narrowing. The left neural foramen is  not significantly narrowed.    L3-L4: Mild disc height loss. Disc bulge eccentric to the left. There  may be a small superimposed left foraminal protrusion component. Mild  facet arthropathy. Mild left lateral recess narrowing. No spinal canal  stenosis centrally. Mild left neural foraminal stenosis.  Mild-to-moderate right neural foraminal stenosis.    L4-L5: Mild disc height loss. Shallow disc bulge with posterior  endplate osteophytic ridging, slightly eccentric to the left. Minimal  retropulsion of the posterior inferior endplate of L4 along the  ventral aspect of the spinal canal. Mild to moderate left facet  arthropathy. Minimal left lateral recess narrowing. No central spinal  canal stenosis. Mild to moderate bordering on moderate left neural  foraminal stenosis. Mild-to-moderate right neural foraminal stenosis.    L5-S1: Mild disc height loss. Disc bulge with posterior endplate  osteophytic ridging slightly eccentric to the left. Moderate left and  mild right facet arthropathy. No spinal canal stenosis. Mild left  neural foraminal  stenosis. The right neural foramen is patent.      Impression    IMPRESSION:  1. Recent-appearing (acute-to-subacute) L4 compression fracture  primarily involving the inferior endplate. Minimal  retropulsion/buckling of the posterior inferior endplate along the  ventral aspect of the spinal canal without significant associated  spinal canal stenosis.  2. Multilevel degenerative changes, as described.  3. No high-grade spinal canal stenosis. Mild multilevel lateral recess  stenosis, as detailed.  4. Mild/mild to moderate multilevel neural foraminal stenosis, as  described.  5. Mild multilevel spondylolisthesis in the sagittal plane. Moderately  exaggerated kyphosis of the thoracolumbar junction. Mild to moderate  levoconvex curvature.    JOSSIE DANIELLE MD         SYSTEM ID:  [id number]

## 2021-08-01 NOTE — ED PROVIDER NOTES
Emergency Department Attending Supervision Note  7/31/2021  8:59 PM      I evaluated this patient in conjunction with Shayy Cabrera PA-C       Briefly, the patient presented with complains of ongoing low back pain over the past several weeks despite outpatient treatments.  The pain has progressively worsened to the point where she came in to see us today, unable to care for herself at home.  She denies any new weakness or numbness to the legs though she does at times feel a shooting sensation down her leg.  She denies issues with bowel or bladder.  She denies any significant trauma.      On my exam, she is resting comfortably in the bed.  Heart, lungs, and abdominal exams are unremarkable.  There is diffuse tenderness throughout the lower back.  She has normal range of motion throughout the legs with 5 out of 5 strength at the hips, knees, and ankles.  She has strong pulses bilaterally.  No significant edema noted.  Normal affect.    Results:  Labs:   Labs Ordered and Resulted from Time of ED Arrival Up to the Time of Departure from the ED - No data to display    Imaging:   Lumbar spine MRI w/o contrast   Final Result   IMPRESSION:   1. Recent-appearing (acute-to-subacute) L4 compression fracture   primarily involving the inferior endplate. Minimal   retropulsion/buckling of the posterior inferior endplate along the   ventral aspect of the spinal canal without significant associated   spinal canal stenosis.   2. Multilevel degenerative changes, as described.   3. No high-grade spinal canal stenosis. Mild multilevel lateral recess   stenosis, as detailed.   4. Mild/mild to moderate multilevel neural foraminal stenosis, as   described.   5. Mild multilevel spondylolisthesis in the sagittal plane. Moderately   exaggerated kyphosis of the thoracolumbar junction. Mild to moderate   levoconvex curvature.      JOSSIE DANIELLE MD            SYSTEM ID:  [id number]          Interventions:   Medications   lidocaine 1 %  0.1-1 mL (has no administration in time range)   lidocaine (LMX4) cream (has no administration in time range)   sodium chloride (PF) 0.9% PF flush 3 mL (has no administration in time range)   sodium chloride (PF) 0.9% PF flush 3 mL ( Intracatheter Canceled Entry 8/3/21 2201)   acetaminophen (TYLENOL) tablet 650 mg (650 mg Oral Given 8/3/21 2120)     Or   acetaminophen (TYLENOL) Suppository 650 mg ( Rectal See Alternative 8/3/21 2120)   melatonin tablet 5 mg (5 mg Oral Given 8/3/21 2120)   polyethylene glycol (MIRALAX) Packet 17 g (17 g Oral Given 8/2/21 0822)   bisacodyl (DULCOLAX) Suppository 10 mg (has no administration in time range)   ondansetron (ZOFRAN-ODT) ODT tab 4 mg (has no administration in time range)     Or   ondansetron (ZOFRAN) injection 4 mg (has no administration in time range)   oxyCODONE (ROXICODONE) tablet 5-10 mg (5 mg Oral Given 8/4/21 0820)   HYDROmorphone (DILAUDID) injection 0.2-0.3 mg (0.2 mg Intravenous Given 8/2/21 1833)   Lidocaine (LIDOCARE) 4 % Patch 1 patch (1 patch Transdermal Patch/Med Removed 8/4/21 0820)     And   lidocaine patch in PLACE ( Transdermal Patch in Place 8/3/21 2201)   ibuprofen (ADVIL/MOTRIN) tablet 600 mg (600 mg Oral Given 8/3/21 0658)   naloxone (NARCAN) injection 0.2 mg (has no administration in time range)     Or   naloxone (NARCAN) injection 0.4 mg (has no administration in time range)     Or   naloxone (NARCAN) injection 0.2 mg (has no administration in time range)     Or   naloxone (NARCAN) injection 0.4 mg (has no administration in time range)   latanoprost (XALATAN) 0.005 % ophthalmic solution 1 drop (1 drop Both Eyes Given 8/3/21 2156)   PreserVision AREDS 2 CAPS 2 capsule (2 capsules Oral Not Given 8/3/21 0918)   cyclobenzaprine (FLEXERIL) tablet 5-10 mg (5 mg Oral Given 8/3/21 1957)   senna-docusate (SENOKOT-S/PERICOLACE) 8.6-50 MG per tablet 1-2 tablet (1 tablet Oral Not Given 8/4/21 0821)   oxyCODONE IR (ROXICODONE) half-tab 2.5 mg (2.5 mg Oral Given  7/31/21 1608)   Lidocaine (LIDOCARE) 4 % Patch 1 patch ( Transdermal Patch/Med Removed 8/1/21 7726)   oxyCODONE IR (ROXICODONE) half-tab 2.5 mg (2.5 mg Oral Given 7/31/21 1733)   naproxen (NAPROSYN) tablet 500 mg (500 mg Oral Given 7/31/21 1733)        ED course:  I evaluated the patient and performed an exam as above.    My impression is L4 compression fracture.  This patient has evidence of an acute fracture with increasing pain and inability to care for herself at home.  Therefore, she will require admission to the hospital.  My physician assistant spoke with the hospitalist for an inpatient bed.  The patient's questions were answered and she is comfortable with the plan for admission.  There is no indication for further emergent intervention at this time.        Diagnosis    ICD-10-CM    1. Preventive measure  Z29.9 polyethylene glycol (MIRALAX) 17 g packet     SENNA-docusate sodium (SENNA S) 8.6-50 MG tablet   2. Closed compression fracture of L4 lumbar vertebra, initial encounter (H)  S32.040A acetaminophen (TYLENOL) 325 MG tablet     Lidocaine (LIDOCARE) 4 % Patch     cyclobenzaprine (FLEXERIL) 5 MG tablet     oxyCODONE (ROXICODONE) 5 MG tablet   3. Bilateral low back pain with left-sided sciatica  M54.42          Trierweiler, Chad A, MD Trierweiler, Chad A, MD  08/04/21 2506

## 2021-08-01 NOTE — PHARMACY-ADMISSION MEDICATION HISTORY
Pharmacy Medication History  Admission medication history interview status for the 7/31/2021  admission is complete. See EPIC admission navigator for prior to admission medications     Location of Interview: Patient room  Medication history sources: Patient and Surescripts    Significant changes made to the medication list:  None    In the past week, patient estimated taking medication this percent of the time: greater than 90%    Additional medication history information:   Patient takes alendronate every week on Saturday, states last dose taken was last Saturday 7/24/21.  Patient recently finished 12-day prednisone taper earlier this week.  Patient stated that she anticipates her pain management regimen will be changing now that she's in the hospital. She was recently dispensed tramadol in the last few days but says that she's now discontinued it after taking a dose earlier this morning.    Medication reconciliation completed by provider prior to medication history? No    Time spent in this activity: 15 minutes    Prior to Admission medications    Medication Sig Last Dose Taking? Auth Provider   Acetaminophen (TYLENOL PO) Take 500 mg by mouth as needed for mild pain or fever prn Yes Reported, Patient   alendronate (FOSAMAX) 70 MG tablet Take 1 tablet (70 mg) by mouth every 7 days 7/24/2021 at Unknown time Yes Vesna Olivera MD   Cholecalciferol (VITAMIN D) 2000 UNITS tablet Take 1 tablet by mouth daily. 7/31/2021 at Unknown time Yes Reported, Patient   ibuprofen (ADVIL/MOTRIN) 200 MG tablet Take 400 mg by mouth every 4 hours as needed for mild pain prn Yes Reported, Patient   latanoprost (XALATAN) 0.005 % ophthalmic solution Place 1 drop into both eyes every evening  7/30/2021 at Unknown time Yes Reported, Patient   melatonin 3 MG CAPS Take 3 mg by mouth nightly as needed  prn Yes Reported, Patient   Multiple Vitamin (MULTIVITAMIN ADULT PO) Take 1 tablet by mouth daily 7/31/2021 at Unknown time Yes Unknown,  Entered By History   Multiple Vitamins-Minerals (PRESERVISION AREDS 2) CAPS Take 2 capsules by mouth daily 7/31/2021 at Unknown time Yes Reported, Patient   naproxen (NAPROSYN) 500 MG tablet Take 1 tablet (500 mg) by mouth 2 times daily (with meals) prn Yes Suyapa Parra PA-C   polyethylene glycol 400 (BLINK TEARS) 0.25 % SOLN ophthalmic solution Place 1 drop into both eyes daily as needed for dry eyes prn Yes Unknown, Entered By History   traMADol (ULTRAM) 50 MG tablet Take 1 tablet (50 mg) by mouth every 6 hours as needed for severe pain 7/31/2021 at Unknown time Yes Vesna Olivera MD       The information provided in this note is only as accurate as the sources available at the time of update(s)

## 2021-08-01 NOTE — ED NOTES
[facility]  ED Nurse Handoff Report    ED Chief complaint: Back Pain      ED Diagnosis:   Final diagnoses:   Closed compression fracture of L4 lumbar vertebra, initial encounter (H)   Bilateral low back pain with left-sided sciatica       Code Status: Full Code    Allergies:   Allergies   Allergen Reactions     Augmentin Diarrhea       Patient Story:Patient arrived to the ED with progressively worsening weakness. Patient unable to stand for the past few days for any longer then 30 seconds. Patient unable to change out of clothes for several days.    Focused Assessment: none   Treatments and/or interventions provided: see mar/flowsheet  Patient's response to treatments and/or interventions: see flowsheet    To be done/followed up on inpatient unit:     Does this patient have any cognitive concerns?: none    Activity level - Baseline/Home:  none  Activity Level - Current:   [facility]    Patient's Preferred language: English   Needed?: No    Isolation: None  Infection: Not Applicable  Patient tested for COVID 19 prior to admission: YES  Bariatric?: No    Vital Signs:   Vitals:    07/31/21 1558 07/31/21 1934 07/31/21 1935   BP: (!) 146/59 137/77    Pulse: 97 87    Temp: 97.9  F (36.6  C)     TempSrc: Temporal     SpO2: 95%  97%   Weight: 61.2 kg (135 lb)     Height: 1.524 m (5')         Cardiac Rhythm:     Was the PSS-3 completed:   Yes  What interventions are required if any?               Family Comments:   OBS brochure/video discussed/provided to patient/family: N/A              Name of person given brochure if not patient:               Relationship to patient:     For the majority of the shift this patient's behavior was Green.   Behavioral interventions performed were none.    ED NURSE PHONE NUMBER: [phone]

## 2021-08-01 NOTE — PROGRESS NOTES
08/01/21 1130   Quick Adds   Quick Adds Certification   Type of Visit Initial PT Evaluation   Living Environment   People in home alone   Current Living Arrangements apartment   Home Accessibility no concerns   Transportation Anticipated family or friend will provide   Self-Care   Usual Activity Tolerance good   Current Activity Tolerance poor   Regular Exercise No   Equipment Currently Used at Home walker, rolling   Activity/Exercise/Self-Care Comment Pt reports increased difficulty with activity during past three weeks.   Disability/Function   Fall history within last six months no   General Information   Onset of Illness/Injury or Date of Surgery 07/31/21   Referring Physician Dr. Valentine   Patient/Family Therapy Goals Statement (PT) To walk   Pertinent History of Current Problem (include personal factors and/or comorbidities that impact the POC) Pt is an 80 year old female under OBS for low back pain, found to have compression fracture.   Existing Precautions/Restrictions fall;spinal   Cognition   Orientation Status (Cognition) oriented x 4   Affect/Mental Status (Cognition) WFL   Pain Assessment   Patient Currently in Pain Yes, see Vital Sign flowsheet   Range of Motion (ROM)   ROM Quick Adds ROM WFL   Strength   Strength Comments Bilateral hips grossly 3+/5, bilateral knee ext grossly 4-/5, ankle DF 4+/5   Bed Mobility   Comment (Bed Mobility) Min assist   Transfers   Transfer Safety Comments Min assist   Gait/Stairs (Locomotion)   Comment (Gait/Stairs) 5' FWW min assist, forward bent posture, decreased ayan and step length   Balance   Balance Comments Fair in sitting, poor in standing   Clinical Impression   Criteria for Skilled Therapeutic Intervention yes, treatment indicated   PT Diagnosis (PT) Impaired ambulation   Influenced by the following impairments Decreased strength, decreased endurance, decreased balance   Functional limitations due to impairments Difficulty with bed mobility, transfers,  ambulation   Clinical Presentation Stable/Uncomplicated   Clinical Presentation Rationale VSS   Clinical Decision Making (Complexity) low complexity   Therapy Frequency (PT) 3x/week   Predicted Duration of Therapy Intervention (days/wks) 1 week   Planned Therapy Interventions (PT) balance training;bed mobility training;home exercise program;gait training;patient/family education;strengthening;transfer training   Risk & Benefits of therapy have been explained evaluation/treatment results reviewed;care plan/treatment goals reviewed;risks/benefits reviewed;current/potential barriers reviewed;participants voiced agreement with care plan;participants included;patient   PT Discharge Planning    PT Discharge Recommendation (DC Rec) Transitional Care Facility   PT Rationale for DC Rec At baseline, pt lives alone in an apartment with elevator access. Pt reports independence with mobility, however, reports increased difficulty with activities during past several weeks, requiring a lot of assistance from friends. This date, pt is well below baseline and would be a considerable fall risk if she returned to home environment. Pt currently requires assist with all mobility and is only able to ambulate very short distances with assist. Pt will benefit from continued therapy at TCU to address impairments and increase mobility and functional independence prior to returning home. Pt in agreement.   PT Brief overview of current status  Bed mobility min assist, transfers min assist, ambulates 20' FWW min assist   Therapy Certification   Start of care date 08/01/21   Certification date from 08/01/21   Certification date to 08/08/21   Medical Diagnosis Compresssion fracture   Total Evaluation Time   Total Evaluation Time (Minutes) 10

## 2021-08-01 NOTE — H&P
[facility]    History and Physical  Hospitalist       Date of Admission:  7/31/2021  Date of Service (when I saw the patient): 07/31/21    ASSESSMENT  Chiquita Berry is a markedly pleasant 80 year old woman with past medical history that is most notable for kyphosis and scoliosis reportedly; who presents with intractable lower radicular back pain and is found to have suspected acute L4 compression fracture.    PLAN    Suspected acute to sub-acute L4 compression fracture: By MRI tonight; without signs of associated cord compression at present.    -- Observation for relief of pain. Tylenol, Motrin, Lidocaine patch, Oxycodone, IV Dilaudid as needed for pain. Anti-emetics as needed.    -- Neurosurgery consulted for further evaluation    History of palpitations: She has been seen by EP in the past for these; a TTE in 2013 showed preserved LVEF with mild mitral regurgitation. It seems that subsequent event monitor and other testing has been negative for arrhythmia; her most recent zio patch in 2019 showed rare pac's and pvc's.     -- Will order telemetry overnight    Dyslipidemia, GERD: By history; resume any home medications for these when verified    Rule Out COVID-19 infection  This patient was evaluated during a global COVID-19 pandemic, which necessitated consideration that the patient might be at risk for infection with the SARS-CoV-2 virus that causes COVID-19. Applicable protocols for evaluation were followed during the patient's care. Low suspicion for infection.   -follow up COVID-19 PCR test result  -no current indication for precautions    Chief Complaint   Back pain    History is obtained from the patient and the ED physician whom I have spoken with    History of Present Illness   Chiquita Berry is a markedly pleasant 80 year old woman who presents with back pain. This is sharp and aching pain that started acutely 7/16/2021 soon after she had been doing groceries and lifting  "heavy bags. It is through the middle and bilateral lower back but worst on the left side, radiating down the left leg to the calf muscle. It has been constant since onset, exacerbated with spasms of pain when she tries to use a walker to walk at home (where she lives independently). She was seen in an urgent care clinic on 7/17, and then by her PCP on 7/20 at which time plain films were done showing DJD; a steroid taper was started, and she has also been using NSAID's, Tylenol number 3, and muscle relaxants, all with minimal relief. She has been doing PT as able at home as prescribed but it has been hard to comply with that due to pain. She saw her PCP in follow up on 7/29 and Ultram was added, also not with any real relief; an appointment was made for her to be seen in the Neurosurgery Clinic in two days. However at home for the past 2-3 days her pain has worsened even further, to the point she can not bathe herself or do normal activities of life at home; she has severe associated insomnia due to the pain at night time. Thus she came to the ED for further evaluation. She otherwise denies bowel or bladder incontinence, any loss of sensation in her waist, back or legs, or upper back pain, fever, any other antecedent trauma or recent falls, or any other acute complaints.    In the ED, Blood pressure 137/77, pulse 87, temperature 97.9  F (36.6  C), temperature source Temporal, height 1.524 m (5'), weight 61.2 kg (135 lb), SpO2 97 %, not currently breastfeeding.    MRI of the Lumbar Spine showed: \"IMPRESSION:  1. Recent-appearing (acute-to-subacute) L4 compression fracture  primarily involving the inferior endplate. Minimal  retropulsion/buckling of the posterior inferior endplate along the  ventral aspect of the spinal canal without significant associated  spinal canal stenosis.  2. Multilevel degenerative changes, as described.  3. No high-grade spinal canal stenosis. Mild multilevel lateral recess  stenosis, as " "detailed.  4. Mild to moderate foraminal moderate left neural foraminal stenosis  at L4-L5. Mild/mild to moderate multilevel neural foraminal narrowing  elsewhere, as detailed.  5. Mild multilevel spondylolisthesis in the sagittal plane. Moderately  exaggerated kyphosis of the thoracolumbar junction. Mild to moderate  levoconvex curvature.\"    She was given Lidoderm patch, Naprosyn, and Oxycodone in the ED, and reports partial relief of the pain with low dose Oxycodone.    PHYSICAL EXAM  Blood pressure 137/77, pulse 87, temperature 97.9  F (36.6  C), temperature source Temporal, height 1.524 m (5'), weight 61.2 kg (135 lb), SpO2 97 %, not currently breastfeeding.  Constitutional: Alert and oriented to person, place and time; no apparent distress  HEENT: normocephalic moist mucus membranes  Respiratory: lungs clear to auscultation bilaterally  Cardiovascular: regular S1 S2  GI: abdomen soft non tender non distended bowel sounds positive  Lymph/Hematologic: no pallor, no cervical lymphadenopathy  Skin: no rash, good turgor  Musculoskeletal: no clubbing, cyanosis or edema  Neurologic: extra-ocular muscles intact; moves all four extremities  Psychiatric: appropriate affect, insight and judgment     DVT Prophylaxis: Pneumatic Compression Devices  Code Status: Full Code    Disposition: Expected discharge in 0-2 days    Jose Valentine MD    Past Medical History    I have reviewed this patient's medical history and updated it with pertinent information if needed.   Past Medical History:   Diagnosis Date     Anxiety      Dyslipidemia      Esophageal reflux      Glaucoma suspect, bilateral      Kyphosis      Mitral regurgitation      Osteoporosis      Palpitations      Scoliosis        Past Surgical History   I have reviewed this patient's surgical history and updated it with pertinent information if needed.  Past Surgical History:   Procedure Laterality Date     CATARACT IOL, RT/LT Right 2012     OPEN REDUCTION " INTERNAL FIXATION HUMERUS DISTAL Right      PHACOEMULSIFICATION CLEAR CORNEA WITH STANDARD INTRAOCULAR LENS IMPLANT Left 11/16/2015    Procedure: PHACOEMULSIFICATION CLEAR CORNEA WITH STANDARD INTRAOCULAR LENS IMPLANT;  Surgeon: Latrell Jessica MD;  Location: [facility]     TONSILLECTOMY & ADENOIDECTOMY         Prior to Admission Medications   Prior to Admission Medications   Prescriptions Last Dose Informant Patient Reported? Taking?   Acetaminophen (TYLENOL PO)   Yes No   Sig: Take 500 mg by mouth as needed for mild pain or fever   Cholecalciferol (VITAMIN D) 2000 UNITS tablet   Yes No   Sig: Take 1 tablet by mouth daily.   Multiple Vitamins-Minerals (PRESERVISION AREDS 2) CAPS   Yes No   Sig: Take 2 capsules by mouth daily   alendronate (FOSAMAX) 70 MG tablet   No No   Sig: Take 1 tablet (70 mg) by mouth every 7 days   ibuprofen (ADVIL/MOTRIN) 200 MG tablet   Yes No   Sig: Take 400 mg by mouth every 4 hours as needed for mild pain   latanoprost (XALATAN) 0.005 % ophthalmic solution   Yes No   melatonin 3 MG CAPS   Yes No   Sig: Take by mouth as needed   naproxen (NAPROSYN) 500 MG tablet   No No   Sig: Take 1 tablet (500 mg) by mouth 2 times daily (with meals)   traMADol (ULTRAM) 50 MG tablet   No No   Sig: Take 1 tablet (50 mg) by mouth every 6 hours as needed for severe pain      Facility-Administered Medications: None     Allergies   Allergies   Allergen Reactions     Augmentin Diarrhea       Social History   I have reviewed this patient's social history and updated it with pertinent information if needed. Chiquita Berry  reports that she has never smoked. She has never used smokeless tobacco. She reports current alcohol use. She reports that she does not use drugs.    Family History   Family history assessed and, except as above, is non-contributory.    Family History   Problem Relation Age of Onset     Dementia Mother      Myocardial Infarction Father         later in life     Stomach Cancer Father       Breast Cancer Sister         x2 sisters (post-menopausal)     Diabetes No family hx of      Cerebrovascular Disease No family hx of      Coronary Artery Disease Early Onset No family hx of      Ovarian Cancer No family hx of      Colon Cancer No family hx of        Review of Systems   The 10 point Review of Systems is negative other than noted in the HPI or here.     Primary Care Physician   Vesna Olivera    Data   Labs Ordered and Resulted from Time of ED Arrival Up to the Time of Departure from the ED - No data to display    Data reviewed today:  I personally reviewed the lumbar spine MRI image(s) showing L4 compression fracture.    Recent Results (from the past 24 hour(s))   Lumbar spine MRI w/o contrast    Narrative    MRI LUMBAR SPINE WITHOUT CONTRAST   7/31/2021 7:00 PM     HISTORY: Worsening right-sided low back pain with radiculopathy.    TECHNIQUE: Multiplanar multisequence MRI of the lumbar spine without  contrast.     COMPARISON: Lumbar spine radiographs dated 7/20/2021.     FINDINGS: Nomenclature is based on 5 lumbar vertebral bodies. There  appears to be a recent (potentially acute to subacute) fracture of the  L4 vertebral body with up to approximately 20-30% vertebral body  height loss. There is a fluid-filled fracture cleft along the anterior  aspect of the inferior endplate. There is mild marrow edema in the  vertebral body, which is probably reactive to the fracture. There is  minimal posterior buckling/retropulsion of the posterior L4 inferior  endplate along the ventral aspect of the spinal canal, without  significant associated spinal canal stenosis/compromise.    Multilevel Schmorl's node/degenerative endplate irregularities are  seen elsewhere, which appear chronic. There is no other significant  vertebral body height loss in the lumbar spine. There is moderately  exaggerated kyphosis at the thoracolumbar junction and mild to  moderate levoconvex curvature centered at approximately L2-L3.  There  is also mild presumed degenerative retrolisthesis of L1 on L2 and L2  on L3 and minimal degenerative anterolisthesis of L5 on S1. There is  close apposition of the spinous processes in the mid to lower lumbar  spine, particularly from L3 through L5, nonspecific, but which can be  seen in Baastrup's syndrome in the appropriate setting.    Modic type I degenerative endplate signal changes at T11-T12, T12-L1,  L1-L2 and L3-L4. Modic type II degenerative endplate signal changes at  T11-T12, T12-L1, L1-L2 and L2-L3 predominantly. Diffuse intervertebral  disc desiccation. Normal appearance of the distal spinal cord with the  conus terminating at L1. There appears to be a small nonspecific T2  hyperintense ovoid lesion measuring 9-10 mm in the inferior aspect of  the spleen (series 8 image 3), possibly representing a cyst.  Lobulated/bilobed T2 hyperintense lesion along the inferior pole of  the right kidney presumably represents a cyst. There is some degree of  fatty atrophy of the lower posterior paraspinous musculature.    Segmental analysis:  T12-L1: Mild disc height loss. No herniation. Posterior central  annular fissure. Mild facet arthropathy. No significant spinal canal  or neural foraminal stenosis.    L1-L2: Moderate disc height loss. Disc bulge with posterior endplate  osteophytic ridging slightly eccentric to the right. Normal facets.  Mild right lateral recess stenosis. No central spinal canal stenosis.  Minimal/mild right neural foraminal narrowing. The left neural foramen  is patent.    L2-L3: Moderate disc height loss. Diffuse disc bulge with posterior  endplate osteophytic ridging which appears relatively symmetric.  Normal facets. No significant spinal canal stenosis. There appears to  be mild right neural foraminal narrowing. The left neural foramen is  not significantly narrowed.    L3-L4: Mild disc height loss. Disc bulge eccentric to the left. There  may be a small superimposed left foraminal  protrusion component. Mild  facet arthropathy. Mild left lateral recess narrowing. No spinal canal  stenosis centrally. Mild left neural foraminal stenosis.  Mild-to-moderate right neural foraminal stenosis.    L4-L5: Mild disc height loss. Shallow disc bulge with posterior  endplate osteophytic ridging, slightly eccentric to the left. Minimal  retropulsion of the posterior inferior endplate of L4 along the  ventral aspect of the spinal canal. Mild to moderate left facet  arthropathy. Minimal left lateral recess narrowing. No central spinal  canal stenosis. Mild to moderate bordering on moderate left neural  foraminal stenosis. Mild-to-moderate right neural foraminal stenosis.    L5-S1: Mild disc height loss. Disc bulge with posterior endplate  osteophytic ridging slightly eccentric to the left. Moderate left and  mild right facet arthropathy. No spinal canal stenosis. Mild left  neural foraminal stenosis. The right neural foramen is patent.      Impression    IMPRESSION:  1. Recent-appearing (acute-to-subacute) L4 compression fracture  primarily involving the inferior endplate. Minimal  retropulsion/buckling of the posterior inferior endplate along the  ventral aspect of the spinal canal without significant associated  spinal canal stenosis.  2. Multilevel degenerative changes, as described.  3. No high-grade spinal canal stenosis. Mild multilevel lateral recess  stenosis, as detailed.  4. Mild to moderate foraminal moderate left neural foraminal stenosis  at L4-L5. Mild/mild to moderate multilevel neural foraminal narrowing  elsewhere, as detailed.  5. Mild multilevel spondylolisthesis in the sagittal plane. Moderately  exaggerated kyphosis of the thoracolumbar junction. Mild to moderate  levoconvex curvature.

## 2021-08-01 NOTE — PROGRESS NOTES
2 security bags (1 bag w/ refrigerated eyedrops, 1 bag w/ non refrigerated medications) of pt's home medications sent to pharmacy per site policy.

## 2021-08-01 NOTE — CONSULTS
Care Management Initial Consult    General Information  Assessment completed with: Chiquita Madrid  Type of CM/SW Visit: Initial Assessment    Primary Care Provider verified and updated as needed:     Readmission within the last 30 days: no previous admission in last 30 days      Reason for Consult: discharge planning  Advance Care Planning:            Communication Assessment  Patient's communication style: spoken language (English or Bilingual)    Hearing Difficulty or Deaf: no   Wear Glasses or Blind: yes    Cognitive  Cognitive/Neuro/Behavioral:                        Living Environment:   People in home: alone     Current living Arrangements: apartment, independent living facility      Able to return to prior arrangements: yes       Family/Social Support:  Care provided by: self, friend  Provides care for: no one  Marital Status: Single  Other (specify) (relatives and friends)          Description of Support System: Supportive         Current Resources:   Patient receiving home care services:       Community Resources:    Equipment currently used at home: walker, rolling  Supplies currently used at home:      Employment/Financial:  Employment Status: retired        Financial Concerns: No concerns identified           Lifestyle & Psychosocial Needs:  Social Determinants of Health     Tobacco Use: Low Risk      Smoking Tobacco Use: Never Smoker     Smokeless Tobacco Use: Never Used   Alcohol Use: Not At Risk     Frequency of Alcohol Consumption: Monthly or less     Average Number of Drinks: 1 or 2     Frequency of Binge Drinking: Never   Financial Resource Strain:      Difficulty of Paying Living Expenses:    Food Insecurity:      Worried About Running Out of Food in the Last Year:      Ran Out of Food in the Last Year:    Transportation Needs:      Lack of Transportation (Medical):      Lack of Transportation (Non-Medical):    Physical Activity:      Days of Exercise per Week:      Minutes of Exercise per Session:     Stress:      Feeling of Stress :    Social Connections:      Frequency of Communication with Friends and Family:      Frequency of Social Gatherings with Friends and Family:      Attends Mandaeism Services:      Active Member of Clubs or Organizations:      Attends Club or Organization Meetings:      Marital Status:    Intimate Partner Violence:      Fear of Current or Ex-Partner:      Emotionally Abused:      Physically Abused:      Sexually Abused:    Depression: Not at risk     PHQ-2 Score: 0   Housing Stability:      Unable to Pay for Housing in the Last Year:      Number of Places Lived in the Last Year:      Unstable Housing in the Last Year:        Functional Status:  Prior to admission patient needed assistance:              Mental Health Status:          Chemical Dependency Status:                Values/Beliefs:  Spiritual, Cultural Beliefs, Mandaeism Practices, Values that affect care:                 Additional Information:  Consult for discharge planning. Patient admitted on 7/31/21 for compression fracture with a tentative discharge date of 8/2/21. Writer reviewed chart and recommendations for TCU at discharge. Writer notified by therapy that patient had given choices. Writer spoke to patient in her room to confirm choices and to discuss process. Patient noted that [facility] is her first choices and she would like to eventually reside there when she needs more assistance than what she has at her independent apartment now. She stated that she has many friends there and is on their list. Other choices provided are [facility] and [facility]. Patient noted that she can have a friend transport her at discharge if she can get into a vehicle. Referrals sent via DOD. Will keep patient updated.     JULIO Velazquez

## 2021-08-02 PROCEDURE — 99225 PR SUBSEQUENT OBSERVATION CARE,LEVEL II: CPT | Performed by: INTERNAL MEDICINE

## 2021-08-02 PROCEDURE — 250N000013 HC RX MED GY IP 250 OP 250 PS 637: Performed by: HOSPITALIST

## 2021-08-02 PROCEDURE — G0378 HOSPITAL OBSERVATION PER HR: HCPCS

## 2021-08-02 PROCEDURE — 250N000011 HC RX IP 250 OP 636: Performed by: HOSPITALIST

## 2021-08-02 RX ORDER — ACETAMINOPHEN 325 MG/1
650 TABLET ORAL EVERY 6 HOURS PRN
DISCHARGE
Start: 2021-08-02 | End: 2021-08-31

## 2021-08-02 RX ORDER — POLYETHYLENE GLYCOL 3350 17 G/17G
17 POWDER, FOR SOLUTION ORAL DAILY PRN
DISCHARGE
Start: 2021-08-02 | End: 2021-08-18

## 2021-08-02 RX ORDER — CYCLOBENZAPRINE HCL 5 MG
5-10 TABLET ORAL 3 TIMES DAILY PRN
Status: DISCONTINUED | OUTPATIENT
Start: 2021-08-02 | End: 2021-08-05 | Stop reason: HOSPADM

## 2021-08-02 RX ORDER — AMOXICILLIN 250 MG
1-2 CAPSULE ORAL 2 TIMES DAILY
Status: DISCONTINUED | OUTPATIENT
Start: 2021-08-02 | End: 2021-08-05 | Stop reason: HOSPADM

## 2021-08-02 RX ORDER — CYCLOBENZAPRINE HCL 5 MG
5-10 TABLET ORAL 3 TIMES DAILY PRN
Qty: 10 TABLET | Refills: 0 | Status: SHIPPED | OUTPATIENT
Start: 2021-08-02 | End: 2021-08-06 | Stop reason: DRUGHIGH

## 2021-08-02 RX ORDER — AMOXICILLIN 250 MG
1-2 CAPSULE ORAL 2 TIMES DAILY
Status: DISCONTINUED | OUTPATIENT
Start: 2021-08-02 | End: 2021-08-02

## 2021-08-02 RX ORDER — SENNA AND DOCUSATE SODIUM 50; 8.6 MG/1; MG/1
1 TABLET, FILM COATED ORAL 2 TIMES DAILY PRN
DISCHARGE
Start: 2021-08-02 | End: 2021-08-06 | Stop reason: DRUGHIGH

## 2021-08-02 RX ORDER — OXYCODONE HYDROCHLORIDE 5 MG/1
5 TABLET ORAL EVERY 4 HOURS PRN
Qty: 10 TABLET | Refills: 0 | Status: SHIPPED | OUTPATIENT
Start: 2021-08-02 | End: 2021-08-06

## 2021-08-02 RX ORDER — LIDOCAINE 4 G/G
1 PATCH TOPICAL EVERY 24 HOURS
DISCHARGE
Start: 2021-08-02 | End: 2022-04-12

## 2021-08-02 RX ADMIN — OXYCODONE HYDROCHLORIDE 5 MG: 5 TABLET ORAL at 11:36

## 2021-08-02 RX ADMIN — IBUPROFEN 600 MG: 600 TABLET ORAL at 07:29

## 2021-08-02 RX ADMIN — POLYETHYLENE GLYCOL 3350 17 G: 17 POWDER, FOR SOLUTION ORAL at 08:22

## 2021-08-02 RX ADMIN — HYDROMORPHONE HYDROCHLORIDE 0.2 MG: 0.2 INJECTION, SOLUTION INTRAMUSCULAR; INTRAVENOUS; SUBCUTANEOUS at 18:33

## 2021-08-02 RX ADMIN — SENNOSIDES AND DOCUSATE SODIUM 1 TABLET: 8.6; 5 TABLET ORAL at 21:19

## 2021-08-02 RX ADMIN — OXYCODONE HYDROCHLORIDE 5 MG: 5 TABLET ORAL at 07:30

## 2021-08-02 RX ADMIN — OXYCODONE HYDROCHLORIDE 10 MG: 5 TABLET ORAL at 21:25

## 2021-08-02 RX ADMIN — IBUPROFEN 600 MG: 600 TABLET ORAL at 21:20

## 2021-08-02 RX ADMIN — LIDOCAINE 1 PATCH: 246 PATCH TOPICAL at 20:35

## 2021-08-02 RX ADMIN — ACETAMINOPHEN 650 MG: 325 TABLET, FILM COATED ORAL at 18:03

## 2021-08-02 RX ADMIN — ACETAMINOPHEN 650 MG: 325 TABLET, FILM COATED ORAL at 03:11

## 2021-08-02 RX ADMIN — IBUPROFEN 600 MG: 600 TABLET ORAL at 14:01

## 2021-08-02 RX ADMIN — LATANOPROST 1 DROP: 50 SOLUTION/ DROPS OPHTHALMIC at 20:35

## 2021-08-02 RX ADMIN — SENNOSIDES AND DOCUSATE SODIUM 1 TABLET: 8.6; 5 TABLET ORAL at 14:35

## 2021-08-02 RX ADMIN — OXYCODONE HYDROCHLORIDE 5 MG: 5 TABLET ORAL at 03:11

## 2021-08-02 RX ADMIN — MELATONIN 5 MG TABLET 5 MG: at 21:20

## 2021-08-02 RX ADMIN — OXYCODONE HYDROCHLORIDE 5 MG: 5 TABLET ORAL at 15:58

## 2021-08-02 RX ADMIN — ACETAMINOPHEN 650 MG: 325 TABLET, FILM COATED ORAL at 11:35

## 2021-08-02 NOTE — PROVIDER NOTIFICATION
MD Notification    Notified Person: MD    Notified Person Name: Dr. Margarita Aleman    Notification Date/Time: 8/2/21 6763    Notification Interaction: paged MD    Purpose of Notification: pt requesting PRN senna for constipation.    Orders Received: senna ordered    Comments:

## 2021-08-02 NOTE — UTILIZATION REVIEW
"Admission Status; Secondary Review Determination     Admission Date: 7/31/2021  4:35 PM       Under the authority of the Utilization Management Committee, the utilization review process indicated a secondary review on the above patient.  The review outcome is based on review of the medical records, discussions with staff, and applying clinical experience noted on the date of the review.          (x) Observation Status Appropriate - This patient does not meet hospital inpatient criteria and is placed in observation status. If this patient's primary payer is Medicare and was admitted as an inpatient, Condition Code 44 should be used and patient status changed to \"observation\".       RATIONALE FOR DETERMINATION      Brief clinical presentation, information copied from the chart, abbreviated and edited for relevant content:     Plan was to discharge today but patient decline her TCU destination. Staying extra night for disposition planning. No criteria for IP.     Chiquita Berry was admitted on 7/31/2021 for an Acute to sub-acute L4 compression fracture  Developed new onset low back pan that started on 7/16/21 after she had been lifting heavy bags of groceries. Seen in UC on 7/17 and by PCP on 7/20. Xrays showed DJD. Was initially managed with a steroid taper, NSAIDs, Tylenol #3 and muscle relaxants. Had been doing PT at home but limited ability to participate dt pain. Saw PCP in clinic on 7/29, Tramadol was added. No significant relief was noted so she was ultimately referred to neurosurgery for evaluation. Pain continued to worsen at home and she was unable to perform ADLs, prompting evaluation in the ED on 7/31.  MRI obtained in ED, showed a recent appearing (acute to subacute) L4 compression fracture primarily involving the inferior endplate with minimal retropulsion and without significant associated spinal canal stenosis. Admitted to OBS for  Pain control regimen.  Seen by neurosurgery, non-surgical management " recommended. Advised to wear Zeigler brace for additional support.        The severity of illness, intensity of cares provided, risk for adverse outcome, and expected LOS make the care appropriate for observation.       The information on this document is developed by the utilization review team in order for the business office to ensure compliance.  This only denotes the appropriateness of proper admission status and does not reflect the quality of care rendered.         The definitions of Inpatient Status and Observation Status used in making the determination above are those provided in the CMS Coverage Manual, Chapter 1 and Chapter 6, section 70.4.      Sincerely,     Yakelin Soliz MD   Utilization Review/ Case Management  [facility].

## 2021-08-02 NOTE — PROGRESS NOTES
Care Management Follow Up    Length of Stay (days): 0    Expected Discharge Date: 08/02/2021     Concerns to be Addressed: discharge planning     Patient plan of care discussed at interdisciplinary rounds: Yes    Anticipated Discharge Disposition: Skilled Nursing Facilty, Transitional Care     Anticipated Discharge Services:    Anticipated Discharge DME:      Patient/family educated on Medicare website which has current facility and service quality ratings: yes  Education Provided on the Discharge Plan:    Patient/Family in Agreement with the Plan: yes    Referrals Placed by CM/SW: Post Acute Facilities  Private pay costs discussed: private room/amenity fees and insurance costs out of pocket expenses and co-pays    Additional Information:  Patient accepted at [facility] and [facility]. May have a private room avail Tues (1st 20 days covered 100%, $36/day after day 20)    SW to continue to follow and assist with discharge planning.    JULIO Clark  Daytime (8:00am-4:30pm): [phone]  After-Hours SW Pager (4:30pm-11:30pm): [phone]           JULIO Valenzuela

## 2021-08-02 NOTE — PROGRESS NOTES
[facility]    Hospitalist Progress Note    Assessment & Plan   Chiquita Berry is a 80 year old female with PMHx of dyslipidemia, GERD kyphosis and scoliosis who was admitted under observation status on 7/31/2021 with intractable lower radicular back pain with findings of an acute L4 compression fracture.    Acute to sub-acute L4 compression fracture  * Developed new onset low back pan that started on 7/16/21 after she had been lifting heavy bags of groceries. Described pain in her middle and bilateral lower back, worse on the left side and with radiation down her left leg into the calf muscle. Seen in UC on 7/17 and by PCP on 7/20. Xrays showed DJD. Was initially managed with a steroid taper, NSAIDs, Tylenol #3 and muscle relaxants. Had been doing PT at home but limited ability to participate dt pain. Saw PCP in clinic on 7/29, Tramadol was added. No significant relief was noted so she was ultimately referred to neurosurgery for evaluation.   * Pain continued to worsen at home and she was unable to perform ADLs, prompting evaluation in the ED on 7/31.   * MRI obtained in ED, showed a recent appearing (acute to subacute) L4 compression fracture primarily involving the inferior endplate with minimal retropulsion and without significant associated spinal canal stenosis  * Pain control regimen started on admission including: lidoderm patch, Tylenol, ibuprofen, oxycodone prn, Flexeril prn  * Seen by neurosurgery, non-surgical management recommended. Advised to wear Jarratt brace for additional support.   * PT recommended TCU stay, patient in agreement.     -- appears comfortable, cont current pain meds, brace and bowel regimen  -- awaiting TCU placement, SW following     History of palpitations, mild mitral regurgitation  Had been seen by EP cardiology in the past for evaluation of palpitations. Last echo in 2013 showed preserved LVEF and mild mitral regurgitation. Event monitor did not show  any evidence of arrhythmias. Last Zio patch in 2019 showed rare PACs/PVCs. Stable.      Dyslipidemia  GERD  Not on any specific medications for these conditions.     FEN: no IVFs, lytes stable, regular diet  DVT Prophylaxis: PCDs  Code Status: Full Code    Disposition: Medically stable to discharge. Awaiting TCU placement. SW following.     Hannah Aleman    Interval History   Seen this morning. Feeling okay. Pain presently controlled. Asking for bowel regimen. No cp/sob/cough, abd pain/n/v     -Data reviewed today: I reviewed all new labs and imaging results over the last 24 hours. I personally reviewed no images or EKG's today.    Physical Exam   Temp: 98.5  F (36.9  C) Temp src: Oral BP: (!) 141/65 Pulse: 77   Resp: 16 SpO2: 94 % O2 Device: None (Room air)    Vitals:    07/31/21 1558   Weight: 61.2 kg (135 lb)     Vital Signs with Ranges  Temp:  [98  F (36.7  C)-98.6  F (37  C)] 98.5  F (36.9  C)  Pulse:  [77-82] 77  Resp:  [16-20] 16  BP: (124-143)/(48-65) 141/65  SpO2:  [94 %-97 %] 94 %  I/O last 3 completed shifts:  In: 480 [P.O.:480]  Out: -     Constitutional: Resting comfortably, alert and conversing appropriately, NAD  Respiratory: CTAB, no wheeze/rales/rhonchi, no increased work of breathing  Cardiovascular: HRRR, no MGR, no LE edema  GI: S, NT, ND, +BS  Skin/Integumen: warm/dry  Other:      Medications       latanoprost  1 drop Both Eyes QPM     lidocaine  1 patch Transdermal Q24h    And     lidocaine   Transdermal Q8H     PreserVision AREDS 2  2 capsule Oral Daily     senna-docusate  1-2 tablet Oral BID     sodium chloride (PF)  3 mL Intracatheter Q8H       Data   Recent Labs   Lab 08/01/21  0644   WBC 8.3   HGB 12.6   MCV 98         POTASSIUM 3.7   CHLORIDE 106   CO2 31   BUN 14   CR 0.65   ANIONGAP 2*   SAGE 8.2*   *       No results found for this or any previous visit (from the past 24 hour(s)).

## 2021-08-02 NOTE — PROGRESS NOTES
Observation goals PRIOR TO DISCHARGE     Comments: -diagnostic tests and consults completed and resulted: met  -vital signs normal or at patient baseline: met  -tolerating oral intake to maintain hydration: met  -adequate pain control on oral analgesics: met  -returns to baseline functional status: not met   -safe disposition plan has been identified: not met

## 2021-08-02 NOTE — PLAN OF CARE
A&O x4. VSS. Up with A1 GB/W. Tolerating regular diet. IV SL.  Pain controlled with Tylenol and Oxy. Plan to discharge to TCU once placement available. Continue to monitor.

## 2021-08-02 NOTE — PROGRESS NOTES
"Care Management Follow Up      \"Medicare Outpatient Observation Notice\" brochure was given & explained to the patient. Patient verbalized understanding and denies any questions at this time.  JOSE JUAN was faxed to [phone].    Chiquita stated she would prefer to go to [facility].  Her [facility] home referral is pending and she has been accepted to [facility].      The pt will need [facility] transpiration wheel chair ride and she approved of the $75 plus ~ $6 per mileage cost.    Care Coordination SW will continue to follow this case.      Christine Murphy RN, BSN Care Coordinator  [facility]  Mobile: [phone]    "

## 2021-08-02 NOTE — PROGRESS NOTES
[facility]    Neurosurgery  Daily Progress Note    Assessment & Plan      80 year old female who was admitted on 7/31/2021 with increased low back pain, radiographic evidence of acute to subacute compression fracture at L4 with approximately 20 to 30% collapse of vertebral body height, no significant spinal canal stenosis or canal compromise is noted. TLSO brace was recommended and fitted per Orthotics. Today, patient reports her back pain is better managed now with pain medications. Reports some muscular soreness in legs but denies paresthesias or weakness. Therapies evaluated. SW coordinating discharge to TCU.     Plan:  - Continue brace when out of bed  - Follow up with our NSG clinic in 6 weeks with repeat imaging. Pending clinical progress, may consider kyphoplasty.   - Discharge pending TCU placement.   - NSG will sign off. Please contact with any further questions.     Discussed with Dr. Greg Yang, CNP  [facility] Neurosurgery  [address] [address]  Tel [phone]  Pager [phone]      Interval History   Stable     Physical Exam   Temp: 98.4  F (36.9  C) Temp src: Oral BP: (!) 143/51 Pulse: 77   Resp: 16 SpO2: 94 % O2 Device: None (Room air)    Vitals:    07/31/21 1558   Weight: 135 lb (61.2 kg)     Vital Signs with Ranges  Temp:  [97.7  F (36.5  C)-98.6  F (37  C)] 98.4  F (36.9  C)  Pulse:  [77-82] 77  Resp:  [16-20] 16  BP: (124-143)/(48-51) 143/51  SpO2:  [94 %-97 %] 94 %  I/O last 3 completed shifts:  In: 620 [P.O.:620]  Out: -     Mental status:  Alert and Oriented x 3, speech is fluent.  Motor:  Moves all extremities. Strength is 5/5 throughout the upper and lower extremities  Sensation:  Intact  Reflexes:  Negative Babinski.  Negative Clonus.      Medications        latanoprost  1 drop Both Eyes QPM     lidocaine  1 patch Transdermal Q24h    And     lidocaine   Transdermal Q8H      PreserVision AREDS 2  2 capsule Oral Daily     sodium chloride (PF)  3 mL Intracatheter Q8H       Juliette Couch CNP  [facility] Neurosurgery   [address] [facility]  [place] [address]  Tel [phone]  Pager [phone]

## 2021-08-02 NOTE — PLAN OF CARE
Observation goals PRIOR TO DISCHARGE     Comments: -diagnostic tests and consults completed and resulted: met  -vital signs normal or at patient baseline: met  -tolerating oral intake to maintain hydration: met  -adequate pain control on oral analgesics: met  -returns to baseline functional status: not met   -safe disposition plan has been identified: partially met, accepted to [facility] but prefers [facility] homes. Will need transportation arranged still, SW waiting to hear back from [facility].        VSS. RA. A&Ox4. Low back pain radiating to BLE managed with oxycodone, ibuprofen, tylenol. Spasms overnight, obtained flexeril order this morning but no further spasms today. C/o constipation x2 days, miralax and senna given, declining suppository. BS active. Up Ax1 GB walker to commode, chair. Neurosurgery signed off. Awaiting TCU placement.

## 2021-08-02 NOTE — PROGRESS NOTES
CROSS COVER:    Paged regarding patient request to restart home eye drops and home preservision caps.    - Orders placed and will try to use patient's own supply.      Micky Pisano PA-C

## 2021-08-02 NOTE — PROVIDER NOTIFICATION
MD Notification    Notified Person: MD    Notified Person Name: Dr. Pisano    Notification Date/Time: 8/1/2021 1900    Notification Interaction: Web page    Purpose of Notification: Patient requesting her home meds latanoprost eye drops and preservision caps be ordered so they can be labeled by pharmacy to be given here.    Orders Received:    Comments:

## 2021-08-02 NOTE — PROGRESS NOTES
Observation goals PRIOR TO DISCHARGE     Comments: -diagnostic tests and consults completed and resulted: met  -vital signs normal or at patient baseline: met  -tolerating oral intake to maintain hydration: met  -adequate pain control on oral analgesics: met  -returns to baseline functional status: not met   -safe disposition plan has been identified: partially met, accepted to [facility] but prefers [facility]. Will need transportation arranged still, SW involved.

## 2021-08-02 NOTE — PLAN OF CARE
[id number]: A&Ox4. VSS on RA. C/o back pain, given PRN Tylenol and oxycodone with decrease in pain. Up with assist of 1, gait belt, and walker to [facility]. Voiding adequately. Regular diet, tolerating. No BM. IV SL. Fitted for Jewitt brace. PT following. Nursing will continue to monitor.

## 2021-08-03 LAB
GLUCOSE BLDC GLUCOMTR-MCNC: 138 MG/DL (ref 70–99)
GLUCOSE BLDC GLUCOMTR-MCNC: 98 MG/DL (ref 70–99)

## 2021-08-03 PROCEDURE — 99217 PR OBSERVATION CARE DISCHARGE: CPT | Performed by: INTERNAL MEDICINE

## 2021-08-03 PROCEDURE — 250N000013 HC RX MED GY IP 250 OP 250 PS 637: Performed by: HOSPITALIST

## 2021-08-03 PROCEDURE — G0378 HOSPITAL OBSERVATION PER HR: HCPCS

## 2021-08-03 PROCEDURE — 250N000013 HC RX MED GY IP 250 OP 250 PS 637: Performed by: INTERNAL MEDICINE

## 2021-08-03 RX ADMIN — OXYCODONE HYDROCHLORIDE 5 MG: 5 TABLET ORAL at 21:20

## 2021-08-03 RX ADMIN — OXYCODONE HYDROCHLORIDE 5 MG: 5 TABLET ORAL at 10:30

## 2021-08-03 RX ADMIN — LIDOCAINE 1 PATCH: 246 PATCH TOPICAL at 20:04

## 2021-08-03 RX ADMIN — OXYCODONE HYDROCHLORIDE 5 MG: 5 TABLET ORAL at 16:10

## 2021-08-03 RX ADMIN — SENNOSIDES AND DOCUSATE SODIUM 1 TABLET: 8.6; 5 TABLET ORAL at 19:58

## 2021-08-03 RX ADMIN — SENNOSIDES AND DOCUSATE SODIUM 1 TABLET: 8.6; 5 TABLET ORAL at 08:09

## 2021-08-03 RX ADMIN — ACETAMINOPHEN 650 MG: 325 TABLET, FILM COATED ORAL at 16:10

## 2021-08-03 RX ADMIN — ACETAMINOPHEN 650 MG: 325 TABLET, FILM COATED ORAL at 21:20

## 2021-08-03 RX ADMIN — MELATONIN 5 MG TABLET 5 MG: at 21:20

## 2021-08-03 RX ADMIN — IBUPROFEN 600 MG: 600 TABLET ORAL at 06:58

## 2021-08-03 RX ADMIN — CYCLOBENZAPRINE 5 MG: 5 TABLET, FILM COATED ORAL at 19:57

## 2021-08-03 RX ADMIN — CYCLOBENZAPRINE 5 MG: 5 TABLET, FILM COATED ORAL at 04:24

## 2021-08-03 RX ADMIN — LATANOPROST 1 DROP: 50 SOLUTION/ DROPS OPHTHALMIC at 21:56

## 2021-08-03 RX ADMIN — ACETAMINOPHEN 650 MG: 325 TABLET, FILM COATED ORAL at 10:30

## 2021-08-03 NOTE — PLAN OF CARE
Observation goals PRIOR TO DISCHARGE     Comments: -diagnostic tests and consults completed and resulted: NOT MET; SW  Helping w/ discharge planning    -vital signs normal or at patient baseline: MET    -tolerating oral intake to maintain hydration: MET    -adequate pain control on oral analgesics: MET; note pt did receive 1 dose 0.2 IV dilaudid earlier in evening for breakthrough pain    -returns to baseline functional status: NOT MET    -safe disposition plan has been identified: IN PROGRESS: accepted [facility] but [facility] homes. Will need transportation arranged still, SW waiting to hear back from [facility].        [id number]: VSS. RA. A&Ox4. Low back pain radiating to BLE managed with oxycodone, ibuprofen, tylenol, and lido patch. Denies spasms. Large BM this shift.  Up Ax1 GB walker to commode, chair. Neurosurgery signed off. SW following for TCU placement

## 2021-08-03 NOTE — PROGRESS NOTES
Observation Goals      -diagnostic tests and consults completed and resulted -- Met  -vital signs normal or at patient baseline -- Met  -tolerating oral intake to maintain hydration -- Met  -adequate pain control on oral analgesics -- Met  -returns to baseline functional status -- Not met  -safe disposition plan has been identified -- Not met  Nurse to notify provider when observation goals have been met and patient is ready for discharge.

## 2021-08-03 NOTE — PROGRESS NOTES
Observation Goals:    -diagnostic tests and consults completed and resulted: partially met, SW looking forTCU  -vital signs normal or at patient baseline; met  -tolerating oral intake to maintain hydration: met   -adequate pain control on oral analgesics: met  -returns to baseline functional status: not met   -safe disposition plan has been identified: not met   Nurse to notify provider when observation goals have been met and patient is ready for discharge.

## 2021-08-03 NOTE — PLAN OF CARE
Pt A/Ox4. VSS on RA. Back pain controlled with PRN oxy, tylenol and flexeril. Denied muscle spasms this shift. Back brace in room. CMS in tact. Pt feeling anxious about going to TCU. Discharge pending. Awaiting insurance Auth.

## 2021-08-03 NOTE — PROGRESS NOTES
Observation Goals:    -diagnostic tests and consults completed and resulted: partially met, SW looking forTCU  -vital signs normal or at patient baseline; met  -tolerating oral intake to maintain hydration: met   -adequate pain control on oral analgesics: met  -returns to baseline functional status: not met   -safe disposition plan has been identified: not met   Nurse to notify provider when observation goals have been met and patient is ready for discharge.        A/Ox4. VSS. Ax1 GB and walker to BSC. Brace in room for back. Had muscle spasms this shift and PRN Flexeril given to pt, worked well per pt. CMS intact. Pt is waiting for TCU placement. Continue to monitor.

## 2021-08-03 NOTE — PROGRESS NOTES
Care Management Follow Up    Length of Stay (days): 0    Expected Discharge Date: 08/03/2021     Concerns to be Addressed: discharge planning     Patient plan of care discussed at interdisciplinary rounds: Yes    Anticipated Discharge Disposition: Skilled Nursing Facilty, Transitional Care     Anticipated Discharge Services:    Anticipated Discharge DME:      Patient/family educated on Medicare website which has current facility and service quality ratings: yes  Education Provided on the Discharge Plan:    Patient/Family in Agreement with the Plan: yes    Referrals Placed by CM/SW: Post Acute Facilities  Private pay costs discussed: Not applicable    Additional Information:  Writer started prior-auth through [employer] confirmation number [id number]. Writer faxed clinicals. Placement pending prior-auth.       Staci Gaviria, JAYCEW, LSW   Social Work   [facility]

## 2021-08-03 NOTE — DISCHARGE SUMMARY
[facility]    Discharge Summary  Hospitalist    Date of Admission:  7/31/2021  Date of Discharge:  8/3/2021  Discharging Provider: Hannah Aleman    Discharge Diagnoses   Acute to sub-acute L4 compression fracture  ----------------------------------  History of palpitations, mild mitral regurgitation  Dyslipidemia  GERD    History of Present Illness   Chiquita Berry is a 80 year old female with PMHx of dyslipidemia, GERD kyphosis and scoliosis who was admitted under observation status on 7/31/2021 with intractable lower radicular back pain with findings of an acute L4 compression fracture.    Hospital Course   Chiquita Berry was admitted on 7/31/2021.  The following problems were addressed during her hospitalization:    Acute to sub-acute L4 compression fracture  * Developed new onset low back pan that started on 7/16/21 after she had been lifting heavy bags of groceries. Described pain in her middle and bilateral lower back, worse on the left side and with radiation down her left leg into the calf muscle. Seen in UC on 7/17 and by PCP on 7/20. Xrays showed DJD. Was initially managed with a steroid taper, NSAIDs, Tylenol #3 and muscle relaxants. Had been doing PT at home but limited ability to participate dt pain. Saw PCP in clinic on 7/29, Tramadol was added. No significant relief was noted so she was ultimately referred to neurosurgery for evaluation.   * Pain continued to worsen at home and she was unable to perform ADLs, prompting evaluation in the ED on 7/31.   * MRI obtained in ED, showed a recent appearing (acute to subacute) L4 compression fracture primarily involving the inferior endplate with minimal retropulsion and without significant associated spinal canal stenosis  * Pain control regimen started on admission including: lidoderm patch, Tylenol, ibuprofen, oxycodone prn, Flexeril prn  * Seen by neurosurgery, non-surgical management recommended. Advised to wear  [place] brace for additional support.   * PT recommended TCU stay, patient in agreement.     Discharged to TCU in stable condition.   Continue lidoderm patch, Tylenol, prn oxycodone and prn flexeril for sx management.   Continue bowel regimen while on narcotics.   Continue using brace as recommended per neurosurgery.   Should follow up with neurosurgery in clinic in 6 wks with repeat imaging, if pain does not improve will consider kyphoplasty at that time     History of palpitations, mild mitral regurgitation  Had been seen by EP cardiology in the past for evaluation of palpitations. Last echo in 2013 showed preserved LVEF and mild mitral regurgitation. Event monitor did not show any evidence of arrhythmias. Last Zio patch in 2019 showed rare PACs/PVCs. Stable, no concerns this stay     Dyslipidemia  GERD  Not on any specific medications for these conditions.    Hannah Aleman, DO    Code Status   Full Code       Primary Care Physician   Vesna Olivera    Physical Exam   Temp: 98  F (36.7  C) Temp src: Oral BP: 121/49 Pulse: 60   Resp: 16 SpO2: 94 % O2 Device: None (Room air)    Vitals:    07/31/21 1558   Weight: 61.2 kg (135 lb)     Vital Signs with Ranges  Temp:  [96.4  F (35.8  C)-98.5  F (36.9  C)] 98  F (36.7  C)  Pulse:  [60-96] 60  Resp:  [14-18] 16  BP: (117-148)/(49-65) 121/49  SpO2:  [94 %-96 %] 94 %  I/O last 3 completed shifts:  In: 480 [P.O.:480]  Out: -     General: Resting comfortably, alert, conversive, NAD  CVS: HRRR, no MGR, no LE edema  Respiratory: CTAB, no wheeze/rales/rhonchi, no increased work of breathing  GI: S, NT, ND, +BS  Skin: Warm/dry  Neuro: CNs 2-12 intact, no focal motor/sensory deficits    Discharge Disposition   Discharged to rehabilitation facility  Condition at discharge: Stable    Consultations This Hospital Stay   NEUROSURGERY IP CONSULT  ORTHOSIS SPINAL IP CONSULT  PHYSICAL THERAPY ADULT IP CONSULT  OCCUPATIONAL THERAPY ADULT IP CONSULT  CARE MANAGEMENT / SOCIAL  WORK IP CONSULT    Time Spent on this Encounter   I, Hannah Aleman DO, personally saw the patient today and spent greater than 30 minutes discharging this patient.    Discharge Orders      General info for SNF    Length of Stay Estimate: Short Term Care: Estimated # of Days <30  Condition at Discharge: Improving  Level of care:skilled   Rehabilitation Potential: Excellent  Admission H&P remains valid and up-to-date: Yes  Recent Chemotherapy: N/A  Use Nursing Home Standing Orders: Yes     Mantoux instructions    Give two-step Mantoux (PPD) Per Facility Policy Yes     Reason for your hospital stay    Management of your compression fracture     Follow Up and recommended labs and tests    Follow up with your PCP in 1-2 weeks.   Follow up with neurosurgery in clinic as advised.     Activity - Up with nursing assistance     Follow-up and recommended labs and tests     Please follow up at [facility] in 6 weeks with repeat XR.  Please call the clinic at [phone] for scheduling.     Activity    Discharge instructions:  Limit lifting to 10 pounds, no bending/twisting until follow up visit.  Continue wearing brace when out of bed.   No contact sports until after follow up visit. No high impact activities such as running/jogging, snowmobile or 4 warren riding or any other recreational vehicles. Avoid jostling/jarring activities.     Call [facility] at [phone] for any questions or concerns.     Physical Therapy Adult Consult    Evaluate and treat as clinically indicated.    Reason:  lumbar compression fracture     Occupational Therapy Adult Consult    Evaluate and treat as clinically indicated.    Reason:  Lumbar compression fracture     Advance Diet as Tolerated    Follow this diet upon discharge: Regular Diet Adult     Discharge Medications   Current Discharge Medication List      START taking these medications    Details   cyclobenzaprine (FLEXERIL) 5 MG  tablet Take 1-2 tablets (5-10 mg) by mouth 3 times daily as needed for muscle spasms  Qty: 10 tablet, Refills: 0    Associated Diagnoses: Closed compression fracture of L4 lumbar vertebra, initial encounter (H)      Lidocaine (LIDOCARE) 4 % Patch Place 1 patch onto the skin every 24 hours To prevent lidocaine toxicity, patient should be patch free for 12 hrs daily.    Associated Diagnoses: Closed compression fracture of L4 lumbar vertebra, initial encounter (H)      oxyCODONE (ROXICODONE) 5 MG tablet Take 1 tablet (5 mg) by mouth every 4 hours as needed for moderate to severe pain  Qty: 10 tablet, Refills: 0    Associated Diagnoses: Closed compression fracture of L4 lumbar vertebra, initial encounter (H)      polyethylene glycol (MIRALAX) 17 g packet Take 17 g by mouth daily as needed for constipation    Associated Diagnoses: Preventive measure      SENNA-docusate sodium (SENNA S) 8.6-50 MG tablet Take 1 tablet by mouth 2 times daily as needed (constipation)    Associated Diagnoses: Preventive measure         CONTINUE these medications which have CHANGED    Details   acetaminophen (TYLENOL) 325 MG tablet Take 2 tablets (650 mg) by mouth every 6 hours as needed for mild pain or other (and adjunct with moderate or severe pain or per patient request)    Associated Diagnoses: Closed compression fracture of L4 lumbar vertebra, initial encounter (H)         CONTINUE these medications which have NOT CHANGED    Details   alendronate (FOSAMAX) 70 MG tablet Take 1 tablet (70 mg) by mouth every 7 days  Qty: 12 tablet, Refills: 3    Associated Diagnoses: Age-related osteoporosis without current pathological fracture      Cholecalciferol (VITAMIN D) 2000 UNITS tablet Take 1 tablet by mouth daily.      ibuprofen (ADVIL/MOTRIN) 200 MG tablet Take 400 mg by mouth every 4 hours as needed for mild pain      latanoprost (XALATAN) 0.005 % ophthalmic solution Place 1 drop into both eyes every evening   Refills: 2      melatonin 3 MG  CAPS Take 3 mg by mouth nightly as needed       Multiple Vitamin (MULTIVITAMIN ADULT PO) Take 1 tablet by mouth daily      Multiple Vitamins-Minerals (PRESERVISION AREDS 2) CAPS Take 2 capsules by mouth daily      polyethylene glycol 400 (BLINK TEARS) 0.25 % SOLN ophthalmic solution Place 1 drop into both eyes daily as needed for dry eyes         STOP taking these medications       naproxen (NAPROSYN) 500 MG tablet Comments:   Reason for Stopping:         traMADol (ULTRAM) 50 MG tablet Comments:   Reason for Stopping:             Allergies   Allergies   Allergen Reactions     Augmentin Diarrhea     Data   Most Recent 3 CBC's:  Recent Labs   Lab Test 08/01/21  0644 02/15/19  1549   WBC 8.3 10.5   HGB 12.6 13.3   MCV 98 96    279      Most Recent 3 BMP's:  Recent Labs   Lab Test 08/01/21  0644 12/21/20  0939 10/08/19  1200 04/16/19  1122    140  --  139   POTASSIUM 3.7 4.8  --  4.1   CHLORIDE 106 108  --  107   CO2 31 31  --  26   BUN 14 16  --  16   CR 0.65 0.66  --  0.63   ANIONGAP 2* 1*  --  6   SAGE 8.2* 9.0  --  8.8   * 102* 79 89     Results for orders placed or performed during the hospital encounter of 07/31/21   Lumbar spine MRI w/o contrast    Narrative    MRI LUMBAR SPINE WITHOUT CONTRAST   7/31/2021 7:00 PM     HISTORY: Worsening right-sided low back pain with radiculopathy.    TECHNIQUE: Multiplanar multisequence MRI of the lumbar spine without  contrast.     COMPARISON: Lumbar spine radiographs dated 7/20/2021.     FINDINGS: Nomenclature is based on 5 lumbar vertebral bodies. There  appears to be a recent (potentially acute to subacute) fracture of the  L4 vertebral body with up to approximately 20-30% vertebral body  height loss. There is a fluid-filled fracture cleft along the anterior  aspect of the inferior endplate. There is mild marrow edema in the  vertebral body, which is probably reactive to the fracture. There is  minimal posterior buckling/retropulsion of the posterior L4  inferior  endplate along the ventral aspect of the spinal canal, without  significant associated spinal canal stenosis/compromise.    Multilevel Schmorl's node/degenerative endplate irregularities are  seen elsewhere, which appear chronic. There is no other significant  vertebral body height loss in the lumbar spine. There is moderately  exaggerated kyphosis at the thoracolumbar junction and mild to  moderate levoconvex curvature centered at approximately L2-L3. There  is also mild presumed degenerative retrolisthesis of L1 on L2 and L2  on L3 and minimal degenerative anterolisthesis of L5 on S1. There is  close apposition of the spinous processes in the mid to lower lumbar  spine, particularly from L3 through L5, nonspecific, but which can be  seen in Baastrup's syndrome in the appropriate setting.    Modic type I degenerative endplate signal changes at T11-T12, T12-L1,  L1-L2 and L3-L4. Modic type II degenerative endplate signal changes at  T11-T12, T12-L1, L1-L2 and L2-L3 predominantly. Diffuse intervertebral  disc desiccation. Normal appearance of the distal spinal cord with the  conus terminating at L1. There appears to be a small nonspecific T2  hyperintense ovoid lesion measuring 9-10 mm in the inferior aspect of  the spleen (series 8 image 3), possibly representing a cyst.  Lobulated/bilobed T2 hyperintense lesion along the inferior pole of  the right kidney presumably represents a cyst. There is some degree of  fatty atrophy of the lower posterior paraspinous musculature.    Segmental analysis:  T12-L1: Mild disc height loss. No herniation. Posterior central  annular fissure. Mild facet arthropathy. No significant spinal canal  or neural foraminal stenosis.    L1-L2: Moderate disc height loss. Disc bulge with posterior endplate  osteophytic ridging slightly eccentric to the right. Normal facets.  Mild right lateral recess stenosis. No central spinal canal stenosis.  Minimal/mild right neural foraminal  narrowing. The left neural foramen  is patent.    L2-L3: Moderate disc height loss. Diffuse disc bulge with posterior  endplate osteophytic ridging which appears relatively symmetric.  Normal facets. No significant spinal canal stenosis. There appears to  be mild right neural foraminal narrowing. The left neural foramen is  not significantly narrowed.    L3-L4: Mild disc height loss. Disc bulge eccentric to the left. There  may be a small superimposed left foraminal protrusion component. Mild  facet arthropathy. Mild left lateral recess narrowing. No spinal canal  stenosis centrally. Mild left neural foraminal stenosis.  Mild-to-moderate right neural foraminal stenosis.    L4-L5: Mild disc height loss. Shallow disc bulge with posterior  endplate osteophytic ridging, slightly eccentric to the left. Minimal  retropulsion of the posterior inferior endplate of L4 along the  ventral aspect of the spinal canal. Mild to moderate left facet  arthropathy. Minimal left lateral recess narrowing. No central spinal  canal stenosis. Mild to moderate bordering on moderate left neural  foraminal stenosis. Mild-to-moderate right neural foraminal stenosis.    L5-S1: Mild disc height loss. Disc bulge with posterior endplate  osteophytic ridging slightly eccentric to the left. Moderate left and  mild right facet arthropathy. No spinal canal stenosis. Mild left  neural foraminal stenosis. The right neural foramen is patent.      Impression    IMPRESSION:  1. Recent-appearing (acute-to-subacute) L4 compression fracture  primarily involving the inferior endplate. Minimal  retropulsion/buckling of the posterior inferior endplate along the  ventral aspect of the spinal canal without significant associated  spinal canal stenosis.  2. Multilevel degenerative changes, as described.  3. No high-grade spinal canal stenosis. Mild multilevel lateral recess  stenosis, as detailed.  4. Mild/mild to moderate multilevel neural foraminal stenosis,  as  described.  5. Mild multilevel spondylolisthesis in the sagittal plane. Moderately  exaggerated kyphosis of the thoracolumbar junction. Mild to moderate  levoconvex curvature.    JOSSIE DANIELLE MD         SYSTEM ID:  [id number]

## 2021-08-04 PROCEDURE — 250N000013 HC RX MED GY IP 250 OP 250 PS 637: Performed by: INTERNAL MEDICINE

## 2021-08-04 PROCEDURE — 250N000013 HC RX MED GY IP 250 OP 250 PS 637: Performed by: HOSPITALIST

## 2021-08-04 PROCEDURE — 99225 PR SUBSEQUENT OBSERVATION CARE,LEVEL II: CPT | Performed by: INTERNAL MEDICINE

## 2021-08-04 PROCEDURE — G0378 HOSPITAL OBSERVATION PER HR: HCPCS

## 2021-08-04 RX ADMIN — OXYCODONE HYDROCHLORIDE 5 MG: 5 TABLET ORAL at 13:14

## 2021-08-04 RX ADMIN — OXYCODONE HYDROCHLORIDE 5 MG: 5 TABLET ORAL at 17:49

## 2021-08-04 RX ADMIN — ACETAMINOPHEN 650 MG: 325 TABLET, FILM COATED ORAL at 19:48

## 2021-08-04 RX ADMIN — OXYCODONE HYDROCHLORIDE 5 MG: 5 TABLET ORAL at 01:32

## 2021-08-04 RX ADMIN — LIDOCAINE 1 PATCH: 246 PATCH TOPICAL at 19:49

## 2021-08-04 RX ADMIN — OXYCODONE HYDROCHLORIDE 10 MG: 5 TABLET ORAL at 21:49

## 2021-08-04 RX ADMIN — CYCLOBENZAPRINE 5 MG: 5 TABLET, FILM COATED ORAL at 11:42

## 2021-08-04 RX ADMIN — OXYCODONE HYDROCHLORIDE 5 MG: 5 TABLET ORAL at 08:20

## 2021-08-04 RX ADMIN — LATANOPROST 1 DROP: 50 SOLUTION/ DROPS OPHTHALMIC at 19:50

## 2021-08-04 RX ADMIN — CYCLOBENZAPRINE 5 MG: 5 TABLET, FILM COATED ORAL at 17:49

## 2021-08-04 NOTE — PLAN OF CARE
Diagnostic tests and consults completed and resulted  Met  -vital signs normal or at patient baseline  Met  -tolerating oral intake to maintain hydration  Met  -adequate pain control on oral analgesics  Met  -returns to baseline functional status  Not Met  -safe disposition plan has been identified Not Met  Patient is A&OX4, VSS on RA. Pain managed with flexeril, tylenol and Oxy. Lidocaine patch on the back. Cold pack in use as well. Up AX1 with GB and walker. Using BSC. PIV saline locked. Regular diet. Discharge pending TCU placement. SW following. Continue to monitor.

## 2021-08-04 NOTE — PLAN OF CARE
Pt a/o up to bathroom and in room, vss,eatting fine. Medicated for pain wit oxy and flexeril. With relief. Awaiting tcu placement

## 2021-08-04 NOTE — PLAN OF CARE
Physical Therapy Discharge Summary    Reason for therapy discharge:    Discharged to transitional care facility.    Progress towards therapy goal(s). See goals on Care Plan in [facility] electronic health record for goal details.  Goals partially met.  Barriers to achieving goals:   discharge from facility.    Therapy recommendation(s):    Continued therapy is recommended.  Rationale/Recommendations:  eval and treat at TCU.

## 2021-08-04 NOTE — PLAN OF CARE
diagnostic tests and consults completed and resulted  Yes  -vital signs normal or at patient baseline  Yes  -tolerating oral intake to maintain hydration  Yes  -adequate pain control on oral analgesics  Yes  -returns to baseline functional status  No  -safe disposition plan has been identified No pending prior auth    A&OX4, VSS on RA. C/o pain in the low back radiating down to LLE. Pain managed with flexeril, tylenol and Oxy. Lido patch on the back. Ice in use as well. Up AX1 with GB and walker. PIV Sled. Regular diet. Having loose stools, please hold future stool softeners. Discharge pending TCU placement. Continue to monitor.

## 2021-08-04 NOTE — PROGRESS NOTES
Care Management Discharge Note    Discharge Date: 08/04/2021       Discharge Disposition: Skilled Nursing Facilty, Transitional Care - [facility]    Discharge Services:  rehab    Discharge DME:      Discharge Transportation: family or friend will provide    Private pay costs discussed: insurance costs out of pocket expenses and co-pays    PAS Confirmation Code:    Patient/family educated on Medicare website which has current facility and service quality ratings: yes    Education Provided on the Discharge Plan:    Persons Notified of Discharge Plans: pt, TCU, medical team  Patient/Family in Agreement with the Plan: yes    Handoff Referral Completed: No    Additional Information:  [facility] called [facility] for update on case [id number]. This writer was informed that clinicals were received late yesterday afternoon and case is currently still under review. INDU updated TCU, pt and team.    Transport tentatively set up for today at 1930, pending ins auth.    INDU to continue to follow and assist with discharge planning.    JULIO Clark  Daytime (8:00am-4:30pm): [phone]  After-Hours SW Pager (4:30pm-11:30pm): [phone]             JULIO Valenzuela

## 2021-08-04 NOTE — PROGRESS NOTES
diagnostic tests and consults completed and resulted  Yes  -vital signs normal or at patient baseline  Yes  -tolerating oral intake to maintain hydration  Yes  -adequate pain control on oral analgesics  Yes  -returns to baseline functional status  No  -safe disposition plan has been identified No pending prior auth

## 2021-08-04 NOTE — PROGRESS NOTES
[facility]    Hospitalist Progress Note    Assessment & Plan   Chiquita Berry is a 80 year old female with PMHx of dyslipidemia, GERD kyphosis and scoliosis who was admitted under observation status on 7/31/2021 with intractable lower radicular back pain with findings of an acute L4 compression fracture.    Acute to sub-acute L4 compression fracture  * Developed new onset low back pan that started on 7/16/21 after she had been lifting heavy bags of groceries. Described pain in her middle and bilateral lower back, worse on the left side and with radiation down her left leg into the calf muscle. Seen in UC on 7/17 and by PCP on 7/20. Xrays showed DJD. Was initially managed with a steroid taper, NSAIDs, Tylenol #3 and muscle relaxants. Had been doing PT at home but limited ability to participate dt pain. Saw PCP in clinic on 7/29, Tramadol was added. No significant relief was noted so she was ultimately referred to neurosurgery for evaluation.   * Pain continued to worsen at home and she was unable to perform ADLs, prompting evaluation in the ED on 7/31.   * MRI obtained in ED, showed a recent appearing (acute to subacute) L4 compression fracture primarily involving the inferior endplate with minimal retropulsion and without significant associated spinal canal stenosis  * Pain control regimen started on admission including: lidoderm patch, Tylenol, ibuprofen, oxycodone prn, Flexeril prn  * Seen by neurosurgery, non-surgical management recommended. Advised to wear [place] brace for additional support.   * PT recommended TCU stay, patient in agreement.     -- appears comfortable, cont current pain meds, brace and bowel regimen  -- awaiting TCU placement, SW following     History of palpitations, mild mitral regurgitation  Had been seen by EP cardiology in the past for evaluation of palpitations. Last echo in 2013 showed preserved LVEF and mild mitral regurgitation. Event monitor did not show  any evidence of arrhythmias. Last Zio patch in 2019 showed rare PACs/PVCs. Stable.      Dyslipidemia  GERD  Not on any specific medications for these conditions.     FEN: no IVFs, lytes stable, regular diet  DVT Prophylaxis: PCDs  Code Status: Full Code    Disposition: Remains medically stable to discharge. Accepted to TCU as of 8/3 but now awaiting prior auth from insurance. SW following, tentatively has ride scheduled for 1930 this evening if auth has been obtained. If no auth obtained, will need to cancel tonight's ride and re-schedule for tomorrow.     Discharge summary was completed on 8/3/21.     Hannah Aleman      Interval History   Was due to discharge to TCU yesterday but do not yet have prior authorization from insurance so remains hospitalized. Seen this afternoon. No specific complaints. Pain presently controlled. Had soft/loose BM overnight. No cp/sob/cough, abd pain/n/v.    -Data reviewed today: I reviewed all new labs and imaging results over the last 24 hours. I personally reviewed no images or EKG's today.    Physical Exam   Temp: 97.6  F (36.4  C) Temp src: Oral BP: 138/51 Pulse: 76   Resp: 18 SpO2: 92 % O2 Device: None (Room air)    Vitals:    07/31/21 1558   Weight: 61.2 kg (135 lb)     Vital Signs with Ranges  Temp:  [96.1  F (35.6  C)-98.8  F (37.1  C)] 97.6  F (36.4  C)  Pulse:  [] 76  Resp:  [16-18] 18  BP: (121-153)/(44-64) 138/51  SpO2:  [90 %-94 %] 92 %  I/O last 3 completed shifts:  In: 480 [P.O.:480]  Out: -     Constitutional: Resting comfortably, alert and conversing appropriately, NAD  Respiratory: CTAB, no wheeze/rales/rhonchi, no increased work of breathing  Cardiovascular: HRRR, no MGR, no LE edema  GI: S, NT, ND, +BS  Skin/Integumen: warm/dry  Other:      Medications       latanoprost  1 drop Both Eyes QPM     lidocaine  1 patch Transdermal Q24h    And     lidocaine   Transdermal Q8H     PreserVision AREDS 2  2 capsule Oral Daily     senna-docusate  1-2 tablet Oral  BID     sodium chloride (PF)  3 mL Intracatheter Q8H       Data   Recent Labs   Lab 08/03/21  2103 08/03/21  1540 08/01/21  0644   WBC  --   --  8.3   HGB  --   --  12.6   MCV  --   --  98   PLT  --   --  345   NA  --   --  139   POTASSIUM  --   --  3.7   CHLORIDE  --   --  106   CO2  --   --  31   BUN  --   --  14   CR  --   --  0.65   ANIONGAP  --   --  2*   SAGE  --   --  8.2*   GLC 98 138* 103*       No results found for this or any previous visit (from the past 24 hour(s)).

## 2021-08-05 VITALS
TEMPERATURE: 98 F | RESPIRATION RATE: 16 BRPM | DIASTOLIC BLOOD PRESSURE: 51 MMHG | HEIGHT: 60 IN | WEIGHT: 137 LBS | SYSTOLIC BLOOD PRESSURE: 131 MMHG | BODY MASS INDEX: 26.9 KG/M2 | HEART RATE: 94 BPM | OXYGEN SATURATION: 92 %

## 2021-08-05 PROCEDURE — 99207 PR CDG-CODE CATEGORY CHANGED: CPT | Performed by: PHYSICIAN ASSISTANT

## 2021-08-05 PROCEDURE — 250N000013 HC RX MED GY IP 250 OP 250 PS 637: Performed by: PHYSICIAN ASSISTANT

## 2021-08-05 PROCEDURE — 99225 PR SUBSEQUENT OBSERVATION CARE,LEVEL II: CPT | Performed by: PHYSICIAN ASSISTANT

## 2021-08-05 PROCEDURE — 250N000013 HC RX MED GY IP 250 OP 250 PS 637: Performed by: HOSPITALIST

## 2021-08-05 PROCEDURE — G0378 HOSPITAL OBSERVATION PER HR: HCPCS

## 2021-08-05 PROCEDURE — 250N000013 HC RX MED GY IP 250 OP 250 PS 637: Performed by: INTERNAL MEDICINE

## 2021-08-05 RX ORDER — ACETAMINOPHEN 500 MG
1000 TABLET ORAL 2 TIMES DAILY
COMMUNITY
Start: 2021-08-05 | End: 2021-08-06 | Stop reason: DRUGHIGH

## 2021-08-05 RX ORDER — ACETAMINOPHEN 500 MG
1000 TABLET ORAL 2 TIMES DAILY
Status: DISCONTINUED | OUTPATIENT
Start: 2021-08-05 | End: 2021-08-05 | Stop reason: HOSPADM

## 2021-08-05 RX ADMIN — CYCLOBENZAPRINE 5 MG: 5 TABLET, FILM COATED ORAL at 06:33

## 2021-08-05 RX ADMIN — OXYCODONE HYDROCHLORIDE 10 MG: 5 TABLET ORAL at 08:25

## 2021-08-05 RX ADMIN — MELATONIN 5 MG TABLET 5 MG: at 00:13

## 2021-08-05 RX ADMIN — SENNOSIDES AND DOCUSATE SODIUM 1 TABLET: 8.6; 5 TABLET ORAL at 08:25

## 2021-08-05 RX ADMIN — ACETAMINOPHEN 1000 MG: 500 TABLET, FILM COATED ORAL at 13:36

## 2021-08-05 RX ADMIN — OXYCODONE HYDROCHLORIDE 5 MG: 5 TABLET ORAL at 13:36

## 2021-08-05 ASSESSMENT — MIFFLIN-ST. JEOR: SCORE: 1007.93

## 2021-08-05 NOTE — PROGRESS NOTES
Observation Goals:    -diagnostic tests and consults completed and resulted - met  -vital signs normal or at patient baseline - met  -tolerating oral intake to maintain hydration - met  -adequate pain control on oral analgesics - met  -returns to baseline functional status - not met  -safe disposition plan has been identified - not met

## 2021-08-05 NOTE — PLAN OF CARE
AVSS; flexeril for muscle spasm pain with relief; pt up with 1 assist and a gait belt/walker to the bedside commode; CMS intact; pt stated she slept well overnight after melatonin; probable discharge to TCU today.

## 2021-08-05 NOTE — PROGRESS NOTES
Observation Goals:    -diagnostic tests and consults completed and resulted - not met  -vital signs normal or at patient baseline - met  -tolerating oral intake to maintain hydration - met  -adequate pain control on oral analgesics - met  -returns to baseline functional status - not met  -safe disposition plan has been identified - not met

## 2021-08-05 NOTE — PLAN OF CARE
Pt A&Ox4; VSS; c/o lower back pain managed with PO analgesics. Denies any numbness or tingling. Up with assist of 1 person/gait belt/ walker but declined to wear brace; importance of brace education provided. Tolerating PO; voiding without difficulties. Discharge orders received; all instructions reviewed with pt; questions answered. A copy of AVS given to pt. Pt's belongings including home medication returned to pt. Pt discharged from floor via w/c accompanied by [facility] transport to TCU. Discharge paperwork/envelope for TCU sent with pt.

## 2021-08-05 NOTE — PROGRESS NOTES
Care Management Discharge Note    Discharge Date: 08/05/2021       Discharge Disposition: Skilled Nursing Facilty, Transitional Care- [facility]    Discharge Services:  rehab    Discharge DME:      Discharge Transportation: agency    Private pay costs discussed: transportation costs and insurance costs out of pocket expenses and co-pays    PAS Confirmation Code:    Patient/family educated on Medicare website which has current facility and service quality ratings: yes    Education Provided on the Discharge Plan:  rolf  Persons Notified of Discharge Plans: pt, TCU, medical team  Patient/Family in Agreement with the Plan: yes    Handoff Referral Completed: No    Additional Information:  DC orders: sent via DOD at 1408  Scripts: faxed at 1409  PAS:  Trans: [facility] at 1430    SW to continue to follow and assist with discharge planning.    JULIO Clark  Daytime (8:00am-4:30pm): [phone]  After-Hours SW Pager (4:30pm-11:30pm): [phone]               JULIO Valenzuela

## 2021-08-05 NOTE — PROGRESS NOTES
[facility]    Hospitalist Progress Note    Assessment & Plan   Chiquita Berry is a 80 year old female who was admitted on 7/31/2021.     Past medical history significant for Dyslipidemia, GERD, Glaucoma, Kyphosis and Scoliosis who was admitted under observation status on 7/31/2021 with intractable lower radicular back pain with findings of an acute L4 compression fracture.    Acute to sub-acute L4 compression fracture  * Developed new onset low back pan that started on 7/16/21 after she had been lifting heavy bags of groceries. Described pain in her middle and bilateral lower back, worse on the left side and with radiation down her left leg into the calf muscle. Seen in UC on 7/17 and by PCP on 7/20. Xrays showed DJD. Was initially managed with a steroid taper, NSAIDs, Tylenol #3 and muscle relaxants. Had been doing PT at home but limited ability to participate dt pain. Saw PCP in clinic on 7/29, Tramadol was added. No significant relief was noted so she was ultimately referred to neurosurgery for evaluation.   * Pain continued to worsen at home and she was unable to perform ADLs, prompting evaluation in the ED on 7/31.   * MRI obtained in ED, showed a recent appearing (acute to subacute) L4 compression fracture primarily involving the inferior endplate with minimal retropulsion and without significant associated spinal canal stenosis  * Pain control regimen started on admission including:    --Lidoderm patch daily, Scheduled Tylenol 1000 mg BID and PRN Tylenol,  PRN ibuprofen, oxycodone prn, Flexeril prn.  * Seen by neurosurgery, non-surgical management recommended. Advised to wear Ijeoma brace for additional support.   * PT recommended TCU stay, patient in agreement.     -- Appears comfortable, cont current pain meds, brace and bowel regimen.   --Discussed pain control and will schedule APAP and will ensure this is ordered for TCU.    -- Awaiting TCU placement, INDU  following.     History of palpitations, mild mitral regurgitation  Had been seen by EP cardiology in the past for evaluation of palpitations. Last echo in 2013 showed preserved LVEF and mild mitral regurgitation. Event monitor did not show any evidence of arrhythmias. Last Zio patch in 2019 showed rare PACs/PVCs. Stable.      Dyslipidemia  GERD  Not on any specific medications for these conditions.     Glaucoma  -- Resumed on PTA eye drops and multivitamin.      Clinically Significant Risk Factors Present on Admission                     Diet: Regular Diet Adult  Advance Diet as Tolerated     DVT Prophylaxis: Pneumatic Compression Devices   Mcallister Catheter: Not present  Code Status: Full Code     Disposition Plan    Expected discharge: Medically stable to discharge; accepted to TCU but awaiting insurance prior authorization.    Entered: Nikhil Pisano PA-C 08/05/2021, 7:09 AM        The patient's care was discussed with the Patient.      The patient has been discussed with Dr. Lovett, who agrees with the assessment and plan at this time.    Nikhil Pisano PA-C  [facility]  Securely message with the Vocera Web Console (learn more here)  Text page via [employer] Paging/Directory      Interval History   Patient was seated in a chair eating breakfast upon arrival.  She denied fever, chills, chest pain, shortness of breath or abdominal pain.  She mentioned some slight rib pain where the gait belt has been.  She continues to have some back pain and we discussed management and she was in agreement with scheduling Tylenol.      Patient mentioned that she had some looser stools after taking a stool softener Tuesday afternoon/evening.  She is concerned about developing constipation and was advised to take Senokot this morning.      -Data reviewed today: I reviewed all new labs and imaging results over the last 24 hours. I personally reviewed no images or EKG's today.    Physical Exam    Temp: 97.7  F (36.5  C) Temp src: Oral BP: 124/55 Pulse: 91   Resp: 16 SpO2: 95 % O2 Device: None (Room air)    Vitals:    07/31/21 1558   Weight: 61.2 kg (135 lb)     Vital Signs with Ranges  Temp:  [96.8  F (36  C)-98.2  F (36.8  C)] 97.7  F (36.5  C)  Pulse:  [76-91] 91  Resp:  [16-18] 16  BP: (122-138)/(45-55) 124/55  SpO2:  [92 %-95 %] 95 %  I/O last 3 completed shifts:  In: 240 [P.O.:240]  Out: -       Constitutional: Awake, alert, cooperative, no apparent distress.    ENT: Normocephalic, without obvious abnormality, atraumatic, oral pharynx with moist mucus membranes, tonsils without erythema or exudates.  Neck: Supple, symmetrical, trachea midline, no adenopathy.  Pulmonary: No increased work of breathing, good air exchange, clear to auscultation bilaterally, no crackles or wheezing.  Cardiovascular: Regular rate and rhythm, normal S1 and S2, no S3 or S4, and no murmur noted.  GI: Normal bowel sounds, soft, non-distended, non-tender.  Skin/Integumen: Clear.  Neuro: CN II-XII grossly intact.  Psych:  Alert and oriented x 3. Normal affect.  Extremities: No lower extremity edema noted, and calves are non-TTP bilaterally.       Medications       acetaminophen  1,000 mg Oral BID     latanoprost  1 drop Both Eyes QPM     lidocaine  1 patch Transdermal Q24h    And     lidocaine   Transdermal Q8H     PreserVision AREDS 2  2 capsule Oral Daily     senna-docusate  1-2 tablet Oral BID     sodium chloride (PF)  3 mL Intracatheter Q8H       Data   Recent Labs   Lab 08/03/21  2103 08/03/21  1540 08/01/21  0644   WBC  --   --  8.3   HGB  --   --  12.6   MCV  --   --  98   PLT  --   --  345   NA  --   --  139   POTASSIUM  --   --  3.7   CHLORIDE  --   --  106   CO2  --   --  31   BUN  --   --  14   CR  --   --  0.65   ANIONGAP  --   --  2*   SAGE  --   --  8.2*   GLC 98 138* 103*       No results found for this or any previous visit (from the past 24 hour(s)).

## 2021-08-05 NOTE — PLAN OF CARE
diagnostic tests and consults completed and resulted: Met  -vital signs normal or at patient baseline: Met  -tolerating oral intake to maintain hydration: Met  -adequate pain control on oral analgesics: Met  -returns to baseline functional status: Not met  -safe disposition plan has been identified: Not met, discharge tomorrow to TCU pending insurance authorization per SW/CM note    A&Ox4. VSS on RA. Cold applied, lidocaine patch, prn oxycodone x2, prn flexeril x1, & prn Tylenol x1 for c/o LBP. Assist of 1 GB WK. Tolerating reg diet. Voiding in BR, continent of b/b. CORINNA PIV SL. PT consult. Neurosurg consult complete. Pt transport for 7:30 pm tonight cancelled d/t pending insurance auth; SW/CM will set up ride tomorrow to TCU.    Tremfya Pregnancy And Lactation Text: The risk during pregnancy and breastfeeding is uncertain with this medication.

## 2021-08-05 NOTE — DISCHARGE SUMMARY
[facility]  Hospitalist Discharge Summary     Admit Date:  7/31/2021  Discharge Date:     8/5/2021  2:37 PM  Discharging Provider: Nikhil Pisano PA-C    PRIMARY CARE PROVIDER:    Vesna Olivera     DISCHARGE DIAGNOSES:   Acute to sub-acute L4 compression fracture  History of palpitations, mild mitral regurgitation  Dyslipidemia  GERD  Glaucoma     DISCHARGE MEDICATIONS:       Review of your medicines      START taking      Dose / Directions   cyclobenzaprine 5 MG tablet  Commonly known as: FLEXERIL      Dose: 5-10 mg  Take 1-2 tablets (5-10 mg) by mouth 3 times daily as needed for muscle spasms  Quantity: 10 tablet  Refills: 0     Lidocaine 4 % Patch  Commonly known as: LIDOCARE      Dose: 1 patch  Place 1 patch onto the skin every 24 hours To prevent lidocaine toxicity, patient should be patch free for 12 hrs daily.  Refills: 0     oxyCODONE 5 MG tablet  Commonly known as: ROXICODONE      Dose: 5 mg  Take 1 tablet (5 mg) by mouth every 4 hours as needed for moderate to severe pain  Quantity: 10 tablet  Refills: 0     polyethylene glycol 17 g packet  Commonly known as: MIRALAX  Used for: Preventive measure      Dose: 17 g  Take 17 g by mouth daily as needed for constipation  Refills: 0     SENNA-docusate sodium 8.6-50 MG tablet  Commonly known as: SENNA S  Used for: Preventive measure      Dose: 1 tablet  Take 1 tablet by mouth 2 times daily as needed (constipation)  Refills: 0        CONTINUE these medicines which may have CHANGED, or have new prescriptions. If we are uncertain of the size of tablets/capsules you have at home, strength may be listed as something that might have changed.      Dose / Directions   * acetaminophen 325 MG tablet  Commonly known as: TYLENOL  This may have changed:     medication strength    how much to take    when to take this    reasons to take this      Dose: 650 mg  Take 2 tablets (650 mg) by mouth every 6 hours as needed for mild pain or other  (and adjunct with moderate or severe pain or per patient request)  Refills: 0     * acetaminophen 500 MG tablet  Commonly known as: TYLENOL  This may have changed: You were already taking a medication with the same name, and this prescription was added. Make sure you understand how and when to take each.      Dose: 1,000 mg  Take 2 tablets (1,000 mg) by mouth 2 times daily  Refills: 0         * This list has 2 medication(s) that are the same as other medications prescribed for you. Read the directions carefully, and ask your doctor or other care provider to review them with you.            CONTINUE these medicines which have NOT CHANGED      Dose / Directions   alendronate 70 MG tablet  Commonly known as: FOSAMAX  Used for: Age-related osteoporosis without current pathological fracture      Dose: 70 mg  Take 1 tablet (70 mg) by mouth every 7 days  Quantity: 12 tablet  Refills: 3     Blink Tears 0.25 % Soln ophthalmic solution  Generic drug: polyethylene glycol 400      Dose: 1 drop  Place 1 drop into both eyes daily as needed for dry eyes  Refills: 0     ibuprofen 200 MG tablet  Commonly known as: ADVIL/MOTRIN      Dose: 400 mg  Take 400 mg by mouth every 4 hours as needed for mild pain  Refills: 0     latanoprost 0.005 % ophthalmic solution  Commonly known as: XALATAN      Dose: 1 drop  Place 1 drop into both eyes every evening  Refills: 2     melatonin 3 MG Caps      Dose: 3 mg  Take 3 mg by mouth nightly as needed  Refills: 0     MULTIVITAMIN ADULT PO      Dose: 1 tablet  Take 1 tablet by mouth daily  Refills: 0     PreserVision AREDS 2 Caps      Dose: 2 capsule  Take 2 capsules by mouth daily  Refills: 0     vitamin D3 50 mcg (2000 units) tablet  Commonly known as: CHOLECALCIFEROL      Dose: 1 tablet  Take 1 tablet by mouth daily.  Refills: 0        STOP taking    naproxen 500 MG tablet  Commonly known as: NAPROSYN        traMADol 50 MG tablet  Commonly known as: ULTRAM              Where to get your medicines       Some of these will need a paper prescription and others can be bought over the counter. Ask your nurse if you have questions.    Bring a paper prescription for each of these medications    cyclobenzaprine 5 MG tablet    oxyCODONE 5 MG tablet  You don't need a prescription for these medications    acetaminophen 500 MG tablet        ALLERGIES:    Allergies   Allergen Reactions     Augmentin Diarrhea       DISPOSITION:    Discharged to TCU  Condition at discharge: Stable        General info for SNF    Length of Stay Estimate: Short Term Care: Estimated # of Days <30  Condition at Discharge: Improving  Level of care:skilled   Rehabilitation Potential: Excellent  Admission H&P remains valid and up-to-date: Yes  Recent Chemotherapy: N/A  Use Nursing Home Standing Orders: Yes     Mantoux instructions    Give two-step Mantoux (PPD) Per Facility Policy Yes     Reason for your hospital stay    Management of your compression fracture     Follow Up and recommended labs and tests    Follow up with your PCP in 1-2 weeks.   Follow up with neurosurgery in clinic as advised.     Activity - Up with nursing assistance     Follow-up and recommended labs and tests     Please follow up at [facility] in 6 weeks with repeat XR.  Please call the clinic at [phone] for scheduling.     Activity    Discharge instructions:  Limit lifting to 10 pounds, no bending/twisting until follow up visit.  Continue wearing brace when out of bed.   No contact sports until after follow up visit. No high impact activities such as running/jogging, snowmobile or 4 warren riding or any other recreational vehicles. Avoid jostling/jarring activities.     Call [facility] at [phone] for any questions or concerns.     Physical Therapy Adult Consult    Evaluate and treat as clinically indicated.    Reason:  lumbar compression fracture     Occupational Therapy Adult Consult    Evaluate and treat as  clinically indicated.    Reason:  Lumbar compression fracture     Advance Diet as Tolerated    Follow this diet upon discharge: Regular Diet Adult        Consultations This Hospital Stay   NEUROSURGERY IP CONSULT  CARE MANAGEMENT / SOCIAL WORK IP CONSULT  PHYSICAL THERAPY ADULT IP CONSULT  ORTHOSIS SPINAL IP CONSULT  PHYSICAL THERAPY ADULT IP CONSULT  OCCUPATIONAL THERAPY ADULT IP CONSULT     LABORATORY IMAGING AND PROCEDURES:   Laboratory studies that included COVID-19 PCR, CBC with platelet differential, BMP, glucose check x2.    Imaging studies that included Lumbar MRI w/o contrast     PENDING RESULTS:    None     PHYSICAL EXAMINATION ON DAY OF DISCHARGE:    Please review progress note as written earlier today.      BRIEF HISTORY OF PRESENT ILLNESS:    Chiquita Berry is a 80 year old female who was admitted on 7/31/2021.     Past medical history significant for Dyslipidemia, GERD, Glaucoma, Kyphosis and Scoliosis who was admitted under observation status on 7/31/2021 with intractable lower radicular back pain with findings of an acute L4 compression fracture.     HOSPITAL COURSE:     Acute to sub-acute L4 compression fracture  * Developed new onset low back pan that started on 7/16/21 after she had been lifting heavy bags of groceries. Described pain in her middle and bilateral lower back, worse on the left side and with radiation down her left leg into the calf muscle. Seen in UC on 7/17 and by PCP on 7/20. Xrays showed DJD. Was initially managed with a steroid taper, NSAIDs, Tylenol #3 and muscle relaxants. Had been doing PT at home but limited ability to participate dt pain. Saw PCP in clinic on 7/29, Tramadol was added. No significant relief was noted so she was ultimately referred to neurosurgery for evaluation.   * Pain continued to worsen at home and she was unable to perform ADLs, prompting evaluation in the ED on 7/31.   * MRI obtained in ED, showed a recent appearing (acute to subacute) L4  compression fracture primarily involving the inferior endplate with minimal retropulsion and without significant associated spinal canal stenosis  * Pain control regimen started on admission including:    --Lidoderm patch daily, Scheduled Tylenol 1000 mg BID and PRN Tylenol,  PRN ibuprofen, oxycodone prn, Flexeril prn.  * Seen by neurosurgery, non-surgical management recommended. Advised to wear Moseley brace for additional support.   * PT recommended TCU stay, patient in agreement.     -- Appears comfortable, cont current pain meds, brace and bowel regimen.   --Will continue with current regimen and bracing at TCU.       History of palpitations, mild mitral regurgitation  Had been seen by EP cardiology in the past for evaluation of palpitations. Last echo in 2013 showed preserved LVEF and mild mitral regurgitation. Event monitor did not show any evidence of arrhythmias. Last Zio patch in 2019 showed rare PACs/PVCs. Stable.      Dyslipidemia  GERD  Not on any specific medications for these conditions.     Glaucoma  -- Resumed on PTA eye drops and multivitamin.      The patient was discussed with Dr. Lovett who agrees with discharge at this time.     TOTAL DISCHARGE TIME:  INikhil PA-C, personally saw the patient today and spent less than or equal to 30 minutes discharging this patient.    Nikhil Pisano PA-C  [facility]  Securely message with the Vocera Web Console (learn more here)  Text page via [employer] Paging/Directory

## 2021-08-05 NOTE — PLAN OF CARE
Physical Therapy Discharge Summary    Reason for therapy discharge:    Discharged to transitional care facility.    Progress towards therapy goal(s). See goals on Care Plan in [facility] electronic health record for goal details.  Goals not met.  Barriers to achieving goals:   discharge from facility.    Therapy recommendation(s):    Continued therapy is recommended.  Rationale/Recommendations:  eval and treat at TCU.

## 2021-08-05 NOTE — PROGRESS NOTES
Observation Goals:    -diagnostic tests and consults completed and resulted - met  -vital signs normal or at patient baseline - met  -tolerating oral intake to maintain hydration - met  -adequate pain control on oral analgesics - met  -returns to baseline functional status - not met  -safe disposition plan has been identified -met

## 2021-08-05 NOTE — PLAN OF CARE
diagnostic tests and consults completed and resulted: Met  -vital signs normal or at patient baseline: Met  -tolerating oral intake to maintain hydration: Met  -adequate pain control on oral analgesics: Met  -returns to baseline functional status: Not met  -safe disposition plan has been identified: Not met, discharge tomorrow to TCU pending insurance authorization per SW/CM note

## 2021-08-06 ENCOUNTER — TRANSITIONAL CARE UNIT VISIT (OUTPATIENT)
Dept: GERIATRICS | Facility: CLINIC | Age: 81
End: 2021-08-06
Payer: COMMERCIAL

## 2021-08-06 ENCOUNTER — PATIENT OUTREACH (OUTPATIENT)
Dept: INTERNAL MEDICINE | Facility: CLINIC | Age: 81
End: 2021-08-06

## 2021-08-06 ENCOUNTER — DOCUMENTATION ONLY (OUTPATIENT)
Dept: OTHER | Facility: CLINIC | Age: 81
End: 2021-08-06

## 2021-08-06 VITALS
WEIGHT: 134.4 LBS | RESPIRATION RATE: 17 BRPM | TEMPERATURE: 97.9 F | DIASTOLIC BLOOD PRESSURE: 61 MMHG | BODY MASS INDEX: 26.39 KG/M2 | HEIGHT: 60 IN | SYSTOLIC BLOOD PRESSURE: 120 MMHG | HEART RATE: 88 BPM | OXYGEN SATURATION: 94 %

## 2021-08-06 DIAGNOSIS — K21.9 GASTROESOPHAGEAL REFLUX DISEASE, UNSPECIFIED WHETHER ESOPHAGITIS PRESENT: ICD-10-CM

## 2021-08-06 DIAGNOSIS — S32.040D CLOSED COMPRESSION FRACTURE OF L4 LUMBAR VERTEBRA WITH ROUTINE HEALING, SUBSEQUENT ENCOUNTER: Primary | ICD-10-CM

## 2021-08-06 DIAGNOSIS — M80.00XD AGE-RELATED OSTEOPOROSIS WITH CURRENT PATHOLOGICAL FRACTURE WITH ROUTINE HEALING, SUBSEQUENT ENCOUNTER: ICD-10-CM

## 2021-08-06 DIAGNOSIS — M54.50 ACUTE BILATERAL LOW BACK PAIN WITHOUT SCIATICA: ICD-10-CM

## 2021-08-06 DIAGNOSIS — I34.0 MITRAL VALVE INSUFFICIENCY, UNSPECIFIED ETIOLOGY: ICD-10-CM

## 2021-08-06 DIAGNOSIS — E78.5 DYSLIPIDEMIA: ICD-10-CM

## 2021-08-06 DIAGNOSIS — Z87.898 HISTORY OF PALPITATIONS: ICD-10-CM

## 2021-08-06 DIAGNOSIS — H40.003 GLAUCOMA SUSPECT, BILATERAL: ICD-10-CM

## 2021-08-06 DIAGNOSIS — R53.81 PHYSICAL DECONDITIONING: ICD-10-CM

## 2021-08-06 PROBLEM — M54.42 BILATERAL LOW BACK PAIN WITH LEFT-SIDED SCIATICA: Status: RESOLVED | Noted: 2021-07-31 | Resolved: 2021-08-06

## 2021-08-06 PROBLEM — M54.42 LEFT-SIDED LOW BACK PAIN WITH LEFT-SIDED SCIATICA: Status: RESOLVED | Noted: 2021-07-29 | Resolved: 2021-08-06

## 2021-08-06 PROBLEM — S32.040A CLOSED COMPRESSION FRACTURE OF L4 LUMBAR VERTEBRA, INITIAL ENCOUNTER (H): Status: RESOLVED | Noted: 2021-07-31 | Resolved: 2021-08-06

## 2021-08-06 PROCEDURE — 99310 SBSQ NF CARE HIGH MDM 45: CPT | Performed by: NURSE PRACTITIONER

## 2021-08-06 RX ORDER — OXYCODONE HYDROCHLORIDE 5 MG/1
5 TABLET ORAL
COMMUNITY
End: 2021-08-15

## 2021-08-06 RX ORDER — CYCLOBENZAPRINE HCL 5 MG
5 TABLET ORAL
COMMUNITY
End: 2021-08-31

## 2021-08-06 RX ORDER — AMOXICILLIN 250 MG
1 CAPSULE ORAL 2 TIMES DAILY PRN
COMMUNITY
End: 2022-01-01

## 2021-08-06 RX ORDER — ACETAMINOPHEN 325 MG/1
650 TABLET ORAL 4 TIMES DAILY
Status: ON HOLD | COMMUNITY
Start: 2021-08-31 | End: 2023-01-01

## 2021-08-06 ASSESSMENT — MIFFLIN-ST. JEOR
SCORE: 989.32
SCORE: 996.13

## 2021-08-06 NOTE — TELEPHONE ENCOUNTER
What type of discharge? Observation  Risk of Hospital admission or ED visit: 56%  Is a TCM episode required? Yes  When should the patient follow up with PCP? 14 days of discharge.    Enedelia Ng RN  [facility]

## 2021-08-06 NOTE — TELEPHONE ENCOUNTER
Pt was discharged to TCU.  And had hospital f/u appt today with [facility].     Once pt is out of TCU: [facility] , she will need to schedule f/u appt with PCP Dr. Olivera.    Follow up with your PCP in 1-2 weeks.   Follow up with neurosurgery in clinic as advised.

## 2021-08-06 NOTE — PROGRESS NOTES
[facility]  PRIMARY CARE PROVIDER AND CLINIC:  Vesna Olivera MD, [address]  / [facility] [address]  Chief Complaint   Patient presents with     Hospital F/U     [place] Medical Record Number:  [id number]  Place of Service where encounter took place:  [facility] ([facility]) [[id number]]    Chiquita Berry  is a 80 year old  (1940), admitted to the above facility from  [facility]. Hospital stay 7/31/21 through 8/5/21..  Admitted to this facility for  rehab, medical management and nursing care.    HPI:    HPI information obtained from: facility chart records, facility staff, patient report and [facility] chart review.     Brief Summary of Hospital Course: pt initially hurt back and noted pain after lifting some groceries on 7/16--she kerry to her PCP form mid and lower back pain--X-rays showed DJD, put on steroid taper, NSAID, Muscle relaxants and  tylenol #3. Not much improved so PT was added S was tramadol on 7/29--she then worsening and was not able to do ADL's so was brought in to hospital, were xray showed a sub acute to acute L4 Fx on MRI--given lidocaine patch, oxycodone, flexeril  And seen by neurosurgery--treating medically at this time.  Fitted for [employer] brace for support.  Sent for rehab    TODAY DURING EXAM/ROS:  No CP, SOB, Cough, dizziness, fevers, chills, HA, N/V, dysuria or Bowel Abnormalities. Appetite is fair.  Pain and spasm worse in am when starting to move. Said Flexeril has helped with this and the pain helped by oxycodone.  ECG showed a mild Mitral regurg as she has some palpitations off and on.      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  PAST SURGICAL HISTORY:   has a past surgical history that includes cataract iol, rt/lt (Right, 2012); Phacoemulsification clear cornea with standard intraocular lens implant (Left, 11/16/2015); Open reduction internal fixation humerus distal (Right); and tonsillectomy & adenoidectomy.  FAMILY HISTORY: family history includes Breast Cancer in her sister; Dementia in her mother; Myocardial Infarction in her father; Stomach Cancer in her father.  SOCIAL HISTORY:   reports that she has never smoked. She has never used smokeless tobacco. She reports current alcohol use. She reports that she does not use drugs.    Post Discharge Medication Reconciliation Status: discharge medications reconciled and changed, per note/orders     Current Outpatient Medications   Medication Sig Dispense Refill     senna-docusate (SENOKOT-S/PERICOLACE) 8.6-50 MG tablet Take 1 tablet by mouth daily before breakfast AND 1 TAB PO BID PRN       acetaminophen (TYLENOL) 325 MG tablet Take 650 mg by mouth 4 times daily       acetaminophen (TYLENOL) 325 MG tablet Take 2 tablets (650 mg) by mouth every 6 hours as needed for mild pain or other (and adjunct with moderate or severe pain or per patient request)       alendronate (FOSAMAX) 70 MG tablet Take 1 tablet (70 mg) by mouth every 7 days 12 tablet 3     Cholecalciferol (VITAMIN D) 2000 UNITS tablet Take 1 tablet by mouth daily.       cyclobenzaprine (FLEXERIL) 5 MG tablet Take 5 mg by mouth daily before breakfast AND 5 MG PO Q6H PRN       latanoprost (XALATAN) 0.005 % ophthalmic solution Place 1 drop into both eyes every evening   2     Lidocaine (LIDOCARE) 4 % Patch Place 1 patch onto the skin every 24 hours To prevent lidocaine toxicity, patient should be patch free for 12 hrs daily.       melatonin 3 MG CAPS Take 3 mg by mouth At Bedtime        Multiple Vitamin (MULTIVITAMIN ADULT PO) Take 1 tablet by mouth daily       Multiple Vitamins-Minerals (PRESERVISION AREDS  2) CAPS Take 2 capsules by mouth daily       oxyCODONE (ROXICODONE) 5 MG tablet Take 5 mg by mouth daily (with lunch) AND 5 MG PO Q4H PRN       polyethylene glycol (MIRALAX) 17 g packet Take 17 g by mouth daily as needed for constipation       polyethylene glycol 400 (BLINK TEARS) 0.25 % SOLN ophthalmic solution Place 1 drop into both eyes daily as needed for dry eyes             ROS: see above under HPI    Vitals:  /61   Pulse 88   Temp 97.9  F (36.6  C)   Resp 17   Ht 1.524 m (5')   Wt 61 kg (134 lb 6.4 oz)   LMP  (LMP Unknown)   SpO2 94%   BMI 26.25 kg/m    Exam:  GENERAL APPEARANCE:  Alert, in no distress, appears healthy, cooperative  ENT:  Mouth and posterior oropharynx normal, moist mucous membranes, normal hearing acuity  EYES:  EOM, conjunctivae, lids, pupils and irises normal  NECK:  No adenopathy,masses or thyromegaly  RESP:  respiratory effort and palpation of chest normal, lungs clear to auscultation , no respiratory distress  CV:  Palpation and auscultation of heart done , regular rate and rhythm, no murmur, rub, or gallop, no edema, +2 pedal pulses  ABDOMEN:  normal bowel sounds, soft, nontender, no hepatosplenomegaly or other masses  M/S:   Gait and station abnormal TLSO [place] brace in place  SKIN:  Inspection of skin and subcutaneous tissue baseline  NEURO:   Cranial nerves 2-12 are normal tested and grossly at patient's baseline  PSYCH:  oriented X 3, normal insight, judgement and memory, affect and mood normal    Lab/Diagnostic data:  Recent Labs   Lab Test 08/03/21  2103 08/03/21  1540 08/01/21  0644 12/21/20  0939   NA  --   --  139 140   POTASSIUM  --   --  3.7 4.8   CHLORIDE  --   --  106 108   CO2  --   --  31 31   ANIONGAP  --   --  2* 1*   GLC 98 138* 103* 102*   BUN  --   --  14 16   CR  --   --  0.65 0.66   SAGE  --   --  8.2* 9.0     CBC RESULTS: Recent Labs   Lab Test 08/01/21  0644   WBC 8.3   RBC 3.87   HGB 12.6   HCT 37.9   MCV 98   MCH 32.6   MCHC 33.2   RDW 14.0   PLT  345     ASSESSMENT / PLAN:  (S32.040D) Closed compression fracture of L4 lumbar vertebra with routine healing, subsequent encounter:7/16/21  (primary encounter diagnosis)   (M54.5) Acute bilateral low back pain without sciatica   (M80.00XD) Age-related osteoporosis with current pathological fracture with routine healing, subsequent encounter   (R53.81) Physical deconditioning  Comment/Plan: vit D, Fosamax,v--oxycodone, Flexeril, lidocaine.. schedule an early am Flexeril and lunchtime oxycodone. Cont with prns also. Monitor. F/U neurosurgery in 6 weeks prn and xray's --consider Kyphoplasty if needed.     CV ISSUES:  (Z87.898) History of palpitations   (I34.0) Mitral valve insufficiency, unspecified etiology   (E78.5) Dyslipidemia  Comment/Plan: no meds--monitor as outpt    (K21.9) Gastroesophageal reflux disease, unspecified whether esophagitis present  Comment/Plan: no acute issues, quiescent, no meds    (H40.003) Glaucoma suspect, bilateral  Comment/Plan:  Preservision, Blink tears, Xalatan.  monitor.    Total time spent with patient visit at the skilled nursing facility was 45 min including patient visit and review of past records. Greater than 50% of total time spent with counseling and coordinating care due to d/w pt as  To POC,see above..     Electronically signed by:  NIMCO Bain CNP

## 2021-08-09 ENCOUNTER — TRANSITIONAL CARE UNIT VISIT (OUTPATIENT)
Dept: GERIATRICS | Facility: CLINIC | Age: 81
End: 2021-08-09
Payer: COMMERCIAL

## 2021-08-09 VITALS
WEIGHT: 134 LBS | HEART RATE: 93 BPM | DIASTOLIC BLOOD PRESSURE: 70 MMHG | TEMPERATURE: 98 F | BODY MASS INDEX: 26.31 KG/M2 | SYSTOLIC BLOOD PRESSURE: 134 MMHG | RESPIRATION RATE: 18 BRPM | OXYGEN SATURATION: 94 % | HEIGHT: 60 IN

## 2021-08-09 DIAGNOSIS — S32.040D CLOSED COMPRESSION FRACTURE OF L4 LUMBAR VERTEBRA WITH ROUTINE HEALING, SUBSEQUENT ENCOUNTER: Primary | ICD-10-CM

## 2021-08-09 DIAGNOSIS — M80.00XD AGE-RELATED OSTEOPOROSIS WITH CURRENT PATHOLOGICAL FRACTURE WITH ROUTINE HEALING, SUBSEQUENT ENCOUNTER: ICD-10-CM

## 2021-08-09 DIAGNOSIS — K21.9 GASTROESOPHAGEAL REFLUX DISEASE, UNSPECIFIED WHETHER ESOPHAGITIS PRESENT: ICD-10-CM

## 2021-08-09 DIAGNOSIS — H40.003 GLAUCOMA SUSPECT, BILATERAL: ICD-10-CM

## 2021-08-09 DIAGNOSIS — R53.81 PHYSICAL DECONDITIONING: ICD-10-CM

## 2021-08-09 DIAGNOSIS — I34.0 MITRAL VALVE INSUFFICIENCY, UNSPECIFIED ETIOLOGY: ICD-10-CM

## 2021-08-09 DIAGNOSIS — M54.50 ACUTE BILATERAL LOW BACK PAIN WITHOUT SCIATICA: ICD-10-CM

## 2021-08-09 PROCEDURE — 99309 SBSQ NF CARE MODERATE MDM 30: CPT | Performed by: NURSE PRACTITIONER

## 2021-08-09 ASSESSMENT — MIFFLIN-ST. JEOR: SCORE: 994.32

## 2021-08-09 NOTE — PROGRESS NOTES
"[facility]  [place] Medical Record Number:  [id number]  Place of Service where encounter took place:  [facility] ([facility]) [[id number]]  Chief Complaint   Patient presents with     Nursing Home Acute       HPI:    Chiquita Berry  is a 80 year old (1940), who is being seen today for an episodic care visit.  HPI information obtained from: facility chart records, facility staff, patient report and [facility] chart review. Today's concern is: says feeling better, the flexeril helps in the am and needs some oxy right now as worked \"hard\" this am. Says walking helps pain.     Closed compression fracture of L4 lumbar vertebra with routine healing, subsequent encounter  Acute bilateral low back pain without sciatica  Age-related osteoporosis with current pathological fracture with routine healing, subsequent encounter  Physical deconditioning  Mitral valve insufficiency, unspecified etiology  Gastroesophageal reflux disease, unspecified whether esophagitis present  Glaucoma suspect, bilateral      Past Medical and Surgical History reviewed in Epic today.    MEDICATIONS:      Current Outpatient Medications   Medication Sig Dispense Refill     acetaminophen (TYLENOL) 325 MG tablet Take 650 mg by mouth 4 times daily       acetaminophen (TYLENOL) 325 MG tablet Take 2 tablets (650 mg) by mouth every 6 hours as needed for mild pain or other (and adjunct with moderate or severe pain or per patient request)       alendronate (FOSAMAX) 70 MG tablet Take 1 tablet (70 mg) by mouth every 7 days 12 tablet 3     Cholecalciferol (VITAMIN D) 2000 UNITS tablet Take 1 tablet by mouth daily.       cyclobenzaprine (FLEXERIL) 5 MG tablet Take 5 mg by mouth daily before breakfast AND 5 MG PO Q6H PRN       latanoprost (XALATAN) 0.005 % ophthalmic solution Place 1 drop into both eyes every evening   2     Lidocaine (LIDOCARE) 4 % Patch Place 1 patch onto the skin every 24 hours To prevent " lidocaine toxicity, patient should be patch free for 12 hrs daily.       melatonin 3 MG CAPS Take 3 mg by mouth At Bedtime        Multiple Vitamin (MULTIVITAMIN ADULT PO) Take 1 tablet by mouth daily       Multiple Vitamins-Minerals (PRESERVISION AREDS 2) CAPS Take 2 capsules by mouth daily       oxyCODONE (ROXICODONE) 5 MG tablet Take 5 mg by mouth daily (with lunch) AND 5 MG PO Q4H PRN       polyethylene glycol (MIRALAX) 17 g packet Take 17 g by mouth daily as needed for constipation       polyethylene glycol 400 (BLINK TEARS) 0.25 % SOLN ophthalmic solution Place 1 drop into both eyes daily And Q2H PRN       senna-docusate (SENOKOT-S/PERICOLACE) 8.6-50 MG tablet Take 1 tablet by mouth daily before breakfast AND 1 TAB PO BID PRN     .  TODAY DURING EXAM/ROS:  No CP, SOB, Cough, dizziness, fevers, chills, HA, N/V, dysuria or Bowel Abnormalities. Appetite is good.  No pain except in back, has [employer] brace.      Objective:  /70   Pulse 93   Temp 98  F (36.7  C)   Resp 18   Ht 1.524 m (5')   Wt 60.8 kg (134 lb)   LMP  (LMP Unknown)   SpO2 94%   BMI 26.17 kg/m         Exam:  GENERAL APPEARANCE:  Alert, in no distress, appears healthy, cooperative  ENT:  Mouth with moist mucous membranes, normal hearing acuity  EYES:  Conjunctivae, lids, pupils and irises normal  RESP:  respiratory effort of chest normal, lungs clear to auscultation , no respiratory distress  CV:  Auscultation of heart done ,RRR, no murmur, rub, or gallop, no edema, +2 pedal pulses  ABDOMEN:  normal bowel sounds, soft, nontender.  M/S:   Gait and station abnormal TLSO [place] brace in place  SKIN:  Inspection of skin and subcutaneous tissue baseline  NEURO:   Cranial nerves are grossly intact and  at patient's baseline  PSYCH:  oriented X 3, normal insight, judgement and memory, affect and mood normal    Lab/Diagnostic data:  Recent Labs   Lab Test 08/03/21  2103 08/03/21  1540 08/01/21  0644 12/21/20  0939   NA  --   --  139 140    POTASSIUM  --   --  3.7 4.8   CHLORIDE  --   --  106 108   CO2  --   --  31 31   ANIONGAP  --   --  2* 1*   GLC 98 138* 103* 102*   BUN  --   --  14 16   CR  --   --  0.65 0.66   SAGE  --   --  8.2* 9.0     CBC RESULTS: Recent Labs   Lab Test 08/01/21  0644   WBC 8.3   RBC 3.87   HGB 12.6   HCT 37.9   MCV 98   MCH 32.6   MCHC 33.2   RDW 14.0        ASSESSMENT / PLAN:  (S32.040D) Closed compression fracture of L4 lumbar vertebra with routine healing, subsequent encounter:7/16/21  (primary encounter diagnosis)   (M54.5) Acute bilateral low back pain without sciatica   (M80.00XD) Age-related osteoporosis with current pathological fracture with routine healing, subsequent encounter   (R53.81) Physical deconditioning  Comment/Plan: vit D, Fosamax,v--oxycodone, Flexeril, lidocaine.  No changes today.. F/U neurosurgery in 4-6 weeks prn and xray's --consider Kyphoplasty if needed.      (I34.0) Mitral valve insufficiency, unspecified etiology  Comment/Plan: no meds--monitor.    (K21.9) Gastroesophageal reflux disease, unspecified whether esophagitis present  Comment/Plan: no acute issues, quiescent, no med, monitor.    (H40.003) Glaucoma suspect, bilateral  Comment/Plan:  Preservision, Blink tears--can have at bedside to self admin, Xalatan.  monitor.      Electronically signed by:  NIMCO Bain CNP

## 2021-08-12 ENCOUNTER — TRANSITIONAL CARE UNIT VISIT (OUTPATIENT)
Dept: GERIATRICS | Facility: CLINIC | Age: 81
End: 2021-08-12
Payer: COMMERCIAL

## 2021-08-12 VITALS
HEIGHT: 60 IN | DIASTOLIC BLOOD PRESSURE: 61 MMHG | WEIGHT: 134 LBS | HEART RATE: 84 BPM | RESPIRATION RATE: 18 BRPM | BODY MASS INDEX: 26.31 KG/M2 | TEMPERATURE: 98.8 F | OXYGEN SATURATION: 93 % | SYSTOLIC BLOOD PRESSURE: 122 MMHG

## 2021-08-12 DIAGNOSIS — K21.9 GASTROESOPHAGEAL REFLUX DISEASE, UNSPECIFIED WHETHER ESOPHAGITIS PRESENT: ICD-10-CM

## 2021-08-12 DIAGNOSIS — R53.81 PHYSICAL DECONDITIONING: ICD-10-CM

## 2021-08-12 DIAGNOSIS — S32.040D CLOSED COMPRESSION FRACTURE OF L4 LUMBAR VERTEBRA WITH ROUTINE HEALING, SUBSEQUENT ENCOUNTER: Primary | ICD-10-CM

## 2021-08-12 DIAGNOSIS — M54.50 ACUTE BILATERAL LOW BACK PAIN WITHOUT SCIATICA: ICD-10-CM

## 2021-08-12 DIAGNOSIS — M80.00XD AGE-RELATED OSTEOPOROSIS WITH CURRENT PATHOLOGICAL FRACTURE WITH ROUTINE HEALING, SUBSEQUENT ENCOUNTER: ICD-10-CM

## 2021-08-12 PROCEDURE — 99305 1ST NF CARE MODERATE MDM 35: CPT | Mod: AI | Performed by: INTERNAL MEDICINE

## 2021-08-12 ASSESSMENT — MIFFLIN-ST. JEOR: SCORE: 994.32

## 2021-08-12 NOTE — PROGRESS NOTES
Chiquita Berry is a 80 year old female seen August 12, 2021 at [facility] where she was admitted after [facility] hospitalization 7/31-8/5 for L4 compression fracture.   Pt is seen in her room up to chair, pleasant and conversational.   She reports she has kyphosis and scoliosis, was carrying heavy groceries in mid-July and after that developed worsening back pain such that she was unable to manage at home.   She was treated with prednisone, NSAIDs, T#3, but incomplete relief.   MRI showed an acute to subacute L4 compression fracture and she was seen by Neurosurgery who recommended a [place] brace and pain management.     Pt transferred to TCU for ongoing recovery and Rehab.   Continues to have pain with movement and needs assist with ADLs.   Also reports muscle spasms that may cause her leg to give way.   Current regimen of lidocaine patch, acetaminophen, Flexeril and prn oxycodone has been effective.    Her alendronate has been restarted.   Pt otherwise has few medical issues.  Notes she has a h/o tachycardia and palpitations.  Reviewed 2019 ZioPatch results, few PACs/PVCs but no significant arrhythmia.        Past Medical History:   Diagnosis Date     Anxiety      Dyslipidemia      Esophageal reflux      Glaucoma suspect, bilateral      Kyphosis      Mitral regurgitation      Osteoporosis      Palpitations      Scoliosis        Past Surgical History:   Procedure Laterality Date     CATARACT IOL, RT/LT Right 2012     OPEN REDUCTION INTERNAL FIXATION HUMERUS DISTAL Right      PHACOEMULSIFICATION CLEAR CORNEA WITH STANDARD INTRAOCULAR LENS IMPLANT Left 11/16/2015    Procedure: PHACOEMULSIFICATION CLEAR CORNEA WITH STANDARD INTRAOCULAR LENS IMPLANT;  Surgeon: Latrell Jessica MD;  Location:  [facility]     TONSILLECTOMY & ADENOIDECTOMY         Family History   Problem Relation Age of Onset     Dementia Mother      Myocardial Infarction Father         later in life     Stomach Cancer Father       Breast Cancer Sister         x2 sisters (post-menopausal)     Diabetes No family hx of      Cerebrovascular Disease No family hx of      Coronary Artery Disease Early Onset No family hx of      Ovarian Cancer No family hx of      Colon Cancer No family hx of        Social History     Tobacco Use     Smoking status: Never Smoker     Smokeless tobacco: Never Used   Substance Use Topics     Alcohol use: Yes      SH: Lives alone, [place] apartment in [place].   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     Review Of Systems  Skin: negative   Eyes: impaired vision, glasses  Ears/Nose/Throat: hearing loss  Respiratory: No shortness of breath, dyspnea on exertion, cough, or hemoptysis  Cardiovascular: palpitations and tachycardia in setting of stress, per pt.   Gastrointestinal: reflux  Genitourinary: negative  Musculoskeletal: back pain, previously ambulatory without device   Now needs min-mod assist with bed mobility, CGA for transfers, SBA for LB dressing and toileting.  Ambulates 150' with FWW and CGA      Neurologic: negative  Psychiatric: negative  Hematologic/Lymphatic/Immunologic: negative  Endocrine: negative      GENERAL APPEARANCE: alert and no distress, wearing [place] brace  /61   Pulse 84   Temp 98.8  F (37.1  C)   Resp 18   Ht 1.524 m (5')   Wt 60.8 kg (134 lb)   LMP  (LMP Unknown)   SpO2 93%   BMI 26.17 kg/m     HEENT: normocephalic, no lesion or abnormalities  +kyphoscoliosis  NECK: no adenopathy, no asymmetry, masses, or scars and thyroid normal to palpation  RESP: lungs clear to auscultation - no rales, rhonchi or wheezes  CV: regular rate and rhythm, normal S1 S2 @90, occ ectopy     ABDOMEN:  soft, nontender, no HSM or masses and bowel sounds normal  MS: extremities normal- no gross deformities noted, no ext edema  SKIN: no suspicious lesions or rashes  NEURO: Normal strength and tone, sensory exam grossly normal, and speech normal  PSYCH: affect  okay  LYMPHATICS: No cervical,  or supraclavicular nodes    Last Comprehensive Metabolic Panel:  Sodium   Date Value Ref Range Status   08/01/2021 139 133 - 144 mmol/L Final   12/21/2020 140 133 - 144 mmol/L Final     Potassium   Date Value Ref Range Status   08/01/2021 3.7 3.4 - 5.3 mmol/L Final   12/21/2020 4.8 3.4 - 5.3 mmol/L Final     Chloride   Date Value Ref Range Status   08/01/2021 106 94 - 109 mmol/L Final   12/21/2020 108 94 - 109 mmol/L Final     Carbon Dioxide   Date Value Ref Range Status   12/21/2020 31 20 - 32 mmol/L Final     Carbon Dioxide (CO2)   Date Value Ref Range Status   08/01/2021 31 20 - 32 mmol/L Final     Anion Gap   Date Value Ref Range Status   08/01/2021 2 (L) 3 - 14 mmol/L Final   12/21/2020 1 (L) 3 - 14 mmol/L Final     Glucose   Date Value Ref Range Status   08/01/2021 103 (H) 70 - 99 mg/dL Final   12/21/2020 102 (H) 70 - 99 mg/dL Final     GLUCOSE BY METER POCT   Date Value Ref Range Status   08/03/2021 98 70 - 99 mg/dL Final     Urea Nitrogen   Date Value Ref Range Status   08/01/2021 14 7 - 30 mg/dL Final   12/21/2020 16 7 - 30 mg/dL Final     Creatinine   Date Value Ref Range Status   08/01/2021 0.65 0.52 - 1.04 mg/dL Final   12/21/2020 0.66 0.52 - 1.04 mg/dL Final     GFR Estimate   Date Value Ref Range Status   08/01/2021 84 >60 mL/min/1.73m2 Final     Comment:     As of July 11, 2021, eGFR is calculated by the CKD-EPI creatinine equation, without race adjustment. eGFR can be influenced by muscle mass, exercise, and diet. The reported eGFR is an estimation only and is only applicable if the renal function is stable.   12/21/2020 83 >60 mL/min/[1.73_m2] Final     Comment:     Non  GFR Calc  Starting 12/18/2018, serum creatinine based estimated GFR (eGFR) will be   calculated using the Chronic Kidney Disease Epidemiology Collaboration   (CKD-EPI) equation.       Calcium   Date Value Ref Range Status   08/01/2021 8.2 (L) 8.5 - 10.1 mg/dL Final   12/21/2020 9.0 8.5  - 10.1 mg/dL Final     Lab Results   Component Value Date    WBC 8.3 08/01/2021      HGB 12.6 08/01/2021      MCV 98 08/01/2021       08/01/2021      Lumbar spine MRI 7/31/2021    IMPRESSION:  1. Recent-appearing (acute-to-subacute) L4 compression fracture  primarily involving the inferior endplate. Minimal retropulsion/buckling of the posterior inferior endplate along the  ventral aspect of the spinal canal without significant associated spinal canal stenosis.  2. Multilevel degenerative changes, as described.  3. No high-grade spinal canal stenosis. Mild multilevel lateral recess stenosis, as detailed.  4. Mild/mild to moderate multilevel neural foraminal stenosis, as described.  5. Mild multilevel spondylolisthesis in the sagittal plane. Moderately  exaggerated kyphosis of the thoracolumbar junction. Mild to moderate levoconvex curvature.       IMP/PLAN:   (S32.040D) Closed compression fracture of L4 lumbar vertebra with routine healing, subsequent encounter:7/16/21  (primary encounter diagnosis)  (M54.5) Acute bilateral low back pain without sciatica  Comment: and muscle spasms  Plan: pain management with scheduled acetaminophen, lidocaine patch, prn Flexeril and prn oxycodone      [employer] brace when out of bed  Follow up with Neurosurgery as scheduled.         (M80.00XD) Age-related osteoporosis with current pathological fracture with routine healing, subsequent encounter  Comment: kyphoscoliosis     Plan: vit D 2000 units/day  Alendronate 70 mg/week restarted       (R53.81) Physical deconditioning  Comment: after acute injury   Plan: PHYSICAL THERAPY / OCCUPATIONAL THERAPY for transfers, gait, strengthening, ADLs.   Discharge goal is return to her [place] apartment with [facility]     (K21.9) Gastroesophageal reflux disease, unspecified whether esophagitis present  Comment: by hx  Plan: follow, consider restarting acid suppression now that she is back on a bisphosphonate         Leidy Villa MD

## 2021-08-12 NOTE — LETTER
8/12/2021        RE: Chiquita Berry  [address]  [facility] [address]        Chiquita Berry is a 80 year old female [facility] August [facility], [facility] after [facility] hospitalization 7/31-8/5 for L4 compression fracture.   Pt is seen in her room up to chair, pleasant and conversational.   She reports she has kyphosis and scoliosis, was carrying heavy groceries in mid-July and after that developed worsening back pain such that she was unable to manage at home.   She was treated with prednisone, NSAIDs, T#3, but incomplete relief.   MRI showed an acute to subacute L4 compression fracture and she was seen by Neurosurgery who recommended a [place] brace and pain management.     Pt transferred to TCU for ongoing recovery and Rehab.   Continues to have pain with movement and needs assist with ADLs.   Also reports muscle spasms that may cause her leg to give way.   Current regimen of lidocaine patch, acetaminophen, Flexeril and prn oxycodone has been effective.    Her alendronate has been restarted.   Pt otherwise has few medical issues.  Notes she has a h/o tachycardia and palpitations.  Reviewed 2019 ZioPatch results, few PACs/PVCs but no significant arrhythmia.        Past Medical History:   Diagnosis Date     Anxiety      Dyslipidemia      Esophageal reflux      Glaucoma suspect, bilateral      Kyphosis      Mitral regurgitation      Osteoporosis      Palpitations      Scoliosis        Past Surgical History:   Procedure Laterality Date     CATARACT IOL, RT/LT Right 2012     OPEN REDUCTION INTERNAL FIXATION HUMERUS DISTAL Right      PHACOEMULSIFICATION CLEAR CORNEA WITH STANDARD INTRAOCULAR LENS IMPLANT Left 11/16/2015    Procedure: PHACOEMULSIFICATION CLEAR CORNEA WITH STANDARD INTRAOCULAR LENS IMPLANT;  Surgeon: Latrell Jessica MD;  Location: [facility]     TONSILLECTOMY & ADENOIDECTOMY         Family History   Problem Relation Age of Onset      Dementia Mother      Myocardial Infarction Father         later in life     Stomach Cancer Father      Breast Cancer Sister         x2 sisters (post-menopausal)     Diabetes No family hx of      Cerebrovascular Disease No family hx of      Coronary Artery Disease Early Onset No family hx of      Ovarian Cancer No family hx of      Colon Cancer No family hx of        Social History     Tobacco Use     Smoking status: Never Smoker     Smokeless tobacco: Never Used   Substance Use Topics     Alcohol use: Yes      SH: Lives alone, [place] apartment in [place].   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     Review Of Systems  Skin: negative   Eyes: impaired vision, glasses  Ears/Nose/Throat: hearing loss  Respiratory: No shortness of breath, dyspnea on exertion, cough, or hemoptysis  Cardiovascular: palpitations and tachycardia in setting of stress, per pt.   Gastrointestinal: reflux  Genitourinary: negative  Musculoskeletal: back pain, previously ambulatory without device   Now needs min-mod assist with bed mobility, CGA for transfers, SBA for LB dressing and toileting.  Ambulates 150' with FWW and CGA      Neurologic: negative  Psychiatric: negative  Hematologic/Lymphatic/Immunologic: negative  Endocrine: negative      GENERAL APPEARANCE: alert and no distress, wearing [place] brace  /61   Pulse 84   Temp 98.8  F (37.1  C)   Resp 18   Ht 1.524 m (5')   Wt 60.8 kg (134 lb)   LMP  (LMP Unknown)   SpO2 93%   BMI 26.17 kg/m     HEENT: normocephalic, no lesion or abnormalities  +kyphoscoliosis  NECK: no adenopathy, no asymmetry, masses, or scars and thyroid normal to palpation  RESP: lungs clear to auscultation - no rales, rhonchi or wheezes  CV: regular rate and rhythm, normal S1 S2 @90, occ ectopy     ABDOMEN:  soft, nontender, no HSM or masses and bowel sounds normal  MS: extremities normal- no gross deformities noted, no ext edema  SKIN: no suspicious lesions or  rashes  NEURO: Normal strength and tone, sensory exam grossly normal, and speech normal  PSYCH: affect okay  LYMPHATICS: No cervical,  or supraclavicular nodes    Last Comprehensive Metabolic Panel:  Sodium   Date Value Ref Range Status   08/01/2021 139 133 - 144 mmol/L Final   12/21/2020 140 133 - 144 mmol/L Final     Potassium   Date Value Ref Range Status   08/01/2021 3.7 3.4 - 5.3 mmol/L Final   12/21/2020 4.8 3.4 - 5.3 mmol/L Final     Chloride   Date Value Ref Range Status   08/01/2021 106 94 - 109 mmol/L Final   12/21/2020 108 94 - 109 mmol/L Final     Carbon Dioxide   Date Value Ref Range Status   12/21/2020 31 20 - 32 mmol/L Final     Carbon Dioxide (CO2)   Date Value Ref Range Status   08/01/2021 31 20 - 32 mmol/L Final     Anion Gap   Date Value Ref Range Status   08/01/2021 2 (L) 3 - 14 mmol/L Final   12/21/2020 1 (L) 3 - 14 mmol/L Final     Glucose   Date Value Ref Range Status   08/01/2021 103 (H) 70 - 99 mg/dL Final   12/21/2020 102 (H) 70 - 99 mg/dL Final     GLUCOSE BY METER POCT   Date Value Ref Range Status   08/03/2021 98 70 - 99 mg/dL Final     Urea Nitrogen   Date Value Ref Range Status   08/01/2021 14 7 - 30 mg/dL Final   12/21/2020 16 7 - 30 mg/dL Final     Creatinine   Date Value Ref Range Status   08/01/2021 0.65 0.52 - 1.04 mg/dL Final   12/21/2020 0.66 0.52 - 1.04 mg/dL Final     GFR Estimate   Date Value Ref Range Status   08/01/2021 84 >60 mL/min/1.73m2 Final     Comment:     As of July 11, 2021, eGFR is calculated by the CKD-EPI creatinine equation, without race adjustment. eGFR can be influenced by muscle mass, exercise, and diet. The reported eGFR is an estimation only and is only applicable if the renal function is stable.   12/21/2020 83 >60 mL/min/[1.73_m2] Final     Comment:     Non  GFR Calc  Starting 12/18/2018, serum creatinine based estimated GFR (eGFR) will be   calculated using the Chronic Kidney Disease Epidemiology Collaboration   (CKD-EPI) equation.        Calcium   Date Value Ref Range Status   08/01/2021 8.2 (L) 8.5 - 10.1 mg/dL Final   12/21/2020 9.0 8.5 - 10.1 mg/dL Final     Lab Results   Component Value Date    WBC 8.3 08/01/2021      HGB 12.6 08/01/2021      MCV 98 08/01/2021       08/01/2021      Lumbar spine MRI 7/31/2021    IMPRESSION:  1. Recent-appearing (acute-to-subacute) L4 compression fracture  primarily involving the inferior endplate. Minimal retropulsion/buckling of the posterior inferior endplate along the  ventral aspect of the spinal canal without significant associated spinal canal stenosis.  2. Multilevel degenerative changes, as described.  3. No high-grade spinal canal stenosis. Mild multilevel lateral recess stenosis, as detailed.  4. Mild/mild to moderate multilevel neural foraminal stenosis, as described.  5. Mild multilevel spondylolisthesis in the sagittal plane. Moderately  exaggerated kyphosis of the thoracolumbar junction. Mild to moderate levoconvex curvature.       IMP/PLAN:   (S32.040D) Closed compression fracture of L4 lumbar vertebra with routine healing, subsequent encounter:7/16/21  (primary encounter diagnosis)  (M54.5) Acute bilateral low back pain without sciatica  Comment: and muscle spasms  Plan: pain management with scheduled acetaminophen, lidocaine patch, prn Flexeril and prn oxycodone      [place] brace when out of bed  Follow up with Neurosurgery as scheduled.         (M80.00XD) Age-related osteoporosis with current pathological fracture with routine healing, subsequent encounter  Comment: kyphoscoliosis     Plan: vit D 2000 units/day  Alendronate 70 mg/week restarted       (R53.81) Physical deconditioning  Comment: after acute injury   Plan: PHYSICAL THERAPY / OCCUPATIONAL THERAPY for transfers, gait, strengthening, ADLs.   Discharge goal is return to her [place] apartment with [facility]     (K21.9) Gastroesophageal reflux disease, unspecified whether esophagitis present  Comment: by hx  Plan: follow, consider  restarting acid suppression now that she is back on a bisphosphonate         Leidy Villa MD         Sincerely,        Leidy Villa MD

## 2021-08-15 ENCOUNTER — TELEPHONE (OUTPATIENT)
Dept: GERIATRICS | Facility: CLINIC | Age: 81
End: 2021-08-15

## 2021-08-15 DIAGNOSIS — R52 PAIN: Primary | ICD-10-CM

## 2021-08-15 RX ORDER — OXYCODONE HYDROCHLORIDE 5 MG/1
TABLET ORAL
Qty: 30 TABLET | Refills: 0 | Status: SHIPPED | OUTPATIENT
Start: 2021-08-15 | End: 2021-08-23

## 2021-08-15 NOTE — TELEPHONE ENCOUNTER
[facility] TRIAGE ENCOUNTER    Chief Complaint   Patient presents with     Opioid Refill       Chiquiat Berry is a 81 year old  (1940),Nurse called today to report: patient needs more oxycodone. Per  oxycodone has not been refilled recently.    ASSESSMENT/PLAN    Refill sent to [facility] pharmacy    Electronically signed by:   Mishel Mcpherson, NP

## 2021-08-18 ENCOUNTER — TRANSITIONAL CARE UNIT VISIT (OUTPATIENT)
Dept: GERIATRICS | Facility: CLINIC | Age: 81
End: 2021-08-18
Payer: COMMERCIAL

## 2021-08-18 VITALS
DIASTOLIC BLOOD PRESSURE: 64 MMHG | HEART RATE: 80 BPM | RESPIRATION RATE: 18 BRPM | HEIGHT: 60 IN | SYSTOLIC BLOOD PRESSURE: 124 MMHG | OXYGEN SATURATION: 96 % | TEMPERATURE: 97.3 F | WEIGHT: 134 LBS | BODY MASS INDEX: 26.31 KG/M2

## 2021-08-18 DIAGNOSIS — R53.81 PHYSICAL DECONDITIONING: ICD-10-CM

## 2021-08-18 DIAGNOSIS — K59.03 DRUG-INDUCED CONSTIPATION: ICD-10-CM

## 2021-08-18 DIAGNOSIS — S32.040D CLOSED COMPRESSION FRACTURE OF L4 LUMBAR VERTEBRA WITH ROUTINE HEALING, SUBSEQUENT ENCOUNTER: Primary | ICD-10-CM

## 2021-08-18 PROCEDURE — 99309 SBSQ NF CARE MODERATE MDM 30: CPT | Performed by: NURSE PRACTITIONER

## 2021-08-18 RX ORDER — POLYETHYLENE GLYCOL 3350 17 G/17G
17 POWDER, FOR SOLUTION ORAL DAILY
Qty: 510 G | COMMUNITY
Start: 2021-08-18

## 2021-08-18 NOTE — LETTER
8/18/2021        RE: Chiquita Berry  [address]  [facility] [address]        [facility]  [facility] Medical Record Number:  [id number]  Place of Service where encounter took place:  [facility] ([facility]) [[id number]]  Chief Complaint   Patient presents with     Nursing Home Acute       HPI:    Chiquita Berry  is a 81 year old (1940), who is being seen today for an episodic care visit.  HPI information obtained from: facility chart records, facility staff, patient report and [facility] chart review.     Chiquita was visited today while in her room, sitting up in the chair. She reports that the brace if uncomfortable but pain is controlled. She has some muscle spasms in the right thigh, flexeril is working. She is taking oxycodone at night time. We discussed tapering off from that early next week which she agrees with. She is eating well, having some difficulty with bowel movements but finds that the miralax has been helpful. No shortness of breath or chest pain.     Past Medical and Surgical History reviewed in Epic today.    MEDICATIONS:    Current Outpatient Medications   Medication Sig Dispense Refill     acetaminophen (TYLENOL) 325 MG tablet Take 650 mg by mouth 4 times daily       acetaminophen (TYLENOL) 325 MG tablet Take 2 tablets (650 mg) by mouth every 6 hours as needed for mild pain or other (and adjunct with moderate or severe pain or per patient request)       alendronate (FOSAMAX) 70 MG tablet Take 1 tablet (70 mg) by mouth every 7 days 12 tablet 3     Cholecalciferol (VITAMIN D) 2000 UNITS tablet Take 1 tablet by mouth daily.       cyclobenzaprine (FLEXERIL) 5 MG tablet Take 5 mg by mouth daily before breakfast AND 5 MG PO Q6H PRN       latanoprost (XALATAN) 0.005 % ophthalmic solution Place 1 drop into both eyes every evening   2     Lidocaine (LIDOCARE) 4 % Patch Place 1 patch onto the skin every 24 hours To prevent  lidocaine toxicity, patient should be patch free for 12 hrs daily.       melatonin 3 MG CAPS Take 3 mg by mouth At Bedtime        Multiple Vitamin (MULTIVITAMIN ADULT PO) Take 1 tablet by mouth daily       Multiple Vitamins-Minerals (PRESERVISION AREDS 2) CAPS Take 1 capsule by mouth daily        oxyCODONE (ROXICODONE) 5 MG tablet Take 1 tablet (5 mg) by mouth daily (with lunch) AND 1 tablet (5 mg) daily (with dinner). AND 5 MG PO Q4H PRN 30 tablet 0     polyethylene glycol (MIRALAX) 17 g packet Take 17 g by mouth daily as needed for constipation       polyethylene glycol 400 (BLINK TEARS) 0.25 % SOLN ophthalmic solution Place 1 drop into both eyes daily And Q2H PRN       senna-docusate (SENOKOT-S/PERICOLACE) 8.6-50 MG tablet Take 1 tablet by mouth daily before breakfast AND 1 TAB PO BID PRN         REVIEW OF SYSTEMS:  4 point ROS including Respiratory, CV, GI and , other than that noted in the HPI,  is negative    Objective:  /64   Pulse 80   Temp 97.3  F (36.3  C)   Resp 18   Ht 1.524 m (5')   Wt 60.8 kg (134 lb)   LMP  (LMP Unknown)   SpO2 96%   BMI 26.17 kg/m    Exam:  GENERAL APPEARANCE:  Alert, in no distress, pleasant, cooperative, oriented x 4  EYES: no discharge or mattering on lids or lashes noted  ENT:  moist mucous membranes, hearing acuity intact  NECK: supple, symmetrical  RESP: no respiratory distress, Lung sounds clear, patient is on room air  CV:  rate and rhythm regular, no murmur. Edema none in bilateral lower extremities. VASCULAR: warm extremities without open areas.  ABDOMEN: normal bowel sounds, soft, nontender.  M/S:   Gait and station ambulates independently with walker, no tenderness or swelling of the joints; able to move all extremities. Thoracic brace is in place.   SKIN:  Inspection and palpation of skin and subcutaneous tissue: skin warm, dry and intact without rashes  NEURO: no facial asymmetry, no speech deficits and able to follow directions, moves all extremities  symmetrically  PSYCH:  insight and judgement intact, memory intact, affect and mood normal    Labs:   CBC RESULTS: Recent Labs   Lab Test 08/01/21  0644 02/15/19  1549   WBC 8.3 10.5   RBC 3.87 4.08   HGB 12.6 13.3   HCT 37.9 39.2   MCV 98 96   MCH 32.6 32.6   MCHC 33.2 33.9   RDW 14.0 13.4    279       Last Basic Metabolic Panel:  Recent Labs   Lab Test 08/03/21  2103 08/03/21  1540 08/01/21  0644 12/21/20  0939   NA  --   --  139 140   POTASSIUM  --   --  3.7 4.8   CHLORIDE  --   --  106 108   SAGE  --   --  8.2* 9.0   CO2  --   --  31 31   BUN  --   --  14 16   CR  --   --  0.65 0.66   GLC 98 138* 103* 102*       Liver Function Studies -   Recent Labs   Lab Test 12/21/20  0939 10/08/19  1200 12/11/15  0959   PROTTOTAL 7.3  --  7.1   ALBUMIN 3.6  --  3.7   BILITOTAL 0.5  --  0.4   ALKPHOS 85  --  71   AST 23  --  32   ALT 24 32 34       TSH   Date Value Ref Range Status   04/16/2019 3.10 0.40 - 4.00 mU/L Final   12/30/2012 3.19 0.4 - 5.0 mU/L Final     ASSESSMENT/PLAN:  (S32.040D) Closed compression fracture of L4 lumbar vertebra with routine healing, subsequent encounter:7/16/21  (primary encounter diagnosis)  Comment: has some discomfort but controlled with tylenol and oxycodone. Muscle spasms are ongoing which flexeril is helping.   Plan: tylenol. Oxycodone and flexeril. Will work to taper off from the oxy early next week.     (K59.03) Drug-induced constipation  Comment: using the PRN miralax on a daily basis.   Plan: will schedule miralax 17 g daily  --senna 1 tab BID and BID PRN    (R53.81) Physical deconditioning  Comment: secondary to recent hospitalization and underlying medical conditions.   Plan: PT/OT for strengthening. SW for discharge planning.     Electronically signed by:  Jeanine Lieberman, NIMCO CNP             Sincerely,        NIMCO Elizondo CNP

## 2021-08-18 NOTE — PROGRESS NOTES
[facility]  [facility] Medical Record Number:  [id number]  Place of Service where encounter took place:  [facility] ([facility]) [[id number]]  Chief Complaint   Patient presents with     Nursing Home Acute       HPI:    Chiquita Berry  is a 81 year old (1940), who is being seen today for an episodic care visit.  HPI information obtained from: facility chart records, facility staff, patient report and [facility] chart review.     Chiquita was visited today while in her room, sitting up in the chair. She reports that the brace if uncomfortable but pain is controlled. She has some muscle spasms in the right thigh, flexeril is working. She is taking oxycodone at night time. We discussed tapering off from that early next week which she agrees with. She is eating well, having some difficulty with bowel movements but finds that the miralax has been helpful. No shortness of breath or chest pain.     Past Medical and Surgical History reviewed in Epic today.    MEDICATIONS:    Current Outpatient Medications   Medication Sig Dispense Refill     acetaminophen (TYLENOL) 325 MG tablet Take 650 mg by mouth 4 times daily       acetaminophen (TYLENOL) 325 MG tablet Take 2 tablets (650 mg) by mouth every 6 hours as needed for mild pain or other (and adjunct with moderate or severe pain or per patient request)       alendronate (FOSAMAX) 70 MG tablet Take 1 tablet (70 mg) by mouth every 7 days 12 tablet 3     Cholecalciferol (VITAMIN D) 2000 UNITS tablet Take 1 tablet by mouth daily.       cyclobenzaprine (FLEXERIL) 5 MG tablet Take 5 mg by mouth daily before breakfast AND 5 MG PO Q6H PRN       latanoprost (XALATAN) 0.005 % ophthalmic solution Place 1 drop into both eyes every evening   2     Lidocaine (LIDOCARE) 4 % Patch Place 1 patch onto the skin every 24 hours To prevent lidocaine toxicity, patient should be patch free for 12 hrs daily.       melatonin 3 MG CAPS Take 3 mg by mouth At  Bedtime        Multiple Vitamin (MULTIVITAMIN ADULT PO) Take 1 tablet by mouth daily       Multiple Vitamins-Minerals (PRESERVISION AREDS 2) CAPS Take 1 capsule by mouth daily        oxyCODONE (ROXICODONE) 5 MG tablet Take 1 tablet (5 mg) by mouth daily (with lunch) AND 1 tablet (5 mg) daily (with dinner). AND 5 MG PO Q4H PRN 30 tablet 0     polyethylene glycol (MIRALAX) 17 g packet Take 17 g by mouth daily as needed for constipation       polyethylene glycol 400 (BLINK TEARS) 0.25 % SOLN ophthalmic solution Place 1 drop into both eyes daily And Q2H PRN       senna-docusate (SENOKOT-S/PERICOLACE) 8.6-50 MG tablet Take 1 tablet by mouth daily before breakfast AND 1 TAB PO BID PRN         REVIEW OF SYSTEMS:  4 point ROS including Respiratory, CV, GI and , other than that noted in the HPI,  is negative    Objective:  /64   Pulse 80   Temp 97.3  F (36.3  C)   Resp 18   Ht 1.524 m (5')   Wt 60.8 kg (134 lb)   LMP  (LMP Unknown)   SpO2 96%   BMI 26.17 kg/m    Exam:  GENERAL APPEARANCE:  Alert, in no distress, pleasant, cooperative, oriented x 4  EYES: no discharge or mattering on lids or lashes noted  ENT:  moist mucous membranes, hearing acuity intact  NECK: supple, symmetrical  RESP: no respiratory distress, Lung sounds clear, patient is on room air  CV:  rate and rhythm regular, no murmur. Edema none in bilateral lower extremities. VASCULAR: warm extremities without open areas.  ABDOMEN: normal bowel sounds, soft, nontender.  M/S:   Gait and station ambulates independently with walker, no tenderness or swelling of the joints; able to move all extremities. Thoracic brace is in place.   SKIN:  Inspection and palpation of skin and subcutaneous tissue: skin warm, dry and intact without rashes  NEURO: no facial asymmetry, no speech deficits and able to follow directions, moves all extremities symmetrically  PSYCH:  insight and judgement intact, memory intact, affect and mood normal    Labs:   CBC RESULTS:  Recent Labs   Lab Test 08/01/21  0644 02/15/19  1549   WBC 8.3 10.5   RBC 3.87 4.08   HGB 12.6 13.3   HCT 37.9 39.2   MCV 98 96   MCH 32.6 32.6   MCHC 33.2 33.9   RDW 14.0 13.4    279       Last Basic Metabolic Panel:  Recent Labs   Lab Test 08/03/21  2103 08/03/21  1540 08/01/21  0644 12/21/20  0939   NA  --   --  139 140   POTASSIUM  --   --  3.7 4.8   CHLORIDE  --   --  106 108   SAGE  --   --  8.2* 9.0   CO2  --   --  31 31   BUN  --   --  14 16   CR  --   --  0.65 0.66   GLC 98 138* 103* 102*       Liver Function Studies -   Recent Labs   Lab Test 12/21/20  0939 10/08/19  1200 12/11/15  0959   PROTTOTAL 7.3  --  7.1   ALBUMIN 3.6  --  3.7   BILITOTAL 0.5  --  0.4   ALKPHOS 85  --  71   AST 23  --  32   ALT 24 32 34       TSH   Date Value Ref Range Status   04/16/2019 3.10 0.40 - 4.00 mU/L Final   12/30/2012 3.19 0.4 - 5.0 mU/L Final     ASSESSMENT/PLAN:  (S32.040D) Closed compression fracture of L4 lumbar vertebra with routine healing, subsequent encounter:7/16/21  (primary encounter diagnosis)  Comment: has some discomfort but controlled with tylenol and oxycodone. Muscle spasms are ongoing which flexeril is helping.   Plan: tylenol. Oxycodone and flexeril. Will work to taper off from the oxy early next week.     (K59.03) Drug-induced constipation  Comment: using the PRN miralax on a daily basis.   Plan: will schedule miralax 17 g daily  --senna 1 tab BID and BID PRN    (R53.81) Physical deconditioning  Comment: secondary to recent hospitalization and underlying medical conditions.   Plan: PT/OT for strengthening. SW for discharge planning.     Electronically signed by:  Jeanine Lieberman, APRN CNP

## 2021-08-23 ENCOUNTER — TRANSITIONAL CARE UNIT VISIT (OUTPATIENT)
Dept: GERIATRICS | Facility: CLINIC | Age: 81
End: 2021-08-23
Payer: COMMERCIAL

## 2021-08-23 VITALS
HEART RATE: 77 BPM | TEMPERATURE: 98 F | DIASTOLIC BLOOD PRESSURE: 62 MMHG | HEIGHT: 60 IN | SYSTOLIC BLOOD PRESSURE: 117 MMHG | RESPIRATION RATE: 18 BRPM | OXYGEN SATURATION: 93 % | WEIGHT: 134 LBS | BODY MASS INDEX: 26.31 KG/M2

## 2021-08-23 DIAGNOSIS — I34.0 MITRAL VALVE INSUFFICIENCY, UNSPECIFIED ETIOLOGY: ICD-10-CM

## 2021-08-23 DIAGNOSIS — R53.81 PHYSICAL DECONDITIONING: ICD-10-CM

## 2021-08-23 DIAGNOSIS — K21.9 GASTROESOPHAGEAL REFLUX DISEASE, UNSPECIFIED WHETHER ESOPHAGITIS PRESENT: ICD-10-CM

## 2021-08-23 DIAGNOSIS — M80.00XD AGE-RELATED OSTEOPOROSIS WITH CURRENT PATHOLOGICAL FRACTURE WITH ROUTINE HEALING, SUBSEQUENT ENCOUNTER: ICD-10-CM

## 2021-08-23 DIAGNOSIS — M54.50 ACUTE RIGHT-SIDED LOW BACK PAIN WITHOUT SCIATICA: ICD-10-CM

## 2021-08-23 DIAGNOSIS — S32.040D CLOSED COMPRESSION FRACTURE OF L4 LUMBAR VERTEBRA WITH ROUTINE HEALING, SUBSEQUENT ENCOUNTER: Primary | ICD-10-CM

## 2021-08-23 PROCEDURE — 99309 SBSQ NF CARE MODERATE MDM 30: CPT | Performed by: NURSE PRACTITIONER

## 2021-08-23 RX ORDER — OXYCODONE HYDROCHLORIDE 5 MG/1
5 TABLET ORAL EVERY 6 HOURS PRN
COMMUNITY
Start: 2021-08-23 | End: 2021-08-31

## 2021-08-23 ASSESSMENT — MIFFLIN-ST. JEOR: SCORE: 989.32

## 2021-08-23 NOTE — PROGRESS NOTES
[place] [facility]  [place] [facility] Record Number:  [id number]  Place of Service where encounter took place:  [facility] ([facility]) [[id number]]  Chief Complaint   Patient presents with     Nursing Home Acute       HPI:    Chiquita Berry  is a 80 year old (1940), who is being seen today for an episodic care visit.  HPI information obtained from: facility chart records, facility staff, patient report and [facility] chart review. Today's concern is: says feeling better overall, using less pain meds, most of pain in back and right hip area. Up often with PT and walking with TLSO--pain most prevalent in am and early afternoon time     Closed compression fracture of L4 lumbar vertebra with routine healing, subsequent encounter  Acute right-sided low back pain without sciatica  Age-related osteoporosis with current pathological fracture with routine healing, subsequent encounter  Physical deconditioning  Mitral valve insufficiency, unspecified etiology  Gastroesophageal reflux disease, unspecified whether esophagitis present      Past Medical and Surgical History reviewed in Epic today.    MEDICATIONS:      Current Outpatient Medications   Medication Sig Dispense Refill     oxyCODONE (ROXICODONE) 5 MG tablet Take 5 mg by mouth every 6 hours as needed for severe pain Give 2.5 mg po q 6 hr prn 1-5/10 pain or 5mg q 6 hr prn 6-10/10pain       acetaminophen (TYLENOL) 325 MG tablet Take 650 mg by mouth 4 times daily       acetaminophen (TYLENOL) 325 MG tablet Take 2 tablets (650 mg) by mouth every 6 hours as needed for mild pain or other (and adjunct with moderate or severe pain or per patient request)       alendronate (FOSAMAX) 70 MG tablet Take 1 tablet (70 mg) by mouth every 7 days 12 tablet 3     Cholecalciferol (VITAMIN D) 2000 UNITS tablet Take 1 tablet by mouth daily.       cyclobenzaprine (FLEXERIL) 5 MG tablet Take 5 mg by mouth daily before breakfast AND 5 MG PO Q8H PRN        latanoprost (XALATAN) 0.005 % ophthalmic solution Place 1 drop into both eyes every evening   2     Lidocaine (LIDOCARE) 4 % Patch Place 1 patch onto the skin every 24 hours To prevent lidocaine toxicity, patient should be patch free for 12 hrs daily.       melatonin 3 MG CAPS Take 3 mg by mouth At Bedtime And 3mg prn(total of 6mg ok for sleep)       Multiple Vitamin (MULTIVITAMIN ADULT PO) Take 1 tablet by mouth daily       Multiple Vitamins-Minerals (PRESERVISION AREDS 2) CAPS Take 1 capsule by mouth daily        polyethylene glycol (MIRALAX) 17 GM/Dose powder Take 17 g by mouth daily 510 g      polyethylene glycol 400 (BLINK TEARS) 0.25 % SOLN ophthalmic solution Place 1 drop into both eyes daily And Q2H PRN       senna-docusate (SENOKOT-S/PERICOLACE) 8.6-50 MG tablet Take 1 tablet by mouth daily before breakfast AND 1 TAB PO BID PRN     .  TODAY DURING EXAM/ROS:  No CP, SOB, Cough, dizziness, fevers, chills, HA, N/V, dysuria or Bowel Abnormalities. Appetite is good.  No pain except as noted above.    Objective:  /62   Pulse 77   Temp 98  F (36.7  C)   Resp 18   Ht 1.524 m (5')   Wt 60.8 kg (134 lb)   LMP  (LMP Unknown)   SpO2 93%   BMI 26.17 kg/m         Exam:  GENERAL APPEARANCE:  Alert, in no distress, appears healthy, cooperative  ENT:  Mouth with moist mucous membranes, normal hearing acuity  RESP:  respiratory effort of chest normal, lungs CTA , no respiratory distress  CV:  Auscultation of heart done ,RRR, no M,G, R or edema, +2 pedal pulses  ABDOMEN:  normal bowel sounds, soft, nontender.  M/S:  Up with walker and PT--using TLSO Ijeoma brace in place  SKIN:  Inspection of skin and subcutaneous tissue baseline but limited as pt dressed and brace on  NEURO:   Cranial nerves are grossly intact and  at patient's baseline  PSYCH:  oriented X 3, normal insight, judgement and memory, affect and mood normal    Lab/Diagnostic data:  Recent Labs   Lab Test 08/03/21  2103 08/03/21  1540  08/01/21  0644 12/21/20  0939   NA  --   --  139 140   POTASSIUM  --   --  3.7 4.8   CHLORIDE  --   --  106 108   CO2  --   --  31 31   ANIONGAP  --   --  2* 1*   GLC 98 138* 103* 102*   BUN  --   --  14 16   CR  --   --  0.65 0.66   SAGE  --   --  8.2* 9.0     CBC RESULTS: Recent Labs   Lab Test 08/01/21  0644   WBC 8.3   RBC 3.87   HGB 12.6   HCT 37.9   MCV 98   MCH 32.6   MCHC 33.2   RDW 14.0        ASSESSMENT / PLAN:  (S32.040D) Closed compression fracture of L4 lumbar vertebra with routine healing, subsequent encounter:7/16/21  (primary encounter diagnosis)  (M54.5) Acute right-sided low back pain without sciatica   (M80.00XD) Age-related osteoporosis with current pathological fracture with routine healing, subsequent encounter   (R53.81) Physical deconditioning  Comment/Plan: vit D, Fosamax, lidocaine. decrease Flexeril and oxycodone,  F/U neurosurgery in 4-6 weeks prn and xray's --consider Kyphoplasty if needed.      (I34.0) Mitral valve insufficiency, unspecified etiology  Comment/Plan: no meds--monitor.    (K21.9) Gastroesophageal reflux disease, unspecified whether esophagitis present  Comment/Plan: no acute issues, on no med, monitor.    (H40.003) Glaucoma suspect, bilateral  Comment/Plan:  Preservision, Blink tears,  Xalatan.  monitor.      Electronically signed by:  NIMCO Bain CNP

## 2021-08-26 ASSESSMENT — MIFFLIN-ST. JEOR: SCORE: 991.13

## 2021-08-31 ENCOUNTER — DISCHARGE SUMMARY NURSING HOME (OUTPATIENT)
Dept: GERIATRICS | Facility: CLINIC | Age: 81
End: 2021-08-31
Payer: COMMERCIAL

## 2021-08-31 VITALS
WEIGHT: 134.4 LBS | HEIGHT: 60 IN | BODY MASS INDEX: 26.39 KG/M2 | DIASTOLIC BLOOD PRESSURE: 69 MMHG | SYSTOLIC BLOOD PRESSURE: 114 MMHG | OXYGEN SATURATION: 94 % | RESPIRATION RATE: 18 BRPM | TEMPERATURE: 97.3 F | HEART RATE: 94 BPM

## 2021-08-31 DIAGNOSIS — R53.81 PHYSICAL DECONDITIONING: ICD-10-CM

## 2021-08-31 DIAGNOSIS — M54.50 ACUTE RIGHT-SIDED LOW BACK PAIN WITHOUT SCIATICA: ICD-10-CM

## 2021-08-31 DIAGNOSIS — M80.00XD AGE-RELATED OSTEOPOROSIS WITH CURRENT PATHOLOGICAL FRACTURE WITH ROUTINE HEALING, SUBSEQUENT ENCOUNTER: ICD-10-CM

## 2021-08-31 DIAGNOSIS — S32.040D CLOSED COMPRESSION FRACTURE OF L4 LUMBAR VERTEBRA WITH ROUTINE HEALING, SUBSEQUENT ENCOUNTER: Primary | ICD-10-CM

## 2021-08-31 DIAGNOSIS — E78.5 DYSLIPIDEMIA: ICD-10-CM

## 2021-08-31 DIAGNOSIS — K21.9 GASTROESOPHAGEAL REFLUX DISEASE, UNSPECIFIED WHETHER ESOPHAGITIS PRESENT: ICD-10-CM

## 2021-08-31 DIAGNOSIS — Z87.898 HISTORY OF PALPITATIONS: ICD-10-CM

## 2021-08-31 DIAGNOSIS — I34.0 MITRAL VALVE INSUFFICIENCY, UNSPECIFIED ETIOLOGY: ICD-10-CM

## 2021-08-31 DIAGNOSIS — H40.003 GLAUCOMA SUSPECT, BILATERAL: ICD-10-CM

## 2021-08-31 PROCEDURE — 99316 NF DSCHRG MGMT 30 MIN+: CPT | Performed by: NURSE PRACTITIONER

## 2021-08-31 RX ORDER — CALCIUM CARBONATE 500 MG/1
1 TABLET, CHEWABLE ORAL DAILY
COMMUNITY
Start: 2021-08-31 | End: 2021-09-01

## 2021-08-31 RX ORDER — CYCLOBENZAPRINE HCL 5 MG
5 TABLET ORAL 2 TIMES DAILY PRN
COMMUNITY
Start: 2021-08-31 | End: 2021-09-17

## 2021-08-31 NOTE — PROGRESS NOTES
[facility] DISCHARGE SUMMARY    PATIENT'S NAME: Chiquita Berry  YOB: 1940    PRIMARY CARE PROVIDER AND CLINIC RESPONSIBLE AFTER TRANSFER: Vesna Olivera     CODE STATUS: Full Code     TRANSFERRING PROVIDERS: NIMCO Bain CNP, Dr. Leidy Villa MD      DATE OF SNF ADMISSION:  August / 05 / 2021.    DATE OF SNF DISCHARGE (including anticipating DC): September / 01 / 2021.    DISCHARGE DISPOSITION: [facility] Provider    Nursing Facility: [facility] stay 7/31/21 to 8/5/21.     Condition on Discharge:  Improving.    Function:  Ambulates: 250 ft w/fww, Transfers: s mod 1  Cognitive Scores: SLUMS 23/30    Physical Function: Tinetti Balance Assessment: 17/40  and TUG 25  Equipment: walker  DME: Walker    DISCHARGE DIAGNOSIS:      Closed compression fracture of L4 lumbar vertebra with routine healing, subsequent encounter  Acute right-sided low back pain without sciatica  Age-related osteoporosis with current pathological fracture with routine healing, subsequent encounter  Physical deconditioning  History of palpitations  Mitral valve insufficiency, unspecified etiology  Dyslipidemia  Gastroesophageal reflux disease, unspecified whether esophagitis present  Glaucoma suspect, bilateral        HOSPITAL COURSE: pt initially hurt back and noted pain after lifting some groceries on 7/16--she kerry to her PCP form mid and lower back pain--X-rays showed DJD, put on steroid taper, NSAID, Muscle relaxants and  tylenol #3. Not much improved so PT was added S was tramadol on 7/29--she then worsening and was not able to do ADL's so was brought in to hospital, were xray showed a sub acute to acute L4 Fx on MRI--given lidocaine patch, oxycodone, flexeril  And seen by neurosurgery--treating medically at this time.  Fitted for Edfolio brace for support.  Sent for rehab    TCU/SNF COURSE: has progressed with PT and OT--initially a  lot of pain and brace not fitting well.  We have been able to decrease narcs but nighttime and early am still most painful.  Today we discussed decreasing the oxy and flexeril(see current doses) further and have done so.  She is to see Neuro Sx in the next 1-2 weeks.   I d/w her the importance of having friends check in by phone a couple to three times daily if not able to stop by.      PAST MEDICAL HISTORY:  Past Medical History:   Diagnosis Date     Anxiety      Dyslipidemia      Esophageal reflux      Glaucoma suspect, bilateral      Kyphosis      Mitral regurgitation      Osteoporosis      Palpitations      Scoliosis        DISCHARGE MEDICATIONS:  Current Outpatient Medications   Medication Sig Dispense Refill     acetaminophen (TYLENOL) 325 MG tablet Take 650 mg by mouth 4 times daily And 650mg po daily prn pain       alendronate (FOSAMAX) 70 MG tablet Take 1 tablet (70 mg) by mouth every 7 days 12 tablet 3     calcium carbonate (TUMS) 500 MG chewable tablet Take 1 chew tab by mouth daily Give 1 tablet by mouth one time only for MIHAELA-Indigestion for 1 Day Calcium carbonate OR Liquid antacid can be used. NOTE: Do not use magnesium based products in patients with renal failure.       Cholecalciferol (VITAMIN D) 2000 UNITS tablet Take 1 tablet by mouth daily.       cyclobenzaprine (FLEXERIL) 5 MG tablet Take 5 mg by mouth 2 times daily as needed for muscle spasms       latanoprost (XALATAN) 0.005 % ophthalmic solution Place 1 drop into both eyes every evening   2     Lidocaine (LIDOCARE) 4 % Patch Place 1 patch onto the skin every 24 hours To prevent lidocaine toxicity, patient should be patch free for 12 hrs daily.       melatonin 3 MG CAPS Take 3 mg by mouth At Bedtime And 3mg prn(total of 6mg ok for sleep)       Multiple Vitamin (MULTIVITAMIN ADULT PO) Take 1 tablet by mouth daily       Multiple Vitamins-Minerals (PRESERVISION AREDS 2) CAPS Take 1 capsule by mouth daily        oxyCODONE IR (ROXICODONE) 2.5 mg  TABS Take 5 mg by mouth 2 times daily as needed for moderate to severe pain       polyethylene glycol (MIRALAX) 17 GM/Dose powder Take 17 g by mouth daily 510 g      polyethylene glycol 400 (BLINK TEARS) 0.25 % SOLN ophthalmic solution Place 1 drop into both eyes daily And Q2H PRN       senna-docusate (SENOKOT-S/PERICOLACE) 8.6-50 MG tablet Take 1 tablet by mouth 2 times daily as needed for constipation          MEDICATION CHANGES/RATIONALE: see orders in chart and EMR  /69   Pulse 94   Temp 97.3  F (36.3  C)   Resp 18   Ht 1.524 m (5')   Wt 61 kg (134 lb 6.4 oz)   LMP  (LMP Unknown)   SpO2 94%   BMI 26.25 kg/m      TODAY DURING EXAM/ROS:  No CP, SOB, Cough, dizziness, fevers, chills, HA, N/V, dysuria or Bowel Abnormalities. Appetite is good.  No pain except low back and down right leg at times when mostly painful      PHYSICAL EXAM Today:  A & O x 3, NAD. Lungs CTA, non labored. RRR, S1/S2 w/o murmur,gallop or rub.  No edema.  Abdomen soft, nontender, +BT'S. No focal neurological deficits. WEBSTER--up with walker has TLSO brace on.       SNF LABS  Recent Labs   Lab Test 08/01/21  0644 12/21/20  0939    140   POTASSIUM 3.7 4.8   CHLORIDE 106 108   CO2 31 31   ANIONGAP 2* 1*   * 102*   BUN 14 16   CR 0.65 0.66   SAGE 8.2* 9.0     CBC RESULTS: Recent Labs   Lab Test 08/01/21  0644   WBC 8.3   RBC 3.87   HGB 12.6   HCT 37.9   MCV 98   MCH 32.6   MCHC 33.2   RDW 14.0                DISCHARGE PLAN:  Occupational Therapy, Physical Therapy, Registered Nurse, Home Health Aide and From:  [facility] Follow-up with PCP in 7 days: 7 days.    Current [place] or other scheduled appointments:  Future Appointments   Date Time Provider Department Center   9/16/2021  1:00 PM Jey Brar PA-C [facility] ME[facility] referral needed and placed by this provider: none    Pending labs: none     Discharge Treatments:TLSO       TOTAL DISCHARGE TIME:   Greater than 30 minutes    Naz Jackson,  APRN CNP    [facility]          Documentation of Face to Face and Certification for Home Health Services    I certify that patient: Chiquita Berry is under my care and that I, or a nurse practitioner or physician's assistant working with me, had a face-to-face encounter that meets the physician face-to-face encounter requirements with this patient on: 8/31/2021.    This encounter with the patient was in whole, or in part, for the following medical condition, which is the primary reason for home health care:      Closed compression fracture of L4 lumbar vertebra with routine healing, subsequent encounter  Acute right-sided low back pain without sciatica  Age-related osteoporosis with current pathological fracture with routine healing, subsequent encounter  Physical deconditioning  History of palpitations  Mitral valve insufficiency, unspecified etiology  Dyslipidemia  Gastroesophageal reflux disease, unspecified whether esophagitis present  Glaucoma suspect, bilateral  .    I certify that, based on my findings, the following services are medically necessary home health services: Nursing, Occupational Therapy and Physical Therapy.    My clinical findings support the need for the above services because: Nurse is needed: To assess pain management, management of TLSO and other medical issues after changes in medications or other medical regimen. and To teach and train about the disease and treatments for fracture of L4-she has suspected glaucoma illness, because deconditioning and needs safety eval.., Occupational Therapy Services are needed to assess and treat cognitive ability and address ADL safety due to impairment in sight, poor mobility due to pain and TLSO is limiting. and Physical Therapy Services are needed to assess and treat the following functional impairments: see under OT, deconditioning also.    Further, I certify that my clinical findings support that this patient is homebound (i.e.  absences from home require considerable and taxing effort and are for medical reasons or Jew services or infrequently or of short duration when for other reasons) because: Requires assistance of another person or specialized equipment to access medical services because patient: Is unable to walk greater than 250 feet without rest...    Based on the above findings. I certify that this patient is confined to the home and needs intermittent skilled nursing care, physical therapy and/or speech therapy.  The patient is under my care, and I have initiated the establishment of the plan of care.  This patient will be followed by a physician who will periodically review the plan of care.  Physician/Provider to provide follow up care: Vesna Olivera    Attending hospital physician (the Medicare certified [facility] provider): NIMCO Bain CNP  Physician Signature: See electronic signature associated with these discharge orders.  Date: 8/31/2021

## 2021-09-03 ENCOUNTER — TELEPHONE (OUTPATIENT)
Dept: INTERNAL MEDICINE | Facility: CLINIC | Age: 81
End: 2021-09-03

## 2021-09-03 NOTE — PROGRESS NOTES
.  Please see attached lab results  They are all normal     THE FOLLOWING ARE EXPLANATIONS OF SOME OF OUR LAB TESTS     YOU DID NOT NECESSARILY HAVE ALL OF THESE DONE     Hgb is the blood iron level  WBC means White Blood Cells  Platelets are small blood cells that help with forming the blood clots along with other blood factors.  Electrolytes are Sodium, Potassium, Calcium, Magnesium, Phosphorus.  Liver tests are: AST, ALT, Bilirubin, Alkaline Phosphatase.  Kidney tests are Creatinine, GFR.  HDL Cholesterol - is the good cholesterol and it is good to have it high.  LDL cholesterol is the bad cholesterol and it is good to have it low.  It is recommended to have LDL less than 130 for people with hypertension and to have it less than 100 for people with heart disease, diabetes and chronic kidney disease.  Triglycerides are another type of lipid that can cause heart disease, like the cholesterol and should be kept low   Thyroid tests are TSH, T4, T3  Glucose is sugar.  A1c is a test that gives us an idea about how well was controlled the diabetes for the last 3 months.   PSA stands for Prostate Specific Antigen and it can be elevated with prostate cancer or prostate inflammation.    Please continue on the same medications    Cholesterol and sugar are just a little high show

## 2021-09-03 NOTE — TELEPHONE ENCOUNTER
Verbal approval given for the homecare request below. Homecare/Hospice agency to fax orders for provider signature.    Physical Therapy 2 times a week for 3 weeks, then 1 time a week for 3 weeks.    [phone] - [facility] PT, ok to bruno detailed message      Dayna Demarco RN  [facility]

## 2021-09-09 ENCOUNTER — MEDICAL CORRESPONDENCE (OUTPATIENT)
Dept: HEALTH INFORMATION MANAGEMENT | Facility: CLINIC | Age: 81
End: 2021-09-09

## 2021-09-09 DIAGNOSIS — Z53.9 DIAGNOSIS NOT YET DEFINED: Primary | ICD-10-CM

## 2021-09-09 PROCEDURE — G0180 MD CERTIFICATION HHA PATIENT: HCPCS | Performed by: INTERNAL MEDICINE

## 2021-09-12 ENCOUNTER — HEALTH MAINTENANCE LETTER (OUTPATIENT)
Age: 81
End: 2021-09-12

## 2021-09-14 DIAGNOSIS — S32.040D CLOSED COMPRESSION FRACTURE OF L4 LUMBAR VERTEBRA WITH ROUTINE HEALING, SUBSEQUENT ENCOUNTER: Primary | ICD-10-CM

## 2021-09-16 ENCOUNTER — ANCILLARY PROCEDURE (OUTPATIENT)
Dept: GENERAL RADIOLOGY | Facility: CLINIC | Age: 81
End: 2021-09-16
Attending: PHYSICIAN ASSISTANT
Payer: COMMERCIAL

## 2021-09-16 ENCOUNTER — OFFICE VISIT (OUTPATIENT)
Dept: NEUROSURGERY | Facility: CLINIC | Age: 81
End: 2021-09-16
Attending: PHYSICIAN ASSISTANT
Payer: COMMERCIAL

## 2021-09-16 VITALS
DIASTOLIC BLOOD PRESSURE: 75 MMHG | SYSTOLIC BLOOD PRESSURE: 152 MMHG | WEIGHT: 133 LBS | BODY MASS INDEX: 25.97 KG/M2 | HEART RATE: 104 BPM

## 2021-09-16 DIAGNOSIS — S32.040D CLOSED COMPRESSION FRACTURE OF L4 LUMBAR VERTEBRA WITH ROUTINE HEALING, SUBSEQUENT ENCOUNTER: Primary | ICD-10-CM

## 2021-09-16 DIAGNOSIS — S32.040D CLOSED COMPRESSION FRACTURE OF L4 LUMBAR VERTEBRA WITH ROUTINE HEALING, SUBSEQUENT ENCOUNTER: ICD-10-CM

## 2021-09-16 PROCEDURE — 72100 X-RAY EXAM L-S SPINE 2/3 VWS: CPT | Performed by: RADIOLOGY

## 2021-09-16 PROCEDURE — 99213 OFFICE O/P EST LOW 20 MIN: CPT | Performed by: PHYSICIAN ASSISTANT

## 2021-09-16 PROCEDURE — G0463 HOSPITAL OUTPT CLINIC VISIT: HCPCS

## 2021-09-16 NOTE — PROGRESS NOTES
Neurosurgery follow-up    Ms. Berry is an 81-year-old female presents to clinic for follow-up of an L4 compression fracture that was found to be subacute on MRI proximally 6 weeks ago.  She had begun having pain 3 weeks before that after lifting something heavy.  Currently she reports low back pain in a bandlike distribution and pain in her right hip.  The symptoms are worse in the morning and evening.  She is having home physical therapy.  She has been wearing her lumbar brace.       Exam    B/P: 152/75, T: Data Unavailable, P: 104, R: Data Unavailable     Alert and oriented no acute distress  Lumbar spine nontender to palpation or percussion  Right hip pain is painful with internal and external rotation    Imaging    Lumbar x-ray demonstrates stable appearing L4 compression fracture with no change from prior x-ray in July    Assessment    Back pain  Chronic appearing compression fracture on x-ray  Right hip pain      Plan    I recommend the patient continue with physical therapy.  I have also recommended she begin to wean out of her brace that she is no longer having pain on palpation of and she finds appears to be quite uncomfortable with it.  For her hip pain I recommend that we see how she does and she states that her brace, if not improving I would recommend she obtain a hip x-ray and follow-up with orthopedics.    She has requested I compare her recent x-ray to x-rays from 2015 at [facility] orthopedics and I have requested those images and will contact her with results when they are available.      Total time of 30 minutes spent with the patient today in counseling and coordination of care.

## 2021-09-16 NOTE — LETTER
9/16/2021         RE: Chiquita Berry  [address]  [place] [address]        Dear Colleague,    Thank you for referring your patient, Chiquita Berry, to the [facility] [place]. Please see a copy of my visit note below.    Neurosurgery follow-up    Ms. Berry is an 81-year-old female presents to clinic for follow-up of an L4 compression fracture that was found to be subacute on MRI proximally 6 weeks ago.  She had begun having pain 3 weeks before that after lifting something heavy.  Currently she reports low back pain in a bandlike distribution and pain in her right hip.  The symptoms are worse in the morning and evening.  She is having home physical therapy.  She has been wearing her lumbar brace.       Exam    B/P: 152/75, T: Data Unavailable, P: 104, R: Data Unavailable     Alert and oriented no acute distress  Lumbar spine nontender to palpation or percussion  Right hip pain is painful with internal and external rotation    Imaging    Lumbar x-ray demonstrates stable appearing L4 compression fracture with no change from prior x-ray in July    Assessment    Back pain  Chronic appearing compression fracture on x-ray  Right hip pain      Plan    I recommend the patient continue with physical therapy.  I have also recommended she begin to wean out of her brace that she is no longer having pain on palpation of and she finds appears to be quite uncomfortable with it.  For her hip pain I recommend that we see how she does and she states that her brace, if not improving I would recommend she obtain a hip x-ray and follow-up with orthopedics.    She has requested I compare her recent x-ray to x-rays from 2015 at [facility] orthoped[facility] and I have requested those images and will contact her with results when they are available.      Total time of 30 minutes spent with the patient today in counseling and coordination of care.        Again, thank you for  allowing me to participate in the care of your patient.        Sincerely,        Jey Brar PA-C

## 2021-09-17 ENCOUNTER — MEDICAL CORRESPONDENCE (OUTPATIENT)
Dept: HEALTH INFORMATION MANAGEMENT | Facility: CLINIC | Age: 81
End: 2021-09-17

## 2021-09-17 ENCOUNTER — TELEPHONE (OUTPATIENT)
Dept: INTERNAL MEDICINE | Facility: CLINIC | Age: 81
End: 2021-09-17

## 2021-09-17 NOTE — TELEPHONE ENCOUNTER
MARAH Miller - Updated pt chart.  Per homecare nurse, pt d/jeffery Oxycodone and flexeril.  Is currently taking about 1 tramadol a day, but is working on weaning this medication with Neurology possibly - along with weaning back brace.     Would you see any urgency for this pt to have a in clinic or virtual follow up(ie if asking for tramadol refill), or ok to follow with neuro for now?    IF f/u needed, we can call pt to schedule.   Purnima Haines, ASHLYN  [facility] RN Triage Team

## 2021-09-17 NOTE — TELEPHONE ENCOUNTER
No urgency/need to follow-up with me re: her compression fracture from my point of view. She is always welcome to see me as needed/desired.      Can follow with neuro for now.

## 2021-09-23 ENCOUNTER — TELEPHONE (OUTPATIENT)
Dept: INTERNAL MEDICINE | Facility: CLINIC | Age: 81
End: 2021-09-23

## 2021-09-23 DIAGNOSIS — M54.42 ACUTE BILATERAL LOW BACK PAIN WITH LEFT-SIDED SCIATICA: Primary | ICD-10-CM

## 2021-09-23 RX ORDER — TRAMADOL HYDROCHLORIDE 50 MG/1
50 TABLET ORAL EVERY 6 HOURS PRN
Qty: 60 TABLET | Refills: 2 | Status: SHIPPED | OUTPATIENT
Start: 2021-09-23 | End: 2022-04-20

## 2021-09-23 RX ORDER — CELECOXIB 100 MG/1
100 CAPSULE ORAL 2 TIMES DAILY PRN
Qty: 60 CAPSULE | Refills: 3 | Status: SHIPPED | OUTPATIENT
Start: 2021-09-23 | End: 2021-10-08

## 2021-09-23 NOTE — TELEPHONE ENCOUNTER
Pt calling to update us about her health . Since transitional care unit she was told to have Tylenol 650 mg 4x a day and PRN on top of that. She is still experiencing pain. She is using the TENS unit from PT hoping that will help. She is aware of the limit that tylenol has of 3000 mg a day  and she is wondering can she take ibuprofen on occasion to spread it out? She also takes tramadol on occasion okayed by PA at [facility] and she is hoping for a refill. She does not want to have to take so much tylenol. She is wanting to use Ibuprofen and Tramadol PRN/intermittently. She has experienced pain for 6 or 7 on average lately and has been wanting to manage pain a little more. She would rather not have to drive in and wants one of the nurses to update her or care team. Pharmacy pended as well.  Okay to leave detailed message.   Jeanie Cisneros

## 2021-09-24 ENCOUNTER — MEDICAL CORRESPONDENCE (OUTPATIENT)
Dept: HEALTH INFORMATION MANAGEMENT | Facility: CLINIC | Age: 81
End: 2021-09-24

## 2021-09-24 NOTE — TELEPHONE ENCOUNTER
Refill of Ultram provided.    Also prescribed Celebrex with she may use twice a day as needed for pain relief (it's a stronger and longer lasting version of ibuprofen).

## 2021-09-24 NOTE — TELEPHONE ENCOUNTER
Patient called.  Couldn't hear the voice message left yesterday regarding Rx pain meds.     Reviewed message and RXs with patient from yesterday.     Patient stated understanding and agreement with advise/plan.      Ellen MCGRAW, RN      September 24, 2021  2:52 PM

## 2021-10-07 ENCOUNTER — NURSE TRIAGE (OUTPATIENT)
Dept: INTERNAL MEDICINE | Facility: CLINIC | Age: 81
End: 2021-10-07

## 2021-10-07 ENCOUNTER — MEDICAL CORRESPONDENCE (OUTPATIENT)
Dept: HEALTH INFORMATION MANAGEMENT | Facility: CLINIC | Age: 81
End: 2021-10-07
Payer: COMMERCIAL

## 2021-10-07 DIAGNOSIS — M54.42 ACUTE BILATERAL LOW BACK PAIN WITH LEFT-SIDED SCIATICA: Primary | ICD-10-CM

## 2021-10-07 NOTE — TELEPHONE ENCOUNTER
"Patient called today to report red fine rash on face:  both cheeks, nose and forehead.   Denies itchy  Denies fever  Denies trouble swallowing or breathing.      Onset:  Awoke with symptoms today    Patient was advised today by pharmacist.sx :probably\" allergic reaction to Rx Celebrex.  Patient has been taking Celebrex x 10 days for compression fx in back.    Also takes Tramadol (mostly at hs) and Acetaminophen .      Patient had [facility] PHYSICAL THERAPY visit today.    Therapist also informed patient facial rash \"probably due to Celebrex allergy\".    BP: 136/78  HR:  80  Temp 98.5    Patient took her morning dose of Celebrex 100 mg this morning.     Advised patient to stop Celebrex.   Increase clear liquids, shanae water.   Advised Benadryl 25-50mg as directed on label. Warned against driving due to sleepiness SE of that OTC med.      Patient plans to continue Acetaminophen and Tramadol as RX'd and will contact PCP if pain is not controlled or worsens, since no longer taking Celebrex.      Patient advised to monitor symptoms closely and seek med care, ED, if symptoms worsen---shanae trouble swallowing or breathing.        Patient stated understanding and agreement with advise/plan.      Added Celebrex to patient's Allergy List.      Dr Olivera---please review and advise.   [phone]  May leave detailed msg/orders on voice mail.     Reason for Disposition    Mild localized rash    Answer Assessment - Initial Assessment Questions  1. APPEARANCE of RASH: \"Describe the rash.\"       Fine red rash  2. LOCATION: \"Where is the rash located?\"       face  3. NUMBER: \"How many spots are there?\"       Fine rash covering all of face  4. SIZE: \"How big are the spots?\" (Inches, centimeters or compare to size of a coin)    5. ONSET: \"When did the rash start?\"       today  6. ITCHING: \"Does the rash itch?\" If so, ask: \"How bad is the itch?\"  (Scale 1-10; or mild, moderate, severe)     Denies   7. PAIN: \"Does the rash hurt?\" If so, ask: \"How " "bad is the pain?\"  (Scale 1-10; or mild, moderate, severe)  Denies   8. OTHER SYMPTOMS: \"Do you have any other symptoms?\" (e.g., fever)      Denies   9. PREGNANCY: \"Is there any chance you are pregnant?\" \"When was your last menstrual period?\"      na    Protocols used: RASH OR REDNESS - [id number]      "

## 2021-10-08 RX ORDER — IBUPROFEN 400 MG/1
400 TABLET, FILM COATED ORAL EVERY 6 HOURS PRN
Qty: 60 TABLET | Refills: 3 | Status: ON HOLD | OUTPATIENT
Start: 2021-10-08 | End: 2023-01-01

## 2021-10-08 RX ORDER — ACETAMINOPHEN 325 MG/1
650 TABLET ORAL 4 TIMES DAILY
Status: CANCELLED
Start: 2021-10-08

## 2021-10-08 NOTE — TELEPHONE ENCOUNTER
Ibuprofen signed.    Continue Benadryl at night until itching resolves.    May use Claritin or Allegra during the day (less sedating) until itching improves.    Adding Pepcid (H2 blocker that also helps with itching/allergies) twice a day may also help (available over the counter)

## 2021-10-08 NOTE — TELEPHONE ENCOUNTER
PCP:    Patient called requesting permission to replace several daily doses of acetaminophen with ibuprofen (in order to limit her daily dose of acetaminophen). Ibuprofen pended.  She continues to take 1-2 tramadol per day but needs OTC pain meds as well for pain relief.    Patient also requested guidance on continuance of Benadryl.  Took 25 mg TID yesterday and 25 mg this am.  Rash has not extended but has not lessened and still itches.  Instructed patient to continue Benadryl until rash begins to diminish and itching stops and she is able to sleep.    Ok to leave a detailed VM message.    Suyapa Diaz, MSN, RN   [facility]

## 2021-10-13 ENCOUNTER — MEDICAL CORRESPONDENCE (OUTPATIENT)
Dept: HEALTH INFORMATION MANAGEMENT | Facility: CLINIC | Age: 81
End: 2021-10-13
Payer: COMMERCIAL

## 2021-10-13 ENCOUNTER — TRANSFERRED RECORDS (OUTPATIENT)
Dept: HEALTH INFORMATION MANAGEMENT | Facility: CLINIC | Age: 81
End: 2021-10-13

## 2021-10-29 ENCOUNTER — TRANSFERRED RECORDS (OUTPATIENT)
Dept: HEALTH INFORMATION MANAGEMENT | Facility: CLINIC | Age: 81
End: 2021-10-29
Payer: COMMERCIAL

## 2021-11-03 ENCOUNTER — TRANSFERRED RECORDS (OUTPATIENT)
Dept: HEALTH INFORMATION MANAGEMENT | Facility: CLINIC | Age: 81
End: 2021-11-03
Payer: COMMERCIAL

## 2021-11-05 ENCOUNTER — ANCILLARY PROCEDURE (OUTPATIENT)
Dept: MAMMOGRAPHY | Facility: CLINIC | Age: 81
End: 2021-11-05
Attending: INTERNAL MEDICINE
Payer: COMMERCIAL

## 2021-11-05 ENCOUNTER — IMMUNIZATION (OUTPATIENT)
Dept: NURSING | Facility: CLINIC | Age: 81
End: 2021-11-05
Payer: COMMERCIAL

## 2021-11-05 DIAGNOSIS — Z12.31 VISIT FOR SCREENING MAMMOGRAM: ICD-10-CM

## 2021-11-05 PROCEDURE — 77067 SCR MAMMO BI INCL CAD: CPT | Mod: TC | Performed by: RADIOLOGY

## 2021-11-05 PROCEDURE — 77063 BREAST TOMOSYNTHESIS BI: CPT | Mod: TC | Performed by: RADIOLOGY

## 2021-11-05 PROCEDURE — 91300 PR COVID VAC PFIZER DIL RECON 30 MCG/0.3 ML IM: CPT

## 2021-11-05 PROCEDURE — 0004A PR COVID VAC PFIZER DIL RECON 30 MCG/0.3 ML IM: CPT

## 2021-11-11 ENCOUNTER — NURSE TRIAGE (OUTPATIENT)
Dept: NURSING | Facility: CLINIC | Age: 81
End: 2021-11-11
Payer: COMMERCIAL

## 2021-11-11 NOTE — TELEPHONE ENCOUNTER
Was exposed to covid a week ago.  No symptoms.  Other person tested negative.  Fully vaccinated.    7 days at home now    Has back fracture so quarantine at home for 10 days. Cannot drive to be tested.    COVID 19 Nurse Triage Plan/Patient Instructions    Please be aware that novel coronavirus (COVID-19) may be circulating in the community. If you develop symptoms such as fever, cough, or SOB or if you have concerns about the presence of another infection including coronavirus (COVID-19), please contact your health care provider or visit [email or web address].     Disposition/Instructions    Home care recommended. Follow home care protocol based instructions.    Thank you for taking steps to prevent the spread of this virus.  o Limit your contact with others.  o Wear a simple mask to cover your cough.  o Wash your hands well and often.    Resources    [facility]: About COVID-19: [email or web address]    CDC: What to Do If You're Sick: www.cdc.gov/coronavirus/2019-ncov/about/steps-when-sick.html    CDC: Ending Home Isolation: www.cdc.gov/coronavirus/2019-ncov/hcp/disposition-in-home-patients.html     CDC: Caring for Someone: www.cdc.gov/coronavirus/2019-ncov/if-you-are-sick/care-for-someone.html     [facility]: Interim Guidance for Hospital Discharge to Home: www.health.[facility].mn.us/diseases/coronavirus/hcp/hospdischarge.pdf    [facility] clinical trials (COVID-19 research studies): [email or web address]-clinical-trials     Below are the COVID-19 hotlines at the [facility] ([facility]). Interpreters are available.   o For health questions: Call 907-731-6332 or 1-599.955.1160 (7 a.m. to 7 p.m.)  o For questions about schools and childcare: Call 607-597-5160 or 1-147.379.1602 (7 a.m. to 7 p.m.)                   Reason for Disposition    [1] CLOSE CONTACT COVID-19 EXPOSURE within last 14 days AND [2] NO symptoms    Protocols used: CORONAVIRUS (COVID-19) EXPOSURE-A-AH  8.25.2021

## 2021-11-12 ENCOUNTER — TELEPHONE (OUTPATIENT)
Dept: INTERNAL MEDICINE | Facility: CLINIC | Age: 81
End: 2021-11-12
Payer: COMMERCIAL

## 2021-11-12 NOTE — TELEPHONE ENCOUNTER
FYI     Patient wanted to let you know the Ibuprofen is working and she still has pain   The Ibuprofen, tylenol and occasional tramadol is keeping it bearable     Her compression fractions from the July, she wore a brace and she saw peter at [facility] they do not recommend surgery or injections because of the nature of the fracture and that it will just take a while to heal  Patient states she is trying to be patient with that    She was referred to an endocrine specialist to review her bone building medications she has that appointment on 12/23    She completed the home care with PT and OT and she found that very helpful and she states she is doing all the thing she needs to do. It is still painful but she is managing around the home with help of friends   She has long term care insurance so is looking into home care as well so they may be in touch for orders if she qualifies     She also wants to thank you and wish you a happy thanksgiving!     Say Garcia RN

## 2021-12-16 LAB
CREATININE (EXTERNAL): 0.63 MG/DL (ref 0.57–1)
GFR ESTIMATED (EXTERNAL): 84 ML/MIN/1.7
GFR ESTIMATED (IF AFRICAN AMERICAN) (EXTERNAL): 97 ML/MIN/1.7
GLUCOSE (EXTERNAL): 97 MG/DL (ref 65–99)
POTASSIUM (EXTERNAL): 4.2 MMOL/L (ref 3.5–5.2)
TSH SERPL-ACNC: 2.56 UIU/ML (ref 0.3–5)

## 2021-12-23 ENCOUNTER — TRANSFERRED RECORDS (OUTPATIENT)
Dept: HEALTH INFORMATION MANAGEMENT | Facility: CLINIC | Age: 81
End: 2021-12-23
Payer: COMMERCIAL

## 2022-01-01 ENCOUNTER — NURSE TRIAGE (OUTPATIENT)
Dept: INTERNAL MEDICINE | Facility: CLINIC | Age: 82
End: 2022-01-01

## 2022-01-01 ENCOUNTER — TELEPHONE (OUTPATIENT)
Dept: FAMILY MEDICINE | Facility: CLINIC | Age: 82
End: 2022-01-01

## 2022-01-01 ENCOUNTER — HOSPITAL ENCOUNTER (INPATIENT)
Facility: CLINIC | Age: 82
LOS: 6 days | Discharge: HOME HEALTH | DRG: 180 | End: 2022-12-16
Attending: EMERGENCY MEDICINE | Admitting: INTERNAL MEDICINE
Payer: COMMERCIAL

## 2022-01-01 ENCOUNTER — APPOINTMENT (OUTPATIENT)
Dept: PHYSICAL THERAPY | Facility: CLINIC | Age: 82
DRG: 180 | End: 2022-01-01
Payer: COMMERCIAL

## 2022-01-01 ENCOUNTER — VIRTUAL VISIT (OUTPATIENT)
Dept: FAMILY MEDICINE | Facility: CLINIC | Age: 82
End: 2022-01-01
Payer: COMMERCIAL

## 2022-01-01 ENCOUNTER — APPOINTMENT (OUTPATIENT)
Dept: ULTRASOUND IMAGING | Facility: CLINIC | Age: 82
DRG: 180 | End: 2022-01-01
Attending: NURSE PRACTITIONER
Payer: COMMERCIAL

## 2022-01-01 ENCOUNTER — OFFICE VISIT (OUTPATIENT)
Dept: INTERNAL MEDICINE | Facility: CLINIC | Age: 82
End: 2022-01-01
Payer: COMMERCIAL

## 2022-01-01 ENCOUNTER — APPOINTMENT (OUTPATIENT)
Dept: GENERAL RADIOLOGY | Facility: CLINIC | Age: 82
DRG: 180 | End: 2022-01-01
Attending: HOSPITALIST
Payer: COMMERCIAL

## 2022-01-01 ENCOUNTER — VIRTUAL VISIT (OUTPATIENT)
Dept: ONCOLOGY | Facility: CLINIC | Age: 82
End: 2022-01-01
Attending: INTERNAL MEDICINE
Payer: COMMERCIAL

## 2022-01-01 ENCOUNTER — TELEPHONE (OUTPATIENT)
Dept: ONCOLOGY | Facility: CLINIC | Age: 82
End: 2022-01-01

## 2022-01-01 ENCOUNTER — TELEPHONE (OUTPATIENT)
Dept: PHARMACY | Facility: CLINIC | Age: 82
End: 2022-01-01

## 2022-01-01 ENCOUNTER — DOCUMENTATION ONLY (OUTPATIENT)
Dept: PHARMACY | Facility: CLINIC | Age: 82
End: 2022-01-01

## 2022-01-01 ENCOUNTER — APPOINTMENT (OUTPATIENT)
Dept: PHYSICAL THERAPY | Facility: CLINIC | Age: 82
DRG: 180 | End: 2022-01-01
Attending: NURSE PRACTITIONER
Payer: COMMERCIAL

## 2022-01-01 ENCOUNTER — APPOINTMENT (OUTPATIENT)
Dept: GENERAL RADIOLOGY | Facility: CLINIC | Age: 82
DRG: 180 | End: 2022-01-01
Attending: EMERGENCY MEDICINE
Payer: COMMERCIAL

## 2022-01-01 ENCOUNTER — OFFICE VISIT (OUTPATIENT)
Dept: URGENT CARE | Facility: URGENT CARE | Age: 82
End: 2022-01-01
Payer: COMMERCIAL

## 2022-01-01 ENCOUNTER — APPOINTMENT (OUTPATIENT)
Dept: MRI IMAGING | Facility: CLINIC | Age: 82
DRG: 180 | End: 2022-01-01
Attending: INTERNAL MEDICINE
Payer: COMMERCIAL

## 2022-01-01 ENCOUNTER — APPOINTMENT (OUTPATIENT)
Dept: CT IMAGING | Facility: CLINIC | Age: 82
DRG: 180 | End: 2022-01-01
Attending: HOSPITALIST
Payer: COMMERCIAL

## 2022-01-01 ENCOUNTER — TELEPHONE (OUTPATIENT)
Dept: INTERNAL MEDICINE | Facility: CLINIC | Age: 82
End: 2022-01-01

## 2022-01-01 ENCOUNTER — PATIENT OUTREACH (OUTPATIENT)
Dept: INTERNAL MEDICINE | Facility: CLINIC | Age: 82
End: 2022-01-01

## 2022-01-01 ENCOUNTER — ANCILLARY PROCEDURE (OUTPATIENT)
Dept: MAMMOGRAPHY | Facility: CLINIC | Age: 82
End: 2022-01-01
Attending: INTERNAL MEDICINE
Payer: COMMERCIAL

## 2022-01-01 ENCOUNTER — MEDICAL CORRESPONDENCE (OUTPATIENT)
Dept: HEALTH INFORMATION MANAGEMENT | Facility: CLINIC | Age: 82
End: 2022-01-01

## 2022-01-01 ENCOUNTER — TELEPHONE (OUTPATIENT)
Dept: NURSING | Facility: CLINIC | Age: 82
End: 2022-01-01

## 2022-01-01 VITALS
WEIGHT: 135.25 LBS | BODY MASS INDEX: 26.41 KG/M2 | DIASTOLIC BLOOD PRESSURE: 78 MMHG | TEMPERATURE: 99.8 F | OXYGEN SATURATION: 96 % | SYSTOLIC BLOOD PRESSURE: 122 MMHG | HEART RATE: 98 BPM

## 2022-01-01 VITALS
SYSTOLIC BLOOD PRESSURE: 155 MMHG | BODY MASS INDEX: 25.48 KG/M2 | HEIGHT: 62 IN | DIASTOLIC BLOOD PRESSURE: 75 MMHG | WEIGHT: 138.45 LBS | OXYGEN SATURATION: 91 % | HEART RATE: 83 BPM | TEMPERATURE: 98.4 F | RESPIRATION RATE: 16 BRPM

## 2022-01-01 VITALS
SYSTOLIC BLOOD PRESSURE: 193 MMHG | HEART RATE: 110 BPM | DIASTOLIC BLOOD PRESSURE: 94 MMHG | HEIGHT: 60 IN | BODY MASS INDEX: 28 KG/M2 | OXYGEN SATURATION: 93 % | WEIGHT: 142.6 LBS | TEMPERATURE: 98.1 F | RESPIRATION RATE: 30 BRPM

## 2022-01-01 VITALS
HEART RATE: 92 BPM | DIASTOLIC BLOOD PRESSURE: 70 MMHG | SYSTOLIC BLOOD PRESSURE: 124 MMHG | WEIGHT: 138.4 LBS | RESPIRATION RATE: 16 BRPM | BODY MASS INDEX: 27.03 KG/M2 | OXYGEN SATURATION: 95 % | TEMPERATURE: 98.4 F

## 2022-01-01 DIAGNOSIS — F41.9 ANXIETY: Primary | ICD-10-CM

## 2022-01-01 DIAGNOSIS — J90 PLEURAL EFFUSION ON LEFT: ICD-10-CM

## 2022-01-01 DIAGNOSIS — M54.41 CHRONIC BILATERAL LOW BACK PAIN WITH BILATERAL SCIATICA: Primary | ICD-10-CM

## 2022-01-01 DIAGNOSIS — G47.00 INSOMNIA, UNSPECIFIED TYPE: ICD-10-CM

## 2022-01-01 DIAGNOSIS — R09.02 HYPOXIA: Primary | ICD-10-CM

## 2022-01-01 DIAGNOSIS — M54.42 CHRONIC BILATERAL LOW BACK PAIN WITH BILATERAL SCIATICA: Primary | ICD-10-CM

## 2022-01-01 DIAGNOSIS — Z23 HIGH PRIORITY FOR 2019-NCOV VACCINE: ICD-10-CM

## 2022-01-01 DIAGNOSIS — R50.9 FEVER, UNSPECIFIED FEVER CAUSE: ICD-10-CM

## 2022-01-01 DIAGNOSIS — C34.92 PRIMARY ADENOCARCINOMA OF LEFT LUNG (H): Primary | ICD-10-CM

## 2022-01-01 DIAGNOSIS — H92.03 ACUTE EAR PAIN, BILATERAL: Primary | ICD-10-CM

## 2022-01-01 DIAGNOSIS — Z53.9 DIAGNOSIS NOT YET DEFINED: Primary | ICD-10-CM

## 2022-01-01 DIAGNOSIS — M81.0 OSTEOPOROSIS, UNSPECIFIED OSTEOPOROSIS TYPE, UNSPECIFIED PATHOLOGICAL FRACTURE PRESENCE: ICD-10-CM

## 2022-01-01 DIAGNOSIS — H61.23 BILATERAL IMPACTED CERUMEN: ICD-10-CM

## 2022-01-01 DIAGNOSIS — C34.82: ICD-10-CM

## 2022-01-01 DIAGNOSIS — R06.03 RESPIRATORY DISTRESS: ICD-10-CM

## 2022-01-01 DIAGNOSIS — G89.29 CHRONIC BILATERAL LOW BACK PAIN WITH BILATERAL SCIATICA: Primary | ICD-10-CM

## 2022-01-01 DIAGNOSIS — Z12.31 VISIT FOR SCREENING MAMMOGRAM: ICD-10-CM

## 2022-01-01 DIAGNOSIS — Z23 NEED FOR PROPHYLACTIC VACCINATION AND INOCULATION AGAINST INFLUENZA: ICD-10-CM

## 2022-01-01 DIAGNOSIS — R03.0 ELEVATED BLOOD PRESSURE READING: ICD-10-CM

## 2022-01-01 LAB
ALBUMIN SERPL-MCNC: 3.1 G/DL (ref 3.4–5)
ALBUMIN SERPL-MCNC: 3.5 G/DL (ref 3.4–5)
ALP SERPL-CCNC: 64 U/L (ref 40–150)
ALT SERPL W P-5'-P-CCNC: 31 U/L (ref 0–50)
ANION GAP SERPL CALCULATED.3IONS-SCNC: 10 MMOL/L (ref 3–14)
ANION GAP SERPL CALCULATED.3IONS-SCNC: 4 MMOL/L (ref 3–14)
ANION GAP SERPL CALCULATED.3IONS-SCNC: 5 MMOL/L (ref 3–14)
ANION GAP SERPL CALCULATED.3IONS-SCNC: 8 MMOL/L (ref 3–14)
APPEARANCE FLD: ABNORMAL
AST SERPL W P-5'-P-CCNC: 27 U/L (ref 0–45)
ATRIAL RATE - MUSE: 102 BPM
BACTERIA PLR CULT: ABNORMAL
BACTERIA PLR CULT: NO GROWTH
BASOPHILS # BLD AUTO: 0.1 10E3/UL (ref 0–0.2)
BASOPHILS # BLD AUTO: 0.1 10E3/UL (ref 0–0.2)
BASOPHILS NFR BLD AUTO: 1 %
BASOPHILS NFR BLD AUTO: 1 %
BILIRUB SERPL-MCNC: 0.4 MG/DL (ref 0.2–1.3)
BUN SERPL-MCNC: 13 MG/DL (ref 7–30)
BUN SERPL-MCNC: 14 MG/DL (ref 7–30)
BUN SERPL-MCNC: 16 MG/DL (ref 7–30)
BUN SERPL-MCNC: 16 MG/DL (ref 7–30)
CALCIUM SERPL-MCNC: 8.3 MG/DL (ref 8.5–10.1)
CALCIUM SERPL-MCNC: 8.3 MG/DL (ref 8.5–10.1)
CALCIUM SERPL-MCNC: 8.6 MG/DL (ref 8.5–10.1)
CALCIUM SERPL-MCNC: 8.8 MG/DL (ref 8.5–10.1)
CELL COUNT BODY FLUID SOURCE: ABNORMAL
CHLORIDE BLD-SCNC: 104 MMOL/L (ref 94–109)
CHLORIDE BLD-SCNC: 105 MMOL/L (ref 94–109)
CHLORIDE BLD-SCNC: 107 MMOL/L (ref 94–109)
CHLORIDE BLD-SCNC: 99 MMOL/L (ref 94–109)
CO2 SERPL-SCNC: 25 MMOL/L (ref 20–32)
CO2 SERPL-SCNC: 26 MMOL/L (ref 20–32)
CO2 SERPL-SCNC: 28 MMOL/L (ref 20–32)
CO2 SERPL-SCNC: 29 MMOL/L (ref 20–32)
COLOR FLD: ABNORMAL
CREAT SERPL-MCNC: 0.44 MG/DL (ref 0.52–1.04)
CREAT SERPL-MCNC: 0.52 MG/DL (ref 0.52–1.04)
CREAT SERPL-MCNC: 0.56 MG/DL (ref 0.52–1.04)
CREAT SERPL-MCNC: 0.56 MG/DL (ref 0.52–1.04)
DIASTOLIC BLOOD PRESSURE - MUSE: NORMAL MMHG
EOSINOPHIL # BLD AUTO: 0 10E3/UL (ref 0–0.7)
EOSINOPHIL # BLD AUTO: 0.2 10E3/UL (ref 0–0.7)
EOSINOPHIL NFR BLD AUTO: 0 %
EOSINOPHIL NFR BLD AUTO: 2 %
ERYTHROCYTE [DISTWIDTH] IN BLOOD BY AUTOMATED COUNT: 14.1 % (ref 10–15)
ERYTHROCYTE [DISTWIDTH] IN BLOOD BY AUTOMATED COUNT: 14.4 % (ref 10–15)
ERYTHROCYTE [DISTWIDTH] IN BLOOD BY AUTOMATED COUNT: 14.5 % (ref 10–15)
ERYTHROCYTE [DISTWIDTH] IN BLOOD BY AUTOMATED COUNT: 14.6 % (ref 10–15)
FLUAV RNA SPEC QL NAA+PROBE: NEGATIVE
FLUBV RNA RESP QL NAA+PROBE: NEGATIVE
GFR SERPL CREATININE-BSD FRML MDRD: 90 ML/MIN/1.73M2
GFR SERPL CREATININE-BSD FRML MDRD: 90 ML/MIN/1.73M2
GFR SERPL CREATININE-BSD FRML MDRD: >90 ML/MIN/1.73M2
GFR SERPL CREATININE-BSD FRML MDRD: >90 ML/MIN/1.73M2
GLUCOSE BLD-MCNC: 104 MG/DL (ref 70–99)
GLUCOSE BLD-MCNC: 107 MG/DL (ref 70–99)
GLUCOSE BLD-MCNC: 118 MG/DL (ref 70–99)
GLUCOSE BLD-MCNC: 128 MG/DL (ref 70–99)
GLUCOSE BODY FLUID SOURCE: NORMAL
GLUCOSE FLD-MCNC: 101 MG/DL
GRAM STAIN RESULT: NORMAL
HCT VFR BLD AUTO: 36.1 % (ref 35–47)
HCT VFR BLD AUTO: 36.3 % (ref 35–47)
HCT VFR BLD AUTO: 38.8 % (ref 35–47)
HCT VFR BLD AUTO: 40.8 % (ref 35–47)
HGB BLD-MCNC: 11.9 G/DL (ref 11.7–15.7)
HGB BLD-MCNC: 12.3 G/DL (ref 11.7–15.7)
HGB BLD-MCNC: 12.5 G/DL (ref 11.7–15.7)
HGB BLD-MCNC: 13.5 G/DL (ref 11.7–15.7)
IMM GRANULOCYTES # BLD: 0 10E3/UL
IMM GRANULOCYTES # BLD: 0.1 10E3/UL
IMM GRANULOCYTES NFR BLD: 0 %
IMM GRANULOCYTES NFR BLD: 1 %
INTERPRETATION ECG - MUSE: NORMAL
INTERPRETATION: NORMAL
LAB DIRECTOR COMMENTS: ABNORMAL
LAB DIRECTOR DISCLAIMER: ABNORMAL
LAB DIRECTOR INTERPRETATION: ABNORMAL
LAB DIRECTOR METHODOLOGY: ABNORMAL
LAB DIRECTOR RESULTS: ABNORMAL
LD BODY BODY FLUID SOURCE: NORMAL
LDH FLD L TO P-CCNC: 748 U/L
LDH SERPL L TO P-CCNC: 230 U/L (ref 81–234)
LYMPHOCYTES # BLD AUTO: 0.9 10E3/UL (ref 0.8–5.3)
LYMPHOCYTES # BLD AUTO: 1.7 10E3/UL (ref 0.8–5.3)
LYMPHOCYTES NFR BLD AUTO: 16 %
LYMPHOCYTES NFR BLD AUTO: 7 %
LYMPHOCYTES NFR FLD MANUAL: 5 %
MAGNESIUM SERPL-MCNC: 2.2 MG/DL (ref 1.6–2.3)
MAGNESIUM SERPL-MCNC: 2.2 MG/DL (ref 1.6–2.3)
MAGNESIUM SERPL-MCNC: 2.3 MG/DL (ref 1.6–2.3)
MAGNESIUM SERPL-MCNC: 2.3 MG/DL (ref 1.6–2.3)
MCH RBC QN AUTO: 32.7 PG (ref 26.5–33)
MCH RBC QN AUTO: 32.9 PG (ref 26.5–33)
MCH RBC QN AUTO: 33.2 PG (ref 26.5–33)
MCH RBC QN AUTO: 33.5 PG (ref 26.5–33)
MCHC RBC AUTO-ENTMCNC: 32.2 G/DL (ref 31.5–36.5)
MCHC RBC AUTO-ENTMCNC: 33 G/DL (ref 31.5–36.5)
MCHC RBC AUTO-ENTMCNC: 33.1 G/DL (ref 31.5–36.5)
MCHC RBC AUTO-ENTMCNC: 33.9 G/DL (ref 31.5–36.5)
MCV RBC AUTO: 100 FL (ref 78–100)
MCV RBC AUTO: 101 FL (ref 78–100)
MCV RBC AUTO: 102 FL (ref 78–100)
MCV RBC AUTO: 98 FL (ref 78–100)
MONOCYTES # BLD AUTO: 0.6 10E3/UL (ref 0–1.3)
MONOCYTES # BLD AUTO: 0.9 10E3/UL (ref 0–1.3)
MONOCYTES NFR BLD AUTO: 5 %
MONOCYTES NFR BLD AUTO: 9 %
MONOS+MACROS NFR FLD MANUAL: 2 %
NEUTROPHILS # BLD AUTO: 10.5 10E3/UL (ref 1.6–8.3)
NEUTROPHILS # BLD AUTO: 7.8 10E3/UL (ref 1.6–8.3)
NEUTROPHILS NFR BLD AUTO: 71 %
NEUTROPHILS NFR BLD AUTO: 87 %
NEUTS BAND NFR FLD MANUAL: 1 %
NRBC # BLD AUTO: 0 10E3/UL
NRBC # BLD AUTO: 0 10E3/UL
NRBC BLD AUTO-RTO: 0 /100
NRBC BLD AUTO-RTO: 0 /100
NT-PROBNP SERPL-MCNC: 448 PG/ML (ref 0–1800)
OTHER CELLS FLD MANUAL: 92 %
P AXIS - MUSE: 39 DEGREES
PATH REPORT.COMMENTS IMP SPEC: ABNORMAL
PATH REPORT.COMMENTS IMP SPEC: ABNORMAL
PATH REPORT.COMMENTS IMP SPEC: YES
PATH REPORT.FINAL DX SPEC: ABNORMAL
PATH REPORT.GROSS SPEC: ABNORMAL
PATH REPORT.MICROSCOPIC SPEC OTHER STN: ABNORMAL
PATH REPORT.RELEVANT HX SPEC: ABNORMAL
PHOSPHATE SERPL-MCNC: 2.9 MG/DL (ref 2.5–4.5)
PLATELET # BLD AUTO: 360 10E3/UL (ref 150–450)
PLATELET # BLD AUTO: 376 10E3/UL (ref 150–450)
PLATELET # BLD AUTO: 384 10E3/UL (ref 150–450)
PLATELET # BLD AUTO: 404 10E3/UL (ref 150–450)
POTASSIUM BLD-SCNC: 3.4 MMOL/L (ref 3.4–5.3)
POTASSIUM BLD-SCNC: 3.5 MMOL/L (ref 3.4–5.3)
POTASSIUM BLD-SCNC: 3.9 MMOL/L (ref 3.4–5.3)
POTASSIUM BLD-SCNC: 3.9 MMOL/L (ref 3.4–5.3)
POTASSIUM BLD-SCNC: 4.1 MMOL/L (ref 3.4–5.3)
PR INTERVAL - MUSE: 166 MS
PROCALCITONIN SERPL-MCNC: <0.05 NG/ML
PROT FLD-MCNC: 4.2 G/DL
PROT SERPL-MCNC: 6.7 G/DL (ref 6.8–8.8)
PROT SERPL-MCNC: 6.9 G/DL (ref 6.8–8.8)
PROTEIN BODY FLUID SOURCE: NORMAL
QRS DURATION - MUSE: 118 MS
QT - MUSE: 380 MS
QTC - MUSE: 495 MS
R AXIS - MUSE: -47 DEGREES
RBC # BLD AUTO: 3.62 10E6/UL (ref 3.8–5.2)
RBC # BLD AUTO: 3.7 10E6/UL (ref 3.8–5.2)
RBC # BLD AUTO: 3.82 10E6/UL (ref 3.8–5.2)
RBC # BLD AUTO: 4.03 10E6/UL (ref 3.8–5.2)
RSV RNA SPEC NAA+PROBE: NEGATIVE
SARS-COV-2 RNA RESP QL NAA+PROBE: NEGATIVE
SARS-COV-2 RNA RESP QL NAA+PROBE: NEGATIVE
SIGNIFICANT RESULTS: NORMAL
SODIUM SERPL-SCNC: 134 MMOL/L (ref 133–144)
SODIUM SERPL-SCNC: 138 MMOL/L (ref 133–144)
SODIUM SERPL-SCNC: 139 MMOL/L (ref 133–144)
SODIUM SERPL-SCNC: 139 MMOL/L (ref 133–144)
SPECIMEN DESCRIPTION: ABNORMAL
SPECIMEN DESCRIPTION: NORMAL
SYSTOLIC BLOOD PRESSURE - MUSE: NORMAL MMHG
T AXIS - MUSE: 102 DEGREES
TEST DETAILS, MDL: NORMAL
TSH SERPL DL<=0.005 MIU/L-ACNC: 1.39 MU/L (ref 0.4–4)
VENTRICULAR RATE- MUSE: 102 BPM
WBC # BLD AUTO: 10.2 10E3/UL (ref 4–11)
WBC # BLD AUTO: 10.7 10E3/UL (ref 4–11)
WBC # BLD AUTO: 12.1 10E3/UL (ref 4–11)
WBC # BLD AUTO: 9.1 10E3/UL (ref 4–11)
WBC # FLD AUTO: 1753 /UL

## 2022-01-01 PROCEDURE — G0452 MOLECULAR PATHOLOGY INTERPR: HCPCS | Mod: 26 | Performed by: PATHOLOGY

## 2022-01-01 PROCEDURE — 250N000013 HC RX MED GY IP 250 OP 250 PS 637

## 2022-01-01 PROCEDURE — 99223 1ST HOSP IP/OBS HIGH 75: CPT | Mod: AI | Performed by: NURSE PRACTITIONER

## 2022-01-01 PROCEDURE — U0003 INFECTIOUS AGENT DETECTION BY NUCLEIC ACID (DNA OR RNA); SEVERE ACUTE RESPIRATORY SYNDROME CORONAVIRUS 2 (SARS-COV-2) (CORONAVIRUS DISEASE [COVID-19]), AMPLIFIED PROBE TECHNIQUE, MAKING USE OF HIGH THROUGHPUT TECHNOLOGIES AS DESCRIBED BY CMS-2020-01-R: HCPCS | Performed by: NURSE PRACTITIONER

## 2022-01-01 PROCEDURE — 99232 SBSQ HOSP IP/OBS MODERATE 35: CPT | Performed by: HOSPITALIST

## 2022-01-01 PROCEDURE — 83880 ASSAY OF NATRIURETIC PEPTIDE: CPT | Performed by: EMERGENCY MEDICINE

## 2022-01-01 PROCEDURE — 36415 COLL VENOUS BLD VENIPUNCTURE: CPT | Performed by: HOSPITALIST

## 2022-01-01 PROCEDURE — 99213 OFFICE O/P EST LOW 20 MIN: CPT | Mod: 95 | Performed by: NURSE PRACTITIONER

## 2022-01-01 PROCEDURE — 272N000706 US THORACENTESIS

## 2022-01-01 PROCEDURE — 97530 THERAPEUTIC ACTIVITIES: CPT | Mod: GP

## 2022-01-01 PROCEDURE — 250N000011 HC RX IP 250 OP 636: Performed by: NURSE PRACTITIONER

## 2022-01-01 PROCEDURE — 97530 THERAPEUTIC ACTIVITIES: CPT | Mod: GP | Performed by: PHYSICAL THERAPIST

## 2022-01-01 PROCEDURE — 99222 1ST HOSP IP/OBS MODERATE 55: CPT | Performed by: INTERNAL MEDICINE

## 2022-01-01 PROCEDURE — 83735 ASSAY OF MAGNESIUM: CPT | Performed by: INTERNAL MEDICINE

## 2022-01-01 PROCEDURE — 255N000002 HC RX 255 OP 636: Performed by: INTERNAL MEDICINE

## 2022-01-01 PROCEDURE — 71045 X-RAY EXAM CHEST 1 VIEW: CPT

## 2022-01-01 PROCEDURE — 74177 CT ABD & PELVIS W/CONTRAST: CPT

## 2022-01-01 PROCEDURE — 36415 COLL VENOUS BLD VENIPUNCTURE: CPT | Performed by: EMERGENCY MEDICINE

## 2022-01-01 PROCEDURE — C9803 HOPD COVID-19 SPEC COLLECT: HCPCS

## 2022-01-01 PROCEDURE — 88112 CYTOPATH CELL ENHANCE TECH: CPT | Mod: TC | Performed by: NURSE PRACTITIONER

## 2022-01-01 PROCEDURE — 80053 COMPREHEN METABOLIC PANEL: CPT | Performed by: EMERGENCY MEDICINE

## 2022-01-01 PROCEDURE — 84132 ASSAY OF SERUM POTASSIUM: CPT | Performed by: HOSPITALIST

## 2022-01-01 PROCEDURE — 83615 LACTATE (LD) (LDH) ENZYME: CPT | Performed by: NURSE PRACTITIONER

## 2022-01-01 PROCEDURE — 97110 THERAPEUTIC EXERCISES: CPT | Mod: GP

## 2022-01-01 PROCEDURE — 250N000013 HC RX MED GY IP 250 OP 250 PS 637: Performed by: HOSPITALIST

## 2022-01-01 PROCEDURE — G0180 MD CERTIFICATION HHA PATIENT: HCPCS | Performed by: INTERNAL MEDICINE

## 2022-01-01 PROCEDURE — 97161 PT EVAL LOW COMPLEX 20 MIN: CPT | Mod: GP

## 2022-01-01 PROCEDURE — A9585 GADOBUTROL INJECTION: HCPCS | Performed by: INTERNAL MEDICINE

## 2022-01-01 PROCEDURE — 250N000013 HC RX MED GY IP 250 OP 250 PS 637: Performed by: NURSE PRACTITIONER

## 2022-01-01 PROCEDURE — 250N000011 HC RX IP 250 OP 636: Performed by: INTERNAL MEDICINE

## 2022-01-01 PROCEDURE — 93005 ELECTROCARDIOGRAM TRACING: CPT

## 2022-01-01 PROCEDURE — 0W9B3ZZ DRAINAGE OF LEFT PLEURAL CAVITY, PERCUTANEOUS APPROACH: ICD-10-PCS | Performed by: RADIOLOGY

## 2022-01-01 PROCEDURE — 99233 SBSQ HOSP IP/OBS HIGH 50: CPT | Performed by: HOSPITALIST

## 2022-01-01 PROCEDURE — 99285 EMERGENCY DEPT VISIT HI MDM: CPT | Mod: 25

## 2022-01-01 PROCEDURE — 84100 ASSAY OF PHOSPHORUS: CPT | Performed by: HOSPITALIST

## 2022-01-01 PROCEDURE — 82040 ASSAY OF SERUM ALBUMIN: CPT | Performed by: HOSPITALIST

## 2022-01-01 PROCEDURE — 99207 PR INPT ADMISSION FROM CLINIC: CPT | Performed by: NURSE PRACTITIONER

## 2022-01-01 PROCEDURE — 87205 SMEAR GRAM STAIN: CPT | Performed by: NURSE PRACTITIONER

## 2022-01-01 PROCEDURE — 99213 OFFICE O/P EST LOW 20 MIN: CPT | Mod: 25 | Performed by: INTERNAL MEDICINE

## 2022-01-01 PROCEDURE — 81445 SO NEO GSAP 5-50DNA/DNA&RNA: CPT | Performed by: NURSE PRACTITIONER

## 2022-01-01 PROCEDURE — 120N000001 HC R&B MED SURG/OB

## 2022-01-01 PROCEDURE — 71046 X-RAY EXAM CHEST 2 VIEWS: CPT

## 2022-01-01 PROCEDURE — 84157 ASSAY OF PROTEIN OTHER: CPT | Performed by: NURSE PRACTITIONER

## 2022-01-01 PROCEDURE — 99232 SBSQ HOSP IP/OBS MODERATE 35: CPT | Performed by: INTERNAL MEDICINE

## 2022-01-01 PROCEDURE — 0124A COVID-19,PF,PFIZER BOOSTER BIVALENT: CPT | Performed by: INTERNAL MEDICINE

## 2022-01-01 PROCEDURE — 85027 COMPLETE CBC AUTOMATED: CPT | Performed by: HOSPITALIST

## 2022-01-01 PROCEDURE — 97116 GAIT TRAINING THERAPY: CPT | Mod: GP | Performed by: PHYSICAL THERAPIST

## 2022-01-01 PROCEDURE — 87070 CULTURE OTHR SPECIMN AEROBIC: CPT | Performed by: NURSE PRACTITIONER

## 2022-01-01 PROCEDURE — 99231 SBSQ HOSP IP/OBS SF/LOW 25: CPT | Performed by: INTERNAL MEDICINE

## 2022-01-01 PROCEDURE — 82374 ASSAY BLOOD CARBON DIOXIDE: CPT | Performed by: HOSPITALIST

## 2022-01-01 PROCEDURE — 90662 IIV NO PRSV INCREASED AG IM: CPT | Performed by: INTERNAL MEDICINE

## 2022-01-01 PROCEDURE — 88305 TISSUE EXAM BY PATHOLOGIST: CPT | Mod: 26 | Performed by: PATHOLOGY

## 2022-01-01 PROCEDURE — 70553 MRI BRAIN STEM W/O & W/DYE: CPT

## 2022-01-01 PROCEDURE — 87075 CULTR BACTERIA EXCEPT BLOOD: CPT | Performed by: NURSE PRACTITIONER

## 2022-01-01 PROCEDURE — 97116 GAIT TRAINING THERAPY: CPT | Mod: GP

## 2022-01-01 PROCEDURE — 88112 CYTOPATH CELL ENHANCE TECH: CPT | Mod: 26 | Performed by: PATHOLOGY

## 2022-01-01 PROCEDURE — 84443 ASSAY THYROID STIM HORMONE: CPT | Performed by: EMERGENCY MEDICINE

## 2022-01-01 PROCEDURE — 99239 HOSP IP/OBS DSCHRG MGMT >30: CPT | Performed by: HOSPITALIST

## 2022-01-01 PROCEDURE — 85025 COMPLETE CBC W/AUTO DIFF WBC: CPT | Performed by: EMERGENCY MEDICINE

## 2022-01-01 PROCEDURE — 250N000009 HC RX 250: Performed by: INTERNAL MEDICINE

## 2022-01-01 PROCEDURE — 88341 IMHCHEM/IMCYTCHM EA ADD ANTB: CPT | Mod: 26 | Performed by: PATHOLOGY

## 2022-01-01 PROCEDURE — 91312 COVID-19,PF,PFIZER BOOSTER BIVALENT: CPT | Performed by: INTERNAL MEDICINE

## 2022-01-01 PROCEDURE — 85004 AUTOMATED DIFF WBC COUNT: CPT | Performed by: NURSE PRACTITIONER

## 2022-01-01 PROCEDURE — 36415 COLL VENOUS BLD VENIPUNCTURE: CPT | Performed by: NURSE PRACTITIONER

## 2022-01-01 PROCEDURE — 250N000013 HC RX MED GY IP 250 OP 250 PS 637: Performed by: INTERNAL MEDICINE

## 2022-01-01 PROCEDURE — 87206 SMEAR FLUORESCENT/ACID STAI: CPT | Performed by: NURSE PRACTITIONER

## 2022-01-01 PROCEDURE — 82945 GLUCOSE OTHER FLUID: CPT | Performed by: NURSE PRACTITIONER

## 2022-01-01 PROCEDURE — 84145 PROCALCITONIN (PCT): CPT | Performed by: EMERGENCY MEDICINE

## 2022-01-01 PROCEDURE — 88360 TUMOR IMMUNOHISTOCHEM/MANUAL: CPT | Mod: 26 | Performed by: PATHOLOGY

## 2022-01-01 PROCEDURE — 82310 ASSAY OF CALCIUM: CPT | Performed by: HOSPITALIST

## 2022-01-01 PROCEDURE — 88305 TISSUE EXAM BY PATHOLOGIST: CPT | Mod: TC | Performed by: NURSE PRACTITIONER

## 2022-01-01 PROCEDURE — 99213 OFFICE O/P EST LOW 20 MIN: CPT | Mod: CS | Performed by: NURSE PRACTITIONER

## 2022-01-01 PROCEDURE — 99233 SBSQ HOSP IP/OBS HIGH 50: CPT | Performed by: INTERNAL MEDICINE

## 2022-01-01 PROCEDURE — 77063 BREAST TOMOSYNTHESIS BI: CPT | Mod: TC | Performed by: RADIOLOGY

## 2022-01-01 PROCEDURE — 80048 BASIC METABOLIC PNL TOTAL CA: CPT | Performed by: NURSE PRACTITIONER

## 2022-01-01 PROCEDURE — 89051 BODY FLUID CELL COUNT: CPT | Performed by: NURSE PRACTITIONER

## 2022-01-01 PROCEDURE — 77067 SCR MAMMO BI INCL CAD: CPT | Mod: TC | Performed by: RADIOLOGY

## 2022-01-01 PROCEDURE — G0008 ADMIN INFLUENZA VIRUS VAC: HCPCS | Performed by: INTERNAL MEDICINE

## 2022-01-01 PROCEDURE — 83735 ASSAY OF MAGNESIUM: CPT | Performed by: HOSPITALIST

## 2022-01-01 PROCEDURE — 88342 IMHCHEM/IMCYTCHM 1ST ANTB: CPT | Mod: 26 | Performed by: PATHOLOGY

## 2022-01-01 PROCEDURE — 250N000009 HC RX 250: Performed by: RADIOLOGY

## 2022-01-01 PROCEDURE — U0005 INFEC AGEN DETEC AMPLI PROBE: HCPCS | Performed by: NURSE PRACTITIONER

## 2022-01-01 PROCEDURE — 87637 SARSCOV2&INF A&B&RSV AMP PRB: CPT | Performed by: EMERGENCY MEDICINE

## 2022-01-01 PROCEDURE — 99214 OFFICE O/P EST MOD 30 MIN: CPT | Performed by: INTERNAL MEDICINE

## 2022-01-01 PROCEDURE — 97110 THERAPEUTIC EXERCISES: CPT | Mod: GP | Performed by: PHYSICAL THERAPIST

## 2022-01-01 RX ORDER — TRAZODONE HYDROCHLORIDE 50 MG/1
25-50 TABLET, FILM COATED ORAL
Qty: 10 TABLET | Refills: 0 | Status: SHIPPED | OUTPATIENT
Start: 2022-01-01 | End: 2022-01-01

## 2022-01-01 RX ORDER — ONDANSETRON 2 MG/ML
4 INJECTION INTRAMUSCULAR; INTRAVENOUS EVERY 6 HOURS PRN
Status: DISCONTINUED | OUTPATIENT
Start: 2022-01-01 | End: 2022-01-01 | Stop reason: HOSPADM

## 2022-01-01 RX ORDER — ACETAMINOPHEN 325 MG/1
650 TABLET ORAL 4 TIMES DAILY
Status: DISCONTINUED | OUTPATIENT
Start: 2022-01-01 | End: 2022-01-01 | Stop reason: HOSPADM

## 2022-01-01 RX ORDER — TRAMADOL HYDROCHLORIDE 50 MG/1
50 TABLET ORAL EVERY 6 HOURS PRN
Status: DISCONTINUED | OUTPATIENT
Start: 2022-01-01 | End: 2022-01-01 | Stop reason: HOSPADM

## 2022-01-01 RX ORDER — ONDANSETRON 4 MG/1
4 TABLET, ORALLY DISINTEGRATING ORAL EVERY 6 HOURS PRN
Status: DISCONTINUED | OUTPATIENT
Start: 2022-01-01 | End: 2022-01-01 | Stop reason: HOSPADM

## 2022-01-01 RX ORDER — NALOXONE HYDROCHLORIDE 0.4 MG/ML
0.2 INJECTION, SOLUTION INTRAMUSCULAR; INTRAVENOUS; SUBCUTANEOUS
Status: DISCONTINUED | OUTPATIENT
Start: 2022-01-01 | End: 2022-01-01 | Stop reason: HOSPADM

## 2022-01-01 RX ORDER — NALOXONE HYDROCHLORIDE 0.4 MG/ML
0.4 INJECTION, SOLUTION INTRAMUSCULAR; INTRAVENOUS; SUBCUTANEOUS
Status: DISCONTINUED | OUTPATIENT
Start: 2022-01-01 | End: 2022-01-01 | Stop reason: HOSPADM

## 2022-01-01 RX ORDER — MORPHINE SULFATE 2 MG/ML
0.5 INJECTION, SOLUTION INTRAMUSCULAR; INTRAVENOUS EVERY 4 HOURS PRN
Status: DISCONTINUED | OUTPATIENT
Start: 2022-01-01 | End: 2022-01-01 | Stop reason: HOSPADM

## 2022-01-01 RX ORDER — BISACODYL 10 MG
10 SUPPOSITORY, RECTAL RECTAL DAILY PRN
Status: DISCONTINUED | OUTPATIENT
Start: 2022-01-01 | End: 2022-01-01 | Stop reason: HOSPADM

## 2022-01-01 RX ORDER — ABALOPARATIDE 2000 UG/ML
80 INJECTION, SOLUTION SUBCUTANEOUS DAILY
Start: 2022-01-01 | End: 2023-01-01

## 2022-01-01 RX ORDER — LIDOCAINE 40 MG/G
CREAM TOPICAL
Status: DISCONTINUED | OUTPATIENT
Start: 2022-01-01 | End: 2022-01-01 | Stop reason: HOSPADM

## 2022-01-01 RX ORDER — POTASSIUM CHLORIDE 1500 MG/1
40 TABLET, EXTENDED RELEASE ORAL ONCE
Status: COMPLETED | OUTPATIENT
Start: 2022-01-01 | End: 2022-01-01

## 2022-01-01 RX ORDER — ANTIOX #8/OM3/DHA/EPA/LUT/ZEAX 250-2.5 MG
1 CAPSULE ORAL 2 TIMES DAILY
Status: DISCONTINUED | OUTPATIENT
Start: 2022-01-01 | End: 2022-01-01 | Stop reason: CLARIF

## 2022-01-01 RX ORDER — CHOLECALCIFEROL (VITAMIN D3) 50 MCG
50 TABLET ORAL DAILY
Status: DISCONTINUED | OUTPATIENT
Start: 2022-01-01 | End: 2022-01-01 | Stop reason: HOSPADM

## 2022-01-01 RX ORDER — CALCIUM CARBONATE 500 MG/1
500 TABLET, CHEWABLE ORAL 3 TIMES DAILY PRN
Status: DISCONTINUED | OUTPATIENT
Start: 2022-01-01 | End: 2022-01-01 | Stop reason: HOSPADM

## 2022-01-01 RX ORDER — HYDROXYZINE HYDROCHLORIDE 10 MG/1
10 TABLET, FILM COATED ORAL 3 TIMES DAILY PRN
Qty: 30 TABLET | Refills: 1 | Status: SHIPPED | OUTPATIENT
Start: 2022-01-01 | End: 2023-01-01

## 2022-01-01 RX ORDER — TIZANIDINE 2 MG/1
2 TABLET ORAL EVERY 12 HOURS PRN
Status: DISCONTINUED | OUTPATIENT
Start: 2022-01-01 | End: 2022-01-01 | Stop reason: HOSPADM

## 2022-01-01 RX ORDER — AMOXICILLIN 250 MG
2 CAPSULE ORAL 2 TIMES DAILY PRN
Status: DISCONTINUED | OUTPATIENT
Start: 2022-01-01 | End: 2022-01-01 | Stop reason: HOSPADM

## 2022-01-01 RX ORDER — VIT C/E/ZN/COPPR/LUTEIN/ZEAXAN 60 MG-6 MG
1 CAPSULE ORAL 2 TIMES DAILY
Status: DISCONTINUED | OUTPATIENT
Start: 2022-01-01 | End: 2022-01-01 | Stop reason: HOSPADM

## 2022-01-01 RX ORDER — TRAZODONE HYDROCHLORIDE 50 MG/1
50 TABLET, FILM COATED ORAL
Status: DISCONTINUED | OUTPATIENT
Start: 2022-01-01 | End: 2022-01-01 | Stop reason: HOSPADM

## 2022-01-01 RX ORDER — TRAZODONE HYDROCHLORIDE 50 MG/1
25-50 TABLET, FILM COATED ORAL
Qty: 30 TABLET | Refills: 1 | Status: ON HOLD | OUTPATIENT
Start: 2022-01-01 | End: 2023-01-01

## 2022-01-01 RX ORDER — LIDOCAINE HYDROCHLORIDE 10 MG/ML
10 INJECTION, SOLUTION EPIDURAL; INFILTRATION; INTRACAUDAL; PERINEURAL ONCE
Status: COMPLETED | OUTPATIENT
Start: 2022-01-01 | End: 2022-01-01

## 2022-01-01 RX ORDER — IOPAMIDOL 755 MG/ML
71 INJECTION, SOLUTION INTRAVASCULAR ONCE
Status: COMPLETED | OUTPATIENT
Start: 2022-01-01 | End: 2022-01-01

## 2022-01-01 RX ORDER — POLYETHYLENE GLYCOL 3350 17 G/17G
17 POWDER, FOR SOLUTION ORAL DAILY
Status: DISCONTINUED | OUTPATIENT
Start: 2022-01-01 | End: 2022-01-01 | Stop reason: HOSPADM

## 2022-01-01 RX ORDER — GUAIFENESIN 600 MG/1
1200 TABLET, EXTENDED RELEASE ORAL 2 TIMES DAILY PRN
COMMUNITY
End: 2023-01-01

## 2022-01-01 RX ORDER — AMOXICILLIN 250 MG
1 CAPSULE ORAL 2 TIMES DAILY PRN
Status: DISCONTINUED | OUTPATIENT
Start: 2022-01-01 | End: 2022-01-01 | Stop reason: HOSPADM

## 2022-01-01 RX ORDER — LANOLIN ALCOHOL/MO/W.PET/CERES
3 CREAM (GRAM) TOPICAL AT BEDTIME
Status: DISCONTINUED | OUTPATIENT
Start: 2022-01-01 | End: 2022-01-01

## 2022-01-01 RX ORDER — IBUPROFEN 400 MG/1
400 TABLET, FILM COATED ORAL EVERY 6 HOURS PRN
Status: DISCONTINUED | OUTPATIENT
Start: 2022-01-01 | End: 2022-01-01 | Stop reason: HOSPADM

## 2022-01-01 RX ORDER — LATANOPROST 50 UG/ML
1 SOLUTION/ DROPS OPHTHALMIC EVERY EVENING
Status: DISCONTINUED | OUTPATIENT
Start: 2022-01-01 | End: 2022-01-01 | Stop reason: HOSPADM

## 2022-01-01 RX ORDER — CARBOXYMETHYLCELLULOSE SODIUM 5 MG/ML
1 SOLUTION/ DROPS OPHTHALMIC DAILY
Status: DISCONTINUED | OUTPATIENT
Start: 2022-01-01 | End: 2022-01-01 | Stop reason: HOSPADM

## 2022-01-01 RX ORDER — BENZOCAINE/MENTHOL 6 MG-10 MG
LOZENGE MUCOUS MEMBRANE 2 TIMES DAILY
Status: DISCONTINUED | OUTPATIENT
Start: 2022-01-01 | End: 2022-01-01 | Stop reason: HOSPADM

## 2022-01-01 RX ORDER — GADOBUTROL 604.72 MG/ML
6 INJECTION INTRAVENOUS ONCE
Status: COMPLETED | OUTPATIENT
Start: 2022-01-01 | End: 2022-01-01

## 2022-01-01 RX ADMIN — Medication 1 CAPSULE: at 08:01

## 2022-01-01 RX ADMIN — IBUPROFEN 400 MG: 400 TABLET, FILM COATED ORAL at 23:58

## 2022-01-01 RX ADMIN — MELATONIN 5 MG TABLET 5 MG: at 22:25

## 2022-01-01 RX ADMIN — ACETAMINOPHEN 650 MG: 325 TABLET, FILM COATED ORAL at 06:33

## 2022-01-01 RX ADMIN — MORPHINE SULFATE 0.5 MG: 2 INJECTION, SOLUTION INTRAMUSCULAR; INTRAVENOUS at 03:04

## 2022-01-01 RX ADMIN — Medication 1 DROP: at 08:17

## 2022-01-01 RX ADMIN — TRAMADOL HYDROCHLORIDE 50 MG: 50 TABLET, COATED ORAL at 02:44

## 2022-01-01 RX ADMIN — Medication 1 CAPSULE: at 21:53

## 2022-01-01 RX ADMIN — TRAMADOL HYDROCHLORIDE 50 MG: 50 TABLET, COATED ORAL at 05:52

## 2022-01-01 RX ADMIN — ACETAMINOPHEN 650 MG: 325 TABLET, FILM COATED ORAL at 09:43

## 2022-01-01 RX ADMIN — LATANOPROST 1 DROP: 50 SOLUTION OPHTHALMIC at 22:23

## 2022-01-01 RX ADMIN — IBUPROFEN 400 MG: 400 TABLET, FILM COATED ORAL at 02:56

## 2022-01-01 RX ADMIN — IOPAMIDOL 71 ML: 755 INJECTION, SOLUTION INTRAVENOUS at 16:39

## 2022-01-01 RX ADMIN — ACETAMINOPHEN 650 MG: 325 TABLET, FILM COATED ORAL at 21:52

## 2022-01-01 RX ADMIN — TIZANIDINE 2 MG: 2 TABLET ORAL at 22:30

## 2022-01-01 RX ADMIN — Medication 1 DROP: at 08:54

## 2022-01-01 RX ADMIN — MELATONIN TAB 3 MG 3 MG: 3 TAB at 21:53

## 2022-01-01 RX ADMIN — GADOBUTROL 6 ML: 604.72 INJECTION INTRAVENOUS at 21:32

## 2022-01-01 RX ADMIN — LIDOCAINE HYDROCHLORIDE 10 ML: 10 INJECTION, SOLUTION EPIDURAL; INFILTRATION; INTRACAUDAL; PERINEURAL at 10:36

## 2022-01-01 RX ADMIN — TRAZODONE HYDROCHLORIDE 50 MG: 50 TABLET ORAL at 23:58

## 2022-01-01 RX ADMIN — Medication 1 DROP: at 08:18

## 2022-01-01 RX ADMIN — MICONAZOLE NITRATE: 2 POWDER TOPICAL at 11:10

## 2022-01-01 RX ADMIN — Medication 1 CAPSULE: at 08:18

## 2022-01-01 RX ADMIN — Medication 1 CAPSULE: at 08:17

## 2022-01-01 RX ADMIN — ACETAMINOPHEN 650 MG: 325 TABLET, FILM COATED ORAL at 08:17

## 2022-01-01 RX ADMIN — Medication 1 DROP: at 08:16

## 2022-01-01 RX ADMIN — TIZANIDINE 2 MG: 2 TABLET ORAL at 22:25

## 2022-01-01 RX ADMIN — LATANOPROST 1 DROP: 50 SOLUTION OPHTHALMIC at 23:38

## 2022-01-01 RX ADMIN — Medication 1 DROP: at 09:43

## 2022-01-01 RX ADMIN — Medication 50 MCG: at 09:43

## 2022-01-01 RX ADMIN — Medication 50 MCG: at 08:01

## 2022-01-01 RX ADMIN — ACETAMINOPHEN 650 MG: 325 TABLET, FILM COATED ORAL at 20:36

## 2022-01-01 RX ADMIN — ACETAMINOPHEN 650 MG: 325 TABLET, FILM COATED ORAL at 17:26

## 2022-01-01 RX ADMIN — LATANOPROST 1 DROP: 50 SOLUTION OPHTHALMIC at 20:06

## 2022-01-01 RX ADMIN — Medication 1 CAPSULE: at 20:07

## 2022-01-01 RX ADMIN — TIZANIDINE 2 MG: 2 TABLET ORAL at 22:49

## 2022-01-01 RX ADMIN — ACETAMINOPHEN 650 MG: 325 TABLET, FILM COATED ORAL at 11:14

## 2022-01-01 RX ADMIN — TRAZODONE HYDROCHLORIDE 50 MG: 50 TABLET ORAL at 03:03

## 2022-01-01 RX ADMIN — IBUPROFEN 400 MG: 400 TABLET, FILM COATED ORAL at 00:33

## 2022-01-01 RX ADMIN — MELATONIN 5 MG TABLET 5 MG: at 22:43

## 2022-01-01 RX ADMIN — IBUPROFEN 400 MG: 400 TABLET, FILM COATED ORAL at 12:29

## 2022-01-01 RX ADMIN — ACETAMINOPHEN 650 MG: 325 TABLET, FILM COATED ORAL at 08:18

## 2022-01-01 RX ADMIN — IBUPROFEN 400 MG: 400 TABLET, FILM COATED ORAL at 12:21

## 2022-01-01 RX ADMIN — IBUPROFEN 400 MG: 400 TABLET, FILM COATED ORAL at 22:30

## 2022-01-01 RX ADMIN — ACETAMINOPHEN 650 MG: 325 TABLET, FILM COATED ORAL at 15:47

## 2022-01-01 RX ADMIN — SODIUM CHLORIDE 60 ML: 900 INJECTION INTRAVENOUS at 16:40

## 2022-01-01 RX ADMIN — ACETAMINOPHEN 650 MG: 325 TABLET, FILM COATED ORAL at 11:56

## 2022-01-01 RX ADMIN — ACETAMINOPHEN 650 MG: 325 TABLET, FILM COATED ORAL at 15:31

## 2022-01-01 RX ADMIN — TIZANIDINE 2 MG: 2 TABLET ORAL at 22:43

## 2022-01-01 RX ADMIN — Medication 1 CAPSULE: at 22:25

## 2022-01-01 RX ADMIN — Medication 1 CAPSULE: at 20:37

## 2022-01-01 RX ADMIN — Medication 1 CAPSULE: at 08:21

## 2022-01-01 RX ADMIN — Medication 1 DROP: at 08:01

## 2022-01-01 RX ADMIN — ACETAMINOPHEN 650 MG: 325 TABLET, FILM COATED ORAL at 17:01

## 2022-01-01 RX ADMIN — MELATONIN 5 MG TABLET 5 MG: at 22:23

## 2022-01-01 RX ADMIN — Medication 50 MCG: at 08:54

## 2022-01-01 RX ADMIN — Medication 50 MCG: at 08:18

## 2022-01-01 RX ADMIN — MELATONIN TAB 3 MG 3 MG: 3 TAB at 22:49

## 2022-01-01 RX ADMIN — POLYETHYLENE GLYCOL 3350 17 G: 17 POWDER, FOR SOLUTION ORAL at 09:43

## 2022-01-01 RX ADMIN — LATANOPROST 1 DROP: 50 SOLUTION OPHTHALMIC at 20:37

## 2022-01-01 RX ADMIN — POTASSIUM CHLORIDE 40 MEQ: 1500 TABLET, EXTENDED RELEASE ORAL at 23:58

## 2022-01-01 RX ADMIN — ACETAMINOPHEN 650 MG: 325 TABLET, FILM COATED ORAL at 22:17

## 2022-01-01 RX ADMIN — Medication 1 CAPSULE: at 22:23

## 2022-01-01 RX ADMIN — HYDROCORTISONE: 1 CREAM TOPICAL at 11:10

## 2022-01-01 RX ADMIN — Medication 50 MCG: at 08:17

## 2022-01-01 RX ADMIN — MELATONIN 5 MG TABLET 5 MG: at 22:17

## 2022-01-01 RX ADMIN — ACETAMINOPHEN 650 MG: 325 TABLET, FILM COATED ORAL at 08:16

## 2022-01-01 RX ADMIN — LATANOPROST 1 DROP: 50 SOLUTION OPHTHALMIC at 21:53

## 2022-01-01 RX ADMIN — IBUPROFEN 400 MG: 400 TABLET, FILM COATED ORAL at 22:43

## 2022-01-01 RX ADMIN — TIZANIDINE 2 MG: 2 TABLET ORAL at 22:17

## 2022-01-01 RX ADMIN — POLYETHYLENE GLYCOL 3350 17 G: 17 POWDER, FOR SOLUTION ORAL at 08:07

## 2022-01-01 RX ADMIN — HYDROCORTISONE: 1 CREAM TOPICAL at 20:37

## 2022-01-01 RX ADMIN — Medication 1 CAPSULE: at 09:43

## 2022-01-01 RX ADMIN — ACETAMINOPHEN 650 MG: 325 TABLET, FILM COATED ORAL at 22:49

## 2022-01-01 RX ADMIN — ACETAMINOPHEN 650 MG: 325 TABLET, FILM COATED ORAL at 12:25

## 2022-01-01 RX ADMIN — HYDROCORTISONE: 1 CREAM TOPICAL at 09:47

## 2022-01-01 RX ADMIN — TIZANIDINE 2 MG: 2 TABLET ORAL at 22:01

## 2022-01-01 RX ADMIN — ACETAMINOPHEN 650 MG: 325 TABLET, FILM COATED ORAL at 22:24

## 2022-01-01 RX ADMIN — Medication 1 CAPSULE: at 08:54

## 2022-01-01 RX ADMIN — Medication 50 MCG: at 08:16

## 2022-01-01 RX ADMIN — ACETAMINOPHEN 650 MG: 325 TABLET, FILM COATED ORAL at 15:37

## 2022-01-01 RX ADMIN — POLYETHYLENE GLYCOL 3350 17 G: 17 POWDER, FOR SOLUTION ORAL at 08:55

## 2022-01-01 RX ADMIN — IBUPROFEN 400 MG: 400 TABLET, FILM COATED ORAL at 20:13

## 2022-01-01 RX ADMIN — ACETAMINOPHEN 650 MG: 325 TABLET, FILM COATED ORAL at 06:45

## 2022-01-01 ASSESSMENT — ACTIVITIES OF DAILY LIVING (ADL)
ADLS_ACUITY_SCORE: 35
ADLS_ACUITY_SCORE: 37
ADLS_ACUITY_SCORE: 37
ADLS_ACUITY_SCORE: 43
ADLS_ACUITY_SCORE: 40
ADLS_ACUITY_SCORE: 39
ADLS_ACUITY_SCORE: 43
ADLS_ACUITY_SCORE: 39
ADLS_ACUITY_SCORE: 44
ADLS_ACUITY_SCORE: 37
ADLS_ACUITY_SCORE: 42
ADLS_ACUITY_SCORE: 39
ADLS_ACUITY_SCORE: 43
ADLS_ACUITY_SCORE: 40
ADLS_ACUITY_SCORE: 43
ADLS_ACUITY_SCORE: 41
ADLS_ACUITY_SCORE: 37
ADLS_ACUITY_SCORE: 39
DEPENDENT_IADLS:: TRANSPORTATION
ADLS_ACUITY_SCORE: 37
ADLS_ACUITY_SCORE: 37
ADLS_ACUITY_SCORE: 35
ADLS_ACUITY_SCORE: 40
ADLS_ACUITY_SCORE: 43
ADLS_ACUITY_SCORE: 37
ADLS_ACUITY_SCORE: 43
ADLS_ACUITY_SCORE: 39
ADLS_ACUITY_SCORE: 43
ADLS_ACUITY_SCORE: 43
ADLS_ACUITY_SCORE: 37
ADLS_ACUITY_SCORE: 39
ADLS_ACUITY_SCORE: 39
ADLS_ACUITY_SCORE: 41
ADLS_ACUITY_SCORE: 39
ADLS_ACUITY_SCORE: 37
ADLS_ACUITY_SCORE: 43
ADLS_ACUITY_SCORE: 41
ADLS_ACUITY_SCORE: 39
ADLS_ACUITY_SCORE: 43
ADLS_ACUITY_SCORE: 39
ADLS_ACUITY_SCORE: 42
ADLS_ACUITY_SCORE: 39
ADLS_ACUITY_SCORE: 39
ADLS_ACUITY_SCORE: 37
ADLS_ACUITY_SCORE: 39
ADLS_ACUITY_SCORE: 43
ADLS_ACUITY_SCORE: 39
ADLS_ACUITY_SCORE: 39
ADLS_ACUITY_SCORE: 43
ADLS_ACUITY_SCORE: 37
ADLS_ACUITY_SCORE: 43
ADLS_ACUITY_SCORE: 37
ADLS_ACUITY_SCORE: 35
ADLS_ACUITY_SCORE: 42
ADLS_ACUITY_SCORE: 37
ADLS_ACUITY_SCORE: 43
ADLS_ACUITY_SCORE: 38
ADLS_ACUITY_SCORE: 37

## 2022-01-01 ASSESSMENT — ANXIETY QUESTIONNAIRES
GAD7 TOTAL SCORE: 9
GAD7 TOTAL SCORE: 9
2. NOT BEING ABLE TO STOP OR CONTROL WORRYING: NEARLY EVERY DAY
1. FEELING NERVOUS, ANXIOUS, OR ON EDGE: NEARLY EVERY DAY
7. FEELING AFRAID AS IF SOMETHING AWFUL MIGHT HAPPEN: NOT AT ALL
6. BECOMING EASILY ANNOYED OR IRRITABLE: NOT AT ALL
5. BEING SO RESTLESS THAT IT IS HARD TO SIT STILL: NOT AT ALL
3. WORRYING TOO MUCH ABOUT DIFFERENT THINGS: NOT AT ALL

## 2022-01-01 ASSESSMENT — PATIENT HEALTH QUESTIONNAIRE - PHQ9
5. POOR APPETITE OR OVEREATING: NEARLY EVERY DAY
SUM OF ALL RESPONSES TO PHQ QUESTIONS 1-9: 6

## 2022-01-01 ASSESSMENT — ENCOUNTER SYMPTOMS
COUGH: 1
FEVER: 0
SORE THROAT: 0
VOMITING: 0
RHINORRHEA: 1
CHILLS: 0
CHEST TIGHTNESS: 0
SHORTNESS OF BREATH: 1

## 2022-01-02 ENCOUNTER — HEALTH MAINTENANCE LETTER (OUTPATIENT)
Age: 82
End: 2022-01-02

## 2022-01-19 ENCOUNTER — VIRTUAL VISIT (OUTPATIENT)
Dept: INTERNAL MEDICINE | Facility: CLINIC | Age: 82
End: 2022-01-19
Payer: COMMERCIAL

## 2022-01-19 DIAGNOSIS — S32.040D CLOSED COMPRESSION FRACTURE OF L4 LUMBAR VERTEBRA WITH ROUTINE HEALING, SUBSEQUENT ENCOUNTER: ICD-10-CM

## 2022-01-19 DIAGNOSIS — U07.1 INFECTION DUE TO 2019 NOVEL CORONAVIRUS: Primary | ICD-10-CM

## 2022-01-19 PROCEDURE — 99213 OFFICE O/P EST LOW 20 MIN: CPT | Mod: 95 | Performed by: NURSE PRACTITIONER

## 2022-01-19 RX ORDER — ABALOPARATIDE 2000 UG/ML
INJECTION, SOLUTION SUBCUTANEOUS
COMMUNITY
Start: 2022-01-06 | End: 2022-01-19

## 2022-01-19 RX ORDER — TIZANIDINE 2 MG/1
TABLET ORAL
COMMUNITY
Start: 2021-12-31 | End: 2022-05-20

## 2022-01-19 NOTE — PATIENT INSTRUCTIONS
"-Chiquita, here are some information for home cares for your covid infection:    Discharge Instructions for COVID-19 Patients  You have--or may have--COVID-19. Please follow the instructions listed below.   If you have a weakened immune system, discuss with your doctor any other actions you need to take.  How can I protect others?  If you have symptoms (fever, cough, body aches or trouble breathing):    Stay home and away from others (self-isolate) until:  ? Your other symptoms have resolved (gotten better). And   ? You've had no fever--and no medicine that reduces fever--for 1 full day (24 hours). And   ? At least 10 days have passed since your symptoms started. (You may need to wait 20 days. Follow the advice of your care team.)  If you don't show symptoms, but testing showed that you have COVID-19:    Stay home and away from others (self-isolate) until at least 10 days have passed since the date of your first positive COVID-19 test.  During this time    Stay in your own room, even for meals. Use your own bathroom if you can.    Stay away from others in your home. No hugging, kissing or shaking hands. No visitors.    Don't go to work, school or anywhere else.    Clean \"high touch\" surfaces often (doorknobs, counters, handles). Use household cleaning spray or wipes.    You'll find a full list of  on the EPA website: www.epa.gov/pesticide-registration/list-n-disinfectants-use-against-sars-cov-2.    Cover your mouth and nose with a mask or other face covering to avoid spreading germs.    Wash your hands and face often. Use soap and water.    Caregivers in these groups are at risk for severe illness due to COVID-19:  ? People 65 years and older  ? People who live in a nursing home or long-term care facility  ? People with chronic disease (lung, heart, cancer, diabetes, kidney, liver, immunologic)  ? People who have a weakened immune system, including those who:    Are in cancer treatment    Take medicine that " weakens the immune system, such as corticosteroids    Had a bone marrow or organ transplant    Have an immune deficiency    Have poorly controlled HIV or AIDS    Are obese (body mass index of 40 or higher)    Smoke regularly    Caregivers should wear gloves while washing dishes, handling laundry and cleaning bedrooms and bathrooms.    Use caution when washing and drying laundry: Don't shake dirty laundry and use the warmest water setting that you can.    For more tips on managing your health at home, go to www.cdc.gov/coronavirus/2019-ncov/downloads/10Things.pdf.  How can I take care of myself at home?  1. Get lots of rest. Drink extra fluids (unless a doctor has told you not to).  2. Take Tylenol (acetaminophen) for fever or pain. If you have liver or kidney problems, ask your family doctor if it's okay to take Tylenol.   Adults can take either:   ? 650 mg (two 325 mg pills) every 4 to 6 hours, or   ? 1,000 mg (two 500 mg pills) every 8 hours as needed.  ? Note: Don't take more than 3,000 mg in one day. Acetaminophen is found in many medicines (both prescribed and over-the-counter medicines). Read all labels to be sure you don't take too much.   For children, check the Tylenol bottle for the right dose. The dose is based on the child's age or weight.  3. If you have other health problems (like cancer, heart failure, an organ transplant or severe kidney disease): Call your specialty clinic if you don't feel better in the next 2 days.  4. Know when to call 911. Emergency warning signs include:  ? Trouble breathing or shortness of breath  ? Pain or pressure in the chest that doesn't go away  ? Feeling confused like you haven't felt before, or not being able to wake up  ? Bluish-colored lips or face  ? Unable to eat or drink  ? Dizziness  5. Your doctor may have prescribed a blood thinner medicine. Follow their instructions.  Where can I get more information?     [facility] - About COVID-19:    [email or web address]    CDC - What to Do If You're Sick: www.cdc.gov/coronavirus/2019-ncov/about/steps-when-sick.html    CDC - Ending Home Isolation: www.cdc.gov/coronavirus/2019-ncov/hcp/disposition-in-home-patients.html    CDC - Caring for Someone: www.cdc.gov/coronavirus/2019-ncov/if-you-are-sick/care-for-someone.html    [facility] - Interim Guidance for Hospital Discharge to Home: www.health.[facility].mn./diseases/coronavirus/hcp/hospdischarge.pdf    Below are the COVID-19 hotlines at the [place] [facility] ([facility]). Interpreters are available.  ? For health questions: Call 746-640-4888 or 1-283.737.5330 (7 a.m. to 7 p.m.)  ? For questions about schools and childcare: Call 398-577-6100 or 1-974.755.6950 (7 a.m. to 7 p.m.)    For informational purposes only. Not to replace the advice of your health care provider. Clinically reviewed by Dr. Ba Mari.   Copyright   2020 [facility]. All rights reserved. [employer] 421992 - REV 01/05/21.

## 2022-01-19 NOTE — PROGRESS NOTES
Chiquita is a 81 year old who is being evaluated via a billable telephone visit.      What phone number would you like to be contacted at? [phone]  How would you like to obtain your AVS? MyChart    Assessment & Plan     Infection due to 2019 novel coronavirus  -Developed symptoms 4 days ago, home test was positive yesterday  -Her MASSBP score is 2; this does not qualify her for oral antiviral treatment at this time.  This was discussed with the patient.  Overall, it sounds like her symptoms are mild and she is doing well at home  -Advised patient to continue monitoring her symptoms and strict return precautions were discussed  -Reviewed isolation and home cares; patient verbalized an understanding and agreed to plan.    Closed compression fracture of L4 lumbar vertebra with routine healing, subsequent encounter:7/16/21  -Currently recovering at home.  Was seen by endocrinology and was started on a new medication to help with her bone health and fracture healing, called Tymlos- subcutaneous daily.  She is waiting to be instructed by home care nurse how to start this however, she had to cancel that appointment due to new COVID infection  -Continue to follow with endocrinology  56}     BMI:   Estimated body mass index is 25.97 kg/m  as calculated from the following:    Height as of 8/26/21: 1.524 m (5').    Weight as of 9/16/21: 60.3 kg (133 lb).       See Patient Instructions    Return if symptoms worsen or fail to improve.    NIMCO Henson CNP  [facility]    Subjective   Chiquita is a 81 year old who presents for the following health issues       Concern for COVID-19    About how many days ago did these symptoms start? 5   Is this your first visit for this illness? Yes  In the 14 days before your symptoms started, have you had close contact with someone with COVID-19 (Coronavirus)? No  Do you have a fever or chills? No  Are you having new or worsening difficulty breathing?  No  Do you have new or worsening cough? Yes, it's a dry cough.   Have you had any new or unexplained body aches? No    Have you experienced any of the following NEW symptoms?    Headache: No    Sore throat: No    Loss of taste or smell: No    Chest pain: No    Diarrhea: No    Rash: No  What treatments have you tried? Ibuprofen, tylenol  Who do you live with? Alone  Are you, or a household member, a healthcare worker or a ? No  Do you live in a nursing home, group home, or shelter? No  Do you have a way to get food/medications if quarantined? Yes     Patient is seen via telephone visit today for COVID concern.  Patient reports that a few days prior she developed a runny nose-specifically this was on Sunday, 4 days ago.  Yesterday she developed a dry cough.  She asked her friend to get a COVID test from the store, and she took that and this returned positive.  She reports that she had a temperature of 99.2 this morning, a couple of days ago it was 98.8.  She denies any shortness of breath, no loss of taste or smell, no new body aches and pains.  She reports that she is recovering from a lumbar compression fracture and has pain from that, but no new or worsening body aches or pains.  She is completely vaccinated and boosted for COVID.  She is not quite certain where her exposure was.  She has been recovering from her lumbar compression fracture at home and has had friends bring her meals.  Otherwise she has not been out and about; she reports that she wears a mask at all times when she is around others, however, some people who have come over to deliver food have not worn a mask.  She denies any significant underlying respiratory issues, but notes that several years ago she had bronchitis that turned into pneumonia.  Again, she is denying any shortness of breath but does note that her symptoms have mildly progressed since they started.  She denies any nausea or vomiting but did report a soft stool  yesterday.  No overt diarrhea.  She reports that her nurse was supposed to come to her house today to instruct her on how to start a new medication for her bone health that was prescribed by Endocrinology, called Tymlos; states she asked the nurse not to come due to her positive COVID test.  She is wondering if there is anything else she needs to do for her COVID infection.      Review of Systems   CONSTITUTIONAL:NEGATIVE for fever, chills, change in weight and low-grade fever of 99.2  EYES: NEGATIVE for vision changes or irritation  ENT/MOUTH: NEGATIVE for ear, mouth and throat problems; + rhinorrhea  RESP:POSITIVE for cough-non productive and no shortness of breath  CV: NEGATIVE for chest pain, palpitations or peripheral edema  GI: NEGATIVE for nausea, abdominal pain, heartburn; had a very soft stool yesterday but no overt diarrhea  NEURO: NEGATIVE for weakness, dizziness or paresthesias        Objective           Vitals:  No vitals were obtained today due to virtual visit.    Physical Exam   healthy, alert and no distress  PSYCH: Alert and oriented times 3; coherent speech, normal   rate and volume, able to articulate logical thoughts, able   to abstract reason, no tangential thoughts, no hallucinations   or delusions  Her affect is normal  RESP: No cough, no audible wheezing, able to talk in full sentences  Remainder of exam unable to be completed due to telephone visits    -Chart reviewed          Phone call duration: 27:30 minutes

## 2022-02-10 ENCOUNTER — TRANSFERRED RECORDS (OUTPATIENT)
Dept: HEALTH INFORMATION MANAGEMENT | Facility: CLINIC | Age: 82
End: 2022-02-10
Payer: COMMERCIAL

## 2022-02-23 ENCOUNTER — TRANSFERRED RECORDS (OUTPATIENT)
Dept: HEALTH INFORMATION MANAGEMENT | Facility: CLINIC | Age: 82
End: 2022-02-23
Payer: COMMERCIAL

## 2022-03-28 ENCOUNTER — TELEPHONE (OUTPATIENT)
Dept: INTERNAL MEDICINE | Facility: CLINIC | Age: 82
End: 2022-03-28
Payer: COMMERCIAL

## 2022-03-28 NOTE — TELEPHONE ENCOUNTER
Patient called the clinic. She is asking if her future virtual visit on 4/13/22 can be changed to in person? It's scheduled for annual wellness, but she would also like to discuss her back pain from a previous dx compression fracture (already had an X-ray and MRI and saw a specialist). Also, sometimes she has high BP up to 160 or 145 systolic. She denied having headache or dizziness. She did say she has been under a lot of stress this week and has some insomnia - taking melatonin. Advised to keep a log of home BP & HR. Pt would also like provider to check her over head to toe.     If not okay for in person, then pt wants to keep this virtual.     Appointments in Next Year    Apr 13, 2022 11:30 AM  (Arrive by 11:10 AM)  Annual Wellness Visit with Vesna Olivera MD  [facility] ([facility] ) [phone]

## 2022-04-12 PROBLEM — M81.0 OSTEOPOROSIS: Status: ACTIVE | Noted: 2022-04-12

## 2022-04-12 PROBLEM — S32.040D CLOSED COMPRESSION FRACTURE OF L4 LUMBAR VERTEBRA WITH ROUTINE HEALING, SUBSEQUENT ENCOUNTER: Status: RESOLVED | Noted: 2021-08-06 | Resolved: 2022-04-12

## 2022-04-12 PROBLEM — E78.5 DYSLIPIDEMIA: Status: RESOLVED | Noted: 2021-08-06 | Resolved: 2022-04-12

## 2022-04-12 PROBLEM — Z87.898 HISTORY OF PALPITATIONS: Status: RESOLVED | Noted: 2021-08-06 | Resolved: 2022-04-12

## 2022-04-12 PROBLEM — K21.9 GASTROESOPHAGEAL REFLUX DISEASE, UNSPECIFIED WHETHER ESOPHAGITIS PRESENT: Status: RESOLVED | Noted: 2021-08-06 | Resolved: 2022-04-12

## 2022-04-12 PROBLEM — M54.50 ACUTE BILATERAL LOW BACK PAIN WITHOUT SCIATICA: Status: RESOLVED | Noted: 2021-08-06 | Resolved: 2022-04-12

## 2022-04-12 PROBLEM — R53.81 PHYSICAL DECONDITIONING: Status: RESOLVED | Noted: 2021-08-06 | Resolved: 2022-04-12

## 2022-04-12 PROBLEM — M80.00XD AGE-RELATED OSTEOPOROSIS WITH CURRENT PATHOLOGICAL FRACTURE WITH ROUTINE HEALING, SUBSEQUENT ENCOUNTER: Status: RESOLVED | Noted: 2021-08-09 | Resolved: 2022-04-12

## 2022-04-12 PROBLEM — I34.0 MITRAL VALVE INSUFFICIENCY, UNSPECIFIED ETIOLOGY: Status: RESOLVED | Noted: 2021-08-06 | Resolved: 2022-04-12

## 2022-04-13 ENCOUNTER — OFFICE VISIT (OUTPATIENT)
Dept: INTERNAL MEDICINE | Facility: CLINIC | Age: 82
End: 2022-04-13
Payer: COMMERCIAL

## 2022-04-13 VITALS
SYSTOLIC BLOOD PRESSURE: 130 MMHG | HEIGHT: 60 IN | RESPIRATION RATE: 18 BRPM | DIASTOLIC BLOOD PRESSURE: 62 MMHG | HEART RATE: 90 BPM | OXYGEN SATURATION: 95 % | BODY MASS INDEX: 25.91 KG/M2 | WEIGHT: 132 LBS | TEMPERATURE: 98.7 F

## 2022-04-13 DIAGNOSIS — G89.29 CHRONIC BILATERAL LOW BACK PAIN WITH RIGHT-SIDED SCIATICA: ICD-10-CM

## 2022-04-13 DIAGNOSIS — M54.41 CHRONIC BILATERAL LOW BACK PAIN WITH RIGHT-SIDED SCIATICA: ICD-10-CM

## 2022-04-13 DIAGNOSIS — Z00.00 MEDICARE ANNUAL WELLNESS VISIT, SUBSEQUENT: Primary | ICD-10-CM

## 2022-04-13 DIAGNOSIS — Z23 HIGH PRIORITY FOR 2019-NCOV VACCINE: ICD-10-CM

## 2022-04-13 PROCEDURE — 0054A COVID-19,PF,PFIZER (12+ YRS): CPT | Performed by: INTERNAL MEDICINE

## 2022-04-13 PROCEDURE — 91305 COVID-19,PF,PFIZER (12+ YRS): CPT | Performed by: INTERNAL MEDICINE

## 2022-04-13 PROCEDURE — 99397 PER PM REEVAL EST PAT 65+ YR: CPT | Mod: 25 | Performed by: INTERNAL MEDICINE

## 2022-04-13 NOTE — PROGRESS NOTES
ASSESSMENT/PLAN                                                       (Z00.00) Medicare annual wellness visit, subsequent  (primary encounter diagnosis)  Comment: PMH, PSH, FH, SH, medications, allergies, immunizations, and preventative health measures reviewed and updated as appropriate.  Plan: see below for plans.      (Z23) High priority for 2019-nCoV vaccine  Plan: COVID-19 booster #2 given today.    (M54.41,  G89.29) Chronic bilateral low back pain with right-sided sciatica  Comment: refractory to physical therapy and oral analgesics (prescription and OTC); not good surgical candidate due to osteoporosis.  Plan: recommend injection; patient will discuss with her spine surgeon.    Appropriate preventive services were discussed with this patient, including applicable screening as appropriate for cardiovascular disease, diabetes, osteopenia/osteoporosis, and glaucoma.  As appropriate for age/gender, discussed screening for colorectal cancer, prostate cancer, breast cancer, and cervical cancer. Checklist reviewing preventive services available has been given to the patient.    Reviewed patients plan of care. The Basic Care Plan (routine screening as documented in Health Maintenance) for Chiquita Berry meets the Care Plan requirement. This Care Plan has been established and reviewed with the Patient.    Vesna Olivera MD   [address] [address]  T: [phone], F: [phone]    SUBJECTIVE                                                      Chiquita Berry is a very pleasant 81 year old female who presents for her subsequent AWV:    Current providers (other than myself): Kennedy ([facility])    PMH, PSH, FH, SH, medications, allergies, immunizations, preventative health, and health risk assessment reviewed and updated as appropriate.    Past Medical History:   Diagnosis Date     Osteoporosis     followed by outside endocrinologist     Past Surgical History:   Procedure  Laterality Date     CATARACT IOL, RT/LT Bilateral      OPEN REDUCTION INTERNAL FIXATION HUMERUS DISTAL Right      PHACOEMULSIFICATION CLEAR CORNEA WITH STANDARD INTRAOCULAR LENS IMPLANT Left 11/16/2015    Procedure: PHACOEMULSIFICATION CLEAR CORNEA WITH STANDARD INTRAOCULAR LENS IMPLANT;  Surgeon: Latrell Jessica MD;  Location:  [facility]     TONSILLECTOMY & ADENOIDECTOMY       Family History   Problem Relation Age of Onset     Dementia Mother      Myocardial Infarction Father         later in life     Stomach Cancer Father      Breast Cancer Sister         x2 sisters (post-menopausal)     Diabetes No family hx of      Cerebrovascular Disease No family hx of      Coronary Artery Disease Early Onset No family hx of      Ovarian Cancer No family hx of      Colon Cancer No family hx of      Social History     Occupational History     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    No kids.    Walks daily.    Volunteers with Religious regularly.      Allergies   Allergen Reactions     Augmentin Diarrhea     Celebrex [Celecoxib] Rash     Current Outpatient Medications   Medication Sig     Abaloparatide (TYMLOS SC)      acetaminophen (TYLENOL) 325 MG tablet Take 650 mg by mouth 4 times daily And 650mg po daily prn pain     Cholecalciferol (VITAMIN D) 2000 UNITS tablet Take 1 tablet by mouth daily.     ibuprofen (ADVIL/MOTRIN) 400 MG tablet Take 1 tablet (400 mg) by mouth every 6 hours as needed for moderate pain     latanoprost (XALATAN) 0.005 % ophthalmic solution Place 1 drop into both eyes every evening      melatonin 3 MG CAPS Take 3 mg by mouth At Bedtime And 3mg prn(total of 6mg ok for sleep)     Multiple Vitamin (MULTIVITAMIN ADULT PO) Take 1 tablet by mouth daily      Multiple Vitamins-Minerals (PRESERVISION AREDS 2) CAPS Take 1 capsule by mouth daily      polyethylene glycol (MIRALAX) 17 GM/Dose powder Take 17 g by mouth daily     polyethylene glycol 400 (BLINK TEARS) 0.25 % SOLN ophthalmic solution Place 1 drop into both eyes daily And Q2H PRN     senna-docusate (SENOKOT-S/PERICOLACE) 8.6-50 MG tablet Take 1 tablet by mouth 2 times daily as needed for constipation      tiZANidine (ZANAFLEX) 2 MG tablet TAKE 1 TABLET BY MOUTH EVERY 12 HOURS AS NEEDED FOR MUSCLE SPASMS     traMADol (ULTRAM) 50 MG tablet Take 1 tablet (50 mg) by mouth every 6 hours as needed for severe pain     Immunization History   Administered Date(s) Administered     COVID-19,PF,Pfizer (12+ Yrs) 02/12/2021, 03/05/2021, 11/05/2021     COVID-19,PF,Pfizer 12+ Yrs (2022 and After) 04/13/2022     FLUAD(HD)65+ QUAD 12/16/2021     HEPA 10/06/1999     HepB 01/21/2009     Influenza (High Dose) 3 valent vaccine 10/01/2015, 09/08/2016, 11/16/2017, 10/19/2019     Influenza (IIV3) PF 11/01/2013     Influenza, Quad, High Dose, Pf, 65yr+ (Fluzone HD) 10/18/2020     Pneumo Conj 13-V (2010&after) 06/01/2015     Pneumococcal 23 valent 12/15/2008     Tdap (Adacel,Boostrix) 01/27/2012     Zoster vaccine recombinant adjuvanted (SHINGRIX) 09/17/2018, 03/01/2019, 03/18/2019     Zoster vaccine, live 08/29/2015     PREVENTATIVE HEALTH                                                      BMI: within normal limits   Blood pressure: within normal limits   Breast CA screening: screening no longer indicated  Colon CA screening: screening no longer indicated  Lung CA screening: n/a   Dexa: up to date   Screening cholesterol: screening no longer indicated   Screening diabetes: up to date   Alcohol misuse screening: alcohol use reviewed - no intervention indicated at this time  Immunizations: reviewed; COVID-19 booster #2 DUE    HEALTH RISK ASSESSMENT                                                      In general, how would you rate your  "overall physical health? good  Outside of work, how many days during the week do you exercise? 5 days/week  Outside of work, approximately how many minutes a day do you exercise? 15-30 minutes    If you drink alcohol do you typically have >3 drinks per day or >7 drinks per week? No  Do you usually eat at least 4 servings of fruit and vegetables a day, include whole grains & fiber and avoid regularly eating high fat or \"junk\" foods? Yes     Do you have any problems taking medications regularly? No  Do you have any side effects from medications? No    Assistance with daily activities: YES - assistance with housework, laundry, meal preparation, and shopping    Safety concerns: No    Fall risk assessment: completed today (see ambulatory assessments)    Hearing concerns: No    In the past 6 months, have you been bothered by leaking of urine: No    In general, how would you rate your overall mental or emotional health: good    PHQ-2/PHQ-9 assessment: completed today (see ambulatory assessments)    Additional concerns today: YES - ongoing bilateral lower back pain, with right-sided sciatica since compression fracture (L4) last summer; no significant improvement with physical therapy and oral analgesics (prescription and over-the-counter)    OBJECTIVE                                                      /62 (BP Location: Left arm, Patient Position: Chair, Cuff Size: Adult Regular)   Pulse 90   Temp 98.7  F (37.1  C) (Temporal)   Resp 18   Ht 1.524 m (5')   Wt 59.9 kg (132 lb)   LMP  (LMP Unknown)   SpO2 95%   BMI 25.78 kg/m    Constitutional: well-appearing  Head, Ears, and Eyes: normocephalic; normal external auditory canal and pinna; tympanic membranes visualized and normal; normal lids and conjunctivae  Neck: supple, symmetric, no thyromegaly or lymphadenopathy  Respiratory: normal respiratory effort; clear to auscultation bilaterally  Cardiovascular: regular rate and rhythm; no edema  Gastrointestinal: " soft, non-tender, and non-distended; no organomegaly or masses  Musculoskeletal: walks with walker  Psych: normal judgment and insight; normal mood and affect; recent and remote memory intact    Cognitive impairment noted: No  ---  (Note was completed, in part, with J[employer] voice-recognition software. Documentation was reviewed, but some grammatical, spelling, and word errors may remain.)

## 2022-04-13 NOTE — PATIENT INSTRUCTIONS
Please continue physical therapy for your back.    In light of continued pain in spite of conservative measures (physical therapy and pain medications), I think you would be a good candidate for an injection (won't hurt and may help).

## 2022-04-25 ENCOUNTER — NURSE TRIAGE (OUTPATIENT)
Dept: INTERNAL MEDICINE | Facility: CLINIC | Age: 82
End: 2022-04-25
Payer: COMMERCIAL

## 2022-04-25 NOTE — TELEPHONE ENCOUNTER
"Pt called the clinic which a message to Dr. Olivera. She appreciates Dr. Olivera's guidance when she talked with her on 4/13. She did discuss injection into the spine with Dr. Kennedy Jose location. First step was to get an MRI which she has to postpone due to positive COVID exposure. Currently taking Tylenol and Ibuprofen which helps. Did see acupuncture which helps a little bit. Pt will do a home COVID test on 4/27/22 - 5 days after exposure. She will call the clinic if positive.     1. COVID-19 EXPOSURE: \"Please describe how you were exposed to someone with a COVID-19 infection.\"      Visitor helped her move who tested positive on 4/23/22.   2. PLACE of CONTACT: \"Where were you when you were exposed to COVID-19?\" (e.g., home, school, medical waiting room; which city?)     In pt's home -   3. TYPE of CONTACT: \"How much contact was there?\" (e.g., sitting next to, live in same house, work in same office, same building)      Same room  4. DURATION of CONTACT: \"How long were you in contact with the COVID-19 patient?\" (e.g., a few seconds, passed by person, a few minutes, 15 minutes or longer, live with the patient)      Less than 6 feet for several hours.   5. MASK: \"Were you wearing a mask?\" \"Was the other person wearing a mask?\" Note: wearing a mask reduces the risk of an otherwise close contact.      No.   6. DATE of CONTACT: \"When did you have contact with a COVID-19 patient?\" (e.g., how many days ago)      On 4/22/22.  7. COMMUNITY SPREAD: \"Are there lots of cases of COVID-19 (community spread) where you live?\" (See public health department website, if unsure)        Yes.   8. SYMPTOMS: \"Do you have any symptoms?\" (e.g., fever, cough, breathing difficulty, loss of taste or smell)      No current symptoms.   9. VACCINE: \"Have you gotten the COVID-19 vaccine?\" If Yes, ask: \"Which one, how many shots, when did you get it?\"      Yes -  In Epic.   10. BOOSTER: \"Have you received your COVID-19 booster?\" If Yes, ask: " "\"Which one and when did you get it?\"        Last booster on 4/13/22.   11. PREGNANCY OR POSTPARTUM: \"Is there any chance you are pregnant?\" \"When was your last menstrual period?\" \"Did you deliver in the last 2 weeks?\"        NA.   12. HIGH RISK: \"Do you have any heart or lung problems?\" (e.g., asthma , COPD, heart failure) \"Do you have a weak immune system or other risk factors?\" (e.g., HIV positive, chemotherapy, renal failure, diabetes mellitus, sickle cell anemia, obesity)        Had COVID in January - mild case then.   13. TRAVEL: \"Have you traveled out of the country recently?\" If Yes, ask: \"When and where?\"     No travel.     Reason for Disposition    [1] CLOSE CONTACT COVID-19 EXPOSURE within last 14 days AND [2] NO symptoms    Additional Information    Negative: COVID-19 lab test positive    Negative: [1] Lives with someone known to have influenza (flu test positive) AND [2] flu-like symptoms (e.g., cough, runny nose, sore throat, SOB; with or without fever)    Negative: [1] Symptoms of COVID-19 (e.g., cough, fever, SOB, or others) AND [2] doctor (or NP/PA) diagnosed COVID-19 based on symptoms    Negative: [1] Symptoms of COVID-19 (e.g., cough, fever, SOB, or others) AND [2] lives in an area with community spread    Negative: [1] Symptoms of COVID-19 (e.g., cough, fever, SOB, or others) AND [2] within 14 days of EXPOSURE (close contact) with diagnosed or suspected COVID-19 patient    Negative: [1] Symptoms of COVID-19 (e.g., cough, fever, SOB, or others) AND [2] within 14 days of travel from high-risk area for COVID-19 community spread (identified by CDC)    Negative: [1] Difficulty breathing (shortness of breath) occurs AND [2] onset > 14 days after COVID-19 EXPOSURE (Close Contact)    Negative: [1] Cough occurs AND [2] onset > 14 days after COVID-19 EXPOSURE    Negative: [1] Common cold symptoms AND [2] onset > 14 days after COVID-19 EXPOSURE    Negative: COVID-19 vaccine reaction suspected (e.g., fever, " headache, muscle aches) occurring during days 1-3 after getting vaccine    Negative: COVID-19 vaccine, questions about    Negative: [1] CLOSE CONTACT COVID-19 EXPOSURE within last 14 days AND [2] needs COVID-19 lab test to return to work AND [3] NO symptoms    Negative: [1] CLOSE CONTACT COVID-19 EXPOSURE within last 14 days AND [2] exposed person is a  (e.g., police or paramedic) AND [3] NO symptoms    Negative: [1] CLOSE CONTACT COVID-19 EXPOSURE within last 14 days AND [2] exposed person is a healthcare worker who was NOT using all recommended personal protective equipment (e.g., a respirator-N95 mask, eye protection, gloves, and gown) AND [3] NO symptoms    Negative: [1] Living or working in a correctional facility, long-term care facility, or shelter (i.e., congregate setting; densely populated) AND [2] where an outbreak has occurred AND [3] NO symptoms    Negative: [1] CLOSE CONTACT COVID-19 EXPOSURE within last 14 days AND [2] weak immune system (e.g., HIV positive, cancer chemo, splenectomy, organ transplant, chronic steroids) AND [3] NO symptoms    Protocols used: CORONAVIRUS (COVID-19) EXPOSURE-A-OH 1.18.2022

## 2022-04-27 NOTE — TELEPHONE ENCOUNTER
Pt called to report she had a negative COVID-19 home test today. She plans to schedule imaging- MRI test previously cancelled.

## 2022-05-04 ENCOUNTER — TRANSFERRED RECORDS (OUTPATIENT)
Dept: HEALTH INFORMATION MANAGEMENT | Facility: CLINIC | Age: 82
End: 2022-05-04
Payer: COMMERCIAL

## 2022-05-10 ENCOUNTER — TELEPHONE (OUTPATIENT)
Dept: INTERNAL MEDICINE | Facility: CLINIC | Age: 82
End: 2022-05-10
Payer: COMMERCIAL

## 2022-05-10 NOTE — TELEPHONE ENCOUNTER
FYI ONLY:     Pt called - [facility] is giving steroid injection into back tomorrow for back pain     MRI showed her fracture from July healed     States when she saw PCP 4/13/22 - provider encouraged pt having the injection so she wanted to make sure PCP is in the loop and aware she is having it done     States the report will get forwarded to PCP     Needs her vaccine record - had one vaccine done at [facility]     Discussed can go to [employer] to locate or we can print too     She will check on [employer] then would like mailed to her     Mailed immunization report to pt     Alissa MARIE, Triage RN  [facility]

## 2022-05-20 DIAGNOSIS — M54.41 CHRONIC BILATERAL LOW BACK PAIN WITH RIGHT-SIDED SCIATICA: Primary | ICD-10-CM

## 2022-05-20 DIAGNOSIS — G89.29 CHRONIC BILATERAL LOW BACK PAIN WITH RIGHT-SIDED SCIATICA: Primary | ICD-10-CM

## 2022-05-20 RX ORDER — TIZANIDINE 2 MG/1
TABLET ORAL
Qty: 60 TABLET | Refills: 11 | Status: ON HOLD | OUTPATIENT
Start: 2022-05-20 | End: 2023-01-01

## 2022-05-20 NOTE — TELEPHONE ENCOUNTER
Received a call from the patient requesting a refill of her Tizanidine. Patient states she reached out to her spine specialist and the spine specialist told patient she should reach out to her PCP for medication management. Patient states she did get a cortisone injection into her back a week ago which has helped her symptoms but she continues to experience some spasms in her legs.     Dose verified with patient. Medication pended to the requested pharmacy and routing to PCP for review.    Beverly Keys RN

## 2022-08-08 NOTE — TELEPHONE ENCOUNTER
Ox TC, please help schedule appt. Route message back to triage if pt has any further questions or concerns.

## 2022-08-08 NOTE — TELEPHONE ENCOUNTER
TO COVERING PROVIDER:     Pt called     Ears plugged, pain in one ear (left) intermittently- feels congested inside ear /pressure sensation     Has wax in ears that PCP saw in April     Thermometer is broken - not feeling feverish, but not feeling well     Ears but systemically not feeling well     Negative home COVID19 test     No hearing loss concerns     Pain is periodic - laid on left side last night and was uncomfortable    Per protocol, advised OV today/tomorrow. PCP is off this week. Discussed walk in [facility], but pt states she cannot walk that far. Asking what to do, can provider(s) work her in today or tomorrow? Or does she need to go to ?     Please advise      Alissa MARIE Triage RN  [facility]           Reason for Disposition    Pain or discomfort in or around ear is main symptom    All other earaches  (Exceptions: Earache lasting < 1 hour, and earache from air travel.)    Additional Information    Negative: Sounds like a life-threatening emergency to the triager    Negative: Moving the earlobe or touching the ear clearly increases the pain    Negative: Pink or red swelling behind the ear    Negative: Stiff neck (can't touch chin to chest)    Negative: Patient sounds very sick or weak to the triager    Negative: Severe earache pain    Negative: Fever > 103 F (39.4 C)    Negative: Pointed object was inserted into the ear canal (e.g., a pencil, stick, or wire)    Negative: White, yellow, or green discharge    Negative: Diabetes mellitus or a weak immune system (e.g., HIV positive, cancer chemotherapy, transplant patient)    Negative: Bloody discharge or unexplained bleeding from ear canal    Negative: New blurred vision or vision changes    Protocols used: EARWAX-A-OH, EARACHE-A-OH

## 2022-08-09 NOTE — PROGRESS NOTES
"  Assessment & Plan     Acute ear pain, bilateral  Bilateral impacted cerumen  - Bilateral ear flush in clinic- no further pain. TM's without evidence of infection    Fever, unspecified fever cause  - Unclear etiology- exam is unrevealing; no overt evidence of infection on exam  - Will obtain COVID PCR  - For now, symptomatic cares discussed  - RTC PRN symptoms worsen/fail to improve  - Symptomatic; Yes; 8/5/2022 COVID-19 Virus (Coronavirus) by PCR Nose  - Symptomatic; Yes; 8/5/2022 COVID-19 Virus (Coronavirus) by PCR Nose         BMI:   Estimated body mass index is 26.41 kg/m  as calculated from the following:    Height as of 4/13/22: 1.524 m (5').    Weight as of this encounter: 61.3 kg (135 lb 4 oz).       See Patient Instructions    Return for If symptoms worsen/fail to improve.    NIMCO Hairston CNP  [facility]   Chiquita is a 81 year old, presenting for the following health issues:  Ear Problem      HPI     Concern - Ear problem  Onset: 3 days  Description: left ear pain fullness and pressure  Intensity: mild, moderate  Progression of Symptoms:  worsening  Accompanying Signs & Symptoms:   Previous history of similar problem:   Precipitating factors:        Worsened by:   Alleviating factors:        Improved by:   Therapies tried and outcome:  none     Reports a week ago noting that her ears were feeling full/plugged, L>R.  3 days ago more noticeable particularly on left side- more plugged and now painful.  She also reports feeling more \"sick\" over the past 3 days; feverish.  Took a home COVID test on Sunday which was negative (Had COVID infection in January).  She denies SOB, cough; no CP.  She denies sore throat, or nasal congestion. Slight runny nose/sniffle.  Eating/drinking ok. No dizziness.  No diarrhea.  No body aches pains- other than normal OA pain.  She does take Tylenol and Ibuprofen regularly/scheduled for her OA pain.  Denies any recent ill/COVID " exposures- wears a mask when she walks her hallways at home.      Review of Systems   CONSTITUTIONAL:POSITIVE  for feeling feverish at home - no thermometer  INTEGUMENTARY/SKIN: NEGATIVE for worrisome rashes, moles or lesions  EYES: NEGATIVE for vision changes or irritation  ENT/MOUTH: POSITIVE for ear pain bilateral/fullness,  and NEGATIVE for nasal congestion and snoring  RESP:NEGATIVE for significant cough or SOB  CV: NEGATIVE for chest pain, palpitations or peripheral edema  MUSCULOSKELETAL: POSITIVE  for arthralgias OA      Objective    /78   Pulse 98   Wt 61.3 kg (135 lb 4 oz)   LMP  (LMP Unknown)   SpO2 96%   BMI 26.41 kg/m    Body mass index is 26.41 kg/m .     Physical Exam   GENERAL APPEARANCE: healthy, alert and no distress  EYES: Eyes grossly normal to inspection, PERRL and conjunctivae and sclerae normal  HENT: nose and mouth without ulcers or lesions and Bilateral cerumen impaction noted.  After successful ear flush, TM's were non-erythematous  RESP: lungs clear to auscultation - no rales, rhonchi or wheezes  CV: regular rates and rhythm, normal S1 S2, no S3 or S4 and no murmur, click or rub  MS: extremities normal- no gross deformities noted  SKIN: Healing superficial abrasion noted to mid lower thoracic spine; no surrounding erythema.  NEURO: Normal strength and tone, mentation intact and speech normal  PSYCH: mentation appears normal and affect normal/bright              .  ..

## 2022-08-09 NOTE — PATIENT INSTRUCTIONS
-COVID test today- results should be available in 24 hours- someone will be in touch with you regarding results.   -Your exam is otherwise unrevealing  -This could be just a viral syndrome- not COVID.  Please isolate at home until your COVID test has returned  -Continue self cares at home- rest, fluids, Tylenol/Ibuprofen as you are

## 2022-10-12 NOTE — PROGRESS NOTES
ASSESSMENT/PLAN                                                      (M54.42,  M54.41,  G89.29) Chronic bilateral low back pain with bilateral sciatica  (primary encounter diagnosis)  Comment: Under reasonable control with recent lumbar MARILEE, pool physical therapy, Tylenol, ibuprofen, and Ultram.  Plan: continue present management; low threshold for repeat MARILEE.    (Z23) Need for prophylactic vaccination and inoculation against influenza  Plan: flu shot given today.    (Z23) High priority for 2019-nCoV vaccine  Plan: COVID-19 booster given today.    Vesna Olivera MD   [facility]  [address] [place] [id number]  T: [phone], F: [phone]    SUBJECTIVE                                                      Chiquita Berry is a very pleasant 82 year old female who presents for follow-up of her back pain:    Patient suffers from chronic bilateral low back pain with bilateral sciatica. Patient follows with [facility]. Her most recent lumbar spine MRI (4/30/2022) shows severe, multilevel, degenerative changes with relative sparing at the L5-S1 level.  There is a vertebra plana at L4 and anterior wedging at T11, T12, and L1. 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 She underwent a lumbar MARILEE this summer and is currently enrolled in pool physical therapy, both with some improvement in her pain. She is using Tylenol, ibuprofen, and Ultram as needed for pain relief as well.    Patient is also due for her flu shot and COVID-19 booster.    OBJECTIVE                                                      /70   Pulse 92   Temp 98.4  F (36.9  C) (Tympanic)   Resp 16   Wt 62.8 kg (138 lb 6.4 oz)   LMP  (LMP Unknown)   SpO2 95%   BMI 27.03 kg/m    Constitutional: well-appearing  Musculoskeletal: walks with walker    ---    (Note documentation was completed, in part, with [employer] voice-recognition software. Documentation was reviewed, but some grammatical, spelling, and word  errors may remain.)

## 2022-11-29 NOTE — TELEPHONE ENCOUNTER
Patient called complaining URI symptoms     Onset: 4 days ago, worsened last night     Negative COVID19 test this AM     SYMPTOMS: Chest congestion and dry cough, runny nose    COLOR: clear   FEVER: 99.1 F today   No sore throat   No ear ache  No wheezing   No vomiting   Breathing is fine     Triaged per Epic Triage Protocol, gave care advice which patient plans to follow.  See Care advice tab for more information.  Patent to call back if further questions or concerns.    Alissa MARIE, Triage RN  [facility]       Reason for Disposition    Colds with no complications    Additional Information    Negative: SEVERE difficulty breathing (e.g., struggling for each breath, speaks in single words)    Negative: Very weak (can't stand)    Negative: Sounds like a life-threatening emergency to the triager    Negative: Difficulty breathing and not from stuffy nose (e.g., not relieved by cleaning out the nose)    Negative: Runny nose is caused by pollen or other allergies    Negative: Cough is main symptom    Negative: Sore throat is main symptom    Negative: Fever > 103 F (39.4 C)    Negative: Fever > 101 F (38.3 C) and over 60 years of age    Negative: Fever > 100.0 F (37.8 C) and has diabetes mellitus or a weak immune system (e.g., HIV positive, cancer chemotherapy, organ transplant, splenectomy, chronic steroids)    Negative: Fever > 100.0 F (37.8 C) and bedridden (e.g., nursing home patient, stroke, chronic illness, recovering from surgery)    Negative: Patient sounds very sick or weak to the triager    Negative: Fever present > 3 days (72 hours)    Negative: Fever returns after gone for over 24 hours and symptoms worse or not improved    Negative: Sinus pain (not just congestion) and fever    Negative: Earache    Negative: Sinus congestion (pressure, fullness) present > 10 days    Negative: Nasal discharge present > 10 days    Negative: Using nasal washes and pain medicine > 24 hours and  sinus pain (lower forehead, cheekbone, or eye) persists    Negative: Patient wants to be seen    Negative: Sore throat present > 5 days    Protocols used: COMMON COLD-A-OH

## 2022-12-05 NOTE — TELEPHONE ENCOUNTER
Spoke with patient relaying provider message. Patient verbalized understanding and will continue current management of symptoms. Patient also agreed to contact clinic if symptoms worsened.

## 2022-12-05 NOTE — TELEPHONE ENCOUNTER
Received call from patient with update from last triage call. Patient states that overall her sx have improved/stayed the same. Pt reports she is using Mucinex as recommended by her pharmacist.   Coughing intermittent. Dry and non productive. Occassionally a runny nose, blows nose and states it will go away. No coughing at rest.    Pt reports that over the last couple of days she has noticed some slight SOB with exertion. Pt states that at rest she feels fine and after walking her SOB breathes subsides with a few minutes of rest.     Pt states that in the past she has had bronchitis and was given albuterol. Pt states she is not asking for the medication but she is open to trying albuterol inhaler if recommended by PCP. Pt open to any other recommendations from provider.     Can we leave a detailed message on this number? YES  Phone number patient can be reached at: Home number on file [phone] (home)    Pharmacy: [address]    Alicia Johnson RN  [facility] Triage

## 2022-12-05 NOTE — TELEPHONE ENCOUNTER
Sounds like she is recovering.    Continue current management.     No additional interventions recommended at this time.    If symptoms worsen or change, patient should contact us.

## 2022-12-08 NOTE — TELEPHONE ENCOUNTER
Patient called with CC of ongoing symptoms. Patient stated symptoms have not worsened or changed. Writer reviewed PCP's note from 12/05/22 with the patient. Patient expressed verbal understanding and plans to call back with worsening/changed symptoms, or if cough has not resolved within three weeks.    Jacqui Marquez, RN  -[facility]

## 2022-12-10 PROBLEM — J90 PLEURAL EFFUSION ON LEFT: Status: ACTIVE | Noted: 2022-01-01

## 2022-12-10 PROBLEM — R06.03 RESPIRATORY DISTRESS: Status: ACTIVE | Noted: 2022-01-01

## 2022-12-10 PROBLEM — R09.02 HYPOXIA: Status: ACTIVE | Noted: 2022-01-01

## 2022-12-10 PROBLEM — R03.0 ELEVATED BLOOD PRESSURE READING: Status: ACTIVE | Noted: 2022-01-01

## 2022-12-10 NOTE — PROGRESS NOTES
Chief Complaint   Patient presents with     Breathing Problem     Difficulty breathing.         ICD-10-CM    1. Hypoxia  R09.02       2. Respiratory distress  R06.03       After initial assessment of patient paramedics were called emergently to take patient to the emergency room.  Oxygen was applied at 3 L nasal cannula and oxygen level came up to 93%.  Gradually heart rate and respiratory lessened although she was still tachypneic and tachycardic.  Report was given to paramedics and they left to take her [facility].    Subjective     Chiquita Berry is an 82 year old female who presents to clinic today for severe shortness of breath.  She had a cough for a little over 10 days and developed shortness of breath a couple of days ago that has been progressively worsening.    ROS: 10 point ROS neg other than the symptoms noted above in the HPI.     Denies any significant medical history besides osteoporosis and kyphosis.  No history of lung disease or smoking.      Objective    BP (!) 193/94 (BP Location: Left arm, Patient Position: Sitting, Cuff Size: Adult Regular)   Pulse 110   Temp 98.1  F (36.7  C) (Oral)   Resp 30   Ht 1.524 m (5')   Wt 64.7 kg (142 lb 9.6 oz)   LMP  (LMP Unknown)   SpO2 93%   BMI 27.85 kg/m    Nurses notes and VS have been reviewed.    Physical Exam       GENERAL APPEARANCE: alert, moderate distress and pale, not unable to complete a sentence due to dyspnea     EYES: PERRL, EOMI, sclera non-icteric     HENT: oral exam benign, mucus membranes intact, without ulcers or lesions     NECK: no adenopathy or asymmetry, thyroid normal to palpation     RESP: Unable to hear lung sounds on the left, right lung sounds are clear     CV: regular rates and rhythm, no murmurs, rubs, or gallop     ABDOMEN:  soft, nontender, no HSM or masses and bowel sounds normal     MS: extremities normal- no gross deformities noted; normal muscle tone.  Severe kyphosis noted     SKIN: no suspicious  lesions or rashes     NEURO: Normal strength and tone, mentation intact and speech normal     PSYCH: normal thought process; no significant mood disturbance    Patient Instructions   Patient was taken to [facility] emergently via paramedics.      NIMCO Pandya, CNP  [facility] Provider    The use of Dragon/Elevate dictation services may have been used to construct the content in this note; any grammatical or spelling errors are non-intentional. Please contact the author of this note directly if you are in need of any clarification.

## 2022-12-10 NOTE — ED TRIAGE NOTES
From [facility], difficulty breathing for a week and getting worse. 88% RA, difficulty speaking. 3L 94%. Dry cough. Increased SOB with activity. 2 at home COVID tests negative.

## 2022-12-11 NOTE — PROVIDER NOTIFICATION
MD Notification    Notified Person: MD    Notified Person Name: Stacy Chowdhury    Notification Date/Time: 12/11/22 11:18 AM    Notification Interaction:  Vocera message    Purpose of Notification: Pt just got back from her thoracentesis and is asking to eat. Is she able to do so? Thanks!    Orders Received: Regular diet ordered    Comments:

## 2022-12-11 NOTE — H&P
[facility]    History and Physical  Hospitalist       Date of Admission:  12/10/2022    Assessment & Plan   Chiquita Berry is a 82 year old female with PMH significant for osteoporosis and chronic back pain with history of compression fracture who presented to the ED from  due to shortness of breath, non-productive cough, rhinorrhea since 11/29/22. She was found to have a large left-sided pleural effusion of unclear etiology with remainder of lab work-up unremarkable. BNP and procal normal. Viral testing negative. Mild leukocytosis of 12.1 with neutrophils of 10.5. No history of blood clots or malignancy. No prior history of effusion or known heart disease. She was admitted for thoracentesis with IR and further management.      Respiratory Distress  Hypoxia  Pleural Effusion Left  Unclear etiology; suspect viral cause. History of non-productive cough, low-grade temperature 98.4-99F with congestion that has been ongoing since 11/29.   -CXR with large left-sided pleural effusion   -Mild leukocytosis, imaging without consolidation    -Currently afebrile   -Procal normal   -Will defer on antibiotics at this time, unless febrile and clinically worsening  -IR for diagnostic and therapeutic thoracentesis ordered for AM   -Labs ordered  -Regular diet and NPO after midnight  -PRN morphine added for SOB  -Wean oxygen to maintain sats >90%   -Currently on 2 liters nasal cannula and satting 92-94%  -Trend labs--mildly hyponatremic at 134 and will monitor  -Repeat CXR in AM  -BNP normal, could consider echo to r/o cardiac cause, if labs from thoracentesis overall unremarkable        Tachycardia  Hypertension  -History sinus tachycardia dating back to 2002; work-up in 2016 with Cardiology recommended monitoring  -No history of HTN and not on meds previously   -Current symptoms o tachycardia and hypertension likely 2/2 respiratory distress due to pleural effusion  -Telemetry  -Monitor VS  -No  "anti-hypertensive's at this time, as blood pressure and heart rate due to above   -Consider PRN medication, if SBP >180 on 2-3 consecutive blood pressure    readings      Osteoporosis  History Compression Fracture  Chronic Back Pain  -Resume PTA Tramadol, Tylenol and Zanaflex   -Resume PT  -Fall risk  -Needs walker for ambulation       COVID-19 Status  -Negative for COVID-19, RSV and Influenza on 12/10/22        Clinically Significant Risk Factors Present on Admission         # Hyponatremia: Lowest Na = 134 mmol/L in last 2 days, will monitor as appropriate            # Acute Respiratory Failure: Documented O2 saturation < 91%.  Continue supplemental oxygen as needed     # Overweight: Estimated body mass index is 25.97 kg/m  as calculated from the following:    Height as of this encounter: 1.575 m (5' 2\").    Weight as of this encounter: 64.4 kg (142 lb).             DVT Prophylaxis: Pneumatic Compression Devices  Code Status: Full Code    This patient was discussed with Dr. Aleman of the Hospitalist Service who agrees with current plans as outlined above.    Disposition: Anticipate >2 midnight stay for thoracentesis and further management of effusion.     Charlene Mccain NP  [facility]  Securely message with the Vocera Web Console (learn more here)  Text page via [facility] Paging/Directory       Primary Care Physician   Vesna Olivera    Chief Complaint   Hypoxia   Left Pleural Effusion    History is obtained from the patient and review of the EMR.    History of Present Illness   Chiquita Berry is a 82 year old female with PMH significant for osteoporosis and chronic back pain with history of compression fracture who presented to the ED from  due to shortness of breath. The patient reports that she has had a dry cough for the past 2-3 weeks. She tested herself for COVID at home and the tests came back negative. States that she spoke to one of the RN's at [facility] where she is " typically seen due to ongoing complaints of dry, non-productive cough that has not resolved over the past 3 weeks, with an occasional runny nose. She endorses temperatures of 98.4-99F. Per chart review, she had called the RN back on 11/29/22 with these symptoms. She had been told the her symptoms were likely viral and per patient, she was advised to try Mucinex. She did not go into the clinic or  to be evaluated or to be tested for other viruses. She states that her cough did not improve. She began to have shortness of breath over the past 2 days. Her shortness of breath comes on with ambulating and attempting to put on her shoes and socks. She does work with PT on a regular basis for her prior compression fractures. She lives by herself, independently. She does not have family who are able to support her. Her sister resides in a nursing home. She relies on good friends who provide her with support and assist her with any needs. She denies chest pain or pressure. She denies having any fever, chills or flu-like body aches. She denies having pain with inspiration. She denies any history of blood clots and is not on anticoagulation. She denies any recent ill contacts. Denies any history malignancy. No known cardiac history. Denies n/v/d. She takes Miralax daily for constipation. She was seen in UC today with the above complaints. After initial assessment, paramedics were called to transport the patient to the ED. The patient was found to be in respiratory distress and hypoxic. Oxygen was applied at 3 liters nasal cannula. She was tachypneic, tachycardic and hypertensive.     Upon arrival to ED, ECG was obtained and noted sinus tachycardia with fusion complexes, possible left atrial enlargement, left axis deviation, LVH with QRS widening and ST&T wave abnormality. CXR with large left-sided pleural effusion with opacification of the majority of the left hemithorax. BMP unremarkable. BNP normal at 448. TSH normal at  1.39. Procal normal at <0.05. Leukocytosis of 12.1 with elevated neutrophil count. COVID, RSV and Influenza negative. She was admitted to the Hospitalist service for further management of left-sided effusion.       PAST MEDICAL HISTORY  Past Medical History:   Diagnosis Date     Osteoporosis     followed by outside endocrinologist     thoracolumbar kyphophosis        PAST SURGICAL HISTORY  Past Surgical History:   Procedure Laterality Date     CATARACT IOL, RT/LT Bilateral      OPEN REDUCTION INTERNAL FIXATION HUMERUS DISTAL Right      PHACOEMULSIFICATION CLEAR CORNEA WITH STANDARD INTRAOCULAR LENS IMPLANT Left 11/16/2015    Procedure: PHACOEMULSIFICATION CLEAR CORNEA WITH STANDARD INTRAOCULAR LENS IMPLANT;  Surgeon: Latrell Jessica MD;  Location: [facility]     TONSILLECTOMY & ADENOIDECTOMY         HOME MEDICATIONS  Prior to Admission medications    Medication Sig Last Dose Taking? Auth Provider Long Term End Date   Abaloparatide (TYMLOS SC) Inject 80 mcg Subcutaneous daily 12/10/2022 at am Yes Reported, Patient     acetaminophen (TYLENOL) 325 MG tablet Take 650 mg by mouth 4 times daily And 650mg po daily prn pain 12/10/2022 at 1600 Yes Reported, Patient     Cholecalciferol (VITAMIN D) 2000 UNITS tablet Take 1 tablet by mouth daily. 12/10/2022 at am Yes Reported, Patient     guaiFENesin (MUCINEX) 600 MG 12 hr tablet Take 1,200 mg by mouth 2 times daily as needed for congestion 12/10/2022 at am Yes Unknown, Entered By History     ibuprofen (ADVIL/MOTRIN) 400 MG tablet Take 1 tablet (400 mg) by mouth every 6 hours as needed for moderate pain 12/10/2022 at 1200 Yes Vesna Olivera MD     latanoprost (XALATAN) 0.005 % ophthalmic solution Place 1 drop into both eyes every evening  12/9/2022 at pm Yes Reported, Patient Yes    melatonin 3 MG CAPS Take 3 mg by mouth At Bedtime And 3mg prn(total of 6mg ok for sleep) 12/9/2022 at pm Yes Reported, Patient     Multiple Vitamin (MULTIVITAMIN ADULT PO) Take 1 tablet by mouth  "daily 12/10/2022 at am Yes Unknown, Entered By History     Multiple Vitamins-Minerals (PRESERVISION AREDS 2) CAPS Take 1 capsule by mouth 2 times daily 12/10/2022 at am Yes Reported, Patient     polyethylene glycol (MIRALAX) 17 GM/Dose powder Take 17 g by mouth daily 12/10/2022 at am Yes Jeanine Lieberman APRN CNP     polyethylene glycol 400 (BLINK TEARS) 0.25 % SOLN ophthalmic solution Place 1 drop into both eyes daily And Q2H PRN 12/10/2022 at am Yes Unknown, Entered By History     tiZANidine (ZANAFLEX) 2 MG tablet TAKE 1 TABLET BY MOUTH EVERY 12 HOURS AS NEEDED FOR MUSCLE SPASMS 12/9/2022 at pm Yes Vesna Olivera MD     traMADol (ULTRAM) 50 MG tablet TAKE 1 TABLET (50 MG) BY MOUTH EVERY 6 HOURS AS NEEDED FOR SEVERE PAIN 12/10/2022 at 0300 Yes Vesna Olivera MD         ALLERGIES  Allergies   Allergen Reactions     Augmentin Diarrhea     Celebrex [Celecoxib] Rash       SOCIAL HISTORY   reports that she has never smoked. She has never used smokeless tobacco. She reports that she does not currently use alcohol. She reports that she does not use drugs.    FAMILY HISTORY  family history includes Breast Cancer in her sister; Dementia in her mother; Myocardial Infarction in her father; Stomach Cancer in her father.    REVIEW OF SYSTEMS  A 10 point ROS was negative other than the symptoms noted above in the HPI.    Physical Exam   Nursing Notes Reviewed.  BP (!) 179/96   Pulse 107   Temp 98.6  F (37  C) (Oral)   Resp 27   Ht 1.575 m (5' 2\")   Wt 64.4 kg (142 lb)   LMP  (LMP Unknown)   SpO2 92%   BMI 25.97 kg/m     General:  Appears stated age in no acute distress.   Skin:  Warm, dry. No rashes or lesions on exposed skin. Pale   HEENT:  Normocephalic, atraumatic; EOMs grossly intact.  Neck:  Supple.  Chest:  Limited air movement to left lung fields. Right lung fields clear to ausculation with no adventitious sounds noted. Short of breath when speaking in sentences. Nasal cannula  Cardiovascular:  " RRR--tachycardia, no rub or murmur. No peripheral edema.  Abdomen:  Soft, non-tender, non-distended.  Musculoskeletal:  Moves all four extremities. Generalized weakness   Neurological:  CN 2-12 grossly intact.    Data   Data reviewed today:  I personally reviewed   Recent Labs   Lab 12/10/22  1743   WBC 12.1*   HGB 13.5   *         POTASSIUM 3.9   CHLORIDE 99   CO2 25   BUN 16   CR 0.56   ANIONGAP 10   SAGE 8.8   *   ALBUMIN 3.5   PROTTOTAL 6.7*   BILITOTAL 0.4   ALKPHOS 64   ALT 31   AST 27       Imaging:  Recent Results (from the past 24 hour(s))   XR Chest 2 Views    Narrative    EXAM: XR CHEST 2 VIEWS  LOCATION: [facility]  DATE/TIME: 12/10/2022 7:02 PM    INDICATION: hypoxia, diminished throughout L side, recent dry cough  COMPARISON: None.      Impression    IMPRESSION: There is a large left-sided pleural effusion, with opacification of the majority of the left hemithorax, the left lung apex is still aerated. Atherosclerotic calcifications of the aortic arch and descending thoracic aorta are noted. Bibasilar   subsegmental atelectasis is present, the right lung is otherwise clear.

## 2022-12-11 NOTE — PROGRESS NOTES
[facility]    Hospitalist Progress Note      Assessment & Plan   Chiquita Berry is a 82 year old female with PMH significant for osteoporosis and chronic back pain with history of compression fracture who presented to the ED from  due to shortness of breath, non-productive cough, rhinorrhea since 11/29/22. She was found to have a large left-sided pleural effusion of unclear etiology with remainder of lab work-up unremarkable. BNP and procal normal. Viral testing negative. Mild leukocytosis of 12.1 with neutrophils of 10.5. No history of blood clots or malignancy. No prior history of effusion or known heart disease. She was admitted for thoracentesis with IR and further management.     Respiratory Distress  Hypoxia  Pleural Effusion Left, exudative  History of non-productive cough, low-grade temperature 98.4-99F with congestion that has been ongoing since 11/29. On admit CXR with large left-sided pleural effusion. Mild leukocytosis, afebrile, procal normal. BNP normal.  , serum  consistent with exudative.  * 12/11: IR for thoracentesis with 1200ml off, prelim cytology concerning for malignancy   -defer antibiotics at this time, unless febrile and clinically worsening  - Regular diet  - PRN morphine for SOB  - Wean oxygen to maintain sats >90%  - follow up cytology  - given preliminary results from cell count, will check CT CAP to see if there are any nodules/consolidations that were hidden behind fluid or any source of malignancy that would be amenable to biopsy  - she would like home care PT on discharge     Tachycardia  Hypertension  -History sinus tachycardia dating back to 2002; work-up in 2016 with Cardiology recommended monitoring  - monitor BPs, no further tele needing     Osteoporosis  History Compression Fracture  Chronic Back Pain  -Resume PTA Tramadol, Tylenol and Zanaflex   -Needs walker for ambulation     Clinically Significant Risk Factors Present on  "Admission         # Hyponatremia: Lowest Na = 134 mmol/L in last 2 days, will monitor as appropriate  # Hypocalcemia: Lowest Ca = 8.3 mg/dL in last 2 days, will monitor and replace as appropriate           # Acute Respiratory Failure: Documented O2 saturation < 91%.  Continue supplemental oxygen as needed     # Overweight: Estimated body mass index is 25.97 kg/m  as calculated from the following:    Height as of this encounter: 1.575 m (5' 2\").    Weight as of this encounter: 64.4 kg (142 lb).           DVT Prophylaxis: Pneumatic Compression Devices  Code Status: Full Code    Expected Discharge Date: 12/12/2022               Stacy Chowdhury DO  Hospitalist Service  [facility]  Securely message with the Vocera Web Console (learn more here)  Text Page (7am - 6pm) via [employer] Paging/Directory      Interval History   Patient seen and examined. She tolerated her thoracentesis well this morning.     -Data reviewed today: I reviewed all new labs and imaging results over the last 24 hours. I personally reviewed no images or EKG's today.    Physical Exam   Temp: 98.6  F (37  C) Temp src: Oral BP: (!) 144/66 Pulse: 95   Resp: 18 SpO2: 95 % O2 Device: Nasal cannula Oxygen Delivery: 1 LPM  Vitals:    12/10/22 1731   Weight: 64.4 kg (142 lb)     Vital Signs with Ranges  Temp:  [98  F (36.7  C)-98.9  F (37.2  C)] 98.6  F (37  C)  Pulse:  [] 95  Resp:  [18-42] 18  BP: (127-193)/(59-96) 144/66  SpO2:  [89 %-95 %] 95 %  I/O last 3 completed shifts:  In: 240 [P.O.:240]  Out: -     Constitutional: Awake, alert, cooperative, no apparent distress  Respiratory: some crackles at left base, clear on right. On 1LPM nasal cannula  Cardiovascular: Regular rhythm, rates near 100, normal S1 and S2, and no murmur noted  GI: Normal bowel sounds, soft, non-distended, non-tender  Skin/Integumen: No rashes, no cyanosis, no edema  Other:     Medications       acetaminophen  650 mg Oral 4x Daily     artificial tears  1 " drop Both Eyes Daily     iopamidol (ISOVUE-370)  71 mL Intravenous Once     latanoprost  1 drop Both Eyes QPM     melatonin  3 mg Oral At Bedtime     multivitamin  with lutein  1 capsule Oral BID     polyethylene glycol  17 g Oral Daily     sodium chloride 0.9 %  60 mL As instructed Once     sodium chloride (PF)  3 mL Intracatheter Q8H     vitamin D3  50 mcg Oral Daily       Data   Recent Labs   Lab 12/11/22  0518 12/10/22  1743   WBC 10.7 12.1*   HGB 12.3 13.5   MCV 98 101*    404    134   POTASSIUM 3.5 3.9   CHLORIDE 104 99   CO2 26 25   BUN 14 16   CR 0.44* 0.56   ANIONGAP 8 10   SAGE 8.3* 8.8   * 128*   ALBUMIN  --  3.5   PROTTOTAL  --  6.9  6.7*   BILITOTAL  --  0.4   ALKPHOS  --  64   ALT  --  31   AST  --  27       Recent Results (from the past 24 hour(s))   XR Chest 2 Views    Narrative    EXAM: XR CHEST 2 VIEWS  LOCATION: [facility]  DATE/TIME: 12/10/2022 7:02 PM    INDICATION: hypoxia, diminished throughout L side, recent dry cough  COMPARISON: None.      Impression    IMPRESSION: There is a large left-sided pleural effusion, with opacification of the majority of the left hemithorax, the left lung apex is still aerated. Atherosclerotic calcifications of the aortic arch and descending thoracic aorta are noted. Bibasilar   subsegmental atelectasis is present, the right lung is otherwise clear.   US Thoracentesis    Narrative    THORACENTESIS 12/11/2022 10:51 AM    HISTORY: Patient with history of left pleural effusion.    PROCEDURE/FINDINGS:  After obtaining informed consent, the patient was  positioned appropriately. The back was prepped and draped in the usual  sterile manner. Limited ultrasound was performed demonstrating a  simple appearing pleural effusion.    Under ultrasound guidance, a 5 Yoruba Yueh needle was used to access  the left pleural space. A permanent ultrasound image was saved to  document needle position. Thoracentesis was performed.  Approximately  1200 mL lanny fluid was aspirated out. Sample was set aside for  submission to the lab.    The patient tolerated the procedure well. There were no immediate  postprocedure complications. The patient's vital signs were monitored  by radiology nursing staff under my supervision and remained stable  throughout the study.      Impression    IMPRESSION:  Successful left thoracentesis performed.  Sample of fluid  was sent for diagnostic testing. A total of 1200 mL fluid was removed.    DAMIAN ARELLANO MD         SYSTEM ID:  [id number]

## 2022-12-11 NOTE — PROGRESS NOTES
..RECEIVING UNIT ED HANDOFF REVIEW    ED Nurse Handoff Report was reviewed by: Shahid Dan RN on December 10, 2022 at 9:10 PM

## 2022-12-11 NOTE — PHARMACY-ADMISSION MEDICATION HISTORY
Pharmacy Medication History  Admission medication history interview status for the 12/10/2022  admission is complete. See EPIC admission navigator for prior to admission medications     Location of Interview: Patient room  Medication history sources: Patient and Surescripts    Significant changes made to the medication list:  None    In the past week, patient estimated taking medication this percent of the time: greater than 90%    Additional medication history information:   None    Medication reconciliation completed by provider prior to medication history? No    Time spent in this activity: 25 minutes    Prior to Admission medications    Medication Sig Last Dose Taking? Auth Provider Long Term End Date   Abaloparatide (TYMLOS SC) Inject 80 mcg Subcutaneous daily 12/10/2022 at am Yes Reported, Patient     acetaminophen (TYLENOL) 325 MG tablet Take 650 mg by mouth 4 times daily And 650mg po daily prn pain 12/10/2022 at 1600 Yes Reported, Patient     Cholecalciferol (VITAMIN D) 2000 UNITS tablet Take 1 tablet by mouth daily. 12/10/2022 at am Yes Reported, Patient     guaiFENesin (MUCINEX) 600 MG 12 hr tablet Take 1,200 mg by mouth 2 times daily as needed for congestion 12/10/2022 at am Yes Unknown, Entered By History     ibuprofen (ADVIL/MOTRIN) 400 MG tablet Take 1 tablet (400 mg) by mouth every 6 hours as needed for moderate pain 12/10/2022 at 1200 Yes Vesna Olivera MD     latanoprost (XALATAN) 0.005 % ophthalmic solution Place 1 drop into both eyes every evening  12/9/2022 at pm Yes Reported, Patient Yes    melatonin 3 MG CAPS Take 3 mg by mouth At Bedtime And 3mg prn(total of 6mg ok for sleep) 12/9/2022 at pm Yes Reported, Patient     Multiple Vitamin (MULTIVITAMIN ADULT PO) Take 1 tablet by mouth daily 12/10/2022 at am Yes Unknown, Entered By History     Multiple Vitamins-Minerals (PRESERVISION AREDS 2) CAPS Take 1 capsule by mouth 2 times daily 12/10/2022 at am Yes Reported, Patient     polyethylene  glycol (MIRALAX) 17 GM/Dose powder Take 17 g by mouth daily 12/10/2022 at am Yes Jeanine Lieberman APRN CNP     polyethylene glycol 400 (BLINK TEARS) 0.25 % SOLN ophthalmic solution Place 1 drop into both eyes daily And Q2H PRN 12/10/2022 at am Yes Unknown, Entered By History     tiZANidine (ZANAFLEX) 2 MG tablet TAKE 1 TABLET BY MOUTH EVERY 12 HOURS AS NEEDED FOR MUSCLE SPASMS 12/9/2022 at pm Yes Vesna Olivera MD     traMADol (ULTRAM) 50 MG tablet TAKE 1 TABLET (50 MG) BY MOUTH EVERY 6 HOURS AS NEEDED FOR SEVERE PAIN 12/10/2022 at 0300 Yes Vesna Olivera MD         The information provided in this note is only as accurate as the sources available at the time of update(s)

## 2022-12-11 NOTE — ED PROVIDER NOTES
History   Chief Complaint:  Shortness of Breath       The history is provided by the patient.      Chiquita Berry is a 82 year old female with history of hyperlipidemia who presents via EMS with shortness of breath. Patient states that she has had a dry cough for awhile with shortness of breath which has progressively worsened the past few.  Associated runny nose.  She states that her shortness of breath worsened significantly today. She shares that she feels more comfortable laying on her left side. Patient denies any fever, chills, sore throat, vomiting, chest pain/tightness, and leg swelling. Patient also denies any history of asthma, blood clots, chest surgeries, lung problem, and heart problems. Patient is not on any anticoagulants.  Hypoxic at urgent care and sent here on 3L O2.    Review of Systems   Constitutional: Negative for chills and fever.   HENT: Positive for rhinorrhea. Negative for sore throat.    Respiratory: Positive for cough and shortness of breath. Negative for chest tightness.    Cardiovascular: Negative for chest pain and leg swelling.   Gastrointestinal: Negative for vomiting.   All other systems reviewed and are negative.    Allergies:  Augmentin  Celebrex [Celecoxib]  Amoxicillin-Pot Clavulanate     Medications:  Abaloparatide  Latanoprost  Melatonin  Senna-docusate  Tizanidine  Tramadol    Past Medical History:     Osteoporosis  Lumbago  Hyperlipidemia  Cataract  Osteoarthritis     Past Surgical History:    Cataract iol  Open reduction internal fixation humerus distal  Phacoemulsification clear cornea with standard intraocular lens implant  Tonsillectomy and adenoidectomy      Family History:    Mother - dementia  Father - myocardial infarction, stomach cancer    Social History:  Presents alone.  Presents via EMS.   PCP: Vesna Olivera     Physical Exam     Patient Vitals for the past 24 hrs:   BP Temp Temp src Pulse Resp SpO2 Height Weight   12/10/22 1736 -- 98.6  F (37  C) Oral  "-- -- -- -- --   12/10/22 1734 -- -- -- -- -- 94 % -- --   12/10/22 1731 (!) 188/94 -- -- 112 24 (!) 89 % 1.575 m (5' 2\") 64.4 kg (142 lb)       Physical Exam  Eyes:               Sclera white; Pupils are equal and round  ENT:                External ears and nares normal  CV:                  Rate as above with regular rhythm, no BLE edema  Resp:               Breath sounds diminished on the left anteriorly and posteriorly, no wheeze, no cough during H&P  GI:                   Abdomen is soft, non-tender, non-distended                          No rebound tenderness or peritoneal features  MS:                  Moves all extremities  Skin:                Warm and dry  Neuro:             Speech is normal and fluent. No apparent deficit.    Emergency Department Course   ECG  ECG taken at 1750, ECG read at 1841  Sinus tachycardia with fusion complexes  Possible Left atrial enlargement  Left axis deviation  Left ventricular hypertrophy with QRS widening ([place] product)  Inferior infarct, age undetermined  Anterior infarct, age undetermined  ST & T wave abnormality, consider   Change in axis as compared to prior, dated 02/15/19.  Rate 102 bpm. NV interval 166 ms. QRS duration 118 ms. QT/QTc 380/495 ms. P-R-T axes 39 -47 102.     Imaging:  XR Chest 2 Views   Final Result   IMPRESSION: There is a large left-sided pleural effusion, with opacification of the majority of the left hemithorax, the left lung apex is still aerated. Atherosclerotic calcifications of the aortic arch and descending thoracic aorta are noted. Bibasilar    subsegmental atelectasis is present, the right lung is otherwise clear.        Report per radiology    Laboratory:  Labs Ordered and Resulted from Time of ED Arrival to Time of ED Departure   COMPREHENSIVE METABOLIC PANEL - Abnormal       Result Value    Sodium 134      Potassium 3.9      Chloride 99      Carbon Dioxide (CO2) 25      Anion Gap 10      Urea Nitrogen 16      Creatinine 0.56      " Calcium 8.8      Glucose 128 (*)     Alkaline Phosphatase 64      AST 27      ALT 31      Protein Total 6.7 (*)     Albumin 3.5      Bilirubin Total 0.4      GFR Estimate 90     CBC WITH PLATELETS AND DIFFERENTIAL - Abnormal    WBC Count 12.1 (*)     RBC Count 4.03      Hemoglobin 13.5      Hematocrit 40.8       (*)     MCH 33.5 (*)     MCHC 33.1      RDW 14.1      Platelet Count 404      % Neutrophils 87      % Lymphocytes 7      % Monocytes 5      % Eosinophils 0      % Basophils 1      % Immature Granulocytes 0      NRBCs per 100 WBC 0      Absolute Neutrophils 10.5 (*)     Absolute Lymphocytes 0.9      Absolute Monocytes 0.6      Absolute Eosinophils 0.0      Absolute Basophils 0.1      Absolute Immature Granulocytes 0.0      Absolute NRBCs 0.0     INFLUENZA A/B & SARS-COV2 PCR MULTIPLEX - Normal    Influenza A PCR Negative      Influenza B PCR Negative      RSV PCR Negative      SARS CoV2 PCR Negative     NT PROBNP INPATIENT - Normal    N terminal Pro BNP Inpatient 448     TSH WITH FREE T4 REFLEX      Emergency Department Course:     Reviewed:  I reviewed nursing notes, vitals, past medical history and Care Everywhere    Assessments:  1828 I obtained history and examined the patient as noted above.   1908 I rechecked the patient and explained findings.     Consults:  1947 I consulted with IR. Patient will be drained in the morning.   2000 I spoke with Dr. Aleman, Hospitalist, who accepts admission.     Interventions:  Oxygen    Disposition:  The patient was admitted to the hospital under the care of Dr. Aleman.     Impression & Plan   Medical Decision Making:    Diminished lung sounds on the left suggest the possibility of either pleural effusion or pneumothorax.  If either of these are found on her x-ray, then work-up will be brought in to evaluate for other sources of hypoxia and tachycardia including the possibility of PE.  X-ray confirms a large left-sided pleural effusion.  She has no existing history  of CHF or prior occurrence of this.  Cardiac evaluation showed no abnormal labs.  She does not have any severe electrolyte abnormality is or renal failure.  She has had a recent cough and white blood cell count is just mildly elevated at 12.1.  Viral testing is negative.  Procalcitonin added on to evaluate for the possibility of underlying bacterial source of the effusion.  Case discussed with IR.  Sats are stable on supplemental oxygen.  Plan for thoracentesis in the morning.  Blood pressure discussed with hospitalist, they will monitor and assess her prior to considering treatment for it.    Procalcitonin returned <0.05.  Will not empirically treat with antibiotics.    Diagnosis:    ICD-10-CM    1. Hypoxia  R09.02       2. Pleural effusion on left  J90       3. Elevated blood pressure reading  R03.0         Scribe Disclosure:  Dre MINA, am serving as a scribe at 6:26 PM on 12/10/2022 to document services personally performed by Catie Hernandez MD, based on my observations and the provider's statements to me.          Catie Hernandez MD  12/11/22 0000

## 2022-12-11 NOTE — PLAN OF CARE
[id number]. A&Ox4, VSS on 3L at 93%. Patient chronic back pain controlled with tylenol and heat. Up with SBA and walker to the BR. LS on the left side are diminished. Patient on Tele, NSR. NPO since midnight, ex ice chips and meds. Thoracentesis planned for the AM. Continue to monitor.

## 2022-12-11 NOTE — PROGRESS NOTES
12/11/22 1419   Appointment Info   Signing Clinician's Name / Credentials (PT) Anel Acosta, PT, DPT   Living Environment   People in Home alone   Current Living Arrangements apartment   Home Accessibility no concerns   Transportation Anticipated family or friend will provide   Living Environment Comments pt reports good support system with multiple friends   Self-Care   Usual Activity Tolerance good   Current Activity Tolerance moderate   Regular Exercise Yes   Activity/Exercise Type walking;strength training   Exercise Amount/Frequency 30 mins  (daily walking with walking in apt building 30-40 min)   Equipment Currently Used at Home walker, rolling;shower chair  (has lift alert)   Fall history within last six months no   Activity/Exercise/Self-Care Comment pt reports just finished up with aquatic PT for balance and planning on starting land PT. pt reports friends give rides to therapy appointments, friends grocery shop and also has meal delivery service.   General Information   Onset of Illness/Injury or Date of Surgery 12/10/22   Referring Physician Charlene Mccain NP   Patient/Family Therapy Goals Statement (PT) to get stronger   Pertinent History of Current Problem (include personal factors and/or comorbidities that impact the POC) 81y/o F admitted with respiratory distress, hypoxia, pleural effusion, left. S/p thoracentesis 12/11/22. PMH significant for osteoporosis and chronic back pain with history of compression fracture (July 2020 per pt report)   Existing Precautions/Restrictions fall   General Observations resting in bed, NAD, 1 L, SPO2: 93%, HR: 92 bpm   Cognition   Affect/Mental Status (Cognition) WNL   Orientation Status (Cognition) oriented to;person;place;situation   Cognitive Status Comments appears to demo good understanding of her limitations, verbalizes good safety awareness   Pain Assessment   Patient Currently in Pain Yes, see Vital Sign flowsheet  (chronic back pain, 3/10 at rest)    Posture    Posture Forward head position;Kyphosis;Protracted shoulders   Range of Motion (ROM)   Range of Motion ROM is WFL   ROM Comment limited spinal motion due to chronic back pain   Strength (Manual Muscle Testing)   Strength Comments pt reports generalized weakness   Bed Mobility   Comment, (Bed Mobility) IND supine to sit, pt was able to INDly aracely shoes and socks sitting at EOB with figure 4 position   Transfers   Comment, (Transfers) SBA with sit to stand with FWW   Gait/Stairs (Locomotion)   [place] Level (Gait) supervision   Assistive Device (Gait) walker, front-wheeled   Distance in Feet 10   Comment, (Gait/Stairs) decreased speed, decreased step length, flexed forward trunk   Balance   Balance Comments SBA with standing balance with UE support on the FWW   Sensory Examination   Sensory Perception Comments pt denies numbness/tingling   Clinical Impression   Criteria for Skilled Therapeutic Intervention Yes, treatment indicated   PT Diagnosis (PT) impaired gait   Influenced by the following impairments generalized weakness, impaired balance, impaired activity tolerance.   Functional limitations due to impairments impaired IND with functional mobility   Clinical Presentation (PT Evaluation Complexity) Stable/Uncomplicated   Clinical Presentation Rationale clinical judgement   Clinical Decision Making (Complexity) low complexity   Planned Therapy Interventions (PT) balance training;bed mobility training;gait training;home exercise program;patient/family education;home program guidelines;risk factor education;progressive activity/exercise;transfer training;stair training;strengthening   Risk & Benefits of therapy have been explained evaluation/treatment results reviewed;care plan/treatment goals reviewed;risks/benefits reviewed;current/potential barriers reviewed;participants voiced agreement with care plan;participants included;patient   PT Total Evaluation Time   PT Eval, Low Complexity Minutes  (10091) 10   Physical Therapy Goals   PT Frequency Daily   PT Predicted Duration/Target Date for Goal Attainment 12/18/22   PT Goals Transfers;Gait   PT: Transfers Modified independent;Sit to/from stand;Bed to/from chair;Assistive device   PT: Gait Greater than 200 feet;Modified independent;Standard walker   Interventions   Interventions Quick Adds Gait Training;Therapeutic Activity;Therapeutic Procedure   Therapeutic Procedure/Exercise   Ther. Procedure: strength, endurance, ROM, flexibillity Minutes (09502) 13   Symptoms Noted During/After Treatment fatigue;shortness of breath   Treatment Detail/Skilled Intervention Amb with FWW in hallway with SBA, no gross LOB, cues initally for increased proximity to the walker for safety-good return demo. Tolerated amb x 8 min on 1 L NC, slow pace, however no LOB. post amb, SPO2: 92%, HR: 100 bpm. 2 brief standing rest breaks in hallway. Pt encouraged to amb 4x/day in hallways with staff to increased strength and activity tolerance prior to return home. pt verbalized understanding. total distance approx. 320ft   Therapeutic Activity   Therapeutic Activities: dynamic activities to improve functional performance Minutes (60139) 8   Symptoms Noted During/After Treatment None   Treatment Detail/Skilled Intervention Discussed recs for homecare with pt and pt was in agreement. Educated pt on recs for up to chair for all meals and importance of IS to improve resp status. pt verbalized understanding. Pt left in chair with needs in reach and friend at bedside.   PT Discharge Planning   PT Plan continue with ambulation with FWW   PT Discharge Recommendation (DC Rec) home with home care physical therapy   PT Rationale for DC Rec Pt demos some deconditioning, however moving fairly well, anticipate by the time of d/c will be able to return home with current support system. Would recommend home PT at d/c to maximize pt's safety and strength. Pt was in strong agreement to homecare PT   PT  Brief overview of current status SBA with FWW, Recommend amb in halls 4x/day with nursing staff and up to chair for all meals.   Total Session Time   Timed Code Treatment Minutes 21   Total Session Time (sum of timed and untimed services) 31

## 2022-12-11 NOTE — ED NOTES
"[facility]  ED Nurse Handoff Report    ED Chief complaint: Shortness of Breath      ED Diagnosis:   Final diagnoses:   None       Code Status: Full Code    Allergies:   Allergies   Allergen Reactions    Augmentin Diarrhea    Celebrex [Celecoxib] Rash       Patient Story: Comes from [facility] via EMS for increasing SOB and O2 requirement. Does not use O2 at baseline and was found to be 88-89% on RA at clinic. O2 currently 93-94% on 3L NC. Resp: Slightly tachypnic, lungs very diminished to whole left side. Cardiac: ST w/ BBB on monitor. Hypertensive. Neuro: WNL      Treatments and/or interventions provided:   Patient's response to treatments and/or interventions:     To be done/followed up on inpatient unit:      Does this patient have any cognitive concerns?:  None    Activity level - Baseline/Home:  Independent  Activity Level - Current:   Stand with Assist    Patient's Preferred language: English   Needed?: No    Isolation: COVID r/o and special precautions  Infection: COVID r/o and special precautions  Patient tested for COVID 19 prior to admission: YES  Bariatric?: No    Vital Signs:   Vitals:    12/10/22 1731 12/10/22 1734 12/10/22 1736   BP: (!) 188/94     Pulse: 112     Resp: 24     Temp:   98.6  F (37  C)   TempSrc:   Oral   SpO2: (!) 89% 94%    Weight: 64.4 kg (142 lb)     Height: 1.575 m (5' 2\")         Cardiac Rhythm:Cardiac Rhythm: Sinus tachycardia (w/ bbb)    Was the PSS-3 completed:   Yes  What interventions are required if any?               Family Comments: None  OBS brochure/video discussed/provided to patient/family: N/A              Name of person given brochure if not patient:               Relationship to patient:     For the majority of the shift this patient's behavior was Green.   Behavioral interventions performed were .    ED NURSE PHONE NUMBER:          "

## 2022-12-11 NOTE — PLAN OF CARE
[id number]    A&Ox4. VSS on 1L NC. Weaned from 4L. Tele NSR. Went for a thoracentesis at 1030 with 1200ml of fluid drained from L pleural space. SBA w/ walker. PIV SL. Tolerating regular diet. Voiding adequately. Large BM x1. C/o back pain at baseline, managed with scheduled tylenol and heat packs. K+ and Mag WDL, AM redraw scheduled. LS diminished but improving. Plan for CT scan later today. Discharge pending.

## 2022-12-11 NOTE — PROGRESS NOTES
Pt here with Pleural effusion; arrived to the floor from ED ~2130. A&Ox4. Lung sounds diminished on the left side. VSS on 2LPM via nasal canula. Tele NSR. Regular diet but NPO at midnight. Up with walker.  Incontinent of bladder. Reports chronic back pain alleviated with tylenol and heat. Thoracentesis tomorrow. Will continue to monitor

## 2022-12-12 NOTE — PLAN OF CARE
Goal Outcome Evaluation: [id number]       VSS on 1L O2. Walking in  luo X2 this shift with SBA. L thoracentesis puncture site CDI. CT completed. Continue to monitor.

## 2022-12-12 NOTE — CONSULTS
Consult Date: 12/12/2022    REQUESTING PHYSICIAN:    This consult has been requested by Dr. Rosita Chowdhury for pleural effusion suspicious for malignancy.    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  For the last few weeks has been having some cough. Around Thanksgiving time, she started to get slightly more short of breath.  As her shortness of breath continued to get worse, she subsequently sought medical attention.  Multiple investigations were done on 12/10/2022.  -CBC reveals normal hemoglobin.  -CMP essentially normal.  -Normal procalcitonin.  -COVID-19 negative.  -Influenza negative.  -Chest x-ray revealed a large left sided pleural effusion.    The patient admitted to the hospital for further management.  Left thoracocentesis of 1200 mL of lanny-colored fluid was done on 12/11/2022.  Cytology is pending.  Pleural fluid glucose is normal.    The patient had a CT chest, abdomen and pelvis done yesterday.  It revealed moderate left pleural effusion and trace right pleural effusion.  There is ground glass opacity in right upper lobe and 6 mm left upper lobe lung nodule.  There is mild mediastinal lymphadenopathy.  There is a low density lesion within the head of the pancreas.  There is nodularity of left adrenal gland of uncertain significance.    The patient feels slightly better after thoracocentesis.  Her shortness of breath is better.    The patient denies any previous history of cancer.  The patient is a nonsmoker.    REVIEW OF SYSTEMS:  The patient says that she had been doing fairly well until about few weeks ago.  For the last few weeks, she has been more short of breath.  She is also having cough.  No hemoptysis.  She feels weak.  No headache.  No dizziness.  No chest pain.  No abdominal pain.  Some nausea.  Appetite fairly good.  No urinary or bowel complaints.  No bleeding  from any site.    All other review of systems negative.    ALLERGIES:  REVIEWED.    MEDICATIONS:  Reviewed.    PAST MEDICAL HISTORY:    1.  Osteoporosis.  2.  Hyperlipidemia.  3.  Osteoarthritis.  4.  Cataracts.  5.  Humeral fracture, status post ORIF.  6.  Tonsillectomy.    SOCIAL HISTORY:   -No smoking.  -No alcohol use.    FAMILY HISTORY:  Father had gastric cancer.    PHYSICAL EXAMINATION:    GENERAL:  She is alert, oriented x 3.  Not in any respiratory distress.  VITALS:  Reviewed.  Rest of the systems not examined.    LABORATORY DATA:  Reviewed.    ASSESSMENT:   1.  An 82-year-old female with large left pleural effusion, status post thoracocentesis.  2.  Left upper lobe lung nodule, ground-glass opacity in right upper lobe and mild mediastinal lymphadenopathy.  3.  Other medical problems including hypertension and osteoporosis.    RECOMMENDATIONS:    1.  CT scan was personally reviewed.  Images shown to the patient.  I told her there is left pleural effusion.  There are small lung nodules and mild mediastinal lymphadenopathy. I explained to her that my suspicion for this being malignancy is high.  We will wait for cytology.  Hopefully, it will be back in the next 1-2 days.  2.  If cytology is positive for malignancy, we will plan on getting a PET scan.  If cytology is negative, we may consider another thoracocentesis or biopsy of the lung nodule.  3.  The patient had multiple questions.  She wanted to know about treatment and prognosis.  I explained to her this will be discussed once we have the diagnosis.  Oncology will continue to follow.  Case discussed with Dr. Chowdhury.    Thanks for the consult.    TOTAL TIME SPENT:  60 minutes.  Time spent in today's visit, review of chart/investigations today, communicating with other providers and documentation today.    Fer Pantoja MD        D: 12/12/2022   T: 12/12/2022   MT: [id number]    Name:     REFUGIO YADAV  MRN:      [id number]        Account:       [id number]   :      1940           Consult Date: 2022     Document: [id number]

## 2022-12-12 NOTE — PROGRESS NOTES
Pt is alert and oriented, AVSS 94% on 1 liter. Lungs diminished on left. Right hip/back pain controlled with tylenol and ibuprofen. Up with 1 assist.

## 2022-12-12 NOTE — PLAN OF CARE
Pt here with plural effusions. A&O. VSS on 2L. Regular. Up with A1, GB, W - ambulated in halls x2. C/o mild to moderate pain, decreased with tylenol & ibuprofen. Plan for AM chest Xray. Discharge plan pending cytology.

## 2022-12-12 NOTE — PROGRESS NOTES
[facility]    Hospitalist Progress Note      Assessment & Plan   Chiquita Berry is a 82 year old female with PMH significant for osteoporosis and chronic back pain with history of compression fracture who presented to the ED from  due to shortness of breath, non-productive cough, rhinorrhea since 11/29/22. She was found to have a large left-sided pleural effusion of unclear etiology with remainder of lab work-up unremarkable. BNP and procal normal. Viral testing negative. Mild leukocytosis of 12.1 with neutrophils of 10.5. No history of blood clots or malignancy. No prior history of effusion or known heart disease. She was admitted for thoracentesis with IR and further management.     Respiratory Distress  Hypoxia  Pleural Effusion Left, exudative, suspicious for malignancy  History of non-productive cough, low-grade temperature 98.4-99F with congestion that has been ongoing since 11/29. On admit CXR with large left-sided pleural effusion. Mild leukocytosis, afebrile, procal normal. BNP normal.  , serum  consistent with exudative.  * 12/11: IR for thoracentesis with 1200ml off, prelim cytology concerning for malignancy  * 12/11 CT CAP: ground glass opacity RUL. 6mm RACHAEL nodule. Mild mediastinal lymphadenopathy. Lesion in head of pancreas and small lesion in spleen.  - Wean oxygen to maintain sats >90%  - follow up cytology  - repeat CXR in AM, may need to repeat thoracentesis if ongoing difficulty weaning O2.  - [place] oncology appreciated, they will follow patient's cytology. If cytology positive for malignancy, she will need a PET scan.  - she would like home care PT on discharge     Tachycardia  Hypertension  -History sinus tachycardia dating back to 2002; work-up in 2016 with Cardiology recommended monitoring  - monitor BPs     Osteoporosis  History Compression Fracture  Chronic Back Pain  -Resume PTA Tramadol, Tylenol and Zanaflex   -Needs walker for ambulation  "    Clinically Significant Risk Factors         # Hyponatremia: Lowest Na = 134 mmol/L in last 2 days, will monitor as appropriate      # Hypoalbuminemia: Lowest albumin = 3.1 g/dL at 12/11/2022  5:18 AM, will monitor as appropriate            # Overweight: Estimated body mass index is 25.26 kg/m  as calculated from the following:    Height as of this encounter: 1.575 m (5' 2\").    Weight as of this encounter: 62.6 kg (138 lb 1.6 oz)., PRESENT ON ADMISSION         DVT Prophylaxis: Pneumatic Compression Devices  Code Status: Full Code     Expected Discharge Date: 12/14/2022      Destination: home;home with help/services         Stacy Chowdhury, DO  Hospitalist Service  [facility]  Securely message with the Vocera Web Console (learn more here)  Text Page (7am - 6pm) via [facility] Paging/Directory      Interval History   Patient seen and examined. She required a little more oxygen today than yesterday, feels like her effusion might already be building back up. Spent a while at bedside discussing preliminary cytology result and CT CAP results, chance of malignancy. Discussed that once we know what exactly is there, if biopsy is needed or other further work-up that we can then discuss prognosis. She lives alone and stress of this potential diagnosis is hard for her to process.     -Data reviewed today: I reviewed all new labs and imaging results over the last 24 hours. I personally reviewed CT CAP: ground glass opacity RUL. 6mm RACHAEL nodule. Mild mediastinal lymphadenopathy. Lesion in head of pancreas and small lesion in spleen.    Physical Exam   Temp: 98.4  F (36.9  C) Temp src: Oral BP: (!) 143/72 Pulse: 90   Resp: 18 SpO2: 93 % O2 Device: Nasal cannula Oxygen Delivery: 2 LPM  Vitals:    12/10/22 1731 12/12/22 1141   Weight: 64.4 kg (142 lb) 62.6 kg (138 lb 1.6 oz)     Vital Signs with Ranges  Temp:  [97.9  F (36.6  C)-98.9  F (37.2  C)] 98.4  F (36.9  C)  Pulse:  [76-90] 90  Resp:  [16-18] 18  BP: " (121-145)/(56-72) 143/72  SpO2:  [91 %-95 %] 93 %  I/O last 3 completed shifts:  In: 200 [P.O.:200]  Out: -     Constitutional: Awake, alert, cooperative, no apparent distress  Respiratory: decreased breath sounds at base on left with crackles mid lung, clear on right. On 2LPM nasal cannula  Cardiovascular: Regular rhythm and rate, normal S1 and S2, and no murmur noted  GI: Normal bowel sounds, soft, non-distended, non-tender  Skin/Integumen: No rashes, no cyanosis, no edema  Other:     Medications       acetaminophen  650 mg Oral 4x Daily     artificial tears  1 drop Both Eyes Daily     latanoprost  1 drop Both Eyes QPM     melatonin  5 mg Oral At Bedtime     multivitamin  with lutein  1 capsule Oral BID     polyethylene glycol  17 g Oral Daily     sodium chloride (PF)  3 mL Intracatheter Q8H     vitamin D3  50 mcg Oral Daily       Data   Recent Labs   Lab 12/12/22  0750 12/11/22  0518 12/10/22  1743   WBC 9.1 10.7 12.1*   HGB 11.9 12.3 13.5    98 101*    384 404    138 134   POTASSIUM 3.4 3.5 3.9   CHLORIDE 105 104 99   CO2 29 26 25   BUN 16 14 16   CR 0.56 0.44* 0.56   ANIONGAP 5 8 10   SAGE 8.3* 8.3* 8.8   * 118* 128*   ALBUMIN  --  3.1* 3.5   PROTTOTAL  --   --  6.9  6.7*   BILITOTAL  --   --  0.4   ALKPHOS  --   --  64   ALT  --   --  31   AST  --   --  27       No results found for this or any previous visit (from the past 24 hour(s)).

## 2022-12-12 NOTE — PROGRESS NOTES
"SPIRITUAL HEALTH SERVICES Progress Note  [facility]  Unit General Surgery    Saw pt Chiquita Berry per RN request for a Bible. Introduced myself and SHS, then provided Bible, highlighter, and prayer cards.     Patient Understanding of Illness and Goals of Care / Distress - Pt identified a desire for \"God's preparation\" and \"peace\" as she faces a possible new diagnosis of cancer. Pt expressed that if she had to do chemo that \"I would just as well want the Lord to take me.\"     Meaning, Beliefs, and Spirituality - Pt stated that she felt \"calm\" because she has had time to process and has lots of support in her [employer], neighborhood, and friends.  Pt uses her denise to cope, stating \" God has a purpose for my life.\"     Plan of Care - I provided emotional support, reflective conversation, compassionate touch, and prayer.     I plan to follow-up to provide support as details of her diagnosis become available.    DORITA BlackDiv  Chaplain Resident   [phone]   "

## 2022-12-13 NOTE — PLAN OF CARE
Pt is AOx4, on 2L O2, 90% on RA, reports some SOB and BENJAMIN, lungs diminished, L back puncture site CDI, denies pain, assist x1 to BR, difficult to sleep overnight, pending xray in am and cytology result.

## 2022-12-13 NOTE — PLAN OF CARE
Goal Outcome Evaluation:  A&Ox4, VSS on 2 L. Pressure slighly elevated, up to BR, up in chair. Pain manages with tylenol. Chest X-ray completed, MRI the brain scheduled, check list completed and faxed to MRI. Up to BR, A-1 GBW. Had a shower this afternoon with her hair washed. Wean off oxygen: 96% 1L and 94% RA, continuous oximeter. Patient seems to be anxious regarding confirmation of positive malignancy. Will continue monitor.

## 2022-12-13 NOTE — PROGRESS NOTES
[facility]    Hospitalist Progress Note      Assessment & Plan   Chiquita Berry is a 82 year old female with PMH significant for osteoporosis and chronic back pain with history of compression fracture who presented to the ED from  due to shortness of breath, non-productive cough, rhinorrhea since 11/29/22. She was found to have a large left-sided pleural effusion which appears to be malignant     Respiratory Distress  Hypoxia  Pleural Effusion Left, exudative, cytology suspicious for adenocarcinoma  History of non-productive cough, low-grade temperature 98.4-99F with congestion that has been ongoing since 11/29. On admit CXR with large left-sided pleural effusion. Mild leukocytosis, afebrile, procal normal. BNP normal.  , serum  consistent with exudative.  * 12/11: IR for thoracentesis with 1200ml off, prelim cytology concerning for malignancy  * 12/11 CT CAP: ground glass opacity RUL. 6mm RACHAEL nodule. Mild mediastinal lymphadenopathy. Lesion in head of pancreas and small lesion in spleen.  * 12/12 CXR: shows moderate left sided effusion with compressive atelectasis  - Wean oxygen to maintain sats >90%, home oxygen eval on 12/14  - follow up cytology final, additional stains pending  - [place] oncology appreciated  - MRI brain w/wo to rule out further mets  - she will need PET scan after discharge (either from hospital or TCU)  - she would prefer to discharge to TCU given that she will need to have oxygen on discharge, PT will reevaluate on 12/14  - if cannot get TCU, then she would need home care RN/PT/OT on discharge  - pending timing of discharge, would repeat thoracentesis on day of discharge     Tachycardia  Hypertension  -History sinus tachycardia dating back to 2002; work-up in 2016 with Cardiology recommended monitoring  - monitor BPs     Osteoporosis  History Compression Fracture  Chronic Back Pain  -Resume PTA Tramadol, Tylenol and Zanaflex   -Needs walker for  "ambulation     Clinically Significant Risk Factors              # Hypoalbuminemia: Lowest albumin = 3.1 g/dL at 12/11/2022  5:18 AM, will monitor as appropriate            # Overweight: Estimated body mass index is 25.2 kg/m  as calculated from the following:    Height as of this encounter: 1.575 m (5' 2\").    Weight as of this encounter: 62.5 kg (137 lb 12.6 oz)., PRESENT ON ADMISSION         DVT Prophylaxis: Pneumatic Compression Devices  Code Status: Full Code     Expected Discharge Date: 12/14/2022      Destination: home;home with help/services         Stacy Chowdhury DO  Hospitalist Service  [facility]  Securely message with the Vocera Web Console (learn more here)  Text Page (7am - 6pm) via [employer] Paging/Directory      Interval History   Patient seen and examined. Discussed care plan with patient and friend Candelaria over the phone. Discussed limitations of PET scan while in hospital and at TCU. Plan for MRI today for further work-up and further stains pending on pleural fluid sample. She feels some shortness of breath. She does not feel like she can go home without support, wants to go to TCU. Discussed that she will need oxygen long term.  Discussed case with Dr Pantoja, INDU and physical therapy  Spent 40 minutes with >50% time spent evaluating patient, reviewing chart, discussing care plan with patient and above.    -Data reviewed today: I reviewed all new labs and imaging results over the last 24 hours. I personally reviewed CXR: moderate left pleural effusion    Physical Exam   Temp: 99.1  F (37.3  C) Temp src: Oral BP: (!) 154/71 Pulse: 91   Resp: 16 SpO2: 95 % O2 Device: Nasal cannula Oxygen Delivery: 2 LPM  Vitals:    12/10/22 1731 12/12/22 1141 12/13/22 0500   Weight: 64.4 kg (142 lb) 62.6 kg (138 lb 1.6 oz) 62.5 kg (137 lb 12.6 oz)     Vital Signs with Ranges  Temp:  [98.4  F (36.9  C)-99.1  F (37.3  C)] 99.1  F (37.3  C)  Pulse:  [81-91] 91  Resp:  [14-19] 16  BP: (121-158)/(62-85) " 154/71  SpO2:  [93 %-97 %] 95 %  I/O last 3 completed shifts:  In: 980 [P.O.:980]  Out: -     Constitutional: Awake, alert, cooperative, no apparent distress  Respiratory: decreased breath sounds at base on left with crackles mid lung, clear on right. On 2LPM nasal cannula  Cardiovascular: Regular rhythm and rate, normal S1 and S2, and no murmur noted  GI: Normal bowel sounds, soft, non-distended, non-tender  Skin/Integumen: No rashes, no cyanosis, no edema  Other:     Medications       acetaminophen  650 mg Oral 4x Daily     artificial tears  1 drop Both Eyes Daily     latanoprost  1 drop Both Eyes QPM     melatonin  5 mg Oral At Bedtime     multivitamin  with lutein  1 capsule Oral BID     polyethylene glycol  17 g Oral Daily     sodium chloride (PF)  3 mL Intracatheter Q8H     vitamin D3  50 mcg Oral Daily       Data   Recent Labs   Lab 12/13/22  0659 12/12/22  0750 12/11/22  0518 12/10/22  1743   WBC 10.2 9.1 10.7 12.1*   HGB 12.5 11.9 12.3 13.5   * 100 98 101*    360 384 404    139 138 134   POTASSIUM 4.1 3.4 3.5 3.9   CHLORIDE 107 105 104 99   CO2 28 29 26 25   BUN 13 16 14 16   CR 0.52 0.56 0.44* 0.56   ANIONGAP 4 5 8 10   SAGE 8.6 8.3* 8.3* 8.8   * 104* 118* 128*   ALBUMIN  --   --  3.1* 3.5   PROTTOTAL  --   --   --  6.9  6.7*   BILITOTAL  --   --   --  0.4   ALKPHOS  --   --   --  64   ALT  --   --   --  31   AST  --   --   --  27       Recent Results (from the past 24 hour(s))   XR Chest Port 1 View    Narrative    CHEST ONE VIEW  12/13/2022 11:03 AM     HISTORY: Evaluate left pleural fluid accumulation.    COMPARISON: December 10, 2022      Impression    IMPRESSION: Decreased left effusion and associated compressive  atelectasis and/or infiltrate, although this remains at least  moderate. Right lung clear.    JOSSIE HUGHES MD         SYSTEM ID:  [id number]

## 2022-12-13 NOTE — PLAN OF CARE
Goal Outcome Evaluation:      Pt is alert and oriented x 4, assist of one with cares and hygiene tasks. Regular diet, VSS on 2 l of oxygen. Pain was managed with tylenol. Discharge pending.

## 2022-12-13 NOTE — PROGRESS NOTES
Service Date: 2022    SUBJECTIVE:  Ms. Yadav is an 82-year-old female with large left pleural effusion.  Thoracocentesis of 1200 mL of fluid was done.  Cytology is pending.  I spoke to the pathologist.  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    I met with the patient.  She had one of her friends on the phone.    The patient feels weak.  She has some shortness of breath on oxygen.  Some cough.    ASSESSMENT:    1.  An 82-year-old female with malignant pleural effusion.  2.  Multiple abnormalities on the CT scan including lung nodule and mild mediastinal lymphadenopathy.    PLAN:    1.  I had a long discussion with the patient and her friend.  I explained to her that cytology is positive for malignancy.  I discussed this with the pathologist.  Final report is not yet out in the Epic.    Pathology is going to do further immunostains stent to identify the origin of this malignancy.  This is likely lung cancer with malignant pleural effusion.    2.  I explained to the patient that this is stage IV disease because of malignant effusion. I discussed regarding further staging workup.  She will get a brain MRI in the hospital.  We will get a PET scan as outpatient.     3.  Discussed regarding treatment.  Treatment will be palliative chemotherapy.  Immunotherapy or targeted agents may be also use.  Those will be decided depending on PD-L1 staining and NGS panel.    4.  She had multiple questions regarding malignancy and possible treatment.  This will be discussed once we have the final pathology.  Oncology will continue to follow. Case discussed with Dr. Chowdhury.      TOTAL TIME SPENT:  35 minutes.  Time spent in today's visit, review of chart/investigations today, communicating with other providers and documentation today.      Fer Pantoja MD        D: 2022   T: 2022   MT: NURYS    Name:     REFUGIO YADAV  MRN:      [id number]        Account:      [id number]   :      1940           Service  Date: 12/13/2022       Document: [id number]

## 2022-12-14 NOTE — PROGRESS NOTES
"SPIRITUAL HEALTH SERVICES Progress Note  [facility] General Surgery    Saw pt Chiquita Berry per RN referral for emotional support.     Patient/Family Understanding of Illness and Goals of Care - Pt informed me that she has been diagnosed with \"stage four\" lung cancer.   Pt became tearful when processing her feelings and I validated her strength.      Distress and Loss - Pt has general wonderings about \"what is coming next,\"  stating \"there is still a lot of unknowns.\" She expressed a desire to get her will in order.   Pt spoke about a possible TCU placement pending treatment advised, since going home would not be safe overnight.     Pt worried about who will take care of her younger sister. \"My greatest concern was that I wanted to outlive my sister.\"     Strengths, Coping, and Resources - Pt coping with her diagnosis as best as she can. She expressed \"fortiutde of radha in mind and spirit.\" She radha gives her meaning and hope.      Meaning, Beliefs, and Spirituality - Pt surrounded by prayer and support from her \"bible study group\" and pastors of her [employer].     Plan of Care - I provided support that validated emotions and experiences through reading scripture and empathetic listening. I provided compassionate touch and prayed with her. I plan to follow-up as more information becomes available.     SHS remain available.     DORITA BlackDiv  Chaplain Resident   [phone]     Radha mind and spirit  "

## 2022-12-14 NOTE — PROGRESS NOTES
[facility]    Medicine Progress Note - Hospitalist Service    Date of Admission:  12/10/2022    Assessment & Plan    Chiquita Berry is a 82 year old female with PMH significant for osteoporosis and chronic back pain with history of compression fracture who presented to the ED from  due to shortness of breath, non-productive cough, rhinorrhea since 11/29/22. She was found to have a large left-sided pleural effusion which appears to be malignant     Respiratory Distress  Hypoxia  Pleural Effusion Left, exudative, cytology suspicious for adenocarcinoma  History of non-productive cough, low-grade temperature 98.4-99F with congestion that has been ongoing since 11/29. On admit CXR with large left-sided pleural effusion. Mild leukocytosis, afebrile, procal normal. BNP normal.  , serum  consistent with exudative.  * 12/11: IR for thoracentesis with 1200ml off, prelim cytology concerning for malignancy  * 12/11 CT CAP: ground glass opacity RUL. 6mm RACHAEL nodule. Mild mediastinal lymphadenopathy. Lesion in head of pancreas and small lesion in spleen.  * 12/12 CXR: shows moderate left sided effusion with compressive atelectasis  Plan   Wean oxygen to maintain sats >90%, home oxygen eval on 12/14  - follow up cytology final, additional stains pending  - [place] oncology appreciated  - MRI brain w/wo to rule out further mets  - she will need PET scan after discharge (either from hospital or TCU)  - she would prefer to discharge to TCU given that she will need to have oxygen on discharge, PT will reevaluate on 12/14  - if cannot get TCU, then she would need home care RN/PT/OT on discharge  - pending timing of discharge, would repeat thoracentesis on day of discharge   -  Tachycardia  Hypertension  - History sinus tachycardia dating back to 2002; work-up in 2016 with Cardiology recommended monitoring  - monitor BPs     Osteoporosis  History Compression Fracture  Chronic Back  Pain  -Resume PTA Tramadol, Tylenol and Zanaflex   -Needs walker for ambulation    -Patient would like to restart her osteoporotic medication-abaloparatide. She tells me she will like to resume her osteoporosis med - abaloparatide and she will like for me to check with her endocrinologist - Dr. Sousa at [phone] to ensure this is okay to start given her new diagnosis of possible diagnosis of adenocarcinoma of the lung.  Call placed to Dr. Sousa, awaiting callback.          Diet: Regular Diet Adult    DVT Prophylaxis: Pneumatic Compression Devices  Mcallister Catheter: Not present  Central Lines: None  Cardiac Monitoring: None  Code Status: Full Code      Disposition Plan     Expected Discharge Date: 12/15/2022      Destination: home;home with help/services          The patient's care was discussed with the Patient.    Colleen Larson MD  Hospitalist Service  [facility]  Securely message with the Vocera Web Console (learn more here)  Text page via [employer] Paging/Directory         Clinically Significant Risk Factors              # Hypoalbuminemia: Lowest albumin = 3.1 g/dL at 12/11/2022  5:18 AM, will monitor as appropriate                   ______________________________________________________________________    Interval History   Patient sitting in chair, no complaints  She tells me she will like to resume her osteoporosis med - abaloparatide and she will like for me to check with her endocrinologist - Dr. Sousa at [phone] to ensure this is okay to start given her new diagnosis of possible diagnosis of adenocarcinoma of the lung.      Data reviewed today: I reviewed all medications, new labs and imaging results over the last 24 hours. I personally reviewed no images or EKG's today.    Physical Exam   Vital Signs: Temp: 98.6  F (37  C) Temp src: Oral BP: 134/68 Pulse: 79   Resp: 16 SpO2: 92 % O2 Device: Nasal cannula Oxygen Delivery: 1 LPM  Weight: 135 lbs 1.6 oz  General  Appearance: Well appearing for stated age.  Respiratory: CTAB, no rales or ronchi  Cardiovascular: S1, S2 normal, no murmurs  GI: non-tender on palpation, BS present      Data   Recent Labs   Lab 12/13/22  0659 12/12/22  0750 12/11/22  0518 12/10/22  1743   WBC 10.2 9.1 10.7 12.1*   HGB 12.5 11.9 12.3 13.5   * 100 98 101*    360 384 404    139 138 134   POTASSIUM 4.1 3.4 3.5 3.9   CHLORIDE 107 105 104 99   CO2 28 29 26 25   BUN 13 16 14 16   CR 0.52 0.56 0.44* 0.56   ANIONGAP 4 5 8 10   SAGE 8.6 8.3* 8.3* 8.8   * 104* 118* 128*   ALBUMIN  --   --  3.1* 3.5   PROTTOTAL  --   --   --  6.9  6.7*   BILITOTAL  --   --   --  0.4   ALKPHOS  --   --   --  64   ALT  --   --   --  31   AST  --   --   --  27     Recent Results (from the past 24 hour(s))   MR Brain w/o & w Contrast    Narrative    EXAM: MR BRAIN W/O and W CONTRAST  LOCATION: [facility]  DATE/TIME: 12/13/2022 9:39 PM    INDICATION: Metastatic cancer  COMPARISON: None.  CONTRAST: 6mL Gadavist  TECHNIQUE: Routine multiplanar multisequence head MRI without and with intravenous contrast.    FINDINGS:  INTRACRANIAL CONTENTS: No acute or subacute infarct. No mass. No acute hemorrhage. There is retrocerebellar CSF intensity fluid collection consistent with arachnoid cyst measuring 2.6 cm AP by 6.7 cm transverse. There is associated mass effect upon the   cerebellar vermis and left cerebellar hemisphere. Negative for midline shift. Patchy and confluent nonspecific T2/FLAIR hyperintensities within the cerebral white matter most consistent with moderate chronic microvascular ischemic change. Moderate   generalized cerebral atrophy. No hydrocephalus. Normal position of the cerebellar tonsils. No pathologic contrast enhancement.    SELLA: No abnormality accounting for technique.    OSSEOUS STRUCTURES/SOFT TISSUES: Normal marrow signal. The major intracranial vascular flow voids are maintained.     ORBITS: Prior bilateral  cataract surgery. Visualized portions of the orbits are otherwise unremarkable.     SINUSES/MASTOIDS: No paranasal sinus mucosal disease. No middle ear or mastoid effusion.       Impression    IMPRESSION:  1.  No intracranial metastatic disease.  2.  Generalized brain atrophy and presumed microvascular ischemic changes as detailed above.  3.  Retrocerebellar arachnoid cyst as described above.

## 2022-12-14 NOTE — PROGRESS NOTES
Pt breathing is normal, no respiratory distress. Lungs diminished on the left, clear on the right. Sating 95% on 1 liter. 90-91% on room air with activities.   Pt is alert and oriented x 4, AVSS. Up with SBA/1assist. Voiding without difficulty. Blanchable redness noted on back. Mepilex applied.  Discharge pending progress.

## 2022-12-14 NOTE — PROGRESS NOTES
Service Date: 12/14/2022    SUBJECTIVE:  Ms. Berry is an 82-year-old female with newly diagnosed malignant pleural effusion.  She came to the emergency Room because of shortness of breath.  A chest x-ray revealed a large left pleural effusion.  She had a left thoracocentesis done.  I have discussed the case with the pathologist and it is malignant pleural effusion.  Final pathology is pending in [facility].    The patient had a CT chest, abdomen and pelvis.  It revealed moderate pleural effusion, lung nodule and mild mediastinal lymphadenopathy.  She had a brain MRI done, which is negative for any metastatic disease.    Overall, her condition is stable.  Her shortness of breath is better.  She is not on oxygen.  No chest pain.  No abdominal pain.  No nausea or vomiting.  No bleeding.    PHYSICAL EXAMINATION:    GENERAL:  She is alert and oriented x 3. Not in distress.  VITAL SIGNS:  Reviewed.  Rest of systems not examined.    ASSESSMENT:    1.  An 82-year-old female with malignant pleural effusion.  2.  Lung nodules, mild mediastinal lymphadenopathy and malignant effusion.  This is clinically consistent with lung cancer.  3.  Other medical problems including hypertension.    PLAN:    1.  The patient has malignant pleural effusion.  She had thoracocentesis done.  Shortness of breath is better.      I explained to her that effusion can recur.  If it happens, she will need to have a thoracocentesis.  For frequent recurrence, we can consider PleurX catheter.    2.  Final pathology is still pending.  Clinically, this looks like lung cancer.  We will wait for the final path. If it lung cancer.  We will send NGS panel also PD L1 and stenting.    3.  The patient will need a PET scan, which will be done as outpatient.    4.  I explained to her that treatment will be all palliative.  Further discussion regarding treatment once we have the pathology available.  Hematology/Oncology will continue to follow.    Fer Pantoja  MD        D: 2022   T: 2022   MT: NURYS    Name:     REFUGIO YADAV  MRN:      [id number]        Account:      [id number]   :      1940           Service Date: 2022       Document: [id number]

## 2022-12-14 NOTE — PLAN OF CARE
Goal Outcome Evaluation:                      Patient A&0x4. Up with SBA GB and walker.  Lungs diminished on Left side. IS to 500. Thoracentesis site on L back is CDI. No edema noted. CMS intact. No numbness or tingling. On RA. C/o chronic back pain. Tylenol and IB along with Heat used for pain control. DP pulses +2. Patient ws in the the chair for part of t he shift. Tolerating Regular diet. Full code.

## 2022-12-15 NOTE — CONSULTS
Care Management Initial Consult    General Information  Assessment completed with: Patient,    Type of CM/SW Visit: Initial Assessment    Primary Care Provider verified and updated as needed:     Readmission within the last 30 days:        Reason for Consult: discharge planning  Advance Care Planning:            Communication Assessment  Patient's communication style: spoken language (English or Bilingual)             Cognitive  Cognitive/Neuro/Behavioral: WDL                      Living Environment:   People in home: alone     Current living Arrangements: apartment      Able to return to prior arrangements: yes       Family/Social Support:  Care provided by: self, friend  Provides care for: no one  Marital Status: Single  Other (specify) (family and friends)          Description of Support System: Supportive, Involved         Current Resources:   Patient receiving home care services: No     Community Resources: [employer], Other (see comment) (delivered meals through [employer])  Equipment currently used at home: walker, rolling  Supplies currently used at home:      Employment/Financial:  Employment Status: retired        Financial Concerns:             Lifestyle & Psychosocial Needs:  Social Determinants of Health     Tobacco Use: Low Risk      Smoking Tobacco Use: Never     Smokeless Tobacco Use: Never     Passive Exposure: Not on file   Alcohol Use: Not on file   Financial Resource Strain: Not on file   Food Insecurity: Not on file   Transportation Needs: Not on file   Physical Activity: Not on file   Stress: Not on file   Social Connections: Not on file   Intimate Partner Violence: Not on file   Depression: Not on file   Housing Stability: Not on file       Functional Status:  Prior to admission patient needed assistance:   Dependent ADLs:: Ambulation-walker  Dependent IADLs:: Transportation       Mental Health Status:  Mental Health Status: No Current Concerns       Chemical Dependency Status:  Chemical Dependency  Status: No Current Concerns             Values/Beliefs:  Spiritual, Cultural Beliefs, Taoism Practices, Values that affect care: yes  Description of Beliefs that Will Affect Care: denise is extremely important            Additional Information:  Intial consult completed with patient. Has [employer] delivered meals, help of friends for transport and otherwise independent, uses a walker. Accepted by [facility] for RN, PT, OT.     Rupal Waldrop, RN   [facility]   Phone [phone]

## 2022-12-15 NOTE — PLAN OF CARE
UP sba in halls and to chair. Tolerating diet. Pain on right to mid back, tylenol given. Lungs dim. Plan to discharge home when safe. Working with rehab.

## 2022-12-15 NOTE — PROGRESS NOTES
[facility]    Hospitalist Progress Note      Assessment & Plan   Chiquita Berry is a 82 year old female with PMH significant for osteoporosis and chronic back pain with history of compression fracture who presented to the ED from  due to shortness of breath, non-productive cough, rhinorrhea since 11/29/22. She was found to have a large left-sided pleural effusion which appears to be malignant     Respiratory Distress  Hypoxia  Pleural Effusion Left, exudative, cytology suspicious for adenocarcinoma  History of non-productive cough, low-grade temperature 98.4-99F with congestion that has been ongoing since 11/29. On admit CXR with large left-sided pleural effusion. Mild leukocytosis, afebrile, procal normal. BNP normal.  , serum  consistent with exudative.  * 12/11: IR for thoracentesis with 1200ml off, prelim cytology concerning for malignancy  * 12/11 CT CAP: ground glass opacity RUL. 6mm RACHAEL nodule. Mild mediastinal lymphadenopathy. Lesion in head of pancreas and small lesion in spleen.  * 12/12 CXR: shows moderate left sided effusion with compressive atelectasis  * 12/13 MRI Brain: no intracranial metastatic disease. Generalized brain atrophy and presumed microvascular changes. retrocerebellar arachnoid cyst.  - Wean oxygen to maintain sats >90% (maintained on room air on 12/15)  - follow up cytology final, additional stains pending--looks like may be lung adenocarcinoma  - [place] [facility] appreciated  - she will need PET scan after discharge  - will need home care RN/PT/OT on discharge  - pending timing of discharge, would repeat thoracentesis on day of discharge if requiring oxygen  - monitor one more day to ensure she isn't requiring oxygen, then discharge on 12/16 once home care in place     Tachycardia- resolved  Hypertension  History sinus tachycardia dating back to 2002     Osteoporosis  History Compression Fracture  Chronic Back Pain  -Resume PTA Tramadol,  Tylenol and Zanaflex   -Needs walker for ambulation    -Patient would like to restart her osteoporotic medication-abaloparatide prescribed by her endocrinologist - Dr. Sousa at [phone]. Will need to verify this is OK with possible cancer dx.    Clinically Significant Risk Factors              # Hypoalbuminemia: Lowest albumin = 3.1 g/dL at 12/11/2022  5:18 AM, will monitor as appropriate                   DVT Prophylaxis: Pneumatic Compression Devices  Code Status: Full Code     Expected Discharge Date: 12/16/2022      Destination: home;home with help/services         Stacy Chowdhury,   Hospitalist Service  [facility]  Securely message with the Vocera Web Console (learn more here)  Text Page (7am - 6pm) via [employer] Paging/Directory      Interval History   Patient seen and examined. She is doing okay. Walking with therapy, not requiring oxygen. Hopefully she can maintain off oxygen tomorrow as well. If no oxygen, then no need for thoracentesis on day of discharge. Home care arranged, but she also wants to pay someone privately to be in the home at night. She reports she will have someone (a friend) be available for her this weekend. Spoke with Candelaria (friend) via phone and SW as well.    -Data reviewed today: I reviewed all new labs and imaging results over the last 24 hours. I personally reviewed no images or EKG's today.    Physical Exam   Temp: 98  F (36.7  C) Temp src: Oral BP: 137/64 Pulse: 79   Resp: 18 SpO2: 93 % O2 Device: None (Room air)    Vitals:    12/13/22 0500 12/14/22 0500 12/15/22 0545   Weight: 62.5 kg (137 lb 12.6 oz) 61.3 kg (135 lb 1.6 oz) 61.2 kg (134 lb 14.4 oz)     Vital Signs with Ranges  Temp:  [98  F (36.7  C)-98.7  F (37.1  C)] 98  F (36.7  C)  Pulse:  [73-99] 79  Resp:  [16-18] 18  BP: (128-137)/(52-67) 137/64  SpO2:  [90 %-93 %] 93 %  I/O last 3 completed shifts:  In: 360 [P.O.:360]  Out: -     Constitutional: Awake, alert, cooperative, no apparent  distress  Respiratory: decreased breath sounds at base on left up to mid lung, clear on right.  Cardiovascular: Regular rhythm and rate, normal S1 and S2, and no murmur noted  GI: Normal bowel sounds, soft, non-distended, non-tender  Skin/Integumen: No rashes, no cyanosis, no edema  Other:      Medications       acetaminophen  650 mg Oral 4x Daily     artificial tears  1 drop Both Eyes Daily     hydrocortisone   Topical BID     latanoprost  1 drop Both Eyes QPM     melatonin  5 mg Oral At Bedtime     multivitamin  with lutein  1 capsule Oral BID     polyethylene glycol  17 g Oral Daily     sodium chloride (PF)  3 mL Intracatheter Q8H     vitamin D3  50 mcg Oral Daily       Data   Recent Labs   Lab 12/15/22  0803 12/13/22  0659 12/12/22  0750 12/11/22  0518 12/10/22  1743   WBC  --  10.2 9.1 10.7 12.1*   HGB  --  12.5 11.9 12.3 13.5   MCV  --  102* 100 98 101*   PLT  --  376 360 384 404   NA  --  139 139 138 134   POTASSIUM 3.9 4.1 3.4 3.5 3.9   CHLORIDE  --  107 105 104 99   CO2  --  28 29 26 25   BUN  --  13 16 14 16   CR  --  0.52 0.56 0.44* 0.56   ANIONGAP  --  4 5 8 10   SAGE  --  8.6 8.3* 8.3* 8.8   GLC  --  107* 104* 118* 128*   ALBUMIN  --   --   --  3.1* 3.5   PROTTOTAL  --   --   --   --  6.9  6.7*   BILITOTAL  --   --   --   --  0.4   ALKPHOS  --   --   --   --  64   ALT  --   --   --   --  31   AST  --   --   --   --  27       No results found for this or any previous visit (from the past 24 hour(s)).

## 2022-12-15 NOTE — PROGRESS NOTES
Alert and oriented x4. VSS on RA. Up SBA. Pain managed with PRN tramadol. Denies SOB, CMS intact. Voiding well per bathroom, no BM. PIV SL. Cont to monitor

## 2022-12-16 NOTE — PROGRESS NOTES
"SPIRITUAL HEALTH SERVICES Progress Note  [facility]  Unit General Surgery    Saw pt Chiquita Spencer Berry per pt request.    Pt expressed \"worry\" over going home, I prayed with her and comforted her by reading Hector 6:34 & Solitario 4:6-7. Her denise is very important to her and she has a Yazidi community reaching out to her.     Pt expects to be discharged home today.          DORITA BlackDiv  Chaplain Resident   [phone]   " External [place]/External [facility]

## 2022-12-16 NOTE — PLAN OF CARE
Summary: Pleural Effusion  Date & Time: 12/15/22 4550-4694  Orientation: AOx4  POD# N/A  Activity Level: SBA / Walker  Fall Risk: Yes  Behavior & Aggression: Green  Pain Management: Scheduled tylenol, PRN tramadol available   ABNL VS/O2: VSS on RA  ABNL Lab/BG: WNL  Diet: Regular  Bowel/Bladder: Up to bathroom  Drains/Devices: PIV SL  Tests/Procedures: N/A  Anticipated  DC Date: 12/15/22    Other Important Info: Pt is Aox4, up with SBA and a walker. Multiple walks today. Significant Kyphosis, protective mepelix to back. Discharge tomorrow back to apartment with Home RN/PT/OT. Pt plans to have a friend stay and has a life alert.

## 2022-12-16 NOTE — PLAN OF CARE
Goal Outcome Evaluation:      Plan of Care Reviewed With: patient    Overall Patient Progress: improvingOverall Patient Progress: improving     Summary: Pleural Effusion  Date & Time: 12/15/22 - 12/16/22: [id number].  Orientation: A&Ox4  Activity Level: SBA / Walker  Fall Risk: Yes  Behavior & Aggression: Green  Pain Management: Scheduled tylenol, PRN tramadol  ABNL VS/O2: VSS on RA  Diet: Tolerating a regular diet  Bowel/Bladder: Up to bathroom  Drains/Devices: PIV SL  Other Important Info: Significant Kyphosis, protective mepelix to back CDI. Discharge expected to home today with RN/PT/OT help. Continue to [facility]

## 2022-12-16 NOTE — DISCHARGE SUMMARY
[facility]    Discharge Summary  Hospitalist    Date of Admission:  12/10/2022  Date of Discharge:  12/16/2022  Discharging Provider: Stacy Chowdhury  Date of Service (when I saw the patient): 12/16/22    Discharge Diagnoses   Acute hypoxic respiratory failure  Malignant pleural effusion (left)  Lung nodule, mediastinal lymphadenopathy secondary to lung adenocarcinoma  Tachycardia- resolved  Hypertension  Osteoporosis  History Compression Fracture  Chronic Back Pain  Insomnia    History of Present Illness   Chiquita Berry is an 82 year old female with PMH significant for osteoporosis and chronic back pain with history of compression fracture who presented to the ED from  due to shortness of breath, non-productive cough, rhinorrhea since 11/29/22. She was found to have a large left-sided pleural effusion secondary to lung adenocarcinoma    Hospital Course   Chiquita Berry was admitted on 12/10/2022.  The following problems were addressed during her hospitalization:    Acute hypoxic respiratory failure- resolved  Malignant pleural effusion (left)  Lung nodule, mediastinal lymphadenopathy secondary to lung adenocarcinoma  History of non-productive cough, low-grade temperature 98.4-99F with congestion that has been ongoing since 11/29. On admit CXR with large left-sided pleural effusion. Mild leukocytosis, afebrile, procal normal. BNP normal.  , serum  consistent with exudative.  * 12/11: IR for thoracentesis with 1200ml off, cytology consistent with lung adenocarcinoma, low PD-L1 expression.  * 12/11 CT CAP: ground glass opacity RUL. 6mm RACHAEL nodule. Mild mediastinal lymphadenopathy. Lesion in head of pancreas and small lesion in spleen.  * 12/12 CXR: shows moderate left sided effusion with compressive atelectasis  * 12/13 MRI Brain: no intracranial metastatic disease. Generalized brain atrophy and presumed microvascular changes. retrocerebellar arachnoid cyst.  - weaned  off oxygen, recommended she get a pulse ox and monitor her oxygen on discharge. If O2 sat is <90% and has worsening symptoms (cough, SOB, wheeze) she should come to the ED for evaluation for repeat thoracentesis  - eventually if she is requiring frequent thoracentesis, she may need scheduled/standing thoracentesis appointments or perhaps go for pleur-x catheter placement  - further stains from malignant pleural effusion pending  - [place] oncology appreciated, they will follow up with her outpatient and schedule PET scan  - home care RN/PT/OT/HHA on discharge  - follow up with PCP within one week     Tachycardia- resolved  Hypertension  History sinus tachycardia dating back to 2002     Osteoporosis  History Compression Fracture  Chronic Back Pain  -Resume PTA Tramadol, Tylenol and Zanaflex   -Needs walker for ambulation    -Patient would like to restart her osteoporotic medication-abaloparatide prescribed by her endocrinologist - Dr. Victor at [phone]. Will need to verify this is OK once cancer dx finalized    Insomnia  PRN trazodone ordered on discharge    Stacy Chowdhury    Significant Results and Procedures   12/11 left sided thoracentesis: 1200ml off    Pending Results   These results will be followed up by Dr Pantoja, oncology  Unresulted Labs Ordered in the Past 30 Days of this Admission     Date and Time Order Name Status Description    12/10/2022  9:59 PM Anaerobic bacterial culture Preliminary     12/10/2022  9:59 PM Acid-Fast Bacilli Culture and Stain In process           Code Status   Full Code       Primary Care Physician   Vesna Olivera    Physical Exam   Temp: 98.4  F (36.9  C) Temp src: Oral BP: (!) 155/75 Pulse: 83   Resp: 16 SpO2: 91 % O2 Device: None (Room air)    Vitals:    12/14/22 0500 12/15/22 0545 12/16/22 0702   Weight: 61.3 kg (135 lb 1.6 oz) 61.2 kg (134 lb 14.4 oz) 62.8 kg (138 lb 7.2 oz)     Vital Signs with Ranges  Temp:  [97.5  F (36.4  C)-98.4  F (36.9  C)] 98.4  F (36.9   C)  Pulse:  [81-86] 83  Resp:  [16] 16  BP: (143-155)/(70-75) 155/75  SpO2:  [91 %-92 %] 91 %  No intake/output data recorded.    Patient seen and examined. No chest pain, shortness of breath, nausea, vomiting. She has occasional cough. She has maintained her oxygen saturation on room air. She is nervous about discharging to home. Home care is visiting her on discharge and will arrange for home health aide to provide overnight support/monitoring. Reviewed instructions for when to return to the hospital. Stable for discharge to home with home cares    Constitutional: Awake, alert, cooperative, no apparent distress.  Eyes: Conjunctiva and pupils examined and normal.  HEENT: Moist mucous membranes, normal dentition.  Respiratory: decreased breath sounds with some crackles up to mid/upper left lung, clear on right lung.  Cardiovascular: Regular rate and rhythm, normal S1 and S2, and no murmur noted.  GI: Soft, non-distended, non-tender, normal bowel sounds.  Lymph/Hematologic: No anterior cervical or supraclavicular adenopathy.  Skin: No rashes, no cyanosis, no edema.  Musculoskeletal: No joint swelling, erythema or tenderness.  Neurologic: Cranial nerves 2-12 intact, normal strength and sensation.  Psychiatric: Alert, oriented to person, place and time, no obvious anxiety or depression.    Discharge Disposition   Discharged to home with home care RN/PT/OT/HHA  Condition at discharge: Stable    Consultations This Hospital Stay   CARE MANAGEMENT / SOCIAL WORK IP CONSULT  PHYSICAL THERAPY ADULT IP CONSULT  HEMATOLOGY & ONCOLOGY IP CONSULT    Time Spent on this Encounter   IStacy, personally saw the patient today and spent greater than 30 minutes discharging this patient.    Discharge Orders      Home Care Referral      Reason for your hospital stay    Malignant pleural effusion     Follow-up and recommended labs and tests     Follow up with primary care provider, Vesna Olivera, within 7 days for hospital  follow- up.  No follow up labs or test are needed.    Follow up with oncology, Dr Pantoja, to discuss results of tumor work-up studies and PET scan     Activity    Your activity upon discharge: activity as tolerated     Brief Discharge Instructions    1. Take trazodone as needed to help with sleep.    2.  Monitor your oxygen with pulse oximeter. If your reading is less than 90% and you are feeling more short of breath, have worsening cough or wheezing come to the ER, the fluid has likely built up enough to require drainage. The ER will be able to get a thoracentesis (remove fluid off lung). Eventually if the fluid is building up quickly enough, Dr Pantoja may help arrange outpatient scheduled thoracentesis for you.     Monitor and record    Oxygen with pulse oximeter. If your reading is less than 90% and you are feeling more short of breath, have worsening cough or wheezing come to the ER     Diet    Follow this diet upon discharge:  Regular Diet Adult     Discharge Medications   Discharge Medication List as of 12/16/2022  1:58 PM      START taking these medications    Details   traZODone (DESYREL) 50 MG tablet Take 0.5-1 tablets (25-50 mg) by mouth nightly as needed for sleep, Disp-10 tablet, R-0, E-PrescribeFuture refills by PCP Dr. Vesna Olivera with phone number [phone].         CONTINUE these medications which have CHANGED    Details   Abaloparatide (TYMLOS) 3120 MCG/1.56ML SOPN injection Inject 0.04 mLs (80 mcg) Subcutaneous daily Discuss resumption with endocrinologist after plan of care with oncology decided, No Print Out         CONTINUE these medications which have NOT CHANGED    Details   acetaminophen (TYLENOL) 325 MG tablet Take 650 mg by mouth 4 times daily And 650mg po daily prn pain, Historical      Cholecalciferol (VITAMIN D) 2000 UNITS tablet Take 1 tablet by mouth daily., Historical      guaiFENesin (MUCINEX) 600 MG 12 hr tablet Take 1,200 mg by mouth 2 times daily as needed for congestion,  Historical      ibuprofen (ADVIL/MOTRIN) 400 MG tablet Take 1 tablet (400 mg) by mouth every 6 hours as needed for moderate pain, Disp-60 tablet, R-3, E-Prescribe      latanoprost (XALATAN) 0.005 % ophthalmic solution Place 1 drop into both eyes every evening , R-2, Historical      melatonin 3 MG CAPS Take 3 mg by mouth At Bedtime And 3mg prn(total of 6mg ok for sleep), Historical      Multiple Vitamin (MULTIVITAMIN ADULT PO) Take 1 tablet by mouth daily, Historical      Multiple Vitamins-Minerals (PRESERVISION AREDS 2) CAPS Take 1 capsule by mouth 2 times daily, Historical      polyethylene glycol (MIRALAX) 17 GM/Dose powder Take 17 g by mouth daily, Disp-510 g, Historical      polyethylene glycol 400 (BLINK TEARS) 0.25 % SOLN ophthalmic solution Place 1 drop into both eyes daily And Q2H PRN, Historical      tiZANidine (ZANAFLEX) 2 MG tablet TAKE 1 TABLET BY MOUTH EVERY 12 HOURS AS NEEDED FOR MUSCLE SPASMS, Disp-60 tablet, R-11, E-Prescribe      traMADol (ULTRAM) 50 MG tablet TAKE 1 TABLET (50 MG) BY MOUTH EVERY 6 HOURS AS NEEDED FOR SEVERE PAIN, Disp-60 tablet, R-2, E-Prescribe           Allergies   Allergies   Allergen Reactions     Adhesive Tape Dermatitis     Skin Sensitivity to Adhesive ie. Lidocaine patch, tele patches, tapes     Augmentin Diarrhea     Celebrex [Celecoxib] Rash     Data   Most Recent 3 CBC's:  Recent Labs   Lab Test 12/13/22  0659 12/12/22  0750 12/11/22  0518   WBC 10.2 9.1 10.7   HGB 12.5 11.9 12.3   * 100 98    360 384      Most Recent 3 BMP's:  Recent Labs   Lab Test 12/15/22  0803 12/13/22  0659 12/12/22  0750 12/11/22  0518   NA  --  139 139 138   POTASSIUM 3.9 4.1 3.4 3.5   CHLORIDE  --  107 105 104   CO2  --  28 29 26   BUN  --  13 16 14   CR  --  0.52 0.56 0.44*   ANIONGAP  --  4 5 8   SAGE  --  8.6 8.3* 8.3*   GLC  --  107* 104* 118*     Most Recent 2 LFT's:  Recent Labs   Lab Test 12/10/22  1743 12/21/20  0939   AST 27 23   ALT 31 24   ALKPHOS 64 85   BILITOTAL 0.4 0.5      Most Recent INR's and Anticoagulation Dosing History:  Anticoagulation Dose History    There is no flowsheet data to display.       Most Recent 3 Troponin's:  Recent Labs   Lab Test 02/15/19  1549   TROPI <0.015     Most Recent Cholesterol Panel:  Recent Labs   Lab Test 10/08/19  1200   CHOL 174   LDL 90   HDL 45*   TRIG 197*     Most Recent 6 Bacteria Isolates From Any Culture (See EPIC Reports for Culture Details):No lab results found.  Most Recent TSH, T4 and A1c Labs:  Recent Labs   Lab Test 12/10/22  1743   TSH 1.39     Results for orders placed or performed during the hospital encounter of 12/10/22   XR Chest 2 Views    Narrative    EXAM: XR CHEST 2 VIEWS  LOCATION: [facility]  DATE/TIME: 12/10/2022 7:02 PM    INDICATION: hypoxia, diminished throughout L side, recent dry cough  COMPARISON: None.      Impression    IMPRESSION: There is a large left-sided pleural effusion, with opacification of the majority of the left hemithorax, the left lung apex is still aerated. Atherosclerotic calcifications of the aortic arch and descending thoracic aorta are noted. Bibasilar   subsegmental atelectasis is present, the right lung is otherwise clear.   US Thoracentesis    Narrative    THORACENTESIS 12/11/2022 10:51 AM    HISTORY: Patient with history of left pleural effusion.    PROCEDURE/FINDINGS:  After obtaining informed consent, the patient was  positioned appropriately. The back was prepped and draped in the usual  sterile manner. Limited ultrasound was performed demonstrating a  simple appearing pleural effusion.    Under ultrasound guidance, a 5 Urdu Yueh needle was used to access  the left pleural space. A permanent ultrasound image was saved to  document needle position. Thoracentesis was performed. Approximately  1200 mL lanny fluid was aspirated out. Sample was set aside for  submission to the lab.    The patient tolerated the procedure well. There were no immediate  postprocedure  complications. The patient's vital signs were monitored  by radiology nursing staff under my supervision and remained stable  throughout the study.      Impression    IMPRESSION:  Successful left thoracentesis performed.  Sample of fluid  was sent for diagnostic testing. A total of 1200 mL fluid was removed.    DAMIAN ARELLANO MD         SYSTEM ID:  [id number]   CT Chest/Abdomen/Pelvis w Contrast    Narrative    EXAM: CT CHEST/ABDOMEN/PELVIS W CONTRAST  LOCATION: [facility]  DATE/TIME: 12/11/2022 5:07 PM    INDICATION: likely malignancy, recent left effusion appears consistent with cancer  COMPARISON: None.  TECHNIQUE: CT scan of the chest, abdomen, and pelvis was performed following injection of IV contrast. Multiplanar reformats were obtained. Dose reduction techniques were used.   CONTRAST: 71 mL Isovue 370    FINDINGS:   LUNGS AND PLEURA: Moderate left pleural effusion. Groundglass opacity identified in the right upper lobe measuring 14 x 11 mm in size. This is best identified on image #41, series 9. Atelectasis is identified involving the tendon aspect of the left upper   lobe. Pulmonary nodule identified in the left upper lobe measuring 6 mm in size. This is best identified on image #102, series 9. Trace right pleural effusion.    MEDIASTINUM/AXILLAE: 8 mm pretracheal lymph node, best identified on image #46, series 8. 17 x 15 mm precarinal lymph node, best identified on image #62, series 8. Focal subcarinal lymph node measuring 15 x 12 mm. And atelectasis identified in the left   lower lobe. No axillary lymphadenopathy.    CORONARY ARTERY CALCIFICATION: Moderate to severe    HEPATOBILIARY: The gallbladder is distended. The gallbladder has a unusual configuration. The gallbladder actually appears to extend anteriorly the liver extending through a diaphragmatic hernia to the right of midline in the seated within the right   chest. This is best demonstrated on image #18, series 4.  Small amount of free fluid is identified adjacent to the gallbladder. The common bile duct is dilated measuring up to 8 mm in size.    PANCREAS: A low-density lesion I believe is present within the head of the pancreas. This measures 2.4 x 1.9 cm in size. This is best seen on image #94, series 3.    SPLEEN: Low-density lesion identified in the inferior aspect of the spleen measuring 10 mm in size. This is best seen on image #76, series 3.    ADRENAL GLANDS: Nodularity of the left adrenal gland.    KIDNEYS/BLADDER: Multiple renal cysts. No hydronephrosis.    BOWEL: Small and large bowel are normal in caliber. Colonic diverticulosis without evidence of diverticulitis. No free fluid or free air.    LYMPH NODES: Normal.    VASCULATURE: Mild vascular calcifications.    PELVIC ORGANS: Uterus is grossly normal.    MUSCULOSKELETAL: Scoliosis. Degenerative change.      Impression    IMPRESSION:  1.  Moderate left pleural effusion. Trace right pleural effusion.  2.  Ground glass opacity identified in the right upper lobe, follow-up should be performed according to the Fleischner Society criteria.  3.  Pulmonary nodule identified in the left upper lobe. Again, follow-up should be performed according to the Fleischner Society criteria.  4.  Mediastinal lymphadenopathy as described above.  5.  The gallbladder extends into the right chest. Diaphragmatic hernia. Small amount of fluid is identified adjacent to the gallbladder.  6.  Low-density lesion within the head of the pancreas. MRCP recommended for further evaluation.  7.  Nodularity of the left adrenal gland, uncertain significance.   XR Chest Port 1 View    Narrative    CHEST ONE VIEW  12/13/2022 11:03 AM     HISTORY: Evaluate left pleural fluid accumulation.    COMPARISON: December 10, 2022      Impression    IMPRESSION: Decreased left effusion and associated compressive  atelectasis and/or infiltrate, although this remains at least  moderate. Right lung clear.    JOSSIE  MD SAUL         SYSTEM ID:  [id number]   MR Brain w/o & w Contrast    Narrative    EXAM: MR BRAIN W/O and W CONTRAST  LOCATION: [facility]  DATE/TIME: 12/13/2022 9:39 PM    INDICATION: Metastatic cancer  COMPARISON: None.  CONTRAST: 6mL Gadavist  TECHNIQUE: Routine multiplanar multisequence head MRI without and with intravenous contrast.    FINDINGS:  INTRACRANIAL CONTENTS: No acute or subacute infarct. No mass. No acute hemorrhage. There is retrocerebellar CSF intensity fluid collection consistent with arachnoid cyst measuring 2.6 cm AP by 6.7 cm transverse. There is associated mass effect upon the   cerebellar vermis and left cerebellar hemisphere. Negative for midline shift. Patchy and confluent nonspecific T2/FLAIR hyperintensities within the cerebral white matter most consistent with moderate chronic microvascular ischemic change. Moderate   generalized cerebral atrophy. No hydrocephalus. Normal position of the cerebellar tonsils. No pathologic contrast enhancement.    SELLA: No abnormality accounting for technique.    OSSEOUS STRUCTURES/SOFT TISSUES: Normal marrow signal. The major intracranial vascular flow voids are maintained.     ORBITS: Prior bilateral cataract surgery. Visualized portions of the orbits are otherwise unremarkable.     SINUSES/MASTOIDS: No paranasal sinus mucosal disease. No middle ear or mastoid effusion.       Impression    IMPRESSION:  1.  No intracranial metastatic disease.  2.  Generalized brain atrophy and presumed microvascular ischemic changes as detailed above.  3.  Retrocerebellar arachnoid cyst as described above.

## 2022-12-16 NOTE — PROGRESS NOTES
Care Management Follow Up    Length of Stay (days): 5    Expected Discharge Date: 12/16/2022     Concerns to be Addressed:       Patient plan of care discussed at interdisciplinary rounds: Yes    Anticipated Discharge Disposition: Home, Home Care     Anticipated Discharge Services:    Anticipated Discharge DME:      Patient/family educated on Medicare website which has current facility and service quality ratings:    Education Provided on the Discharge Plan:    Patient/Family in Agreement with the Plan: yes    Referrals Placed by CM/SW: Homecare  Private pay costs discussed: Not applicable    Additional Information:  SW went over options for private duty nursing with pt. SW did inform pt that [facility] does not have private pay service options. SW gave information on [employer] for private pay A services.       JULIO Reyes

## 2022-12-16 NOTE — PROGRESS NOTES
"Hematology-Oncology Follow-up Note  [facility]     Today's Date: 12/16/22  Date of Admission:  12/10/2022  Reason for Consult: Pleural effusion  -Lung nodules mediastinal lymphadenopathy-clinically consistent with lung cancer    ASSESSMENT/ PLAN :  Chiquita Berry is a 82 year old female with         Pleural effusion  -Lung nodules mediastinal lymphadenopathy-clinically consistent with lung cancer  -Cytology is pending  -Please schedule with Dr. Pantoja after discharge-I will message our schedulers  Patient will need PET scan which will then be done as outpatient      Okay to discharge from oncology standpoint  Oncology will sign off      INTERIM HISTORY:  Patient resting in bed comfortably .      MEDICATIONS:  Reviewed         PHYSICAL EXAM:  Vital signs:  Temp: 98.4  F (36.9  C) Temp src: Oral BP: (!) 155/75 Pulse: 83   Resp: 16 SpO2: 91 % O2 Device: None (Room air) Oxygen Delivery: 1 LPM Height: 157.5 cm (5' 2\") Weight: 62.8 kg (138 lb 7.2 oz)  Estimated body mass index is 25.32 kg/m  as calculated from the following:    Height as of this encounter: 1.575 m (5' 2\").    Weight as of this encounter: 62.8 kg (138 lb 7.2 oz).      GENERAL/CONSTITUTIONAL: No acute distress.        LABS:  Recent Labs   Lab Test 12/15/22  0803 12/13/22  0659   NA  --  139   POTASSIUM 3.9 4.1   CHLORIDE  --  107   CO2  --  28   ANIONGAP  --  4   BUN  --  13   CR  --  0.52   GLC  --  107*   SAGE  --  8.6     Recent Labs   Lab Test 12/13/22  0659 12/12/22  0750 12/11/22  0518 12/10/22  1743   WBC 10.2 9.1 10.7 12.1*   HGB 12.5 11.9 12.3 13.5    360 384 404   * 100 98 101*   NEUTROPHIL  --   --  71 87     Recent Labs   Lab Test 12/11/22  0518 12/10/22  2258 12/10/22  1743 12/21/20  0939   BILITOTAL  --   --  0.4 0.5   ALKPHOS  --   --  64 85   ALT  --   --  31 24   AST  --   --  27 23   ALBUMIN 3.1*  --  3.5 3.6   LDH  --  230  --   --              Ayden Bob, Nurse Practitioner   [facility]"   [facility]   [phone]    Chart documentation with Dragon Voice recognition Software. Although reviewed after completion, some words and grammatical errors may remain.

## 2022-12-16 NOTE — PLAN OF CARE
Physical Therapy Discharge Summary    Reason for therapy discharge:    Discharged to home with home therapy.    Progress towards therapy goal(s). See goals on Care Plan in [facility] electronic health record for goal details.  Goals met.    Therapy recommendation(s):    Continued therapy is recommended.  Rationale/Recommendations:  Pt is moving very well, has met inpatient PT goals and is walking with a walker well. Agree with plan for home with assist from friends and home PT to address generalized deconditioning and strength with mobility.

## 2022-12-16 NOTE — PROGRESS NOTES
Discharge Note    Patient discharged to home via private vehicle  accompanied by friend.  IV: Discontinued  Prescriptions filled and given to patient/family.   Belongings reviewed and sent with patient.   Home medications returned to patient: NA  Equipment sent with: patient, N/A.   patient verbalizes understanding of discharge instructions. AVS given to patient.

## 2022-12-17 NOTE — TELEPHONE ENCOUNTER
Lindsey calling from [facility] requesting orders for Start of care, PT/OT eval and treat, nursing visits 1x week for 4 weeks, and every other week visit for 4 weeks, bath aide 1x week.     Preferred Call Back Number: [phone]    Pt is discharge from hospital with Pleural effusion with new lung cancer diagnosis    Verified Requestor will send fax to have orders signed.    Prosper Ely RN, BSN  12/17/2022 at 3:20 PM  [facility]

## 2022-12-18 NOTE — TELEPHONE ENCOUNTER
Received a call from [facility] lab regarding positive culture result on pleural fluid that is growing gram-positive bacilli resembling diphtheroids on day 7 of incubation.  Final result pending.    I discussed this case with Dr. Greenfield from infectious disease .  Felt that this was contaminant and cannot be ignored.

## 2022-12-19 NOTE — TELEPHONE ENCOUNTER
What type of discharge? Inpatient  Risk of Hospital admission or ED visit: 56%  Is a TCM episode required? Yes  When should the patient follow up with PCP? 7 days of discharge.    Robb Haro RN  [facility]

## 2022-12-19 NOTE — TELEPHONE ENCOUNTER
"Hospital/TCU/ED for chronic condition Discharge Protocol    \"Hi, my name is Robb Haro RN, a registered nurse, and I am calling from [facility].  I am calling to follow up and see how things are going for you after your recent emergency visit/hospital/TCU stay.\"    Tell me how you are doing now that you are home?\" Checking oximetry with reading 92% or so. Hard time sleeping, thinks Trazodone is helping. Needs to update her will, does not need help with this. Home care aid coming in to be with her. She is feeling weak. Plans on home care RN and PT.           Discharge Instructions    \"Let's review your discharge instructions.  What is/are the follow-up recommendations?  Pt. Response: Follow up with oncologist on Thursday. Plans to have a PET scan.     \"Has an appointment with your primary care provider been scheduled?\"   No (schedule appointment)     Appointments in Next Year    Dec 22, 2022 11:20 AM  Return Visit with Fer Pantoja MD  [facility] [place] ([facility] ) [phone]   Jan 06, 2023 10:30 AM  (Arrive by 10:10 AM)  Provider Visit with Vesna Olivera MD  [facility] ([facility] ) [phone]        \"When you see the provider, I would recommend that you bring your medications with you.\"    Medications    \"Tell me what changed about your medicines when you discharged?\"    Changes to chronic meds?    0-1    \"What questions do you have about your medications?\"    None     New diagnoses of heart failure, COPD, diabetes, or MI?    No              Post Discharge Medication Reconciliation Status: discharge medications reconciled, continue medications without change.    Was MTM referral placed (*Make sure to put transitions as reason for referral)?   No    Call Summary    \"What questions or concerns do you have about your recent visit and your follow-up care?\"  " "   none    \"If you have questions or things don't continue to improve, we encourage you contact us through the main clinic number (give number).  Even if the clinic is not open, triage nurses are available 24/7 to help you.     We would like you to know that our clinic has extended hours (provide information).  We also have urgent care (provide details on closest location and hours/contact info)\"      \"Thank you for your time and take care!\"             "

## 2022-12-22 NOTE — Clinical Note
12/22/2022         RE: Chiquita Berry  [address]  [facility] [address]        Dear Colleague,    Thank you for referring your patient, Chiquita Berry, to the [facility] [place]. Please see a copy of my visit note below.    Chiquita is a 82 year old who is being evaluated via a billable telephone visit.  Currently in [place].    What phone number would you like to be contacted at? [phone].  How would you like to obtain your AVS? MyChart  {PROVIDER LOCATION On-site should be selected for visits conducted from your clinic location or adjoining [facility] hospital, academic office, or other nearby [facility] building. Off-site should be selected for all other provider locations, including home:[id number]}  Distant Location (provider location):  On-site  Phone call duration: 25 minutes  URVASHI Albrecht    ONCOLOGY HISTORY:  Ms. Berry is a female with metastatic lung adenocarcinoma.  1.  Chest x-ray on 12/10/2022 revealed a large left pleural effusion.  -CT chest, abdomen, and pelvis on 12/11/2022 revealed a left pleural effusion.  There is a left upper lobe lung nodule and also ground glass opacity in right upper lobe.  There is mediastinal lymphadenopathy.  There is low density lesion in head of the pancreas and some nodularity of left adrenal gland.  2.  Left thoracocentesis was done on 12/11/2022.  Pathology reveals adenocarcinoma.  Immunostains compatible with metastasis from lung primary.  -TPS of 20-30%.  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  3.  Brain MRI on 12/13/2022 does not reveal any brain metastasis.     SUBJECTIVE: Ms. Berry is an 82-year-old female who was recently seen in the hospital.  She has newly diagnosed lung adenocarcinoma.  NGS panel was not back when I saw her in the hospital.    The patient's overall condition is stable.  She has fatigue.  No headache.  Occasional dizziness.  No chest pain.  No abdominal pain.  No nausea or vomiting.   Appetite IS fairly good.  She has pleural effusion.  She had thoracocentesis done. She does not feel that her shortness of breath is getting worse.     PHYSICAL EXAMINATION:    GENERAL:  She is alert and oriented x 3.  Rest of systems not examined as this is a telephone visit.     ASSESSMENT:    1.  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  2.  Malignant pleural effusion.     PLAN:     1.  I had a long discussion with the patient. She had a couple of friends with her during this telephone visit.  Imaging studies and cytology was reviewed.  I explained to her she has stage IV lung cancer as she has malignant pleural effusion.   TPS score is between 20-30%.  NGS panel is positive for ALK gene rearrangement.    2.  Discussed regarding treatment of lung cancer.  I explained to her this is an incurable situation.  Treatment will help to control disease, prolong life and palliate symptoms.    I discussed regarding treatment.  NGS panel is positive for ALK gene rearrangement.  Discussed regarding different option.  I would recommend alectinib.  Side effects of alectinib were discussed.  She is agreeable for it.  It will be started once approved by insurance company.      I explained to the patient that we will continue to monitor her with scans every 2-3 months.  When there is progression, we will switch her to chemotherapy along with immunotherapy.    3.  The patient has malignant pleural effusion.  It can reaccumulate and cause problems.  I advised her to let us know if she starts having shortness of breath.    4.  The patient is going to get a PET scan.  I will see her after the PET scan.    5.  She had multiple questions, which were all answered.     TOTAL TELEPHONE VISIT TIME:  25 minutes.       Visit Date: 12/22/2022    ONCOLOGY HISTORY:  Ms. Berry is a female with metastatic lung adenocarcinoma.  1.  Chest x-ray on 12/10/2022 revealed a large left pleural effusion.  --  CT chest, abdomen, and pelvis on 12/11/2022 revealed a left pleural effusion.  There is a left upper lobe lung nodule and also ground glass opacity in right upper lobe.  There is mediastinal lymphadenopathy.  There is low density lesion in head of the pancreas and some nodularity of left adrenal gland.  2.  Left thoracocentesis was done on 12/11/2022.  Pathology reveals adenocarcinoma.  Immunostains compatible with metastasis from lung primary.  -- DPS of 20-30%.  -- NGS panel is positive for ALK gene rearrangement.  3.  Brain MRI on 12/13/2022 does not reveal any brain metastasis.    SUBJECTIVE: Ms. Berry is an 82-year-old female who was recently seen in the hospital.  She has newly diagnosed lung adenocarcinoma.  NGS panel was not back when I saw her in the hospital.  The patient's overall condition is stable.  She has fatigue.  No headache.  Occasional dizziness.  No chest pain.  No abdominal pain.  No nausea or vomiting.  Appetite fairly good.  She has pleural effusion.  She had thoracocentesis done. She does not feel that her shortness of breath is getting worse.    PHYSICAL EXAMINATION:    GENERAL:  She is alert and oriented x 3.  Rest of systems not examined as this is a telephone visit.    ASSESSMENT:    1.  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  2.  Malignant pleural effusion.    PLAN:     1.  I had a long discussion with the patient. She had a couple of friends with her during this telephone visit.  Imaging studies and cytology was reviewed.  I explained to her she has stage IV lung cancer as she has malignant pleural effusion.   TPS score is between 20-30%.  NGS panel is positive for ALK gene rearrangement.  2.  Discussed regarding treatment of lung cancer.  I explained to her this is an incurable situation.  Treatment will help to control disease, prolong life and palliate symptoms.   I discussed regarding treatment.  *** *** is positive.  Discussed  regarding different option.  I would recommend alectinib.  Side effects of alectinib were discussed.  She is agreeable for it.  It will be started once approved by insurance company.    I explained to the patient that we will continue to monitor her with scans every 2-3 months.  When there is progression, we will switch her to chemotherapy along with immunotherapy.  3.  The patient has malignant pleural effusion.  It can reaccumulate and cause problems.  I advised her to let us know if she starts having shortness of breath.  4.  The patient is going to get a PET scan.  I will see her after the PET scan.  5.  She had multiple questions, which were all answered.    TOTAL TELEPHONE VISIT TIME:  25 minutes.    Fer Pantoja MD        D: 2022   T: 2022   MT: DIXIE    Name:     REFUGIO YADAV  MRN:      [id number]        Account:    [id number]   :      1940           Visit Date: 2022     Document: [id number]      Again, thank you for allowing me to participate in the care of your patient.        Sincerely,        Fer Pantoja MD

## 2022-12-22 NOTE — PROGRESS NOTES
Oral Chemotherapy Monitoring Program    Lab Monitoring Plan  CBC w/diff + CMP q2wk x 3 months then monthly.  Labs drawn outside of [place]: Patient will have labs drawn through [place] home care services. Following-up with RNCC to ensure these are set up.  Subjective/Objective:  Chiquita Berry is a 82 year old female contacted by phone for an initial visit for oral chemotherapy education. Main counseling points associated with alectinib were reviewed with the patient and education was provided for supportive anti-nausea medication (prochlorperazine). The patient is currently mostly home-bound due spinal stenosis. The patient will be followed by primary care provider for ongoing pain management associated with this condition. She requested labs be drawn through  home care services. She will also share educational materials with her home care nurse, who will follow-up with pharmacy if she has any questions or concerns. Baseline labs from earlier this month reviewed.    ORAL CHEMOTHERAPY 12/22/2022   Assessment Type New Teach;Chart Review   Diagnosis Code Non-Small Cell Lung Cancer   Providers Kit   Clinic Name/Location [facility]   Drug Name Alecensa (alectinib)   Dose 600 mg   Current Schedule BID   Cycle Details Continuous   Planned next cycle start date 12/28/2022   Any new drug interactions? No   Is the dose as ordered appropriate for the patient? Yes       Last PHQ-2 Score on record:   PHQ-2 ( 1999 [employer]) 8/13/2019 4/16/2019   Q1: Little interest or pleasure in doing things 0 0   Q2: Feeling down, depressed or hopeless 0 0   PHQ-2 Score 0 0   PHQ-2 Total Score (12-17 Years)- Positive if 3 or more points; Administer PHQ-A if positive 0 0   Q1: Little interest or pleasure in doing things - Not at all   Q2: Feeling down, depressed or hopeless - Not at all   PHQ-2 Score - 0       Vitals:  BP:   BP Readings from Last 1 Encounters:   12/16/22 (!) 155/75     Wt Readings from Last 1 Encounters:   12/16/22 62.8  "kg (138 lb 7.2 oz)     Estimated body surface area is 1.66 meters squared as calculated from the following:    Height as of 12/10/22: 1.575 m (5' 2\").    Weight as of 12/16/22: 62.8 kg (138 lb 7.2 oz).    Labs:  _  Result Component Current Result Ref Range   Sodium 139 (12/13/2022) 133 - 144 mmol/L     _  Result Component Current Result Ref Range   Potassium 3.9 (12/15/2022) 3.4 - 5.3 mmol/L     _  Result Component Current Result Ref Range   Calcium 8.6 (12/13/2022) 8.5 - 10.1 mg/dL     _  Result Component Current Result Ref Range   Magnesium 2.3 (12/15/2022) 1.6 - 2.3 mg/dL     _  Result Component Current Result Ref Range   Phosphorus 2.9 (12/13/2022) 2.5 - 4.5 mg/dL     _  Result Component Current Result Ref Range   Albumin 3.1 (L) (12/11/2022) 3.4 - 5.0 g/dL     _  Result Component Current Result Ref Range   Urea Nitrogen 13 (12/13/2022) 7 - 30 mg/dL     _  Result Component Current Result Ref Range   Creatinine 0.52 (12/13/2022) 0.52 - 1.04 mg/dL     _  Result Component Current Result Ref Range   AST 27 (12/10/2022) 0 - 45 U/L     _  Result Component Current Result Ref Range   ALT 31 (12/10/2022) 0 - 50 U/L     _  Result Component Current Result Ref Range   Bilirubin Total 0.4 (12/10/2022) 0.2 - 1.3 mg/dL     _  Result Component Current Result Ref Range   WBC Count 10.2 (12/13/2022) 4.0 - 11.0 10e3/uL     _  Result Component Current Result Ref Range   Hemoglobin 12.5 (12/13/2022) 11.7 - 15.7 g/dL     _  Result Component Current Result Ref Range   Platelet Count 376 (12/13/2022) 150 - 450 10e3/uL     No results found for ANC within last 30 days.     _  Result Component Current Result Ref Range   Absolute Neutrophils 7.8 (12/11/2022) 1.6 - 8.3 10e3/uL        Assessment:  Patient is appropriate to start therapy.    Plan:  Basic chemotherapy teaching was reviewed with the patient including indication, start date of therapy, dose, administration, adverse effects, missed doses, food and drug interactions, monitoring, " side effect management, office contact information, and safe handling. Written materials were provided and all questions answered. Will be following up with RNCC regarding getting labs drawn through home care services.    Follow-Up:  Phone call 1 week after initiating therapy to assess for adherence and tolerability.     Rachel Moniz, Pharmacist Intern  December 22, 2022

## 2022-12-22 NOTE — PROGRESS NOTES
Chiquita is a 82 year old who is being evaluated via a billable telephone visit.  Currently in [place].    What phone number would you like to be contacted at? [phone].  How would you like to obtain your AVS? [place]    Distant Location (provider location):  On-site  Phone call duration: 25 minutes  URVASHI Albrecht

## 2022-12-22 NOTE — Clinical Note
12/22/2022         RE: Chiquita Berry  [address]  [facility] [address]        Dear Colleague,    Thank you for referring your patient, Chiquita Berry, to the [facility] [place]. Please see a copy of my visit note below.    Chiquita is a 82 year old who is being evaluated via a billable telephone visit.  Currently in [place].    What phone number would you like to be contacted at? [phone].  How would you like to obtain your AVS? MyChart  {PROVIDER LOCATION On-site should be selected for visits conducted from your clinic location or adjoining [facility] hospital, academic office, or other nearby [facility] building. Off-site should be selected for all other provider locations, including home:[id number]}  Distant Location (provider location):  On-site  Phone call duration: 25 minutes  URVASHI Albrecht    ONCOLOGY HISTORY:  Ms. Berry is a female with metastatic lung adenocarcinoma.  1.  Chest x-ray on 12/10/2022 revealed a large left pleural effusion.  -CT chest, abdomen, and pelvis on 12/11/2022 revealed a left pleural effusion.  There is a left upper lobe lung nodule and also ground glass opacity in right upper lobe.  There is mediastinal lymphadenopathy.  There is low density lesion in head of the pancreas and some nodularity of left adrenal gland.  2.  Left thoracocentesis was done on 12/11/2022.  Pathology reveals adenocarcinoma.  Immunostains compatible with metastasis from lung primary.  -TPS of 20-30%.  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  3.  Brain MRI on 12/13/2022 does not reveal any brain metastasis.     SUBJECTIVE: Ms. Berry is an 82-year-old female who was recently seen in the hospital.  She has newly diagnosed lung adenocarcinoma.  NGS panel was not back when I saw her in the hospital.    The patient's overall condition is stable.  She has fatigue.  No headache.  Occasional dizziness.  No chest pain.  No abdominal pain.  No nausea or vomiting.   Appetite IS fairly good.  She has pleural effusion.  She had thoracocentesis done. She does not feel that her shortness of breath is getting worse.     PHYSICAL EXAMINATION:    GENERAL:  She is alert and oriented x 3.  Rest of systems not examined as this is a telephone visit.     ASSESSMENT:    1.  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  2.  Malignant pleural effusion.     PLAN:     1.  I had a long discussion with the patient. She had a couple of friends with her during this telephone visit.  Imaging studies and cytology was reviewed.  I explained to her she has stage IV lung cancer as she has malignant pleural effusion.   TPS score is between 20-30%.  NGS panel is positive for ALK gene rearrangement.    2.  Discussed regarding treatment of lung cancer.  I explained to her this is an incurable situation.  Treatment will help to control disease, prolong life and palliate symptoms.    I discussed regarding treatment.  NGS panel is positive for ALK gene rearrangement.  Discussed regarding different option.  I would recommend alectinib.  Side effects of alectinib were discussed.  She is agreeable for it.  It will be started once approved by insurance company.      I explained to the patient that we will continue to monitor her with scans every 2-3 months.  When there is progression, we will switch her to chemotherapy along with immunotherapy.    3.  The patient has malignant pleural effusion.  It can reaccumulate and cause problems.  I advised her to let us know if she starts having shortness of breath.    4.  The patient is going to get a PET scan.  I will see her after the PET scan.    5.  She had multiple questions, which were all answered.     TOTAL TELEPHONE VISIT TIME:  25 minutes.       Visit Date: 12/22/2022    ONCOLOGY HISTORY:  Ms. Berry is a female with metastatic lung adenocarcinoma.  1.  Chest x-ray on 12/10/2022 revealed a large left pleural effusion.  --  CT chest, abdomen, and pelvis on 12/11/2022 revealed a left pleural effusion.  There is a left upper lobe lung nodule and also ground glass opacity in right upper lobe.  There is mediastinal lymphadenopathy.  There is low density lesion in head of the pancreas and some nodularity of left adrenal gland.  2.  Left thoracocentesis was done on 12/11/2022.  Pathology reveals adenocarcinoma.  Immunostains compatible with metastasis from lung primary.  -- DPS of 20-30%.  -- NGS panel is positive for ALK gene rearrangement.  3.  Brain MRI on 12/13/2022 does not reveal any brain metastasis.    SUBJECTIVE: Ms. Berry is an 82-year-old female who was recently seen in the hospital.  She has newly diagnosed lung adenocarcinoma.  NGS panel was not back when I saw her in the hospital.  The patient's overall condition is stable.  She has fatigue.  No headache.  Occasional dizziness.  No chest pain.  No abdominal pain.  No nausea or vomiting.  Appetite fairly good.  She has pleural effusion.  She had thoracocentesis done. She does not feel that her shortness of breath is getting worse.    PHYSICAL EXAMINATION:    GENERAL:  She is alert and oriented x 3.  Rest of systems not examined as this is a telephone visit.    ASSESSMENT:    1.  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  2.  Malignant pleural effusion.    PLAN:     1.  I had a long discussion with the patient. She had a couple of friends with her during this telephone visit.  Imaging studies and cytology was reviewed.  I explained to her she has stage IV lung cancer as she has malignant pleural effusion.   TPS score is between 20-30%.  NGS panel is positive for ALK gene rearrangement.  2.  Discussed regarding treatment of lung cancer.  I explained to her this is an incurable situation.  Treatment will help to control disease, prolong life and palliate symptoms.   I discussed regarding treatment.  *** *** is positive.  Discussed  regarding different option.  I would recommend alectinib.  Side effects of alectinib were discussed.  She is agreeable for it.  It will be started once approved by insurance company.    I explained to the patient that we will continue to monitor her with scans every 2-3 months.  When there is progression, we will switch her to chemotherapy along with immunotherapy.  3.  The patient has malignant pleural effusion.  It can reaccumulate and cause problems.  I advised her to let us know if she starts having shortness of breath.  4.  The patient is going to get a PET scan.  I will see her after the PET scan.  5.  She had multiple questions, which were all answered.    TOTAL TELEPHONE VISIT TIME:  25 minutes.    Fer Pantoja MD        D: 2022   T: 2022   MT: DIXIE    Name:     REFUGIO YADAV  MRN:      [id number]        Account:    [id number]   :      1940           Visit Date: 2022     Document: [id number]      Again, thank you for allowing me to participate in the care of your patient.        Sincerely,        Fer Pantoja MD

## 2022-12-22 NOTE — TELEPHONE ENCOUNTER
Prior auth is needed, waiting for  to complete his dictation so I can send that in with the PA request.

## 2022-12-22 NOTE — NURSING NOTE
Patient declined individual allergy and medication review by support staff because pt states that medications and allergies are all up to date since e check in.    Oralia James, VF

## 2022-12-23 NOTE — TELEPHONE ENCOUNTER
PA Initiation    Medication: Alecensa 150mg capsules  Insurance Company: [employer] Clinical Review - Phone [phone] Fax [phone]  Pharmacy Filling the Rx:    Filling Pharmacy Phone:    Filling Pharmacy Fax:    Start Date: 12/22/2022

## 2022-12-23 NOTE — TELEPHONE ENCOUNTER
Patient was given the medication in the hospital.      She is taking 1/2 tablet now at night, she is asking for additional refill.     She has enough until Monday.     Asia Mckeon RN  [facility]

## 2022-12-26 NOTE — TELEPHONE ENCOUNTER
Fer Pantoja MD  You; Isabela Schwartz RN Just now (3:14 PM)     BK  Patient should go to ER when these episodes happen.     Fer Pantoja MD      Called Chiquita and gave her Dr. Pantoja's recommendation and she verbalized understanding.

## 2022-12-26 NOTE — TELEPHONE ENCOUNTER
Prior Authorization Approval    Authorization Effective Date: 9/24/2022  Authorization Expiration Date: 12/23/2023  Medication: Alecensa 150mg capsules- Approved  Approved Dose/Quantity: 240/30  Reference #: [id number]/A     Insurance Company: [employer] Clinical Review - Phone [phone] Fax [phone]  Expected CoPay: $854.49      CoPay Card Available: Not eligible      Foundation Assistance Needed: Yes    Which Pharmacy is filling the prescription (Not needed for infusion/clinic administered):    Pharmacy Notified: No   Patient Notified:  Yes

## 2022-12-26 NOTE — TELEPHONE ENCOUNTER
Free Drug Application Initiated  Medication: Alecensa  Sponsor: [employer]  Phone #: [phone]  Fax #: [phone]  Additional Information: Forms faxed to [employer] 12/28/22

## 2022-12-26 NOTE — TELEPHONE ENCOUNTER
Chiquita called in to report episodes of tachycardia. She reports that it happens every few days. The highest her heart rate has been recently was 110 bpm. She can feel her heart racing when it happens and feels anxious. It stays elevated for about 15 minutes. She uses techniques like deep breathing, prayer, distraction, and relaxation to help bring her heart rate back down. Denies dizziness, nausea, chest pain, difficulty breathing, hot flashes, sweating, or other associated symptoms. She reports that she has had a workup for similar symptoms in the past, and was told that she did not need any further cardiac workup. Per chart review, she had occasional sinus tachycardia and left BBB.     She is feeling good today and her pulse is 87. Her oxygen saturations have remained above 90%, today her oxygen level is 94%.    She is wondering if Dr. Pantoja would prescribe her an anti-anxiety medication that she can use as needed.    Will route to Dr. Pnatoja for recommendations.  Paged Dr. Pantoja at 11:51 AM.    Sheridan Woody RN  Triage Nurse Advisor  [facility]

## 2022-12-26 NOTE — PROGRESS NOTES
ONCOLOGY HISTORY:  Ms. Berry is a female with metastatic lung adenocarcinoma.  1.  Chest x-ray on 12/10/2022 revealed a large left pleural effusion.  -CT chest, abdomen, and pelvis on 12/11/2022 revealed a left pleural effusion.  There is a left upper lobe lung nodule and also ground glass opacity in right upper lobe.  There is mediastinal lymphadenopathy.  There is low density lesion in head of the pancreas and some nodularity of left adrenal gland.  2.  Left thoracocentesis was done on 12/11/2022.  Pathology reveals adenocarcinoma.  Immunostains compatible with metastasis from lung primary.  -TPS of 20-30%.  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  3.  Brain MRI on 12/13/2022 does not reveal any brain metastasis.     SUBJECTIVE: Ms. Berry is an 82-year-old female who was recently seen in the hospital.  She has newly diagnosed lung adenocarcinoma.  NGS panel was not back when I saw her in the hospital.    The patient's overall condition is stable.  She has fatigue.  No headache.  Occasional dizziness.  No chest pain.  No abdominal pain.  No nausea or vomiting.  Appetite IS fairly good.  She has pleural effusion.  She had thoracocentesis done. She does not feel that her shortness of breath is getting worse.     PHYSICAL EXAMINATION:    GENERAL:  She is alert and oriented x 3.  Rest of systems not examined as this is a telephone visit.     ASSESSMENT:    1.  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  2.  Malignant pleural effusion.     PLAN:     1.  I had a long discussion with the patient. She had a couple of friends with her during this telephone visit.  Imaging studies and cytology was reviewed.  I explained to her she has stage IV lung cancer as she has malignant pleural effusion.   TPS score is between 20-30%.  NGS panel is positive for ALK gene rearrangement.    2.  Discussed regarding treatment of lung cancer.  I explained to her this is an incurable  situation.  Treatment will help to control disease, prolong life and palliate symptoms.    I discussed regarding treatment.  NGS panel is positive for ALK gene rearrangement.  Discussed regarding different option.  I would recommend alectinib.  Side effects of alectinib were discussed.  She is agreeable for it.  It will be started once approved by insurance company.      I explained to the patient that we will continue to monitor her with scans every 2-3 months.  When there is progression, we will switch her to chemotherapy along with immunotherapy.    3.  The patient has malignant pleural effusion.  It can reaccumulate and cause problems.  I advised her to let us know if she starts having shortness of breath.    4.  The patient is going to get a PET scan.  I will see her after the PET scan.    5.  She had multiple questions, which were all answered.     TOTAL TELEPHONE VISIT TIME:  25 minutes.

## 2022-12-27 NOTE — TELEPHONE ENCOUNTER
- Patient requesting a medication for anxiety or depression.     Patient recently received Dx of lung cancer during a hospital admission.    Patient reports she has revised her will, is dealing with financial difficulties and legal pieces currently along with the diagnosis of lung cancer.    Pt reports elevated HR and nausea when thinking of stressors.    Pt reports sleeping until 3am  last NOC and unable to go back to sleep, was unable to calm brain/thoughts.     Pt wishes she could feel more peaceful.       - Also requesting 1/6/23 visit to be completed via Telephone instead of OV due to the cold weather and feeling weak from cancer. Pt has home care nurses that can take vitals.               Can we leave a detailed message on this number? YES  Phone number patient can be reached at: Cell number on file:    Telephone Information:   Mobile [phone]       Joann Nelson RN  [facility] Triage

## 2022-12-27 NOTE — TELEPHONE ENCOUNTER
Patient was called with provider's message. She will submit an E-visit now. She really wants something for anxiety tonight.

## 2022-12-27 NOTE — TELEPHONE ENCOUNTER
Yes - we can change her 1/6/22 visit to a virtual visit. A video visit is preferred if possible - she does have MyChart.    We can discuss mood then or she can start an E-visit prior to that to discuss mood.

## 2022-12-27 NOTE — TELEPHONE ENCOUNTER
"Patient called back, she is unable too submit and E-visit and that is creating lots of anxiety for her.     1. CONCERN: \"Did anything happen that prompted you to call today?\"       Yes, new diagnosis of cancer.   2. ANXIETY SYMPTOMS: \"Can you describe how you (your loved one; patient) have been feeling?\" (e.g., tense, restless, panicky, anxious, keyed up, overwhelmed, sense of impending doom).       Feeling increased heart rate, woke up at 3 am and couldn't get back to sleep.   3. ONSET: \"How long have you been feeling this way?\" (e.g., hours, days, weeks)      4 days.   4. SEVERITY: \"How would you rate the level of anxiety?\" (e.g., 0 - 10; or mild, moderate, severe).      8/10   5. FUNCTIONAL IMPAIRMENT: \"How have these feelings affected your ability to do daily activities?\" \"Have you had more difficulty than usual doing your normal daily activities?\" (e.g., getting better, same, worse; self-care, school, work, interactions)      Able to do daily activities. Able to watch TV and talk to people.   6. HISTORY: \"Have you felt this way before?\" \"Have you ever been diagnosed with an anxiety problem in the past?\" (e.g., generalized anxiety disorder, panic attacks, PTSD). If Yes, ask: \"How was this problem treated?\" (e.g., medicines, counseling, etc.)      No to this degree of anxiety.   7. RISK OF HARM - SUICIDAL IDEATION: \"Do you ever have thoughts of hurting or killing yourself?\" If Yes, ask:  \"Do you have these feelings now?\" \"Do you have a plan on how you would do this?\"      No.   8. TREATMENT:  \"What has been done so far to treat this anxiety?\" (e.g., medicines, relaxation strategies). \"What has helped?\"      Deep breathing, music.   9. TREATMENT - THERAPIST: \"Do you have a counselor or therapist? Name?\"      No.   10. POTENTIAL TRIGGERS: \"Do you drink caffeinated beverages (e.g., coffee, madhavi, teas), and how much daily?\" \"Do you drink alcohol or use any drugs?\" \"Have you started any new medicines recently?\"      " "None.  10. PATIENT SUPPORT: \"Who is with you now?\" \"Who do you live with?\" \"Do you have family or friends who you can talk to?\"         Her friend is with her which is helping a lot.   11. OTHER SYMPTOMS: \"Do you have any other symptoms?\" (e.g., feeling depressed, trouble concentrating, trouble sleeping, trouble breathing, palpitations or fast heartbeat, chest pain, sweating, nausea, or diarrhea)        Trouble sleeping, fast heartbeat, no chest pain, no dizziness, no SOB.     Per protocol pt should be seen in 3 days, but she wants a medication today or tomorrow. Discussed to stop checking her HR every minute as that can increase anxiety - patient agreed this is the case. Discussed to focus on having a meal, watching a movie, listening to music, having a conversation about something else rather than her anxiety. Pt said the conversation with triage nurse was helpful, but she still wanted medication.     Pt was transferred to scheduling, scheduled for telephone visit with another clinic provider tomorrow. Pt refused urgent care today as she said she is homebound.     Appointments in Next Year    Dec 28, 2022  2:00 PM  (Arrive by 1:40 PM)  Provider Visit with NIMCO Dawn CNP  [facility] ([facility] ) [phone]   Jan 06, 2023 10:30 AM  (Arrive by 10:10 AM)  Provider Visit with Vesna Olivera MD  [facility] ([facility] ) [phone]        Reason for Disposition    Symptoms of anxiety or panic and has not been evaluated for this by physician    Additional Information    Negative: Difficulty breathing and persists > 10 minutes and not relieved by reassurance provided by triager    Negative: Lightheadedness or dizziness and persists > 10 minutes and not relieved by reassurance provided by triager    Negative: Substance use (drug use) or unhealthy alcohol use, known or suspected, and " feeling very shaky (i.e., visible tremors of hands)    Negative: Patient sounds very sick or weak to the triager    Negative: Patient sounds very upset or troubled to the triager    Negative: Symptoms interfere with work or school    Protocols used: ANXIETY AND PANIC ATTACK-A-OH

## 2022-12-28 NOTE — PROGRESS NOTES
"Chiquita is a 82 year old who is being evaluated via a billable telephone visit.      What phone number would you like to be contacted at? [phone]  How would you like to obtain your AVS? [place]  Distant Location (provider location):  On-site    Assessment & Plan     Anxiety  Significant anxiety reported with new diagnosis of cancer.  Interested in both a daily mediation as well as a PRN medication. Will start sertraline and hydroxyzine as needed for anxiety. Risks and benefits of medication discussed.  Plan to follow up with PCP for monitoring.   - sertraline (ZOLOFT) 50 MG tablet; Take 1/2 tab for 1-2 weeks then increase to 50 mg daily  - hydrOXYzine (ATARAX) 10 MG tablet; Take 1 tablet (10 mg) by mouth 3 times daily as needed for anxiety      Return in about 4 weeks (around 1/25/2023), or if symptoms worsen or fail to improve.    NIMCO Dawn [facility]    Subjective   Chiquita is a 82 year old, presenting for the following health issues:  Anxiety      HPI   See triage note from 12/27/2022    1. CONCERN: \"Did anything happen that prompted you to call today?\"       Yes, new diagnosis of cancer.   2. ANXIETY SYMPTOMS: \"Can you describe how you (your loved one; patient) have been feeling?\" (e.g., tense, restless, panicky, anxious, keyed up, overwhelmed, sense of impending doom).       Feeling increased heart rate, woke up at 3 am and couldn't get back to sleep.   3. ONSET: \"How long have you been feeling this way?\" (e.g., hours, days, weeks)      4 days.   4. SEVERITY: \"How would you rate the level of anxiety?\" (e.g., 0 - 10; or mild, moderate, severe).      8/10   5. FUNCTIONAL IMPAIRMENT: \"How have these feelings affected your ability to do daily activities?\" \"Have you had more difficulty than usual doing your normal daily activities?\" (e.g., getting better, same, worse; self-care, school, work, interactions)      Able to do daily activities. Able to watch TV and talk to " "people.   6. HISTORY: \"Have you felt this way before?\" \"Have you ever been diagnosed with an anxiety problem in the past?\" (e.g., generalized anxiety disorder, panic attacks, PTSD). If Yes, ask: \"How was this problem treated?\" (e.g., medicines, counseling, etc.)      No to this degree of anxiety.   7. RISK OF HARM - SUICIDAL IDEATION: \"Do you ever have thoughts of hurting or killing yourself?\" If Yes, ask:  \"Do you have these feelings now?\" \"Do you have a plan on how you would do this?\"      No.   8. TREATMENT:  \"What has been done so far to treat this anxiety?\" (e.g., medicines, relaxation strategies). \"What has helped?\"      Deep breathing, music.   9. TREATMENT - THERAPIST: \"Do you have a counselor or therapist? Name?\"      No.   10. POTENTIAL TRIGGERS: \"Do you drink caffeinated beverages (e.g., coffee, madhavi, teas), and how much daily?\" \"Do you drink alcohol or use any drugs?\" \"Have you started any new medicines recently?\"      None.  10. PATIENT SUPPORT: \"Who is with you now?\" \"Who do you live with?\" \"Do you have family or friends who you can talk to?\"         Her friend is with her which is helping a lot.   11. OTHER SYMPTOMS: \"Do you have any other symptoms?\" (e.g., feeling depressed, trouble concentrating, trouble sleeping, trouble breathing, palpitations or fast heartbeat, chest pain, sweating, nausea, or diarrhea)        Trouble sleeping, fast heartbeat, no chest pain, no dizziness, no SOB.      Per protocol pt should be seen in 3 days, but she wants a medication today or tomorrow. Discussed to stop checking her HR every minute as that can increase anxiety - patient agreed this is the case. Discussed to focus on having a meal, watching a movie, listening to music, having a conversation about something else rather than her anxiety. Pt said the conversation with triage nurse was helpful, but she still wanted medication.       Patient states she is feeling better today, but still has an overall looming " anxiety.  She is wondering if something can be prescribed to alleviate these symptoms.  Has been practicing breathing exercises at home to improve symptoms.     PATIENT HEALTH QUESTIONNAIRE-9 (PHQ - 9)    Over the last 2 weeks, how often have you been bothered by any of the following problems?    1. Little interest or pleasure in doing things -  Not at all   2. Feeling down, depressed, or hopeless -  Not at all   3. Trouble falling or staying asleep, or sleeping too much - Nearly every day   4. Feeling tired or having little energy -  Nearly every day   5. Poor appetite or overeating -  Not at all   6. Feeling bad about yourself - or that you are a failure or have let yourself or your family down -  Not at all   7. Trouble concentrating on things, such as reading the newspaper or watching television - Not at all   8. Moving or speaking so slowly that other people could have noticed? Or the opposite - being so fidgety or restless that you have been moving around a lot more than usual Not at all   9. Thoughts that you would be better off dead or of hurting  yourself in some way Not at all   Total Score: 6     If you checked off any problems, how difficult have these problems made it for you to do your work, take care of things at home, or get along with other people?      Developed by Tameka Rosales, Elza Mauricio, Osmar Talbert and colleagues, with an educational ruperto from [employer] No permission required to reproduce, translate, display or distribute. permission required to reproduce, translate, display or distribute.    LETIITA-7 SCORE 4/16/2019 8/13/2019 12/28/2022   Total Score 3 0 9     Anxiety regarding health and diagnosis -getting things in order  Not sleeping - Trazodone sent previously but has not     Review of Systems   Constitutional, HEENT, cardiovascular, pulmonary, gi and gu systems are negative, except as otherwise noted.      Objective           Vitals:  No vitals were obtained  today due to virtual visit.    Physical Exam   healthy, alert and no distress  PSYCH: Alert and oriented times 3; coherent speech, normal   rate and volume, able to articulate logical thoughts, able   to abstract reason, no tangential thoughts, no hallucinations   or delusions  Her affect is normal  RESP: No cough, no audible wheezing, able to talk in full sentences  Remainder of exam unable to be completed due to telephone visits          Phone call duration: 12 minutes

## 2023-01-01 ENCOUNTER — APPOINTMENT (OUTPATIENT)
Dept: OCCUPATIONAL THERAPY | Facility: CLINIC | Age: 83
DRG: 682 | End: 2023-01-01
Attending: INTERNAL MEDICINE
Payer: COMMERCIAL

## 2023-01-01 ENCOUNTER — APPOINTMENT (OUTPATIENT)
Dept: OCCUPATIONAL THERAPY | Facility: CLINIC | Age: 83
DRG: 480 | End: 2023-01-01
Attending: HOSPITALIST
Payer: COMMERCIAL

## 2023-01-01 ENCOUNTER — APPOINTMENT (OUTPATIENT)
Dept: MRI IMAGING | Facility: CLINIC | Age: 83
DRG: 480 | End: 2023-01-01
Attending: PHYSICIAN ASSISTANT
Payer: COMMERCIAL

## 2023-01-01 ENCOUNTER — TRANSITIONAL CARE UNIT VISIT (OUTPATIENT)
Dept: GERIATRICS | Facility: CLINIC | Age: 83
End: 2023-01-01
Payer: COMMERCIAL

## 2023-01-01 ENCOUNTER — TRANSFERRED RECORDS (OUTPATIENT)
Dept: HEALTH INFORMATION MANAGEMENT | Facility: CLINIC | Age: 83
End: 2023-01-01

## 2023-01-01 ENCOUNTER — DISCHARGE SUMMARY NURSING HOME (OUTPATIENT)
Dept: GERIATRICS | Facility: CLINIC | Age: 83
End: 2023-01-01
Payer: COMMERCIAL

## 2023-01-01 ENCOUNTER — NURSE TRIAGE (OUTPATIENT)
Dept: INTERNAL MEDICINE | Facility: CLINIC | Age: 83
End: 2023-01-01
Payer: COMMERCIAL

## 2023-01-01 ENCOUNTER — APPOINTMENT (OUTPATIENT)
Dept: PHYSICAL THERAPY | Facility: CLINIC | Age: 83
DRG: 480 | End: 2023-01-01
Attending: STUDENT IN AN ORGANIZED HEALTH CARE EDUCATION/TRAINING PROGRAM
Payer: COMMERCIAL

## 2023-01-01 ENCOUNTER — APPOINTMENT (OUTPATIENT)
Dept: SPEECH THERAPY | Facility: CLINIC | Age: 83
DRG: 480 | End: 2023-01-01
Attending: PSYCHIATRY & NEUROLOGY
Payer: COMMERCIAL

## 2023-01-01 ENCOUNTER — APPOINTMENT (OUTPATIENT)
Dept: PHYSICAL THERAPY | Facility: CLINIC | Age: 83
DRG: 480 | End: 2023-01-01
Attending: PSYCHIATRY & NEUROLOGY
Payer: COMMERCIAL

## 2023-01-01 ENCOUNTER — TELEPHONE (OUTPATIENT)
Dept: ONCOLOGY | Facility: CLINIC | Age: 83
End: 2023-01-01

## 2023-01-01 ENCOUNTER — TELEPHONE (OUTPATIENT)
Dept: INTERNAL MEDICINE | Facility: CLINIC | Age: 83
End: 2023-01-01
Payer: COMMERCIAL

## 2023-01-01 ENCOUNTER — APPOINTMENT (OUTPATIENT)
Dept: MRI IMAGING | Facility: CLINIC | Age: 83
DRG: 682 | End: 2023-01-01
Attending: INTERNAL MEDICINE
Payer: COMMERCIAL

## 2023-01-01 ENCOUNTER — PATIENT OUTREACH (OUTPATIENT)
Dept: CARE COORDINATION | Facility: CLINIC | Age: 83
End: 2023-01-01
Payer: COMMERCIAL

## 2023-01-01 ENCOUNTER — VIRTUAL VISIT (OUTPATIENT)
Dept: ONCOLOGY | Facility: CLINIC | Age: 83
End: 2023-01-01
Attending: INTERNAL MEDICINE
Payer: COMMERCIAL

## 2023-01-01 ENCOUNTER — APPOINTMENT (OUTPATIENT)
Dept: GENERAL RADIOLOGY | Facility: CLINIC | Age: 83
DRG: 480 | End: 2023-01-01
Payer: COMMERCIAL

## 2023-01-01 ENCOUNTER — APPOINTMENT (OUTPATIENT)
Dept: OCCUPATIONAL THERAPY | Facility: CLINIC | Age: 83
DRG: 682 | End: 2023-01-01
Attending: NURSE PRACTITIONER
Payer: COMMERCIAL

## 2023-01-01 ENCOUNTER — APPOINTMENT (OUTPATIENT)
Dept: CT IMAGING | Facility: CLINIC | Age: 83
DRG: 480 | End: 2023-01-01
Attending: PSYCHIATRY & NEUROLOGY
Payer: COMMERCIAL

## 2023-01-01 ENCOUNTER — TELEPHONE (OUTPATIENT)
Dept: GERIATRICS | Facility: CLINIC | Age: 83
End: 2023-01-01
Payer: COMMERCIAL

## 2023-01-01 ENCOUNTER — APPOINTMENT (OUTPATIENT)
Dept: SPEECH THERAPY | Facility: CLINIC | Age: 83
DRG: 480 | End: 2023-01-01
Attending: HOSPITALIST
Payer: COMMERCIAL

## 2023-01-01 ENCOUNTER — DOCUMENTATION ONLY (OUTPATIENT)
Dept: OTHER | Facility: CLINIC | Age: 83
End: 2023-01-01

## 2023-01-01 ENCOUNTER — MEDICAL CORRESPONDENCE (OUTPATIENT)
Dept: HEALTH INFORMATION MANAGEMENT | Facility: CLINIC | Age: 83
End: 2023-01-01

## 2023-01-01 ENCOUNTER — APPOINTMENT (OUTPATIENT)
Dept: CARDIOLOGY | Facility: CLINIC | Age: 83
DRG: 682 | End: 2023-01-01
Attending: NURSE PRACTITIONER
Payer: COMMERCIAL

## 2023-01-01 ENCOUNTER — APPOINTMENT (OUTPATIENT)
Dept: GENERAL RADIOLOGY | Facility: CLINIC | Age: 83
DRG: 480 | End: 2023-01-01
Attending: PHYSICIAN ASSISTANT
Payer: COMMERCIAL

## 2023-01-01 ENCOUNTER — HOSPITAL ENCOUNTER (INPATIENT)
Dept: NEUROLOGY | Facility: CLINIC | Age: 83
Discharge: HOME | DRG: 480 | End: 2023-05-18
Attending: PSYCHIATRY & NEUROLOGY | Admitting: PSYCHIATRY & NEUROLOGY
Payer: COMMERCIAL

## 2023-01-01 ENCOUNTER — APPOINTMENT (OUTPATIENT)
Dept: MRI IMAGING | Facility: CLINIC | Age: 83
DRG: 480 | End: 2023-01-01
Attending: STUDENT IN AN ORGANIZED HEALTH CARE EDUCATION/TRAINING PROGRAM
Payer: COMMERCIAL

## 2023-01-01 ENCOUNTER — DOCUMENTATION ONLY (OUTPATIENT)
Dept: PHARMACY | Facility: CLINIC | Age: 83
End: 2023-01-01
Payer: COMMERCIAL

## 2023-01-01 ENCOUNTER — APPOINTMENT (OUTPATIENT)
Dept: PHYSICAL THERAPY | Facility: CLINIC | Age: 83
DRG: 682 | End: 2023-01-01
Attending: INTERNAL MEDICINE
Payer: COMMERCIAL

## 2023-01-01 ENCOUNTER — APPOINTMENT (OUTPATIENT)
Dept: CT IMAGING | Facility: CLINIC | Age: 83
DRG: 682 | End: 2023-01-01
Attending: INTERNAL MEDICINE
Payer: COMMERCIAL

## 2023-01-01 ENCOUNTER — APPOINTMENT (OUTPATIENT)
Dept: SPEECH THERAPY | Facility: CLINIC | Age: 83
DRG: 480 | End: 2023-01-01
Attending: STUDENT IN AN ORGANIZED HEALTH CARE EDUCATION/TRAINING PROGRAM
Payer: COMMERCIAL

## 2023-01-01 ENCOUNTER — APPOINTMENT (OUTPATIENT)
Dept: GENERAL RADIOLOGY | Facility: CLINIC | Age: 83
DRG: 480 | End: 2023-01-01
Attending: STUDENT IN AN ORGANIZED HEALTH CARE EDUCATION/TRAINING PROGRAM
Payer: COMMERCIAL

## 2023-01-01 ENCOUNTER — APPOINTMENT (OUTPATIENT)
Dept: GENERAL RADIOLOGY | Facility: CLINIC | Age: 83
DRG: 682 | End: 2023-01-01
Attending: INTERNAL MEDICINE
Payer: COMMERCIAL

## 2023-01-01 ENCOUNTER — DOCUMENTATION ONLY (OUTPATIENT)
Dept: ONCOLOGY | Facility: CLINIC | Age: 83
End: 2023-01-01
Payer: COMMERCIAL

## 2023-01-01 ENCOUNTER — VIRTUAL VISIT (OUTPATIENT)
Dept: INTERNAL MEDICINE | Facility: CLINIC | Age: 83
End: 2023-01-01
Payer: COMMERCIAL

## 2023-01-01 ENCOUNTER — TELEPHONE (OUTPATIENT)
Dept: INTERNAL MEDICINE | Facility: CLINIC | Age: 83
End: 2023-01-01

## 2023-01-01 ENCOUNTER — APPOINTMENT (OUTPATIENT)
Dept: PHYSICAL THERAPY | Facility: CLINIC | Age: 83
DRG: 480 | End: 2023-01-01
Attending: HOSPITALIST
Payer: COMMERCIAL

## 2023-01-01 ENCOUNTER — NURSING HOME VISIT (OUTPATIENT)
Dept: GERIATRICS | Facility: CLINIC | Age: 83
End: 2023-01-01
Payer: COMMERCIAL

## 2023-01-01 ENCOUNTER — APPOINTMENT (OUTPATIENT)
Dept: CT IMAGING | Facility: CLINIC | Age: 83
DRG: 480 | End: 2023-01-01
Attending: STUDENT IN AN ORGANIZED HEALTH CARE EDUCATION/TRAINING PROGRAM
Payer: COMMERCIAL

## 2023-01-01 ENCOUNTER — APPOINTMENT (OUTPATIENT)
Dept: SPEECH THERAPY | Facility: CLINIC | Age: 83
DRG: 682 | End: 2023-01-01
Attending: INTERNAL MEDICINE
Payer: COMMERCIAL

## 2023-01-01 ENCOUNTER — APPOINTMENT (OUTPATIENT)
Dept: GENERAL RADIOLOGY | Facility: CLINIC | Age: 83
DRG: 480 | End: 2023-01-01
Attending: EMERGENCY MEDICINE
Payer: COMMERCIAL

## 2023-01-01 ENCOUNTER — HOSPITAL ENCOUNTER (INPATIENT)
Facility: CLINIC | Age: 83
End: 2023-01-01
Payer: COMMERCIAL

## 2023-01-01 ENCOUNTER — HOSPITAL ENCOUNTER (OUTPATIENT)
Dept: ULTRASOUND IMAGING | Facility: CLINIC | Age: 83
Discharge: HOME | End: 2023-01-06
Attending: INTERNAL MEDICINE
Payer: COMMERCIAL

## 2023-01-01 ENCOUNTER — APPOINTMENT (OUTPATIENT)
Dept: CT IMAGING | Facility: CLINIC | Age: 83
DRG: 480 | End: 2023-01-01
Payer: COMMERCIAL

## 2023-01-01 ENCOUNTER — ASSISTED LIVING VISIT (OUTPATIENT)
Dept: GERIATRICS | Facility: CLINIC | Age: 83
End: 2023-01-01
Payer: COMMERCIAL

## 2023-01-01 ENCOUNTER — APPOINTMENT (OUTPATIENT)
Dept: OCCUPATIONAL THERAPY | Facility: CLINIC | Age: 83
DRG: 480 | End: 2023-01-01
Attending: STUDENT IN AN ORGANIZED HEALTH CARE EDUCATION/TRAINING PROGRAM
Payer: COMMERCIAL

## 2023-01-01 ENCOUNTER — HOSPITAL ENCOUNTER (INPATIENT)
Facility: CLINIC | Age: 83
LOS: 30 days | Discharge: HOSPICE | DRG: 480 | End: 2023-05-31
Attending: EMERGENCY MEDICINE | Admitting: INTERNAL MEDICINE
Payer: COMMERCIAL

## 2023-01-01 ENCOUNTER — APPOINTMENT (OUTPATIENT)
Dept: GENERAL RADIOLOGY | Facility: CLINIC | Age: 83
DRG: 480 | End: 2023-01-01
Attending: INTERNAL MEDICINE
Payer: COMMERCIAL

## 2023-01-01 ENCOUNTER — ANESTHESIA EVENT (OUTPATIENT)
Dept: SURGERY | Facility: CLINIC | Age: 83
DRG: 480 | End: 2023-01-01
Payer: COMMERCIAL

## 2023-01-01 ENCOUNTER — NURSE TRIAGE (OUTPATIENT)
Dept: NURSING | Facility: CLINIC | Age: 83
End: 2023-01-01

## 2023-01-01 ENCOUNTER — HOSPITAL ENCOUNTER (OUTPATIENT)
Dept: PET IMAGING | Facility: CLINIC | Age: 83
Discharge: HOME | End: 2023-01-30
Attending: INTERNAL MEDICINE
Payer: COMMERCIAL

## 2023-01-01 ENCOUNTER — APPOINTMENT (OUTPATIENT)
Dept: SPEECH THERAPY | Facility: CLINIC | Age: 83
DRG: 682 | End: 2023-01-01
Attending: NURSE PRACTITIONER
Payer: COMMERCIAL

## 2023-01-01 ENCOUNTER — ANESTHESIA (OUTPATIENT)
Dept: SURGERY | Facility: CLINIC | Age: 83
DRG: 480 | End: 2023-01-01
Payer: COMMERCIAL

## 2023-01-01 ENCOUNTER — APPOINTMENT (OUTPATIENT)
Dept: MRI IMAGING | Facility: CLINIC | Age: 83
DRG: 682 | End: 2023-01-01
Attending: NURSE PRACTITIONER
Payer: COMMERCIAL

## 2023-01-01 ENCOUNTER — APPOINTMENT (OUTPATIENT)
Dept: GENERAL RADIOLOGY | Facility: CLINIC | Age: 83
DRG: 682 | End: 2023-01-01
Attending: PHYSICIAN ASSISTANT
Payer: COMMERCIAL

## 2023-01-01 ENCOUNTER — HOSPITAL ENCOUNTER (INPATIENT)
Facility: CLINIC | Age: 83
LOS: 7 days | Discharge: NURSING, REHAB OR LONG-TERM CARE | DRG: 682 | End: 2023-02-14
Attending: EMERGENCY MEDICINE | Admitting: HOSPITALIST
Payer: COMMERCIAL

## 2023-01-01 ENCOUNTER — TELEPHONE (OUTPATIENT)
Dept: NEUROLOGY | Facility: CLINIC | Age: 83
End: 2023-01-01

## 2023-01-01 ENCOUNTER — HOSPITAL ENCOUNTER (OUTPATIENT)
Dept: ULTRASOUND IMAGING | Facility: CLINIC | Age: 83
Discharge: HOME | End: 2023-01-30
Attending: INTERNAL MEDICINE
Payer: COMMERCIAL

## 2023-01-01 ENCOUNTER — TELEPHONE (OUTPATIENT)
Dept: PHARMACY | Facility: CLINIC | Age: 83
End: 2023-01-01
Payer: COMMERCIAL

## 2023-01-01 ENCOUNTER — HOSPITAL ENCOUNTER (OUTPATIENT)
Dept: GENERAL RADIOLOGY | Facility: CLINIC | Age: 83
Discharge: HOME | End: 2023-01-06
Attending: INTERNAL MEDICINE
Payer: COMMERCIAL

## 2023-01-01 ENCOUNTER — APPOINTMENT (OUTPATIENT)
Dept: CARDIOLOGY | Facility: CLINIC | Age: 83
DRG: 480 | End: 2023-01-01
Attending: PSYCHIATRY & NEUROLOGY
Payer: COMMERCIAL

## 2023-01-01 ENCOUNTER — NURSING HOME VISIT (OUTPATIENT)
Dept: GERIATRICS | Facility: CLINIC | Age: 83
End: 2023-01-01
Payer: MEDICARE

## 2023-01-01 ENCOUNTER — APPOINTMENT (OUTPATIENT)
Dept: PHYSICAL THERAPY | Facility: CLINIC | Age: 83
DRG: 682 | End: 2023-01-01
Attending: NURSE PRACTITIONER
Payer: COMMERCIAL

## 2023-01-01 ENCOUNTER — APPOINTMENT (OUTPATIENT)
Dept: OCCUPATIONAL THERAPY | Facility: CLINIC | Age: 83
DRG: 480 | End: 2023-01-01
Attending: PSYCHIATRY & NEUROLOGY
Payer: COMMERCIAL

## 2023-01-01 ENCOUNTER — APPOINTMENT (OUTPATIENT)
Dept: GENERAL RADIOLOGY | Facility: CLINIC | Age: 83
DRG: 480 | End: 2023-01-01
Attending: HOSPITALIST
Payer: COMMERCIAL

## 2023-01-01 ENCOUNTER — APPOINTMENT (OUTPATIENT)
Dept: PHYSICAL THERAPY | Facility: CLINIC | Age: 83
DRG: 480 | End: 2023-01-01
Attending: INTERNAL MEDICINE
Payer: COMMERCIAL

## 2023-01-01 ENCOUNTER — PATIENT OUTREACH (OUTPATIENT)
Dept: ONCOLOGY | Facility: CLINIC | Age: 83
End: 2023-01-01
Payer: COMMERCIAL

## 2023-01-01 ENCOUNTER — TELEPHONE (OUTPATIENT)
Dept: ONCOLOGY | Facility: CLINIC | Age: 83
End: 2023-01-01
Payer: COMMERCIAL

## 2023-01-01 ENCOUNTER — LAB REQUISITION (OUTPATIENT)
Dept: LAB | Facility: CLINIC | Age: 83
End: 2023-01-01
Payer: COMMERCIAL

## 2023-01-01 ENCOUNTER — APPOINTMENT (OUTPATIENT)
Dept: PHYSICAL THERAPY | Facility: CLINIC | Age: 83
DRG: 682 | End: 2023-01-01
Attending: HOSPITALIST
Payer: COMMERCIAL

## 2023-01-01 ENCOUNTER — APPOINTMENT (OUTPATIENT)
Dept: CT IMAGING | Facility: CLINIC | Age: 83
DRG: 480 | End: 2023-01-01
Attending: INTERNAL MEDICINE
Payer: COMMERCIAL

## 2023-01-01 ENCOUNTER — APPOINTMENT (OUTPATIENT)
Dept: CT IMAGING | Facility: CLINIC | Age: 83
DRG: 480 | End: 2023-01-01
Attending: HOSPITALIST
Payer: COMMERCIAL

## 2023-01-01 ENCOUNTER — HOSPITAL ENCOUNTER (INPATIENT)
Dept: NEUROLOGY | Facility: CLINIC | Age: 83
Discharge: HOME | DRG: 682 | End: 2023-02-08
Attending: INTERNAL MEDICINE
Payer: COMMERCIAL

## 2023-01-01 ENCOUNTER — APPOINTMENT (OUTPATIENT)
Dept: GENERAL RADIOLOGY | Facility: CLINIC | Age: 83
DRG: 682 | End: 2023-01-01
Attending: EMERGENCY MEDICINE
Payer: COMMERCIAL

## 2023-01-01 ENCOUNTER — APPOINTMENT (OUTPATIENT)
Dept: CT IMAGING | Facility: CLINIC | Age: 83
DRG: 682 | End: 2023-01-01
Payer: COMMERCIAL

## 2023-01-01 ENCOUNTER — APPOINTMENT (OUTPATIENT)
Dept: SPEECH THERAPY | Facility: CLINIC | Age: 83
DRG: 480 | End: 2023-01-01
Attending: INTERNAL MEDICINE
Payer: COMMERCIAL

## 2023-01-01 ENCOUNTER — APPOINTMENT (OUTPATIENT)
Dept: INTERVENTIONAL RADIOLOGY/VASCULAR | Facility: CLINIC | Age: 83
DRG: 480 | End: 2023-01-01
Attending: STUDENT IN AN ORGANIZED HEALTH CARE EDUCATION/TRAINING PROGRAM
Payer: COMMERCIAL

## 2023-01-01 ENCOUNTER — DOCUMENTATION ONLY (OUTPATIENT)
Dept: OTHER | Facility: CLINIC | Age: 83
End: 2023-01-01
Payer: COMMERCIAL

## 2023-01-01 ENCOUNTER — PATIENT OUTREACH (OUTPATIENT)
Dept: ONCOLOGY | Facility: CLINIC | Age: 83
End: 2023-01-01

## 2023-01-01 ENCOUNTER — HEALTH MAINTENANCE LETTER (OUTPATIENT)
Age: 83
End: 2023-01-01

## 2023-01-01 ENCOUNTER — APPOINTMENT (OUTPATIENT)
Dept: MRI IMAGING | Facility: CLINIC | Age: 83
DRG: 480 | End: 2023-01-01
Attending: PSYCHIATRY & NEUROLOGY
Payer: COMMERCIAL

## 2023-01-01 ENCOUNTER — DOCUMENTATION ONLY (OUTPATIENT)
Dept: ONCOLOGY | Facility: CLINIC | Age: 83
End: 2023-01-01

## 2023-01-01 VITALS
HEIGHT: 60 IN | WEIGHT: 137 LBS | BODY MASS INDEX: 26.9 KG/M2 | RESPIRATION RATE: 18 BRPM | SYSTOLIC BLOOD PRESSURE: 109 MMHG | HEART RATE: 63 BPM | DIASTOLIC BLOOD PRESSURE: 55 MMHG | TEMPERATURE: 98.1 F | OXYGEN SATURATION: 93 %

## 2023-01-01 VITALS
WEIGHT: 137 LBS | HEART RATE: 63 BPM | HEIGHT: 60 IN | TEMPERATURE: 98.1 F | OXYGEN SATURATION: 93 % | DIASTOLIC BLOOD PRESSURE: 55 MMHG | BODY MASS INDEX: 26.9 KG/M2 | RESPIRATION RATE: 18 BRPM | SYSTOLIC BLOOD PRESSURE: 109 MMHG

## 2023-01-01 VITALS
WEIGHT: 136.8 LBS | BODY MASS INDEX: 25.17 KG/M2 | DIASTOLIC BLOOD PRESSURE: 54 MMHG | HEART RATE: 63 BPM | OXYGEN SATURATION: 98 % | HEIGHT: 62 IN | SYSTOLIC BLOOD PRESSURE: 104 MMHG | TEMPERATURE: 97.6 F | RESPIRATION RATE: 20 BRPM

## 2023-01-01 VITALS
TEMPERATURE: 97.7 F | HEART RATE: 63 BPM | WEIGHT: 135.7 LBS | SYSTOLIC BLOOD PRESSURE: 116 MMHG | OXYGEN SATURATION: 93 % | RESPIRATION RATE: 16 BRPM | BODY MASS INDEX: 24.97 KG/M2 | HEIGHT: 62 IN | DIASTOLIC BLOOD PRESSURE: 50 MMHG

## 2023-01-01 VITALS
WEIGHT: 136.8 LBS | OXYGEN SATURATION: 95 % | DIASTOLIC BLOOD PRESSURE: 53 MMHG | TEMPERATURE: 97.5 F | SYSTOLIC BLOOD PRESSURE: 100 MMHG | RESPIRATION RATE: 18 BRPM | BODY MASS INDEX: 25.17 KG/M2 | HEIGHT: 62 IN | HEART RATE: 70 BPM

## 2023-01-01 VITALS
HEIGHT: 62 IN | RESPIRATION RATE: 16 BRPM | BODY MASS INDEX: 25.03 KG/M2 | SYSTOLIC BLOOD PRESSURE: 116 MMHG | DIASTOLIC BLOOD PRESSURE: 50 MMHG | WEIGHT: 136 LBS | OXYGEN SATURATION: 93 % | HEART RATE: 65 BPM | TEMPERATURE: 97.6 F

## 2023-01-01 VITALS
HEART RATE: 87 BPM | HEIGHT: 60 IN | DIASTOLIC BLOOD PRESSURE: 68 MMHG | BODY MASS INDEX: 24.42 KG/M2 | RESPIRATION RATE: 19 BRPM | TEMPERATURE: 98.1 F | OXYGEN SATURATION: 96 % | SYSTOLIC BLOOD PRESSURE: 132 MMHG | WEIGHT: 124.4 LBS

## 2023-01-01 VITALS
HEART RATE: 63 BPM | TEMPERATURE: 98.1 F | DIASTOLIC BLOOD PRESSURE: 62 MMHG | WEIGHT: 137 LBS | HEIGHT: 60 IN | SYSTOLIC BLOOD PRESSURE: 123 MMHG | OXYGEN SATURATION: 95 % | BODY MASS INDEX: 26.9 KG/M2 | RESPIRATION RATE: 16 BRPM

## 2023-01-01 VITALS
SYSTOLIC BLOOD PRESSURE: 115 MMHG | RESPIRATION RATE: 16 BRPM | DIASTOLIC BLOOD PRESSURE: 62 MMHG | HEIGHT: 60 IN | WEIGHT: 151.6 LBS | HEART RATE: 81 BPM | OXYGEN SATURATION: 92 % | BODY MASS INDEX: 29.76 KG/M2 | TEMPERATURE: 98.2 F

## 2023-01-01 VITALS
TEMPERATURE: 98.1 F | BODY MASS INDEX: 26.9 KG/M2 | OXYGEN SATURATION: 93 % | WEIGHT: 137 LBS | HEART RATE: 63 BPM | RESPIRATION RATE: 18 BRPM | HEIGHT: 60 IN | DIASTOLIC BLOOD PRESSURE: 55 MMHG | SYSTOLIC BLOOD PRESSURE: 109 MMHG

## 2023-01-01 VITALS
RESPIRATION RATE: 16 BRPM | TEMPERATURE: 97.5 F | BODY MASS INDEX: 25.17 KG/M2 | OXYGEN SATURATION: 94 % | WEIGHT: 136.8 LBS | SYSTOLIC BLOOD PRESSURE: 126 MMHG | HEART RATE: 70 BPM | HEIGHT: 62 IN | DIASTOLIC BLOOD PRESSURE: 60 MMHG

## 2023-01-01 VITALS
HEART RATE: 89 BPM | BODY MASS INDEX: 25.17 KG/M2 | OXYGEN SATURATION: 94 % | RESPIRATION RATE: 20 BRPM | DIASTOLIC BLOOD PRESSURE: 78 MMHG | TEMPERATURE: 97.6 F | WEIGHT: 136.8 LBS | SYSTOLIC BLOOD PRESSURE: 111 MMHG | HEIGHT: 62 IN

## 2023-01-01 VITALS
HEART RATE: 89 BPM | WEIGHT: 124.4 LBS | HEIGHT: 60 IN | SYSTOLIC BLOOD PRESSURE: 109 MMHG | BODY MASS INDEX: 24.42 KG/M2 | RESPIRATION RATE: 21 BRPM | TEMPERATURE: 98.1 F | DIASTOLIC BLOOD PRESSURE: 61 MMHG | OXYGEN SATURATION: 94 %

## 2023-01-01 VITALS
OXYGEN SATURATION: 93 % | HEIGHT: 62 IN | SYSTOLIC BLOOD PRESSURE: 116 MMHG | RESPIRATION RATE: 16 BRPM | WEIGHT: 136.8 LBS | DIASTOLIC BLOOD PRESSURE: 56 MMHG | TEMPERATURE: 98.1 F | HEART RATE: 69 BPM | BODY MASS INDEX: 25.17 KG/M2

## 2023-01-01 DIAGNOSIS — I69.320 APHASIA FOLLOWING CEREBRAL INFARCTION: Primary | ICD-10-CM

## 2023-01-01 DIAGNOSIS — Z85.118 HISTORY OF LUNG CANCER: ICD-10-CM

## 2023-01-01 DIAGNOSIS — G89.29 CHRONIC BILATERAL LOW BACK PAIN WITH RIGHT-SIDED SCIATICA: ICD-10-CM

## 2023-01-01 DIAGNOSIS — I63.9 APHASIA DUE TO ACUTE CEREBROVASCULAR ACCIDENT (CVA) (H): ICD-10-CM

## 2023-01-01 DIAGNOSIS — Z91.81 HISTORY OF RECENT FALL: ICD-10-CM

## 2023-01-01 DIAGNOSIS — E55.9 VITAMIN D DEFICIENCY: Primary | ICD-10-CM

## 2023-01-01 DIAGNOSIS — C34.92 PRIMARY ADENOCARCINOMA OF LEFT LUNG (H): Primary | ICD-10-CM

## 2023-01-01 DIAGNOSIS — S72.141A CLOSED DISPLACED INTERTROCHANTERIC FRACTURE OF RIGHT FEMUR, INITIAL ENCOUNTER (H): ICD-10-CM

## 2023-01-01 DIAGNOSIS — R07.89 PAIN OF STERNUM: ICD-10-CM

## 2023-01-01 DIAGNOSIS — Z51.5 HOSPICE CARE PATIENT: ICD-10-CM

## 2023-01-01 DIAGNOSIS — R06.02 SHORTNESS OF BREATH: ICD-10-CM

## 2023-01-01 DIAGNOSIS — G89.29 CHRONIC LOW BACK PAIN WITHOUT SCIATICA, UNSPECIFIED BACK PAIN LATERALITY: ICD-10-CM

## 2023-01-01 DIAGNOSIS — M54.50 CHRONIC LOW BACK PAIN WITHOUT SCIATICA, UNSPECIFIED BACK PAIN LATERALITY: ICD-10-CM

## 2023-01-01 DIAGNOSIS — S52.601D UNSPECIFIED FRACTURE OF LOWER END OF RIGHT ULNA, SUBSEQUENT ENCOUNTER FOR CLOSED FRACTURE WITH ROUTINE HEALING: ICD-10-CM

## 2023-01-01 DIAGNOSIS — Z86.73 HISTORY OF MULTIPLE STROKES: Primary | ICD-10-CM

## 2023-01-01 DIAGNOSIS — G47.00 INSOMNIA, UNSPECIFIED TYPE: ICD-10-CM

## 2023-01-01 DIAGNOSIS — J90 PLEURAL EFFUSION, LEFT: ICD-10-CM

## 2023-01-01 DIAGNOSIS — R09.02 HYPOXIA: ICD-10-CM

## 2023-01-01 DIAGNOSIS — R53.81 PHYSICAL DECONDITIONING: ICD-10-CM

## 2023-01-01 DIAGNOSIS — C34.92 PRIMARY ADENOCARCINOMA OF LEFT LUNG (H): ICD-10-CM

## 2023-01-01 DIAGNOSIS — Z91.81 PERSONAL HISTORY OF FALL: ICD-10-CM

## 2023-01-01 DIAGNOSIS — J90 PLEURAL EFFUSION: ICD-10-CM

## 2023-01-01 DIAGNOSIS — Z00.00 ANNUAL PHYSICAL EXAM: ICD-10-CM

## 2023-01-01 DIAGNOSIS — Z51.5 END OF LIFE CARE: ICD-10-CM

## 2023-01-01 DIAGNOSIS — J18.9 HEALTHCARE-ASSOCIATED PNEUMONIA: ICD-10-CM

## 2023-01-01 DIAGNOSIS — S52.501A CLOSED FRACTURE OF DISTAL ENDS OF RIGHT RADIUS AND ULNA, INITIAL ENCOUNTER: ICD-10-CM

## 2023-01-01 DIAGNOSIS — I63.512 CEREBROVASCULAR ACCIDENT (CVA) DUE TO STENOSIS OF LEFT MIDDLE CEREBRAL ARTERY (H): ICD-10-CM

## 2023-01-01 DIAGNOSIS — Z92.21 STATUS POST CHEMOTHERAPY: ICD-10-CM

## 2023-01-01 DIAGNOSIS — D64.9 ANEMIA, UNSPECIFIED: ICD-10-CM

## 2023-01-01 DIAGNOSIS — R47.01 APHASIA DUE TO ACUTE CEREBROVASCULAR ACCIDENT (CVA) (H): ICD-10-CM

## 2023-01-01 DIAGNOSIS — M89.00 ALGONEURODYSTROPHY, UNSPECIFIED SITE: ICD-10-CM

## 2023-01-01 DIAGNOSIS — R53.1 WEAKNESS: ICD-10-CM

## 2023-01-01 DIAGNOSIS — J90 PLEURAL EFFUSION: Primary | ICD-10-CM

## 2023-01-01 DIAGNOSIS — F41.9 ANXIETY DISORDER, UNSPECIFIED TYPE: ICD-10-CM

## 2023-01-01 DIAGNOSIS — C78.02 METASTATIC LUNG CARCINOMA, LEFT (H): ICD-10-CM

## 2023-01-01 DIAGNOSIS — C34.82: ICD-10-CM

## 2023-01-01 DIAGNOSIS — C34.92 MALIGNANT NEOPLASM OF UNSPECIFIED PART OF LEFT BRONCHUS OR LUNG (H): ICD-10-CM

## 2023-01-01 DIAGNOSIS — M54.50 CHRONIC LOW BACK PAIN WITHOUT SCIATICA, UNSPECIFIED BACK PAIN LATERALITY: Primary | ICD-10-CM

## 2023-01-01 DIAGNOSIS — S52.501D UNSPECIFIED FRACTURE OF THE LOWER END OF RIGHT RADIUS, SUBSEQUENT ENCOUNTER FOR CLOSED FRACTURE WITH ROUTINE HEALING: ICD-10-CM

## 2023-01-01 DIAGNOSIS — S72.144D CLOSED NONDISPLACED INTERTROCHANTERIC FRACTURE OF RIGHT FEMUR WITH ROUTINE HEALING, SUBSEQUENT ENCOUNTER: ICD-10-CM

## 2023-01-01 DIAGNOSIS — M54.50 CHRONIC BILATERAL LOW BACK PAIN, UNSPECIFIED WHETHER SCIATICA PRESENT: ICD-10-CM

## 2023-01-01 DIAGNOSIS — I69.320 APHASIA FOLLOWING CEREBRAL INFARCTION: ICD-10-CM

## 2023-01-01 DIAGNOSIS — M54.41 CHRONIC BILATERAL LOW BACK PAIN WITH RIGHT-SIDED SCIATICA: ICD-10-CM
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
## 2023-01-01 DIAGNOSIS — G89.29 CHRONIC BILATERAL LOW BACK PAIN, UNSPECIFIED WHETHER SCIATICA PRESENT: ICD-10-CM

## 2023-01-01 DIAGNOSIS — C34.92 METASTATIC PRIMARY LUNG CANCER, LEFT (H): ICD-10-CM

## 2023-01-01 DIAGNOSIS — S60.222A CONTUSION OF LEFT HAND, INITIAL ENCOUNTER: ICD-10-CM

## 2023-01-01 DIAGNOSIS — K21.00 GASTROESOPHAGEAL REFLUX DISEASE WITH ESOPHAGITIS, UNSPECIFIED WHETHER HEMORRHAGE: ICD-10-CM

## 2023-01-01 DIAGNOSIS — Z86.73 HISTORY OF MULTIPLE STROKES: ICD-10-CM

## 2023-01-01 DIAGNOSIS — J91.0 MALIGNANT PLEURAL EFFUSION (H): Primary | ICD-10-CM

## 2023-01-01 DIAGNOSIS — I63.9 ACUTE EMBOLIC STROKE (H): Primary | ICD-10-CM

## 2023-01-01 DIAGNOSIS — Z53.9 DIAGNOSIS NOT YET DEFINED: Primary | ICD-10-CM

## 2023-01-01 DIAGNOSIS — G89.29 CHRONIC LOW BACK PAIN WITHOUT SCIATICA, UNSPECIFIED BACK PAIN LATERALITY: Primary | ICD-10-CM

## 2023-01-01 DIAGNOSIS — G47.00 INSOMNIA: ICD-10-CM

## 2023-01-01 DIAGNOSIS — S52.601A CLOSED FRACTURE OF DISTAL ENDS OF RIGHT RADIUS AND ULNA, INITIAL ENCOUNTER: ICD-10-CM

## 2023-01-01 DIAGNOSIS — C78.02 METASTATIC LUNG CARCINOMA, LEFT (H): Primary | ICD-10-CM

## 2023-01-01 DIAGNOSIS — I10 ESSENTIAL (PRIMARY) HYPERTENSION: ICD-10-CM

## 2023-01-01 DIAGNOSIS — J91.0 MALIGNANT PLEURAL EFFUSION (H): ICD-10-CM

## 2023-01-01 DIAGNOSIS — C34.90 PRIMARY MALIGNANT NEOPLASM OF LUNG METASTATIC TO OTHER SITE, UNSPECIFIED LATERALITY (H): ICD-10-CM

## 2023-01-01 DIAGNOSIS — I60.9 NONTRAUMATIC SUBARACHNOID HEMORRHAGE, UNSPECIFIED (H): Primary | ICD-10-CM

## 2023-01-01 DIAGNOSIS — F41.9 ANXIETY: ICD-10-CM

## 2023-01-01 DIAGNOSIS — J18.9 PNEUMONIA DUE TO INFECTIOUS ORGANISM, UNSPECIFIED LATERALITY, UNSPECIFIED PART OF LUNG: ICD-10-CM

## 2023-01-01 DIAGNOSIS — S22.22XD CLOSED FRACTURE OF BODY OF STERNUM WITH ROUTINE HEALING, SUBSEQUENT ENCOUNTER: ICD-10-CM
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
## 2023-01-01 DIAGNOSIS — D62 ACUTE POSTHEMORRHAGIC ANEMIA: ICD-10-CM

## 2023-01-01 DIAGNOSIS — R11.0 NAUSEA: Primary | ICD-10-CM

## 2023-01-01 DIAGNOSIS — C34.00: ICD-10-CM

## 2023-01-01 DIAGNOSIS — F41.1 GAD (GENERALIZED ANXIETY DISORDER): ICD-10-CM

## 2023-01-01 DIAGNOSIS — Z78.9 TAKES DIETARY SUPPLEMENTS: Primary | ICD-10-CM

## 2023-01-01 LAB
ABO/RH(D): NORMAL
ACID FAST STAIN (ARUP): NORMAL
ALBUMIN SERPL BCG-MCNC: 2.7 G/DL (ref 3.5–5.2)
ALBUMIN SERPL BCG-MCNC: 2.7 G/DL (ref 3.5–5.2)
ALBUMIN SERPL BCG-MCNC: 2.8 G/DL (ref 3.5–5.2)
ALBUMIN SERPL BCG-MCNC: 2.9 G/DL (ref 3.5–5.2)
ALBUMIN SERPL BCG-MCNC: 3.4 G/DL (ref 3.5–5.2)
ALBUMIN SERPL BCG-MCNC: 3.6 G/DL (ref 3.5–5.2)
ALBUMIN SERPL BCG-MCNC: 3.6 G/DL (ref 3.5–5.2)
ALBUMIN SERPL BCG-MCNC: 3.7 G/DL (ref 3.5–5.2)
ALBUMIN SERPL BCG-MCNC: 3.7 G/DL (ref 3.5–5.2)
ALBUMIN UR-MCNC: NEGATIVE MG/DL
ALBUMIN UR-MCNC: NEGATIVE MG/DL
ALP SERPL-CCNC: 108 U/L (ref 35–104)
ALP SERPL-CCNC: 110 U/L (ref 35–104)
ALP SERPL-CCNC: 112 U/L (ref 35–104)
ALP SERPL-CCNC: 119 U/L (ref 35–104)
ALP SERPL-CCNC: 123 U/L (ref 35–104)
ALP SERPL-CCNC: 171 U/L (ref 35–104)
ALP SERPL-CCNC: 70 U/L (ref 35–104)
ALP SERPL-CCNC: 80 U/L (ref 35–104)
ALP SERPL-CCNC: 80 U/L (ref 35–104)
ALT SERPL W P-5'-P-CCNC: 14 U/L (ref 10–35)
ALT SERPL W P-5'-P-CCNC: 19 U/L (ref 10–35)
ALT SERPL W P-5'-P-CCNC: 19 U/L (ref 10–35)
ALT SERPL W P-5'-P-CCNC: 21 U/L (ref 10–35)
ALT SERPL W P-5'-P-CCNC: 23 U/L (ref 10–35)
ALT SERPL W P-5'-P-CCNC: 37 U/L (ref 10–35)
ALT SERPL W P-5'-P-CCNC: 39 U/L (ref 10–35)
ALT SERPL W P-5'-P-CCNC: 43 U/L (ref 10–35)
ALT SERPL W P-5'-P-CCNC: 47 U/L (ref 10–35)
ANION GAP SERPL CALCULATED.3IONS-SCNC: 10 MMOL/L (ref 7–15)
ANION GAP SERPL CALCULATED.3IONS-SCNC: 11 MMOL/L (ref 7–15)
ANION GAP SERPL CALCULATED.3IONS-SCNC: 12 MMOL/L (ref 7–15)
ANION GAP SERPL CALCULATED.3IONS-SCNC: 13 MMOL/L (ref 7–15)
ANION GAP SERPL CALCULATED.3IONS-SCNC: 13 MMOL/L (ref 7–15)
ANION GAP SERPL CALCULATED.3IONS-SCNC: 14 MMOL/L (ref 7–15)
ANION GAP SERPL CALCULATED.3IONS-SCNC: 6 MMOL/L (ref 7–15)
ANION GAP SERPL CALCULATED.3IONS-SCNC: 7 MMOL/L (ref 7–15)
ANION GAP SERPL CALCULATED.3IONS-SCNC: 8 MMOL/L (ref 7–15)
ANION GAP SERPL CALCULATED.3IONS-SCNC: 9 MMOL/L (ref 7–15)
ANTIBODY SCREEN: NEGATIVE
APPEARANCE UR: CLEAR
APPEARANCE UR: CLEAR
APTT PPP: 25 SECONDS (ref 22–38)
AST SERPL W P-5'-P-CCNC: 29 U/L (ref 10–35)
AST SERPL W P-5'-P-CCNC: 30 U/L (ref 10–35)
AST SERPL W P-5'-P-CCNC: 35 U/L (ref 10–35)
AST SERPL W P-5'-P-CCNC: 45 U/L (ref 10–35)
AST SERPL W P-5'-P-CCNC: 47 U/L (ref 10–35)
AST SERPL W P-5'-P-CCNC: 48 U/L (ref 10–35)
AST SERPL W P-5'-P-CCNC: 51 U/L (ref 10–35)
AST SERPL W P-5'-P-CCNC: 57 U/L (ref 10–35)
AST SERPL W P-5'-P-CCNC: 59 U/L (ref 10–35)
ATRIAL RATE - MUSE: 89 BPM
ATRIAL RATE - MUSE: NORMAL BPM
BACTERIA #/AREA URNS HPF: ABNORMAL /HPF
BACTERIA #/AREA URNS HPF: ABNORMAL /HPF
BACTERIA BLD CULT: NO GROWTH
BACTERIA BLD CULT: NO GROWTH
BACTERIA UR CULT: ABNORMAL
BACTERIA UR CULT: ABNORMAL
BASOPHILS # BLD AUTO: 0.1 10E3/UL (ref 0–0.2)
BASOPHILS # BLD AUTO: 0.2 10E3/UL (ref 0–0.2)
BASOPHILS # BLD MANUAL: 0.3 10E3/UL (ref 0–0.2)
BASOPHILS # BLD MANUAL: 0.3 10E3/UL (ref 0–0.2)
BASOPHILS NFR BLD AUTO: 1 %
BASOPHILS NFR BLD AUTO: 2 %
BASOPHILS NFR BLD MANUAL: 2 %
BASOPHILS NFR BLD MANUAL: 2 %
BILIRUB DIRECT SERPL-MCNC: 0.24 MG/DL (ref 0–0.3)
BILIRUB DIRECT SERPL-MCNC: <0.2 MG/DL (ref 0–0.3)
BILIRUB SERPL-MCNC: 0.2 MG/DL
BILIRUB SERPL-MCNC: 0.5 MG/DL
BILIRUB SERPL-MCNC: 0.6 MG/DL
BILIRUB SERPL-MCNC: 0.8 MG/DL
BILIRUB SERPL-MCNC: 1 MG/DL
BILIRUB SERPL-MCNC: 1 MG/DL
BILIRUB SERPL-MCNC: 1.1 MG/DL
BILIRUB SERPL-MCNC: 1.1 MG/DL
BILIRUB SERPL-MCNC: 1.4 MG/DL
BILIRUB UR QL STRIP: NEGATIVE
BILIRUB UR QL STRIP: NEGATIVE
BLD PROD TYP BPU: NORMAL
BLOOD COMPONENT TYPE: NORMAL
BUN SERPL-MCNC: 10.1 MG/DL (ref 8–23)
BUN SERPL-MCNC: 10.3 MG/DL (ref 8–23)
BUN SERPL-MCNC: 10.4 MG/DL (ref 8–23)
BUN SERPL-MCNC: 10.5 MG/DL (ref 8–23)
BUN SERPL-MCNC: 11 MG/DL (ref 8–23)
BUN SERPL-MCNC: 12.3 MG/DL (ref 8–23)
BUN SERPL-MCNC: 12.5 MG/DL (ref 8–23)
BUN SERPL-MCNC: 13.6 MG/DL (ref 8–23)
BUN SERPL-MCNC: 14.5 MG/DL (ref 8–23)
BUN SERPL-MCNC: 14.7 MG/DL (ref 8–23)
BUN SERPL-MCNC: 15.1 MG/DL (ref 8–23)
BUN SERPL-MCNC: 15.2 MG/DL (ref 8–23)
BUN SERPL-MCNC: 15.4 MG/DL (ref 8–23)
BUN SERPL-MCNC: 16.5 MG/DL (ref 8–23)
BUN SERPL-MCNC: 16.7 MG/DL (ref 8–23)
BUN SERPL-MCNC: 17.4 MG/DL (ref 8–23)
BUN SERPL-MCNC: 18.3 MG/DL (ref 8–23)
BUN SERPL-MCNC: 19 MG/DL (ref 8–23)
BUN SERPL-MCNC: 20.4 MG/DL (ref 8–23)
BUN SERPL-MCNC: 20.4 MG/DL (ref 8–23)
BUN SERPL-MCNC: 21.4 MG/DL (ref 8–23)
BUN SERPL-MCNC: 22.5 MG/DL (ref 8–23)
BUN SERPL-MCNC: 7.5 MG/DL (ref 8–23)
BUN SERPL-MCNC: 7.6 MG/DL (ref 8–23)
BUN SERPL-MCNC: 8.6 MG/DL (ref 8–23)
BUN SERPL-MCNC: 9.2 MG/DL (ref 8–23)
BUN SERPL-MCNC: 9.6 MG/DL (ref 8–23)
CALCIUM SERPL-MCNC: 7.3 MG/DL (ref 8.8–10.2)
CALCIUM SERPL-MCNC: 7.4 MG/DL (ref 8.8–10.2)
CALCIUM SERPL-MCNC: 7.8 MG/DL (ref 8.8–10.2)
CALCIUM SERPL-MCNC: 8.1 MG/DL (ref 8.8–10.2)
CALCIUM SERPL-MCNC: 8.1 MG/DL (ref 8.8–10.2)
CALCIUM SERPL-MCNC: 8.2 MG/DL (ref 8.8–10.2)
CALCIUM SERPL-MCNC: 8.2 MG/DL (ref 8.8–10.2)
CALCIUM SERPL-MCNC: 8.3 MG/DL (ref 8.8–10.2)
CALCIUM SERPL-MCNC: 8.5 MG/DL (ref 8.8–10.2)
CALCIUM SERPL-MCNC: 8.6 MG/DL (ref 8.8–10.2)
CALCIUM SERPL-MCNC: 8.7 MG/DL (ref 8.8–10.2)
CALCIUM SERPL-MCNC: 8.8 MG/DL (ref 8.8–10.2)
CALCIUM SERPL-MCNC: 8.9 MG/DL (ref 8.8–10.2)
CALCIUM SERPL-MCNC: 9.1 MG/DL (ref 8.8–10.2)
CALCIUM SERPL-MCNC: 9.1 MG/DL (ref 8.8–10.2)
CHLORIDE SERPL-SCNC: 101 MMOL/L (ref 98–107)
CHLORIDE SERPL-SCNC: 102 MMOL/L (ref 98–107)
CHLORIDE SERPL-SCNC: 103 MMOL/L (ref 98–107)
CHLORIDE SERPL-SCNC: 104 MMOL/L (ref 98–107)
CHLORIDE SERPL-SCNC: 104 MMOL/L (ref 98–107)
CHLORIDE SERPL-SCNC: 105 MMOL/L (ref 98–107)
CHLORIDE SERPL-SCNC: 107 MMOL/L (ref 98–107)
CHLORIDE SERPL-SCNC: 110 MMOL/L (ref 98–107)
CHLORIDE SERPL-SCNC: 93 MMOL/L (ref 98–107)
CHLORIDE SERPL-SCNC: 98 MMOL/L (ref 98–107)
CHLORIDE SERPL-SCNC: 99 MMOL/L (ref 98–107)
CHOLEST SERPL-MCNC: 171 MG/DL
CHOLEST SERPL-MCNC: 171 MG/DL
CK SERPL-CCNC: 102 U/L (ref 26–192)
CK SERPL-CCNC: 75 U/L (ref 26–192)
CODING SYSTEM: NORMAL
COLOR UR AUTO: ABNORMAL
COLOR UR AUTO: ABNORMAL
CREAT SERPL-MCNC: 0.46 MG/DL (ref 0.51–0.95)
CREAT SERPL-MCNC: 0.47 MG/DL (ref 0.51–0.95)
CREAT SERPL-MCNC: 0.47 MG/DL (ref 0.51–0.95)
CREAT SERPL-MCNC: 0.5 MG/DL (ref 0.51–0.95)
CREAT SERPL-MCNC: 0.53 MG/DL (ref 0.51–0.95)
CREAT SERPL-MCNC: 0.55 MG/DL (ref 0.51–0.95)
CREAT SERPL-MCNC: 0.55 MG/DL (ref 0.51–0.95)
CREAT SERPL-MCNC: 0.56 MG/DL (ref 0.51–0.95)
CREAT SERPL-MCNC: 0.57 MG/DL (ref 0.51–0.95)
CREAT SERPL-MCNC: 0.58 MG/DL (ref 0.51–0.95)
CREAT SERPL-MCNC: 0.59 MG/DL (ref 0.51–0.95)
CREAT SERPL-MCNC: 0.6 MG/DL (ref 0.51–0.95)
CREAT SERPL-MCNC: 0.63 MG/DL (ref 0.51–0.95)
CREAT SERPL-MCNC: 0.66 MG/DL (ref 0.51–0.95)
CREAT SERPL-MCNC: 0.68 MG/DL (ref 0.51–0.95)
CREAT SERPL-MCNC: 0.69 MG/DL (ref 0.51–0.95)
CREAT SERPL-MCNC: 0.71 MG/DL (ref 0.51–0.95)
CREAT SERPL-MCNC: 0.72 MG/DL (ref 0.51–0.95)
CREAT SERPL-MCNC: 0.73 MG/DL (ref 0.51–0.95)
CREAT SERPL-MCNC: 0.79 MG/DL (ref 0.51–0.95)
CREAT SERPL-MCNC: 0.8 MG/DL (ref 0.51–0.95)
CREAT SERPL-MCNC: 0.81 MG/DL (ref 0.51–0.95)
CREAT SERPL-MCNC: 0.9 MG/DL (ref 0.51–0.95)
CREAT SERPL-MCNC: 0.91 MG/DL (ref 0.51–0.95)
CROSSMATCH: NORMAL
CRP SERPL-MCNC: 19.79 MG/L
DEPRECATED CALCIDIOL+CALCIFEROL SERPL-MC: 34 UG/L (ref 20–75)
DEPRECATED HCO3 PLAS-SCNC: 21 MMOL/L (ref 22–29)
DEPRECATED HCO3 PLAS-SCNC: 22 MMOL/L (ref 22–29)
DEPRECATED HCO3 PLAS-SCNC: 23 MMOL/L (ref 22–29)
DEPRECATED HCO3 PLAS-SCNC: 24 MMOL/L (ref 22–29)
DEPRECATED HCO3 PLAS-SCNC: 24 MMOL/L (ref 22–29)
DEPRECATED HCO3 PLAS-SCNC: 25 MMOL/L (ref 22–29)
DEPRECATED HCO3 PLAS-SCNC: 26 MMOL/L (ref 22–29)
DEPRECATED HCO3 PLAS-SCNC: 27 MMOL/L (ref 22–29)
DEPRECATED HCO3 PLAS-SCNC: 28 MMOL/L (ref 22–29)
DEPRECATED HCO3 PLAS-SCNC: 28 MMOL/L (ref 22–29)
DEPRECATED HCO3 PLAS-SCNC: 29 MMOL/L (ref 22–29)
DEPRECATED HCO3 PLAS-SCNC: 30 MMOL/L (ref 22–29)
DEPRECATED HCO3 PLAS-SCNC: 30 MMOL/L (ref 22–29)
DIASTOLIC BLOOD PRESSURE - MUSE: NORMAL MMHG
DIASTOLIC BLOOD PRESSURE - MUSE: NORMAL MMHG
EOSINOPHIL # BLD AUTO: 0.2 10E3/UL (ref 0–0.7)
EOSINOPHIL # BLD AUTO: 0.4 10E3/UL (ref 0–0.7)
EOSINOPHIL # BLD AUTO: 0.4 10E3/UL (ref 0–0.7)
EOSINOPHIL # BLD AUTO: 0.5 10E3/UL (ref 0–0.7)
EOSINOPHIL # BLD AUTO: 0.6 10E3/UL (ref 0–0.7)
EOSINOPHIL # BLD AUTO: 0.6 10E3/UL (ref 0–0.7)
EOSINOPHIL # BLD MANUAL: 0.1 10E3/UL (ref 0–0.7)
EOSINOPHIL # BLD MANUAL: 0.2 10E3/UL (ref 0–0.7)
EOSINOPHIL NFR BLD AUTO: 1 %
EOSINOPHIL NFR BLD AUTO: 4 %
EOSINOPHIL NFR BLD AUTO: 4 %
EOSINOPHIL NFR BLD AUTO: 5 %
EOSINOPHIL NFR BLD AUTO: 7 %
EOSINOPHIL NFR BLD AUTO: 8 %
EOSINOPHIL NFR BLD MANUAL: 1 %
EOSINOPHIL NFR BLD MANUAL: 1 %
ERYTHROCYTE [DISTWIDTH] IN BLOOD BY AUTOMATED COUNT: 14.7 % (ref 10–15)
ERYTHROCYTE [DISTWIDTH] IN BLOOD BY AUTOMATED COUNT: 14.9 % (ref 10–15)
ERYTHROCYTE [DISTWIDTH] IN BLOOD BY AUTOMATED COUNT: 15 % (ref 10–15)
ERYTHROCYTE [DISTWIDTH] IN BLOOD BY AUTOMATED COUNT: 15 % (ref 10–15)
ERYTHROCYTE [DISTWIDTH] IN BLOOD BY AUTOMATED COUNT: 15.3 % (ref 10–15)
ERYTHROCYTE [DISTWIDTH] IN BLOOD BY AUTOMATED COUNT: 15.7 % (ref 10–15)
ERYTHROCYTE [DISTWIDTH] IN BLOOD BY AUTOMATED COUNT: 17.6 % (ref 10–15)
ERYTHROCYTE [DISTWIDTH] IN BLOOD BY AUTOMATED COUNT: 17.7 % (ref 10–15)
ERYTHROCYTE [DISTWIDTH] IN BLOOD BY AUTOMATED COUNT: 18 % (ref 10–15)
ERYTHROCYTE [DISTWIDTH] IN BLOOD BY AUTOMATED COUNT: 18.1 % (ref 10–15)
ERYTHROCYTE [DISTWIDTH] IN BLOOD BY AUTOMATED COUNT: 18.3 % (ref 10–15)
ERYTHROCYTE [DISTWIDTH] IN BLOOD BY AUTOMATED COUNT: 18.4 % (ref 10–15)
ERYTHROCYTE [DISTWIDTH] IN BLOOD BY AUTOMATED COUNT: 18.4 % (ref 10–15)
ERYTHROCYTE [DISTWIDTH] IN BLOOD BY AUTOMATED COUNT: 18.5 % (ref 10–15)
ERYTHROCYTE [DISTWIDTH] IN BLOOD BY AUTOMATED COUNT: 18.7 % (ref 10–15)
ERYTHROCYTE [DISTWIDTH] IN BLOOD BY AUTOMATED COUNT: 19.3 % (ref 10–15)
ERYTHROCYTE [DISTWIDTH] IN BLOOD BY AUTOMATED COUNT: 19.5 % (ref 10–15)
ERYTHROCYTE [DISTWIDTH] IN BLOOD BY AUTOMATED COUNT: 19.9 % (ref 10–15)
ERYTHROCYTE [DISTWIDTH] IN BLOOD BY AUTOMATED COUNT: 20.5 % (ref 10–15)
ERYTHROCYTE [DISTWIDTH] IN BLOOD BY AUTOMATED COUNT: 22.4 % (ref 10–15)
FLUAV RNA SPEC QL NAA+PROBE: NEGATIVE
FLUBV RNA RESP QL NAA+PROBE: NEGATIVE
FRAGMENTS BLD QL SMEAR: SLIGHT
FRAGMENTS BLD QL SMEAR: SLIGHT
GFR SERPL CREATININE-BSD FRML MDRD: 62 ML/MIN/1.73M2
GFR SERPL CREATININE-BSD FRML MDRD: 63 ML/MIN/1.73M2
GFR SERPL CREATININE-BSD FRML MDRD: 72 ML/MIN/1.73M2
GFR SERPL CREATININE-BSD FRML MDRD: 73 ML/MIN/1.73M2
GFR SERPL CREATININE-BSD FRML MDRD: 74 ML/MIN/1.73M2
GFR SERPL CREATININE-BSD FRML MDRD: 81 ML/MIN/1.73M2
GFR SERPL CREATININE-BSD FRML MDRD: 83 ML/MIN/1.73M2
GFR SERPL CREATININE-BSD FRML MDRD: 84 ML/MIN/1.73M2
GFR SERPL CREATININE-BSD FRML MDRD: 86 ML/MIN/1.73M2
GFR SERPL CREATININE-BSD FRML MDRD: 86 ML/MIN/1.73M2
GFR SERPL CREATININE-BSD FRML MDRD: 87 ML/MIN/1.73M2
GFR SERPL CREATININE-BSD FRML MDRD: 88 ML/MIN/1.73M2
GFR SERPL CREATININE-BSD FRML MDRD: 89 ML/MIN/1.73M2
GFR SERPL CREATININE-BSD FRML MDRD: 90 ML/MIN/1.73M2
GFR SERPL CREATININE-BSD FRML MDRD: >90 ML/MIN/1.73M2
GLUCOSE BLDC GLUCOMTR-MCNC: 109 MG/DL (ref 70–99)
GLUCOSE BLDC GLUCOMTR-MCNC: 109 MG/DL (ref 70–99)
GLUCOSE BLDC GLUCOMTR-MCNC: 113 MG/DL (ref 70–99)
GLUCOSE BLDC GLUCOMTR-MCNC: 119 MG/DL (ref 70–99)
GLUCOSE BLDC GLUCOMTR-MCNC: 119 MG/DL (ref 70–99)
GLUCOSE BLDC GLUCOMTR-MCNC: 123 MG/DL (ref 70–99)
GLUCOSE BLDC GLUCOMTR-MCNC: 124 MG/DL (ref 70–99)
GLUCOSE BLDC GLUCOMTR-MCNC: 130 MG/DL (ref 70–99)
GLUCOSE BLDC GLUCOMTR-MCNC: 152 MG/DL (ref 70–99)
GLUCOSE BLDC GLUCOMTR-MCNC: 165 MG/DL (ref 70–99)
GLUCOSE BLDC GLUCOMTR-MCNC: 93 MG/DL (ref 70–99)
GLUCOSE BLDC GLUCOMTR-MCNC: 95 MG/DL (ref 70–99)
GLUCOSE BLDC GLUCOMTR-MCNC: 99 MG/DL (ref 70–99)
GLUCOSE SERPL-MCNC: 104 MG/DL (ref 70–99)
GLUCOSE SERPL-MCNC: 104 MG/DL (ref 70–99)
GLUCOSE SERPL-MCNC: 105 MG/DL (ref 70–99)
GLUCOSE SERPL-MCNC: 106 MG/DL (ref 70–99)
GLUCOSE SERPL-MCNC: 106 MG/DL (ref 70–99)
GLUCOSE SERPL-MCNC: 107 MG/DL (ref 70–99)
GLUCOSE SERPL-MCNC: 109 MG/DL (ref 70–99)
GLUCOSE SERPL-MCNC: 110 MG/DL (ref 70–99)
GLUCOSE SERPL-MCNC: 111 MG/DL (ref 70–99)
GLUCOSE SERPL-MCNC: 112 MG/DL (ref 70–99)
GLUCOSE SERPL-MCNC: 117 MG/DL (ref 70–99)
GLUCOSE SERPL-MCNC: 122 MG/DL (ref 70–99)
GLUCOSE SERPL-MCNC: 124 MG/DL (ref 70–99)
GLUCOSE SERPL-MCNC: 130 MG/DL (ref 70–99)
GLUCOSE SERPL-MCNC: 132 MG/DL (ref 70–99)
GLUCOSE SERPL-MCNC: 137 MG/DL (ref 70–99)
GLUCOSE SERPL-MCNC: 138 MG/DL (ref 70–99)
GLUCOSE SERPL-MCNC: 139 MG/DL (ref 70–99)
GLUCOSE SERPL-MCNC: 142 MG/DL (ref 70–99)
GLUCOSE SERPL-MCNC: 158 MG/DL (ref 70–99)
GLUCOSE SERPL-MCNC: 73 MG/DL (ref 70–99)
GLUCOSE SERPL-MCNC: 94 MG/DL (ref 70–99)
GLUCOSE SERPL-MCNC: 98 MG/DL (ref 70–99)
GLUCOSE SERPL-MCNC: 99 MG/DL (ref 70–99)
GLUCOSE UR STRIP-MCNC: NEGATIVE MG/DL
GLUCOSE UR STRIP-MCNC: NEGATIVE MG/DL
HBA1C MFR BLD: 4.9 %
HBA1C MFR BLD: 6.3 %
HCT VFR BLD AUTO: 20 % (ref 35–47)
HCT VFR BLD AUTO: 22.7 % (ref 35–47)
HCT VFR BLD AUTO: 24 % (ref 35–47)
HCT VFR BLD AUTO: 24.1 % (ref 35–47)
HCT VFR BLD AUTO: 25 % (ref 35–47)
HCT VFR BLD AUTO: 25.5 % (ref 35–47)
HCT VFR BLD AUTO: 25.9 % (ref 35–47)
HCT VFR BLD AUTO: 26.6 % (ref 35–47)
HCT VFR BLD AUTO: 26.7 % (ref 35–47)
HCT VFR BLD AUTO: 27.4 % (ref 35–47)
HCT VFR BLD AUTO: 27.5 % (ref 35–47)
HCT VFR BLD AUTO: 29 % (ref 35–47)
HCT VFR BLD AUTO: 30.4 % (ref 35–47)
HCT VFR BLD AUTO: 33.8 % (ref 35–47)
HCT VFR BLD AUTO: 34.7 % (ref 35–47)
HCT VFR BLD AUTO: 34.7 % (ref 35–47)
HCT VFR BLD AUTO: 38.2 % (ref 35–47)
HCT VFR BLD AUTO: 38.2 % (ref 35–47)
HCT VFR BLD AUTO: 38.9 % (ref 35–47)
HCT VFR BLD AUTO: 39.1 % (ref 35–47)
HDLC SERPL-MCNC: 34 MG/DL
HDLC SERPL-MCNC: 42 MG/DL
HGB BLD-MCNC: 10.8 G/DL (ref 11.7–15.7)
HGB BLD-MCNC: 11 G/DL (ref 11.7–15.7)
HGB BLD-MCNC: 12.1 G/DL (ref 11.7–15.7)
HGB BLD-MCNC: 12.7 G/DL (ref 11.7–15.7)
HGB BLD-MCNC: 12.8 G/DL (ref 11.7–15.7)
HGB BLD-MCNC: 13.2 G/DL (ref 11.7–15.7)
HGB BLD-MCNC: 13.2 G/DL (ref 11.7–15.7)
HGB BLD-MCNC: 4.6 G/DL (ref 11.7–15.7)
HGB BLD-MCNC: 7 G/DL (ref 11.7–15.7)
HGB BLD-MCNC: 7.6 G/DL (ref 11.7–15.7)
HGB BLD-MCNC: 7.7 G/DL (ref 11.7–15.7)
HGB BLD-MCNC: 7.7 G/DL (ref 11.7–15.7)
HGB BLD-MCNC: 7.8 G/DL (ref 11.7–15.7)
HGB BLD-MCNC: 7.9 G/DL (ref 11.7–15.7)
HGB BLD-MCNC: 7.9 G/DL (ref 11.7–15.7)
HGB BLD-MCNC: 8 G/DL (ref 11.7–15.7)
HGB BLD-MCNC: 8.1 G/DL (ref 11.7–15.7)
HGB BLD-MCNC: 8.3 G/DL (ref 11.7–15.7)
HGB BLD-MCNC: 8.4 G/DL (ref 11.7–15.7)
HGB BLD-MCNC: 8.5 G/DL (ref 11.7–15.7)
HGB BLD-MCNC: 8.5 G/DL (ref 11.7–15.7)
HGB BLD-MCNC: 8.6 G/DL (ref 11.7–15.7)
HGB BLD-MCNC: 8.7 G/DL (ref 11.7–15.7)
HGB BLD-MCNC: 8.8 G/DL (ref 11.7–15.7)
HGB BLD-MCNC: 8.8 G/DL (ref 11.7–15.7)
HGB BLD-MCNC: 8.9 G/DL (ref 11.7–15.7)
HGB BLD-MCNC: 9 G/DL (ref 11.7–15.7)
HGB BLD-MCNC: 9.1 G/DL (ref 11.7–15.7)
HGB BLD-MCNC: 9.4 G/DL (ref 11.7–15.7)
HGB BLD-MCNC: 9.4 G/DL (ref 11.7–15.7)
HGB BLD-MCNC: 9.6 G/DL (ref 11.7–15.7)
HGB BLD-MCNC: 9.8 G/DL (ref 11.7–15.7)
HGB UR QL STRIP: NEGATIVE
HGB UR QL STRIP: NEGATIVE
HOLD SPECIMEN: NORMAL
IMM GRANULOCYTES # BLD: 0.1 10E3/UL
IMM GRANULOCYTES # BLD: 0.2 10E3/UL
IMM GRANULOCYTES # BLD: 0.3 10E3/UL
IMM GRANULOCYTES # BLD: 0.4 10E3/UL
IMM GRANULOCYTES NFR BLD: 1 %
IMM GRANULOCYTES NFR BLD: 3 %
IMM GRANULOCYTES NFR BLD: 4 %
INR PPP: 0.97 (ref 0.85–1.15)
INR PPP: 1.06 (ref 0.85–1.15)
INTERPRETATION ECG - MUSE: NORMAL
INTERPRETATION ECG - MUSE: NORMAL
ISSUE DATE AND TIME: NORMAL
KETONES UR STRIP-MCNC: NEGATIVE MG/DL
KETONES UR STRIP-MCNC: NEGATIVE MG/DL
LDLC SERPL CALC-MCNC: 104 MG/DL
LDLC SERPL CALC-MCNC: 99 MG/DL
LEUKOCYTE ESTERASE UR QL STRIP: ABNORMAL
LEUKOCYTE ESTERASE UR QL STRIP: NEGATIVE
LVEF ECHO: NORMAL
LVEF ECHO: NORMAL
LYMPHOCYTES # BLD AUTO: 1.7 10E3/UL (ref 0.8–5.3)
LYMPHOCYTES # BLD AUTO: 1.9 10E3/UL (ref 0.8–5.3)
LYMPHOCYTES # BLD AUTO: 2.4 10E3/UL (ref 0.8–5.3)
LYMPHOCYTES # BLD AUTO: 2.9 10E3/UL (ref 0.8–5.3)
LYMPHOCYTES # BLD MANUAL: 2.1 10E3/UL (ref 0.8–5.3)
LYMPHOCYTES # BLD MANUAL: 2.2 10E3/UL (ref 0.8–5.3)
LYMPHOCYTES NFR BLD AUTO: 16 %
LYMPHOCYTES NFR BLD AUTO: 17 %
LYMPHOCYTES NFR BLD AUTO: 18 %
LYMPHOCYTES NFR BLD AUTO: 29 %
LYMPHOCYTES NFR BLD AUTO: 29 %
LYMPHOCYTES NFR BLD AUTO: 32 %
LYMPHOCYTES NFR BLD MANUAL: 14 %
LYMPHOCYTES NFR BLD MANUAL: 14 %
MAGNESIUM SERPL-MCNC: 2.1 MG/DL (ref 1.7–2.3)
MCH RBC QN AUTO: 30.7 PG (ref 26.5–33)
MCH RBC QN AUTO: 31 PG (ref 26.5–33)
MCH RBC QN AUTO: 31 PG (ref 26.5–33)
MCH RBC QN AUTO: 31.2 PG (ref 26.5–33)
MCH RBC QN AUTO: 31.3 PG (ref 26.5–33)
MCH RBC QN AUTO: 31.4 PG (ref 26.5–33)
MCH RBC QN AUTO: 31.4 PG (ref 26.5–33)
MCH RBC QN AUTO: 31.5 PG (ref 26.5–33)
MCH RBC QN AUTO: 31.6 PG (ref 26.5–33)
MCH RBC QN AUTO: 31.7 PG (ref 26.5–33)
MCH RBC QN AUTO: 32.2 PG (ref 26.5–33)
MCH RBC QN AUTO: 32.2 PG (ref 26.5–33)
MCH RBC QN AUTO: 32.3 PG (ref 26.5–33)
MCH RBC QN AUTO: 32.3 PG (ref 26.5–33)
MCH RBC QN AUTO: 32.4 PG (ref 26.5–33)
MCHC RBC AUTO-ENTMCNC: 31.6 G/DL (ref 31.5–36.5)
MCHC RBC AUTO-ENTMCNC: 31.7 G/DL (ref 31.5–36.5)
MCHC RBC AUTO-ENTMCNC: 32 G/DL (ref 31.5–36.5)
MCHC RBC AUTO-ENTMCNC: 32 G/DL (ref 31.5–36.5)
MCHC RBC AUTO-ENTMCNC: 32.5 G/DL (ref 31.5–36.5)
MCHC RBC AUTO-ENTMCNC: 32.7 G/DL (ref 31.5–36.5)
MCHC RBC AUTO-ENTMCNC: 32.8 G/DL (ref 31.5–36.5)
MCHC RBC AUTO-ENTMCNC: 33 G/DL (ref 31.5–36.5)
MCHC RBC AUTO-ENTMCNC: 33 G/DL (ref 31.5–36.5)
MCHC RBC AUTO-ENTMCNC: 33.2 G/DL (ref 31.5–36.5)
MCHC RBC AUTO-ENTMCNC: 33.2 G/DL (ref 31.5–36.5)
MCHC RBC AUTO-ENTMCNC: 33.5 G/DL (ref 31.5–36.5)
MCHC RBC AUTO-ENTMCNC: 33.8 G/DL (ref 31.5–36.5)
MCHC RBC AUTO-ENTMCNC: 33.8 G/DL (ref 31.5–36.5)
MCHC RBC AUTO-ENTMCNC: 33.9 G/DL (ref 31.5–36.5)
MCHC RBC AUTO-ENTMCNC: 33.9 G/DL (ref 31.5–36.5)
MCHC RBC AUTO-ENTMCNC: 34.1 G/DL (ref 31.5–36.5)
MCHC RBC AUTO-ENTMCNC: 34.4 G/DL (ref 31.5–36.5)
MCHC RBC AUTO-ENTMCNC: 34.9 G/DL (ref 31.5–36.5)
MCHC RBC AUTO-ENTMCNC: 35 G/DL (ref 31.5–36.5)
MCV RBC AUTO: 103 FL (ref 78–100)
MCV RBC AUTO: 89 FL (ref 78–100)
MCV RBC AUTO: 91 FL (ref 78–100)
MCV RBC AUTO: 92 FL (ref 78–100)
MCV RBC AUTO: 93 FL (ref 78–100)
MCV RBC AUTO: 93 FL (ref 78–100)
MCV RBC AUTO: 94 FL (ref 78–100)
MCV RBC AUTO: 95 FL (ref 78–100)
MCV RBC AUTO: 96 FL (ref 78–100)
MCV RBC AUTO: 97 FL (ref 78–100)
MCV RBC AUTO: 99 FL (ref 78–100)
MCV RBC AUTO: 99 FL (ref 78–100)
METAMYELOCYTES # BLD MANUAL: 0.3 10E3/UL
METAMYELOCYTES NFR BLD MANUAL: 2 %
MONOCYTES # BLD AUTO: 0.6 10E3/UL (ref 0–1.3)
MONOCYTES # BLD AUTO: 0.6 10E3/UL (ref 0–1.3)
MONOCYTES # BLD AUTO: 0.7 10E3/UL (ref 0–1.3)
MONOCYTES # BLD AUTO: 0.7 10E3/UL (ref 0–1.3)
MONOCYTES # BLD AUTO: 0.8 10E3/UL (ref 0–1.3)
MONOCYTES # BLD AUTO: 0.9 10E3/UL (ref 0–1.3)
MONOCYTES # BLD MANUAL: 0.4 10E3/UL (ref 0–1.3)
MONOCYTES # BLD MANUAL: 0.5 10E3/UL (ref 0–1.3)
MONOCYTES NFR BLD AUTO: 4 %
MONOCYTES NFR BLD AUTO: 7 %
MONOCYTES NFR BLD AUTO: 8 %
MONOCYTES NFR BLD MANUAL: 3 %
MONOCYTES NFR BLD MANUAL: 3 %
MUCOUS THREADS #/AREA URNS LPF: PRESENT /LPF
MYELOCYTES # BLD MANUAL: 0.3 10E3/UL
MYELOCYTES NFR BLD MANUAL: 2 %
NEUTROPHILS # BLD AUTO: 12 10E3/UL (ref 1.6–8.3)
NEUTROPHILS # BLD AUTO: 3.9 10E3/UL (ref 1.6–8.3)
NEUTROPHILS # BLD AUTO: 4.5 10E3/UL (ref 1.6–8.3)
NEUTROPHILS # BLD AUTO: 4.6 10E3/UL (ref 1.6–8.3)
NEUTROPHILS # BLD AUTO: 7.1 10E3/UL (ref 1.6–8.3)
NEUTROPHILS # BLD AUTO: 7.1 10E3/UL (ref 1.6–8.3)
NEUTROPHILS # BLD MANUAL: 11.6 10E3/UL (ref 1.6–8.3)
NEUTROPHILS # BLD MANUAL: 12.5 10E3/UL (ref 1.6–8.3)
NEUTROPHILS NFR BLD AUTO: 51 %
NEUTROPHILS NFR BLD AUTO: 54 %
NEUTROPHILS NFR BLD AUTO: 55 %
NEUTROPHILS NFR BLD AUTO: 66 %
NEUTROPHILS NFR BLD AUTO: 68 %
NEUTROPHILS NFR BLD AUTO: 75 %
NEUTROPHILS NFR BLD MANUAL: 78 %
NEUTROPHILS NFR BLD MANUAL: 78 %
NITRATE UR QL: NEGATIVE
NITRATE UR QL: NEGATIVE
NONHDLC SERPL-MCNC: 129 MG/DL
NONHDLC SERPL-MCNC: 137 MG/DL
NRBC # BLD AUTO: 0 10E3/UL
NRBC # BLD AUTO: 0.1 10E3/UL
NRBC BLD AUTO-RTO: 0 /100
NRBC BLD AUTO-RTO: 1 /100
NRBC BLD MANUAL-RTO: 1 %
NT-PROBNP SERPL-MCNC: 1405 PG/ML (ref 0–1800)
P AXIS - MUSE: 41 DEGREES
P AXIS - MUSE: NORMAL DEGREES
PA ADP BLD-ACNC: 10 PRU
PH UR STRIP: 6 [PH] (ref 5–7)
PH UR STRIP: 7 [PH] (ref 5–7)
PHOSPHATE SERPL-MCNC: 3 MG/DL (ref 2.5–4.5)
PLAT MORPH BLD: ABNORMAL
PLAT MORPH BLD: ABNORMAL
PLATELET # BLD AUTO: 164 10E3/UL (ref 150–450)
PLATELET # BLD AUTO: 226 10E3/UL (ref 150–450)
PLATELET # BLD AUTO: 235 10E3/UL (ref 150–450)
PLATELET # BLD AUTO: 241 10E3/UL (ref 150–450)
PLATELET # BLD AUTO: 258 10E3/UL (ref 150–450)
PLATELET # BLD AUTO: 277 10E3/UL (ref 150–450)
PLATELET # BLD AUTO: 278 10E3/UL (ref 150–450)
PLATELET # BLD AUTO: 285 10E3/UL (ref 150–450)
PLATELET # BLD AUTO: 310 10E3/UL (ref 150–450)
PLATELET # BLD AUTO: 311 10E3/UL (ref 150–450)
PLATELET # BLD AUTO: 332 10E3/UL (ref 150–450)
PLATELET # BLD AUTO: 334 10E3/UL (ref 150–450)
PLATELET # BLD AUTO: 353 10E3/UL (ref 150–450)
PLATELET # BLD AUTO: 356 10E3/UL (ref 150–450)
PLATELET # BLD AUTO: 388 10E3/UL (ref 150–450)
PLATELET # BLD AUTO: 417 10E3/UL (ref 150–450)
PLATELET # BLD AUTO: 571 10E3/UL (ref 150–450)
PLATELET # BLD AUTO: 589 10E3/UL (ref 150–450)
PLATELET # BLD AUTO: 599 10E3/UL (ref 150–450)
PLATELET # BLD AUTO: 607 10E3/UL (ref 150–450)
PLATELET # BLD AUTO: 634 10E3/UL (ref 150–450)
PLATELET # BLD AUTO: 641 10E3/UL (ref 150–450)
PLATELET # BLD AUTO: 706 10E3/UL (ref 150–450)
POLYCHROMASIA BLD QL SMEAR: SLIGHT
POLYCHROMASIA BLD QL SMEAR: SLIGHT
POTASSIUM SERPL-SCNC: 3.2 MMOL/L (ref 3.4–5.3)
POTASSIUM SERPL-SCNC: 3.2 MMOL/L (ref 3.4–5.3)
POTASSIUM SERPL-SCNC: 3.3 MMOL/L (ref 3.4–5.3)
POTASSIUM SERPL-SCNC: 3.4 MMOL/L (ref 3.4–5.3)
POTASSIUM SERPL-SCNC: 3.5 MMOL/L (ref 3.4–5.3)
POTASSIUM SERPL-SCNC: 3.6 MMOL/L (ref 3.4–5.3)
POTASSIUM SERPL-SCNC: 3.7 MMOL/L (ref 3.4–5.3)
POTASSIUM SERPL-SCNC: 3.8 MMOL/L (ref 3.4–5.3)
POTASSIUM SERPL-SCNC: 3.8 MMOL/L (ref 3.4–5.3)
POTASSIUM SERPL-SCNC: 3.9 MMOL/L (ref 3.4–5.3)
POTASSIUM SERPL-SCNC: 4 MMOL/L (ref 3.4–5.3)
POTASSIUM SERPL-SCNC: 4.1 MMOL/L (ref 3.4–5.3)
POTASSIUM SERPL-SCNC: 4.3 MMOL/L (ref 3.4–5.3)
POTASSIUM SERPL-SCNC: 4.3 MMOL/L (ref 3.4–5.3)
POTASSIUM SERPL-SCNC: 4.4 MMOL/L (ref 3.4–5.3)
POTASSIUM SERPL-SCNC: 4.5 MMOL/L (ref 3.4–5.3)
POTASSIUM SERPL-SCNC: 4.7 MMOL/L (ref 3.4–5.3)
PR INTERVAL - MUSE: 176 MS
PR INTERVAL - MUSE: NORMAL MS
PROCALCITONIN SERPL IA-MCNC: 0.04 NG/ML
PROCALCITONIN SERPL IA-MCNC: 0.12 NG/ML
PROT SERPL-MCNC: 4.6 G/DL (ref 6.4–8.3)
PROT SERPL-MCNC: 4.7 G/DL (ref 6.4–8.3)
PROT SERPL-MCNC: 5 G/DL (ref 6.4–8.3)
PROT SERPL-MCNC: 5.3 G/DL (ref 6.4–8.3)
PROT SERPL-MCNC: 5.4 G/DL (ref 6.4–8.3)
PROT SERPL-MCNC: 6 G/DL (ref 6.4–8.3)
PROT SERPL-MCNC: 6 G/DL (ref 6.4–8.3)
PROT SERPL-MCNC: 6.2 G/DL (ref 6.4–8.3)
PROT SERPL-MCNC: 6.5 G/DL (ref 6.4–8.3)
QRS DURATION - MUSE: 120 MS
QRS DURATION - MUSE: 132 MS
QT - MUSE: 410 MS
QT - MUSE: 418 MS
QTC - MUSE: 467 MS
QTC - MUSE: 508 MS
R AXIS - MUSE: -52 DEGREES
R AXIS - MUSE: -56 DEGREES
RADIOLOGIST FLAGS: ABNORMAL
RBC # BLD AUTO: 2.24 10E6/UL (ref 3.8–5.2)
RBC # BLD AUTO: 2.47 10E6/UL (ref 3.8–5.2)
RBC # BLD AUTO: 2.48 10E6/UL (ref 3.8–5.2)
RBC # BLD AUTO: 2.52 10E6/UL (ref 3.8–5.2)
RBC # BLD AUTO: 2.69 10E6/UL (ref 3.8–5.2)
RBC # BLD AUTO: 2.74 10E6/UL (ref 3.8–5.2)
RBC # BLD AUTO: 2.75 10E6/UL (ref 3.8–5.2)
RBC # BLD AUTO: 2.78 10E6/UL (ref 3.8–5.2)
RBC # BLD AUTO: 2.83 10E6/UL (ref 3.8–5.2)
RBC # BLD AUTO: 2.87 10E6/UL (ref 3.8–5.2)
RBC # BLD AUTO: 2.9 10E6/UL (ref 3.8–5.2)
RBC # BLD AUTO: 2.96 10E6/UL (ref 3.8–5.2)
RBC # BLD AUTO: 3.04 10E6/UL (ref 3.8–5.2)
RBC # BLD AUTO: 3.42 10E6/UL (ref 3.8–5.2)
RBC # BLD AUTO: 3.5 10E6/UL (ref 3.8–5.2)
RBC # BLD AUTO: 3.74 10E6/UL (ref 3.8–5.2)
RBC # BLD AUTO: 3.92 10E6/UL (ref 3.8–5.2)
RBC # BLD AUTO: 3.97 10E6/UL (ref 3.8–5.2)
RBC # BLD AUTO: 4.08 10E6/UL (ref 3.8–5.2)
RBC # BLD AUTO: 4.09 10E6/UL (ref 3.8–5.2)
RBC MORPH BLD: ABNORMAL
RBC MORPH BLD: ABNORMAL
RBC URINE: 2 /HPF
RBC URINE: 5 /HPF
RSV RNA SPEC NAA+PROBE: NEGATIVE
SARS-COV-2 RNA RESP QL NAA+PROBE: NEGATIVE
SODIUM SERPL-SCNC: 132 MMOL/L (ref 136–145)
SODIUM SERPL-SCNC: 133 MMOL/L (ref 136–145)
SODIUM SERPL-SCNC: 135 MMOL/L (ref 136–145)
SODIUM SERPL-SCNC: 136 MMOL/L (ref 136–145)
SODIUM SERPL-SCNC: 136 MMOL/L (ref 136–145)
SODIUM SERPL-SCNC: 137 MMOL/L (ref 136–145)
SODIUM SERPL-SCNC: 138 MMOL/L (ref 136–145)
SODIUM SERPL-SCNC: 139 MMOL/L (ref 136–145)
SODIUM SERPL-SCNC: 140 MMOL/L (ref 136–145)
SODIUM SERPL-SCNC: 142 MMOL/L (ref 136–145)
SP GR UR STRIP: 1 (ref 1–1.03)
SP GR UR STRIP: 1.02 (ref 1–1.03)
SPECIMEN EXPIRATION DATE: NORMAL
SQUAMOUS EPITHELIAL: 1 /HPF
SQUAMOUS EPITHELIAL: <1 /HPF
SYSTOLIC BLOOD PRESSURE - MUSE: NORMAL MMHG
SYSTOLIC BLOOD PRESSURE - MUSE: NORMAL MMHG
T AXIS - MUSE: 87 DEGREES
T AXIS - MUSE: 96 DEGREES
T4 FREE SERPL-MCNC: 1.24 NG/DL (ref 0.9–1.7)
TRANSITIONAL EPI: 2 /HPF
TRIGL SERPL-MCNC: 124 MG/DL
TRIGL SERPL-MCNC: 190 MG/DL
TROPONIN T SERPL HS-MCNC: 12 NG/L
TSH SERPL DL<=0.005 MIU/L-ACNC: 5 UIU/ML (ref 0.3–4.2)
UNIT ABO/RH: NORMAL
UNIT NUMBER: NORMAL
UNIT STATUS: NORMAL
UNIT TYPE ISBT: 600
UNIT TYPE ISBT: 600
UNIT TYPE ISBT: 6200
UROBILINOGEN UR STRIP-MCNC: NORMAL MG/DL
UROBILINOGEN UR STRIP-MCNC: NORMAL MG/DL
VENTRICULAR RATE- MUSE: 78 BPM
VENTRICULAR RATE- MUSE: 89 BPM
WBC # BLD AUTO: 10.4 10E3/UL (ref 4–11)
WBC # BLD AUTO: 10.7 10E3/UL (ref 4–11)
WBC # BLD AUTO: 10.8 10E3/UL (ref 4–11)
WBC # BLD AUTO: 10.8 10E3/UL (ref 4–11)
WBC # BLD AUTO: 12.7 10E3/UL (ref 4–11)
WBC # BLD AUTO: 12.9 10E3/UL (ref 4–11)
WBC # BLD AUTO: 13.3 10E3/UL (ref 4–11)
WBC # BLD AUTO: 13.5 10E3/UL (ref 4–11)
WBC # BLD AUTO: 14.7 10E3/UL (ref 4–11)
WBC # BLD AUTO: 14.9 10E3/UL (ref 4–11)
WBC # BLD AUTO: 15.7 10E3/UL (ref 4–11)
WBC # BLD AUTO: 16 10E3/UL (ref 4–11)
WBC # BLD AUTO: 16.1 10E3/UL (ref 4–11)
WBC # BLD AUTO: 20.4 10E3/UL (ref 4–11)
WBC # BLD AUTO: 7.6 10E3/UL (ref 4–11)
WBC # BLD AUTO: 8.2 10E3/UL (ref 4–11)
WBC # BLD AUTO: 8.3 10E3/UL (ref 4–11)
WBC # BLD AUTO: 8.3 10E3/UL (ref 4–11)
WBC # BLD AUTO: 8.9 10E3/UL (ref 4–11)
WBC # BLD AUTO: 9.7 10E3/UL (ref 4–11)
WBC # BLD AUTO: 9.8 10E3/UL (ref 4–11)
WBC URINE: 74 /HPF
WBC URINE: <1 /HPF

## 2023-01-01 PROCEDURE — 85014 HEMATOCRIT: CPT | Performed by: STUDENT IN AN ORGANIZED HEALTH CARE EDUCATION/TRAINING PROGRAM

## 2023-01-01 PROCEDURE — 99310 SBSQ NF CARE HIGH MDM 45: CPT | Performed by: NURSE PRACTITIONER

## 2023-01-01 PROCEDURE — 70450 CT HEAD/BRAIN W/O DYE: CPT

## 2023-01-01 PROCEDURE — 95816 EEG AWAKE AND DROWSY: CPT

## 2023-01-01 PROCEDURE — 85007 BL SMEAR W/DIFF WBC COUNT: CPT | Performed by: HOSPITALIST

## 2023-01-01 PROCEDURE — 120N000001 HC R&B MED SURG/OB

## 2023-01-01 PROCEDURE — 97161 PT EVAL LOW COMPLEX 20 MIN: CPT | Mod: GP

## 2023-01-01 PROCEDURE — 82565 ASSAY OF CREATININE: CPT

## 2023-01-01 PROCEDURE — 99233 SBSQ HOSP IP/OBS HIGH 50: CPT | Mod: GC | Performed by: PSYCHIATRY & NEUROLOGY

## 2023-01-01 PROCEDURE — 250N000011 HC RX IP 250 OP 636: Performed by: PHYSICIAN ASSISTANT

## 2023-01-01 PROCEDURE — 80048 BASIC METABOLIC PNL TOTAL CA: CPT | Performed by: STUDENT IN AN ORGANIZED HEALTH CARE EDUCATION/TRAINING PROGRAM

## 2023-01-01 PROCEDURE — 250N000013 HC RX MED GY IP 250 OP 250 PS 637: Performed by: STUDENT IN AN ORGANIZED HEALTH CARE EDUCATION/TRAINING PROGRAM

## 2023-01-01 PROCEDURE — 99207 EEG VIDEO 12-26 HR UNMONITORED: CPT | Performed by: PSYCHIATRY & NEUROLOGY

## 2023-01-01 PROCEDURE — 99212 OFFICE O/P EST SF 10 MIN: CPT | Mod: 95 | Performed by: INTERNAL MEDICINE

## 2023-01-01 PROCEDURE — 99232 SBSQ HOSP IP/OBS MODERATE 35: CPT | Performed by: STUDENT IN AN ORGANIZED HEALTH CARE EDUCATION/TRAINING PROGRAM

## 2023-01-01 PROCEDURE — 71045 X-RAY EXAM CHEST 1 VIEW: CPT

## 2023-01-01 PROCEDURE — 250N000013 HC RX MED GY IP 250 OP 250 PS 637: Performed by: PHYSICIAN ASSISTANT

## 2023-01-01 PROCEDURE — 97110 THERAPEUTIC EXERCISES: CPT | Mod: GO

## 2023-01-01 PROCEDURE — 80053 COMPREHEN METABOLIC PANEL: CPT | Performed by: EMERGENCY MEDICINE

## 2023-01-01 PROCEDURE — G0378 HOSPITAL OBSERVATION PER HR: HCPCS

## 2023-01-01 PROCEDURE — 258N000003 HC RX IP 258 OP 636: Performed by: STUDENT IN AN ORGANIZED HEALTH CARE EDUCATION/TRAINING PROGRAM

## 2023-01-01 PROCEDURE — 250N000013 HC RX MED GY IP 250 OP 250 PS 637

## 2023-01-01 PROCEDURE — 250N000013 HC RX MED GY IP 250 OP 250 PS 637: Performed by: NURSE PRACTITIONER

## 2023-01-01 PROCEDURE — 99231 SBSQ HOSP IP/OBS SF/LOW 25: CPT | Performed by: STUDENT IN AN ORGANIZED HEALTH CARE EDUCATION/TRAINING PROGRAM

## 2023-01-01 PROCEDURE — 99223 1ST HOSP IP/OBS HIGH 75: CPT | Mod: GC | Performed by: PSYCHIATRY & NEUROLOGY

## 2023-01-01 PROCEDURE — 250N000013 HC RX MED GY IP 250 OP 250 PS 637: Performed by: HOSPITALIST

## 2023-01-01 PROCEDURE — 93005 ELECTROCARDIOGRAM TRACING: CPT

## 2023-01-01 PROCEDURE — 83036 HEMOGLOBIN GLYCOSYLATED A1C: CPT | Performed by: INTERNAL MEDICINE

## 2023-01-01 PROCEDURE — 96376 TX/PRO/DX INJ SAME DRUG ADON: CPT

## 2023-01-01 PROCEDURE — 80048 BASIC METABOLIC PNL TOTAL CA: CPT | Performed by: HOSPITALIST

## 2023-01-01 PROCEDURE — 82962 GLUCOSE BLOOD TEST: CPT

## 2023-01-01 PROCEDURE — 97535 SELF CARE MNGMENT TRAINING: CPT | Mod: GO

## 2023-01-01 PROCEDURE — 97116 GAIT TRAINING THERAPY: CPT | Mod: GP

## 2023-01-01 PROCEDURE — 70496 CT ANGIOGRAPHY HEAD: CPT

## 2023-01-01 PROCEDURE — 99233 SBSQ HOSP IP/OBS HIGH 50: CPT | Performed by: INTERNAL MEDICINE

## 2023-01-01 PROCEDURE — 84295 ASSAY OF SERUM SODIUM: CPT | Performed by: INTERNAL MEDICINE

## 2023-01-01 PROCEDURE — 78815 PET IMAGE W/CT SKULL-THIGH: CPT | Mod: PI

## 2023-01-01 PROCEDURE — 80053 COMPREHEN METABOLIC PANEL: CPT | Performed by: INTERNAL MEDICINE

## 2023-01-01 PROCEDURE — 360N000084 HC SURGERY LEVEL 4 W/ FLUORO, PER MIN: Performed by: STUDENT IN AN ORGANIZED HEALTH CARE EDUCATION/TRAINING PROGRAM

## 2023-01-01 PROCEDURE — 80048 BASIC METABOLIC PNL TOTAL CA: CPT | Performed by: NURSE PRACTITIONER

## 2023-01-01 PROCEDURE — 36415 COLL VENOUS BLD VENIPUNCTURE: CPT | Performed by: INTERNAL MEDICINE

## 2023-01-01 PROCEDURE — 99347 HOME/RES VST EST SF MDM 20: CPT | Performed by: NURSE PRACTITIONER

## 2023-01-01 PROCEDURE — 92610 EVALUATE SWALLOWING FUNCTION: CPT | Mod: GN

## 2023-01-01 PROCEDURE — 70549 MR ANGIOGRAPH NECK W/O&W/DYE: CPT

## 2023-01-01 PROCEDURE — 97530 THERAPEUTIC ACTIVITIES: CPT | Mod: GP

## 2023-01-01 PROCEDURE — 250N000009 HC RX 250: Performed by: RADIOLOGY

## 2023-01-01 PROCEDURE — 36415 COLL VENOUS BLD VENIPUNCTURE: CPT | Performed by: EMERGENCY MEDICINE

## 2023-01-01 PROCEDURE — 92526 ORAL FUNCTION THERAPY: CPT | Mod: GN | Performed by: SPEECH-LANGUAGE PATHOLOGIST

## 2023-01-01 PROCEDURE — 250N000009 HC RX 250: Performed by: PHYSICIAN ASSISTANT

## 2023-01-01 PROCEDURE — G0463 HOSPITAL OUTPT CLINIC VISIT: HCPCS

## 2023-01-01 PROCEDURE — 250N000013 HC RX MED GY IP 250 OP 250 PS 637: Performed by: INTERNAL MEDICINE

## 2023-01-01 PROCEDURE — 85018 HEMOGLOBIN: CPT | Performed by: STUDENT IN AN ORGANIZED HEALTH CARE EDUCATION/TRAINING PROGRAM

## 2023-01-01 PROCEDURE — 999N000127 HC STATISTIC PERIPHERAL IV START W US GUIDANCE

## 2023-01-01 PROCEDURE — 93306 TTE W/DOPPLER COMPLETE: CPT

## 2023-01-01 PROCEDURE — 93010 ELECTROCARDIOGRAM REPORT: CPT | Performed by: INTERNAL MEDICINE

## 2023-01-01 PROCEDURE — 250N000011 HC RX IP 250 OP 636: Performed by: INTERNAL MEDICINE

## 2023-01-01 PROCEDURE — C1769 GUIDE WIRE: HCPCS | Performed by: STUDENT IN AN ORGANIZED HEALTH CARE EDUCATION/TRAINING PROGRAM

## 2023-01-01 PROCEDURE — 36415 COLL VENOUS BLD VENIPUNCTURE: CPT | Performed by: NURSE PRACTITIONER

## 2023-01-01 PROCEDURE — 200N000001 HC R&B ICU

## 2023-01-01 PROCEDURE — 84484 ASSAY OF TROPONIN QUANT: CPT | Performed by: EMERGENCY MEDICINE

## 2023-01-01 PROCEDURE — 97116 GAIT TRAINING THERAPY: CPT | Mod: GP | Performed by: PHYSICAL THERAPIST

## 2023-01-01 PROCEDURE — 72170 X-RAY EXAM OF PELVIS: CPT

## 2023-01-01 PROCEDURE — 93321 DOPPLER ECHO F-UP/LMTD STD: CPT | Mod: 26 | Performed by: INTERNAL MEDICINE

## 2023-01-01 PROCEDURE — 36415 COLL VENOUS BLD VENIPUNCTURE: CPT

## 2023-01-01 PROCEDURE — 71046 X-RAY EXAM CHEST 2 VIEWS: CPT

## 2023-01-01 PROCEDURE — 86923 COMPATIBILITY TEST ELECTRIC: CPT | Performed by: PHYSICIAN ASSISTANT

## 2023-01-01 PROCEDURE — 72072 X-RAY EXAM THORAC SPINE 3VWS: CPT

## 2023-01-01 PROCEDURE — 99232 SBSQ HOSP IP/OBS MODERATE 35: CPT | Performed by: PHYSICIAN ASSISTANT

## 2023-01-01 PROCEDURE — 99232 SBSQ HOSP IP/OBS MODERATE 35: CPT | Performed by: INTERNAL MEDICINE

## 2023-01-01 PROCEDURE — 93308 TTE F-UP OR LMTD: CPT

## 2023-01-01 PROCEDURE — 250N000011 HC RX IP 250 OP 636: Performed by: NURSE ANESTHETIST, CERTIFIED REGISTERED

## 2023-01-01 PROCEDURE — 99232 SBSQ HOSP IP/OBS MODERATE 35: CPT | Mod: GC | Performed by: STUDENT IN AN ORGANIZED HEALTH CARE EDUCATION/TRAINING PROGRAM

## 2023-01-01 PROCEDURE — 84132 ASSAY OF SERUM POTASSIUM: CPT | Performed by: INTERNAL MEDICINE

## 2023-01-01 PROCEDURE — 99223 1ST HOSP IP/OBS HIGH 75: CPT | Performed by: NURSE PRACTITIONER

## 2023-01-01 PROCEDURE — 97530 THERAPEUTIC ACTIVITIES: CPT | Mod: GP | Performed by: PHYSICAL THERAPIST

## 2023-01-01 PROCEDURE — 95720 EEG PHY/QHP EA INCR W/VEEG: CPT | Performed by: PSYCHIATRY & NEUROLOGY

## 2023-01-01 PROCEDURE — 36415 COLL VENOUS BLD VENIPUNCTURE: CPT | Performed by: STUDENT IN AN ORGANIZED HEALTH CARE EDUCATION/TRAINING PROGRAM

## 2023-01-01 PROCEDURE — 99232 SBSQ HOSP IP/OBS MODERATE 35: CPT | Mod: GC | Performed by: PSYCHIATRY & NEUROLOGY

## 2023-01-01 PROCEDURE — 36415 COLL VENOUS BLD VENIPUNCTURE: CPT | Performed by: HOSPITALIST

## 2023-01-01 PROCEDURE — 85027 COMPLETE CBC AUTOMATED: CPT | Performed by: NURSE PRACTITIONER

## 2023-01-01 PROCEDURE — 85025 COMPLETE CBC W/AUTO DIFF WBC: CPT | Performed by: EMERGENCY MEDICINE

## 2023-01-01 PROCEDURE — 255N000002 HC RX 255 OP 636: Performed by: NEUROLOGICAL SURGERY

## 2023-01-01 PROCEDURE — 85018 HEMOGLOBIN: CPT

## 2023-01-01 PROCEDURE — 255N000002 HC RX 255 OP 636: Performed by: INTERNAL MEDICINE

## 2023-01-01 PROCEDURE — 3E033XZ INTRODUCTION OF VASOPRESSOR INTO PERIPHERAL VEIN, PERCUTANEOUS APPROACH: ICD-10-PCS | Performed by: NEUROLOGICAL SURGERY

## 2023-01-01 PROCEDURE — 85027 COMPLETE CBC AUTOMATED: CPT | Performed by: STUDENT IN AN ORGANIZED HEALTH CARE EDUCATION/TRAINING PROGRAM

## 2023-01-01 PROCEDURE — 72070 X-RAY EXAM THORAC SPINE 2VWS: CPT

## 2023-01-01 PROCEDURE — 999N000179 XR SURGERY CARM FLUORO LESS THAN 5 MIN W STILLS

## 2023-01-01 PROCEDURE — 82550 ASSAY OF CK (CPK): CPT | Mod: ORL | Performed by: INTERNAL MEDICINE

## 2023-01-01 PROCEDURE — 61630 BALO ANGIOPLASTY ICR PERQ: CPT | Mod: GC | Performed by: NEUROLOGICAL SURGERY

## 2023-01-01 PROCEDURE — 99316 NF DSCHRG MGMT 30 MIN+: CPT | Performed by: NURSE PRACTITIONER

## 2023-01-01 PROCEDURE — 92526 ORAL FUNCTION THERAPY: CPT | Mod: GN

## 2023-01-01 PROCEDURE — 99285 EMERGENCY DEPT VISIT HI MDM: CPT | Mod: 25

## 2023-01-01 PROCEDURE — B3171ZZ FLUOROSCOPY OF LEFT INTERNAL CAROTID ARTERY USING LOW OSMOLAR CONTRAST: ICD-10-PCS | Performed by: NEUROLOGICAL SURGERY

## 2023-01-01 PROCEDURE — P9045 ALBUMIN (HUMAN), 5%, 250 ML: HCPCS | Performed by: NURSE ANESTHETIST, CERTIFIED REGISTERED

## 2023-01-01 PROCEDURE — 99285 EMERGENCY DEPT VISIT HI MDM: CPT | Mod: CS,25

## 2023-01-01 PROCEDURE — 250N000011 HC RX IP 250 OP 636: Performed by: NURSE PRACTITIONER

## 2023-01-01 PROCEDURE — 87040 BLOOD CULTURE FOR BACTERIA: CPT | Performed by: EMERGENCY MEDICINE

## 2023-01-01 PROCEDURE — 258N000003 HC RX IP 258 OP 636

## 2023-01-01 PROCEDURE — 96372 THER/PROPH/DIAG INJ SC/IM: CPT | Performed by: PHYSICIAN ASSISTANT

## 2023-01-01 PROCEDURE — 83880 ASSAY OF NATRIURETIC PEPTIDE: CPT | Performed by: INTERNAL MEDICINE

## 2023-01-01 PROCEDURE — 99231 SBSQ HOSP IP/OBS SF/LOW 25: CPT | Performed by: INTERNAL MEDICINE

## 2023-01-01 PROCEDURE — 80048 BASIC METABOLIC PNL TOTAL CA: CPT | Performed by: INTERNAL MEDICINE

## 2023-01-01 PROCEDURE — 85018 HEMOGLOBIN: CPT | Performed by: HOSPITALIST

## 2023-01-01 PROCEDURE — 97530 THERAPEUTIC ACTIVITIES: CPT | Mod: GO

## 2023-01-01 PROCEDURE — 85027 COMPLETE CBC AUTOMATED: CPT | Performed by: HOSPITALIST

## 2023-01-01 PROCEDURE — 84443 ASSAY THYROID STIM HORMONE: CPT | Performed by: HOSPITALIST

## 2023-01-01 PROCEDURE — 87088 URINE BACTERIA CULTURE: CPT | Performed by: HOSPITALIST

## 2023-01-01 PROCEDURE — 258N000003 HC RX IP 258 OP 636: Performed by: NURSE ANESTHETIST, CERTIFIED REGISTERED

## 2023-01-01 PROCEDURE — 85018 HEMOGLOBIN: CPT | Performed by: INTERNAL MEDICINE

## 2023-01-01 PROCEDURE — 0PSKXZZ REPOSITION RIGHT ULNA, EXTERNAL APPROACH: ICD-10-PCS | Performed by: EMERGENCY MEDICINE

## 2023-01-01 PROCEDURE — 70553 MRI BRAIN STEM W/O & W/DYE: CPT

## 2023-01-01 PROCEDURE — 85049 AUTOMATED PLATELET COUNT: CPT

## 2023-01-01 PROCEDURE — B3141ZZ FLUOROSCOPY OF LEFT COMMON CAROTID ARTERY USING LOW OSMOLAR CONTRAST: ICD-10-PCS | Performed by: NEUROLOGICAL SURGERY

## 2023-01-01 PROCEDURE — 25605 CLTX DST RDL FX/EPHYS SEP W/: CPT | Mod: RT

## 2023-01-01 PROCEDURE — 250N000009 HC RX 250

## 2023-01-01 PROCEDURE — 86923 COMPATIBILITY TEST ELECTRIC: CPT

## 2023-01-01 PROCEDURE — 87637 SARSCOV2&INF A&B&RSV AMP PRB: CPT | Performed by: EMERGENCY MEDICINE

## 2023-01-01 PROCEDURE — 250N000011 HC RX IP 250 OP 636: Performed by: EMERGENCY MEDICINE

## 2023-01-01 PROCEDURE — 72100 X-RAY EXAM L-S SPINE 2/3 VWS: CPT

## 2023-01-01 PROCEDURE — 82310 ASSAY OF CALCIUM: CPT | Performed by: INTERNAL MEDICINE

## 2023-01-01 PROCEDURE — 99233 SBSQ HOSP IP/OBS HIGH 50: CPT | Performed by: HOSPITALIST

## 2023-01-01 PROCEDURE — 272N000192 HC ACCESSORY CR2

## 2023-01-01 PROCEDURE — 258N000003 HC RX IP 258 OP 636: Performed by: INTERNAL MEDICINE

## 2023-01-01 PROCEDURE — 84145 PROCALCITONIN (PCT): CPT | Performed by: HOSPITALIST

## 2023-01-01 PROCEDURE — 250N000011 HC RX IP 250 OP 636: Performed by: HOSPITALIST

## 2023-01-01 PROCEDURE — 83036 HEMOGLOBIN GLYCOSYLATED A1C: CPT | Performed by: STUDENT IN AN ORGANIZED HEALTH CARE EDUCATION/TRAINING PROGRAM

## 2023-01-01 PROCEDURE — 999N000128 HC STATISTIC PERIPHERAL IV START W/O US GUIDANCE

## 2023-01-01 PROCEDURE — 84132 ASSAY OF SERUM POTASSIUM: CPT | Performed by: STUDENT IN AN ORGANIZED HEALTH CARE EDUCATION/TRAINING PROGRAM

## 2023-01-01 PROCEDURE — 99233 SBSQ HOSP IP/OBS HIGH 50: CPT | Mod: GC | Performed by: STUDENT IN AN ORGANIZED HEALTH CARE EDUCATION/TRAINING PROGRAM

## 2023-01-01 PROCEDURE — 250N000009 HC RX 250: Performed by: INTERNAL MEDICINE

## 2023-01-01 PROCEDURE — 258N000003 HC RX IP 258 OP 636: Performed by: NURSE PRACTITIONER

## 2023-01-01 PROCEDURE — 36415 COLL VENOUS BLD VENIPUNCTURE: CPT | Performed by: PHYSICIAN ASSISTANT

## 2023-01-01 PROCEDURE — 73110 X-RAY EXAM OF WRIST: CPT | Mod: RT

## 2023-01-01 PROCEDURE — 82248 BILIRUBIN DIRECT: CPT | Performed by: HOSPITALIST

## 2023-01-01 PROCEDURE — 84439 ASSAY OF FREE THYROXINE: CPT | Performed by: HOSPITALIST

## 2023-01-01 PROCEDURE — 92507 TX SP LANG VOICE COMM INDIV: CPT | Mod: GN

## 2023-01-01 PROCEDURE — 250N000011 HC RX IP 250 OP 636

## 2023-01-01 PROCEDURE — 250N000009 HC RX 250: Performed by: HOSPITALIST

## 2023-01-01 PROCEDURE — 85027 COMPLETE CBC AUTOMATED: CPT | Performed by: PHYSICIAN ASSISTANT

## 2023-01-01 PROCEDURE — 99223 1ST HOSP IP/OBS HIGH 75: CPT | Performed by: INTERNAL MEDICINE

## 2023-01-01 PROCEDURE — 92523 SPEECH SOUND LANG COMPREHEN: CPT | Mod: GN | Performed by: SPEECH-LANGUAGE PATHOLOGIST

## 2023-01-01 PROCEDURE — 99291 CRITICAL CARE FIRST HOUR: CPT | Performed by: NURSE PRACTITIONER

## 2023-01-01 PROCEDURE — 999N000052 EEG VIDEO 12-26 HR UNMONITORED

## 2023-01-01 PROCEDURE — 85027 COMPLETE CBC AUTOMATED: CPT | Mod: ORL | Performed by: NURSE PRACTITIONER

## 2023-01-01 PROCEDURE — 86901 BLOOD TYPING SEROLOGIC RH(D): CPT | Performed by: PHYSICIAN ASSISTANT

## 2023-01-01 PROCEDURE — 80061 LIPID PANEL: CPT | Performed by: STUDENT IN AN ORGANIZED HEALTH CARE EDUCATION/TRAINING PROGRAM

## 2023-01-01 PROCEDURE — 32555 ASPIRATE PLEURA W/ IMAGING: CPT

## 2023-01-01 PROCEDURE — 0042T CT HEAD PERFUSION W CONTRAST: CPT

## 2023-01-01 PROCEDURE — 86140 C-REACTIVE PROTEIN: CPT | Performed by: HOSPITALIST

## 2023-01-01 PROCEDURE — 80053 COMPREHEN METABOLIC PANEL: CPT | Mod: ORL | Performed by: NURSE PRACTITIONER

## 2023-01-01 PROCEDURE — 999N000065 XR WRIST RIGHT 2 VIEWS: Mod: RT

## 2023-01-01 PROCEDURE — A9585 GADOBUTROL INJECTION: HCPCS | Performed by: PHYSICIAN ASSISTANT

## 2023-01-01 PROCEDURE — 80048 BASIC METABOLIC PNL TOTAL CA: CPT | Performed by: PHYSICIAN ASSISTANT

## 2023-01-01 PROCEDURE — 85014 HEMATOCRIT: CPT | Performed by: HOSPITALIST

## 2023-01-01 PROCEDURE — 82947 ASSAY GLUCOSE BLOOD QUANT: CPT | Performed by: INTERNAL MEDICINE

## 2023-01-01 PROCEDURE — 99207 PR CDG-CUT & PASTE-POTENTIAL IMPACT ON LEVEL: CPT | Performed by: INTERNAL MEDICINE

## 2023-01-01 PROCEDURE — C1769 GUIDE WIRE: HCPCS

## 2023-01-01 PROCEDURE — 99233 SBSQ HOSP IP/OBS HIGH 50: CPT

## 2023-01-01 PROCEDURE — 97165 OT EVAL LOW COMPLEX 30 MIN: CPT | Mod: GO

## 2023-01-01 PROCEDURE — 272N000706 US THORACENTESIS

## 2023-01-01 PROCEDURE — 250N000013 HC RX MED GY IP 250 OP 250 PS 637: Performed by: EMERGENCY MEDICINE

## 2023-01-01 PROCEDURE — 272N000116 HC CATH CR1

## 2023-01-01 PROCEDURE — 70498 CT ANGIOGRAPHY NECK: CPT

## 2023-01-01 PROCEDURE — 250N000009 HC RX 250: Performed by: STUDENT IN AN ORGANIZED HEALTH CARE EDUCATION/TRAINING PROGRAM

## 2023-01-01 PROCEDURE — 99207 PR SERVICE NOT STAFFED W/SUPERV PROV: CPT | Performed by: PSYCHIATRY & NEUROLOGY

## 2023-01-01 PROCEDURE — 99207 PR CDG-CUT & PASTE-POTENTIAL IMPACT ON LEVEL: CPT | Performed by: STUDENT IN AN ORGANIZED HEALTH CARE EDUCATION/TRAINING PROGRAM

## 2023-01-01 PROCEDURE — 97110 THERAPEUTIC EXERCISES: CPT | Mod: GP

## 2023-01-01 PROCEDURE — P9016 RBC LEUKOCYTES REDUCED: HCPCS

## 2023-01-01 PROCEDURE — 61630 BALO ANGIOPLASTY ICR PERQ: CPT | Mod: LT

## 2023-01-01 PROCEDURE — 93325 DOPPLER ECHO COLOR FLOW MAPG: CPT

## 2023-01-01 PROCEDURE — 250N000009 HC RX 250: Performed by: NURSE ANESTHETIST, CERTIFIED REGISTERED

## 2023-01-01 PROCEDURE — C1760 CLOSURE DEV, VASC: HCPCS

## 2023-01-01 PROCEDURE — A9552 F18 FDG: HCPCS | Performed by: INTERNAL MEDICINE

## 2023-01-01 PROCEDURE — 83735 ASSAY OF MAGNESIUM: CPT | Performed by: HOSPITALIST

## 2023-01-01 PROCEDURE — A9585 GADOBUTROL INJECTION: HCPCS | Performed by: INTERNAL MEDICINE

## 2023-01-01 PROCEDURE — 85025 COMPLETE CBC W/AUTO DIFF WBC: CPT | Mod: ORL | Performed by: NURSE PRACTITIONER

## 2023-01-01 PROCEDURE — 0PSHXZZ REPOSITION RIGHT RADIUS, EXTERNAL APPROACH: ICD-10-PCS | Performed by: EMERGENCY MEDICINE

## 2023-01-01 PROCEDURE — 93325 DOPPLER ECHO COLOR FLOW MAPG: CPT | Mod: 26 | Performed by: INTERNAL MEDICINE

## 2023-01-01 PROCEDURE — 272N000571 HC SHEATH CR8

## 2023-01-01 PROCEDURE — 96375 TX/PRO/DX INJ NEW DRUG ADDON: CPT

## 2023-01-01 PROCEDURE — 258N000003 HC RX IP 258 OP 636: Performed by: HOSPITALIST

## 2023-01-01 PROCEDURE — 99309 SBSQ NF CARE MODERATE MDM 30: CPT | Mod: GV | Performed by: NURSE PRACTITIONER

## 2023-01-01 PROCEDURE — 0PSH04Z REPOSITION RIGHT RADIUS WITH INTERNAL FIXATION DEVICE, OPEN APPROACH: ICD-10-PCS | Performed by: STUDENT IN AN ORGANIZED HEALTH CARE EDUCATION/TRAINING PROGRAM

## 2023-01-01 PROCEDURE — 99291 CRITICAL CARE FIRST HOUR: CPT | Mod: GC | Performed by: PSYCHIATRY & NEUROLOGY

## 2023-01-01 PROCEDURE — 84132 ASSAY OF SERUM POTASSIUM: CPT | Performed by: PHYSICIAN ASSISTANT

## 2023-01-01 PROCEDURE — 93308 TTE F-UP OR LMTD: CPT | Mod: 26 | Performed by: INTERNAL MEDICINE

## 2023-01-01 PROCEDURE — 82306 VITAMIN D 25 HYDROXY: CPT | Performed by: PHYSICIAN ASSISTANT

## 2023-01-01 PROCEDURE — 80053 COMPREHEN METABOLIC PANEL: CPT | Performed by: STUDENT IN AN ORGANIZED HEALTH CARE EDUCATION/TRAINING PROGRAM

## 2023-01-01 PROCEDURE — L3908 WHO COCK-UP NONMOLDE PRE OTS: HCPCS

## 2023-01-01 PROCEDURE — P9604 ONE-WAY ALLOW PRORATED TRIP: HCPCS | Mod: ORL | Performed by: NURSE PRACTITIONER

## 2023-01-01 PROCEDURE — 85610 PROTHROMBIN TIME: CPT | Performed by: NURSE PRACTITIONER

## 2023-01-01 PROCEDURE — 85025 COMPLETE CBC W/AUTO DIFF WBC: CPT | Performed by: HOSPITALIST

## 2023-01-01 PROCEDURE — 99233 SBSQ HOSP IP/OBS HIGH 50: CPT | Performed by: STUDENT IN AN ORGANIZED HEALTH CARE EDUCATION/TRAINING PROGRAM

## 2023-01-01 PROCEDURE — 99232 SBSQ HOSP IP/OBS MODERATE 35: CPT | Performed by: HOSPITALIST

## 2023-01-01 PROCEDURE — 73130 X-RAY EXAM OF HAND: CPT | Mod: LT

## 2023-01-01 PROCEDURE — 96372 THER/PROPH/DIAG INJ SC/IM: CPT | Performed by: NURSE PRACTITIONER

## 2023-01-01 PROCEDURE — 85027 COMPLETE CBC AUTOMATED: CPT | Performed by: INTERNAL MEDICINE

## 2023-01-01 PROCEDURE — 86850 RBC ANTIBODY SCREEN: CPT | Performed by: PHYSICIAN ASSISTANT

## 2023-01-01 PROCEDURE — 84100 ASSAY OF PHOSPHORUS: CPT | Performed by: HOSPITALIST

## 2023-01-01 PROCEDURE — 85018 HEMOGLOBIN: CPT | Performed by: NURSE PRACTITIONER

## 2023-01-01 PROCEDURE — G0463 HOSPITAL OUTPT CLINIC VISIT: HCPCS | Mod: 25

## 2023-01-01 PROCEDURE — 73502 X-RAY EXAM HIP UNI 2-3 VIEWS: CPT

## 2023-01-01 PROCEDURE — 73552 X-RAY EXAM OF FEMUR 2/>: CPT | Mod: RT

## 2023-01-01 PROCEDURE — 95816 EEG AWAKE AND DROWSY: CPT | Mod: 26 | Performed by: PSYCHIATRY & NEUROLOGY

## 2023-01-01 PROCEDURE — 99291 CRITICAL CARE FIRST HOUR: CPT | Mod: 25 | Performed by: NURSE PRACTITIONER

## 2023-01-01 PROCEDURE — 999N000141 HC STATISTIC PRE-PROCEDURE NURSING ASSESSMENT: Performed by: STUDENT IN AN ORGANIZED HEALTH CARE EDUCATION/TRAINING PROGRAM

## 2023-01-01 PROCEDURE — 99222 1ST HOSP IP/OBS MODERATE 55: CPT | Mod: AI | Performed by: HOSPITALIST

## 2023-01-01 PROCEDURE — 99349 HOME/RES VST EST MOD MDM 40: CPT | Performed by: NURSE PRACTITIONER

## 2023-01-01 PROCEDURE — P9016 RBC LEUKOCYTES REDUCED: HCPCS | Performed by: PHYSICIAN ASSISTANT

## 2023-01-01 PROCEDURE — 70551 MRI BRAIN STEM W/O DYE: CPT

## 2023-01-01 PROCEDURE — 92507 TX SP LANG VOICE COMM INDIV: CPT | Mod: GN | Performed by: SPEECH-LANGUAGE PATHOLOGIST

## 2023-01-01 PROCEDURE — 85048 AUTOMATED LEUKOCYTE COUNT: CPT | Performed by: INTERNAL MEDICINE

## 2023-01-01 PROCEDURE — C9803 HOPD COVID-19 SPEC COLLECT: HCPCS

## 2023-01-01 PROCEDURE — 272N000302 HC DEVICE INFLATION CR5

## 2023-01-01 PROCEDURE — 250N000009 HC RX 250: Performed by: NURSE PRACTITIONER

## 2023-01-01 PROCEDURE — 82550 ASSAY OF CK (CPK): CPT

## 2023-01-01 PROCEDURE — 99309 SBSQ NF CARE MODERATE MDM 30: CPT | Performed by: NURSE PRACTITIONER

## 2023-01-01 PROCEDURE — 97530 THERAPEUTIC ACTIVITIES: CPT | Mod: GO | Performed by: OCCUPATIONAL THERAPIST

## 2023-01-01 PROCEDURE — 710N000009 HC RECOVERY PHASE 1, LEVEL 1, PER MIN: Performed by: STUDENT IN AN ORGANIZED HEALTH CARE EDUCATION/TRAINING PROGRAM

## 2023-01-01 PROCEDURE — 0QS636Z REPOSITION RIGHT UPPER FEMUR WITH INTRAMEDULLARY INTERNAL FIXATION DEVICE, PERCUTANEOUS APPROACH: ICD-10-PCS | Performed by: STUDENT IN AN ORGANIZED HEALTH CARE EDUCATION/TRAINING PROGRAM

## 2023-01-01 PROCEDURE — 36415 COLL VENOUS BLD VENIPUNCTURE: CPT | Mod: ORL | Performed by: NURSE PRACTITIONER

## 2023-01-01 PROCEDURE — 97161 PT EVAL LOW COMPLEX 20 MIN: CPT | Mod: GP | Performed by: PHYSICAL THERAPIST

## 2023-01-01 PROCEDURE — 81001 URINALYSIS AUTO W/SCOPE: CPT | Performed by: HOSPITALIST

## 2023-01-01 PROCEDURE — 99214 OFFICE O/P EST MOD 30 MIN: CPT | Mod: 95 | Performed by: INTERNAL MEDICINE

## 2023-01-01 PROCEDURE — C1713 ANCHOR/SCREW BN/BN,TIS/BN: HCPCS | Performed by: STUDENT IN AN ORGANIZED HEALTH CARE EDUCATION/TRAINING PROGRAM

## 2023-01-01 PROCEDURE — 272N000001 HC OR GENERAL SUPPLY STERILE: Performed by: STUDENT IN AN ORGANIZED HEALTH CARE EDUCATION/TRAINING PROGRAM

## 2023-01-01 PROCEDURE — 370N000017 HC ANESTHESIA TECHNICAL FEE, PER MIN: Performed by: STUDENT IN AN ORGANIZED HEALTH CARE EDUCATION/TRAINING PROGRAM

## 2023-01-01 PROCEDURE — 99222 1ST HOSP IP/OBS MODERATE 55: CPT | Mod: GC | Performed by: STUDENT IN AN ORGANIZED HEALTH CARE EDUCATION/TRAINING PROGRAM

## 2023-01-01 PROCEDURE — 96374 THER/PROPH/DIAG INJ IV PUSH: CPT

## 2023-01-01 PROCEDURE — 037G3ZZ DILATION OF INTRACRANIAL ARTERY, PERCUTANEOUS APPROACH: ICD-10-PCS | Performed by: NEUROLOGICAL SURGERY

## 2023-01-01 PROCEDURE — C1887 CATHETER, GUIDING: HCPCS

## 2023-01-01 PROCEDURE — 99207 PR APP CREDIT; MD BILLING SHARED VISIT: CPT | Performed by: INTERNAL MEDICINE

## 2023-01-01 PROCEDURE — 96361 HYDRATE IV INFUSION ADD-ON: CPT

## 2023-01-01 PROCEDURE — 93306 TTE W/DOPPLER COMPLETE: CPT | Mod: 26 | Performed by: INTERNAL MEDICINE

## 2023-01-01 PROCEDURE — 96365 THER/PROPH/DIAG IV INF INIT: CPT

## 2023-01-01 PROCEDURE — 250N000011 HC RX IP 250 OP 636: Performed by: ANESTHESIOLOGY

## 2023-01-01 PROCEDURE — 99239 HOSP IP/OBS DSCHRG MGMT >30: CPT | Performed by: INTERNAL MEDICINE

## 2023-01-01 PROCEDURE — 250N000011 HC RX IP 250 OP 636: Performed by: STUDENT IN AN ORGANIZED HEALTH CARE EDUCATION/TRAINING PROGRAM

## 2023-01-01 PROCEDURE — G0180 MD CERTIFICATION HHA PATIENT: HCPCS | Performed by: NURSE PRACTITIONER

## 2023-01-01 PROCEDURE — 258N000003 HC RX IP 258 OP 636: Performed by: ANESTHESIOLOGY

## 2023-01-01 PROCEDURE — L3908 WHO COCK-UP NONMOLDE PRE OTS: HCPCS | Performed by: PHYSICAL THERAPIST

## 2023-01-01 PROCEDURE — 80061 LIPID PANEL: CPT | Performed by: NURSE PRACTITIONER

## 2023-01-01 PROCEDURE — 250N000025 HC SEVOFLURANE, PER MIN: Performed by: STUDENT IN AN ORGANIZED HEALTH CARE EDUCATION/TRAINING PROGRAM

## 2023-01-01 PROCEDURE — 70544 MR ANGIOGRAPHY HEAD W/O DYE: CPT

## 2023-01-01 PROCEDURE — 99207 PR MOONLIGHTING INDICATOR: CPT | Performed by: PHYSICIAN ASSISTANT

## 2023-01-01 PROCEDURE — 85004 AUTOMATED DIFF WBC COUNT: CPT | Performed by: HOSPITALIST

## 2023-01-01 PROCEDURE — 999N000063 XR PELVIS AND HIP PORTABLE RIGHT 1 VIEW

## 2023-01-01 PROCEDURE — 272N000567 HC SHEATH CR4

## 2023-01-01 PROCEDURE — 85730 THROMBOPLASTIN TIME PARTIAL: CPT | Performed by: NURSE PRACTITIONER

## 2023-01-01 PROCEDURE — 36415 COLL VENOUS BLD VENIPUNCTURE: CPT | Mod: ORL | Performed by: INTERNAL MEDICINE

## 2023-01-01 PROCEDURE — 99231 SBSQ HOSP IP/OBS SF/LOW 25: CPT | Performed by: NURSE PRACTITIONER

## 2023-01-01 PROCEDURE — 250N000009 HC RX 250: Performed by: ANESTHESIOLOGY

## 2023-01-01 PROCEDURE — 343N000001 HC RX 343: Performed by: INTERNAL MEDICINE

## 2023-01-01 PROCEDURE — 82248 BILIRUBIN DIRECT: CPT | Performed by: INTERNAL MEDICINE

## 2023-01-01 PROCEDURE — 99207 PR NO BILLABLE SERVICE THIS VISIT: CPT | Performed by: INTERNAL MEDICINE

## 2023-01-01 PROCEDURE — 99222 1ST HOSP IP/OBS MODERATE 55: CPT | Performed by: INTERNAL MEDICINE

## 2023-01-01 PROCEDURE — 272N000196 HC ACCESSORY CR5

## 2023-01-01 PROCEDURE — 999N000040 HC STATISTIC CONSULT NO CHARGE VASC ACCESS

## 2023-01-01 PROCEDURE — 99348 HOME/RES VST EST LOW MDM 30: CPT | Performed by: NURSE PRACTITIONER

## 2023-01-01 PROCEDURE — 92507 TX SP LANG VOICE COMM INDIV: CPT | Mod: GN | Performed by: REHABILITATION PRACTITIONER

## 2023-01-01 PROCEDURE — 95714 VEEG EA 12-26 HR UNMNTR: CPT

## 2023-01-01 PROCEDURE — 81001 URINALYSIS AUTO W/SCOPE: CPT | Performed by: EMERGENCY MEDICINE

## 2023-01-01 PROCEDURE — 255N000002 HC RX 255 OP 636: Performed by: PHYSICIAN ASSISTANT

## 2023-01-01 PROCEDURE — 999N000063 XR WRIST PORT RIGHT 2 VIEWS: Mod: RT

## 2023-01-01 PROCEDURE — C1725 CATH, TRANSLUMIN NON-LASER: HCPCS

## 2023-01-01 PROCEDURE — 85576 BLOOD PLATELET AGGREGATION: CPT

## 2023-01-01 PROCEDURE — P9604 ONE-WAY ALLOW PRORATED TRIP: HCPCS | Mod: ORL | Performed by: INTERNAL MEDICINE

## 2023-01-01 PROCEDURE — 99152 MOD SED SAME PHYS/QHP 5/>YRS: CPT | Mod: GC | Performed by: NEUROLOGICAL SURGERY

## 2023-01-01 PROCEDURE — 99305 1ST NF CARE MODERATE MDM 35: CPT | Performed by: INTERNAL MEDICINE

## 2023-01-01 PROCEDURE — 82310 ASSAY OF CALCIUM: CPT | Performed by: HOSPITALIST

## 2023-01-01 DEVICE — IMP SCR SYN TFNA FENESTRATED LAG 90MM 04.038.190S: Type: IMPLANTABLE DEVICE | Site: HIP | Status: FUNCTIONAL

## 2023-01-01 DEVICE — IMP SCR BONE ACUMED CORT THRD LOCK 2.3X18MM TI CO-T2318: Type: IMPLANTABLE DEVICE | Site: WRIST | Status: FUNCTIONAL

## 2023-01-01 DEVICE — 10MM/130 DEG TI CANN TFNA 170MM - STERILE
Type: IMPLANTABLE DEVICE | Site: HIP | Status: FUNCTIONAL
Brand: TFN-ADVANCE

## 2023-01-01 DEVICE — IMPLANTABLE DEVICE: Type: IMPLANTABLE DEVICE | Site: WRIST | Status: FUNCTIONAL

## 2023-01-01 DEVICE — IMP SCR BONE ACUMED CORT THRD LOCK 2.3X16MM TI CO-T2316: Type: IMPLANTABLE DEVICE | Site: WRIST | Status: FUNCTIONAL

## 2023-01-01 DEVICE — LOCKING SCREW FOR IM NAIL Ø 5MM/ 32MM/ XL25/ STERILE: Type: IMPLANTABLE DEVICE | Site: HIP | Status: FUNCTIONAL

## 2023-01-01 DEVICE — SCREW NON-LOCKING HEXALOBE 3.5MM X 12MM: Type: IMPLANTABLE DEVICE | Site: WRIST | Status: FUNCTIONAL

## 2023-01-01 RX ORDER — OXYCODONE HYDROCHLORIDE 5 MG/1
10-15 TABLET ORAL
Status: DISCONTINUED | OUTPATIENT
Start: 2023-01-01 | End: 2023-01-01

## 2023-01-01 RX ORDER — OXYCODONE HYDROCHLORIDE 5 MG/1
2.5-5 TABLET ORAL EVERY 4 HOURS PRN
Qty: 20 TABLET | Refills: 0 | Status: SHIPPED | OUTPATIENT
Start: 2023-01-01

## 2023-01-01 RX ORDER — CLOPIDOGREL BISULFATE 75 MG/1
300 TABLET ORAL ONCE
Status: COMPLETED | OUTPATIENT
Start: 2023-01-01 | End: 2023-01-01

## 2023-01-01 RX ORDER — OXYCODONE HCL 10 MG/1
10 TABLET, FILM COATED, EXTENDED RELEASE ORAL 2 TIMES DAILY
Status: DISCONTINUED | OUTPATIENT
Start: 2023-01-01 | End: 2023-01-01

## 2023-01-01 RX ORDER — OXYCODONE HYDROCHLORIDE 5 MG/1
10 TABLET ORAL
Status: DISCONTINUED | OUTPATIENT
Start: 2023-01-01 | End: 2023-01-01

## 2023-01-01 RX ORDER — SALIVA STIMULANT COMB. NO.3
2 SPRAY, NON-AEROSOL (ML) MUCOUS MEMBRANE 4 TIMES DAILY
Status: DISCONTINUED | OUTPATIENT
Start: 2023-01-01 | End: 2023-01-01

## 2023-01-01 RX ORDER — FENTANYL 25 UG/1
25 PATCH TRANSDERMAL
Status: DISCONTINUED | OUTPATIENT
Start: 2023-01-01 | End: 2023-01-01 | Stop reason: HOSPADM

## 2023-01-01 RX ORDER — FENTANYL 25 UG/1
1 PATCH TRANSDERMAL
Qty: 4 PATCH | Refills: 0 | Status: SHIPPED | OUTPATIENT
Start: 2023-01-01 | End: 2023-01-01

## 2023-01-01 RX ORDER — POTASSIUM CHLORIDE 20MEQ/15ML
40 LIQUID (ML) ORAL ONCE
Status: COMPLETED | OUTPATIENT
Start: 2023-01-01 | End: 2023-01-01

## 2023-01-01 RX ORDER — IBUPROFEN 200 MG
400 TABLET ORAL EVERY 6 HOURS PRN
Status: DISCONTINUED | OUTPATIENT
Start: 2023-01-01 | End: 2023-01-01

## 2023-01-01 RX ORDER — ONDANSETRON 2 MG/ML
INJECTION INTRAMUSCULAR; INTRAVENOUS PRN
Status: DISCONTINUED | OUTPATIENT
Start: 2023-01-01 | End: 2023-01-01

## 2023-01-01 RX ORDER — PROCHLORPERAZINE MALEATE 5 MG
5 TABLET ORAL EVERY 6 HOURS PRN
Status: DISCONTINUED | OUTPATIENT
Start: 2023-01-01 | End: 2023-01-01 | Stop reason: HOSPADM

## 2023-01-01 RX ORDER — PROCHLORPERAZINE 25 MG
12.5 SUPPOSITORY, RECTAL RECTAL EVERY 12 HOURS PRN
Status: DISCONTINUED | OUTPATIENT
Start: 2023-01-01 | End: 2023-01-01 | Stop reason: HOSPADM

## 2023-01-01 RX ORDER — ASPIRIN 81 MG/1
81 TABLET ORAL DAILY
Status: DISCONTINUED | OUTPATIENT
Start: 2023-01-01 | End: 2023-01-01 | Stop reason: HOSPADM

## 2023-01-01 RX ORDER — LANOLIN ALCOHOL/MO/W.PET/CERES
3 CREAM (GRAM) TOPICAL
Status: DISCONTINUED | OUTPATIENT
Start: 2023-01-01 | End: 2023-01-01 | Stop reason: HOSPADM

## 2023-01-01 RX ORDER — GADOBUTROL 604.72 MG/ML
6 INJECTION INTRAVENOUS ONCE
Status: COMPLETED | OUTPATIENT
Start: 2023-01-01 | End: 2023-01-01

## 2023-01-01 RX ORDER — ATORVASTATIN CALCIUM 40 MG/1
40 TABLET, FILM COATED ORAL EVERY EVENING
Qty: 30 TABLET | Refills: 11 | Status: ON HOLD | OUTPATIENT
Start: 2023-01-01 | End: 2023-01-01

## 2023-01-01 RX ORDER — ACETAMINOPHEN 325 MG/1
975 TABLET ORAL ONCE
Status: COMPLETED | OUTPATIENT
Start: 2023-01-01 | End: 2023-01-01

## 2023-01-01 RX ORDER — TRAZODONE HYDROCHLORIDE 50 MG/1
50 TABLET, FILM COATED ORAL
DISCHARGE
Start: 2023-01-01 | End: 2023-01-01

## 2023-01-01 RX ORDER — ATORVASTATIN CALCIUM 20 MG/1
40 TABLET, FILM COATED ORAL EVERY EVENING
Status: DISCONTINUED | OUTPATIENT
Start: 2023-01-01 | End: 2023-01-01

## 2023-01-01 RX ORDER — NALOXONE HYDROCHLORIDE 0.4 MG/ML
0.2 INJECTION, SOLUTION INTRAMUSCULAR; INTRAVENOUS; SUBCUTANEOUS
Status: DISCONTINUED | OUTPATIENT
Start: 2023-01-01 | End: 2023-01-01 | Stop reason: HOSPADM

## 2023-01-01 RX ORDER — QUETIAPINE FUMARATE 25 MG/1
25 TABLET, FILM COATED ORAL DAILY PRN
Qty: 10 TABLET | Refills: 0 | Status: SHIPPED | OUTPATIENT
Start: 2023-01-01 | End: 2023-01-01

## 2023-01-01 RX ORDER — FENTANYL 25 UG/1
1 PATCH TRANSDERMAL
Qty: 5 PATCH | Refills: 0 | Status: SHIPPED | OUTPATIENT
Start: 2023-01-01 | End: 2023-01-01

## 2023-01-01 RX ORDER — LEVOFLOXACIN 5 MG/ML
500 INJECTION, SOLUTION INTRAVENOUS EVERY 24 HOURS
Status: DISCONTINUED | OUTPATIENT
Start: 2023-01-01 | End: 2023-01-01

## 2023-01-01 RX ORDER — OXYCODONE HYDROCHLORIDE 5 MG/1
5 TABLET ORAL ONCE
Status: COMPLETED | OUTPATIENT
Start: 2023-01-01 | End: 2023-01-01

## 2023-01-01 RX ORDER — METHOCARBAMOL 500 MG/1
500 TABLET, FILM COATED ORAL 4 TIMES DAILY
Status: DISCONTINUED | OUTPATIENT
Start: 2023-01-01 | End: 2023-01-01 | Stop reason: HOSPADM

## 2023-01-01 RX ORDER — LIDOCAINE 40 MG/G
CREAM TOPICAL
Status: DISCONTINUED | OUTPATIENT
Start: 2023-01-01 | End: 2023-01-01

## 2023-01-01 RX ORDER — PANTOPRAZOLE SODIUM 40 MG/1
40 TABLET, DELAYED RELEASE ORAL DAILY
Status: DISCONTINUED | OUTPATIENT
Start: 2023-01-01 | End: 2023-01-01 | Stop reason: HOSPADM

## 2023-01-01 RX ORDER — OXYCODONE HYDROCHLORIDE 5 MG/1
2.5-5 TABLET ORAL EVERY 4 HOURS PRN
Qty: 25 TABLET | Refills: 0 | Status: SHIPPED | OUTPATIENT
Start: 2023-01-01 | End: 2023-01-01

## 2023-01-01 RX ORDER — GABAPENTIN 100 MG/1
100 CAPSULE ORAL 3 TIMES DAILY
Status: DISCONTINUED | OUTPATIENT
Start: 2023-01-01 | End: 2023-01-01

## 2023-01-01 RX ORDER — ACETAMINOPHEN 325 MG/1
650 TABLET ORAL EVERY 4 HOURS PRN
Status: DISCONTINUED | OUTPATIENT
Start: 2023-01-01 | End: 2023-01-01

## 2023-01-01 RX ORDER — OXYCODONE HYDROCHLORIDE 5 MG/1
5 TABLET ORAL EVERY 4 HOURS PRN
Status: DISCONTINUED | OUTPATIENT
Start: 2023-01-01 | End: 2023-01-01

## 2023-01-01 RX ORDER — LATANOPROST 50 UG/ML
1 SOLUTION/ DROPS OPHTHALMIC EVERY EVENING
Status: DISCONTINUED | OUTPATIENT
Start: 2023-01-01 | End: 2023-01-01 | Stop reason: HOSPADM

## 2023-01-01 RX ORDER — NALOXONE HYDROCHLORIDE 0.4 MG/ML
0.4 INJECTION, SOLUTION INTRAMUSCULAR; INTRAVENOUS; SUBCUTANEOUS
Status: DISCONTINUED | OUTPATIENT
Start: 2023-01-01 | End: 2023-01-01 | Stop reason: HOSPADM

## 2023-01-01 RX ORDER — FENTANYL 12.5 UG/1
12 PATCH TRANSDERMAL
Status: DISCONTINUED | OUTPATIENT
Start: 2023-01-01 | End: 2023-01-01 | Stop reason: HOSPADM

## 2023-01-01 RX ORDER — MORPHINE SULFATE 2 MG/ML
2-4 INJECTION, SOLUTION INTRAMUSCULAR; INTRAVENOUS
Status: DISCONTINUED | OUTPATIENT
Start: 2023-01-01 | End: 2023-01-01

## 2023-01-01 RX ORDER — TRANEXAMIC ACID 10 MG/ML
1 INJECTION, SOLUTION INTRAVENOUS ONCE
Status: COMPLETED | OUTPATIENT
Start: 2023-01-01 | End: 2023-01-01

## 2023-01-01 RX ORDER — ATORVASTATIN CALCIUM 40 MG/1
40 TABLET, FILM COATED ORAL EVERY EVENING
Status: DISCONTINUED | OUTPATIENT
Start: 2023-01-01 | End: 2023-01-01 | Stop reason: HOSPADM

## 2023-01-01 RX ORDER — IOPAMIDOL 612 MG/ML
100 INJECTION, SOLUTION INTRAVASCULAR ONCE
Status: COMPLETED | OUTPATIENT
Start: 2023-01-01 | End: 2023-01-01

## 2023-01-01 RX ORDER — IOPAMIDOL 755 MG/ML
75 INJECTION, SOLUTION INTRAVASCULAR ONCE
Status: COMPLETED | OUTPATIENT
Start: 2023-01-01 | End: 2023-01-01

## 2023-01-01 RX ORDER — MAGNESIUM HYDROXIDE 1200 MG/15ML
LIQUID ORAL PRN
Status: DISCONTINUED | OUTPATIENT
Start: 2023-01-01 | End: 2023-01-01 | Stop reason: HOSPADM

## 2023-01-01 RX ORDER — CLOPIDOGREL BISULFATE 75 MG/1
75 TABLET ORAL DAILY
Qty: 83 TABLET | Refills: 0 | DISCHARGE
Start: 2023-01-01

## 2023-01-01 RX ORDER — CLOPIDOGREL BISULFATE 75 MG/1
75 TABLET ORAL DAILY
Qty: 83 TABLET | Refills: 0 | Status: ON HOLD | DISCHARGE
Start: 2023-01-01 | End: 2023-01-01

## 2023-01-01 RX ORDER — ACETAMINOPHEN 325 MG/1
975 TABLET ORAL EVERY 8 HOURS
Status: COMPLETED | OUTPATIENT
Start: 2023-01-01 | End: 2023-01-01

## 2023-01-01 RX ORDER — ONDANSETRON 4 MG/1
4 TABLET, ORALLY DISINTEGRATING ORAL EVERY 6 HOURS PRN
Status: DISCONTINUED | OUTPATIENT
Start: 2023-01-01 | End: 2023-01-01

## 2023-01-01 RX ORDER — MORPHINE SULFATE 4 MG/ML
4 INJECTION, SOLUTION INTRAMUSCULAR; INTRAVENOUS ONCE
Status: COMPLETED | OUTPATIENT
Start: 2023-01-01 | End: 2023-01-01

## 2023-01-01 RX ORDER — SODIUM CHLORIDE 9 MG/ML
INJECTION, SOLUTION INTRAVENOUS CONTINUOUS
Status: CANCELLED | OUTPATIENT
Start: 2023-01-01

## 2023-01-01 RX ORDER — FLUMAZENIL 0.1 MG/ML
0.2 INJECTION, SOLUTION INTRAVENOUS
Status: DISCONTINUED | OUTPATIENT
Start: 2023-01-01 | End: 2023-01-01 | Stop reason: HOSPADM

## 2023-01-01 RX ORDER — AMOXICILLIN 250 MG
2 CAPSULE ORAL 2 TIMES DAILY PRN
Status: DISCONTINUED | OUTPATIENT
Start: 2023-01-01 | End: 2023-01-01 | Stop reason: HOSPADM

## 2023-01-01 RX ORDER — ONDANSETRON 2 MG/ML
4 INJECTION INTRAMUSCULAR; INTRAVENOUS EVERY 6 HOURS PRN
Status: DISCONTINUED | OUTPATIENT
Start: 2023-01-01 | End: 2023-01-01

## 2023-01-01 RX ORDER — AMPICILLIN 1 G/1
1 INJECTION, POWDER, FOR SOLUTION INTRAMUSCULAR; INTRAVENOUS EVERY 6 HOURS
Status: DISCONTINUED | OUTPATIENT
Start: 2023-01-01 | End: 2023-01-01

## 2023-01-01 RX ORDER — TRAZODONE HYDROCHLORIDE 50 MG/1
50 TABLET, FILM COATED ORAL
Status: DISCONTINUED | OUTPATIENT
Start: 2023-01-01 | End: 2023-01-01 | Stop reason: HOSPADM

## 2023-01-01 RX ORDER — SODIUM CHLORIDE, SODIUM LACTATE, POTASSIUM CHLORIDE, CALCIUM CHLORIDE 600; 310; 30; 20 MG/100ML; MG/100ML; MG/100ML; MG/100ML
INJECTION, SOLUTION INTRAVENOUS CONTINUOUS
Status: DISCONTINUED | OUTPATIENT
Start: 2023-01-01 | End: 2023-01-01

## 2023-01-01 RX ORDER — AMOXICILLIN 250 MG
1 CAPSULE ORAL 2 TIMES DAILY PRN
Status: DISCONTINUED | OUTPATIENT
Start: 2023-01-01 | End: 2023-01-01 | Stop reason: HOSPADM

## 2023-01-01 RX ORDER — GADOBUTROL 604.72 MG/ML
15 INJECTION INTRAVENOUS ONCE
Status: COMPLETED | OUTPATIENT
Start: 2023-01-01 | End: 2023-01-01

## 2023-01-01 RX ORDER — CEFAZOLIN SODIUM/WATER 2 G/20 ML
2 SYRINGE (ML) INTRAVENOUS
Status: COMPLETED | OUTPATIENT
Start: 2023-01-01 | End: 2023-01-01

## 2023-01-01 RX ORDER — OXYCODONE HYDROCHLORIDE 5 MG/1
10 TABLET ORAL ONCE
Status: COMPLETED | OUTPATIENT
Start: 2023-01-01 | End: 2023-01-01

## 2023-01-01 RX ORDER — MIDODRINE HYDROCHLORIDE 5 MG/1
10 TABLET ORAL
Status: DISCONTINUED | OUTPATIENT
Start: 2023-01-01 | End: 2023-01-01

## 2023-01-01 RX ORDER — DEXAMETHASONE SODIUM PHOSPHATE 4 MG/ML
INJECTION, SOLUTION INTRA-ARTICULAR; INTRALESIONAL; INTRAMUSCULAR; INTRAVENOUS; SOFT TISSUE PRN
Status: DISCONTINUED | OUTPATIENT
Start: 2023-01-01 | End: 2023-01-01

## 2023-01-01 RX ORDER — CHOLECALCIFEROL (VITAMIN D3) 50 MCG
50 TABLET ORAL DAILY
Status: DISCONTINUED | OUTPATIENT
Start: 2023-01-01 | End: 2023-01-01

## 2023-01-01 RX ORDER — CLOPIDOGREL BISULFATE 75 MG/1
75 TABLET ORAL DAILY
Status: DISCONTINUED | OUTPATIENT
Start: 2023-01-01 | End: 2023-01-01 | Stop reason: HOSPADM

## 2023-01-01 RX ORDER — FENTANYL 25 UG/1
25 PATCH TRANSDERMAL
COMMUNITY
Start: 2023-01-01

## 2023-01-01 RX ORDER — LIDOCAINE HYDROCHLORIDE 10 MG/ML
10 INJECTION, SOLUTION EPIDURAL; INFILTRATION; INTRACAUDAL; PERINEURAL ONCE
Status: COMPLETED | OUTPATIENT
Start: 2023-01-01 | End: 2023-01-01

## 2023-01-01 RX ORDER — QUETIAPINE FUMARATE 25 MG/1
25 TABLET, FILM COATED ORAL 2 TIMES DAILY PRN
Status: DISCONTINUED | OUTPATIENT
Start: 2023-01-01 | End: 2023-01-01 | Stop reason: HOSPADM

## 2023-01-01 RX ORDER — ACETAMINOPHEN 325 MG/1
650 TABLET ORAL 4 TIMES DAILY
Qty: 200 TABLET | Refills: 0 | DISCHARGE
Start: 2023-01-01 | End: 2023-01-01

## 2023-01-01 RX ORDER — HYDROXYZINE HYDROCHLORIDE 10 MG/1
10 TABLET, FILM COATED ORAL 3 TIMES DAILY PRN
Status: DISCONTINUED | OUTPATIENT
Start: 2023-01-01 | End: 2023-01-01 | Stop reason: HOSPADM

## 2023-01-01 RX ORDER — LEVOFLOXACIN 500 MG/1
500 TABLET, FILM COATED ORAL DAILY
Qty: 5 TABLET | Refills: 0 | DISCHARGE
Start: 2023-01-01 | End: 2023-01-01

## 2023-01-01 RX ORDER — NEOSTIGMINE METHYLSULFATE 1 MG/ML
VIAL (ML) INJECTION PRN
Status: DISCONTINUED | OUTPATIENT
Start: 2023-01-01 | End: 2023-01-01

## 2023-01-01 RX ORDER — BISACODYL 10 MG
10 SUPPOSITORY, RECTAL RECTAL DAILY PRN
Qty: 10 SUPPOSITORY | Refills: 0 | DISCHARGE
Start: 2023-01-01 | End: 2023-01-01

## 2023-01-01 RX ORDER — ACETAMINOPHEN 325 MG/1
650 TABLET ORAL 4 TIMES DAILY
Status: DISCONTINUED | OUTPATIENT
Start: 2023-01-01 | End: 2023-01-01

## 2023-01-01 RX ORDER — PROCHLORPERAZINE MALEATE 5 MG
5 TABLET ORAL EVERY 6 HOURS PRN
Qty: 30 TABLET | Refills: 5 | Status: SHIPPED | OUTPATIENT
Start: 2023-01-01 | End: 2023-01-01

## 2023-01-01 RX ORDER — IOPAMIDOL 755 MG/ML
125 INJECTION, SOLUTION INTRAVASCULAR ONCE
Status: COMPLETED | OUTPATIENT
Start: 2023-01-01 | End: 2023-01-01

## 2023-01-01 RX ORDER — ATORVASTATIN CALCIUM 40 MG/1
40 TABLET, FILM COATED ORAL EVERY EVENING
Status: DISCONTINUED | OUTPATIENT
Start: 2023-01-01 | End: 2023-01-01

## 2023-01-01 RX ORDER — AZITHROMYCIN 500 MG/1
500 INJECTION, POWDER, LYOPHILIZED, FOR SOLUTION INTRAVENOUS ONCE
Status: COMPLETED | OUTPATIENT
Start: 2023-01-01 | End: 2023-01-01

## 2023-01-01 RX ORDER — POLYETHYLENE GLYCOL 3350 17 G/17G
17 POWDER, FOR SOLUTION ORAL DAILY PRN
Status: DISCONTINUED | OUTPATIENT
Start: 2023-01-01 | End: 2023-01-01 | Stop reason: HOSPADM

## 2023-01-01 RX ORDER — ONDANSETRON 2 MG/ML
4 INJECTION INTRAMUSCULAR; INTRAVENOUS EVERY 6 HOURS PRN
Status: DISCONTINUED | OUTPATIENT
Start: 2023-01-01 | End: 2023-01-01 | Stop reason: HOSPADM

## 2023-01-01 RX ORDER — IOPAMIDOL 755 MG/ML
75 INJECTION, SOLUTION INTRAVASCULAR ONCE
Status: DISCONTINUED | OUTPATIENT
Start: 2023-01-01 | End: 2023-01-01

## 2023-01-01 RX ORDER — ACETAMINOPHEN 325 MG/1
975 TABLET ORAL 3 TIMES DAILY
Status: DISCONTINUED | OUTPATIENT
Start: 2023-01-01 | End: 2023-01-01 | Stop reason: HOSPADM

## 2023-01-01 RX ORDER — FAMOTIDINE 10 MG
10 TABLET ORAL 2 TIMES DAILY
Status: DISCONTINUED | OUTPATIENT
Start: 2023-01-01 | End: 2023-01-01

## 2023-01-01 RX ORDER — LORAZEPAM 1 MG/1
1 TABLET ORAL
Qty: 20 TABLET | Refills: 0 | Status: SHIPPED | OUTPATIENT
Start: 2023-01-01

## 2023-01-01 RX ORDER — MIDODRINE HYDROCHLORIDE 5 MG/1
5 TABLET ORAL
Status: DISCONTINUED | OUTPATIENT
Start: 2023-01-01 | End: 2023-01-01

## 2023-01-01 RX ORDER — LIDOCAINE 4 G/G
1 PATCH TOPICAL
Status: DISCONTINUED | OUTPATIENT
Start: 2023-01-01 | End: 2023-01-01

## 2023-01-01 RX ORDER — HYDRALAZINE HYDROCHLORIDE 20 MG/ML
10 INJECTION INTRAMUSCULAR; INTRAVENOUS EVERY 4 HOURS PRN
Status: DISCONTINUED | OUTPATIENT
Start: 2023-01-01 | End: 2023-01-01

## 2023-01-01 RX ORDER — ENOXAPARIN SODIUM 100 MG/ML
40 INJECTION SUBCUTANEOUS EVERY 24 HOURS
Status: DISCONTINUED | OUTPATIENT
Start: 2023-01-01 | End: 2023-01-01

## 2023-01-01 RX ORDER — PANTOPRAZOLE SODIUM 40 MG/1
40 TABLET, DELAYED RELEASE ORAL DAILY
Qty: 30 TABLET | Refills: 11 | Status: SHIPPED | OUTPATIENT
Start: 2023-01-01

## 2023-01-01 RX ORDER — OXYCODONE HYDROCHLORIDE 5 MG/1
15 TABLET ORAL
Status: DISCONTINUED | OUTPATIENT
Start: 2023-01-01 | End: 2023-01-01

## 2023-01-01 RX ORDER — OXYCODONE HYDROCHLORIDE 5 MG/1
10 TABLET ORAL EVERY 4 HOURS PRN
Status: DISCONTINUED | OUTPATIENT
Start: 2023-01-01 | End: 2023-01-01

## 2023-01-01 RX ORDER — ACETAMINOPHEN 325 MG/1
650 TABLET ORAL EVERY 4 HOURS PRN
Qty: 30 TABLET | Refills: 0 | DISCHARGE
Start: 2023-01-01

## 2023-01-01 RX ORDER — LORAZEPAM 1 MG/1
1 TABLET ORAL
Status: DISCONTINUED | OUTPATIENT
Start: 2023-01-01 | End: 2023-01-01 | Stop reason: HOSPADM

## 2023-01-01 RX ORDER — MULTIPLE VITAMINS W/ MINERALS TAB 9MG-400MCG
1 TAB ORAL DAILY
Qty: 30 TABLET | Refills: 11 | Status: SHIPPED | OUTPATIENT
Start: 2023-01-01

## 2023-01-01 RX ORDER — MULTIPLE VITAMINS W/ MINERALS TAB 9MG-400MCG
1 TAB ORAL DAILY
COMMUNITY
End: 2023-01-01

## 2023-01-01 RX ORDER — TRAZODONE HYDROCHLORIDE 50 MG/1
50 TABLET, FILM COATED ORAL AT BEDTIME
Status: DISCONTINUED | OUTPATIENT
Start: 2023-01-01 | End: 2023-01-01

## 2023-01-01 RX ORDER — POTASSIUM CHLORIDE 1500 MG/1
40 TABLET, EXTENDED RELEASE ORAL ONCE
Status: COMPLETED | OUTPATIENT
Start: 2023-01-01 | End: 2023-01-01

## 2023-01-01 RX ORDER — ASPIRIN 81 MG/1
81 TABLET, CHEWABLE ORAL DAILY
Status: DISCONTINUED | OUTPATIENT
Start: 2023-01-01 | End: 2023-01-01 | Stop reason: HOSPADM

## 2023-01-01 RX ORDER — MORPHINE SULFATE 2 MG/ML
2 INJECTION, SOLUTION INTRAMUSCULAR; INTRAVENOUS
Status: DISCONTINUED | OUTPATIENT
Start: 2023-01-01 | End: 2023-01-01

## 2023-01-01 RX ORDER — SENNOSIDES 8.6 MG
1-2 TABLET ORAL AT BEDTIME
Status: DISCONTINUED | OUTPATIENT
Start: 2023-01-01 | End: 2023-01-01 | Stop reason: HOSPADM

## 2023-01-01 RX ORDER — PANTOPRAZOLE SODIUM 40 MG/1
40 TABLET, DELAYED RELEASE ORAL DAILY
Qty: 30 TABLET | Refills: 0 | Status: SHIPPED | OUTPATIENT
Start: 2023-01-01 | End: 2023-01-01

## 2023-01-01 RX ORDER — LIDOCAINE 40 MG/G
CREAM TOPICAL
Status: CANCELLED | OUTPATIENT
Start: 2023-01-01

## 2023-01-01 RX ORDER — HEPARIN SODIUM 1000 [USP'U]/ML
4500 INJECTION, SOLUTION INTRAVENOUS; SUBCUTANEOUS ONCE
Status: COMPLETED | OUTPATIENT
Start: 2023-01-01 | End: 2023-01-01

## 2023-01-01 RX ORDER — ASPIRIN 325 MG
325 TABLET ORAL DAILY
Status: DISCONTINUED | OUTPATIENT
Start: 2023-01-01 | End: 2023-01-01 | Stop reason: HOSPADM

## 2023-01-01 RX ORDER — BUPIVACAINE HYDROCHLORIDE AND EPINEPHRINE 5; 5 MG/ML; UG/ML
INJECTION, SOLUTION PERINEURAL PRN
Status: DISCONTINUED | OUTPATIENT
Start: 2023-01-01 | End: 2023-01-01 | Stop reason: HOSPADM

## 2023-01-01 RX ORDER — HEPARIN SODIUM 200 [USP'U]/100ML
1 INJECTION, SOLUTION INTRAVENOUS CONTINUOUS PRN
Status: DISCONTINUED | OUTPATIENT
Start: 2023-01-01 | End: 2023-01-01 | Stop reason: HOSPADM

## 2023-01-01 RX ORDER — SERTRALINE HYDROCHLORIDE 25 MG/1
25 TABLET, FILM COATED ORAL DAILY
Qty: 90 TABLET | Refills: 3 | Status: ON HOLD | OUTPATIENT
Start: 2023-01-01 | End: 2023-01-01

## 2023-01-01 RX ORDER — GABAPENTIN 100 MG/1
200 CAPSULE ORAL 3 TIMES DAILY
Status: DISCONTINUED | OUTPATIENT
Start: 2023-01-01 | End: 2023-01-01 | Stop reason: HOSPADM

## 2023-01-01 RX ORDER — SIMETHICONE 80 MG
80 TABLET,CHEWABLE ORAL EVERY 6 HOURS PRN
Status: DISCONTINUED | OUTPATIENT
Start: 2023-01-01 | End: 2023-01-01 | Stop reason: HOSPADM

## 2023-01-01 RX ORDER — LORAZEPAM 2 MG/ML
1 INJECTION INTRAMUSCULAR
Status: DISCONTINUED | OUTPATIENT
Start: 2023-01-01 | End: 2023-01-01 | Stop reason: HOSPADM

## 2023-01-01 RX ORDER — TIZANIDINE 2 MG/1
2 TABLET ORAL EVERY 8 HOURS PRN
Status: DISCONTINUED | OUTPATIENT
Start: 2023-01-01 | End: 2023-01-01 | Stop reason: HOSPADM

## 2023-01-01 RX ORDER — POTASSIUM CHLORIDE 1.5 G/1.58G
40 POWDER, FOR SOLUTION ORAL ONCE
Status: COMPLETED | OUTPATIENT
Start: 2023-01-01 | End: 2023-01-01

## 2023-01-01 RX ORDER — ACETAMINOPHEN 650 MG/1
650 SUPPOSITORY RECTAL EVERY 6 HOURS PRN
Status: DISCONTINUED | OUTPATIENT
Start: 2023-01-01 | End: 2023-01-01

## 2023-01-01 RX ORDER — NALOXONE HYDROCHLORIDE 0.4 MG/ML
0.1 INJECTION, SOLUTION INTRAMUSCULAR; INTRAVENOUS; SUBCUTANEOUS
Status: DISCONTINUED | OUTPATIENT
Start: 2023-01-01 | End: 2023-01-01

## 2023-01-01 RX ORDER — GLYCOPYRROLATE 0.2 MG/ML
INJECTION, SOLUTION INTRAMUSCULAR; INTRAVENOUS PRN
Status: DISCONTINUED | OUTPATIENT
Start: 2023-01-01 | End: 2023-01-01

## 2023-01-01 RX ORDER — GABAPENTIN 100 MG/1
100 CAPSULE ORAL 3 TIMES DAILY
Qty: 90 CAPSULE | Refills: 0 | Status: SHIPPED | OUTPATIENT
Start: 2023-01-01 | End: 2023-06-30

## 2023-01-01 RX ORDER — SODIUM CHLORIDE, SODIUM LACTATE, POTASSIUM CHLORIDE, CALCIUM CHLORIDE 600; 310; 30; 20 MG/100ML; MG/100ML; MG/100ML; MG/100ML
INJECTION, SOLUTION INTRAVENOUS CONTINUOUS
Status: DISCONTINUED | OUTPATIENT
Start: 2023-01-01 | End: 2023-01-01 | Stop reason: HOSPADM

## 2023-01-01 RX ORDER — NALOXONE HYDROCHLORIDE 0.4 MG/ML
0.2 INJECTION, SOLUTION INTRAMUSCULAR; INTRAVENOUS; SUBCUTANEOUS
Status: DISCONTINUED | OUTPATIENT
Start: 2023-01-01 | End: 2023-01-01

## 2023-01-01 RX ORDER — HYDROMORPHONE HCL IN WATER/PF 6 MG/30 ML
0.4 PATIENT CONTROLLED ANALGESIA SYRINGE INTRAVENOUS
Status: DISCONTINUED | OUTPATIENT
Start: 2023-01-01 | End: 2023-01-01

## 2023-01-01 RX ORDER — LORAZEPAM 1 MG/1
1 TABLET ORAL
Qty: 12 TABLET | Refills: 0 | Status: SHIPPED | OUTPATIENT
Start: 2023-01-01 | End: 2023-01-01

## 2023-01-01 RX ORDER — TRAMADOL HYDROCHLORIDE 50 MG/1
50 TABLET ORAL EVERY 6 HOURS PRN
Qty: 10 TABLET | Refills: 0 | Status: SHIPPED | OUTPATIENT
Start: 2023-01-01 | End: 2023-01-01

## 2023-01-01 RX ORDER — FENTANYL CITRATE 0.05 MG/ML
50 INJECTION, SOLUTION INTRAMUSCULAR; INTRAVENOUS ONCE
Status: COMPLETED | OUTPATIENT
Start: 2023-01-01 | End: 2023-01-01

## 2023-01-01 RX ORDER — FENTANYL CITRATE 50 UG/ML
25-50 INJECTION, SOLUTION INTRAMUSCULAR; INTRAVENOUS EVERY 5 MIN PRN
Status: DISCONTINUED | OUTPATIENT
Start: 2023-01-01 | End: 2023-01-01 | Stop reason: HOSPADM

## 2023-01-01 RX ORDER — LEVOFLOXACIN 5 MG/ML
250 INJECTION, SOLUTION INTRAVENOUS EVERY 24 HOURS
Status: DISCONTINUED | OUTPATIENT
Start: 2023-01-01 | End: 2023-01-01

## 2023-01-01 RX ORDER — GADOBUTROL 604.72 MG/ML
10 INJECTION INTRAVENOUS ONCE
Status: COMPLETED | OUTPATIENT
Start: 2023-01-01 | End: 2023-01-01

## 2023-01-01 RX ORDER — ACETAMINOPHEN 650 MG/1
650 SUPPOSITORY RECTAL EVERY 6 HOURS PRN
Status: DISCONTINUED | OUTPATIENT
Start: 2023-01-01 | End: 2023-01-01 | Stop reason: HOSPADM

## 2023-01-01 RX ORDER — POLYETHYLENE GLYCOL 3350 17 G/17G
17 POWDER, FOR SOLUTION ORAL DAILY
Status: DISCONTINUED | OUTPATIENT
Start: 2023-01-01 | End: 2023-01-01 | Stop reason: HOSPADM

## 2023-01-01 RX ORDER — HYDROMORPHONE HYDROCHLORIDE 1 MG/ML
0.25 INJECTION, SOLUTION INTRAMUSCULAR; INTRAVENOUS; SUBCUTANEOUS ONCE
Status: COMPLETED | OUTPATIENT
Start: 2023-01-01 | End: 2023-01-01

## 2023-01-01 RX ORDER — BISACODYL 10 MG
10 SUPPOSITORY, RECTAL RECTAL DAILY PRN
Status: DISCONTINUED | OUTPATIENT
Start: 2023-01-01 | End: 2023-01-01 | Stop reason: HOSPADM

## 2023-01-01 RX ORDER — LIDOCAINE HYDROCHLORIDE 20 MG/ML
INJECTION, SOLUTION INFILTRATION; PERINEURAL PRN
Status: DISCONTINUED | OUTPATIENT
Start: 2023-01-01 | End: 2023-01-01

## 2023-01-01 RX ORDER — PROPOFOL 10 MG/ML
INJECTION, EMULSION INTRAVENOUS PRN
Status: DISCONTINUED | OUTPATIENT
Start: 2023-01-01 | End: 2023-01-01

## 2023-01-01 RX ORDER — ATORVASTATIN CALCIUM 40 MG/1
40 TABLET, FILM COATED ORAL EVERY EVENING
Qty: 30 TABLET | Refills: 0 | DISCHARGE
Start: 2023-01-01 | End: 2023-01-01

## 2023-01-01 RX ORDER — OXYCODONE HYDROCHLORIDE 5 MG/1
5-10 TABLET ORAL
Status: DISCONTINUED | OUTPATIENT
Start: 2023-01-01 | End: 2023-01-01 | Stop reason: HOSPADM

## 2023-01-01 RX ORDER — TRAMADOL HYDROCHLORIDE 50 MG/1
50 TABLET ORAL EVERY 6 HOURS PRN
Qty: 10 TABLET | Refills: 0 | Status: SHIPPED | DISCHARGE
Start: 2023-01-01 | End: 2023-01-01

## 2023-01-01 RX ORDER — ACETAMINOPHEN 325 MG/1
650 TABLET ORAL 4 TIMES DAILY
Status: DISCONTINUED | OUTPATIENT
Start: 2023-01-01 | End: 2023-01-01 | Stop reason: HOSPADM

## 2023-01-01 RX ORDER — TRAMADOL HYDROCHLORIDE 50 MG/1
50 TABLET ORAL EVERY 6 HOURS PRN
Status: DISCONTINUED | OUTPATIENT
Start: 2023-01-01 | End: 2023-01-01 | Stop reason: HOSPADM

## 2023-01-01 RX ORDER — OXYCODONE HYDROCHLORIDE 5 MG/1
20 TABLET ORAL
Status: DISCONTINUED | OUTPATIENT
Start: 2023-01-01 | End: 2023-01-01

## 2023-01-01 RX ORDER — VIT C/E/ZN/COPPR/LUTEIN/ZEAXAN 60 MG-6 MG
1 CAPSULE ORAL 2 TIMES DAILY
Status: DISCONTINUED | OUTPATIENT
Start: 2023-01-01 | End: 2023-01-01 | Stop reason: HOSPADM

## 2023-01-01 RX ORDER — AMOXICILLIN 250 MG
1 CAPSULE ORAL 2 TIMES DAILY PRN
Qty: 20 TABLET | Refills: 0 | DISCHARGE
Start: 2023-01-01

## 2023-01-01 RX ORDER — CLONAZEPAM 0.25 MG/1
0.25 TABLET, ORALLY DISINTEGRATING ORAL EVERY 12 HOURS
COMMUNITY
Start: 2023-01-01

## 2023-01-01 RX ORDER — ACETAMINOPHEN 325 MG/1
650 TABLET ORAL 4 TIMES DAILY
Qty: 220 TABLET | Refills: 11 | Status: ON HOLD | OUTPATIENT
Start: 2023-01-01 | End: 2023-01-01

## 2023-01-01 RX ORDER — SODIUM CHLORIDE 9 MG/ML
INJECTION, SOLUTION INTRAVENOUS CONTINUOUS
Status: ACTIVE | OUTPATIENT
Start: 2023-01-01 | End: 2023-01-01

## 2023-01-01 RX ORDER — QUETIAPINE FUMARATE 25 MG/1
25 TABLET, FILM COATED ORAL DAILY PRN
DISCHARGE
Start: 2023-01-01 | End: 2023-01-01

## 2023-01-01 RX ORDER — CEFAZOLIN SODIUM 1 G/3ML
1 INJECTION, POWDER, FOR SOLUTION INTRAMUSCULAR; INTRAVENOUS EVERY 8 HOURS
Status: COMPLETED | OUTPATIENT
Start: 2023-01-01 | End: 2023-01-01

## 2023-01-01 RX ORDER — HYDROMORPHONE HCL IN WATER/PF 6 MG/30 ML
0.2 PATIENT CONTROLLED ANALGESIA SYRINGE INTRAVENOUS
Status: DISCONTINUED | OUTPATIENT
Start: 2023-01-01 | End: 2023-01-01

## 2023-01-01 RX ORDER — ATROPINE SULFATE 10 MG/ML
2 SOLUTION/ DROPS OPHTHALMIC EVERY 4 HOURS PRN
COMMUNITY
Start: 2023-01-01

## 2023-01-01 RX ORDER — ROPIVACAINE IN 0.9% SOD CHL/PF 0.1 %
.03-.125 PLASTIC BAG, INJECTION (ML) EPIDURAL CONTINUOUS
Status: DISCONTINUED | OUTPATIENT
Start: 2023-01-01 | End: 2023-01-01

## 2023-01-01 RX ORDER — SODIUM CHLORIDE, SODIUM LACTATE, POTASSIUM CHLORIDE, CALCIUM CHLORIDE 600; 310; 30; 20 MG/100ML; MG/100ML; MG/100ML; MG/100ML
INJECTION, SOLUTION INTRAVENOUS CONTINUOUS
Status: ACTIVE | OUTPATIENT
Start: 2023-01-01 | End: 2023-01-01

## 2023-01-01 RX ORDER — PROCHLORPERAZINE MALEATE 5 MG
5 TABLET ORAL EVERY 6 HOURS PRN
Status: DISCONTINUED | OUTPATIENT
Start: 2023-01-01 | End: 2023-01-01

## 2023-01-01 RX ORDER — FENTANYL CITRATE 50 UG/ML
INJECTION, SOLUTION INTRAMUSCULAR; INTRAVENOUS PRN
Status: DISCONTINUED | OUTPATIENT
Start: 2023-01-01 | End: 2023-01-01

## 2023-01-01 RX ORDER — IOPAMIDOL 755 MG/ML
50 INJECTION, SOLUTION INTRAVASCULAR ONCE
Status: COMPLETED | OUTPATIENT
Start: 2023-01-01 | End: 2023-01-01

## 2023-01-01 RX ORDER — FUROSEMIDE 10 MG/ML
20 INJECTION INTRAMUSCULAR; INTRAVENOUS ONCE
Status: COMPLETED | OUTPATIENT
Start: 2023-01-01 | End: 2023-01-01

## 2023-01-01 RX ORDER — ACETAMINOPHEN 325 MG/1
650 TABLET ORAL EVERY 6 HOURS PRN
Status: DISCONTINUED | OUTPATIENT
Start: 2023-01-01 | End: 2023-01-01 | Stop reason: HOSPADM

## 2023-01-01 RX ORDER — TRAMADOL HYDROCHLORIDE 50 MG/1
50 TABLET ORAL EVERY 6 HOURS PRN
Qty: 10 TABLET | Refills: 0 | Status: ON HOLD | OUTPATIENT
Start: 2023-01-01 | End: 2023-01-01

## 2023-01-01 RX ORDER — LEVOFLOXACIN 5 MG/ML
750 INJECTION, SOLUTION INTRAVENOUS EVERY 24 HOURS
Status: DISCONTINUED | OUTPATIENT
Start: 2023-01-01 | End: 2023-01-01

## 2023-01-01 RX ORDER — TRAZODONE HYDROCHLORIDE 50 MG/1
50 TABLET, FILM COATED ORAL AT BEDTIME
COMMUNITY
Start: 2023-01-01 | End: 2023-01-01

## 2023-01-01 RX ORDER — ONDANSETRON 4 MG/1
4 TABLET, ORALLY DISINTEGRATING ORAL EVERY 6 HOURS PRN
Status: DISCONTINUED | OUTPATIENT
Start: 2023-01-01 | End: 2023-01-01 | Stop reason: HOSPADM

## 2023-01-01 RX ORDER — ASPIRIN 81 MG/1
81 TABLET ORAL DAILY
Status: DISCONTINUED | OUTPATIENT
Start: 2023-01-01 | End: 2023-01-01

## 2023-01-01 RX ORDER — BISACODYL 10 MG
10 SUPPOSITORY, RECTAL RECTAL DAILY PRN
Status: DISCONTINUED | OUTPATIENT
Start: 2023-01-01 | End: 2023-01-01

## 2023-01-01 RX ORDER — ACETAMINOPHEN 325 MG/1
650 TABLET ORAL EVERY 6 HOURS PRN
Status: DISCONTINUED | OUTPATIENT
Start: 2023-01-01 | End: 2023-01-01

## 2023-01-01 RX ORDER — FENTANYL 12.5 UG/1
1 PATCH TRANSDERMAL
COMMUNITY
Start: 2023-01-01

## 2023-01-01 RX ORDER — LIDOCAINE 40 MG/G
CREAM TOPICAL
Status: DISCONTINUED | OUTPATIENT
Start: 2023-01-01 | End: 2023-01-01 | Stop reason: HOSPADM

## 2023-01-01 RX ORDER — VIT C/E/ZN/COPPR/LUTEIN/ZEAXAN 60 MG-6 MG
1 CAPSULE ORAL 2 TIMES DAILY
Status: DISCONTINUED | OUTPATIENT
Start: 2023-01-01 | End: 2023-01-01

## 2023-01-01 RX ORDER — CHOLECALCIFEROL (VITAMIN D3) 50 MCG
1 TABLET ORAL DAILY
Qty: 30 TABLET | Refills: 11 | Status: SHIPPED | OUTPATIENT
Start: 2023-01-01

## 2023-01-01 RX ORDER — METHOCARBAMOL 500 MG/1
500 TABLET, FILM COATED ORAL 4 TIMES DAILY PRN
Status: DISCONTINUED | OUTPATIENT
Start: 2023-01-01 | End: 2023-01-01

## 2023-01-01 RX ORDER — MORPHINE SULFATE 2 MG/ML
2 INJECTION, SOLUTION INTRAMUSCULAR; INTRAVENOUS ONCE
Status: COMPLETED | OUTPATIENT
Start: 2023-01-01 | End: 2023-01-01

## 2023-01-01 RX ORDER — AMOXICILLIN 250 MG
1 CAPSULE ORAL 2 TIMES DAILY
Status: DISCONTINUED | OUTPATIENT
Start: 2023-01-01 | End: 2023-01-01 | Stop reason: HOSPADM

## 2023-01-01 RX ORDER — ONDANSETRON 4 MG/1
4 TABLET, FILM COATED ORAL EVERY 6 HOURS PRN
Qty: 30 TABLET | Refills: 0 | Status: SHIPPED | OUTPATIENT
Start: 2023-01-01

## 2023-01-01 RX ORDER — CEFAZOLIN SODIUM/WATER 2 G/20 ML
2 SYRINGE (ML) INTRAVENOUS SEE ADMIN INSTRUCTIONS
Status: DISCONTINUED | OUTPATIENT
Start: 2023-01-01 | End: 2023-01-01 | Stop reason: HOSPADM

## 2023-01-01 RX ORDER — CALCIUM CARBONATE 500 MG/1
500 TABLET, CHEWABLE ORAL 2 TIMES DAILY PRN
Status: DISCONTINUED | OUTPATIENT
Start: 2023-01-01 | End: 2023-01-01 | Stop reason: HOSPADM

## 2023-01-01 RX ORDER — SODIUM CHLORIDE 9 MG/ML
INJECTION, SOLUTION INTRAVENOUS CONTINUOUS
Status: DISCONTINUED | OUTPATIENT
Start: 2023-01-01 | End: 2023-01-01

## 2023-01-01 RX ADMIN — ACETAMINOPHEN 650 MG: 325 TABLET ORAL at 04:03

## 2023-01-01 RX ADMIN — CLOPIDOGREL BISULFATE 75 MG: 75 TABLET ORAL at 08:52

## 2023-01-01 RX ADMIN — TIZANIDINE 2 MG: 2 TABLET ORAL at 21:26

## 2023-01-01 RX ADMIN — METHOCARBAMOL 500 MG: 500 TABLET ORAL at 23:43

## 2023-01-01 RX ADMIN — IBUPROFEN 400 MG: 400 TABLET ORAL at 18:45

## 2023-01-01 RX ADMIN — ACETAMINOPHEN 650 MG: 325 TABLET, FILM COATED ORAL at 21:07

## 2023-01-01 RX ADMIN — ENOXAPARIN SODIUM 40 MG: 40 INJECTION SUBCUTANEOUS at 17:15

## 2023-01-01 RX ADMIN — SENNOSIDES AND DOCUSATE SODIUM 1 TABLET: 50; 8.6 TABLET ORAL at 08:45

## 2023-01-01 RX ADMIN — TRAMADOL HYDROCHLORIDE 50 MG: 50 TABLET, COATED ORAL at 15:30

## 2023-01-01 RX ADMIN — Medication 50 MCG: at 09:01

## 2023-01-01 RX ADMIN — Medication 50 MCG: at 08:01

## 2023-01-01 RX ADMIN — METHOCARBAMOL 500 MG: 500 TABLET ORAL at 20:34

## 2023-01-01 RX ADMIN — CLOPIDOGREL BISULFATE 75 MG: 75 TABLET ORAL at 08:10

## 2023-01-01 RX ADMIN — PANTOPRAZOLE SODIUM 40 MG: 40 TABLET, DELAYED RELEASE ORAL at 09:15

## 2023-01-01 RX ADMIN — PANTOPRAZOLE SODIUM 40 MG: 40 TABLET, DELAYED RELEASE ORAL at 08:04

## 2023-01-01 RX ADMIN — ACETAMINOPHEN 650 MG: 325 TABLET ORAL at 15:39

## 2023-01-01 RX ADMIN — ALBUMIN HUMAN: 0.05 INJECTION, SOLUTION INTRAVENOUS at 14:46

## 2023-01-01 RX ADMIN — POLYETHYLENE GLYCOL 3350 17 G: 17 POWDER, FOR SOLUTION ORAL at 08:46

## 2023-01-01 RX ADMIN — GABAPENTIN 200 MG: 100 CAPSULE ORAL at 17:25

## 2023-01-01 RX ADMIN — TRAZODONE HYDROCHLORIDE 50 MG: 50 TABLET ORAL at 21:27

## 2023-01-01 RX ADMIN — ACETAMINOPHEN 975 MG: 325 TABLET ORAL at 08:01

## 2023-01-01 RX ADMIN — LATANOPROST 1 DROP: 50 SOLUTION/ DROPS OPHTHALMIC at 20:58

## 2023-01-01 RX ADMIN — TRAMADOL HYDROCHLORIDE 50 MG: 50 TABLET, COATED ORAL at 12:35

## 2023-01-01 RX ADMIN — OXYCODONE HYDROCHLORIDE 15 MG: 5 TABLET ORAL at 00:42

## 2023-01-01 RX ADMIN — Medication 600 MG: at 18:21

## 2023-01-01 RX ADMIN — METHOCARBAMOL 500 MG: 500 TABLET ORAL at 12:32

## 2023-01-01 RX ADMIN — IBUPROFEN 400 MG: 400 TABLET ORAL at 05:11

## 2023-01-01 RX ADMIN — ATORVASTATIN CALCIUM 40 MG: 40 TABLET, FILM COATED ORAL at 19:59

## 2023-01-01 RX ADMIN — SODIUM CHLORIDE: 9 INJECTION, SOLUTION INTRAVENOUS at 07:36

## 2023-01-01 RX ADMIN — Medication 600 MG: at 08:07

## 2023-01-01 RX ADMIN — SENNOSIDES AND DOCUSATE SODIUM 1 TABLET: 50; 8.6 TABLET ORAL at 09:05

## 2023-01-01 RX ADMIN — TRAZODONE HYDROCHLORIDE 50 MG: 50 TABLET ORAL at 22:09

## 2023-01-01 RX ADMIN — MORPHINE SULFATE 2 MG: 2 INJECTION, SOLUTION INTRAMUSCULAR; INTRAVENOUS at 23:34

## 2023-01-01 RX ADMIN — Medication 1 CAPSULE: at 21:19

## 2023-01-01 RX ADMIN — SODIUM CHLORIDE, POTASSIUM CHLORIDE, SODIUM LACTATE AND CALCIUM CHLORIDE: 600; 310; 30; 20 INJECTION, SOLUTION INTRAVENOUS at 18:55

## 2023-01-01 RX ADMIN — OXYCODONE HYDROCHLORIDE 5 MG: 5 TABLET ORAL at 19:38

## 2023-01-01 RX ADMIN — LEVOFLOXACIN 500 MG: 500 INJECTION, SOLUTION INTRAVENOUS at 14:49

## 2023-01-01 RX ADMIN — OXYCODONE HYDROCHLORIDE 15 MG: 5 TABLET ORAL at 10:04

## 2023-01-01 RX ADMIN — OXYCODONE HYDROCHLORIDE 2.5 MG: 5 TABLET ORAL at 08:43

## 2023-01-01 RX ADMIN — Medication 2 SPRAY: at 11:32

## 2023-01-01 RX ADMIN — IOPAMIDOL 125 ML: 755 INJECTION, SOLUTION INTRAVENOUS at 19:12

## 2023-01-01 RX ADMIN — Medication 50 MCG: at 08:42

## 2023-01-01 RX ADMIN — ACETAMINOPHEN 975 MG: 325 TABLET ORAL at 21:02

## 2023-01-01 RX ADMIN — LATANOPROST 1 DROP: 50 SOLUTION/ DROPS OPHTHALMIC at 21:29

## 2023-01-01 RX ADMIN — OXYCODONE HYDROCHLORIDE 5 MG: 5 TABLET ORAL at 06:20

## 2023-01-01 RX ADMIN — BISACODYL 10 MG: 10 SUPPOSITORY RECTAL at 11:41

## 2023-01-01 RX ADMIN — ENOXAPARIN SODIUM 40 MG: 40 INJECTION SUBCUTANEOUS at 16:23

## 2023-01-01 RX ADMIN — ROCURONIUM BROMIDE 50 MG: 50 INJECTION, SOLUTION INTRAVENOUS at 13:42

## 2023-01-01 RX ADMIN — ENOXAPARIN SODIUM 40 MG: 40 INJECTION SUBCUTANEOUS at 12:31

## 2023-01-01 RX ADMIN — LATANOPROST 1 DROP: 50 SOLUTION/ DROPS OPHTHALMIC at 19:50

## 2023-01-01 RX ADMIN — ACETAMINOPHEN 650 MG: 325 TABLET, FILM COATED ORAL at 12:49

## 2023-01-01 RX ADMIN — OXYCODONE HYDROCHLORIDE 10 MG: 5 TABLET ORAL at 09:34

## 2023-01-01 RX ADMIN — ASPIRIN 81 MG: 81 TABLET, COATED ORAL at 10:13

## 2023-01-01 RX ADMIN — Medication 2 SPRAY: at 09:05

## 2023-01-01 RX ADMIN — FENTANYL CITRATE 50 MCG: 50 INJECTION, SOLUTION INTRAMUSCULAR; INTRAVENOUS at 14:04

## 2023-01-01 RX ADMIN — TRAMADOL HYDROCHLORIDE 50 MG: 50 TABLET, COATED ORAL at 22:38

## 2023-01-01 RX ADMIN — ACETAMINOPHEN 650 MG: 325 TABLET ORAL at 22:05

## 2023-01-01 RX ADMIN — ACETAMINOPHEN 650 MG: 325 TABLET, FILM COATED ORAL at 05:50

## 2023-01-01 RX ADMIN — TRAMADOL HYDROCHLORIDE 50 MG: 50 TABLET, COATED ORAL at 04:09

## 2023-01-01 RX ADMIN — LATANOPROST 1 DROP: 50 SOLUTION/ DROPS OPHTHALMIC at 20:37

## 2023-01-01 RX ADMIN — ATORVASTATIN CALCIUM 40 MG: 40 TABLET, FILM COATED ORAL at 19:56

## 2023-01-01 RX ADMIN — Medication 50 MCG: at 08:48

## 2023-01-01 RX ADMIN — METHOCARBAMOL 500 MG: 500 TABLET ORAL at 18:29

## 2023-01-01 RX ADMIN — LEVOFLOXACIN 500 MG: 500 INJECTION, SOLUTION INTRAVENOUS at 14:26

## 2023-01-01 RX ADMIN — LATANOPROST 1 DROP: 50 SOLUTION/ DROPS OPHTHALMIC at 21:08

## 2023-01-01 RX ADMIN — PANTOPRAZOLE SODIUM 40 MG: 40 TABLET, DELAYED RELEASE ORAL at 08:40

## 2023-01-01 RX ADMIN — MORPHINE SULFATE 4 MG: 2 INJECTION, SOLUTION INTRAMUSCULAR; INTRAVENOUS at 09:21

## 2023-01-01 RX ADMIN — POLYETHYLENE GLYCOL 3350 17 G: 17 POWDER, FOR SOLUTION ORAL at 08:02

## 2023-01-01 RX ADMIN — LATANOPROST 1 DROP: 50 SOLUTION/ DROPS OPHTHALMIC at 19:10

## 2023-01-01 RX ADMIN — OXYCODONE HYDROCHLORIDE 10 MG: 5 TABLET ORAL at 21:49

## 2023-01-01 RX ADMIN — Medication 2 SPRAY: at 20:08

## 2023-01-01 RX ADMIN — PHENYLEPHRINE HYDROCHLORIDE 0.5 MCG/KG/MIN: 10 INJECTION INTRAVENOUS at 14:29

## 2023-01-01 RX ADMIN — MORPHINE SULFATE 2 MG: 2 INJECTION, SOLUTION INTRAMUSCULAR; INTRAVENOUS at 05:08

## 2023-01-01 RX ADMIN — Medication 600 MG: at 08:49

## 2023-01-01 RX ADMIN — SENNOSIDES AND DOCUSATE SODIUM 1 TABLET: 50; 8.6 TABLET ORAL at 08:35

## 2023-01-01 RX ADMIN — OXYCODONE HYDROCHLORIDE 10 MG: 5 TABLET ORAL at 22:12

## 2023-01-01 RX ADMIN — OXYCODONE HYDROCHLORIDE 5 MG: 5 TABLET ORAL at 10:23

## 2023-01-01 RX ADMIN — CLOPIDOGREL BISULFATE 75 MG: 75 TABLET ORAL at 08:48

## 2023-01-01 RX ADMIN — SENNOSIDES AND DOCUSATE SODIUM 1 TABLET: 50; 8.6 TABLET ORAL at 08:28

## 2023-01-01 RX ADMIN — IOPAMIDOL 125 ML: 755 INJECTION, SOLUTION INTRAVENOUS at 14:53

## 2023-01-01 RX ADMIN — Medication 1 MG: at 23:16

## 2023-01-01 RX ADMIN — Medication 50 MCG: at 08:28

## 2023-01-01 RX ADMIN — POLYETHYLENE GLYCOL 3350 17 G: 17 POWDER, FOR SOLUTION ORAL at 08:36

## 2023-01-01 RX ADMIN — ATORVASTATIN CALCIUM 40 MG: 40 TABLET, FILM COATED ORAL at 21:05

## 2023-01-01 RX ADMIN — MAGNESIUM HYDROXIDE 30 ML: 400 SUSPENSION ORAL at 11:46

## 2023-01-01 RX ADMIN — ASPIRIN 325 MG ORAL TABLET 325 MG: 325 PILL ORAL at 08:47

## 2023-01-01 RX ADMIN — GABAPENTIN 200 MG: 100 CAPSULE ORAL at 21:31

## 2023-01-01 RX ADMIN — QUETIAPINE FUMARATE 25 MG: 25 TABLET ORAL at 21:44

## 2023-01-01 RX ADMIN — Medication 600 MG: at 18:47

## 2023-01-01 RX ADMIN — SENNOSIDES AND DOCUSATE SODIUM 1 TABLET: 50; 8.6 TABLET ORAL at 08:53

## 2023-01-01 RX ADMIN — ACETAMINOPHEN 650 MG: 325 TABLET ORAL at 16:17

## 2023-01-01 RX ADMIN — PANTOPRAZOLE SODIUM 40 MG: 40 TABLET, DELAYED RELEASE ORAL at 08:30

## 2023-01-01 RX ADMIN — LATANOPROST 1 DROP: 50 SOLUTION/ DROPS OPHTHALMIC at 21:30

## 2023-01-01 RX ADMIN — POTASSIUM CHLORIDE 40 MEQ: 20 SOLUTION ORAL at 13:50

## 2023-01-01 RX ADMIN — GADOBUTROL 10 ML: 604.72 INJECTION INTRAVENOUS at 04:25

## 2023-01-01 RX ADMIN — HYDROMORPHONE HYDROCHLORIDE 0.4 MG: 0.2 INJECTION, SOLUTION INTRAMUSCULAR; INTRAVENOUS; SUBCUTANEOUS at 08:19

## 2023-01-01 RX ADMIN — ASPIRIN 325 MG ORAL TABLET 325 MG: 325 PILL ORAL at 09:14

## 2023-01-01 RX ADMIN — IBUPROFEN 400 MG: 400 TABLET ORAL at 20:00

## 2023-01-01 RX ADMIN — ASPIRIN 81 MG: 81 TABLET, COATED ORAL at 08:10

## 2023-01-01 RX ADMIN — Medication 1 MG: at 22:06

## 2023-01-01 RX ADMIN — POLYETHYLENE GLYCOL 3350 17 G: 17 POWDER, FOR SOLUTION ORAL at 08:07

## 2023-01-01 RX ADMIN — PHENYLEPHRINE HYDROCHLORIDE 150 MCG: 10 INJECTION INTRAVENOUS at 14:22

## 2023-01-01 RX ADMIN — LEVOFLOXACIN 750 MG: 5 INJECTION, SOLUTION INTRAVENOUS at 15:35

## 2023-01-01 RX ADMIN — MIDODRINE HYDROCHLORIDE 10 MG: 5 TABLET ORAL at 20:25

## 2023-01-01 RX ADMIN — SENNOSIDES AND DOCUSATE SODIUM 1 TABLET: 50; 8.6 TABLET ORAL at 20:24

## 2023-01-01 RX ADMIN — SENNOSIDES AND DOCUSATE SODIUM 1 TABLET: 50; 8.6 TABLET ORAL at 10:28

## 2023-01-01 RX ADMIN — SENNOSIDES AND DOCUSATE SODIUM 1 TABLET: 50; 8.6 TABLET ORAL at 08:14

## 2023-01-01 RX ADMIN — OXYCODONE HYDROCHLORIDE 5 MG: 5 TABLET ORAL at 03:55

## 2023-01-01 RX ADMIN — ACETAMINOPHEN 650 MG: 325 TABLET, FILM COATED ORAL at 16:13

## 2023-01-01 RX ADMIN — ACETAMINOPHEN 650 MG: 325 TABLET ORAL at 04:59

## 2023-01-01 RX ADMIN — Medication 2 SPRAY: at 21:06

## 2023-01-01 RX ADMIN — Medication 0.11 MCG/KG/MIN: at 01:06

## 2023-01-01 RX ADMIN — METHOCARBAMOL 500 MG: 500 TABLET ORAL at 21:02

## 2023-01-01 RX ADMIN — METHOCARBAMOL 500 MG: 500 TABLET ORAL at 21:27

## 2023-01-01 RX ADMIN — ACETAMINOPHEN 975 MG: 325 TABLET ORAL at 16:08

## 2023-01-01 RX ADMIN — OXYCODONE HYDROCHLORIDE 10 MG: 5 TABLET ORAL at 12:46

## 2023-01-01 RX ADMIN — ACETAMINOPHEN 975 MG: 325 TABLET ORAL at 18:46

## 2023-01-01 RX ADMIN — ACETAMINOPHEN 650 MG: 325 TABLET, FILM COATED ORAL at 14:16

## 2023-01-01 RX ADMIN — OXYCODONE HYDROCHLORIDE 10 MG: 5 TABLET ORAL at 05:35

## 2023-01-01 RX ADMIN — CLOPIDOGREL BISULFATE 75 MG: 75 TABLET ORAL at 08:01

## 2023-01-01 RX ADMIN — HYDROXYZINE HYDROCHLORIDE 10 MG: 10 TABLET ORAL at 21:05

## 2023-01-01 RX ADMIN — ACETAMINOPHEN 975 MG: 325 TABLET ORAL at 17:25

## 2023-01-01 RX ADMIN — OXYCODONE HYDROCHLORIDE 5 MG: 5 TABLET ORAL at 14:08

## 2023-01-01 RX ADMIN — Medication 600 MG: at 19:57

## 2023-01-01 RX ADMIN — ACETAMINOPHEN 975 MG: 325 TABLET ORAL at 08:00

## 2023-01-01 RX ADMIN — CLOPIDOGREL BISULFATE 75 MG: 75 TABLET ORAL at 10:14

## 2023-01-01 RX ADMIN — PANTOPRAZOLE SODIUM 40 MG: 40 TABLET, DELAYED RELEASE ORAL at 08:00

## 2023-01-01 RX ADMIN — LATANOPROST 1 DROP: 50 SOLUTION/ DROPS OPHTHALMIC at 22:21

## 2023-01-01 RX ADMIN — TIZANIDINE 2 MG: 2 TABLET ORAL at 12:50

## 2023-01-01 RX ADMIN — LORAZEPAM 1 MG: 1 TABLET ORAL at 15:21

## 2023-01-01 RX ADMIN — SENNOSIDES AND DOCUSATE SODIUM 1 TABLET: 50; 8.6 TABLET ORAL at 11:42

## 2023-01-01 RX ADMIN — ACETAMINOPHEN 975 MG: 325 TABLET ORAL at 21:49

## 2023-01-01 RX ADMIN — FENTANYL CITRATE 25 MCG: 50 INJECTION, SOLUTION INTRAMUSCULAR; INTRAVENOUS at 14:32

## 2023-01-01 RX ADMIN — SIMETHICONE 80 MG: 80 TABLET, CHEWABLE ORAL at 00:03

## 2023-01-01 RX ADMIN — ACETAMINOPHEN 650 MG: 325 TABLET, FILM COATED ORAL at 21:08

## 2023-01-01 RX ADMIN — Medication 2 SPRAY: at 12:08

## 2023-01-01 RX ADMIN — ASPIRIN 325 MG ORAL TABLET 325 MG: 325 PILL ORAL at 08:14

## 2023-01-01 RX ADMIN — Medication 1 CAPSULE: at 21:54

## 2023-01-01 RX ADMIN — OXYCODONE HYDROCHLORIDE 5 MG: 5 TABLET ORAL at 22:10

## 2023-01-01 RX ADMIN — OXYCODONE HYDROCHLORIDE 10 MG: 5 TABLET ORAL at 13:37

## 2023-01-01 RX ADMIN — OXYCODONE HYDROCHLORIDE 10 MG: 5 TABLET ORAL at 11:47

## 2023-01-01 RX ADMIN — POLYETHYLENE GLYCOL 3350 17 G: 17 POWDER, FOR SOLUTION ORAL at 09:33

## 2023-01-01 RX ADMIN — Medication 600 MG: at 16:36

## 2023-01-01 RX ADMIN — MORPHINE SULFATE 4 MG: 4 INJECTION, SOLUTION INTRAMUSCULAR; INTRAVENOUS at 15:03

## 2023-01-01 RX ADMIN — OXYCODONE HYDROCHLORIDE 10 MG: 5 TABLET ORAL at 20:58

## 2023-01-01 RX ADMIN — ACETAMINOPHEN 975 MG: 325 TABLET ORAL at 08:35

## 2023-01-01 RX ADMIN — ACETAMINOPHEN 975 MG: 325 TABLET ORAL at 21:26

## 2023-01-01 RX ADMIN — Medication 2 SPRAY: at 17:15

## 2023-01-01 RX ADMIN — LATANOPROST 1 DROP: 50 SOLUTION/ DROPS OPHTHALMIC at 20:18

## 2023-01-01 RX ADMIN — ASPIRIN 81 MG: 81 TABLET, COATED ORAL at 08:52

## 2023-01-01 RX ADMIN — Medication 2 SPRAY: at 21:07

## 2023-01-01 RX ADMIN — PANTOPRAZOLE SODIUM 40 MG: 40 TABLET, DELAYED RELEASE ORAL at 10:22

## 2023-01-01 RX ADMIN — ACETAMINOPHEN 975 MG: 325 TABLET ORAL at 10:22

## 2023-01-01 RX ADMIN — TRAMADOL HYDROCHLORIDE 50 MG: 50 TABLET, COATED ORAL at 03:03

## 2023-01-01 RX ADMIN — Medication 50 MCG: at 08:30

## 2023-01-01 RX ADMIN — SODIUM CHLORIDE, POTASSIUM CHLORIDE, SODIUM LACTATE AND CALCIUM CHLORIDE: 600; 310; 30; 20 INJECTION, SOLUTION INTRAVENOUS at 05:26

## 2023-01-01 RX ADMIN — ACETAMINOPHEN 650 MG: 325 TABLET ORAL at 04:32

## 2023-01-01 RX ADMIN — OXYCODONE HYDROCHLORIDE 2.5 MG: 5 TABLET ORAL at 21:09

## 2023-01-01 RX ADMIN — LATANOPROST 1 DROP: 50 SOLUTION/ DROPS OPHTHALMIC at 21:41

## 2023-01-01 RX ADMIN — ONDANSETRON 4 MG: 2 INJECTION INTRAMUSCULAR; INTRAVENOUS at 16:01

## 2023-01-01 RX ADMIN — SENNOSIDES AND DOCUSATE SODIUM 2 TABLET: 50; 8.6 TABLET ORAL at 09:20

## 2023-01-01 RX ADMIN — METHOCARBAMOL 500 MG: 500 TABLET ORAL at 08:45

## 2023-01-01 RX ADMIN — PANTOPRAZOLE SODIUM 40 MG: 40 TABLET, DELAYED RELEASE ORAL at 08:25

## 2023-01-01 RX ADMIN — ACETAMINOPHEN 650 MG: 325 TABLET, FILM COATED ORAL at 13:37

## 2023-01-01 RX ADMIN — LATANOPROST 1 DROP: 50 SOLUTION/ DROPS OPHTHALMIC at 20:57

## 2023-01-01 RX ADMIN — METHOCARBAMOL 500 MG: 500 TABLET ORAL at 12:46

## 2023-01-01 RX ADMIN — Medication 600 MG: at 17:16

## 2023-01-01 RX ADMIN — FENTANYL CITRATE 50 MCG: 50 INJECTION, SOLUTION INTRAMUSCULAR; INTRAVENOUS at 12:08

## 2023-01-01 RX ADMIN — SODIUM CHLORIDE: 9 INJECTION, SOLUTION INTRAVENOUS at 17:00

## 2023-01-01 RX ADMIN — ACETAMINOPHEN 650 MG: 325 TABLET, FILM COATED ORAL at 15:29

## 2023-01-01 RX ADMIN — Medication 1 MG: at 22:10

## 2023-01-01 RX ADMIN — OXYCODONE HYDROCHLORIDE 5 MG: 5 TABLET ORAL at 01:18

## 2023-01-01 RX ADMIN — TRAMADOL HYDROCHLORIDE 50 MG: 50 TABLET, COATED ORAL at 13:18

## 2023-01-01 RX ADMIN — MIDODRINE HYDROCHLORIDE 10 MG: 5 TABLET ORAL at 11:47

## 2023-01-01 RX ADMIN — OXYCODONE HYDROCHLORIDE 5 MG: 5 TABLET ORAL at 05:50

## 2023-01-01 RX ADMIN — OXYCODONE HYDROCHLORIDE 10 MG: 5 TABLET ORAL at 03:33

## 2023-01-01 RX ADMIN — ACETAMINOPHEN 650 MG: 325 TABLET ORAL at 21:44

## 2023-01-01 RX ADMIN — TRAMADOL HYDROCHLORIDE 50 MG: 50 TABLET, COATED ORAL at 23:51

## 2023-01-01 RX ADMIN — SODIUM CHLORIDE, POTASSIUM CHLORIDE, SODIUM LACTATE AND CALCIUM CHLORIDE: 600; 310; 30; 20 INJECTION, SOLUTION INTRAVENOUS at 01:30

## 2023-01-01 RX ADMIN — Medication 600 MG: at 18:13

## 2023-01-01 RX ADMIN — HYDROMORPHONE HYDROCHLORIDE 0.2 MG: 0.2 INJECTION, SOLUTION INTRAMUSCULAR; INTRAVENOUS; SUBCUTANEOUS at 23:35

## 2023-01-01 RX ADMIN — HYDROXYZINE HYDROCHLORIDE 10 MG: 10 TABLET ORAL at 01:01

## 2023-01-01 RX ADMIN — ASPIRIN 325 MG ORAL TABLET 325 MG: 325 PILL ORAL at 08:45

## 2023-01-01 RX ADMIN — Medication 1 MG: at 21:28

## 2023-01-01 RX ADMIN — FENTANYL CITRATE 50 MCG: 50 INJECTION, SOLUTION INTRAMUSCULAR; INTRAVENOUS at 13:40

## 2023-01-01 RX ADMIN — LIDOCAINE HYDROCHLORIDE 60 MG: 20 INJECTION, SOLUTION INFILTRATION; PERINEURAL at 13:40

## 2023-01-01 RX ADMIN — CLOPIDOGREL BISULFATE 75 MG: 75 TABLET ORAL at 08:34

## 2023-01-01 RX ADMIN — SENNOSIDES AND DOCUSATE SODIUM 1 TABLET: 50; 8.6 TABLET ORAL at 20:06

## 2023-01-01 RX ADMIN — ASPIRIN 81 MG: 81 TABLET, COATED ORAL at 08:49

## 2023-01-01 RX ADMIN — HYDROMORPHONE HYDROCHLORIDE 0.2 MG: 0.2 INJECTION, SOLUTION INTRAMUSCULAR; INTRAVENOUS; SUBCUTANEOUS at 21:26

## 2023-01-01 RX ADMIN — GABAPENTIN 200 MG: 100 CAPSULE ORAL at 09:05

## 2023-01-01 RX ADMIN — Medication 600 MG: at 18:10

## 2023-01-01 RX ADMIN — OXYCODONE HYDROCHLORIDE 10 MG: 5 TABLET ORAL at 12:39

## 2023-01-01 RX ADMIN — METHOCARBAMOL 500 MG: 500 TABLET ORAL at 11:47

## 2023-01-01 RX ADMIN — OXYCODONE HYDROCHLORIDE 10 MG: 5 TABLET ORAL at 17:12

## 2023-01-01 RX ADMIN — Medication 1 MG: at 22:09

## 2023-01-01 RX ADMIN — ACETAMINOPHEN 650 MG: 325 TABLET, FILM COATED ORAL at 13:18

## 2023-01-01 RX ADMIN — METHOCARBAMOL 500 MG: 500 TABLET ORAL at 14:13

## 2023-01-01 RX ADMIN — PANTOPRAZOLE SODIUM 40 MG: 40 TABLET, DELAYED RELEASE ORAL at 08:47

## 2023-01-01 RX ADMIN — HEPARIN SODIUM 4500 UNITS: 1000 INJECTION INTRAVENOUS; SUBCUTANEOUS at 13:30

## 2023-01-01 RX ADMIN — GADOBUTROL 6 ML: 604.72 INJECTION INTRAVENOUS at 16:02

## 2023-01-01 RX ADMIN — TRAZODONE HYDROCHLORIDE 50 MG: 50 TABLET ORAL at 20:14

## 2023-01-01 RX ADMIN — TRAZODONE HYDROCHLORIDE 50 MG: 50 TABLET ORAL at 21:59

## 2023-01-01 RX ADMIN — HYDROMORPHONE HYDROCHLORIDE 0.2 MG: 0.2 INJECTION, SOLUTION INTRAMUSCULAR; INTRAVENOUS; SUBCUTANEOUS at 03:41

## 2023-01-01 RX ADMIN — OXYCODONE HYDROCHLORIDE 5 MG: 5 TABLET ORAL at 21:35

## 2023-01-01 RX ADMIN — LATANOPROST 1 DROP: 50 SOLUTION/ DROPS OPHTHALMIC at 21:07

## 2023-01-01 RX ADMIN — CEFAZOLIN 1 G: 1 INJECTION, POWDER, FOR SOLUTION INTRAMUSCULAR; INTRAVENOUS at 05:32

## 2023-01-01 RX ADMIN — TIZANIDINE 2 MG: 2 TABLET ORAL at 22:06

## 2023-01-01 RX ADMIN — OXYCODONE HYDROCHLORIDE 5 MG: 5 TABLET ORAL at 09:20

## 2023-01-01 RX ADMIN — ENOXAPARIN SODIUM 40 MG: 40 INJECTION SUBCUTANEOUS at 14:06

## 2023-01-01 RX ADMIN — ACETAMINOPHEN 975 MG: 325 TABLET ORAL at 09:34

## 2023-01-01 RX ADMIN — FLUDEOXYGLUCOSE F-18 14 MCI.: 500 INJECTION, SOLUTION INTRAVENOUS at 11:57

## 2023-01-01 RX ADMIN — ACETAMINOPHEN 650 MG: 325 TABLET, FILM COATED ORAL at 09:00

## 2023-01-01 RX ADMIN — Medication 600 MG: at 17:45

## 2023-01-01 RX ADMIN — SENNOSIDES AND DOCUSATE SODIUM 1 TABLET: 50; 8.6 TABLET ORAL at 09:14

## 2023-01-01 RX ADMIN — HYDROXYZINE HYDROCHLORIDE 10 MG: 10 TABLET ORAL at 00:47

## 2023-01-01 RX ADMIN — ATORVASTATIN CALCIUM 40 MG: 40 TABLET, FILM COATED ORAL at 20:00

## 2023-01-01 RX ADMIN — HYDROMORPHONE HYDROCHLORIDE 0.2 MG: 0.2 INJECTION, SOLUTION INTRAMUSCULAR; INTRAVENOUS; SUBCUTANEOUS at 01:05

## 2023-01-01 RX ADMIN — CLOPIDOGREL BISULFATE 75 MG: 75 TABLET ORAL at 08:23

## 2023-01-01 RX ADMIN — ACETAMINOPHEN 650 MG: 325 TABLET, FILM COATED ORAL at 17:15

## 2023-01-01 RX ADMIN — ACETAMINOPHEN 650 MG: 325 TABLET ORAL at 14:26

## 2023-01-01 RX ADMIN — ACETAMINOPHEN 975 MG: 325 TABLET, FILM COATED ORAL at 06:58

## 2023-01-01 RX ADMIN — CLOPIDOGREL BISULFATE 75 MG: 75 TABLET ORAL at 09:05

## 2023-01-01 RX ADMIN — OXYCODONE HYDROCHLORIDE 10 MG: 5 TABLET ORAL at 20:03

## 2023-01-01 RX ADMIN — ACETAMINOPHEN 650 MG: 325 TABLET, FILM COATED ORAL at 22:06

## 2023-01-01 RX ADMIN — ACETAMINOPHEN 650 MG: 325 TABLET, FILM COATED ORAL at 06:35

## 2023-01-01 RX ADMIN — OXYCODONE HYDROCHLORIDE 5 MG: 5 TABLET ORAL at 21:11

## 2023-01-01 RX ADMIN — ACETAMINOPHEN 650 MG: 325 TABLET ORAL at 20:27

## 2023-01-01 RX ADMIN — Medication 600 MG: at 16:41

## 2023-01-01 RX ADMIN — Medication 2 SPRAY: at 13:38

## 2023-01-01 RX ADMIN — OXYCODONE HYDROCHLORIDE 5 MG: 5 TABLET ORAL at 12:48

## 2023-01-01 RX ADMIN — SODIUM CHLORIDE, POTASSIUM CHLORIDE, SODIUM LACTATE AND CALCIUM CHLORIDE: 600; 310; 30; 20 INJECTION, SOLUTION INTRAVENOUS at 03:12

## 2023-01-01 RX ADMIN — CLOPIDOGREL BISULFATE 75 MG: 75 TABLET ORAL at 08:28

## 2023-01-01 RX ADMIN — ACETAMINOPHEN 650 MG: 325 TABLET, FILM COATED ORAL at 12:36

## 2023-01-01 RX ADMIN — Medication 2 SPRAY: at 08:37

## 2023-01-01 RX ADMIN — PANTOPRAZOLE SODIUM 40 MG: 40 TABLET, DELAYED RELEASE ORAL at 08:34

## 2023-01-01 RX ADMIN — Medication 1 MG: at 22:21

## 2023-01-01 RX ADMIN — ACETAMINOPHEN 650 MG: 325 TABLET, FILM COATED ORAL at 21:30

## 2023-01-01 RX ADMIN — ACETAMINOPHEN 650 MG: 325 TABLET ORAL at 11:58

## 2023-01-01 RX ADMIN — Medication 50 MCG: at 08:15

## 2023-01-01 RX ADMIN — ACETAMINOPHEN 650 MG: 325 TABLET ORAL at 13:37

## 2023-01-01 RX ADMIN — CLOPIDOGREL BISULFATE 75 MG: 75 TABLET ORAL at 09:01

## 2023-01-01 RX ADMIN — ATORVASTATIN CALCIUM 40 MG: 40 TABLET, FILM COATED ORAL at 19:50

## 2023-01-01 RX ADMIN — HYDROXYZINE HYDROCHLORIDE 10 MG: 10 TABLET ORAL at 21:02

## 2023-01-01 RX ADMIN — ENOXAPARIN SODIUM 40 MG: 40 INJECTION SUBCUTANEOUS at 15:32

## 2023-01-01 RX ADMIN — Medication 2 SPRAY: at 17:05

## 2023-01-01 RX ADMIN — OXYCODONE HYDROCHLORIDE 10 MG: 5 TABLET ORAL at 19:02

## 2023-01-01 RX ADMIN — OXYCODONE HYDROCHLORIDE 5 MG: 5 TABLET ORAL at 22:21

## 2023-01-01 RX ADMIN — OXYCODONE HYDROCHLORIDE 10 MG: 5 TABLET ORAL at 12:27

## 2023-01-01 RX ADMIN — ACETAMINOPHEN 650 MG: 325 TABLET, FILM COATED ORAL at 21:19

## 2023-01-01 RX ADMIN — OXYCODONE HYDROCHLORIDE 5 MG: 5 TABLET ORAL at 14:20

## 2023-01-01 RX ADMIN — ACETAMINOPHEN 650 MG: 325 TABLET, FILM COATED ORAL at 11:22

## 2023-01-01 RX ADMIN — LATANOPROST 1 DROP: 50 SOLUTION/ DROPS OPHTHALMIC at 20:53

## 2023-01-01 RX ADMIN — IBUPROFEN 400 MG: 400 TABLET ORAL at 12:46

## 2023-01-01 RX ADMIN — SODIUM CHLORIDE, POTASSIUM CHLORIDE, SODIUM LACTATE AND CALCIUM CHLORIDE: 600; 310; 30; 20 INJECTION, SOLUTION INTRAVENOUS at 12:47

## 2023-01-01 RX ADMIN — POLYETHYLENE GLYCOL 3350 17 G: 17 POWDER, FOR SOLUTION ORAL at 09:01

## 2023-01-01 RX ADMIN — TRAMADOL HYDROCHLORIDE 50 MG: 50 TABLET, COATED ORAL at 23:43

## 2023-01-01 RX ADMIN — SENNOSIDES AND DOCUSATE SODIUM 2 TABLET: 50; 8.6 TABLET ORAL at 17:02

## 2023-01-01 RX ADMIN — ACETAMINOPHEN 650 MG: 325 TABLET, FILM COATED ORAL at 10:12

## 2023-01-01 RX ADMIN — Medication 1 MG: at 22:38

## 2023-01-01 RX ADMIN — OXYCODONE HYDROCHLORIDE 10 MG: 5 TABLET ORAL at 02:08

## 2023-01-01 RX ADMIN — ASPIRIN 81 MG: 81 TABLET, COATED ORAL at 08:36

## 2023-01-01 RX ADMIN — ASPIRIN 325 MG ORAL TABLET 325 MG: 325 PILL ORAL at 08:41

## 2023-01-01 RX ADMIN — METHOCARBAMOL 500 MG: 500 TABLET ORAL at 09:05

## 2023-01-01 RX ADMIN — ACETAMINOPHEN 975 MG: 325 TABLET ORAL at 09:05

## 2023-01-01 RX ADMIN — Medication 2 SPRAY: at 11:42

## 2023-01-01 RX ADMIN — Medication 2 SPRAY: at 20:39

## 2023-01-01 RX ADMIN — BISACODYL 10 MG: 10 SUPPOSITORY RECTAL at 12:53

## 2023-01-01 RX ADMIN — OXYCODONE HYDROCHLORIDE 10 MG: 5 TABLET ORAL at 08:22

## 2023-01-01 RX ADMIN — POTASSIUM CHLORIDE 40 MEQ: 1.5 POWDER, FOR SOLUTION ORAL at 11:36

## 2023-01-01 RX ADMIN — OXYCODONE HYDROCHLORIDE 15 MG: 5 TABLET ORAL at 15:25

## 2023-01-01 RX ADMIN — ACETAMINOPHEN 650 MG: 325 TABLET, FILM COATED ORAL at 08:58

## 2023-01-01 RX ADMIN — OXYCODONE HYDROCHLORIDE 10 MG: 5 TABLET ORAL at 16:40

## 2023-01-01 RX ADMIN — ACETAMINOPHEN 650 MG: 325 TABLET, FILM COATED ORAL at 07:52

## 2023-01-01 RX ADMIN — Medication 600 MG: at 18:17

## 2023-01-01 RX ADMIN — TRAMADOL HYDROCHLORIDE 50 MG: 50 TABLET, COATED ORAL at 19:52

## 2023-01-01 RX ADMIN — OXYCODONE HYDROCHLORIDE 15 MG: 5 TABLET ORAL at 16:48

## 2023-01-01 RX ADMIN — ACETAMINOPHEN 650 MG: 325 TABLET, FILM COATED ORAL at 16:53

## 2023-01-01 RX ADMIN — POLYETHYLENE GLYCOL 3350 17 G: 17 POWDER, FOR SOLUTION ORAL at 08:25

## 2023-01-01 RX ADMIN — OXYCODONE HYDROCHLORIDE 10 MG: 5 TABLET ORAL at 12:32

## 2023-01-01 RX ADMIN — PANTOPRAZOLE SODIUM 40 MG: 40 TABLET, DELAYED RELEASE ORAL at 08:35

## 2023-01-01 RX ADMIN — OXYCODONE HYDROCHLORIDE 10 MG: 5 TABLET ORAL at 08:11

## 2023-01-01 RX ADMIN — HYDROMORPHONE HYDROCHLORIDE 0.25 MG: 1 INJECTION, SOLUTION INTRAMUSCULAR; INTRAVENOUS; SUBCUTANEOUS at 12:10

## 2023-01-01 RX ADMIN — ACETAMINOPHEN 650 MG: 325 TABLET, FILM COATED ORAL at 08:49

## 2023-01-01 RX ADMIN — ASPIRIN 325 MG ORAL TABLET 325 MG: 325 PILL ORAL at 08:01

## 2023-01-01 RX ADMIN — METHOCARBAMOL 500 MG: 500 TABLET ORAL at 03:44

## 2023-01-01 RX ADMIN — SODIUM CHLORIDE, POTASSIUM CHLORIDE, SODIUM LACTATE AND CALCIUM CHLORIDE: 600; 310; 30; 20 INJECTION, SOLUTION INTRAVENOUS at 12:17

## 2023-01-01 RX ADMIN — Medication 50 MCG: at 09:18

## 2023-01-01 RX ADMIN — ENOXAPARIN SODIUM 40 MG: 40 INJECTION SUBCUTANEOUS at 15:55

## 2023-01-01 RX ADMIN — OXYCODONE HYDROCHLORIDE 10 MG: 5 TABLET ORAL at 17:43

## 2023-01-01 RX ADMIN — ATORVASTATIN CALCIUM 40 MG: 40 TABLET, FILM COATED ORAL at 20:58

## 2023-01-01 RX ADMIN — SODIUM CHLORIDE, POTASSIUM CHLORIDE, SODIUM LACTATE AND CALCIUM CHLORIDE: 600; 310; 30; 20 INJECTION, SOLUTION INTRAVENOUS at 18:59

## 2023-01-01 RX ADMIN — OXYCODONE HYDROCHLORIDE 5 MG: 5 TABLET ORAL at 03:28

## 2023-01-01 RX ADMIN — OXYCODONE HYDROCHLORIDE 10 MG: 5 TABLET ORAL at 16:34

## 2023-01-01 RX ADMIN — PHENYLEPHRINE HYDROCHLORIDE 100 MCG: 10 INJECTION INTRAVENOUS at 14:34

## 2023-01-01 RX ADMIN — ACETAMINOPHEN 650 MG: 325 TABLET, FILM COATED ORAL at 07:56

## 2023-01-01 RX ADMIN — OXYCODONE HYDROCHLORIDE 5 MG: 5 TABLET ORAL at 08:24

## 2023-01-01 RX ADMIN — Medication 600 MG: at 09:15

## 2023-01-01 RX ADMIN — OXYCODONE HYDROCHLORIDE 10 MG: 10 TABLET, FILM COATED, EXTENDED RELEASE ORAL at 08:00

## 2023-01-01 RX ADMIN — OXYCODONE HYDROCHLORIDE 2.5 MG: 5 TABLET ORAL at 16:58

## 2023-01-01 RX ADMIN — ASPIRIN 81 MG: 81 TABLET, COATED ORAL at 14:26

## 2023-01-01 RX ADMIN — CLOPIDOGREL BISULFATE 75 MG: 75 TABLET ORAL at 10:28

## 2023-01-01 RX ADMIN — SODIUM CHLORIDE, POTASSIUM CHLORIDE, SODIUM LACTATE AND CALCIUM CHLORIDE: 600; 310; 30; 20 INJECTION, SOLUTION INTRAVENOUS at 14:35

## 2023-01-01 RX ADMIN — LEVOFLOXACIN 500 MG: 500 INJECTION, SOLUTION INTRAVENOUS at 13:39

## 2023-01-01 RX ADMIN — SENNOSIDES AND DOCUSATE SODIUM 1 TABLET: 50; 8.6 TABLET ORAL at 20:03

## 2023-01-01 RX ADMIN — HYALURONIDASE (HUMAN RECOMBINANT) 150 UNITS: 150 INJECTION, SOLUTION SUBCUTANEOUS at 15:33

## 2023-01-01 RX ADMIN — ACETAMINOPHEN 650 MG: 325 TABLET, FILM COATED ORAL at 08:10

## 2023-01-01 RX ADMIN — IBUPROFEN 400 MG: 400 TABLET ORAL at 11:19

## 2023-01-01 RX ADMIN — SENNOSIDES AND DOCUSATE SODIUM 1 TABLET: 50; 8.6 TABLET ORAL at 20:00

## 2023-01-01 RX ADMIN — SENNOSIDES AND DOCUSATE SODIUM 1 TABLET: 50; 8.6 TABLET ORAL at 08:48

## 2023-01-01 RX ADMIN — Medication 1 MG: at 22:26

## 2023-01-01 RX ADMIN — METHOCARBAMOL 500 MG: 500 TABLET ORAL at 12:44

## 2023-01-01 RX ADMIN — Medication 600 MG: at 17:13

## 2023-01-01 RX ADMIN — SENNOSIDES AND DOCUSATE SODIUM 1 TABLET: 50; 8.6 TABLET ORAL at 20:20

## 2023-01-01 RX ADMIN — Medication 50 MCG: at 08:14

## 2023-01-01 RX ADMIN — ACETAMINOPHEN 650 MG: 325 TABLET, FILM COATED ORAL at 22:36

## 2023-01-01 RX ADMIN — OXYCODONE HYDROCHLORIDE 5 MG: 5 TABLET ORAL at 17:15

## 2023-01-01 RX ADMIN — ENOXAPARIN SODIUM 40 MG: 40 INJECTION SUBCUTANEOUS at 16:14

## 2023-01-01 RX ADMIN — METHOCARBAMOL 500 MG: 500 TABLET ORAL at 07:06

## 2023-01-01 RX ADMIN — ACETAMINOPHEN 650 MG: 325 TABLET ORAL at 23:16

## 2023-01-01 RX ADMIN — OXYCODONE HYDROCHLORIDE 10 MG: 5 TABLET ORAL at 15:15

## 2023-01-01 RX ADMIN — OXYCODONE HYDROCHLORIDE 5 MG: 5 TABLET ORAL at 04:28

## 2023-01-01 RX ADMIN — SODIUM CHLORIDE 100 ML: 9 INJECTION, SOLUTION INTRAVENOUS at 18:58

## 2023-01-01 RX ADMIN — ASPIRIN 81 MG: 81 TABLET, COATED ORAL at 17:58

## 2023-01-01 RX ADMIN — LEVOFLOXACIN 500 MG: 500 INJECTION, SOLUTION INTRAVENOUS at 16:45

## 2023-01-01 RX ADMIN — ATORVASTATIN CALCIUM 40 MG: 40 TABLET, FILM COATED ORAL at 20:39

## 2023-01-01 RX ADMIN — Medication 600 MG: at 17:32

## 2023-01-01 RX ADMIN — CLOPIDOGREL BISULFATE 75 MG: 75 TABLET ORAL at 09:15

## 2023-01-01 RX ADMIN — POLYETHYLENE GLYCOL 3350 17 G: 17 POWDER, FOR SOLUTION ORAL at 05:50

## 2023-01-01 RX ADMIN — OXYCODONE HYDROCHLORIDE 2.5 MG: 5 TABLET ORAL at 03:06

## 2023-01-01 RX ADMIN — Medication 2 SPRAY: at 20:00

## 2023-01-01 RX ADMIN — OXYCODONE HYDROCHLORIDE 10 MG: 5 TABLET ORAL at 09:05

## 2023-01-01 RX ADMIN — OXYCODONE HYDROCHLORIDE 10 MG: 10 TABLET, FILM COATED, EXTENDED RELEASE ORAL at 20:36

## 2023-01-01 RX ADMIN — METHOCARBAMOL 500 MG: 500 TABLET ORAL at 13:04

## 2023-01-01 RX ADMIN — ASPIRIN 81 MG CHEWABLE TABLET 81 MG: 81 TABLET CHEWABLE at 08:58

## 2023-01-01 RX ADMIN — SENNOSIDES AND DOCUSATE SODIUM 1 TABLET: 50; 8.6 TABLET ORAL at 20:17

## 2023-01-01 RX ADMIN — Medication 1 MG: at 21:21

## 2023-01-01 RX ADMIN — Medication 600 MG: at 08:04

## 2023-01-01 RX ADMIN — ATORVASTATIN CALCIUM 40 MG: 40 TABLET, FILM COATED ORAL at 20:24

## 2023-01-01 RX ADMIN — OXYCODONE HYDROCHLORIDE 10 MG: 5 TABLET ORAL at 07:53

## 2023-01-01 RX ADMIN — LEVOFLOXACIN 500 MG: 500 INJECTION, SOLUTION INTRAVENOUS at 14:47

## 2023-01-01 RX ADMIN — LEVOFLOXACIN 750 MG: 5 INJECTION, SOLUTION INTRAVENOUS at 17:15

## 2023-01-01 RX ADMIN — SENNOSIDES AND DOCUSATE SODIUM 1 TABLET: 50; 8.6 TABLET ORAL at 20:41

## 2023-01-01 RX ADMIN — Medication 50 MCG: at 08:31

## 2023-01-01 RX ADMIN — LATANOPROST 1 DROP: 50 SOLUTION/ DROPS OPHTHALMIC at 20:00

## 2023-01-01 RX ADMIN — PANTOPRAZOLE SODIUM 40 MG: 40 TABLET, DELAYED RELEASE ORAL at 08:45

## 2023-01-01 RX ADMIN — ASPIRIN 325 MG ORAL TABLET 325 MG: 325 PILL ORAL at 08:25

## 2023-01-01 RX ADMIN — METHOCARBAMOL 500 MG: 500 TABLET ORAL at 22:14

## 2023-01-01 RX ADMIN — METHOCARBAMOL 500 MG: 500 TABLET ORAL at 17:11

## 2023-01-01 RX ADMIN — SENNOSIDES AND DOCUSATE SODIUM 1 TABLET: 50; 8.6 TABLET ORAL at 08:40

## 2023-01-01 RX ADMIN — MIDODRINE HYDROCHLORIDE 10 MG: 5 TABLET ORAL at 18:04

## 2023-01-01 RX ADMIN — SENNOSIDES AND DOCUSATE SODIUM 1 TABLET: 50; 8.6 TABLET ORAL at 08:24

## 2023-01-01 RX ADMIN — ACETAMINOPHEN 650 MG: 325 TABLET ORAL at 00:08

## 2023-01-01 RX ADMIN — ACETAMINOPHEN 650 MG: 325 TABLET, FILM COATED ORAL at 21:27

## 2023-01-01 RX ADMIN — CLOPIDOGREL BISULFATE 75 MG: 75 TABLET ORAL at 08:00

## 2023-01-01 RX ADMIN — Medication 1 MG: at 21:30

## 2023-01-01 RX ADMIN — Medication 600 MG: at 08:16

## 2023-01-01 RX ADMIN — POTASSIUM CHLORIDE 40 MEQ: 1.5 POWDER, FOR SOLUTION ORAL at 11:41

## 2023-01-01 RX ADMIN — OXYCODONE HYDROCHLORIDE 10 MG: 5 TABLET ORAL at 16:06

## 2023-01-01 RX ADMIN — GABAPENTIN 200 MG: 100 CAPSULE ORAL at 08:35

## 2023-01-01 RX ADMIN — ACETAMINOPHEN 650 MG: 325 TABLET ORAL at 10:11

## 2023-01-01 RX ADMIN — POLYETHYLENE GLYCOL 3350 17 G: 17 POWDER, FOR SOLUTION ORAL at 08:30

## 2023-01-01 RX ADMIN — SENNOSIDES AND DOCUSATE SODIUM 1 TABLET: 50; 8.6 TABLET ORAL at 09:01

## 2023-01-01 RX ADMIN — PANTOPRAZOLE SODIUM 40 MG: 40 TABLET, DELAYED RELEASE ORAL at 08:13

## 2023-01-01 RX ADMIN — LATANOPROST 1 DROP: 50 SOLUTION/ DROPS OPHTHALMIC at 19:40

## 2023-01-01 RX ADMIN — OXYCODONE HYDROCHLORIDE 5 MG: 5 TABLET ORAL at 20:17

## 2023-01-01 RX ADMIN — LATANOPROST 1 DROP: 50 SOLUTION/ DROPS OPHTHALMIC at 20:36

## 2023-01-01 RX ADMIN — OXYCODONE HYDROCHLORIDE 10 MG: 5 TABLET ORAL at 04:03

## 2023-01-01 RX ADMIN — ACETAMINOPHEN 975 MG: 325 TABLET ORAL at 08:48

## 2023-01-01 RX ADMIN — OXYCODONE HYDROCHLORIDE 5 MG: 5 TABLET ORAL at 05:20

## 2023-01-01 RX ADMIN — IOPAMIDOL 84 ML: 612 INJECTION, SOLUTION INTRAVENOUS at 14:52

## 2023-01-01 RX ADMIN — OXYCODONE HYDROCHLORIDE 5 MG: 5 TABLET ORAL at 13:38

## 2023-01-01 RX ADMIN — ASPIRIN 81 MG: 81 TABLET, COATED ORAL at 08:30

## 2023-01-01 RX ADMIN — SENNOSIDES AND DOCUSATE SODIUM 1 TABLET: 50; 8.6 TABLET ORAL at 21:31

## 2023-01-01 RX ADMIN — Medication 600 MG: at 09:01

## 2023-01-01 RX ADMIN — PANTOPRAZOLE SODIUM 40 MG: 40 TABLET, DELAYED RELEASE ORAL at 08:33

## 2023-01-01 RX ADMIN — IOPAMIDOL 75 ML: 755 INJECTION, SOLUTION INTRAVENOUS at 12:18

## 2023-01-01 RX ADMIN — ACETAMINOPHEN 650 MG: 325 TABLET ORAL at 07:53

## 2023-01-01 RX ADMIN — LATANOPROST 1 DROP: 50 SOLUTION/ DROPS OPHTHALMIC at 20:27

## 2023-01-01 RX ADMIN — Medication 50 MCG: at 08:45

## 2023-01-01 RX ADMIN — ASPIRIN 325 MG ORAL TABLET 325 MG: 325 PILL ORAL at 08:09

## 2023-01-01 RX ADMIN — ASPIRIN 325 MG ORAL TABLET 325 MG: 325 PILL ORAL at 09:34

## 2023-01-01 RX ADMIN — CLOPIDOGREL BISULFATE 75 MG: 75 TABLET ORAL at 08:58

## 2023-01-01 RX ADMIN — OXYCODONE HYDROCHLORIDE 5 MG: 5 TABLET ORAL at 08:32

## 2023-01-01 RX ADMIN — OXYCODONE HYDROCHLORIDE 5 MG: 5 TABLET ORAL at 14:41

## 2023-01-01 RX ADMIN — Medication 50 MCG: at 08:25

## 2023-01-01 RX ADMIN — ATORVASTATIN CALCIUM 40 MG: 10 TABLET, FILM COATED ORAL at 21:05

## 2023-01-01 RX ADMIN — Medication 50 MCG: at 10:28

## 2023-01-01 RX ADMIN — LEVOFLOXACIN 750 MG: 5 INJECTION, SOLUTION INTRAVENOUS at 16:53

## 2023-01-01 RX ADMIN — ATORVASTATIN CALCIUM 40 MG: 40 TABLET, FILM COATED ORAL at 21:02

## 2023-01-01 RX ADMIN — ACETAMINOPHEN 650 MG: 325 TABLET ORAL at 11:56

## 2023-01-01 RX ADMIN — ACETAMINOPHEN 975 MG: 325 TABLET ORAL at 22:12

## 2023-01-01 RX ADMIN — Medication 600 MG: at 08:46

## 2023-01-01 RX ADMIN — Medication 1 MG: at 20:45

## 2023-01-01 RX ADMIN — ENOXAPARIN SODIUM 40 MG: 40 INJECTION SUBCUTANEOUS at 17:14

## 2023-01-01 RX ADMIN — OXYCODONE HYDROCHLORIDE 10 MG: 5 TABLET ORAL at 08:04

## 2023-01-01 RX ADMIN — SENNOSIDES AND DOCUSATE SODIUM 1 TABLET: 50; 8.6 TABLET ORAL at 20:58

## 2023-01-01 RX ADMIN — Medication 0.03 MCG/KG/MIN: at 23:25

## 2023-01-01 RX ADMIN — PHENYLEPHRINE HYDROCHLORIDE 150 MCG: 10 INJECTION INTRAVENOUS at 14:44

## 2023-01-01 RX ADMIN — LATANOPROST 1 DROP: 50 SOLUTION/ DROPS OPHTHALMIC at 20:01

## 2023-01-01 RX ADMIN — Medication 600 MG: at 18:40

## 2023-01-01 RX ADMIN — POLYETHYLENE GLYCOL 3350 17 G: 17 POWDER, FOR SOLUTION ORAL at 12:52

## 2023-01-01 RX ADMIN — HYDROMORPHONE HYDROCHLORIDE 0.2 MG: 0.2 INJECTION, SOLUTION INTRAMUSCULAR; INTRAVENOUS; SUBCUTANEOUS at 03:55

## 2023-01-01 RX ADMIN — OXYCODONE HYDROCHLORIDE 10 MG: 5 TABLET ORAL at 21:03

## 2023-01-01 RX ADMIN — HYDROXYZINE HYDROCHLORIDE 10 MG: 10 TABLET ORAL at 21:08

## 2023-01-01 RX ADMIN — SENNOSIDES AND DOCUSATE SODIUM 1 TABLET: 50; 8.6 TABLET ORAL at 21:01

## 2023-01-01 RX ADMIN — POLYETHYLENE GLYCOL 3350 17 G: 17 POWDER, FOR SOLUTION ORAL at 08:34

## 2023-01-01 RX ADMIN — ACETAMINOPHEN 975 MG: 325 TABLET ORAL at 16:33

## 2023-01-01 RX ADMIN — OXYCODONE HYDROCHLORIDE 10 MG: 5 TABLET ORAL at 08:33

## 2023-01-01 RX ADMIN — PHENYLEPHRINE HYDROCHLORIDE 100 MCG: 10 INJECTION INTRAVENOUS at 14:27

## 2023-01-01 RX ADMIN — ACETAMINOPHEN 650 MG: 325 TABLET ORAL at 15:20

## 2023-01-01 RX ADMIN — CALCIUM CARBONATE (ANTACID) CHEW TAB 500 MG 500 MG: 500 CHEW TAB at 11:02

## 2023-01-01 RX ADMIN — ACETAMINOPHEN 650 MG: 325 TABLET ORAL at 08:48

## 2023-01-01 RX ADMIN — OXYCODONE HYDROCHLORIDE 10 MG: 5 TABLET ORAL at 03:58

## 2023-01-01 RX ADMIN — Medication 2 SPRAY: at 08:20

## 2023-01-01 RX ADMIN — HYDRALAZINE HYDROCHLORIDE 10 MG: 20 INJECTION INTRAMUSCULAR; INTRAVENOUS at 20:58

## 2023-01-01 RX ADMIN — ENOXAPARIN SODIUM 40 MG: 40 INJECTION SUBCUTANEOUS at 16:52

## 2023-01-01 RX ADMIN — Medication 1 MG: at 21:44

## 2023-01-01 RX ADMIN — Medication 1 MG: at 21:54

## 2023-01-01 RX ADMIN — OXYCODONE HYDROCHLORIDE 10 MG: 5 TABLET ORAL at 01:55

## 2023-01-01 RX ADMIN — METHOCARBAMOL 500 MG: 500 TABLET ORAL at 17:50

## 2023-01-01 RX ADMIN — ACETAMINOPHEN 650 MG: 325 TABLET, FILM COATED ORAL at 21:58

## 2023-01-01 RX ADMIN — SENNOSIDES AND DOCUSATE SODIUM 1 TABLET: 50; 8.6 TABLET ORAL at 08:49

## 2023-01-01 RX ADMIN — GLYCOPYRROLATE 0.6 MG: 0.2 INJECTION, SOLUTION INTRAMUSCULAR; INTRAVENOUS at 16:05

## 2023-01-01 RX ADMIN — PANTOPRAZOLE SODIUM 40 MG: 40 TABLET, DELAYED RELEASE ORAL at 09:34

## 2023-01-01 RX ADMIN — Medication 600 MG: at 08:30

## 2023-01-01 RX ADMIN — OXYCODONE HYDROCHLORIDE 10 MG: 5 TABLET ORAL at 11:18

## 2023-01-01 RX ADMIN — FENTANYL 1 PATCH: 25 PATCH TRANSDERMAL at 13:54

## 2023-01-01 RX ADMIN — PANTOPRAZOLE SODIUM 40 MG: 40 TABLET, DELAYED RELEASE ORAL at 08:01

## 2023-01-01 RX ADMIN — ASPIRIN 325 MG ORAL TABLET 325 MG: 325 PILL ORAL at 08:35

## 2023-01-01 RX ADMIN — ASPIRIN 325 MG ORAL TABLET 325 MG: 325 PILL ORAL at 09:05

## 2023-01-01 RX ADMIN — PHENYLEPHRINE HYDROCHLORIDE 200 MCG: 10 INJECTION INTRAVENOUS at 13:43

## 2023-01-01 RX ADMIN — OXYCODONE HYDROCHLORIDE 10 MG: 5 TABLET ORAL at 14:46

## 2023-01-01 RX ADMIN — ENOXAPARIN SODIUM 40 MG: 40 INJECTION SUBCUTANEOUS at 12:35

## 2023-01-01 RX ADMIN — LEVOFLOXACIN 750 MG: 5 INJECTION, SOLUTION INTRAVENOUS at 15:58

## 2023-01-01 RX ADMIN — POLYETHYLENE GLYCOL 3350 17 G: 17 POWDER, FOR SOLUTION ORAL at 08:45

## 2023-01-01 RX ADMIN — TRANEXAMIC ACID 1 G: 10 INJECTION, SOLUTION INTRAVENOUS at 13:48

## 2023-01-01 RX ADMIN — ACETAMINOPHEN 975 MG: 325 TABLET ORAL at 08:45

## 2023-01-01 RX ADMIN — ACETAMINOPHEN 650 MG: 325 TABLET ORAL at 20:58

## 2023-01-01 RX ADMIN — SODIUM CHLORIDE, POTASSIUM CHLORIDE, SODIUM LACTATE AND CALCIUM CHLORIDE: 600; 310; 30; 20 INJECTION, SOLUTION INTRAVENOUS at 07:53

## 2023-01-01 RX ADMIN — SODIUM CHLORIDE: 9 INJECTION, SOLUTION INTRAVENOUS at 14:36

## 2023-01-01 RX ADMIN — Medication 50 MCG: at 08:34

## 2023-01-01 RX ADMIN — OXYCODONE HYDROCHLORIDE 15 MG: 5 TABLET ORAL at 18:29

## 2023-01-01 RX ADMIN — SENNOSIDES AND DOCUSATE SODIUM 1 TABLET: 50; 8.6 TABLET ORAL at 08:10

## 2023-01-01 RX ADMIN — Medication 600 MG: at 08:14

## 2023-01-01 RX ADMIN — SENNOSIDES AND DOCUSATE SODIUM 1 TABLET: 50; 8.6 TABLET ORAL at 08:33

## 2023-01-01 RX ADMIN — TRAZODONE HYDROCHLORIDE 50 MG: 50 TABLET ORAL at 21:54

## 2023-01-01 RX ADMIN — ACETAMINOPHEN 650 MG: 325 TABLET ORAL at 16:57

## 2023-01-01 RX ADMIN — Medication 600 MG: at 18:04

## 2023-01-01 RX ADMIN — Medication 600 MG: at 09:18

## 2023-01-01 RX ADMIN — OXYCODONE HYDROCHLORIDE 5 MG: 5 TABLET ORAL at 08:13

## 2023-01-01 RX ADMIN — FENTANYL CITRATE 25 MCG: 50 INJECTION, SOLUTION INTRAMUSCULAR; INTRAVENOUS at 13:53

## 2023-01-01 RX ADMIN — OXYCODONE HYDROCHLORIDE 2.5 MG: 5 TABLET ORAL at 12:46

## 2023-01-01 RX ADMIN — TRAZODONE HYDROCHLORIDE 50 MG: 50 TABLET ORAL at 00:42

## 2023-01-01 RX ADMIN — Medication 1 MG: at 02:35

## 2023-01-01 RX ADMIN — CLOPIDOGREL BISULFATE 75 MG: 75 TABLET ORAL at 07:53

## 2023-01-01 RX ADMIN — CLOPIDOGREL BISULFATE 75 MG: 75 TABLET ORAL at 08:05

## 2023-01-01 RX ADMIN — QUETIAPINE FUMARATE 25 MG: 25 TABLET ORAL at 22:06

## 2023-01-01 RX ADMIN — GABAPENTIN 200 MG: 100 CAPSULE ORAL at 21:00

## 2023-01-01 RX ADMIN — TRAMADOL HYDROCHLORIDE 50 MG: 50 TABLET, COATED ORAL at 22:30

## 2023-01-01 RX ADMIN — Medication 1 MG: at 02:05

## 2023-01-01 RX ADMIN — Medication 1 MG: at 21:34

## 2023-01-01 RX ADMIN — SODIUM CHLORIDE 100 ML: 900 INJECTION INTRAVENOUS at 14:54

## 2023-01-01 RX ADMIN — SENNOSIDES AND DOCUSATE SODIUM 1 TABLET: 50; 8.6 TABLET ORAL at 20:59

## 2023-01-01 RX ADMIN — FENTANYL 1 PATCH: 25 PATCH TRANSDERMAL at 14:34

## 2023-01-01 RX ADMIN — OXYCODONE HYDROCHLORIDE 10 MG: 5 TABLET ORAL at 08:35

## 2023-01-01 RX ADMIN — Medication 600 MG: at 08:01

## 2023-01-01 RX ADMIN — Medication 50 MCG: at 08:04

## 2023-01-01 RX ADMIN — TRAZODONE HYDROCHLORIDE 50 MG: 50 TABLET ORAL at 22:36

## 2023-01-01 RX ADMIN — CLOPIDOGREL BISULFATE 75 MG: 75 TABLET ORAL at 09:34

## 2023-01-01 RX ADMIN — OXYCODONE HYDROCHLORIDE 5 MG: 5 TABLET ORAL at 07:52

## 2023-01-01 RX ADMIN — MELATONIN TAB 3 MG 3 MG: 3 TAB at 21:01

## 2023-01-01 RX ADMIN — OXYCODONE HYDROCHLORIDE 10 MG: 5 TABLET ORAL at 19:49

## 2023-01-01 RX ADMIN — ACETAMINOPHEN 650 MG: 325 TABLET ORAL at 22:27

## 2023-01-01 RX ADMIN — ACETAMINOPHEN 975 MG: 325 TABLET ORAL at 22:04

## 2023-01-01 RX ADMIN — METHOCARBAMOL 500 MG: 500 TABLET ORAL at 06:57

## 2023-01-01 RX ADMIN — ACETAMINOPHEN 650 MG: 325 TABLET ORAL at 05:35

## 2023-01-01 RX ADMIN — ACETAMINOPHEN 650 MG: 325 TABLET, FILM COATED ORAL at 08:35

## 2023-01-01 RX ADMIN — Medication 50 MCG: at 08:35

## 2023-01-01 RX ADMIN — Medication 1 MG: at 22:27

## 2023-01-01 RX ADMIN — LATANOPROST 1 DROP: 50 SOLUTION/ DROPS OPHTHALMIC at 21:42

## 2023-01-01 RX ADMIN — LATANOPROST 1 DROP: 50 SOLUTION/ DROPS OPHTHALMIC at 20:17

## 2023-01-01 RX ADMIN — Medication 2 SPRAY: at 09:29

## 2023-01-01 RX ADMIN — CLOPIDOGREL BISULFATE 75 MG: 75 TABLET ORAL at 08:25

## 2023-01-01 RX ADMIN — OXYCODONE HYDROCHLORIDE 5 MG: 5 TABLET ORAL at 09:55

## 2023-01-01 RX ADMIN — HYDROMORPHONE HYDROCHLORIDE 0.2 MG: 0.2 INJECTION, SOLUTION INTRAMUSCULAR; INTRAVENOUS; SUBCUTANEOUS at 04:59

## 2023-01-01 RX ADMIN — Medication 1 MG: at 22:30

## 2023-01-01 RX ADMIN — PANTOPRAZOLE SODIUM 40 MG: 40 TABLET, DELAYED RELEASE ORAL at 09:01

## 2023-01-01 RX ADMIN — SENNOSIDES AND DOCUSATE SODIUM 1 TABLET: 50; 8.6 TABLET ORAL at 08:32

## 2023-01-01 RX ADMIN — ACETAMINOPHEN 975 MG: 325 TABLET ORAL at 15:12

## 2023-01-01 RX ADMIN — OXYCODONE HYDROCHLORIDE 5 MG: 5 TABLET ORAL at 10:22

## 2023-01-01 RX ADMIN — PHENYLEPHRINE HYDROCHLORIDE 150 MCG: 10 INJECTION INTRAVENOUS at 14:19

## 2023-01-01 RX ADMIN — Medication 0.12 MCG/KG/MIN: at 07:34

## 2023-01-01 RX ADMIN — BISACODYL 10 MG: 10 SUPPOSITORY RECTAL at 12:55

## 2023-01-01 RX ADMIN — ATORVASTATIN CALCIUM 40 MG: 40 TABLET, FILM COATED ORAL at 21:07

## 2023-01-01 RX ADMIN — LIDOCAINE HYDROCHLORIDE 10 ML: 10 INJECTION, SOLUTION EPIDURAL; INFILTRATION; INTRACAUDAL; PERINEURAL at 10:51

## 2023-01-01 RX ADMIN — OXYCODONE HYDROCHLORIDE 10 MG: 5 TABLET ORAL at 11:22

## 2023-01-01 RX ADMIN — OXYCODONE HYDROCHLORIDE 10 MG: 5 TABLET ORAL at 18:25

## 2023-01-01 RX ADMIN — METHOCARBAMOL 500 MG: 500 TABLET ORAL at 08:35

## 2023-01-01 RX ADMIN — OXYCODONE HYDROCHLORIDE 15 MG: 5 TABLET ORAL at 06:35

## 2023-01-01 RX ADMIN — METHOCARBAMOL 500 MG: 500 TABLET ORAL at 10:02

## 2023-01-01 RX ADMIN — SENNOSIDES AND DOCUSATE SODIUM 1 TABLET: 50; 8.6 TABLET ORAL at 20:36

## 2023-01-01 RX ADMIN — ACETAMINOPHEN 650 MG: 325 TABLET ORAL at 12:49

## 2023-01-01 RX ADMIN — LORAZEPAM 1 MG: 1 TABLET ORAL at 10:51

## 2023-01-01 RX ADMIN — METHOCARBAMOL 500 MG: 500 TABLET ORAL at 20:24

## 2023-01-01 RX ADMIN — TRAMADOL HYDROCHLORIDE 50 MG: 50 TABLET, COATED ORAL at 02:10

## 2023-01-01 RX ADMIN — ACETAMINOPHEN 650 MG: 325 TABLET ORAL at 21:32

## 2023-01-01 RX ADMIN — POLYETHYLENE GLYCOL 3350 17 G: 17 POWDER, FOR SOLUTION ORAL at 08:48

## 2023-01-01 RX ADMIN — PANTOPRAZOLE SODIUM 40 MG: 40 TABLET, DELAYED RELEASE ORAL at 08:07

## 2023-01-01 RX ADMIN — ACETAMINOPHEN 650 MG: 325 TABLET ORAL at 21:30

## 2023-01-01 RX ADMIN — MIDODRINE HYDROCHLORIDE 10 MG: 5 TABLET ORAL at 08:28

## 2023-01-01 RX ADMIN — OXYCODONE HYDROCHLORIDE 10 MG: 5 TABLET ORAL at 15:20

## 2023-01-01 RX ADMIN — OXYCODONE HYDROCHLORIDE 15 MG: 5 TABLET ORAL at 13:30

## 2023-01-01 RX ADMIN — OXYCODONE HYDROCHLORIDE 20 MG: 5 TABLET ORAL at 02:04

## 2023-01-01 RX ADMIN — GADOBUTROL 15 ML: 604.72 INJECTION INTRAVENOUS at 22:11

## 2023-01-01 RX ADMIN — OXYCODONE HYDROCHLORIDE 10 MG: 5 TABLET ORAL at 15:50

## 2023-01-01 RX ADMIN — LATANOPROST 1 DROP: 50 SOLUTION/ DROPS OPHTHALMIC at 21:50

## 2023-01-01 RX ADMIN — LEVOFLOXACIN 750 MG: 5 INJECTION, SOLUTION INTRAVENOUS at 16:16

## 2023-01-01 RX ADMIN — TIZANIDINE 2 MG: 2 TABLET ORAL at 06:23

## 2023-01-01 RX ADMIN — OXYCODONE HYDROCHLORIDE 5 MG: 5 TABLET ORAL at 02:13

## 2023-01-01 RX ADMIN — Medication 50 MCG: at 08:09

## 2023-01-01 RX ADMIN — TRAMADOL HYDROCHLORIDE 50 MG: 50 TABLET, COATED ORAL at 05:06

## 2023-01-01 RX ADMIN — ACETAMINOPHEN 650 MG: 325 TABLET, FILM COATED ORAL at 12:08

## 2023-01-01 RX ADMIN — LIDOCAINE HYDROCHLORIDE 10 ML: 10 INJECTION, SOLUTION EPIDURAL; INFILTRATION; INTRACAUDAL; PERINEURAL at 13:40

## 2023-01-01 RX ADMIN — TRAMADOL HYDROCHLORIDE 50 MG: 50 TABLET, COATED ORAL at 00:03

## 2023-01-01 RX ADMIN — ACETAMINOPHEN 650 MG: 325 TABLET ORAL at 23:34

## 2023-01-01 RX ADMIN — Medication 2 SPRAY: at 08:52

## 2023-01-01 RX ADMIN — MIDAZOLAM 0.5 MG: 1 INJECTION INTRAMUSCULAR; INTRAVENOUS at 14:02

## 2023-01-01 RX ADMIN — SENNOSIDES AND DOCUSATE SODIUM 1 TABLET: 50; 8.6 TABLET ORAL at 20:22

## 2023-01-01 RX ADMIN — Medication 2 SPRAY: at 16:15

## 2023-01-01 RX ADMIN — ACETAMINOPHEN 650 MG: 325 TABLET ORAL at 08:07

## 2023-01-01 RX ADMIN — TRAZODONE HYDROCHLORIDE 50 MG: 50 TABLET ORAL at 21:00

## 2023-01-01 RX ADMIN — POLYETHYLENE GLYCOL 3350 17 G: 17 POWDER, FOR SOLUTION ORAL at 08:03

## 2023-01-01 RX ADMIN — Medication 2 SPRAY: at 21:20

## 2023-01-01 RX ADMIN — PROPOFOL 80 MG: 10 INJECTION, EMULSION INTRAVENOUS at 13:40

## 2023-01-01 RX ADMIN — POLYETHYLENE GLYCOL 3350 17 G: 17 POWDER, FOR SOLUTION ORAL at 08:14

## 2023-01-01 RX ADMIN — AZITHROMYCIN MONOHYDRATE 500 MG: 500 INJECTION, POWDER, LYOPHILIZED, FOR SOLUTION INTRAVENOUS at 09:11

## 2023-01-01 RX ADMIN — CLOPIDOGREL BISULFATE 75 MG: 75 TABLET ORAL at 08:13

## 2023-01-01 RX ADMIN — Medication 600 MG: at 08:33

## 2023-01-01 RX ADMIN — ACETAMINOPHEN 650 MG: 325 TABLET, FILM COATED ORAL at 22:24

## 2023-01-01 RX ADMIN — Medication 600 MG: at 17:22

## 2023-01-01 RX ADMIN — POLYETHYLENE GLYCOL 3350 17 G: 17 POWDER, FOR SOLUTION ORAL at 08:42

## 2023-01-01 RX ADMIN — Medication 2 SPRAY: at 15:29

## 2023-01-01 RX ADMIN — MIDODRINE HYDROCHLORIDE 10 MG: 5 TABLET ORAL at 15:51

## 2023-01-01 RX ADMIN — ACETAMINOPHEN 975 MG: 325 TABLET ORAL at 09:15

## 2023-01-01 RX ADMIN — OXYCODONE HYDROCHLORIDE 10 MG: 5 TABLET ORAL at 06:58

## 2023-01-01 RX ADMIN — ACETAMINOPHEN 650 MG: 325 TABLET ORAL at 20:34

## 2023-01-01 RX ADMIN — MORPHINE SULFATE 2 MG: 2 INJECTION, SOLUTION INTRAMUSCULAR; INTRAVENOUS at 06:43

## 2023-01-01 RX ADMIN — PANTOPRAZOLE SODIUM 40 MG: 40 TABLET, DELAYED RELEASE ORAL at 08:28

## 2023-01-01 RX ADMIN — SENNOSIDES 1 TABLET: 8.6 TABLET, FILM COATED ORAL at 21:07

## 2023-01-01 RX ADMIN — OXYCODONE HYDROCHLORIDE 10 MG: 5 TABLET ORAL at 07:59

## 2023-01-01 RX ADMIN — ACETAMINOPHEN 650 MG: 325 TABLET ORAL at 15:56

## 2023-01-01 RX ADMIN — Medication 2 SPRAY: at 17:16

## 2023-01-01 RX ADMIN — IOPAMIDOL 50 ML: 755 INJECTION, SOLUTION INTRAVENOUS at 14:35

## 2023-01-01 RX ADMIN — POTASSIUM CHLORIDE 40 MEQ: 1500 TABLET, EXTENDED RELEASE ORAL at 09:29

## 2023-01-01 RX ADMIN — CLOPIDOGREL BISULFATE 75 MG: 75 TABLET ORAL at 08:15

## 2023-01-01 RX ADMIN — SENNOSIDES AND DOCUSATE SODIUM 1 TABLET: 50; 8.6 TABLET ORAL at 08:25

## 2023-01-01 RX ADMIN — PANTOPRAZOLE SODIUM 40 MG: 40 TABLET, DELAYED RELEASE ORAL at 10:28

## 2023-01-01 RX ADMIN — LEVOFLOXACIN 750 MG: 5 INJECTION, SOLUTION INTRAVENOUS at 17:05

## 2023-01-01 RX ADMIN — LEVOFLOXACIN 750 MG: 5 INJECTION, SOLUTION INTRAVENOUS at 15:31

## 2023-01-01 RX ADMIN — Medication 600 MG: at 17:37

## 2023-01-01 RX ADMIN — FENTANYL 1 PATCH: 12.5 PATCH TRANSDERMAL at 12:44

## 2023-01-01 RX ADMIN — ACETAMINOPHEN 650 MG: 325 TABLET ORAL at 00:03

## 2023-01-01 RX ADMIN — OXYCODONE HYDROCHLORIDE 10 MG: 5 TABLET ORAL at 14:34

## 2023-01-01 RX ADMIN — LIDOCAINE HYDROCHLORIDE 14 ML: 10 INJECTION, SOLUTION INFILTRATION; PERINEURAL at 13:24

## 2023-01-01 RX ADMIN — SODIUM CHLORIDE 90 ML: 900 INJECTION INTRAVENOUS at 12:18

## 2023-01-01 RX ADMIN — ACETAMINOPHEN 650 MG: 325 TABLET ORAL at 20:06

## 2023-01-01 RX ADMIN — Medication 50 MCG: at 09:15

## 2023-01-01 RX ADMIN — ATORVASTATIN CALCIUM 40 MG: 40 TABLET, FILM COATED ORAL at 21:03

## 2023-01-01 RX ADMIN — Medication 1 CAPSULE: at 20:57

## 2023-01-01 RX ADMIN — LATANOPROST 1 DROP: 50 SOLUTION/ DROPS OPHTHALMIC at 20:21

## 2023-01-01 RX ADMIN — POLYETHYLENE GLYCOL 3350 17 G: 17 POWDER, FOR SOLUTION ORAL at 09:20

## 2023-01-01 RX ADMIN — METHOCARBAMOL 500 MG: 500 TABLET ORAL at 21:01

## 2023-01-01 RX ADMIN — Medication 600 MG: at 08:34

## 2023-01-01 RX ADMIN — ACETAMINOPHEN 650 MG: 325 TABLET ORAL at 16:39

## 2023-01-01 RX ADMIN — ASPIRIN 81 MG: 81 TABLET, COATED ORAL at 08:31

## 2023-01-01 RX ADMIN — ASPIRIN 81 MG: 81 TABLET, COATED ORAL at 08:04

## 2023-01-01 RX ADMIN — FAMOTIDINE 10 MG: 10 TABLET ORAL at 09:15

## 2023-01-01 RX ADMIN — TRAZODONE HYDROCHLORIDE 50 MG: 50 TABLET ORAL at 22:30

## 2023-01-01 RX ADMIN — SODIUM CHLORIDE: 9 INJECTION, SOLUTION INTRAVENOUS at 18:00

## 2023-01-01 RX ADMIN — LATANOPROST 1 DROP: 50 SOLUTION/ DROPS OPHTHALMIC at 20:03

## 2023-01-01 RX ADMIN — SODIUM CHLORIDE, POTASSIUM CHLORIDE, SODIUM LACTATE AND CALCIUM CHLORIDE: 600; 310; 30; 20 INJECTION, SOLUTION INTRAVENOUS at 23:53

## 2023-01-01 RX ADMIN — FENTANYL CITRATE 25 MCG: 50 INJECTION, SOLUTION INTRAMUSCULAR; INTRAVENOUS at 13:31

## 2023-01-01 RX ADMIN — Medication 600 MG: at 08:25

## 2023-01-01 RX ADMIN — CLOPIDOGREL BISULFATE 75 MG: 75 TABLET ORAL at 08:32

## 2023-01-01 RX ADMIN — TIZANIDINE 2 MG: 2 TABLET ORAL at 22:36

## 2023-01-01 RX ADMIN — LEVOFLOXACIN 750 MG: 5 INJECTION, SOLUTION INTRAVENOUS at 16:49

## 2023-01-01 RX ADMIN — TRAZODONE HYDROCHLORIDE 50 MG: 50 TABLET ORAL at 21:34

## 2023-01-01 RX ADMIN — METHOCARBAMOL 500 MG: 500 TABLET ORAL at 05:52

## 2023-01-01 RX ADMIN — METHOCARBAMOL 500 MG: 500 TABLET ORAL at 14:34

## 2023-01-01 RX ADMIN — ATORVASTATIN CALCIUM 40 MG: 40 TABLET, FILM COATED ORAL at 22:05

## 2023-01-01 RX ADMIN — ACETAMINOPHEN 650 MG: 325 TABLET ORAL at 17:37

## 2023-01-01 RX ADMIN — ACETAMINOPHEN 650 MG: 325 TABLET, FILM COATED ORAL at 03:34

## 2023-01-01 RX ADMIN — MIDODRINE HYDROCHLORIDE 10 MG: 5 TABLET ORAL at 13:04

## 2023-01-01 RX ADMIN — Medication 2 SPRAY: at 08:25

## 2023-01-01 RX ADMIN — Medication 2 SPRAY: at 15:37

## 2023-01-01 RX ADMIN — ACETAMINOPHEN 650 MG: 325 TABLET ORAL at 02:43

## 2023-01-01 RX ADMIN — ACETAMINOPHEN 975 MG: 325 TABLET ORAL at 21:01

## 2023-01-01 RX ADMIN — ACETAMINOPHEN 650 MG: 325 TABLET ORAL at 01:04

## 2023-01-01 RX ADMIN — ACETAMINOPHEN 975 MG: 325 TABLET ORAL at 00:03

## 2023-01-01 RX ADMIN — ACETAMINOPHEN 650 MG: 325 TABLET ORAL at 08:34

## 2023-01-01 RX ADMIN — ACETAMINOPHEN 975 MG: 325 TABLET ORAL at 15:25

## 2023-01-01 RX ADMIN — Medication 0.12 MCG/KG/MIN: at 10:45

## 2023-01-01 RX ADMIN — METHOCARBAMOL 500 MG: 500 TABLET ORAL at 11:22

## 2023-01-01 RX ADMIN — OXYCODONE HYDROCHLORIDE 15 MG: 5 TABLET ORAL at 03:37

## 2023-01-01 RX ADMIN — OXYCODONE HYDROCHLORIDE 10 MG: 5 TABLET ORAL at 20:00

## 2023-01-01 RX ADMIN — Medication 1 MG: at 21:36

## 2023-01-01 RX ADMIN — SODIUM CHLORIDE 1000 ML: 9 INJECTION, SOLUTION INTRAVENOUS at 12:32

## 2023-01-01 RX ADMIN — PHENYLEPHRINE HYDROCHLORIDE 100 MCG: 10 INJECTION INTRAVENOUS at 14:14

## 2023-01-01 RX ADMIN — ASPIRIN 81 MG: 81 TABLET, COATED ORAL at 07:52

## 2023-01-01 RX ADMIN — TRAMADOL HYDROCHLORIDE 50 MG: 50 TABLET, COATED ORAL at 14:01

## 2023-01-01 RX ADMIN — SODIUM CHLORIDE 250 ML: 9 INJECTION, SOLUTION INTRAVENOUS at 03:27

## 2023-01-01 RX ADMIN — ACETAMINOPHEN 650 MG: 325 TABLET, FILM COATED ORAL at 11:32

## 2023-01-01 RX ADMIN — ATORVASTATIN CALCIUM 40 MG: 40 TABLET, FILM COATED ORAL at 20:29

## 2023-01-01 RX ADMIN — OXYCODONE HYDROCHLORIDE 15 MG: 5 TABLET ORAL at 18:23

## 2023-01-01 RX ADMIN — OXYCODONE HYDROCHLORIDE 5 MG: 5 TABLET ORAL at 16:51

## 2023-01-01 RX ADMIN — ENOXAPARIN SODIUM 40 MG: 40 INJECTION SUBCUTANEOUS at 13:04

## 2023-01-01 RX ADMIN — ATORVASTATIN CALCIUM 40 MG: 40 TABLET, FILM COATED ORAL at 20:03

## 2023-01-01 RX ADMIN — SENNOSIDES AND DOCUSATE SODIUM 1 TABLET: 50; 8.6 TABLET ORAL at 08:01

## 2023-01-01 RX ADMIN — Medication 600 MG: at 08:28

## 2023-01-01 RX ADMIN — ACETAMINOPHEN 650 MG: 325 TABLET, FILM COATED ORAL at 13:01

## 2023-01-01 RX ADMIN — SODIUM CHLORIDE, POTASSIUM CHLORIDE, SODIUM LACTATE AND CALCIUM CHLORIDE: 600; 310; 30; 20 INJECTION, SOLUTION INTRAVENOUS at 09:00

## 2023-01-01 RX ADMIN — OXYCODONE HYDROCHLORIDE 2.5 MG: 5 TABLET ORAL at 02:05

## 2023-01-01 RX ADMIN — Medication 1 CAPSULE: at 09:44

## 2023-01-01 RX ADMIN — POLYETHYLENE GLYCOL 3350 17 G: 17 POWDER, FOR SOLUTION ORAL at 08:00

## 2023-01-01 RX ADMIN — TRAMADOL HYDROCHLORIDE 50 MG: 50 TABLET, COATED ORAL at 01:49

## 2023-01-01 RX ADMIN — POLYETHYLENE GLYCOL 3350 17 G: 17 POWDER, FOR SOLUTION ORAL at 10:39

## 2023-01-01 RX ADMIN — ACETAMINOPHEN 650 MG: 325 TABLET, FILM COATED ORAL at 16:23

## 2023-01-01 RX ADMIN — GADOBUTROL 6 ML: 604.72 INJECTION INTRAVENOUS at 08:57

## 2023-01-01 RX ADMIN — SODIUM CHLORIDE, POTASSIUM CHLORIDE, SODIUM LACTATE AND CALCIUM CHLORIDE: 600; 310; 30; 20 INJECTION, SOLUTION INTRAVENOUS at 01:18

## 2023-01-01 RX ADMIN — ACETAMINOPHEN 650 MG: 325 TABLET, FILM COATED ORAL at 17:13

## 2023-01-01 RX ADMIN — ATORVASTATIN CALCIUM 40 MG: 40 TABLET, FILM COATED ORAL at 20:27

## 2023-01-01 RX ADMIN — CLOPIDOGREL BISULFATE 75 MG: 75 TABLET ORAL at 08:30

## 2023-01-01 RX ADMIN — HYDROMORPHONE HYDROCHLORIDE 0.2 MG: 0.2 INJECTION, SOLUTION INTRAMUSCULAR; INTRAVENOUS; SUBCUTANEOUS at 17:03

## 2023-01-01 RX ADMIN — SENNOSIDES AND DOCUSATE SODIUM 2 TABLET: 50; 8.6 TABLET ORAL at 12:15

## 2023-01-01 RX ADMIN — ATORVASTATIN CALCIUM 40 MG: 40 TABLET, FILM COATED ORAL at 20:57

## 2023-01-01 RX ADMIN — GABAPENTIN 200 MG: 100 CAPSULE ORAL at 15:25

## 2023-01-01 RX ADMIN — Medication 1 CAPSULE: at 08:36

## 2023-01-01 RX ADMIN — TRAMADOL HYDROCHLORIDE 50 MG: 50 TABLET, COATED ORAL at 06:56

## 2023-01-01 RX ADMIN — QUETIAPINE FUMARATE 25 MG: 25 TABLET ORAL at 00:23

## 2023-01-01 RX ADMIN — MIDAZOLAM 0.5 MG: 1 INJECTION INTRAMUSCULAR; INTRAVENOUS at 13:25

## 2023-01-01 RX ADMIN — ACETAMINOPHEN 650 MG: 325 TABLET, FILM COATED ORAL at 08:52

## 2023-01-01 RX ADMIN — ACETAMINOPHEN 650 MG: 325 TABLET ORAL at 04:05

## 2023-01-01 RX ADMIN — ACETAMINOPHEN 975 MG: 325 TABLET ORAL at 16:02

## 2023-01-01 RX ADMIN — CLOPIDOGREL BISULFATE 75 MG: 75 TABLET ORAL at 14:26

## 2023-01-01 RX ADMIN — ENOXAPARIN SODIUM 40 MG: 40 INJECTION SUBCUTANEOUS at 16:53

## 2023-01-01 RX ADMIN — ASPIRIN 325 MG ORAL TABLET 325 MG: 325 PILL ORAL at 08:28

## 2023-01-01 RX ADMIN — TRAMADOL HYDROCHLORIDE 50 MG: 50 TABLET, COATED ORAL at 12:01

## 2023-01-01 RX ADMIN — HEPARIN SODIUM 8 BAG: 200 INJECTION, SOLUTION INTRAVENOUS at 13:32

## 2023-01-01 RX ADMIN — ACETAMINOPHEN 650 MG: 325 TABLET, FILM COATED ORAL at 15:37

## 2023-01-01 RX ADMIN — DEXAMETHASONE SODIUM PHOSPHATE 4 MG: 4 INJECTION, SOLUTION INTRA-ARTICULAR; INTRALESIONAL; INTRAMUSCULAR; INTRAVENOUS; SOFT TISSUE at 13:55

## 2023-01-01 RX ADMIN — OXYCODONE HYDROCHLORIDE 15 MG: 5 TABLET ORAL at 21:28

## 2023-01-01 RX ADMIN — IOPAMIDOL 125 ML: 755 INJECTION, SOLUTION INTRAVENOUS at 18:56

## 2023-01-01 RX ADMIN — OXYCODONE HYDROCHLORIDE 15 MG: 5 TABLET ORAL at 07:58

## 2023-01-01 RX ADMIN — PANTOPRAZOLE SODIUM 40 MG: 40 TABLET, DELAYED RELEASE ORAL at 09:18

## 2023-01-01 RX ADMIN — TRAMADOL HYDROCHLORIDE 50 MG: 50 TABLET, COATED ORAL at 03:56

## 2023-01-01 RX ADMIN — Medication 2 SPRAY: at 08:58

## 2023-01-01 RX ADMIN — QUETIAPINE FUMARATE 25 MG: 25 TABLET ORAL at 20:55

## 2023-01-01 RX ADMIN — ACETAMINOPHEN 650 MG: 325 TABLET, FILM COATED ORAL at 17:14

## 2023-01-01 RX ADMIN — TRAMADOL HYDROCHLORIDE 50 MG: 50 TABLET, COATED ORAL at 08:19

## 2023-01-01 RX ADMIN — PANTOPRAZOLE SODIUM 40 MG: 40 TABLET, DELAYED RELEASE ORAL at 08:09

## 2023-01-01 RX ADMIN — SENNOSIDES AND DOCUSATE SODIUM 1 TABLET: 50; 8.6 TABLET ORAL at 08:00

## 2023-01-01 RX ADMIN — Medication 1 CAPSULE: at 09:59

## 2023-01-01 RX ADMIN — FAMOTIDINE 10 MG: 10 TABLET ORAL at 21:02

## 2023-01-01 RX ADMIN — SENNOSIDES AND DOCUSATE SODIUM 1 TABLET: 50; 8.6 TABLET ORAL at 09:33

## 2023-01-01 RX ADMIN — Medication 30 ML: at 13:37

## 2023-01-01 RX ADMIN — OXYCODONE HYDROCHLORIDE 5 MG: 5 TABLET ORAL at 16:23

## 2023-01-01 RX ADMIN — LATANOPROST 1 DROP: 50 SOLUTION/ DROPS OPHTHALMIC at 20:28

## 2023-01-01 RX ADMIN — SIMETHICONE 80 MG: 80 TABLET, CHEWABLE ORAL at 18:39

## 2023-01-01 RX ADMIN — Medication 1 MG: at 22:24

## 2023-01-01 RX ADMIN — Medication 50 MCG: at 08:07

## 2023-01-01 RX ADMIN — ATORVASTATIN CALCIUM 40 MG: 40 TABLET, FILM COATED ORAL at 19:10

## 2023-01-01 RX ADMIN — Medication 600 MG: at 08:32

## 2023-01-01 RX ADMIN — OXYCODONE HYDROCHLORIDE 20 MG: 5 TABLET ORAL at 14:13

## 2023-01-01 RX ADMIN — Medication 50 MCG: at 09:00

## 2023-01-01 RX ADMIN — OXYCODONE HYDROCHLORIDE 15 MG: 5 TABLET ORAL at 11:06

## 2023-01-01 RX ADMIN — Medication 0.12 MCG/KG/MIN: at 16:33

## 2023-01-01 RX ADMIN — QUETIAPINE FUMARATE 25 MG: 25 TABLET ORAL at 22:25

## 2023-01-01 RX ADMIN — OXYCODONE HYDROCHLORIDE 5 MG: 5 TABLET ORAL at 22:02

## 2023-01-01 RX ADMIN — ACETAMINOPHEN 650 MG: 325 TABLET, FILM COATED ORAL at 21:04

## 2023-01-01 RX ADMIN — LATANOPROST 1 DROP: 50 SOLUTION/ DROPS OPHTHALMIC at 20:22

## 2023-01-01 RX ADMIN — OXYCODONE HYDROCHLORIDE 10 MG: 5 TABLET ORAL at 21:36

## 2023-01-01 RX ADMIN — ASPIRIN 81 MG CHEWABLE TABLET 81 MG: 81 TABLET CHEWABLE at 08:24

## 2023-01-01 RX ADMIN — ACETAMINOPHEN 650 MG: 325 TABLET, FILM COATED ORAL at 17:02

## 2023-01-01 RX ADMIN — ACETAMINOPHEN 650 MG: 325 TABLET ORAL at 17:50

## 2023-01-01 RX ADMIN — TRAZODONE HYDROCHLORIDE 50 MG: 50 TABLET ORAL at 21:05

## 2023-01-01 RX ADMIN — ACETAMINOPHEN 975 MG: 325 TABLET ORAL at 15:20

## 2023-01-01 RX ADMIN — SENNOSIDES AND DOCUSATE SODIUM 1 TABLET: 50; 8.6 TABLET ORAL at 08:04

## 2023-01-01 RX ADMIN — LATANOPROST 1 DROP: 50 SOLUTION/ DROPS OPHTHALMIC at 21:02

## 2023-01-01 RX ADMIN — ACETAMINOPHEN 650 MG: 325 TABLET ORAL at 16:27

## 2023-01-01 RX ADMIN — Medication 2 SPRAY: at 20:33

## 2023-01-01 RX ADMIN — SENNOSIDES AND DOCUSATE SODIUM 1 TABLET: 50; 8.6 TABLET ORAL at 09:34

## 2023-01-01 RX ADMIN — ACETAMINOPHEN 975 MG: 325 TABLET ORAL at 08:09

## 2023-01-01 RX ADMIN — Medication 2 SPRAY: at 13:01

## 2023-01-01 RX ADMIN — HYDROMORPHONE HYDROCHLORIDE 0.4 MG: 0.2 INJECTION, SOLUTION INTRAMUSCULAR; INTRAVENOUS; SUBCUTANEOUS at 15:14

## 2023-01-01 RX ADMIN — ACETAMINOPHEN 650 MG: 325 TABLET ORAL at 13:05

## 2023-01-01 RX ADMIN — TRAZODONE HYDROCHLORIDE 50 MG: 50 TABLET ORAL at 22:18

## 2023-01-01 RX ADMIN — OXYCODONE HYDROCHLORIDE 5 MG: 5 TABLET ORAL at 06:55

## 2023-01-01 RX ADMIN — Medication 2 SPRAY: at 10:16

## 2023-01-01 RX ADMIN — Medication 2 SPRAY: at 15:35

## 2023-01-01 RX ADMIN — LEVOFLOXACIN 500 MG: 500 INJECTION, SOLUTION INTRAVENOUS at 13:42

## 2023-01-01 RX ADMIN — ACETAMINOPHEN 650 MG: 325 TABLET, FILM COATED ORAL at 09:19

## 2023-01-01 RX ADMIN — LATANOPROST 1 DROP: 50 SOLUTION/ DROPS OPHTHALMIC at 21:03

## 2023-01-01 RX ADMIN — POLYETHYLENE GLYCOL 3350 17 G: 17 POWDER, FOR SOLUTION ORAL at 08:32

## 2023-01-01 RX ADMIN — ACETAMINOPHEN 650 MG: 325 TABLET ORAL at 05:46

## 2023-01-01 RX ADMIN — LATANOPROST 1 DROP: 50 SOLUTION/ DROPS OPHTHALMIC at 20:24

## 2023-01-01 RX ADMIN — CEFAZOLIN 1 G: 1 INJECTION, POWDER, FOR SOLUTION INTRAMUSCULAR; INTRAVENOUS at 21:07

## 2023-01-01 RX ADMIN — FUROSEMIDE 20 MG: 10 INJECTION, SOLUTION INTRAMUSCULAR; INTRAVENOUS at 12:08

## 2023-01-01 RX ADMIN — ACETAMINOPHEN 975 MG: 325 TABLET ORAL at 16:48

## 2023-01-01 RX ADMIN — OXYCODONE HYDROCHLORIDE 10 MG: 5 TABLET ORAL at 05:37

## 2023-01-01 RX ADMIN — ASPIRIN 325 MG ORAL TABLET 325 MG: 325 PILL ORAL at 08:00

## 2023-01-01 RX ADMIN — POLYETHYLENE GLYCOL 3350 17 G: 17 POWDER, FOR SOLUTION ORAL at 08:49

## 2023-01-01 RX ADMIN — POLYETHYLENE GLYCOL 3350 17 G: 17 POWDER, FOR SOLUTION ORAL at 08:10

## 2023-01-01 RX ADMIN — SODIUM CHLORIDE, POTASSIUM CHLORIDE, SODIUM LACTATE AND CALCIUM CHLORIDE: 600; 310; 30; 20 INJECTION, SOLUTION INTRAVENOUS at 15:31

## 2023-01-01 RX ADMIN — ACETAMINOPHEN 975 MG: 325 TABLET ORAL at 17:11

## 2023-01-01 RX ADMIN — METHOCARBAMOL 500 MG: 500 TABLET ORAL at 18:10

## 2023-01-01 RX ADMIN — OXYCODONE HYDROCHLORIDE 5 MG: 5 TABLET ORAL at 17:27

## 2023-01-01 RX ADMIN — TRAZODONE HYDROCHLORIDE 50 MG: 50 TABLET ORAL at 22:11

## 2023-01-01 RX ADMIN — OXYCODONE HYDROCHLORIDE 5 MG: 5 TABLET ORAL at 23:52

## 2023-01-01 RX ADMIN — NEOSTIGMINE METHYLSULFATE 3 MG: 1 INJECTION, SOLUTION INTRAVENOUS at 16:05

## 2023-01-01 RX ADMIN — ACETAMINOPHEN 650 MG: 325 TABLET, FILM COATED ORAL at 21:35

## 2023-01-01 RX ADMIN — PROCHLORPERAZINE EDISYLATE 5 MG: 5 INJECTION INTRAMUSCULAR; INTRAVENOUS at 09:37

## 2023-01-01 RX ADMIN — ACETAMINOPHEN 650 MG: 325 TABLET, FILM COATED ORAL at 14:03

## 2023-01-01 RX ADMIN — SODIUM CHLORIDE 100 ML: 9 INJECTION, SOLUTION INTRAVENOUS at 14:36

## 2023-01-01 RX ADMIN — LEVOFLOXACIN 750 MG: 5 INJECTION, SOLUTION INTRAVENOUS at 15:36

## 2023-01-01 RX ADMIN — ASPIRIN 81 MG CHEWABLE TABLET 81 MG: 81 TABLET CHEWABLE at 09:01

## 2023-01-01 RX ADMIN — LATANOPROST 1 DROP: 50 SOLUTION/ DROPS OPHTHALMIC at 20:06

## 2023-01-01 RX ADMIN — SENNOSIDES AND DOCUSATE SODIUM 1 TABLET: 50; 8.6 TABLET ORAL at 08:34

## 2023-01-01 RX ADMIN — ASPIRIN 325 MG ORAL TABLET 325 MG: 325 PILL ORAL at 08:13

## 2023-01-01 RX ADMIN — Medication 2 G: at 13:45

## 2023-01-01 RX ADMIN — LATANOPROST 1 DROP: 50 SOLUTION/ DROPS OPHTHALMIC at 20:10

## 2023-01-01 RX ADMIN — LATANOPROST 1 DROP: 50 SOLUTION/ DROPS OPHTHALMIC at 19:57

## 2023-01-01 RX ADMIN — Medication 1 MG: at 21:59

## 2023-01-01 RX ADMIN — Medication 600 MG: at 18:53

## 2023-01-01 RX ADMIN — PANTOPRAZOLE SODIUM 40 MG: 40 TABLET, DELAYED RELEASE ORAL at 08:49

## 2023-01-01 RX ADMIN — SENNOSIDES AND DOCUSATE SODIUM 1 TABLET: 50; 8.6 TABLET ORAL at 20:53

## 2023-01-01 RX ADMIN — SENNOSIDES AND DOCUSATE SODIUM 1 TABLET: 50; 8.6 TABLET ORAL at 22:05

## 2023-01-01 RX ADMIN — TRANEXAMIC ACID 1 G: 10 INJECTION, SOLUTION INTRAVENOUS at 15:05

## 2023-01-01 RX ADMIN — ACETAMINOPHEN 650 MG: 325 TABLET ORAL at 08:45

## 2023-01-01 RX ADMIN — Medication 1 MG: at 21:05

## 2023-01-01 RX ADMIN — Medication 1 CAPSULE: at 09:19

## 2023-01-01 RX ADMIN — MIDODRINE HYDROCHLORIDE 10 MG: 5 TABLET ORAL at 08:25

## 2023-01-01 RX ADMIN — Medication 1 CAPSULE: at 22:30

## 2023-01-01 RX ADMIN — Medication 600 MG: at 19:10

## 2023-01-01 RX ADMIN — METHOCARBAMOL 500 MG: 500 TABLET ORAL at 17:25

## 2023-01-01 RX ADMIN — OXYCODONE HYDROCHLORIDE 5 MG: 5 TABLET ORAL at 20:39

## 2023-01-01 RX ADMIN — ACETAMINOPHEN 650 MG: 325 TABLET ORAL at 09:18

## 2023-01-01 RX ADMIN — ATORVASTATIN CALCIUM 40 MG: 40 TABLET, FILM COATED ORAL at 21:19

## 2023-01-01 RX ADMIN — Medication 2 SPRAY: at 20:59

## 2023-01-01 RX ADMIN — ACETAMINOPHEN 650 MG: 325 TABLET ORAL at 05:36

## 2023-01-01 RX ADMIN — TRAMADOL HYDROCHLORIDE 50 MG: 50 TABLET, COATED ORAL at 18:17

## 2023-01-01 RX ADMIN — PROCHLORPERAZINE EDISYLATE 5 MG: 5 INJECTION INTRAMUSCULAR; INTRAVENOUS at 19:21

## 2023-01-01 RX ADMIN — TRAZODONE HYDROCHLORIDE 50 MG: 50 TABLET ORAL at 22:24

## 2023-01-01 RX ADMIN — SENNOSIDES AND DOCUSATE SODIUM 1 TABLET: 50; 8.6 TABLET ORAL at 21:07

## 2023-01-01 RX ADMIN — ASPIRIN 325 MG ORAL TABLET 325 MG: 325 PILL ORAL at 16:39

## 2023-01-01 RX ADMIN — CLOPIDOGREL BISULFATE 75 MG: 75 TABLET ORAL at 08:43

## 2023-01-01 RX ADMIN — CLOPIDOGREL BISULFATE 75 MG: 75 TABLET ORAL at 08:36

## 2023-01-01 RX ADMIN — SENNOSIDES AND DOCUSATE SODIUM 1 TABLET: 50; 8.6 TABLET ORAL at 20:02

## 2023-01-01 RX ADMIN — ACETAMINOPHEN 650 MG: 325 TABLET ORAL at 11:05

## 2023-01-01 RX ADMIN — LATANOPROST 1 DROP: 50 SOLUTION/ DROPS OPHTHALMIC at 20:41

## 2023-01-01 RX ADMIN — ASPIRIN 81 MG: 81 TABLET, COATED ORAL at 10:28

## 2023-01-01 RX ADMIN — OXYCODONE HYDROCHLORIDE 5 MG: 5 TABLET ORAL at 17:14

## 2023-01-01 RX ADMIN — PANTOPRAZOLE SODIUM 40 MG: 40 TABLET, DELAYED RELEASE ORAL at 08:32

## 2023-01-01 RX ADMIN — TRAZODONE HYDROCHLORIDE 50 MG: 50 TABLET ORAL at 22:06

## 2023-01-01 RX ADMIN — Medication 50 MCG: at 10:22

## 2023-01-01 RX ADMIN — GADOBUTROL 15 ML: 604.72 INJECTION INTRAVENOUS at 22:39

## 2023-01-01 RX ADMIN — OXYCODONE HYDROCHLORIDE 5 MG: 5 TABLET ORAL at 22:57

## 2023-01-01 RX ADMIN — SIMETHICONE 80 MG: 80 TABLET, CHEWABLE ORAL at 12:47

## 2023-01-01 RX ADMIN — HYDROXYZINE HYDROCHLORIDE 10 MG: 10 TABLET ORAL at 17:45

## 2023-01-01 RX ADMIN — CLOPIDOGREL BISULFATE 75 MG: 75 TABLET ORAL at 08:45

## 2023-01-01 RX ADMIN — LATANOPROST 1 DROP: 50 SOLUTION/ DROPS OPHTHALMIC at 21:32

## 2023-01-01 RX ADMIN — Medication 1 CAPSULE: at 08:51

## 2023-01-01 RX ADMIN — SENNOSIDES AND DOCUSATE SODIUM 1 TABLET: 50; 8.6 TABLET ORAL at 08:30

## 2023-01-01 RX ADMIN — SIMETHICONE 80 MG: 80 TABLET, CHEWABLE ORAL at 22:05

## 2023-01-01 RX ADMIN — POLYETHYLENE GLYCOL 3350 17 G: 17 POWDER, FOR SOLUTION ORAL at 11:42

## 2023-01-01 RX ADMIN — OXYCODONE HYDROCHLORIDE 10 MG: 5 TABLET ORAL at 13:32

## 2023-01-01 RX ADMIN — ASPIRIN 325 MG ORAL TABLET 325 MG: 325 PILL ORAL at 08:33

## 2023-01-01 RX ADMIN — CLOPIDOGREL BISULFATE 75 MG: 75 TABLET ORAL at 08:35

## 2023-01-01 RX ADMIN — POLYETHYLENE GLYCOL 3350 17 G: 17 POWDER, FOR SOLUTION ORAL at 09:05

## 2023-01-01 RX ADMIN — TRAMADOL HYDROCHLORIDE 50 MG: 50 TABLET, COATED ORAL at 07:13

## 2023-01-01 RX ADMIN — SENNOSIDES AND DOCUSATE SODIUM 1 TABLET: 50; 8.6 TABLET ORAL at 20:14

## 2023-01-01 RX ADMIN — OXYCODONE HYDROCHLORIDE 5 MG: 5 TABLET ORAL at 00:39

## 2023-01-01 RX ADMIN — OXYCODONE HYDROCHLORIDE 10 MG: 5 TABLET ORAL at 04:59

## 2023-01-01 RX ADMIN — Medication 600 MG: at 08:09

## 2023-01-01 RX ADMIN — FAMOTIDINE 10 MG: 10 TABLET ORAL at 11:01

## 2023-01-01 RX ADMIN — ATORVASTATIN CALCIUM 40 MG: 40 TABLET, FILM COATED ORAL at 20:53

## 2023-01-01 RX ADMIN — OXYCODONE HYDROCHLORIDE 10 MG: 10 TABLET, FILM COATED, EXTENDED RELEASE ORAL at 20:22

## 2023-01-01 RX ADMIN — ACETAMINOPHEN 650 MG: 325 TABLET ORAL at 17:00

## 2023-01-01 RX ADMIN — SODIUM CHLORIDE 100 ML: 9 INJECTION, SOLUTION INTRAVENOUS at 19:12

## 2023-01-01 RX ADMIN — MIDAZOLAM 0.5 MG: 1 INJECTION INTRAMUSCULAR; INTRAVENOUS at 13:46

## 2023-01-01 RX ADMIN — Medication 1 MG: at 21:27

## 2023-01-01 RX ADMIN — ACETAMINOPHEN 650 MG: 325 TABLET ORAL at 08:28

## 2023-01-01 RX ADMIN — POLYETHYLENE GLYCOL 3350 17 G: 17 POWDER, FOR SOLUTION ORAL at 17:02

## 2023-01-01 RX ADMIN — MAGNESIUM HYDROXIDE 30 ML: 400 SUSPENSION ORAL at 17:29

## 2023-01-01 RX ADMIN — PANTOPRAZOLE SODIUM 40 MG: 40 TABLET, DELAYED RELEASE ORAL at 08:16

## 2023-01-01 RX ADMIN — POLYETHYLENE GLYCOL 3350 17 G: 17 POWDER, FOR SOLUTION ORAL at 08:35

## 2023-01-01 RX ADMIN — PANTOPRAZOLE SODIUM 40 MG: 40 TABLET, DELAYED RELEASE ORAL at 09:05

## 2023-01-01 RX ADMIN — OXYCODONE HYDROCHLORIDE 5 MG: 5 TABLET ORAL at 04:06

## 2023-01-01 RX ADMIN — OXYCODONE HYDROCHLORIDE 5 MG: 5 TABLET ORAL at 10:21

## 2023-01-01 RX ADMIN — ACETAMINOPHEN 650 MG: 325 TABLET ORAL at 10:43

## 2023-01-01 RX ADMIN — ACETAMINOPHEN 650 MG: 325 TABLET ORAL at 12:47

## 2023-01-01 RX ADMIN — Medication 600 MG: at 17:00

## 2023-01-01 RX ADMIN — OXYCODONE HYDROCHLORIDE 5 MG: 5 TABLET ORAL at 13:01

## 2023-01-01 RX ADMIN — ACETAMINOPHEN 650 MG: 325 TABLET, FILM COATED ORAL at 12:00

## 2023-01-01 RX ADMIN — ACETAMINOPHEN 650 MG: 325 TABLET ORAL at 04:15

## 2023-01-01 RX ADMIN — MORPHINE SULFATE 2 MG: 2 INJECTION, SOLUTION INTRAMUSCULAR; INTRAVENOUS at 01:38

## 2023-01-01 RX ADMIN — ACETAMINOPHEN 975 MG: 325 TABLET ORAL at 20:59

## 2023-01-01 RX ADMIN — TRAZODONE HYDROCHLORIDE 50 MG: 50 TABLET ORAL at 22:38

## 2023-01-01 RX ADMIN — LEVOFLOXACIN 500 MG: 500 INJECTION, SOLUTION INTRAVENOUS at 13:38

## 2023-01-01 RX ADMIN — ACETAMINOPHEN 650 MG: 325 TABLET, FILM COATED ORAL at 21:28

## 2023-01-01 RX ADMIN — ACETAMINOPHEN 650 MG: 325 TABLET ORAL at 16:21

## 2023-01-01 RX ADMIN — CLOPIDOGREL BISULFATE 300 MG: 75 TABLET ORAL at 17:58

## 2023-01-01 RX ADMIN — CEFEPIME HYDROCHLORIDE 2 G: 2 INJECTION, POWDER, FOR SOLUTION INTRAVENOUS at 08:26

## 2023-01-01 RX ADMIN — OXYCODONE HYDROCHLORIDE 10 MG: 5 TABLET ORAL at 22:32

## 2023-01-01 RX ADMIN — GABAPENTIN 200 MG: 100 CAPSULE ORAL at 08:45

## 2023-01-01 RX ADMIN — Medication 600 MG: at 08:47

## 2023-01-01 RX ADMIN — Medication 2 SPRAY: at 11:26

## 2023-01-01 RX ADMIN — ACETAMINOPHEN 975 MG: 325 TABLET ORAL at 22:44

## 2023-01-01 RX ADMIN — OXYCODONE HYDROCHLORIDE 10 MG: 5 TABLET ORAL at 20:14

## 2023-01-01 RX ADMIN — GABAPENTIN 200 MG: 100 CAPSULE ORAL at 16:48

## 2023-01-01 RX ADMIN — POLYETHYLENE GLYCOL 3350 17 G: 17 POWDER, FOR SOLUTION ORAL at 09:34

## 2023-01-01 RX ADMIN — SODIUM CHLORIDE, POTASSIUM CHLORIDE, SODIUM LACTATE AND CALCIUM CHLORIDE: 600; 310; 30; 20 INJECTION, SOLUTION INTRAVENOUS at 11:01

## 2023-01-01 RX ADMIN — SODIUM CHLORIDE, POTASSIUM CHLORIDE, SODIUM LACTATE AND CALCIUM CHLORIDE: 600; 310; 30; 20 INJECTION, SOLUTION INTRAVENOUS at 22:05

## 2023-01-01 RX ADMIN — Medication 600 MG: at 08:44

## 2023-01-01 RX ADMIN — OXYCODONE HYDROCHLORIDE 10 MG: 5 TABLET ORAL at 12:34

## 2023-01-01 RX ADMIN — METHOCARBAMOL 500 MG: 500 TABLET ORAL at 18:00

## 2023-01-01 RX ADMIN — LATANOPROST 1 DROP: 50 SOLUTION/ DROPS OPHTHALMIC at 21:10

## 2023-01-01 RX ADMIN — SODIUM CHLORIDE: 9 INJECTION, SOLUTION INTRAVENOUS at 06:44

## 2023-01-01 RX ADMIN — ACETAMINOPHEN 650 MG: 325 TABLET ORAL at 05:19

## 2023-01-01 RX ADMIN — HYDROXYZINE HYDROCHLORIDE 10 MG: 10 TABLET ORAL at 21:28

## 2023-01-01 RX ADMIN — OXYCODONE HYDROCHLORIDE 10 MG: 5 TABLET ORAL at 10:02

## 2023-01-01 RX ADMIN — METHOCARBAMOL 500 MG: 500 TABLET ORAL at 09:34

## 2023-01-01 ASSESSMENT — ACTIVITIES OF DAILY LIVING (ADL)
ADLS_ACUITY_SCORE: 44
ADLS_ACUITY_SCORE: 37
ADLS_ACUITY_SCORE: 36
ADLS_ACUITY_SCORE: 39
ADLS_ACUITY_SCORE: 35
ADLS_ACUITY_SCORE: 40
ADLS_ACUITY_SCORE: 47
ADLS_ACUITY_SCORE: 42
ADLS_ACUITY_SCORE: 36
ADLS_ACUITY_SCORE: 42
ADLS_ACUITY_SCORE: 46
ADLS_ACUITY_SCORE: 39
ADLS_ACUITY_SCORE: 40
ADLS_ACUITY_SCORE: 38
ADLS_ACUITY_SCORE: 34
ADLS_ACUITY_SCORE: 39
ADLS_ACUITY_SCORE: 42
ADLS_ACUITY_SCORE: 35
ADLS_ACUITY_SCORE: 35
ADLS_ACUITY_SCORE: 36
ADLS_ACUITY_SCORE: 44
ADLS_ACUITY_SCORE: 44
DOING_ERRANDS_INDEPENDENTLY_DIFFICULTY: YES
ADLS_ACUITY_SCORE: 39
ADLS_ACUITY_SCORE: 42
ADLS_ACUITY_SCORE: 36
ADLS_ACUITY_SCORE: 44
ADLS_ACUITY_SCORE: 50
ADLS_ACUITY_SCORE: 46
ADLS_ACUITY_SCORE: 46
ADLS_ACUITY_SCORE: 44
ADLS_ACUITY_SCORE: 39
ADLS_ACUITY_SCORE: 35
ADLS_ACUITY_SCORE: 45
ADLS_ACUITY_SCORE: 36
ADLS_ACUITY_SCORE: 39
ADLS_ACUITY_SCORE: 44
ADLS_ACUITY_SCORE: 42
ADLS_ACUITY_SCORE: 49
ADLS_ACUITY_SCORE: 44
ADLS_ACUITY_SCORE: 42
ADLS_ACUITY_SCORE: 33
ADLS_ACUITY_SCORE: 42
ADLS_ACUITY_SCORE: 46
ADLS_ACUITY_SCORE: 39
ADLS_ACUITY_SCORE: 47
ADLS_ACUITY_SCORE: 38
ADLS_ACUITY_SCORE: 50
ADLS_ACUITY_SCORE: 36
ADLS_ACUITY_SCORE: 35
ADLS_ACUITY_SCORE: 38
ADLS_ACUITY_SCORE: 34
ADLS_ACUITY_SCORE: 42
ADLS_ACUITY_SCORE: 46
ADLS_ACUITY_SCORE: 36
ADLS_ACUITY_SCORE: 40
ADLS_ACUITY_SCORE: 36
ADLS_ACUITY_SCORE: 43
ADLS_ACUITY_SCORE: 40
ADLS_ACUITY_SCORE: 47
ADLS_ACUITY_SCORE: 39
ADLS_ACUITY_SCORE: 42
ADLS_ACUITY_SCORE: 42
ADLS_ACUITY_SCORE: 36
ADLS_ACUITY_SCORE: 45
ADLS_ACUITY_SCORE: 36
ADLS_ACUITY_SCORE: 38
ADLS_ACUITY_SCORE: 39
ADLS_ACUITY_SCORE: 42
ADLS_ACUITY_SCORE: 40
VISION_MANAGEMENT: GLASSES
ADLS_ACUITY_SCORE: 34
ADLS_ACUITY_SCORE: 42
ADLS_ACUITY_SCORE: 34
ADLS_ACUITY_SCORE: 38
ADLS_ACUITY_SCORE: 34
ADLS_ACUITY_SCORE: 36
ADLS_ACUITY_SCORE: 44
ADLS_ACUITY_SCORE: 46
DRESSING/BATHING_DIFFICULTY: NO
NUMBER_OF_TIMES_PATIENT_HAS_FALLEN_WITHIN_LAST_SIX_MONTHS: 1
ADLS_ACUITY_SCORE: 39
ADLS_ACUITY_SCORE: 39
ADLS_ACUITY_SCORE: 43
ADLS_ACUITY_SCORE: 44
ADLS_ACUITY_SCORE: 42
HEARING_DIFFICULTY_OR_DEAF: NO
ADLS_ACUITY_SCORE: 42
ADLS_ACUITY_SCORE: 42
ADLS_ACUITY_SCORE: 44
ADLS_ACUITY_SCORE: 47
ADLS_ACUITY_SCORE: 35
CONCENTRATING,_REMEMBERING_OR_MAKING_DECISIONS_DIFFICULTY: NO
ADLS_ACUITY_SCORE: 44
ADLS_ACUITY_SCORE: 42
ADLS_ACUITY_SCORE: 43
ADLS_ACUITY_SCORE: 36
ADLS_ACUITY_SCORE: 38
ADLS_ACUITY_SCORE: 35
ADLS_ACUITY_SCORE: 43
ADLS_ACUITY_SCORE: 34
ADLS_ACUITY_SCORE: 42
ADLS_ACUITY_SCORE: 36
WEAR_GLASSES_OR_BLIND: YES
ADLS_ACUITY_SCORE: 47
ADLS_ACUITY_SCORE: 39
ADLS_ACUITY_SCORE: 42
ADLS_ACUITY_SCORE: 44
ADLS_ACUITY_SCORE: 34
ADLS_ACUITY_SCORE: 50
ADLS_ACUITY_SCORE: 34
ADLS_ACUITY_SCORE: 44
ADLS_ACUITY_SCORE: 44
ADLS_ACUITY_SCORE: 46
ADLS_ACUITY_SCORE: 38
ADLS_ACUITY_SCORE: 34
ADLS_ACUITY_SCORE: 42
ADLS_ACUITY_SCORE: 42
ADLS_ACUITY_SCORE: 45
ADLS_ACUITY_SCORE: 33
ADLS_ACUITY_SCORE: 36
ADLS_ACUITY_SCORE: 45
ADLS_ACUITY_SCORE: 36
ADLS_ACUITY_SCORE: 36
ADLS_ACUITY_SCORE: 42
DEPENDENT_IADLS:: TRANSPORTATION
ADLS_ACUITY_SCORE: 36
ADLS_ACUITY_SCORE: 42
ADLS_ACUITY_SCORE: 39
ADLS_ACUITY_SCORE: 46
ADLS_ACUITY_SCORE: 36
ADLS_ACUITY_SCORE: 43
ADLS_ACUITY_SCORE: 35
ADLS_ACUITY_SCORE: 50
ADLS_ACUITY_SCORE: 36
CHANGE_IN_FUNCTIONAL_STATUS_SINCE_ONSET_OF_CURRENT_ILLNESS/INJURY: YES
ADLS_ACUITY_SCORE: 39
ADLS_ACUITY_SCORE: 36
ADLS_ACUITY_SCORE: 40
ADLS_ACUITY_SCORE: 36
ADLS_ACUITY_SCORE: 43
ADLS_ACUITY_SCORE: 39
DIFFICULTY_COMMUNICATING: YES
ADLS_ACUITY_SCORE: 41
ADLS_ACUITY_SCORE: 37
ADLS_ACUITY_SCORE: 47
ADLS_ACUITY_SCORE: 34
ADLS_ACUITY_SCORE: 35
ADLS_ACUITY_SCORE: 40
ADLS_ACUITY_SCORE: 41
ADLS_ACUITY_SCORE: 47
ADLS_ACUITY_SCORE: 39
ADLS_ACUITY_SCORE: 42
ADLS_ACUITY_SCORE: 36
ADLS_ACUITY_SCORE: 36
ADLS_ACUITY_SCORE: 40
ADLS_ACUITY_SCORE: 41
ADLS_ACUITY_SCORE: 39
ADLS_ACUITY_SCORE: 42
ADLS_ACUITY_SCORE: 44
ADLS_ACUITY_SCORE: 42
ADLS_ACUITY_SCORE: 42
ADLS_ACUITY_SCORE: 50
ADLS_ACUITY_SCORE: 38
ADLS_ACUITY_SCORE: 44
ADLS_ACUITY_SCORE: 41
ADLS_ACUITY_SCORE: 41
ADLS_ACUITY_SCORE: 46
WALKING_OR_CLIMBING_STAIRS_DIFFICULTY: NO
ADLS_ACUITY_SCORE: 35
ADLS_ACUITY_SCORE: 45
ADLS_ACUITY_SCORE: 44
ADLS_ACUITY_SCORE: 42
ADLS_ACUITY_SCORE: 36
ADLS_ACUITY_SCORE: 39
ADLS_ACUITY_SCORE: 43
COMMUNICATION: DIFFICULTY SPEAKING
ADLS_ACUITY_SCORE: 36
ADLS_ACUITY_SCORE: 39
ADLS_ACUITY_SCORE: 38
ADLS_ACUITY_SCORE: 45
ADLS_ACUITY_SCORE: 43
ADLS_ACUITY_SCORE: 45
ADLS_ACUITY_SCORE: 38
ADLS_ACUITY_SCORE: 40
ADLS_ACUITY_SCORE: 43
ADLS_ACUITY_SCORE: 43
ADLS_ACUITY_SCORE: 44
ADLS_ACUITY_SCORE: 39
ADLS_ACUITY_SCORE: 39
TOILETING_ISSUES: NO
ADLS_ACUITY_SCORE: 46
ADLS_ACUITY_SCORE: 44
ADLS_ACUITY_SCORE: 33
ADLS_ACUITY_SCORE: 44
ADLS_ACUITY_SCORE: 42
ADLS_ACUITY_SCORE: 36
ADLS_ACUITY_SCORE: 42
ADLS_ACUITY_SCORE: 46
ADLS_ACUITY_SCORE: 35
ADLS_ACUITY_SCORE: 38
ADLS_ACUITY_SCORE: 39
ADLS_ACUITY_SCORE: 44
ADLS_ACUITY_SCORE: 42
ADLS_ACUITY_SCORE: 34
ADLS_ACUITY_SCORE: 36
ADLS_ACUITY_SCORE: 42
ADLS_ACUITY_SCORE: 34
ADLS_ACUITY_SCORE: 42
ADLS_ACUITY_SCORE: 42
ADLS_ACUITY_SCORE: 36
ADLS_ACUITY_SCORE: 42
ADLS_ACUITY_SCORE: 47
ADLS_ACUITY_SCORE: 39
ADLS_ACUITY_SCORE: 44
ADLS_ACUITY_SCORE: 39
ADLS_ACUITY_SCORE: 41
ADLS_ACUITY_SCORE: 38
ADLS_ACUITY_SCORE: 44
ADLS_ACUITY_SCORE: 40
ADLS_ACUITY_SCORE: 34
ADLS_ACUITY_SCORE: 35
ADLS_ACUITY_SCORE: 45
ADLS_ACUITY_SCORE: 40
ADLS_ACUITY_SCORE: 42
ADLS_ACUITY_SCORE: 41
ADLS_ACUITY_SCORE: 46
ADLS_ACUITY_SCORE: 36
ADLS_ACUITY_SCORE: 39
ADLS_ACUITY_SCORE: 44
ADLS_ACUITY_SCORE: 47
ADLS_ACUITY_SCORE: 41
ADLS_ACUITY_SCORE: 39
ADLS_ACUITY_SCORE: 39
ADLS_ACUITY_SCORE: 50
ADLS_ACUITY_SCORE: 39
ADLS_ACUITY_SCORE: 37
ADLS_ACUITY_SCORE: 35
ADLS_ACUITY_SCORE: 42
ADLS_ACUITY_SCORE: 33
ADLS_ACUITY_SCORE: 38
ADLS_ACUITY_SCORE: 39
ADLS_ACUITY_SCORE: 46
ADLS_ACUITY_SCORE: 45
ADLS_ACUITY_SCORE: 42
ADLS_ACUITY_SCORE: 45
ADLS_ACUITY_SCORE: 45
ADLS_ACUITY_SCORE: 38
ADLS_ACUITY_SCORE: 42
ADLS_ACUITY_SCORE: 35
ADLS_ACUITY_SCORE: 39
ADLS_ACUITY_SCORE: 38
ADLS_ACUITY_SCORE: 43
ADLS_ACUITY_SCORE: 39
ADLS_ACUITY_SCORE: 35
ADLS_ACUITY_SCORE: 44
ADLS_ACUITY_SCORE: 42
ADLS_ACUITY_SCORE: 39
ADLS_ACUITY_SCORE: 43
ADLS_ACUITY_SCORE: 42
ADLS_ACUITY_SCORE: 49
ADLS_ACUITY_SCORE: 40
ADLS_ACUITY_SCORE: 44
ADLS_ACUITY_SCORE: 43
ADLS_ACUITY_SCORE: 36
ADLS_ACUITY_SCORE: 34
ADLS_ACUITY_SCORE: 43
ADLS_ACUITY_SCORE: 45
ADLS_ACUITY_SCORE: 38
ADLS_ACUITY_SCORE: 50
ADLS_ACUITY_SCORE: 44
ADLS_ACUITY_SCORE: 42
ADLS_ACUITY_SCORE: 38
ADLS_ACUITY_SCORE: 42
ADLS_ACUITY_SCORE: 33
ADLS_ACUITY_SCORE: 34
ADLS_ACUITY_SCORE: 39
ADLS_ACUITY_SCORE: 36
ADLS_ACUITY_SCORE: 42
ADLS_ACUITY_SCORE: 42
ADLS_ACUITY_SCORE: 39
ADLS_ACUITY_SCORE: 39
ADLS_ACUITY_SCORE: 38
ADLS_ACUITY_SCORE: 36
ADLS_ACUITY_SCORE: 39
ADLS_ACUITY_SCORE: 40
ADLS_ACUITY_SCORE: 34
ADLS_ACUITY_SCORE: 45
ADLS_ACUITY_SCORE: 42
ADLS_ACUITY_SCORE: 36
ADLS_ACUITY_SCORE: 39
ADLS_ACUITY_SCORE: 35
ADLS_ACUITY_SCORE: 42
ADLS_ACUITY_SCORE: 44
ADLS_ACUITY_SCORE: 34
ADLS_ACUITY_SCORE: 38
ADLS_ACUITY_SCORE: 36
ADLS_ACUITY_SCORE: 39
ADLS_ACUITY_SCORE: 36
ADLS_ACUITY_SCORE: 37
ADLS_ACUITY_SCORE: 42
ADLS_ACUITY_SCORE: 42
ADLS_ACUITY_SCORE: 39
ADLS_ACUITY_SCORE: 42
ADLS_ACUITY_SCORE: 39
ADLS_ACUITY_SCORE: 39
ADLS_ACUITY_SCORE: 42
ADLS_ACUITY_SCORE: 36
ADLS_ACUITY_SCORE: 45
ADLS_ACUITY_SCORE: 38
ADLS_ACUITY_SCORE: 39
ADLS_ACUITY_SCORE: 44
ADLS_ACUITY_SCORE: 39
ADLS_ACUITY_SCORE: 44
FALL_HISTORY_WITHIN_LAST_SIX_MONTHS: YES
ADLS_ACUITY_SCORE: 43
ADLS_ACUITY_SCORE: 41
ADLS_ACUITY_SCORE: 40
ADLS_ACUITY_SCORE: 41
ADLS_ACUITY_SCORE: 42
ADLS_ACUITY_SCORE: 46
ADLS_ACUITY_SCORE: 44
ADLS_ACUITY_SCORE: 42
ADLS_ACUITY_SCORE: 36
ADLS_ACUITY_SCORE: 50
ADLS_ACUITY_SCORE: 42
ADLS_ACUITY_SCORE: 44
ADLS_ACUITY_SCORE: 39
ADLS_ACUITY_SCORE: 45
ADLS_ACUITY_SCORE: 44
DOING_ERRANDS_INDEPENDENTLY_DIFFICULTY: YES
ADLS_ACUITY_SCORE: 42
ADLS_ACUITY_SCORE: 40
ADLS_ACUITY_SCORE: 39
ADLS_ACUITY_SCORE: 44
ADLS_ACUITY_SCORE: 38
ADLS_ACUITY_SCORE: 38
ADLS_ACUITY_SCORE: 44
ADLS_ACUITY_SCORE: 46
ADLS_ACUITY_SCORE: 35
ADLS_ACUITY_SCORE: 38
CHANGE_IN_FUNCTIONAL_STATUS_SINCE_ONSET_OF_CURRENT_ILLNESS/INJURY: YES
ADLS_ACUITY_SCORE: 40
ADLS_ACUITY_SCORE: 40
ADLS_ACUITY_SCORE: 44
ADLS_ACUITY_SCORE: 50
ADLS_ACUITY_SCORE: 43
ADLS_ACUITY_SCORE: 47
ADLS_ACUITY_SCORE: 44
ADLS_ACUITY_SCORE: 36
ADLS_ACUITY_SCORE: 45
ADLS_ACUITY_SCORE: 36
ADLS_ACUITY_SCORE: 42
ADLS_ACUITY_SCORE: 47
ADLS_ACUITY_SCORE: 42
ADLS_ACUITY_SCORE: 34
ADLS_ACUITY_SCORE: 36
ADLS_ACUITY_SCORE: 36
ADLS_ACUITY_SCORE: 40
ADLS_ACUITY_SCORE: 42
ADLS_ACUITY_SCORE: 39
ADLS_ACUITY_SCORE: 42
ADLS_ACUITY_SCORE: 47
WEAR_GLASSES_OR_BLIND: YES
NUMBER_OF_TIMES_PATIENT_HAS_FALLEN_WITHIN_LAST_SIX_MONTHS: 1
ADLS_ACUITY_SCORE: 42
ADLS_ACUITY_SCORE: 41
ADLS_ACUITY_SCORE: 45
VISION_MANAGEMENT: GLASSES
ADLS_ACUITY_SCORE: 42
ADLS_ACUITY_SCORE: 40
ADLS_ACUITY_SCORE: 38
ADLS_ACUITY_SCORE: 42
ADLS_ACUITY_SCORE: 46
ADLS_ACUITY_SCORE: 35
ADLS_ACUITY_SCORE: 44
ADLS_ACUITY_SCORE: 39
ADLS_ACUITY_SCORE: 40
ADLS_ACUITY_SCORE: 45
ADLS_ACUITY_SCORE: 40
ADLS_ACUITY_SCORE: 42
ADLS_ACUITY_SCORE: 36
ADLS_ACUITY_SCORE: 46
ADLS_ACUITY_SCORE: 44
ADLS_ACUITY_SCORE: 44
ADLS_ACUITY_SCORE: 47
ADLS_ACUITY_SCORE: 42
ADLS_ACUITY_SCORE: 41
ADLS_ACUITY_SCORE: 35
ADLS_ACUITY_SCORE: 38
ADLS_ACUITY_SCORE: 46
ADLS_ACUITY_SCORE: 44
ADLS_ACUITY_SCORE: 53
ADLS_ACUITY_SCORE: 42
ADLS_ACUITY_SCORE: 45
ADLS_ACUITY_SCORE: 36
ADLS_ACUITY_SCORE: 45
ADLS_ACUITY_SCORE: 40
ADLS_ACUITY_SCORE: 37
ADLS_ACUITY_SCORE: 39
ADLS_ACUITY_SCORE: 42
ADLS_ACUITY_SCORE: 46
ADLS_ACUITY_SCORE: 40
ADLS_ACUITY_SCORE: 50
ADLS_ACUITY_SCORE: 38
ADLS_ACUITY_SCORE: 42
ADLS_ACUITY_SCORE: 43
ADLS_ACUITY_SCORE: 46
ADLS_ACUITY_SCORE: 46
ADLS_ACUITY_SCORE: 36
ADLS_ACUITY_SCORE: 39
ADLS_ACUITY_SCORE: 39
ADLS_ACUITY_SCORE: 42
ADLS_ACUITY_SCORE: 41
ADLS_ACUITY_SCORE: 44
ADLS_ACUITY_SCORE: 36
ADLS_ACUITY_SCORE: 44
ADLS_ACUITY_SCORE: 39
ADLS_ACUITY_SCORE: 35
ADLS_ACUITY_SCORE: 35
ADLS_ACUITY_SCORE: 41
ADLS_ACUITY_SCORE: 39
ADLS_ACUITY_SCORE: 34
ADLS_ACUITY_SCORE: 46
ADLS_ACUITY_SCORE: 39
ADLS_ACUITY_SCORE: 39
ADLS_ACUITY_SCORE: 42
ADLS_ACUITY_SCORE: 44
ADLS_ACUITY_SCORE: 36
ADLS_ACUITY_SCORE: 42
ADLS_ACUITY_SCORE: 36
ADLS_ACUITY_SCORE: 39
ADLS_ACUITY_SCORE: 35
ADLS_ACUITY_SCORE: 42
ADLS_ACUITY_SCORE: 44
ADLS_ACUITY_SCORE: 39
ADLS_ACUITY_SCORE: 35
ADLS_ACUITY_SCORE: 47
ADLS_ACUITY_SCORE: 50
ADLS_ACUITY_SCORE: 39
ADLS_ACUITY_SCORE: 40
ADLS_ACUITY_SCORE: 42
ADLS_ACUITY_SCORE: 42
ADLS_ACUITY_SCORE: 33
ADLS_ACUITY_SCORE: 45
ADLS_ACUITY_SCORE: 44
ADLS_ACUITY_SCORE: 46
ADLS_ACUITY_SCORE: 50
ADLS_ACUITY_SCORE: 41
ADLS_ACUITY_SCORE: 41
ADLS_ACUITY_SCORE: 42
ADLS_ACUITY_SCORE: 46
ADLS_ACUITY_SCORE: 39
ADLS_ACUITY_SCORE: 35
ADLS_ACUITY_SCORE: 44
ADLS_ACUITY_SCORE: 46
ADLS_ACUITY_SCORE: 40
ADLS_ACUITY_SCORE: 44
ADLS_ACUITY_SCORE: 36
ADLS_ACUITY_SCORE: 38
ADLS_ACUITY_SCORE: 35
ADLS_ACUITY_SCORE: 42
ADLS_ACUITY_SCORE: 46
ADLS_ACUITY_SCORE: 50
ADLS_ACUITY_SCORE: 46
ADLS_ACUITY_SCORE: 38
ADLS_ACUITY_SCORE: 45
ADLS_ACUITY_SCORE: 37
ADLS_ACUITY_SCORE: 39
ADLS_ACUITY_SCORE: 43
ADLS_ACUITY_SCORE: 44
ADLS_ACUITY_SCORE: 39
ADLS_ACUITY_SCORE: 44
ADLS_ACUITY_SCORE: 44
ADLS_ACUITY_SCORE: 39
ADLS_ACUITY_SCORE: 41
ADLS_ACUITY_SCORE: 50
ADLS_ACUITY_SCORE: 35
ADLS_ACUITY_SCORE: 42
ADLS_ACUITY_SCORE: 42
ADLS_ACUITY_SCORE: 39
ADLS_ACUITY_SCORE: 39
ADLS_ACUITY_SCORE: 36
ADLS_ACUITY_SCORE: 44
ADLS_ACUITY_SCORE: 43
ADLS_ACUITY_SCORE: 34
ADLS_ACUITY_SCORE: 34
ADLS_ACUITY_SCORE: 42
ADLS_ACUITY_SCORE: 43
ADLS_ACUITY_SCORE: 42
ADLS_ACUITY_SCORE: 35
ADLS_ACUITY_SCORE: 40
ADLS_ACUITY_SCORE: 44
ADLS_ACUITY_SCORE: 44
ADLS_ACUITY_SCORE: 40
ADLS_ACUITY_SCORE: 38
ADLS_ACUITY_SCORE: 41
ADLS_ACUITY_SCORE: 38
ADLS_ACUITY_SCORE: 42
ADLS_ACUITY_SCORE: 44
ADLS_ACUITY_SCORE: 45
ADLS_ACUITY_SCORE: 42
ADLS_ACUITY_SCORE: 38
ADLS_ACUITY_SCORE: 46
ADLS_ACUITY_SCORE: 37
ADLS_ACUITY_SCORE: 41
ADLS_ACUITY_SCORE: 40
ADLS_ACUITY_SCORE: 36
ADLS_ACUITY_SCORE: 42
ADLS_ACUITY_SCORE: 45
ADLS_ACUITY_SCORE: 36
FALL_HISTORY_WITHIN_LAST_SIX_MONTHS: YES
ADLS_ACUITY_SCORE: 42
DIFFICULTY_EATING/SWALLOWING: NO
ADLS_ACUITY_SCORE: 44
ADLS_ACUITY_SCORE: 42
ADLS_ACUITY_SCORE: 40

## 2023-01-01 ASSESSMENT — ENCOUNTER SYMPTOMS
COUGH: 0
FREQUENCY: 0
DYSURIA: 0
DIFFICULTY URINATING: 0
VOMITING: 0
HEMATURIA: 0
WEAKNESS: 1
FEVER: 0
SEIZURES: 0

## 2023-01-01 ASSESSMENT — LIFESTYLE VARIABLES: TOBACCO_USE: 0

## 2023-01-04 NOTE — TELEPHONE ENCOUNTER
----- Message from Rachel M Moniz sent at 12/22/2022  4:12 PM CST -----  Regarding: Alectinib Lab Draws  Luis Jaimes    Education was provided to the patient for alectinib this afternoon. She is relatively home-bound due to existing medical conditions and receives care through [facility] care services. She requested labs be drawn through home care. Are you able to set this up? She should be getting CBC w/diff and CMP labs drawn every 2 weeks for the first 3 months of therapy then monthly. Let me know if you need anything else from me.    Rachel Moniz, Pharmacist Intern  December 22, 2023

## 2023-01-04 NOTE — TELEPHONE ENCOUNTER
Free Drug Application Approved  Effective Dates: 1/4/2023-no end date  Patient notified: yes, I left a voicemail for the patient.   Additional Information: I noticed that the application

## 2023-01-04 NOTE — TELEPHONE ENCOUNTER
Writer called patient to get an update on when she will start her new medication, Tymlos. Patient states she was working on a co pay assistance.  She has been talking with [place] pharmacy liaison about information needed to start the process and waiting to hear back about an update.     Message sent to [place] and was updated that an approval was received on Tymlos.     Writer also notified complex  of follow up discharge orders and will call patient today.     Isabela Schwartz RN

## 2023-01-04 NOTE — TELEPHONE ENCOUNTER
I called [employer] to check the status, they said they do not have a profile on her yet so they suggested I Taulia to make sure that its sent to the patients address and not the providers as its indicated on the application.    I called [employer] at [phone], spoke with rep Navarrete.  It can take 24-48 hours for the pharmacy to create an account, so they should call her possibly on Friday 1/6.

## 2023-01-05 PROBLEM — R09.02 HYPOXIA: Status: RESOLVED | Noted: 2022-01-01 | Resolved: 2023-01-01

## 2023-01-05 PROBLEM — R03.0 ELEVATED BLOOD PRESSURE READING: Status: RESOLVED | Noted: 2022-01-01 | Resolved: 2023-01-01

## 2023-01-05 PROBLEM — J90 PLEURAL EFFUSION ON LEFT: Status: RESOLVED | Noted: 2022-01-01 | Resolved: 2023-01-01

## 2023-01-05 PROBLEM — R06.03 RESPIRATORY DISTRESS: Status: RESOLVED | Noted: 2022-01-01 | Resolved: 2023-01-01

## 2023-01-05 NOTE — PROGRESS NOTES
Writer called patient to follow up on medication approval and if she knew when the the medication was to arrive.  Patient states that she was encouraged to call the company directly and she will do that today.     Patient is concerned and describes feeling a bit more short of breath this morning with activity, but checks or 02 saturation and it is at her baseline of 90-92%. She had 1 thoracentesis on 12/11 during hospitalization and Dr. Pantoja to order another if she had symptoms. Patient has a nurse coming from [facility] agency today who can also listen to patient's lungs and assess patient for need of a thoracentesis.     Isabela Schwartz RN

## 2023-01-06 NOTE — PROGRESS NOTES
TELEPHONE VISIT                                                       ASSESSMENT/PLAN                                                      (J91.0) Malignant pleural effusion  (primary encounter diagnosis)  (C34.92) Metastatic primary lung cancer, left (H)  (R06.02) Shortness of breath  Plan: CXR today; if recurrent pleural effusion, will order thoracentesis.    (F41.1) LETITIA (generalized anxiety disorder)  Comment: just started on Zoloft; may not be experiencing relief with hydroxyzine 10 mg as needed.  Plan: Continue Zoloft 25 mg daily without change; TRIAL of increasing hydroxyzine from 10 to 20 mg as needed for acute relief of anxiety; if still no relief with higher dose, patient to contact MD; may benefit from Seroquel or low-dose benzodiazepine as needed.    Total time of video call between patient and provider was 12 minutes (10:20-10:32am). Provider location: office. Patient location: home. Platform: [employer].     Vesna Olivera MD   [employer]  [address] [address]  T: [phone], F: [phone]    SUBJECTIVE                                                      Chiquita Beryr is a very pleasant 82 year old female who requested a telephone visit for follow-up:    Friend/healthcare advocate in the background.    Patient was hospitalized at [facility] 12/10/2022-12/16/2022 for acute hypoxic respiratory failure due to a pleural effusion. Patient underwent a thoracentesis. Cytology consistent with new diagnosis of lung cancer.    Patient has since followed up with oncology. She has stage IV lung adenocarcinoma.  Plan is to start treatment with alectinib, which has been approved/covered by her insurance.    Patient is feeling more short of breath today. Oxygen saturations 91-92%.    She has also been struggling with anxiety. Virtual visit 12/28/2022: Started on Zoloft 25 mg daily and hydroxyzine as needed. Tolerating medication(s) well - no adverse side effects. She is not sure  hydroxyzine is helping.    ---    (Note was completed, in part, with [employer] voice-recognition software. Documentation reviewed, but some grammatical, spelling, and word errors may remain.)

## 2023-01-06 NOTE — TELEPHONE ENCOUNTER
Patient called the clinic regard her appointment this morning. Patient would like to thank the provider for her quick action and help in providing care.    [facility] Triage Team

## 2023-01-09 NOTE — PROGRESS NOTES
Writer called and spoke with patient to follow up on the thoracentesis and if patient notes improvement in symptoms of shortness of breath .    Patient states that Friday she was significantly short of breath and had an appointment with PCP and x-ray and thoracentesis and does note improvement in her breathing, 02 saturations, and decrease heart rate.    Patient updated writer that she should here back from pharmacy on the shipment of her medication . She will let this weriter know when that delivery is expected.     Writer discussed another thoracentesis to be scheduled for day of PETSCAN to coordinate. Patient agreed and verbalized understanding.     Isabela Schwartz RN

## 2023-01-10 NOTE — TELEPHONE ENCOUNTER
Patient should HOLD the sertraline and see if the diarrhea/loose stools resolve.    If no improvement after 5-7 days of holding, patient to contact MD.    If improvement noted, also contact MD - we can prescribe an alternative medication to replace sertraline.

## 2023-01-10 NOTE — TELEPHONE ENCOUNTER
Nurse Triage SBAR    Is this a 2nd Level Triage? YES, LICENSED PRACTITIONER REVIEW IS REQUIRED    Situation: Patient calling with concerns regarding diarrhea and medication question.  Consent: not needed    Background:  Pt states that she has already had 3 episodes of diarrhea this AM. Pt was recently DX with lung cancer but has not yet started TX for this. Pt recently started Zoloft 25 mg and yesterday increased dose to 50 mg.     Assessment: O2 96%, P95, T 99 oral. Pt states that she is also experiencing some nausea. Pt does have some anxiety regarding new DX and situation and states that she did not sleep well last night at all. Pt asked if ok to take 2 tabs of Hydroxyzine PRN as she felt the 10 mg was not helpful. Per PCP note advised pt this is ok to try and if not helpful to let PCP know.    Although pt has not started cancer TX, pt would feel more comfortable knowing PCP is aware and advising her. Discussed pushing fluids and BRAT diet. Please contact pt to advisee further.Pt has HHA coming at 1130 to assist with bathing.    Protocol Recommended Disposition:   See in Office Today    Recommendation: Advised patient to wait for call back after consulting with PCP. . Reviewed concerning symptoms and when to call back.     Routed to provider    Does the patient meet one of the following criteria for ADS visit consideration? 16+ years old, with an [facility] PCP     TIP  Providers, please consider if this condition is appropriate for management at one of our Acute and Diagnostic Services sites.     If patient is a good candidate, please use dotphrase <dot>triageresponse and select Refer to ADS to document.       MONO PANTOJA RN [facility] 1/10/2023 10:02 AM      Reason for Disposition    Weak immune system (e.g., HIV positive, cancer chemo, splenectomy, organ transplant, chronic steroids)    Additional Information    Negative: Shock suspected (e.g., cold/pale/clammy skin, too weak to stand, low BP,  rapid pulse)    Negative: Difficult to awaken or acting confused (e.g., disoriented, slurred speech)    Negative: Sounds like a life-threatening emergency to the triager    Negative: Vomiting also present and worse than the diarrhea    Negative: Blood in stool and without diarrhea    Negative: SEVERE abdominal pain (e.g., excruciating) and present > 1 hour    Negative: SEVERE abdominal pain and age > 60 years    Negative: Bloody, black, or tarry bowel movements (Exception: chronic-unchanged black-grey bowel movements and is taking iron pills or Pepto-Bismol)    Negative: SEVERE diarrhea (e.g., 7 or more times / day more than normal) and age > 60 years    Negative: Constant abdominal pain lasting > 2 hours    Negative: Drinking very little and has signs of dehydration (e.g., no urine > 12 hours, very dry mouth, very lightheaded)    Negative: Patient sounds very sick or weak to the triager    Negative: SEVERE diarrhea (e.g., 7 or more times / day more than normal) and present > 24 hours (1 day)    Negative: MODERATE diarrhea (e.g., 4-6 times / day more than normal) and present > 48 hours (2 days)    Negative: MODERATE diarrhea (e.g., 4-6 times / day more than normal) and age > 70 years    Negative: Abdominal pain (Exception: Pain clears completely with each passage of diarrhea stool.)    Negative: Fever > 101 F (38.3 C)    Negative: Blood in the stool    Negative: Mucus or pus in stool has been present > 2 days and diarrhea is more than mild    Protocols used: DIARRHEA-A-OH

## 2023-01-10 NOTE — TELEPHONE ENCOUNTER
Patient was called, she reports no diarrhea with taking sertraline 1/2 tab, she took the first increased dose yesterday and had 3 small tan/brown mucus like diarrhea episodes today. She did take a full tablet again today. Denies abdominal pain. She will hold sertraline for 5-7 days and call back Dr. Olivera with a report of symptoms or call earlier with if new symptoms arise.

## 2023-01-13 NOTE — PROGRESS NOTES
Writer placed an order for therapeutic thoracentesis to be scheduled for 1/30 at 10:30a to be coordinated with PET SCAN.     Patient is expecting her shipment of medication on 1/26 labs to be drawn starting 2 weeks after beginning the medication.

## 2023-01-16 NOTE — TELEPHONE ENCOUNTER
The Home Care/Assisted Living/Nursing Facility is calling regarding an established patient.  Has the patient seen Home Care in the past or is currently residing in Assisted Living or Nursing Facility? Yes.     Xuan calling from [facility] requesting the following orders that are within the Home Care, Assisted Living or Nursing Home Eval and Treatment standing order and can be signed as standing order signature required by RN.    Preferred Call Back Number: [phone] confidential line    Home Care Visits Continuation Skilled Nursing for 1/x4w    Any additional Orders:  Are there any orders requested, not stated above, that are outside of the standing order and must be routed to a licensed practitioner for approval?    No    Writer has verified Requestor will send fax to have orders signed.    Perri WILEY RN  EP Triage

## 2023-01-16 NOTE — PROGRESS NOTES
Oral Chemotherapy Monitoring Program     Placed call to patient in follow up of Alectinib therapy. Chiquita stated that the medication will be delivered on 1/26/23. She will start with the evening dose that day when the medication arrives. We also discussed the regimen and possible side effects again in addition to needing home care lab draws every 2 weeks for at least the first 2 months of therapy. She will likely hold her dose the morning of 1/30 PET scan etc.     Follow up:  1/26/23 start Alectinib  2/1/23 Dr. Pantoja telephone visit  2/2/23 1 week follow up phone call assessment  ~2/8/23 needs lab draw (coordinating with [facility] for home care lab draw).    Felix Chapa PharmD  [facility]  [phone]  January 16, 2023

## 2023-01-17 NOTE — TELEPHONE ENCOUNTER
Patient calling clinic to report how sertraline 50 mg has affected her. Patient stated she started taking sertraline half a pill, then moved to taking a full pill. After switching to full pill patient began having nausea and diarrhea and stopped taking pill, today is one week since stopping pill.     Patient stated she is also starting cancer tx next week and may need something to help with the nausea. Patient stated she is also still taking hydroxyzine 10 mg for anxiety.     Routing to PCP for update/review.

## 2023-01-17 NOTE — TELEPHONE ENCOUNTER
Ember, PT with [facility] to request verbal orders for the following:    PT 1x a week for 2 weeks, every other week for one more visit     Writer gave verbal approval for requested orders per RN protocol     No further questions or concerns at this time.    Signing encounter.    Sandi Bal RN  [facility]

## 2023-01-17 NOTE — TELEPHONE ENCOUNTER
Did her nausea and vomiting resolve since stopping Zoloft?    If so, did she have any symptoms while on the 25mg dose? If she tolerated the 25mg dose without difficulty, she should continue that dose.    Her oncology team will be able to treat her nausea while receiving chemotherapy.

## 2023-01-18 NOTE — TELEPHONE ENCOUNTER
Patient Contact    Attempt # 1    Was call answered?  No.  Left message on voicemail with information to call me back.    On call back, please relay PCP questions to patient.

## 2023-01-18 NOTE — TELEPHONE ENCOUNTER
"Received a call back from the patient with some clarification regarding the phone call from this morning. Patient states she thought about her symptoms on the 25 mg dose of Sertraline and the patient states she did experiencing periodic episodes of a \"queasy sensation.\" Patient states she never vomited.     At this time, the patient is going to hold off on restarting the Sertraline since she did experiencing some symptoms with the medication.    Will route to PCP for review.     Can we leave a detailed message on this number? YES  Phone number patient can be reached at: Cell number on file:    Telephone Information:   Mobile [phone]       Beverly Keys RN  [facility] Triage      "

## 2023-01-18 NOTE — TELEPHONE ENCOUNTER
"Clinic Action Needed:FYI    Reason for Call:  Patient is returning clinic call. Reviewed Dr Olivera's message below.    1. Patient stating her nausea and vomiting did resolve on the Zoloft 25 mg dose.   Stating she stopped Zoloft completely 1 week ago.  Patient reporting she has hydroxyzine prn.  Stating she does have some anxiety related to upcoming chemotherapy.  Patient stating she is still making a decision on restarting Zoloft 25 mg. Reviewed Dr Olivera's recommendation of Zoloft 25 mg.     2. Reporting right foot itching \"between toes\" starting in past day.  Denies redness, rash or discharge. \"Just between 2 toes.\"   Reviewed home care per athletes foot triage guidelines.  Stating she has a virtual home care nurse visit today she will discuss symptoms with.    Caller verbalized understanding. Denies further questions.    Mishel Zepeda RN  [facility]          Reason for Disposition    Athlete's foot with no complications    Additional Information    Negative: Doesn't match the SYMPTOMS for athlete's foot    Negative: Patient sounds very sick or weak to the triager    Negative: Fever and bright red area or streak    Negative: Looks infected (e.g., spreading redness, red streak, pus)    Negative: Rash is very painful    Negative: Diabetes mellitus    Negative: Rash has spread beyond the instep and toes    Negative: Patient wants to be seen    Negative: After week on treatment and rash continues to spread    Negative: After 4 weeks on treatment and rash has not cleared completely    Protocols used: ATHLETE[employer]S FOOT-A-OH      "

## 2023-01-18 NOTE — TELEPHONE ENCOUNTER
"Received call from the patient. Reports that she spoke with a nurse earlier and express hesitancy in taking sertraline 25 mg due to the occasional \"queasy\" episodes. Pt discussed with home care RN who advised to continue 25 mg sertraline daily and to try it with food to help with the future changes pt will likely experience given her new cancer diagnosis.     Pt will like to take 25 mg sertraline daily. Pt states she has plenty of medications at home and will continue to cut her 50 mg tabs in half. Pt wondering if she can get 25 mg for future refills.     Pt expresses gratitude for individualized care.     Routing to provider for review.    Alicia Johnson RN                  "

## 2023-01-25 NOTE — TELEPHONE ENCOUNTER
Patient calling and continues with diarrhea.     She will stop the sertraline and see if her diarrhea will stop.  She will call back if she thinks she needs to be on another medication.     Asia Mckeon RN  [facility]

## 2023-01-27 NOTE — TELEPHONE ENCOUNTER
Keren GRIGGS with [facility] calling to request the following verbal orders:     Requesting social work re-visit 1 visit within 21 days- patient is requesting some help with referrals     Callback to Keren [phone] - confidential VM (ok to leave detailed VM)    Sandi Bal RN  [facility]

## 2023-01-30 NOTE — TELEPHONE ENCOUNTER
Patient Contact    Attempt # 1    Was call answered?  No.  Left detailed message on voicemail with PCP approval of requested orders.     On call back, please relay PCP approval of requested orders.

## 2023-01-31 NOTE — ORAL ONC MGMT
Oral Chemotherapy Monitoring Program    Subjective/Objective:  Chiquita Berry is a 82 year old female contacted by phone for a follow-up visit for oral chemotherapy. Chiquita confirmed the correct dose of alectinib at 4 capsules twice daily and she started on 1/26/23. She is taking the dose with meals. She denies any missed doses or medication changes. She reports having 3 episodes of diarrhea this morning and called for guidance. Episodes were low volume. She did take miralax yesterday as she was feeling somewhat constipated. This could have contributed to the diarrhea. No other side effects reported.     ORAL CHEMOTHERAPY 12/22/2022 1/16/2023 1/31/2023   Assessment Type New Teach;Chart Review Chart Review;Incoming phone call Incoming phone call;Initial Follow up   Diagnosis Code Non-Small Cell Lung Cancer Non-Small Cell Lung Cancer Non-Small Cell Lung Cancer   Providers Kit Pantoja   Clinic Name/Location [facility]   Drug Name Alecensa (alectinib) Alecensa (alectinib) Alecensa (alectinib)   Dose 600 mg 600 mg 600 mg   Current Schedule BID BID BID   Cycle Details Continuous Continuous Continuous   Start Date of Last Cycle - - 1/26/2023   Planned next cycle start date 12/28/2022 1/26/2023 2/25/2023   Doses missed in last 2 weeks - - 0   Adherence Assessment - - Adherent   Adverse Effects - - Diarrhea   Diarrhea - - Grade 1   Pharmacist Intervention(diarrhea) - - Yes   Intervention(s) - - OTC recommendation   Any new drug interactions? No No No   Is the dose as ordered appropriate for the patient? Yes Yes Yes       Last PHQ-2 Score on record:   PHQ-2 ( 1999 [employer]) 1/5/2023 12/28/2022   Q1: Little interest or pleasure in doing things 0 0   Q2: Feeling down, depressed or hopeless 1 0   PHQ-2 Score 1 0   PHQ-2 Total Score (12-17 Years)- Positive if 3 or more points; Administer PHQ-A if positive - -   Q1: Little interest or pleasure in doing things Not at all -   Q2: Feeling down, depressed or  "hopeless Several days -   PHQ-2 Score 1 -       Vitals:  BP:   BP Readings from Last 1 Encounters:   12/16/22 (!) 155/75     Wt Readings from Last 1 Encounters:   12/16/22 62.8 kg (138 lb 7.2 oz)     Estimated body surface area is 1.66 meters squared as calculated from the following:    Height as of 12/10/22: 1.575 m (5' 2\").    Weight as of 12/16/22: 62.8 kg (138 lb 7.2 oz).    Labs:  No results found for NA within last 30 days.     No results found for K within last 30 days.     No results found for CA within last 30 days.     No results found for Mag within last 30 days.     No results found for Phos within last 30 days.     No results found for ALBUMIN within last 30 days.     No results found for BUN within last 30 days.     No results found for CR within last 30 days.     No results found for AST within last 30 days.     No results found for ALT within last 30 days.     No results found for BILITOTAL within last 30 days.     No results found for WBC within last 30 days.     No results found for HGB within last 30 days.     No results found for PLT within last 30 days.     No results found for ANC within last 30 days.     No results found for ANC within last 30 days.          Assessment/Plan:  Grade 1 diarrhea. Advised to increase fluid intake and consider bland diet along with smaller more frequent meals. We discussed her getting loperamide (imodium) and the dosing of this for diarrhea. She was agreeable to the plan and will get the medication later today.     Follow-Up:  2/2: review appt with Dr. Pantoja    Refill Due:  Needs by 2/25    Deon Chapman, PharmD, MS  Hematology/Oncology Clinical Pharmacist  [facility]  [phone]      "

## 2023-02-01 NOTE — LETTER
2/1/2023         RE: Chiquita Berry  [address]  [place] [address]        Dear Colleague,    Thank you for referring your patient, Chiquita Berry, to the [facility]. Please see a copy of my visit note below.    ONCOLOGY HISTORY:  Ms. Berry is a female with metastatic lung adenocarcinoma.  1.  Chest x-ray on 12/10/2022 revealed a large left pleural effusion.  -CT chest, abdomen, and pelvis on 12/11/2022 revealed a left pleural effusion.  There is a left upper lobe lung nodule and also ground glass opacity in right upper lobe.  There is mediastinal lymphadenopathy.  There is low density lesion in head of the pancreas and some nodularity of left adrenal gland.  2.  Left thoracocentesis was done on 12/11/2022.  Pathology reveals adenocarcinoma.  Immunostains compatible with metastasis from lung primary.  -TPS of 20-30%.  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  3.  Brain MRI on 12/13/2022 does not reveal any brain metastasis.  4. Alectinib started on 01/26/2023.     SUBJECTIVE: Ms. 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  Patient started alectinib on January 26.  She is tolerating it well.    She has malignant pleural effusion.  She requires thoracocentesis.    Her overall condition is stable.  She has generalized weakness.  No worsening.  She is able to do activities of daily living.  No headache.  No dizziness.  No chest pain.  She gets short of breath when pleural fluid reaccumulates.  No abdominal pain.  No nausea or vomiting.  No bleeding.  Appetite has been good.  No urinary bowel complaint.  All other review of system negative.     PHYSICAL EXAMINATION:    GENERAL:  She is alert and oriented x 3.  Rest of systems not examined as this is a telephone visit.     ASSESSMENT:    1.  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  2.  Malignant pleural effusion.     PLAN:     1.  Patient tolerating alectinib well.  She will continue on it.  Advised her to call us if she has any side effects.  Side effects reviewed.    2.  She had a PET scan done.  Result is pending.  I personally reviewed it.  No evidence of distant metastatic disease.  We will inform her of the PET scan result.    We will plan on doing follow-up CT scan in about 3 months time.    3.  She will continue on thoracocentesis for symptomatic pleural effusion.  Advised her to call our clinic if she has worsening shortness of breath.    4.  I will see her in a month.    TOTAL TELEPHONE VISIT TIME:  13 minutes.      Again, thank you for allowing me to participate in the care of your patient.        Sincerely,        Fer Pantoja MD

## 2023-02-01 NOTE — LETTER
2/1/2023         RE: Chiquita Berry  [address]  [place] [address]        Dear Colleague,    Thank you for referring your patient, Chiquita Berry, to the [facility]. Please see a copy of my visit note below.    ONCOLOGY HISTORY:  Ms. Berry is a female with metastatic lung adenocarcinoma.  1.  Chest x-ray on 12/10/2022 revealed a large left pleural effusion.  -CT chest, abdomen, and pelvis on 12/11/2022 revealed a left pleural effusion.  There is a left upper lobe lung nodule and also ground glass opacity in right upper lobe.  There is mediastinal lymphadenopathy.  There is low density lesion in head of the pancreas and some nodularity of left adrenal gland.  2.  Left thoracocentesis was done on 12/11/2022.  Pathology reveals adenocarcinoma.  Immunostains compatible with metastasis from lung primary.  -TPS of 20-30%.  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  3.  Brain MRI on 12/13/2022 does not reveal any brain metastasis.  4. Alectinib started on 01/26/2023.     SUBJECTIVE: Ms. 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  Patient started alectinib on January 26.  She is tolerating it well.    She has malignant pleural effusion.  She requires thoracocentesis.    Her overall condition is stable.  She has generalized weakness.  No worsening.  She is able to do activities of daily living.  No headache.  No dizziness.  No chest pain.  She gets short of breath when pleural fluid reaccumulates.  No abdominal pain.  No nausea or vomiting.  No bleeding.  Appetite has been good.  No urinary bowel complaint.  All other review of system negative.     PHYSICAL EXAMINATION:    GENERAL:  She is alert and oriented x 3.  Rest of systems not examined as this is a telephone visit.     ASSESSMENT:    1.  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  2.  Malignant pleural effusion.     PLAN:     1.  Patient tolerating alectinib well.  She will continue on it.  Advised her to call us if she has any side effects.  Side effects reviewed.    2.  She had a PET scan done.  Result is pending.  I personally reviewed it.  No evidence of distant metastatic disease.  We will inform her of the PET scan result.    We will plan on doing follow-up CT scan in about 3 months time.    3.  She will continue on thoracocentesis for symptomatic pleural effusion.  Advised her to call our clinic if she has worsening shortness of breath.    4.  I will see her in a month.    TOTAL TELEPHONE VISIT TIME:  13 minutes.      Again, thank you for allowing me to participate in the care of your patient.        Sincerely,        Fer Pantoja MD     patient

## 2023-02-01 NOTE — TELEPHONE ENCOUNTER
Writer called [facility]  [phone] and a message was given for a return call to provide lab orders for 2/9.    Patient on 2/9 & 2/23 will need a CMP and CK total.     Will wait for a return call.     Isabela Schwartz RN

## 2023-02-01 NOTE — NURSING NOTE
Is the patient currently in the state of [place]? YES    Visit mode:TELEPHONE     If the visit is dropped, the patient can be reconnected by: [phone]    Will anyone else be joining the visit? YES: How would they like to receive their invitation? Other e-mail: N/A      How would you like to obtain your AVS? MyChart    Are changes needed to the allergy or medication list? NO    Comments or concerns related to today's visit: Patient confirms no changes to allergies or medications.     Kimberly Mckinley

## 2023-02-02 NOTE — H&P
[facility]    History and Physical - Hospitalist Service       Date of Admission:  2/2/2023    Assessment & Plan      Chiquita Berry is a 82 year old female admitted on 2/2/2023. Past history metastatic adenocarcinoma of the lung, hypertension, chronic back pain secondary to compression fracture who presents with generalized weakness.    Generalized weakness  Rule out pneumonia  Primarily suspect dehydration (see below), but cannot rule out chemotherapy.  She denies any recent infectious symptoms including fevers or chills, specifically denies any increase in shortness of breath or cough.  Initial work-up revealing mild acute kidney injury and mild hyponatremia.  She reports starting oral chemotherapy 1 week ago and had decreased appetite for about 2 days as well as 1 day of diarrhea which may contribute to dehydration.  LFTs, CBC, troponin, UA, COVID/flu/RSV are all negative.  Chest x-ray shows left basilar consolidation and effusion which is unchanged from prior.  EKG is unchanged from prior. Neuro exam non-focal.   -Management of GALE and hyponatremia as below  -PT evaluation  -Per review of up-to-date, fatigue is a common side effect of alectinib (<41% of individuals); will ask Oncology to consult  -received cefepime and azithro in ED but reports no change in dyspnea or cough, denies subjective fever/chills, no leukocytosis and CXR unchanged from prior - will check procal but will not plan to continue antibiotics unless elevated    ADDENDUM:  procal low, will monitor off further antibiotics    Acute kidney injury  Hyponatremia  Admission creatinine 0.7, up from baseline of 0.5; admission sodium 135.  Reports recent poor oral intake as above.  -Infuse 1 L LR over 10 hours  -Repeat BMP in the morning    Metastatic adenocarcinoma of the lung  Follows with Dr. Pantoja of [place] [facility].  Started alectinib on 1/26/2023.  She reports she has been tolerating this very well without symptoms  "up until the past 24 hours when she was profoundly weak.  -Oncology consult as above  -hold alectinib for now; resume as per Oncology's recommendation (patient will have home supply brought in as Obs)    Chronic low back pain secondary to compression fracture  -Continue as needed Tylenol    Anxiety  Reports this is related to her cancer diagnosis  -Continue as needed hydroxyzine       Diet:   regular diet   DVT Prophylaxis: Low Risk/Ambulatory with no VTE prophylaxis indicated; start lovenox if not discharged 2/3/23  Mcallister Catheter: Not present  Lines: None     Cardiac Monitoring: None  Code Status:   DNR/DNI    Clinically Significant Risk Factors Present on Admission                       # Overweight: Estimated body mass index is 25.42 kg/m  as calculated from the following:    Height as of this encounter: 1.575 m (5' 2\").    Weight as of this encounter: 63 kg (139 lb).           Disposition Plan      Expected Discharge Date: 02/03/2023                  Giovani Kam MD  Hospitalist Service  [facility]  Securely message with [employer] (more info)  Text page via [email or web address] Paging/Directory     ______________________________________________________________________    Chief Complaint   Generalized weakness     History is obtained from the patient, chart review and discussion with ED provider.     History of Present Illness   Chiquita Berry is a 82 year old female who presents with weakness.  She reports she has been trying to walk in her apartment to get exercise and maintain her strength since starting her immunotherapy.  She was getting up to the bathroom last night and felt profoundly weak and unable to to walk so lowered herself to the ground with the assistance of her walker and pressed her medical alert button.  She reports whole body weakness and reports this is a sudden change in the past 24 hours; denying progressive weakness in recent days.  Denies any focal neurologic symptoms.  No " lightheadedness, no chest pain/pressure, no palpitations.  Reports chronic shortness of breath and cough related to malignant effusion which is stable.  Two nights ago felt mildly lightheaded and room spinning, but this resolved on its own after going to bed.  Had mild diarrhea 2 days ago which resolved.  Reports good oral intake and has been trying to rehydrate (drank 10 glasses water yesterday), though reports feeling very dry today.  No fever/chills or dysuria, abdominal pain, nausea, rash, headache, sore throat, general body aches.  Started chemotherapy on 1/26/23, and feels she has been tolerating this well.  Denies any orthostatic symptoms.  Denies orthopnea.        Past Medical History    Past Medical History:   Diagnosis Date     Osteoporosis     followed by outside endocrinologist       Past Surgical History   Past Surgical History:   Procedure Laterality Date     CATARACT IOL, RT/LT Bilateral      OPEN REDUCTION INTERNAL FIXATION HUMERUS DISTAL Right      PHACOEMULSIFICATION CLEAR CORNEA WITH STANDARD INTRAOCULAR LENS IMPLANT Left 11/16/2015    Procedure: PHACOEMULSIFICATION CLEAR CORNEA WITH STANDARD INTRAOCULAR LENS IMPLANT;  Surgeon: Latrell Jessica MD;  Location: [facility]     TONSILLECTOMY & ADENOIDECTOMY         Prior to Admission Medications   Prior to Admission Medications   Prescriptions Last Dose Informant Patient Reported? Taking?   Abaloparatide (TYMLOS) 3120 MCG/1.56ML SOPN injection   No No   Sig: Inject 0.04 mLs (80 mcg) Subcutaneous daily Discuss resumption with endocrinologist after plan of care with oncology decided   Patient not taking: Reported on 12/28/2022   Cholecalciferol (VITAMIN D) 2000 UNITS tablet  Self Yes No   Sig: Take 1 tablet by mouth daily.   Multiple Vitamin (MULTIVITAMIN ADULT PO)  Self Yes No   Sig: Take 1 tablet by mouth daily   Multiple Vitamins-Minerals (PRESERVISION AREDS 2) CAPS  Self Yes No   Sig: Take 1 capsule by mouth 2 times daily   acetaminophen (TYLENOL) 325  MG tablet  Self Yes No   Sig: Take 650 mg by mouth 4 times daily And 650mg po daily prn pain   alectinib (ALECENSA) 150 MG CAPS   Yes No   Sig: Take 4 capsules (600 mg) by mouth 2 times daily (with meals)   hydrOXYzine (ATARAX) 10 MG tablet   No No   Sig: Take 1 tablet (10 mg) by mouth 3 times daily as needed for anxiety   ibuprofen (ADVIL/MOTRIN) 400 MG tablet  Self No No   Sig: Take 1 tablet (400 mg) by mouth every 6 hours as needed for moderate pain   latanoprost (XALATAN) 0.005 % ophthalmic solution  Self Yes No   Sig: Place 1 drop into both eyes every evening    melatonin 3 MG CAPS  Self Yes No   Sig: Take 3 mg by mouth At Bedtime And 3mg prn(total of 6mg ok for sleep)   polyethylene glycol (MIRALAX) 17 GM/Dose powder  Self Yes No   Sig: Take 17 g by mouth daily   polyethylene glycol 400 (BLINK TEARS) 0.25 % SOLN ophthalmic solution  Self Yes No   Sig: Place 1 drop into both eyes daily And Q2H PRN   prochlorperazine (COMPAZINE) 5 MG tablet   No No   Sig: Take 1 tablet (5 mg) by mouth every 6 hours as needed for nausea or vomiting   sertraline (ZOLOFT) 25 MG tablet   No No   Sig: Take 1 tablet (25 mg) by mouth daily   tiZANidine (ZANAFLEX) 2 MG tablet  Self No No   Sig: TAKE 1 TABLET BY MOUTH EVERY 12 HOURS AS NEEDED FOR MUSCLE SPASMS   traMADol (ULTRAM) 50 MG tablet   No No   Sig: TAKE 1 TABLET (50 MG) BY MOUTH EVERY 6 HOURS AS NEEDED FOR SEVERE PAIN   traZODone (DESYREL) 50 MG tablet   No No   Sig: Take 0.5-1 tablets (25-50 mg) by mouth nightly as needed for sleep      Facility-Administered Medications: None           Physical Exam   Vital Signs: Temp: 97.7  F (36.5  C) Temp src: Oral BP: (!) 144/65 Pulse: 85   Resp: (!) 9 SpO2: 93 % O2 Device: None (Room air)    Weight: 139 lbs 0 oz    General Appearance: thin female in NAD  Eyes: PERRL, sclear anicteric  HEENT: mucous membranes moist, no neck LAD  Respiratory: diminished lung sounds of left middle and lower regions, otherwise clear   Cardiovascular: RRR,  normal s1/s2 with grade 2/6 systolic murmur  GI: abdomen soft, normal bowel sounds  Lymph/Hematologic: no peripheral edema   Skin: no rash or bruising  Musculoskeletal: tenderness to palpation over lower sternum, extremities warm and well perfused   Neurologic: cranial nerves II-XII intact, 5/5 strength of extremities, alert and appropriate   Psychiatric: normal affect     Medical Decision Making       60 MINUTES SPENT BY ME on the date of service doing chart review, history, exam, documentation & further activities per the note.      Data     I have personally reviewed the following data over the past 24 hrs:    8.3  \   13.2   / 417     135 (L) 101 22.5 /  117 (H)   3.7 25 0.79 \       ALT: 19 AST: 30 AP: 70 TBILI: 0.2   ALB: 3.7 TOT PROTEIN: 6.2 (L) LIPASE: N/A       Trop: 12 BNP: N/A       Imaging results reviewed over the past 24 hrs:   Recent Results (from the past 24 hour(s))   Chest XR,  PA & LAT    Narrative    XR CHEST 2 VIEWS 2/2/2023 7:20 AM    HISTORY: Hit front of chest against walker.    COMPARISON: 1/6/2023      Impression    IMPRESSION: Low lung volumes. There is left basilar consolidation  and/or atelectasis with a moderate effusion.. There is pulmonary  venous congestion. Normal cardiac size accounting for imaging  technique.     There is diffuse demineralization in the bones. No acute displaced rib  fractures. Increased lower thoracic kyphosis. Minimal notching of the  anterior cortex of the mid sternum on the lateral view is new from  prior and could represent an acute, nondisplaced fracture.  Retrosternal clear space is clear.    CELSO ROCHA MD         SYSTEM ID:  [id number]

## 2023-02-02 NOTE — ED NOTES
Assist x1 to restroom via wheelchair.  Moves very slowly.  Not able to ambulate.  Stand & pivot with assist x1 at this time.  Voided & UA collected.   Dr Guerra aware.

## 2023-02-02 NOTE — PROVIDER NOTIFICATION
MD Notification    Notified Person: MD    Notified Person Name: Dr. Kam     Notification Date/Time: 2/2/23 @9610    Notification Interaction: [employer] messaging     Purpose of Notification: Pt said hem/onc told her she can continue her chemo. She does have her own supply here, could you order it for her?     Orders Received: Cassie callaway     Comments:

## 2023-02-02 NOTE — PROGRESS NOTES
"   02/02/23 1500   Appointment Info   Signing Clinician's Name / Credentials (PT) Richardson Vega DPT   Quick Adds   Quick Adds Certification       Present no   Living Environment   People in Home alone   Current Living Arrangements apartment   Home Accessibility no concerns   Transportation Anticipated family or friend will provide   Living Environment Comments Pt lives alone in an apartment. No stairs. Pt reports a family member will pick her up at discharge. Pt reports she plans on returning home alone without assist.   Self-Care   Usual Activity Tolerance good   Current Activity Tolerance moderate   Regular Exercise No   Equipment Currently Used at Home grab bar, toilet;grab bar, tub/shower;raised toilet seat;shower chair;walker, rolling   Fall history within last six months yes   Number of times patient has fallen within last six months 1   Activity/Exercise/Self-Care Comment Pt reports she currently receives assist with bathing and meal prep 1x/week, otherwise IND with ADLs. Pt currently also receiving HHPT. Pt ambulates w/ a FWW but reports she has gotten weaker and does not feel safe returning home at this time.   General Information   Onset of Illness/Injury or Date of Surgery 02/02/23   Referring Physician Giovani Kam MD   Patient/Family Therapy Goals Statement (PT) \"To get better\"   Pertinent History of Current Problem (include personal factors and/or comorbidities that impact the POC) Per Chart: Chiquita Berry is a 82 year old female admitted on 2/2/2023. Past history metastatic adenocarcinoma of the lung, hypertension, chronic back pain secondary to compression fracture who presents with generalized weakness.   Existing Precautions/Restrictions fall   Weight-Bearing Status - LLE full weight-bearing   Weight-Bearing Status - RLE full weight-bearing   Cognition   Orientation Status (Cognition) oriented x 3   Pain Assessment   Patient Currently in Pain Yes, see Vital Sign " flowsheet  (Low back pain)   Integumentary/Edema   Integumentary/Edema no deficits were identifed   Posture    Posture Forward head position;Protracted shoulders   Range of Motion (ROM)   Range of Motion ROM is WFL   Strength (Manual Muscle Testing)   Strength (Manual Muscle Testing) Deficits observed during functional mobility   Strength Comments BLE Hip Flexion: 3/5   Bed Mobility   Comment, (Bed Mobility) Did not assess   Transfers   Comment, (Transfers) Sit>stand w/ FWW and min A x 1   Gait/Stairs (Locomotion)   [place] Level (Gait) contact guard   Assistive Device (Gait) walker, front-wheeled   Distance in Feet 10'   Distance in Feet (Gait) 45'   Balance   Balance Comments Fair static sitting balance; Pt mildly unsteady with ambulation.   Sensory Examination   Sensory Perception patient reports no sensory changes   Clinical Impression   Criteria for Skilled Therapeutic Intervention Yes, treatment indicated   PT Diagnosis (PT) impaired gait   Influenced by the following impairments Decreased activity tolerance; decreased balance; decreased strength   Functional limitations due to impairments Impaired functional mobility   Clinical Presentation (PT Evaluation Complexity) Stable/Uncomplicated   Clinical Presentation Rationale clinical judgement   Clinical Decision Making (Complexity) low complexity   Planned Therapy Interventions (PT) balance training;bed mobility training;gait training;patient/family education;strengthening;transfer training   Risk & Benefits of therapy have been explained evaluation/treatment results reviewed;care plan/treatment goals reviewed;risks/benefits reviewed;current/potential barriers reviewed;participants voiced agreement with care plan;participants included;patient   PT Total Evaluation Time   PT Nagi Low Complexity Minutes (09637) 10   Therapy Certification   Start of care date 02/02/23   Certification date from 02/02/23   Certification date to 02/07/23   Medical Diagnosis Fall    Physical Therapy Goals   PT Frequency 3x/week   PT Predicted Duration/Target Date for Goal Attainment 02/07/23   PT Goals Bed Mobility;Transfers;Gait   PT: Bed Mobility Independent;Supine to/from sit   PT: Transfers Modified independent;Sit to/from stand;Assistive device   PT: Gait Modified independent;Assistive device;150 feet   Interventions   Interventions Quick Adds Gait Training;Therapeutic Activity   Therapeutic Activity   Therapeutic Activities: dynamic activities to improve functional performance Minutes (81242) 15   Symptoms Noted During/After Treatment Fatigue   Treatment Detail/Skilled Intervention Greeted pt sitting in chair, agreed to PT. VSS on RA throughout session. Pt performed sit>stand x 5 w/ FWW and min A x 1, needing assist to stand fully upright. Verbal cues for hand placement and anterior weightshift. Pt ambulated to the bathroom to void. Pt needing min A x 1 to change breif. After ambulation, pt returned to chair and performed stand>sit w/ FWW and CGA, verbal cues to descend in a slow, controlled motion. Pt ended session sitting in chair, with all needs met and call light within reach. Discussed discharge recommendations with pt, pt in agreement for TCU as pt does not feel safe returning home alone at this time. RN notified.   Gait Training   Gait Training Minutes (07446) 14   Symptoms Noted During/After Treatment (Gait Training) fatigue   Treatment Detail/Skilled Intervention Pt ambulated w/ FWW and CGA. Pt ambulated with decreased gait speed, downward gaze, decreased step length, kyphotic posture, and mildly unsteady. Verbal cues for upright gaze and posture, to increase step length, FWW proximity and pacing. Pt unsteady but no overt LOB noted. Unable to ambulate further citing fatigue.   PT Discharge Planning   PT Plan Assess bed mobility; repeat sit>stands; dynamic balance   PT Discharge Recommendation (DC Rec) Transitional Care Facility   PT Rationale for DC Rec Pt is below baseline. Pt  currently requiring assist with all functional mobility. Pt presents with deficits in activity tolerance, balance, and strength. Due to these deficits, pt is a high falls risk and unsafe to return home alone. Pt would benefit from continued skilled PT services via TCU to address deficits and improve IND with safety and functional mobility. Pt prefer [facility] TCU if available.   PT Brief overview of current status Sit>stand w/ FWW and min A x 1; gait w/ FWW and CGA   Total Session Time   Timed Code Treatment Minutes 29   Total Session Time (sum of timed and untimed services) 39     [facility]  OUTPATIENT PHYSICAL THERAPY EVALUATION  PLAN OF TREATMENT FOR OUTPATIENT REHABILITATION  (COMPLETE FOR INITIAL CLAIMS ONLY)  Patient's Last Name, First Name, M.I.  YOB: 1940  Chiquita Berry                        Provider's Name  [facility] Medical Record No.  [id number]                             Onset Date:  02/02/23   Start of Care Date:  02/02/23   Type:     _X_PT   ___OT   ___SLP Medical Diagnosis:  Fall              PT Diagnosis:  impaired gait Visits from SOC:  1     See note for plan of treatment, functional goals and certification details    I CERTIFY THE NEED FOR THESE SERVICES FURNISHED UNDER        THIS PLAN OF TREATMENT AND WHILE UNDER MY CARE     (Physician co-signature of this document indicates review and certification of the therapy plan).

## 2023-02-02 NOTE — PROGRESS NOTES
RECEIVING UNIT ED HANDOFF REVIEW    ED Nurse Handoff Report was reviewed by: Ada Brooke RN on February 2, 2023 at 12:03 PM

## 2023-02-02 NOTE — ED PROVIDER NOTES
History     Chief Complaint:  Generalized Weakness       HPI   Chiquita Berry is a 82 year old female with a history of lung cancer on chemotherapy who presents via EMS with generalized weakness. She states that she stood up with her walker after sitting on the edge of her bed prior to standing up. She felt week in her knees when she stood up and lowered herself to the ground. She states that her  came to help her and called EMS. This has not happened to her before. She does note that 2-3 weeks ago she felt dizzy before getting up to  the middle of the night and went back to bed. She is currently going through chemotherapy for lung cancer. She has had a week of chemotherapy so far and thinks her weakness may be attributed to this. She denies fever, vomiting, cough, chest pain or urinary symptoms. She denies any known sick contacts. She also notes that she hit her chest on her walker.     Independent Historian: Yes    Review of External Notes: Yes, outpatient oncology notes reviewed    ROS:  Review of Systems   Constitutional: Negative for fever.   Respiratory: Negative for cough.    Cardiovascular: Negative for chest pain.   Gastrointestinal: Negative for vomiting.   Genitourinary: Negative for difficulty urinating, dysuria, frequency, hematuria and urgency.   Neurological: Positive for weakness.   All other systems reviewed and are negative.      Allergies:  Adhesive Tape  Augmentin  Celebrex [Celecoxib]     Medications:    Tymlos  Alecensa  Vitamin D  Atarax  Melatonin  Miralax  Compazine  Zoloft  Zanaflex  Ultram  Desyrel     Past Medical History:    Lung cancer  Osteoporosis     Past Surgical History:    Cataract, bilateral  ORIF, right humerus distal  Cataract IOL, left  Tonsil and adenoidectomy    Family History:    Mother - dementia  Father - MI, stomach cancer  Sister - breast cancer    Social History:  The patient presents to the ED via EMS alone.  She lives in her own  "apartment.  PCP: Vesna Olivera     Physical Exam     Patient Vitals for the past 24 hrs:   BP Temp Temp src Pulse Resp SpO2 Height Weight   02/02/23 0700 (!) 144/65 -- -- 85 -- 93 % -- --   02/02/23 0645 (!) 162/62 -- -- -- -- -- -- --   02/02/23 0630 -- -- -- 87 (!) 9 92 % -- --   02/02/23 0615 (!) 144/53 -- -- 84 15 91 % -- --   02/02/23 0600 (!) 151/64 -- -- 81 15 91 % -- --   02/02/23 0545 (!) 147/59 -- -- 81 17 92 % -- --   02/02/23 0533 -- 97.7  F (36.5  C) Oral -- -- -- -- --   02/02/23 0530 (!) 154/102 -- -- 82 17 -- -- --   02/02/23 0515 (!) 144/48 -- -- 84 17 95 % -- --   02/02/23 0457 (!) 143/121 -- Oral 79 16 93 % 1.575 m (5' 2\") 63 kg (139 lb)        Physical Exam  Physical Exam   Nursing note and vitals reviewed.  General: Oriented to person, place, and time. Appears well-developed and well-nourished.   Head: No signs of trauma.   Mouth/Throat: Oropharynx is clear and moist.   Eyes: Conjunctivae are normal. Pupils are equal, round, and reactive to light.   Neck: Normal range of motion. No nuchal rigidity.   Cardiovascular: Normal rate and regular rhythm.    Respiratory: Effort normal and breath sounds normal. No respiratory distress.   Abdominal: Soft. There is no tenderness. There is no guarding.   Musculoskeletal: Normal range of motion. no edema.   Neurological: The patient is alert and oriented to person, place, and time.  PERRLA, EOMI, visual fields intact, strength in upper/lower extremities normal and symmetrical.   Sensation normal. Speech normal  GCS eye subscore is 4. GCS verbal subscore is 5. GCS motor subscore is 6.   Skin: Skin is warm and dry. No rash noted.   Psychiatric: normal mood and affect. behavior is normal.       Emergency Department Course   ECG  ECG taken at 0503, ECG read at 0530  Wide QRS rhythm. Left axis deviation. Minimal voltage criteria for LVH, may be normal variant ( Goyo product ). Inferior infarct, age undetermined. Anterolateral infarct, age undetermined. " Abnormal ECG   Rate 78 bpm. UT interval * ms. QRS duration 120 ms. QT/QTc 410/467 ms. P-R-T axes * -52 96.     Imaging:  Chest XR,  PA & LAT   Final Result   IMPRESSION: Low lung volumes. There is left basilar consolidation   and/or atelectasis with a moderate effusion.. There is pulmonary   venous congestion. Normal cardiac size accounting for imaging   technique.       There is diffuse demineralization in the bones. No acute displaced rib   fractures. Increased lower thoracic kyphosis. Minimal notching of the   anterior cortex of the mid sternum on the lateral view is new from   prior and could represent an acute, nondisplaced fracture.   Retrosternal clear space is clear.      CELSO ROCHA MD            SYSTEM ID:  [id number]         Report per radiology    Laboratory:  Labs Ordered and Resulted from Time of ED Arrival to Time of ED Departure   COMPREHENSIVE METABOLIC PANEL - Abnormal       Result Value    Sodium 135 (*)     Potassium 3.7      Chloride 101      Carbon Dioxide (CO2) 25      Anion Gap 9      Urea Nitrogen 22.5      Creatinine 0.79      Calcium 8.9      Glucose 117 (*)     Alkaline Phosphatase 70      AST 30      ALT 19      Protein Total 6.2 (*)     Albumin 3.7      Bilirubin Total 0.2      GFR Estimate 74     TROPONIN T, HIGH SENSITIVITY - Normal    Troponin T, High Sensitivity 12     CBC WITH PLATELETS AND DIFFERENTIAL    WBC Count 8.3      RBC Count 4.08      Hemoglobin 13.2      Hematocrit 38.9      MCV 95      MCH 32.4      MCHC 33.9      RDW 14.9      Platelet Count 417      % Neutrophils 54      % Lymphocytes 29      % Monocytes 7      % Eosinophils 8      % Basophils 1      % Immature Granulocytes 1      NRBCs per 100 WBC 0      Absolute Neutrophils 4.5      Absolute Lymphocytes 2.4      Absolute Monocytes 0.6      Absolute Eosinophils 0.6      Absolute Basophils 0.1      Absolute Immature Granulocytes 0.1      Absolute NRBCs 0.0     INFLUENZA A/B & SARS-COV2 PCR MULTIPLEX   ROUTINE UA  WITH MICROSCOPIC REFLEX TO CULTURE        Emergency Department Course & Assessments:           Interventions:  Medications   ceFEPIme (MAXIPIME) 2 g vial to attach to  ml bag for ADULTS or 50 ml bag for PEDS (0 g Intravenous Stopped 2/2/23 0924)   azithromycin (ZITHROMAX) 500 mg vial to attach to  mL bag (500 mg Intravenous New Bag 2/2/23 0911)        Independent Interpretation (X-rays, CTs, rhythm strip):  Yes, independent interpretation of chest x-ray was done and I agree with radiology    Consultations/Discussion of Management or Tests:  Consultation with admitting physician was made    Social Determinants of Health affecting care:  Healthcare Access/Compliance, Financial Insecurity, Stress/Adjustment Disorders and Social Connections/Isolation    Assessments:  0530 I obtained history and examined the patient as noted above.  0700 I rechecked the patient and explained findings.    Disposition:  The patient was admitted to the hospital under the care of Dr. Kam.     Impression & Plan      Medical Decision Making:  This patient is presenting to the ER after becoming so weak that she could not get off the floor this evening while walking to the bathroom.  She did not have a syncopal episode.  She is status post chemotherapy x1 week for lung cancer.  This may be contributing to her weakness.  However, she has evidence of possible pneumonia.  Treatment was started here in the ED.  No signs of sepsis.  She lives alone and is unable to care for herself at home so she will be admitted to the hospital for further evaluation and treatment.    Diagnosis:    ICD-10-CM    1. Pneumonia due to infectious organism, unspecified laterality, unspecified part of lung  J18.9       2. Weakness  R53.1       3. History of lung cancer  Z85.118       4. Status post chemotherapy  Z92.21                 Scribe Disclosure:  Manuela MINA, am serving as a scribe at 5:22 AM on 2/2/2023 to document services personally performed by  Julio C Guerra MD based on my observations and the provider's statements to me.     2/2/2023   Julio C Guerra MD Joing, Todd Roger, MD  02/02/23 1801

## 2023-02-02 NOTE — CONSULTS
[facility]    Hematology / Oncology Consultation     Date of Admission:  2/2/2023  Date of Consult (When I saw the patient): 02/02/23    Assessment & Plan   Chiquita Berry is a 82 year old female who was admitted on 2/2/2023. 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    Patient presented to the ED with shortness of breath and cough persistent for 2 weeks on 12/10/2022 and was admitted with acute hypoxic respiratory failure.  She had a large left-sided malignant pleural effusion.  Cytology from this effusion confirmed lung adenocarcinoma with low PDL1 expression with TPS of 20 to 30%.  NGS panel revealed ALK gene rearrangement.  Her brain MRI was negative for metastasis.  A restaging CT scan of chest, abdomen and pelvis on 12/11/2022 revealed left pleural effusion, left upper lobe lung nodule, mediastinal lymphadenopathy, indeterminate lesion in head of pancreas and left adrenal gland.    She has been started on alectinib due to her ALK gene rearrangement starting 1/26/2023.  She was last followed in oncology clinic yesterday by Dr. Pantoja over a telephone visit.  She had no new complaints other than some generalized fatigue.  Today she admits of having diarrhea for couple of days early in the week.  It has resolved.  She notes that she did not have the strength to stand yesterday and just sat down on the ground.  She activated her emergency device and was attended by  and EMS.  She denies any fever, night sweats, nausea/vomiting, diarrhea.  She has been trying to drink enough fluids but suspects that she is dehydrated.    She has been on alectinib for only a week.  She did not notice any specific side effects attributable to this medication.  Alectinib is very well-tolerated.  Most patients tend to have sustained response to this medication.  It would be preferred if she is able to continue on her  alectinib during the hospital stay.  I have encouraged her to get her home supply.    She has been participating with physical therapy at home but clearly is weak.  She needs assistance to get to the restroom.  Her appetite is at baseline.  I have encouraged her to be more active and participate with physical therapy.  She should try to increase her protein intake.  She should stay hydrated.    Recommendations:  -Agree with supportive management as current  -OT/PT to assess the need for acute rehab  -Chiquita should continue her alectinib if possible.  She should get her home medications.    I have reviewed her care with Dr. Pantoja who followed with her over telephone yesterday.    Okay to discharge once clinically stable and rehab needs are assessed and addressed.    Over 80 min spent on day of visit including review of tests, obtaining/reviewing separately obtained history/physical exam, counseling patient, ordering medications/tests/procedures, communicating with PCP/consultants, and documenting in electronic medical record.    Ethan Branch  Hematologist and Medical Oncologist  [facility]        Ethan Branch MD, MD    Code Status    No CPR- Do NOT Intubate    Reason for Consult   Reason for consult: I was asked by Dr. Kam to evaluate this patient for metastatic adenocarcinoma.    Primary Care Physician   Vesna Olivera    Chief Complaint   generalized weakness    History is obtained from the patient    History of Present Illness   Chiquita Berry is a 82 year old female who presents with generalized weakness    Chiquita had been doing well and has no major complaints other than generalized weakness.  She has been trying to drink adequate amounts of water but does not feel that she is able to keep up with the daily requirements.  She admits having diarrhea for 2 days earlier this week.  She attributes this to something that she ate.  She has not noticed any new symptoms since starting her  alectinib.    Past Medical History   I have reviewed this patient's medical history and updated it with pertinent information if needed.   Past Medical History:   Diagnosis Date     Anxiety     related to cancer diagnosis     Chronic back pain     due to compression fracture     Osteoporosis     followed by outside endocrinologist     Primary lung adenocarcinoma (H)     stage IV       Past Surgical History   I have reviewed this patient's surgical history and updated it with pertinent information if needed.  Past Surgical History:   Procedure Laterality Date     CATARACT IOL, RT/LT Bilateral      OPEN REDUCTION INTERNAL FIXATION HUMERUS DISTAL Right      PHACOEMULSIFICATION CLEAR CORNEA WITH STANDARD INTRAOCULAR LENS IMPLANT Left 11/16/2015    Procedure: PHACOEMULSIFICATION CLEAR CORNEA WITH STANDARD INTRAOCULAR LENS IMPLANT;  Surgeon: Latrell Jessica MD;  [facility]: [facility]     TONSILLECTOMY & ADENOIDECTOMY         Prior to Admission Medications   Prior to Admission Medications   Prescriptions Last Dose Informant Patient Reported? Taking?   Cholecalciferol (VITAMIN D) 2000 UNITS tablet 2/1/2023 Self Yes Yes   Sig: Take 1 tablet by mouth daily.   Multiple Vitamin (MULTIVITAMIN ADULT PO) 2/1/2023 Self Yes Yes   Sig: Take 1 tablet by mouth daily   Multiple Vitamins-Minerals (PRESERVISION AREDS 2) CAPS 2/1/2023 Self Yes Yes   Sig: Take 1 capsule by mouth 2 times daily   acetaminophen (TYLENOL) 325 MG tablet  Self Yes No   Sig: Take 650 mg by mouth 4 times daily And 650mg po daily prn pain   alectinib (ALECENSA) 150 MG CAPS 2/1/2023  Yes Yes   Sig: Take 4 capsules (600 mg) by mouth 2 times daily (with meals)   hydrOXYzine (ATARAX) 10 MG tablet Past Week  No Yes   Sig: Take 1 tablet (10 mg) by mouth 3 times daily as needed for anxiety   ibuprofen (ADVIL/MOTRIN) 400 MG tablet Past Week Self No Yes   Sig: Take 1 tablet (400 mg) by mouth every 6 hours as needed for moderate pain   latanoprost (XALATAN) 0.005 % ophthalmic  solution 2/1/2023 Self Yes Yes   Sig: Place 1 drop into both eyes every evening    melatonin 3 MG CAPS Past Week Self Yes Yes   Sig: Take 3 mg by mouth At Bedtime And 3mg prn(total of 6mg ok for sleep)   polyethylene glycol (MIRALAX) 17 GM/Dose powder Past Week Self Yes Yes   Sig: Take 17 g by mouth daily   polyethylene glycol 400 (BLINK TEARS) 0.25 % SOLN ophthalmic solution Past Week Self Yes Yes   Sig: Place 1 drop into both eyes daily And Q2H PRN   prochlorperazine (COMPAZINE) 5 MG tablet Past Week  No Yes   Sig: Take 1 tablet (5 mg) by mouth every 6 hours as needed for nausea or vomiting   sertraline (ZOLOFT) 25 MG tablet   No No   Sig: Take 1 tablet (25 mg) by mouth daily   tiZANidine (ZANAFLEX) 2 MG tablet Past Week Self No Yes   Sig: TAKE 1 TABLET BY MOUTH EVERY 12 HOURS AS NEEDED FOR MUSCLE SPASMS   traMADol (ULTRAM) 50 MG tablet Past Week  No Yes   Sig: TAKE 1 TABLET (50 MG) BY MOUTH EVERY 6 HOURS AS NEEDED FOR SEVERE PAIN   traZODone (DESYREL) 50 MG tablet   No No   Sig: Take 0.5-1 tablets (25-50 mg) by mouth nightly as needed for sleep   Patient taking differently: Take 50 mg by mouth nightly as needed for sleep      Facility-Administered Medications: None     Allergies   Allergies   Allergen Reactions     Adhesive Tape Dermatitis     Skin Sensitivity to Adhesive ie. Lidocaine patch, tele patches, tapes     Augmentin Diarrhea     Celebrex [Celecoxib] Rash     Lidocaine      Skin sensitivity to Lidocaine patch adhesive       Social History   I have reviewed this patient's social history and updated it with pertinent information if needed. Chiquita Berry  reports that she has never smoked. She has never used smokeless tobacco. She reports that she does not currently use alcohol. She reports that she does not use drugs.    Family History   I have reviewed this patient's family history and updated it with pertinent information if needed.   Family History   Problem Relation Age of Onset     Dementia  Mother      Myocardial Infarction Father         later in life     Stomach Cancer Father      Breast Cancer Sister         x2 sisters (post-menopausal)     Diabetes No family hx of      Cerebrovascular Disease No family hx of      Coronary Artery Disease Early Onset No family hx of      Ovarian Cancer No family hx of      Colon Cancer No family hx of        Review of Systems   The 10 point Review of Systems is negative other than noted in the HPI or here.     Physical Exam   Temp: 98.2  F (36.8  C) Temp src: Oral BP: (!) 148/65 Pulse: 79   Resp: 18 SpO2: 95 % O2 Device: None (Room air)    Vital Signs with Ranges  Temp:  [97.7  F (36.5  C)-98.2  F (36.8  C)] 98.2  F (36.8  C)  Pulse:  [79-89] 79  Resp:  [9-18] 18  BP: (126-162)/() 148/65  SpO2:  [91 %-95 %] 95 %  139 lbs 0 oz      Data   Recent Labs   Lab Test 02/02/23  0534 12/15/22  0803 12/13/22  0659 12/12/22 0750 12/11/22 0518 12/10/22  1743   *  --  139 139 138 134   POTASSIUM 3.7 3.9 4.1 3.4 3.5 3.9   CHLORIDE 101  --  107 105 104 99   CO2 25  --  28 29 26 25   ANIONGAP 9  --  4 5 8 10   BUN 22.5  --  13 16 14 16   CR 0.79  --  0.52 0.56 0.44* 0.56   *  --  107* 104* 118* 128*   SAGE 8.9  --  8.6 8.3* 8.3* 8.8     Recent Labs   Lab Test 12/15/22  0803 12/13/22  0659 12/12/22  0750 12/11/22  0518   MAG 2.3 2.3 2.2 2.2   PHOS  --  2.9  --   --      Recent Labs   Lab Test 02/02/23  0534 12/13/22  0659 12/12/22  0750 12/11/22  0518 12/10/22  1743 08/01/21  0644 08/01/21  0644 02/15/19  1549   WBC 8.3 10.2 9.1 10.7 12.1*   < > 8.3 10.5   HGB 13.2 12.5 11.9 12.3 13.5   < > 12.6 13.3    376 360 384 404   < > 345 279   MCV 95 102* 100 98 101*   < > 98 96   NEUTROPHIL 54  --   --  71 87  --  56 73.0    < > = values in this interval not displayed.     Recent Labs   Lab Test 02/02/23  0534 12/11/22  0518 12/10/22  2258 12/10/22  1743 12/21/20  0939   BILITOTAL 0.2  --   --  0.4 0.5   ALKPHOS 70  --   --  64 85   ALT 19  --   --  31 24   AST 30   --   --  27 23   ALBUMIN 3.7 3.1*  --  3.5 3.6   LDH  --   --  230  --   --      TSH   Date Value Ref Range Status   12/10/2022 1.39 0.40 - 4.00 mU/L Final   04/16/2019 3.10 0.40 - 4.00 mU/L Final   12/30/2012 3.19 0.4 - 5.0 mU/L Final     No results for input(s): CEA in the last 51552 hours.  Results for orders placed or performed during the hospital encounter of 02/02/23   Chest XR,  PA & LAT    Narrative    XR CHEST 2 VIEWS 2/2/2023 7:20 AM    HISTORY: Hit front of chest against walker.    COMPARISON: 1/6/2023      Impression    IMPRESSION: Low lung volumes. There is left basilar consolidation  and/or atelectasis with a moderate effusion.. There is pulmonary  venous congestion. Normal cardiac size accounting for imaging  technique.     There is diffuse demineralization in the bones. No acute displaced rib  fractures. Increased lower thoracic kyphosis. Minimal notching of the  anterior cortex of the mid sternum on the lateral view is new from  prior and could represent an acute, nondisplaced fracture.  Retrosternal clear space is clear.    CELSO ROCHA MD         SYSTEM ID:  [id number]

## 2023-02-02 NOTE — PROGRESS NOTES
[facility]    Patient is currently receiving services with [facility]. The patient is currently receiving RN/PT and HA services.  Patient's  and home health team have been notified that patient is under observation status. [facility] Liaison will continue to follow patient during stay. If patient is admitted to inpatient status, please provide orders to resume home care at time of discharge if appropriate.

## 2023-02-02 NOTE — PHARMACY-ADMISSION MEDICATION HISTORY
Pharmacy Medication History  Admission medication history interview status for the 2/2/2023  admission is complete. See EPIC admission navigator for prior to admission medications     Location of Interview: Patient room  Medication history sources: Patient, Surescripts and Care Everywhere    Significant changes made to the medication list:  --Patient no longer taking sertraline due to diarrhea adverse effects  --No longer taking abaloparatide (Tymlos) 3120 mcg/1.56 mL 80 mcg daily    In the past week, patient estimated taking medication this percent of the time: greater than 90%    Medication reconciliation completed by provider prior to medication history? No    Time spent in this activity: 10 min    Prior to Admission medications    Medication Sig Last Dose Taking? Auth Provider Long Term End Date   alectinib (ALECENSA) 150 MG CAPS Take 4 capsules (600 mg) by mouth 2 times daily (with meals) 2/1/2023 Yes Fer Pantoja MD     Cholecalciferol (VITAMIN D) 2000 UNITS tablet Take 1 tablet by mouth daily. 2/1/2023 Yes Reported, Patient     hydrOXYzine (ATARAX) 10 MG tablet Take 1 tablet (10 mg) by mouth 3 times daily as needed for anxiety Past Week Yes Aniya Cali APRN CNP     ibuprofen (ADVIL/MOTRIN) 400 MG tablet Take 1 tablet (400 mg) by mouth every 6 hours as needed for moderate pain Past Week Yes Vesna Olivera MD     latanoprost (XALATAN) 0.005 % ophthalmic solution Place 1 drop into both eyes every evening  2/1/2023 Yes Reported, Patient Yes    melatonin 3 MG CAPS Take 3 mg by mouth At Bedtime And 3mg prn(total of 6mg ok for sleep) Past Week Yes Reported, Patient     Multiple Vitamin (MULTIVITAMIN ADULT PO) Take 1 tablet by mouth daily 2/1/2023 Yes Unknown, Entered By History     Multiple Vitamins-Minerals (PRESERVISION AREDS 2) CAPS Take 1 capsule by mouth 2 times daily 2/1/2023 Yes Reported, Patient     polyethylene glycol (MIRALAX) 17 GM/Dose powder Take 17 g by mouth daily Past Week Yes Luisana  NIMCO Ward CNP     polyethylene glycol 400 (BLINK TEARS) 0.25 % SOLN ophthalmic solution Place 1 drop into both eyes daily And Q2H PRN Past Week Yes Unknown, Entered By History     prochlorperazine (COMPAZINE) 5 MG tablet Take 1 tablet (5 mg) by mouth every 6 hours as needed for nausea or vomiting Past Week Yes Fer Pantoja MD     tiZANidine (ZANAFLEX) 2 MG tablet TAKE 1 TABLET BY MOUTH EVERY 12 HOURS AS NEEDED FOR MUSCLE SPASMS Past Week Yes Vesna Olivera MD     traMADol (ULTRAM) 50 MG tablet TAKE 1 TABLET (50 MG) BY MOUTH EVERY 6 HOURS AS NEEDED FOR SEVERE PAIN Past Week Yes Vesna Olivera MD     acetaminophen (TYLENOL) 325 MG tablet Take 650 mg by mouth 4 times daily And 650mg po daily prn pain   Reported, Patient     sertraline (ZOLOFT) 25 MG tablet Take 1 tablet (25 mg) by mouth daily   Vesna Olivera MD Yes    traZODone (DESYREL) 50 MG tablet Take 0.5-1 tablets (25-50 mg) by mouth nightly as needed for sleep  Patient taking differently: Take 50 mg by mouth nightly as needed for sleep   Hector Hendrix MD Yes        The information provided in this note is only as accurate as the sources available at the time of update(s)

## 2023-02-02 NOTE — ED TRIAGE NOTES
EMS arrival:    Walking with walker.  Felt weak in knees.  Lowered self to ground.    Dizziness after sitting on ground.  Able to stand with assistance but not able to walk like normal.  No more dizziness.  Fire dept thought she slow with words.  Patient said it was from her dry mouth & some sips of water cleared her speech.    Bumped chest on walker.  Feeling shaky from stress of it all.   Started new oral cancer medication 1 week ago.

## 2023-02-02 NOTE — ED NOTES
[facility]  ED Nurse Handoff Report    ED Chief complaint: Generalized Weakness      ED Diagnosis:   Final diagnoses:   None       Code Status: Full Code    Allergies:   Allergies   Allergen Reactions    Adhesive Tape Dermatitis     Skin Sensitivity to Adhesive ie. Lidocaine patch, tele patches, tapes    Augmentin Diarrhea    Celebrex [Celecoxib] Rash       Patient Story: Patient usually up to restroom at night with walker. (Lives alone in apt).  Today, up using walker, weak in legs, felt knees give out.  Sat self on floor; hit front of chest, sternal area on walker.  Dizziness, shaky after sitting down on floor.  Unable to get up without assistance.   Focused Assessment:  Shaky on arrival to ER.  Has been on an oral chemo drug for the last week.  Up to restroom via wheelchair.  Assist x1.  Very weak.  Not able to ambulate; stand & pivot with assistance.  Moves slowly.  Curvature of spine.  Redness, skin breakdown to bony spine, mid back.  Mepilex applied to area.     Treatments and/or interventions provided:  IV insertion to right hand.  22 G.    Medications   acetaminophen (TYLENOL) tablet 975 mg (975 mg Oral Given 2/2/23 0658)       Patient's response to treatments and/or interventions:     To be done/followed up on inpatient unit:       Does this patient have any cognitive concerns?:  Alert & oriented x4    Activity level - Baseline/Home:  Walker  Activity Level - Current:   Stand with Assist    Patient's Preferred language: English   Needed?: No    Isolation: None  Infection: Not Applicable  Patient tested for COVID 19 prior to admission: YES  Bariatric?: No    Vital Signs:   Vitals:    02/02/23 0545 02/02/23 0600 02/02/23 0615 02/02/23 0645   BP: (!) 147/59 (!) 151/64 (!) 144/53 (!) 162/62   Pulse: 81 81 84    Resp: 17 15 15    Temp:       TempSrc:       SpO2: 92% 91% 91%    Weight:       Height:           Cardiac Rhythm:     Was the PSS-3 completed:   Yes  What interventions are  required if any?               Family Comments:  None present  OBS brochure/video discussed/provided to patient/family: Yes              Name of person given brochure if not patient:              Relationship to patient:     For the majority of the shift this patient's behavior was Green.   Behavioral interventions performed were .    ED NURSE PHONE NUMBER:  *[phone]

## 2023-02-03 NOTE — PROGRESS NOTES
Observation goals  PRIOR TO DISCHARGE        Comments:   -diagnostic tests and consults completed and resulted: not met   -vital signs normal or at patient baseline: met   -returns to baseline functional status: not met   -safe disposition plan has been identified: not met   Nurse to notify provider when observation goals have been met and patient is ready for discharge.

## 2023-02-03 NOTE — PLAN OF CARE
"Orientation/Cognitive: A&Ox4  Observation Goals (Met/ Not Met): not met  Mobility Level/Assist Equipment: Ax1 GB and walker  Fall Risk (Y/N): yes  Behavior Concerns: none  Pain Management: Scheduled Tylenol and heat packs/PRN Ibuprofen   Tele/VS/O2: VSS on RA  ABNL Lab/BG: Sodium 135  Diet: regular   Bowel/Bladder: incontinent of bowel, diarrhea   Skin Concerns: blanchable redness on back, mepilex in place   Drains/Devices: IVF @100  Tests/Procedures for next shift: n/a  Anticipated DC date & active delays: TBD  Patient Stated Goal for Today: \"Be comfortable, gain strength\"      "

## 2023-02-03 NOTE — PROGRESS NOTES
[facility]    Medicine Progress Note - Hospitalist Service    Date of Admission:  2/2/2023    Assessment & Plan   Chiquita Berry is a 82 year old female admitted on 2/2/2023. Past history metastatic adenocarcinoma of the lung, hypertension, chronic back pain secondary to compression fracture who presents with generalized weakness.  She also has possible healthcare associated pneumonia as well as chest wall pain due to recent fall where she struck her chest on her walker.  She is registered to observation for further management.     Generalized weakness  Concern for possible HAP  Primarily suspect dehydration (see below), but cannot rule out chemotherapy.  She denies any recent infectious symptoms including fevers or chills, specifically denies any increase in shortness of breath or cough.  Initial work-up revealing mild acute kidney injury and mild hyponatremia.  She reports starting oral chemotherapy 1 week ago and had decreased appetite for about 2 days as well as 1 day of diarrhea which may contribute to dehydration.  LFTs, CBC, troponin, UA, COVID/flu/RSV are all negative.  Chest x-ray shows left basilar consolidation and effusion which is unchanged from prior.  EKG is unchanged from prior. Neuro exam non-focal.   --Management of GALE and hyponatremia as below  --PT evaluation  --Per review of up-to-date, fatigue is a common side effect of alectinib (<41% of individuals); Oncology consulted.  --Received cefepime and azithro in ED but reports no change in dyspnea or cough, denies subjective fever/chills, no leukocytosis and CXR unchanged from prior   --Procalcitonin was checked and is low.  Nonetheless, due to concern for pneumonia, oncology has started levofloxacin 750 mg IV every 24 hours  --Vital signs, worsening infectious symptoms, fever, and obtain CBC and BMP in AM.    Chest wall pain, due to chest contusion from fall and possible mid sternal fracture  Prior to this  presentation, patient became weak and fell, hitting her chest on her walker.  She has had subsequent pain in her sternum and ribs anteriorly.  She does not have shortness of breath, crepitus, or evidence of pneumothorax.    * Chest x-ray shows minimal notching of the anterior cortex of the mid sternum on lateral view that is new from prior and could represent an acute, nondisplaced fracture.  --Monitor O2 saturations.  --Pulmonary hygiene, incentive spirometry  --Tylenol scheduled.  Tramadol as needed.  Added oxycodone low-dose at 2.5 mg every 4 hours as needed for her ongoing pain.  She also had ibuprofen ordered, but would limit NSAID use if able.     Acute kidney injury, mild  Hyponatremia  Hypokalemia, mild  Admission creatinine 0.7, up from baseline of 0.5; admission sodium 135.  Reports recent poor oral intake as above.  --Given 1 L LR over 10 hours  --BMP with sodium 138, potassium 3.2--3.9, creatinine 0.56  --Replace potassium per protocol, K improving 3.2--3.9     Metastatic adenocarcinoma of the lung  Follows with Dr. Pantoja of [facility].  Started alectinib on 1/26/2023.  She reports she has been tolerating this very well without symptoms up until the past 24 hours when she was profoundly weak.  --Oncology consult as above, input appreciated  --Hold alectinib for now; resume as per Oncology's recommendation (patient will have home supply brought in as Obs)     Chronic low back pain secondary to compression fracture  --Continue as needed Tylenol     Anxiety  Reports this is related to her cancer diagnosis  --Continue as needed hydroxyzine       Diet: Regular Diet Adult    DVT Prophylaxis: We will start Lovenox 2/3  Mcallister Catheter: Not present  Lines: None     Cardiac Monitoring: None  Code Status: No CPR- Do NOT Intubate      Clinically Significant Risk Factors Present on Admission        # Hypokalemia: Lowest K = 3.2 mmol/L in last 2 days, will replace as needed   # Hypocalcemia: Lowest Ca = 8.3  "mg/dL in last 2 days, will monitor and replace as appropriate              # Overweight: Estimated body mass index is 25.42 kg/m  as calculated from the following:    Height as of this encounter: 1.575 m (5' 2\").    Weight as of this encounter: 63 kg (139 lb).           Disposition Plan     Expected Discharge Date: 02/03/2023                Discharge to TCU in the next 1-2 days pending clinical stability, and ability to transition to p.o. antibiotics     The patient's care was discussed with the patient, bedside nurse, /CC.    Patient was discussed with Dr. Garnica of the hospitalist service.  He is in agreement with my assessment and plan of care.      SUE Goldman  Hospitalist Service  [facility]  Securely message with [employer] (more info)  Text page via [facility] Paging/Directory   ______________________________________________________________________    Interval History   Patient reports ongoing pain in her sternal area.  Tender to palpation.  Denies difficulty breathing.  Denies fever or chills.  Feels generally weak.  No nausea or vomiting.  Occasional cough.  Seen by oncology.  In agreement with TCU placement.    Physical Exam   Vital Signs: Temp: 98.2  F (36.8  C) Temp src: Oral BP: (!) 149/67 Pulse: 75   Resp: 20 SpO2: 92 % O2 Device: None (Room air)    Weight: 139 lbs 0 oz    General Appearance: thin female in NAD  Eyes: PERRL, sclear anicteric  HEENT: mucous membranes moist  Respiratory: diminished lung sounds of left middle and lower regions, otherwise clear   Cardiovascular: RRR, normal s1/s2 with grade 2/6 systolic murmur  GI: abdomen soft, normal bowel sounds  Skin:  Small square patches of redness on patient's chest where her EKG patches were placed.  Otherwise no rashes on exposed skin.  Musculoskeletal: tenderness to palpation over lower sternum, extremities warm and well perfused   Neurologic: cranial nerves II-XII intact, moves all 4 extremities " equally, alert and appropriate   Psychiatric: normal affect        Medical Decision Making       40 MINUTES SPENT BY ME on the date of service doing chart review, history, exam, documentation & further activities per the note.      Data   ------------------------- PAST 24 HR DATA REVIEWED -----------------------------------------------    I have personally reviewed the following data over the past 24 hrs:    N/A  \   N/A   / N/A     138 103 9.6 /  98   3.9 26 0.56 \       TSH: N/A T4: N/A A1C: N/A          ROUTINE IP LABS (Last four results)  [facility]   Lab 02/03/23  1312 02/03/23  0647 02/02/23  0534   NA  --  138 135*   POTASSIUM 3.9 3.2* 3.7   CHLORIDE  --  103 101   SAGE  --  8.3* 8.9   CO2  --  26 25   BUN  --  9.6 22.5   CR  --  0.56 0.79   GLC  --  98 117*     CBC  Recent Labs   Lab 02/02/23  0534   WBC 8.3   RBC 4.08   HGB 13.2   HCT 38.9   MCV 95   MCH 32.4   MCHC 33.9   RDW 14.9        INRNo lab results found in last 7 days.    Recent Results (from the past 48 hour(s))   Chest XR,  PA & LAT    Narrative    XR CHEST 2 VIEWS 2/2/2023 7:20 AM    HISTORY: Hit front of chest against walker.    COMPARISON: 1/6/2023      Impression    IMPRESSION: Low lung volumes. There is left basilar consolidation  and/or atelectasis with a moderate effusion.. There is pulmonary  venous congestion. Normal cardiac size accounting for imaging  technique.     There is diffuse demineralization in the bones. No acute displaced rib  fractures. Increased lower thoracic kyphosis. Minimal notching of the  anterior cortex of the mid sternum on the lateral view is new from  prior and could represent an acute, nondisplaced fracture.  Retrosternal clear space is clear.    CELSO ROCHA MD         SYSTEM ID:  [id number]

## 2023-02-03 NOTE — PLAN OF CARE
Goal Outcome Evaluation:    Orientation/Cognitive: AOx4  Observation Goals (Met/ Not Met): not met  Mobility Level/Assist Equipment: A1 GB/W  Fall Risk (Y/N): Y  Behavior Concerns: none  Pain Management: scheduled tylenol, PRN tramadol x1  Tele/VS/O2: VSS on RA  ABNL Lab/BG: Na 135  Diet: Regular  Bowel/Bladder: incontinent at times.   Skin Concerns: sternum bruising, L back blanchable redness, mepi in place  Drains/Devices: PIV SL  Tests/Procedures for next shift: AM labs  Anticipated DC date & active delays: TBD pending placement, SW following  Patient Stated Goal for Today: rest    Observation goals  PRIOR TO DISCHARGE       Comments: -diagnostic tests and consults completed and resulted: not met   -vital signs normal or at patient baseline: met   -returns to baseline functional status: not met   -safe disposition plan has been identified: not met   Nurse to notify provider when observation goals have been met and patient is ready for discharge.

## 2023-02-03 NOTE — PROGRESS NOTES
Care Management Follow Up    Length of Stay (days): 0    Expected Discharge Date: 02/03/2023     Concerns to be Addressed:       Patient plan of care discussed at interdisciplinary rounds: Yes    Anticipated Discharge Disposition:       Anticipated Discharge Services:    Anticipated Discharge DME:      Patient/family educated on Medicare website which has current facility and service quality ratings:    Education Provided on the Discharge Plan:    Patient/Family in Agreement with the Plan:      Referrals Placed by CM/SW:    Private pay costs discussed: Not applicable    Additional Information:  Referral sent to [facility] per preference. Writer waiting to hear from facility if they can accept.     Staci Gaviira, JUSTINA, LGSW   Social Work   [facility]

## 2023-02-03 NOTE — PROVIDER NOTIFICATION
MD Notification    Notified Person: MD    Notified Person Name: Dr. Montes    Notification Date/Time: 2/2/23 @1822    Notification Interaction: [employer]    Purpose of Notification: Pt requesting Ibuprofen for pain. Could you order this for her? Thanks    Orders Received: Ibuprofrmary grcae     Comments:

## 2023-02-03 NOTE — CONSULTS
Care Management Initial Consult    General Information  Assessment completed with: Chiquita Madrid  Type of CM/SW Visit: Initial Assessment    Primary Care Provider verified and updated as needed: Yes   Readmission within the last 30 days: no previous admission in last 30 days      Reason for Consult: discharge planning  Advance Care Planning:            Communication Assessment  Patient's communication style: spoken language (English or Bilingual)    Hearing Difficulty or Deaf: no        Cognitive  Cognitive/Neuro/Behavioral: WDL                      Living Environment:   People in home: alone     Current living Arrangements: apartment      Able to return to prior arrangements: other (see comments)       Family/Social Support:  Care provided by: self, homecare agency, neighbors and landlord  Provides care for:    Marital Status: Single             Description of Support System: supportive          Current Resources:   Patient receiving home care services: Yes, [facility]  Skilled Home Care Services: Skilled Nursing, Physicial Therapy  Community Resources: eTipping, delivered meals through [employer]  Equipment currently used at home: grab bar, tub/shower, grab bar, toilet  Supplies currently used at home: none per pt report     Employment/Financial:  Employment Status: retired        Financial Concerns: No concerns identified           Lifestyle & Psychosocial Needs:  Social Determinants of Health     Tobacco Use: Low Risk      Smoking Tobacco Use: Never     Smokeless Tobacco Use: Never     Passive Exposure: Not on file   Alcohol Use: Not on file   Financial Resource Strain: Not on file   Food Insecurity: Not on file   Transportation Needs: Not on file   Physical Activity: Not on file   Stress: Not on file   Social Connections: Not on file   Intimate Partner Violence: Not on file   Depression: Not at risk     PHQ-2 Score: 1   Housing Stability: Not on file       Functional Status:  Prior to admission patient needed  "assistance:   Dependent ADLs:: Ambulation-walker  Dependent IADLs:: Transportation       Mental Health Status:  Mental Health Status: No Current Concerns       Chemical Dependency Status:      None noted          Values/Beliefs:  Spiritual, Cultural Beliefs, Bahai Practices, Values that affect care: no               Additional Information:  Met with patient. Went over Observation Outpatient Notice. Patient understands. She also understands she needs TCU at discharge. \"I am hoping [facility] has an opening. I live by there and they know me.\" Patient then told writer that she is on a new medication for her chemo. She really doesn't know how she fell but knows her chest hit the walker and \"it hurst. She is hoping to stay through the weekend in the hospital. Writer explained that she was in short stay and maybe tomorrow vs. Sunday discharge pending a TCU bed for her. Writer did tell patient that is up to the provider when she is ready to discharge. Patient appears to be happy around people and enjoys talking to writer.  Will follow for discharge planning.  Clarisse Mauricio RN        "

## 2023-02-03 NOTE — PLAN OF CARE
"Orientation/Cognitive: A&Ox4, forgetful  Observation Goals (Met/ Not Met): not met  Mobility Level/Assist Equipment: Ax1 GB and walker  Fall Risk (Y/N): yes  Behavior Concerns: none  Pain Management: PRN Oxy, Ibuprofen, cold packs, and scheduled Tylenol   Tele/VS/O2: VSS on RA  ABNL Lab/BG: K 3.2-replaced. Recheck was 3.9  Diet: regular   Bowel/Bladder: continent  Skin Concerns: blanchable redness on back-mepliex in place   Drains/Devices: IV-SL  Tests/Procedures for next shift: SW following for discharge planning  Anticipated DC date & active delays: TBD  Patient Stated Goal for Today: \"Speak with , pain control\"    "

## 2023-02-04 NOTE — PROGRESS NOTES
Observation goals  PRIOR TO DISCHARGE       Comments:   -diagnostic tests and consults completed and resulted- Partially met    -vital signs normal or at patient baseline- Met   -returns to baseline functional status- Not met    -safe disposition plan has been identified- Not met

## 2023-02-04 NOTE — PROGRESS NOTES
[facility]    Medicine Progress Note - Hospitalist Service    Date of Admission:  2/2/2023    Assessment & Plan   Chiquita Berry is a 82 year old female admitted on 2/2/2023. Past history metastatic adenocarcinoma of the lung, hypertension, chronic back pain secondary to compression fracture who presents with generalized weakness.  She also has possible healthcare associated pneumonia as well as chest wall pain due to recent fall where she struck her chest on her walker.  Registered to observation for further management.     Generalized weakness  Concern for possible HAP  Primarily suspect dehydration (see below), but cannot rule out chemotherapy.  She denies any recent infectious symptoms including fevers or chills, specifically denies any increase in shortness of breath or cough.  Initial work-up revealing mild acute kidney injury and mild hyponatremia.  She reports starting oral chemotherapy 1 week ago and had decreased appetite for about 2 days as well as 1 day of diarrhea which may contribute to dehydration.  LFTs, CBC, troponin, UA, COVID/flu/RSV are all negative.  Chest x-ray shows left basilar consolidation and effusion which is unchanged from prior.  EKG is unchanged from prior. Neuro exam non-focal.   --Management of GALE and hyponatremia as below  --PT evaluation  --Per review of up-to-date, fatigue is a common side effect of alectinib (<41% of individuals); Oncology consulted.  --Received cefepime and azithro in ED but reports no change in dyspnea or cough, denies subjective fever/chills, no leukocytosis and CXR unchanged from prior   --Procalcitonin was checked and is low.  Nonetheless, due to concern for pneumonia, oncology has started levofloxacin 750 mg IV every 24 hours.  Transition to p.o. antibiotics at discharge when able  -- Monitor vital signs for worsening signs of infection, symptoms, fever  -- Repeat labs in a.m. including BMP and CBC.  -- Discussed plans with  her primary oncologist, Dr. Pantoja    Chest wall pain, due to chest contusion from fall and possible mid sternal fracture  Prior to this presentation, patient became weak and fell, hitting her chest on her walker.  She has had subsequent pain in her sternum and ribs anteriorly.  She does not have shortness of breath, crepitus, or evidence of pneumothorax.    * Chest x-ray shows minimal notching of the anterior cortex of the mid sternum on lateral view that is new from prior and could represent an acute, nondisplaced fracture.  --Monitor O2 saturations.  --Pulmonary hygiene, incentive spirometry  -- Pain regimen:    --Tylenol scheduled.     --Tramadol 50 mg as needed   --Added oxycodone low-dose at 2.5 mg every 4 hours as needed for her ongoing pain.  Still in significant pain, therefore will increase to 5 mg as needed.  Continue to titrate.   --She also had ibuprofen ordered, but would limit NSAID use if able.     Acute kidney injury, mild  Hyponatremia  Hypokalemia, mild  Admission creatinine 0.7, up from baseline of 0.5; admission sodium 135.  Reports recent poor oral intake as above.  --Given 1 L LR over 10 hours  --BMP with sodium 138--139, potassium 3.2--3.9--3.5, creatinine 0.56, stable  --Replace potassium per protocol  -- Continue to monitor     Metastatic adenocarcinoma of the lung  Follows with Dr. Pantoja of [facility].  Started alectinib on 1/26/2023.  She reports she has been tolerating this very well without symptoms up until the past 24 hours when she was profoundly weak.  --Oncology consult as above, input appreciated.    --Resume alectinib as per Oncology's recommendation (patient will have home supply brought in as Obs)  -- Discussed with Dr. Pantoja on 2/4/2023.  Plan is to continue working on achieving better pain control, and transfer to TCU for ongoing cares.  Continue alectinib as above.     Chronic low back pain secondary to compression fracture  --Continue as needed  "Tylenol     Anxiety  Reports this is related to her cancer diagnosis  --Continue as needed hydroxyzine       Diet: Regular Diet Adult    DVT Prophylaxis: Start Lovenox 2/3  Mcallister Catheter: Not present  Lines: None     Cardiac Monitoring: None  Code Status: No CPR- Do NOT Intubate      Clinically Significant Risk Factors Present on Admission        # Hypokalemia: Lowest K = 3.2 mmol/L in last 2 days, will replace as needed                # Overweight: Estimated body mass index is 25.42 kg/m  as calculated from the following:    Height as of this encounter: 1.575 m (5' 2\").    Weight as of this encounter: 63 kg (139 lb).           Disposition Plan        Discharge to TCU in the next 1-2 days pending clinical stability, and ability to transition to p.o. antibiotics.  Discussed with SW/CC.  Patient needs prior Auth which may not be processed until Monday.     The patient's care was discussed with the patient, bedside nurse, /CC.    Patient was discussed with Dr. Garnica of the hospitalist service.  He is in agreement with my assessment and plan of care.      SUE Goldman  Hospitalist Service  [facility]  Securely message with [employer] (more info)  Text page via [employer] Paging/Directory   ______________________________________________________________________    Interval History   Patient reports ongoing pain in her sternal area, not improved on current pain regimen.  Discussed continuing to increase medications to achieve better control.  Tender to palpation.  Denies difficulty breathing.  Denies fever or chills.  Feels generally weak.  No nausea or vomiting.  Occasional cough.  Seen by oncology.  In agreement with TCU placement.    Physical Exam   Vital Signs: Temp: 98.2  F (36.8  C) Temp src: Oral BP: 130/50 Pulse: 72   Resp: 16 SpO2: 93 % O2 Device: None (Room air)    Weight: 139 lbs 0 oz    General Appearance: thin female in NAD, alert, oriented  HEENT: mucous membranes " moist  Respiratory: diminished lung sounds of left middle and lower regions, otherwise clear   Cardiovascular: RRR, normal s1/s2 with grade 2/6 systolic murmur  GI: abdomen soft, normal bowel sounds  Skin:  Small square patches of redness on patient's chest where her EKG patches were placed.  Otherwise no rashes on exposed skin.  Musculoskeletal: tenderness to palpation over lower sternum, extremities warm and well perfused   Neurologic: cranial nerves II-XII intact, moves all 4 extremities equally, alert and appropriate   Psychiatric: normal affect        Medical Decision Making       36 MINUTES SPENT BY ME on the date of service doing chart review, history, exam, documentation & further activities per the note.      Data   ------------------------- PAST 24 HR DATA REVIEWED -----------------------------------------------    I have personally reviewed the following data over the past 24 hrs:    N/A  \   N/A   / N/A     N/A N/A N/A /  N/A   3.9 N/A N/A \          ROUTINE IP LABS (Last four results)  BMP  Recent Labs   Lab 02/03/23  1312 02/03/23  0647 02/02/23  0534   NA  --  138 135*   POTASSIUM 3.9 3.2* 3.7   CHLORIDE  --  103 101   SAGE  --  8.3* 8.9   CO2  --  26 25   BUN  --  9.6 22.5   CR  --  0.56 0.79   GLC  --  98 117*     CBC  Recent Labs   Lab 02/02/23  0534   WBC 8.3   RBC 4.08   HGB 13.2   HCT 38.9   MCV 95   MCH 32.4   MCHC 33.9   RDW 14.9        INRNo lab results found in last 7 days.    Recent Results (from the past 48 hour(s))   Chest XR,  PA & LAT    Narrative    XR CHEST 2 VIEWS 2/2/2023 7:20 AM    HISTORY: Hit front of chest against walker.    COMPARISON: 1/6/2023      Impression    IMPRESSION: Low lung volumes. There is left basilar consolidation  and/or atelectasis with a moderate effusion.. There is pulmonary  venous congestion. Normal cardiac size accounting for imaging  technique.     There is diffuse demineralization in the bones. No acute displaced rib  fractures. Increased lower  thoracic kyphosis. Minimal notching of the  anterior cortex of the mid sternum on the lateral view is new from  prior and could represent an acute, nondisplaced fracture.  Retrosternal clear space is clear.    CELSO ROCHA MD         SYSTEM ID:  [id number]

## 2023-02-04 NOTE — PROGRESS NOTES
[facility]  Hematology-Oncology Follow-up Note      Today's Date: 02/03/23  Date of Admission:  2/2/2023  Reason for Consult: metastatic non small cell lung cancer (adenocarcinoma of right upper lobe)      ASSESSMENT:  Chiquita Berry is a 82 year old female who was admitted on 2/2/2023. 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    She has been on alectinib for only a week.  She did not notice any specific side effects attributable to this medication.  Alectinib is very well-tolerated.  Most patients tend to have sustained response to this medication.  It would be preferred if she is able to continue on her alectinib during the hospital stay.  I have encouraged her to get her home supply.     She has been participating with physical therapy at home and here. She feels weak and wishes to be discharged to an acute rehab facility.     Her PET-CT results are finally available. I reviewed these with her. She has not yet seen the results. There were no new sites for disease. There is some concern for pneumonia. I would recommend that we treat her. It is possible that her lung infection could be causing her marked fatigue. I would recommend a course of levaquin and have entered orders.      Recommendations:  -Agree with supportive management as current  - PET/CT suggests possible pneumonia. Recommend levaquin 750 mg once daily (orders entered)  -OT/PT to assess the need for acute rehab  -Chiquita should continue her alectinib if possible.  She should get her home medications.  - We will sign off at this time. Please do call Dr. Pantoja with questions.      I have reviewed her care with Dr. Pantoja who followed with her over telephone the day before yesterday.Dr. Pantoja is on call for the weekend and following week.     Over 50 min spent on day of visit including review of tests, obtaining/reviewing separately obtained history/physical  exam, counseling patient, ordering medications/tests/procedures, communicating with PCP/consultants, and documenting in electronic medical record.    Ethan Branch  Hematologist and Medical Oncologist  [facility]       INTERIM HISTORY:  Chiquita was in her room after a walk with physical therapist.     She is doing better as per her. She still feels quite weak and need for acute rehab.      MEDICATIONS:  Current Facility-Administered Medications   Medication     acetaminophen (TYLENOL) tablet 650 mg    Or     acetaminophen (TYLENOL) Suppository 650 mg     acetaminophen (TYLENOL) tablet 650 mg     alectinib (ALECENSA) capsule 600 mg     bisacodyl (DULCOLAX) suppository 10 mg     enoxaparin ANTICOAGULANT (LOVENOX) injection 40 mg     hydrOXYzine (ATARAX) tablet 10 mg     ibuprofen (ADVIL/MOTRIN) tablet 400 mg     latanoprost (XALATAN) 0.005 % ophthalmic solution 1 drop     levofloxacin (LEVAQUIN) infusion 750 mg     lidocaine (LMX4) cream     lidocaine 1 % 0.1-1 mL     melatonin tablet 1 mg     naloxone (NARCAN) injection 0.2 mg    Or     naloxone (NARCAN) injection 0.4 mg    Or     naloxone (NARCAN) injection 0.2 mg    Or     naloxone (NARCAN) injection 0.4 mg     oxyCODONE IR (ROXICODONE) half-tab 2.5 mg     polyethylene glycol (MIRALAX) Packet 17 g     prochlorperazine (COMPAZINE) injection 5 mg    Or     prochlorperazine (COMPAZINE) tablet 5 mg    Or     prochlorperazine (COMPAZINE) suppository 12.5 mg     senna-docusate (SENOKOT-S/PERICOLACE) 8.6-50 MG per tablet 1 tablet    Or     senna-docusate (SENOKOT-S/PERICOLACE) 8.6-50 MG per tablet 2 tablet     sodium chloride (PF) 0.9% PF flush 3 mL     sodium chloride (PF) 0.9% PF flush 3 mL     tiZANidine (ZANAFLEX) tablet 2 mg     traMADol (ULTRAM) tablet 50 mg     traZODone (DESYREL) tablet 50 mg           ALLERGIES:  Allergies   Allergen Reactions     Adhesive Tape Dermatitis     Skin Sensitivity to Adhesive ie. Lidocaine patch, tele patches, tapes     Augmentin  "Diarrhea     Celebrex [Celecoxib] Rash     Lidocaine      Skin sensitivity to Lidocaine patch adhesive         PHYSICAL EXAM:  Vital signs:  Temp: 98  F (36.7  C) Temp src: Oral BP: 122/58 Pulse: 80   Resp: 16 SpO2: 93 % O2 Device: None (Room air)   Height: 157.5 cm (5' 2\") Weight: 63 kg (139 lb)  Estimated body mass index is 25.42 kg/m  as calculated from the following:    Height as of this encounter: 1.575 m (5' 2\").    Weight as of this encounter: 63 kg (139 lb).        LABS:  Recent Labs   Lab Test 02/04/23  0704 02/03/23  1312 02/03/23  0647 02/02/23  0534 12/15/22  0803 12/13/22  0659 12/12/22  0750     --  138 135*  --  139 139   POTASSIUM 3.5  3.5 3.9 3.2* 3.7 3.9 4.1 3.4   CHLORIDE 104  --  103 101  --  107 105   CO2 26  --  26 25  --  28 29   ANIONGAP 9  --  9 9  --  4 5   BUN 10.4  --  9.6 22.5  --  13 16   CR 0.57  --  0.56 0.79  --  0.52 0.56   *  --  98 117*  --  107* 104*   SAGE 8.7*  --  8.3* 8.9  --  8.6 8.3*     Recent Labs   Lab Test 02/02/23  0534 12/15/22  0803 12/13/22  0659 12/12/22  0750 12/11/22  0518   MAG 2.1 2.3 2.3 2.2 2.2   PHOS 3.0  --  2.9  --   --      Recent Labs   Lab Test 02/02/23  0534 12/13/22  0659 12/12/22  0750 12/11/22  0518 12/10/22  1743 08/01/21  0644 08/01/21  0644 02/15/19  1549   WBC 8.3 10.2 9.1 10.7 12.1*   < > 8.3 10.5   HGB 13.2 12.5 11.9 12.3 13.5   < > 12.6 13.3    376 360 384 404   < > 345 279   MCV 95 102* 100 98 101*   < > 98 96   NEUTROPHIL 54  --   --  71 87  --  56 73.0    < > = values in this interval not displayed.     Recent Labs   Lab Test 02/02/23  0534 12/11/22  0518 12/10/22  2258 12/10/22  1743 12/21/20  0939   BILITOTAL 0.2  --   --  0.4 0.5   ALKPHOS 70  --   --  64 85   ALT 19  --   --  31 24   AST 30  --   --  27 23   ALBUMIN 3.7 3.1*  --  3.5 3.6   LDH  --   --  230  --   --      TSH   Date Value Ref Range Status   02/02/2023 5.00 (H) 0.30 - 4.20 uIU/mL Final   12/10/2022 1.39 0.40 - 4.00 mU/L Final   04/16/2019 3.10 0.40 - " 4.00 mU/L Final   12/30/2012 3.19 0.4 - 5.0 mU/L Final       PATHOLOGY:  No results found for: PATH    IMAGING:  Results for orders placed or performed during the hospital encounter of 02/02/23   Chest XR,  PA & LAT    Narrative    XR CHEST 2 VIEWS 2/2/2023 7:20 AM    HISTORY: Hit front of chest against walker.    COMPARISON: 1/6/2023      Impression    IMPRESSION: Low lung volumes. There is left basilar consolidation  and/or atelectasis with a moderate effusion.. There is pulmonary  venous congestion. Normal cardiac size accounting for imaging  technique.     There is diffuse demineralization in the bones. No acute displaced rib  fractures. Increased lower thoracic kyphosis. Minimal notching of the  anterior cortex of the mid sternum on the lateral view is new from  prior and could represent an acute, nondisplaced fracture.  Retrosternal clear space is clear.    CELSO ROCHA MD         SYSTEM ID:  [id number]

## 2023-02-04 NOTE — PROGRESS NOTES
diagnostic tests and consults completed and resulted- Partially met    -vital signs normal or at patient baseline- Met   -returns to baseline functional status- Not met    -safe disposition plan has been identified- Not met

## 2023-02-04 NOTE — PLAN OF CARE
Goal Outcome Evaluation:  diagnostic tests and consults completed and resulted- Partially met    -vital signs normal or at patient baseline- Met   -returns to baseline functional status- Not met    -safe disposition plan has been identified- Not met      Orientation/Cognitive: A&OX4  Observation Goals (Met/ Not Met):  not met  Mobility Level/Assist Equipment: AX1 with GB and walker  Fall Risk (Y/N):  Yes  Behavior Concerns: None  Pain Management:  Oxy, tylenol and tramadol for pain in the chest and the back, hxt of fall  Tele/VS/O2: VSS on RA, no tele  ABNL Lab/BG: Ca+ down at 8.7  Diet: regular diet  Bowel/Bladder: Continent, chemo precaution  Skin Concerns:  redness in the back  Drains/Devices: PIV Sled  Tests/Procedures for next shift: none  Anticipated DC date & active delays:  Pending TCU placement  Patient Stated Goal for Today:  Pain management and to walk

## 2023-02-04 NOTE — PLAN OF CARE
Goal Outcome Evaluation:  Orientation/Cognitive: A&Ox4, forgetful  Observation Goals (Met/ Not Met): Not met  Mobility Level/Assist Equipment: Ax1 GB and walker  Fall Risk (Y/N): Yes  Behavior Concerns: None  Pain Management: PRN Oxycodone, tizanidine, tramadol and scheduled Tylenol   Tele/VS/O2: VSS on RA  ABNL Lab/BG: None  Diet: Regular   Bowel/Bladder: continent  Skin Concerns: blanchable redness on back-mepliex in place   Drains/Devices: PIV SL  Tests/Procedures for next shift: SW following for discharge planning  Anticipated DC date & active delays: TBD  Patient Stated Goal for Today: Pain control     Observation goals  PRIOR TO DISCHARGE       Comments:   -diagnostic tests and consults completed and resulted- Partially met    -vital signs normal or at patient baseline- Met   -returns to baseline functional status- Not met    -safe disposition plan has been identified- Not met

## 2023-02-04 NOTE — UTILIZATION REVIEW
Concurrent stay review; Secondary Review Determination    Under the authority of the Utilization Management Committee, the utilization review process indicated a secondary review on the above patient. The review outcome is based on review of the medical records, discussions with staff, and applying clinical experience noted on the date of the review.    (x) Observation Status Appropriate - Concurrent stay review    RATIONALE FOR DETERMINATION    Chiquita Berry is an 82 year old female admitted on 2/2/2023.  She has history of metastatic adenocarcinoma of the lung, hypertension, chronic back pain secondary to compression fracture. She presented to the emergency department on 2/2/2023 with generalized weakness.  She also had chest wall pain due to recent fall during which she struck her chest on her walker.    Emergency department evaluation showed stable vital signs (afebrile, heart rate in 80s, no tachypnea, and no hypoxia).  Laboratory evaluation showed basically normal labs including white blood cells 8.3, platelets 417, hemoglobin 13.2, procalcitonin 0.04, and high-sensitivity troponin of 12.  TSH was elevated at 5 but free thyroxine was normal at 1.24.  Urinalysis was unremarkable.  Testing for COVID/influenza/RSV was negative.  Blood culture sent (no growth to date).  Chest x-ray showed left base consolidation versus atelectasis with moderate effusion.  She was given cefepime and azithromycin in the emergency department and admitted with weakness, chest pain, and concern for possible pneumonia.  Procalcitonin came back normal so antibiotics were not continued initially.  Oncology was consulted given patient's weakness in the setting of recent chemotherapy.  Upon review of recent PET/CT scan there were concerned about pneumonia so started her on IV Levaquin.  She remains afebrile and without tachycardia, tachypnea, or hypoxia.  Physical therapy is being consulted for weakness.  Observation admission is  appropriate for treatment of possible pneumonia without sepsis or hypoxia and for further evaluation and treatment of weakness with physical therapy.    Patient is clinically improving and there is no clear indication to change patient's status to inpatient. The severity of illness, intensity of service provided, expected LOS and risk for adverse outcome make the care appropriate for observation.    This document was produced using voice recognition software    The information on this document is developed by the utilization review team in order for the business office to ensure compliance. This only denotes the appropriateness of proper admission status and does not reflect the quality of care rendered.    The definitions of Inpatient Status and Observation Status used in making the determination above are those provided in the CMS Coverage Manual, Chapter 1 and Chapter 6, section 70.4.    Sincerely,    Dave Whittaker MD     Utilization Review    Physician Advisor    [facility].

## 2023-02-05 NOTE — PLAN OF CARE
Goal Outcome Evaluation:    Observation goals  PRIOR TO DISCHARGE        Comments: -diagnostic tests and consults completed and resulted- met  -vital signs normal or at patient baseline- met  -returns to baseline functional status- not met  -safe disposition plan has been identified- not met  Nurse to notify provider when observation goals have been met and patient is ready for discharge.

## 2023-02-05 NOTE — PLAN OF CARE
Goal Outcome Evaluation:         Observation goals  PRIOR TO DISCHARGE        Comments:   -diagnostic tests and consults completed and resulted :partially met   -vital signs normal or at patient baseline :unmet, low sats, o2 started at 1lpm   -returns to baseline functional status :unmet   -safe disposition plan has been identified :partially met     Nurse to notify provider when observation goals have been met and patient is ready for discharge.

## 2023-02-05 NOTE — PROGRESS NOTES
Orientation/Cognitive: A&Ox4  Observation Goals (Met/ Not Met): Not Met  Mobility Level/Assist Equipment: A-1 GB/Walker  Fall Risk (Y/N): Y  Behavior Concerns: None  Pain Management: PRN Oxycodone, Tramadol, Tizanidine, Schedule tylenol for left hip pain  Tele/VS/O2: b/p 148/61 other VSS on RA  ABNL Lab/BG: Ca- 8.7  Diet: Reg.  Bowel/Bladder: Cont. B&B  Skin Concerns: None  Drains/Devices: None  Tests/Procedures for next shift: None  Anticipated DC date & active delays: 1-2 days  Patient Stated Goal for Today: None

## 2023-02-05 NOTE — PLAN OF CARE
Goal Outcome Evaluation:     Observation goals  PRIOR TO DISCHARGE        Comments:   -diagnostic tests and consults completed and resulted :partially met   -vital signs normal or at patient baseline : partially met, low sats, o2  at 1lpm   -returns to baseline functional status :unmet   -safe disposition plan has been identified :partially met     Nurse to notify provider when observation goals have been met and patient is ready for discharge.

## 2023-02-05 NOTE — PLAN OF CARE
Goal Outcome Evaluation:  diagnostic tests and consults completed and resulted- Partially met    -vital signs normal or at patient baseline- Met   -returns to baseline functional status- Not met    -safe disposition plan has been identified- Not met  Orientation/Cognitive: A&OX4  Observation Goals (Met/ Not Met): not met  Mobility Level/Assist Equipment: AX1 with GB and walker  Fall Risk (Y/N): Yes  Behavior Concerns: None  Pain Management:  Oxy, tylenol, Zanaflex and tramadol for back and chest pain from a fall  Tele/VS/O2: VSS on RA  ABNL Lab/BG: K+ of 3.4, replaced and recheck pending  Diet: Regular diet  Bowel/Bladder: Continent, chemo precaution  Skin Concerns: Intact except for redness on the back  Drains/Devices: PIV Sled  Tests/Procedures for next shift: none  Anticipated DC date & active delays: possibly tomorrow to [facility] pending insurance authorization  Patient Stated Goal for Today:  Pain managements

## 2023-02-05 NOTE — PLAN OF CARE
Orientation/Cognitive: A/Ox4   Observation Goals (Met/ Not Met): in progress   Mobility Level/Assist Equipment: Ax1 w/ Gb and walker   Fall Risk (Y/N): yes dt ongoing fatigue/weakness   Behavior Concerns: none  Pain Management: left anterior rib pain-tramadol, oxycodone, Zanaflex, Tylenol given w/ relief per pt   Tele/VS/O2: VSS, started on o2 at 1lpm per NC overnight dt decreased sats, may wean off when fully awake, might be dt previous intake of medications  ABNL Lab/BG: no labs this shift   Diet: regular  Bowel/Bladder: continent, voids to BR, urine output adequate   Skin Concerns: scattered bruises   Drains/Devices: PIV NS flushed and locked   Tests/Procedures for next shift: labs : Creatinine, CBC c PC, BMP   Anticipated DC date & active delays: 1-2 days pending updates from [facility] re acceptance to facility   Patient Stated Goal for Today: better pain control

## 2023-02-05 NOTE — PROGRESS NOTES
[facility]    Medicine Progress Note - Hospitalist Service    Date of Admission:  2/2/2023    Assessment & Plan   Chiquita Berry is a 82 year old female admitted on 2/2/2023. Past history metastatic adenocarcinoma of the lung, hypertension, chronic back pain secondary to compression fracture who presents with generalized weakness.  She also has possible healthcare associated pneumonia as well as chest wall pain due to recent fall where she struck her chest on her walker.  Registered to observation for further management.     Generalized weakness  Concern for possible HAP  Primarily suspect dehydration (see below), but cannot rule out chemotherapy.  She denies any recent infectious symptoms including fevers or chills, specifically denies any increase in shortness of breath or cough.  Initial work-up revealing mild acute kidney injury and mild hyponatremia.  She reports starting oral chemotherapy 1 week ago and had decreased appetite for about 2 days as well as 1 day of diarrhea which may contribute to dehydration.  LFTs, CBC, troponin, UA, COVID/flu/RSV are all negative.  Chest x-ray shows left basilar consolidation and effusion which is unchanged from prior.  EKG is unchanged from prior. Neuro exam non-focal.   --Management of GALE and hyponatremia as below  --PT evaluation  --Per review of up-to-date, fatigue is a common side effect of alectinib (<41% of individuals); Oncology consulted.  --Received cefepime and azithro in ED but reports no change in dyspnea or cough, denies subjective fever/chills, no leukocytosis and CXR unchanged from prior   --Procalcitonin was checked and is low.  Nonetheless, due to concern for pneumonia, oncology has started levofloxacin 750 mg IV every 24 hours.  Transition to p.o. antibiotics at discharge when able  -- Monitor vital signs for worsening signs of infection, symptoms, fever  -- Repeat labs in a.m. including BMP and CBC.  -- Discussed plans with  her primary oncologist, Dr. Pantoja    Chest wall pain, due to chest contusion from fall and possible mid sternal fracture  Prior to this presentation, patient became weak and fell, hitting her chest on her walker.  She has had subsequent pain in her sternum and ribs anteriorly.  She does not have shortness of breath, crepitus, or evidence of pneumothorax.    * Chest x-ray shows minimal notching of the anterior cortex of the mid sternum on lateral view that is new from prior and could represent an acute, nondisplaced fracture.  --Monitor O2 saturations.  --Pulmonary hygiene, incentive spirometry  -- Pain regimen:    --Tylenol scheduled.     --Tramadol 50 mg as needed   --Added oxycodone low-dose at 2.5 mg every 4 hours as needed for her ongoing pain.  Still was in significant pain, therefore will increase to 5 mg as needed.  Continue to titrate.   --She also had ibuprofen ordered, but would limit NSAID use if able.     Acute kidney injury, mild  Hyponatremia  Hypokalemia, mild  Admission creatinine 0.7, up from baseline of 0.5; admission sodium 135.  Reports recent poor oral intake as above.  --Given 1 L LR over 10 hours  --BMP with sodium 138--139---138, potassium 3.2--3.9--3.5---3.4, creatinine 0.56, stable  --Replace potassium per protocol  -- Continue to monitor     Metastatic adenocarcinoma of the lung  Follows with Dr. Pantoja of [facility].  Started alectinib on 1/26/2023.  She reports she has been tolerating this very well without symptoms up until the past 24 hours when she was profoundly weak.  --Oncology consult as above, input appreciated.    --Resume alectinib as per Oncology's recommendation (patient will have home supply brought in as Obs)  -- Discussed with Dr. Pantoja on 2/4/2023.  Plan is to continue working on achieving better pain control, and transfer to TCU for ongoing cares.  Continue alectinib as above.     Chronic low back pain secondary to compression fracture  --Continue as needed  "Tylenol     Anxiety  Reports this is related to her cancer diagnosis  --Continue as needed hydroxyzine       Diet: Regular Diet Adult    DVT Prophylaxis: Start Lovenox 2/3  Mcallister Catheter: Not present  Lines: None     Cardiac Monitoring: None  Code Status: No CPR- Do NOT Intubate      Clinically Significant Risk Factors Present on Admission                       # Overweight: Estimated body mass index is 25.42 kg/m  as calculated from the following:    Height as of this encounter: 1.575 m (5' 2\").    Weight as of this encounter: 63 kg (139 lb).           Disposition Plan      Expected Discharge Date: 02/05/2023      Destination: inpatient rehabilitation facility  Discharge Comments: TCU placement pending: referrals sent to [facility]. SW following: prior auth needed. IV levoquin for possible pneumonia.     Discharge to TCU in the next 1-2 days pending clinical stability. Discussed with SW/CC.  Patient needs prior Auth which may not be processed until Monday.     The patient's care was discussed with the patient, charge RN, /CC.    Patient was discussed with Dr. Garnica of the hospitalist service.  He is in agreement with my assessment and plan of care.      Jennifer Calderon PA-C  Hospitalist Service  [facility]  Securely message with [employer] (more info)  Text page via [employer] Paging/Directory   ______________________________________________________________________    Interval History   Patient reports ongoing pain in her sternal area but improved with pain medications. Slept well overnight.  Still very cautious with ambulation - only walking with gait belt and CGA per her report. Feels unsteady and worried about falls. No BM x 2 days but took Miralax today.    Physical Exam   Vital Signs: Temp: 97.6  F (36.4  C) Temp src: Oral BP: 126/59 Pulse: 68   Resp: 18 SpO2: 94 % O2 Device: None (Room air) Oxygen Delivery: 1 LPM  Weight: 139 lbs 0 oz    General Appearance: thin female " in NAD, alert, oriented, sitting in chair  Respiratory: diminished lung sounds at bases, but otherwise clear  Cardiovascular: RRR, normal s1/s2 with grade 2/6 systolic murmur  GI: abdomen soft, normal bowel sounds  Skin:  warm and dry.  Musculoskeletal: tenderness to palpation over lower sternum, extremities warm and well perfused   Neurologic: cranial nerves II-XII intact, moves all 4 extremities equally, alert and appropriate   Psychiatric: normal affect, answers questions appropriately        Medical Decision Making       40 MINUTES SPENT BY ME on the date of service doing chart review, history, exam, documentation & further activities per the note.      Data   ------------------------- PAST 24 HR DATA REVIEWED -----------------------------------------------    I have personally reviewed the following data over the past 24 hrs:    9.7  \   12.8   / 334     138 102 10.3 /  112 (H)   3.4 25 0.55 \          ROUTINE IP LABS (Last four results)  BMP  Recent Labs   Lab 02/05/23  0750 02/04/23  0704 02/03/23  1312 02/03/23  0647 02/02/23  0534    139  --  138 135*   POTASSIUM 3.4 3.5  3.5 3.9 3.2* 3.7   CHLORIDE 102 104  --  103 101   SAGE 8.8 8.7*  --  8.3* 8.9   CO2 25 26  --  26 25   BUN 10.3 10.4  --  9.6 22.5   CR 0.55 0.57  --  0.56 0.79   * 105*  --  98 117*     CBC  Recent Labs   Lab 02/05/23  0750 02/02/23  0534   WBC 9.7 8.3   RBC 3.97 4.08   HGB 12.8 13.2   HCT 38.2 38.9   MCV 96 95   MCH 32.2 32.4   MCHC 33.5 33.9   RDW 14.7 14.9    417     INRNo lab results found in last 7 days.    Recent Results (from the past 48 hour(s))   Chest XR,  PA & LAT    Narrative    XR CHEST 2 VIEWS 2/2/2023 7:20 AM    HISTORY: Hit front of chest against walker.    COMPARISON: 1/6/2023      Impression    IMPRESSION: Low lung volumes. There is left basilar consolidation  and/or atelectasis with a moderate effusion.. There is pulmonary  venous congestion. Normal cardiac size accounting for imaging  technique.      There is diffuse demineralization in the bones. No acute displaced rib  fractures. Increased lower thoracic kyphosis. Minimal notching of the  anterior cortex of the mid sternum on the lateral view is new from  prior and could represent an acute, nondisplaced fracture.  Retrosternal clear space is clear.    CELSO ROCHA MD         SYSTEM ID:  [id number]

## 2023-02-06 NOTE — PROVIDER NOTIFICATION
Patient found sitting up in room following lunch with new R sided facial droop and slight slurring of speech at 1410. All other neuro's intact.LKW 1250. Flyer assessed NIH stroke scale and code stroke called. NIH 3. CT and labs ordered. CT suspicious for stroke. Following CT patient moved to Station 73 for more closer neuro assessments. All medication including home supply of chemo meds and eye drops sent with patient.

## 2023-02-06 NOTE — PROGRESS NOTES
[facility] - Clinical Swallow Evaluation   02/06/23 5125   Appointment Info   Signing Clinician's Name / Credentials (SLP) Camille Valero MS CCC SLP   Quick Adds Certification   General Information   Onset of Illness/Injury or Date of Surgery 02/02/23   Referring Physician Manuela Clay APRN CNP   Patient/Family Therapy Goal Statement (SLP) Would like to eat/drink tonight   Pertinent History of Current Problem Per provider documentation - Chiquita Berry is a 82 year old female admitted on 2/2/2023. Past history metastatic adenocarcinoma of the lung, hypertension, chronic back pain secondary to compression fracture who presents with generalized weakness.  She also has possible healthcare associated pneumonia as well as chest wall pain due to recent fall where she struck her chest on her walker.  Registered to observation for further management.   General Observations Pt is alert and agreeable to evaluation   Type of Evaluation   Type of Evaluation Swallow Evaluation   Oral Motor   Oral Musculature anomalies present   Structural Abnormalities none present   Mucosal Quality adequate   Dentition (Oral Motor)   Dentition (Oral Motor) adequate dentition   Facial Symmetry (Oral Motor)   Facial Symmetry (Oral Motor) right side impairment   Right Side Facial Asymmetry minimal impairment;moderate impairment   Lip Function (Oral Motor)   Lip Range of Motion (Oral Motor) protrusion impairment;retraction impairment   Tongue Function (Oral Motor)   Tongue ROM (Oral Motor) WNL   Vocal Quality/Secretion Management (Oral Motor)   Vocal Quality (Oral Motor) WFL   Secretion Management (Oral Motor) WNL   General Swallowing Observations   Past History of Dysphagia None reported by pt and none noted upon review of EMR   Respiratory Support (General Swallowing Observations) supplemental oxygen;nasal cannula   Current Diet/Method of Nutritional Intake (General Swallowing Observations, NIS) NPO   Swallowing Evaluation  Clinical swallow evaluation   Clinical Swallow Evaluation   Feeding Assistance no assistance needed   Clinical Swallow Evaluation Textures Trialed thin liquids;pureed;solid foods   Clinical Swallow Eval: Thin Liquid Texture Trial   Mode of Presentation, Thin Liquids cup;straw;self-fed   Volume of Liquid or Food Presented 4 oz   Oral Phase of Swallow WFL   Pharyngeal Phase of Swallow intact   Diagnostic Statement Overt cough x 1 with use of straw in suboptimal position. Pt otherwise tolerated thin liquids without overt s/sx of aspiration, no changes in breath sounds or vocal quality. She felt that cough was related to the pain in her sternum   Clinical Swallow Evaluation: Puree Solid Texture Trial   Mode of Presentation, Puree spoon;self-fed   Volume of Puree Presented 4 oz   Oral Phase, Puree WFL   Pharyngeal Phase, Puree intact   Diagnostic Statement No overt s/sx of aspiration   Clinical Swallow Evaluation: Solid Food Texture Trial   Mode of Presentation self-fed   Volume Presented 1 cracker   Oral Phase WFL   Pharyngeal Phase intact   Diagnostic Statement Adequate mastication and oral clearance, no overt s/sx of aspiration. Pt denied any difficulty   Esophageal Phase of Swallow   Patient reports or presents with symptoms of esophageal dysphagia No   Swallowing Recommendations   Diet Consistency Recommendations regular diet;thin liquids (level 0)   Supervision Level for Intake distant supervision needed   Mode of Delivery Recommendations bolus size, small;slow rate of intake   Swallowing Maneuver Recommendations alternate food and liquid intake   Monitoring/Assistance Required (Eating/Swallowing) check mouth frequently for oral residue/pocketing;cue for finger/lingual sweep if oral pocketing present;stop eating activities when fatigue is present;monitor for cough or change in vocal quality with intake   Recommended Feeding/Eating Techniques (Swallow Eval) maintain upright sitting position for eating;maintain  upright posture during/after eating for 30 minutes;minimize distractions during oral intake   Medication Administration Recommendations, Swallowing (SLP) As per pt preference   Instrumental Assessment Recommendations instrumental evaluation not recommended at this time   General Therapy Interventions   Planned Therapy Interventions Dysphagia Treatment   Dysphagia treatment Instruction of safe swallow strategies   Clinical Impression   Criteria for Skilled Therapeutic Interventions Met (SLP Eval) Yes, treatment indicated   SLP Diagnosis Mild oropharyngeal dysphagia   Risks & Benefits of therapy have been explained evaluation/treatment results reviewed;care plan/treatment goals reviewed;participants voiced agreement with care plan;participants included;patient   Clinical Impression Comments Pt seen for clinical swallow evaluation. Right sided facial weakness and reduced ROM. Pt consumed all PO trials (thin, puree, cracker) with overt cough x 1 with straw sip of liquids and not sitting in optimal position. She otherwise did not present with s/sx of aspiration, no changes in breath sounds or vocal quality, an achieved adequate clearance of her oral cavity. Mild oropharyngeal dysphagia secondary to mild-mod right sided labial and lingual reduced strength and ROM. Recommend regular diet and thin liquids with complete upright position, avoid straws, small bites/sips, slow rate, and alternate solids and liquids. Nursing to monitor for s/sx of aspiration. Initiate SLP services for dysphagia and will monitor dysarthria.   SLP Total Evaluation Time   Eval: oral/pharyngeal swallow function, clinical swallow Minutes (10086) 10   Therapy Certification   Start of Care Date 02/06/23   Certification date from 02/06/23   Certification date to 03/06/23   Medical Diagnosis Oropharyngeal dysphagia   SLP Goals   Therapy Frequency (SLP Eval) 5 times/wk   SLP Predicted Duration/Target Date for Goal Attainment 02/20/23   SLP Goals Swallow    SLP: Safely tolerate diet without signs/symptoms of aspiration Regular diet;Thin liquids       [facility]  OUTPATIENT SPEECH LANGUAGE PATHOLOGY EVALUATION  PLAN OF TREATMENT FOR OUTPATIENT REHABILITATION  (COMPLETE FOR INITIAL CLAIMS ONLY)  Patient's Last Name, First Name, M.I.  YOB: 1940  Chiquita Berry                        Provider's Name  [facility] Medical Record No.  [id number]                             Onset Date:  02/02/23  Start of Care Date: (P) 02/06/23    Type:     ___PT   ___OT   _X_SLP Medical Diagnosis: (P) Oropharyngeal dysphagia          SLP Diagnosis:  (P) Mild oropharyngeal dysphagia  Visits from SOC:  1     See note for plan of treatment, functional goals and certification details    I CERTIFY THE NEED FOR THESE SERVICES FURNISHED UNDER        THIS PLAN OF TREATMENT AND WHILE UNDER MY CARE     (Physician co-signature of this document indicates review and certification of the therapy plan).

## 2023-02-06 NOTE — CONSULTS
"[facility] Nurse Inpatient Assessment     Consulted for: Nonblanchable site to mid back, nickel size    Patient History (according to provider note(s):      \"Chiquita Berry is a 82 year old female admitted on 2/2/2023. Past history metastatic adenocarcinoma of the lung, hypertension, chronic back pain secondary to compression fracture who presents with generalized weakness.  She also has possible healthcare associated pneumonia as well as chest wall pain due to recent fall where she struck her chest on her walker.\"    Areas Assessed:      Areas visualized during today's visit: Posterior surfaces  and spine    Pressure Injury Location: Low spine        Last photo: 2/6/23  Wound type: Pressure Injury     Pressure Injury Stage: Deep Tissue Pressure Injury (DTPI), present on admission      This is a Medical Device Related Pressure Injury (MDRPI) due to unknown if r/t medical device  Wound history/plan of care: noted by nursing staff on admission. Pt has kyphosis and prominent vertebra. 4x4 Mepilex in place over wound.  Wound base: ADRIANO,  non-blanchable and epidermis     Palpation of the wound bed: normal      Drainage: none     Description of drainage: none     Measurements (length x width x depth, in cm) 0.5  x 0.8  x  0 cm      Tunneling N/A     Undermining N/A  Periwound skin: Intact and Erythema- blanchable      Color: pink      Temperature: normal   Odor: none  Pain: denies , none  Pain intervention prior to dressing change: patient tolerated well  Treatment goal: Heal  and Protection  STATUS: initial assessment  Supplies ordered: at bedside and discussed with RN    My PI Risk Assessment     Sensory Perception: 3 - Slightly Limited     Moisture: 4 - Rarely moist     Activity: 3 - Walks occasionally      Mobility: 3 - Slightly limited      Nutrition: 3 - Adequate     Friction/Shear: 2 - Potential problem      TOTAL: 18     Treatment Plan:     Spine wound(s): Every shift when patient " "is awake   1. Gently roll back Mepilex dressing to assess wound daily. If wound is showing signs of deterioration, please reach out to [facility] nurse/team.  2. Do not change Mepilex dressing daily. It can safely remain in place up to seven days.   3. Every seven days, or sooner if dressing becomes saturated or damaged, gently cleanse area with wound cleanser.  4. Apply a new Mepilex to cover skin injury. Initial and date.     Pressure Injury Prevention (PIP) Plan:   -If patient is declining pressure injury prevention interventions: Explore reason why and address patient's concerns, Educate on pressure injury risk and prevention intervention(s), If patient is still declining, document \"informed refusal\" , and Ensure Care team is aware ( provider, charge nurse, etc)   -Mattress: Follow bed algorithm, reassess daily and order specialty mattress, if indicated.   -HOB: Maintain at or below 30 degrees, unless contraindicated   -Repositioning in bed: Every 1-2 hours , Left/right positioning; avoid supine, and Raise foot of bed prior to raising head of bed, to reduce patient sliding down (shear)   -Heels: Keep elevated off mattress and Pillows under calves   -Protective Dressing: None   -Positioning Equipment: pillows   -Chair positioning: Chair cushion (#[id number]) , Repositions self: patient to shift weight every 15 minutes, and Do NOT use a donut for sitting (this increases pressure to smaller area and creates a higher potential for injury)     -Moisture Management: Perineal cleansing /protection: Follow Incontinence Protocol, Avoid brief in bed, and Moisturize dry skin   -Under Devices: Inspect skin under all medical devices during skin inspection , Ensure tubes are stabilized without tension, and Ensure patient is not lying on medical devices or equipment when repositioned    Orders: Written    RECOMMEND PRIMARY TEAM ORDER: None, at this time  Education provided: importance of repositioning, plan of care and Off-loading " pressure  Discussed plan of care with: Patient and Nurse  WOC nurse follow-up plan: weekly  Notify WOC if wound(s) deteriorate.  Nursing to notify the Provider(s) and re-consult the WOC Nurse if new skin concern.    DATA:     Current support surface: Standard  Standard gel/foam mattress (IsoFlex, Atmos air, etc)  Containment of urine/stool: Continent of bladder and Continent of bowel  BMI: Body mass index is 25.42 kg/m .   Active diet order: Orders Placed This Encounter      NPO for Medical/Clinical Reasons Except for: No Exceptions     Output: I/O last 3 completed shifts:  In: 320 [P.O.:320]  Out: -      Labs: Recent Labs   Lab 02/06/23  1443 02/05/23  0750 02/02/23  0534   ALBUMIN  --   --  3.7   HGB 13.2   < > 13.2   INR 0.97  --   --    WBC 10.8   < > 8.3    < > = values in this interval not displayed.     Pressure injury risk assessment:   Sensory Perception: 3-->slightly limited  Moisture: 3-->occasionally moist  Activity: 3-->walks occasionally  Mobility: 3-->slightly limited  Nutrition: 3-->adequate  Friction and Shear: 2-->potential problem  Jese Score: 17    Zuri Wagner RN, CWCN   Pager no longer is use, please contact through [employer] group: WOC Nurse

## 2023-02-06 NOTE — CODE/RAPID RESPONSE
[facility]    Code Stroke  House Officer Note    ////////////////////////////////////////////////////////////////////////////////////////////////////////////////////////////////  Acute stroke evaluation:  Time of arrival: 225pm   Time called: 225pm   Glucose level 152   Time patient seen by neurology: 230pm   Time onset of stroke symptoms / witnessed onset: 215pm   Time last known well: 1250pm   IV tPA admistered: no   IA / Thrombolectomy no   Stroke Presentation Type Inhouse Stroke   Stroke Thrombolytic Ineligible Reason Pt/Family declined treatment, Mild deficits   Stroke Thrombolytic Treatments none   Neurologists Dr. Johnston   Stroke Type of Vascular Event Cerebral Infarct, Ischemic   Pre TPa Wts entered? Yes   Post TPa VS Neuro Checks na      IV tPA:  Patient was not treated with rt-TP due to the following reason(s):  Patient (or their surrogate decision maker) refused TPA treatment after discussion of risks and benefits.  Mild stroke symptoms ( NIHSS < 4 and not globally aphasic)     IA thrombolectomy:  N/A      National Institutes of Health Stroke Scale  Exam Interval: Baseline   Score    Level of consciousness: (0)   Alert, keenly responsive    LOC questions: (0)   Answers both questions correctly    LOC commands: (0)   Performs both tasks correctly    Best gaze: (0)   Normal    Visual: (0)   No visual loss    Facial palsy: (1)   Minor paralysis (flat nasolabial fold, smile asymmetry)    Motor arm (left): (0)   No drift    Motor arm (right): (1)   Drift    Motor leg (left): (0)   No drift    Motor leg (right): (0)   No drift    Limb ataxia: (1)   Present in one limb    Sensory: (0)   Normal- no sensory loss    Best language: (0)   Normal- no aphasia    Dysarthria: (1)   Mild to moderate dysarthria    Extinction and inattention: (0)   No abnormality        Total Score:  4       Imaging:  No results found for this or any previous visit (from the past 24 hour(s)).    Physical Exam    Vital Signs with Ranges:  Temp:  [97.4  F (36.3  C)-97.9  F (36.6  C)] 97.4  F (36.3  C)  Pulse:  [70-82] 70  Resp:  [16-18] 16  BP: (116-136)/(49-67) 125/50  SpO2:  [89 %-94 %] 94 %    Assessment & Plan     This is an 82-year-old man with past medical history of hypertension, non-small cell lung cancer admitted with weakness, fall, and possible HAP and chest wall pain where she struck her chest on her walker.    Patient had been sitting in the chair, ICU RN was rounding on the unit and was approached by bedside RN regarding possible new right-sided facial droop and dysarthria.  ICU RN evaluated patient and noted right facial droop as well as some dysarthria.  For this reason code stroke was activated.  On my arrival I was able to obtain the following data, last known normal time, onset of symptoms.  Patient is on prophylactic anticoagulation with molecular weight heparin.  NIH stroke scale was obtained as outlined elsewhere in this note.  Patient transferred from chair to commode with minimal assist of 1 and walker.  Stable in her stance.  There is shakiness of her right upper extremity on ataxia testing and she is mildly dysarthric subjectively so.  Additionally when she looked in a camera at herself she did notice that her right face is drooping.    Stroke risk factors  HTN, lung cancer    Acute focal deficits including right facial droop, slight right upper extremity drift, right upper extremity ataxia, mild dysarthria  INTERVENTIONS:  -glucose 152 mg/dL  -/50  -CT head, CT angio, CT perfusion scans were obtained of the head and neck.  - Unfortunately there was extravasation of contrast in her right upper extremity  -Post extravasation treatment protocol ordered with hyaluronidase  -Supplemental O2 2 L provided for SPO2 in 88-90 range during transit  -I received the CT interpretation on the reading radiologist which I discussed personally with the stroke neurologist.  -Poststroke order set completed  -  MRI with and without contrast of the brain  - Code stroke de-escalated at 336pm  -Transfer to neuroscience bed  - Formal stroke neurology consult  - I was called away to another emergency before was able to speak with patient's family.  Hospitalist attending physician has been updated by me and very kindly contacted patient's family    Discussed with and defer further cares to stroke neurology and hospitalist attending physician Dr. Garnica    At the end of the evaluation I have shared results of possible stroke with patient, outlined treatment plan above including but limited to MRI, transfer to neuroscience unit etc.    Interval History   See above    Code Status: No CPR- Do NOT Intubate    Physical Exam   Neurologic exam as above  Remainder of physical exam deferred so as to prioritize neuroimaging    Data   Recent Labs   Lab 02/05/23  0750 02/02/23  0534   WBC 9.7 8.3   HGB 12.8 13.2   MCV 96 95    417       Time Spent on this Encounter   I spent 85 minutes (225 - 450pm) of critical care time on the unit/floor managing the care of Chiquita Berry. Upon evaluation, this patient had a high probability of imminent or life-threatening deterioration due to acute focal neuro deficit likely 2/2 acute ischemic stroke, which required my direct attention, intervention, and personal management. 100% of my time was spent at the bedside counseling the patient and/or coordinating care regarding services listed in this note.    Manuela Clay CNP  Hospitalist - House [facility] 7am-6pm  Text Page   Pager [phone]

## 2023-02-06 NOTE — UTILIZATION REVIEW
"Concurrent stay review; Secondary Review Determination    [facility]        Under the authority of the Utilization Management Committee, the utilization review process indicated a secondary review on the above patient.  The review outcome is based on review of the medical records, discussions with staff, and applying clinical experience noted on the date of the review.        (x) Observation/outpatient Status Appropriate - Concurrent stay review       RATIONALE FOR DETERMINATION:     Per provider progress note today: \"82 year old female admitted on 2/2/2023. Past history metastatic adenocarcinoma of the lung, hypertension, chronic back pain secondary to compression fracture who presents with generalized weakness.  She also has possible healthcare associated pneumonia as well as chest wall pain due to recent fall where she struck her chest on her walker.  Registered to observation for further management.\"    Vital signs stable during her hospitalization; currently on RA with oxygen saturations >90%    Labs unremarkable.  CBC normal today.  Blood cx NGTD    CXR shows \"left basilar consolidation and/or atelectasis with a moderate effusion.\"    The patient is receiving IV Levaquin for presumed pneumonia.    Discharge to TCU anticipated when prior authorization from insurance completed.  Given clinical stability and that the patient remains hospitalized for appropriate disposition plan, observation is appropriate.      Patient delayed discharge is related to disposition, there is no medical necessity for inpatient admission at the time of this review. If there is a change in patient status, please resend for review.    The information on this document is developed by the utilization review team in order for the business office to ensure compliance.  This only denotes the appropriateness of proper admission status and does not reflect the quality of care rendered.       The definitions of Inpatient Status " and Observation Status used in making the determination above are those provided in the CMS Coverage Manual, Chapter 1 and Chapter 6, section 70.4.       Sincerely,    Latrell Albert MD

## 2023-02-06 NOTE — CONSULTS
[facility]    Stroke Consult Note    Reason for Consult: Stroke Code     Chief Complaint: Generalized Weakness      HPI  Chiquita Berry is a 82 year old female w/ PMhx of metastatic adenocarcinoma of the lung, hypertension and chronic back pain who originally presented with generalized weakness and chest wall pain after a fall. She had a stroke alert called 2/6 due to right face droop and dysarthria. She was LKN 1250 and was found at 215pm, she notes her speech is slurred and that her right face is drooping but that her symptoms are not disabling.     Imaging Findings  Per personal review: Head CT/CTA showed no acute hemorrhage, no acute stroke and no large vessel occlusion. There is sever L MCA stenosis and L PCA stenosis    Official Radiology read is pending if any acute findings on official report please page back for further management.          HEAD CTA:  1.  Short segment high-grade narrowing of proximal M2 segment of the  left middle cerebral artery from the left middle cerebral artery has  normal caliber distal to this area.   2.  Moderate narrowing of the P2 segment of the left posterior  cerebral artery. The left posterior cerebral artery has normal caliber  distal to this area.   3.  No high-grade stenoses or occlusion of the rest of the major  intracranial arteries. No intracranial aneurysms.     NECK CTA:  1. No hemodynamically significant narrowing of the internal carotid  arteries bilaterally based on the NASCET criteria.   2. Beaded appearance of the right internal carotid artery, suspicious  for fibromuscular dysplasia.  3. The vertebral arteries are patent throughout their course in the  neck.  4. Large left pleural effusion and associated left upper lobe  Atelectasis.                                                                     IMPRESSION:  1.  No intracranial hemorrhage, mass lesions, hydrocephalus or CT  evidence for an acute infarct.  2.  Mild diffuse  "cerebral parenchymal volume loss. Presumed chronic  hypertensive/microvascular ischemic white matter changes.     Intravenous Thrombolysis  Not given due to:   - patient/family declined   - minor deficits  -questionable acute ischemic stroke already present in PCA territory on CT    Endovascular Treatment  Not initiated due to absence of proximal vessel occlusion    Impression   R face droop/ dysathria- could be due to ischemic stroke, she finds her symptoms non-disabling and feels the risk of hemorrhage with TNK does not outweigh the benefit. Patient was in agreement that she would not want to receive TNK medication given the non-disabling nature of her symptoms. She has vascular risk factors with HTN. Stroke could be due to small vessel disease.     Recommendations  - Use orderset: \"Ischemic Stroke Routine Admission\" or \"Ischemic Stroke No Thrombolytics/No Thrombectomy ICU Admission\"  - Neurochecks and Vital Signs every Q4H   - Permissive HTN; goal SBP < 220 mmHg  - Daily aspirin 81 mg for secondary stroke prevention  - Plavix (clopidogrel) 300 mg PO loading dose x 1  - Plavix (clopidogrel) 75 mg PO Daily  - Statin: Lipitor 40mg  - MRI Brain with and without contrast  - TTE (with Bubble Study if age 60 yrs or less)  - Telemetry, EKG  - Bedside Glucose Monitoring  - A1c, Lipid Panel, Troponin x 3  - PT/OT/SLP  - Stroke Education  - Euthermia, Euglycemia  - DAPT 90 days      Patient Follow-up     - final recommendation pending work-up    Thank you for this consult. We will continue to follow.      The Stroke Staff is Dr. Lacy.    Purnima Johnston MD  Vascular Neurology Fellow    To page me or covering stroke neurology team member, click here: AMCOM  Choose \"On Call\" tab at top, then select \"NEUROLOGY/ALL SITES\" from middle drop-down box, press Enter, then look for \"stroke\" or \"telestroke\" for your site.    ______________________________________________________    Clinically Significant Risk Factors Present on Admission " "                      # Overweight: Estimated body mass index is 25.42 kg/m  as calculated from the following:    Height as of this encounter: 1.575 m (5' 2\").    Weight as of this encounter: 63 kg (139 lb).              Past Medical History   Past Medical History:   Diagnosis Date     Anxiety     related to cancer diagnosis     Chronic back pain     due to compression fracture     Osteoporosis     followed by outside endocrinologist     Primary lung adenocarcinoma (H)     stage IV     Past Surgical History   Past Surgical History:   Procedure Laterality Date     CATARACT IOL, RT/LT Bilateral      OPEN REDUCTION INTERNAL FIXATION HUMERUS DISTAL Right      PHACOEMULSIFICATION CLEAR CORNEA WITH STANDARD INTRAOCULAR LENS IMPLANT Left 11/16/2015    Procedure: PHACOEMULSIFICATION CLEAR CORNEA WITH STANDARD INTRAOCULAR LENS IMPLANT;  Surgeon: Latrell Jessica MD;  Location: [facility]     TONSILLECTOMY & ADENOIDECTOMY       Medications   Home Meds  Prior to Admission medications    Medication Sig Start Date End Date Taking? Authorizing Provider   alectinib (ALECENSA) 150 MG CAPS Take 4 capsules (600 mg) by mouth 2 times daily (with meals) 1/9/23  Yes Fer Pantoja MD   Cholecalciferol (VITAMIN D) 2000 UNITS tablet Take 1 tablet by mouth daily.   Yes Reported, Patient   hydrOXYzine (ATARAX) 10 MG tablet Take 1 tablet (10 mg) by mouth 3 times daily as needed for anxiety 12/28/22  Yes Aniya Cali APRN CNP   ibuprofen (ADVIL/MOTRIN) 400 MG tablet Take 1 tablet (400 mg) by mouth every 6 hours as needed for moderate pain 10/8/21  Yes Vesna Olivera MD   latanoprost (XALATAN) 0.005 % ophthalmic solution Place 1 drop into both eyes every evening  8/12/19  Yes Reported, Patient   melatonin 3 MG CAPS Take 3 mg by mouth At Bedtime And 3mg prn(total of 6mg ok for sleep)   Yes Reported, Patient   Multiple Vitamin (MULTIVITAMIN ADULT PO) Take 1 tablet by mouth daily   Yes Unknown, Entered By History   Multiple " Vitamins-Minerals (PRESERVISION AREDS 2) CAPS Take 1 capsule by mouth 2 times daily   Yes Reported, Patient   polyethylene glycol (MIRALAX) 17 GM/Dose powder Take 17 g by mouth daily 8/18/21  Yes Jeanine Lieberman APRN CNP   polyethylene glycol 400 (BLINK TEARS) 0.25 % SOLN ophthalmic solution Place 1 drop into both eyes daily And Q2H PRN   Yes Unknown, Entered By History   prochlorperazine (COMPAZINE) 5 MG tablet Take 1 tablet (5 mg) by mouth every 6 hours as needed for nausea or vomiting 1/26/23  Yes Fer Pantoja MD   tiZANidine (ZANAFLEX) 2 MG tablet TAKE 1 TABLET BY MOUTH EVERY 12 HOURS AS NEEDED FOR MUSCLE SPASMS 5/20/22  Yes Vesna Olivera MD   traMADol (ULTRAM) 50 MG tablet TAKE 1 TABLET (50 MG) BY MOUTH EVERY 6 HOURS AS NEEDED FOR SEVERE PAIN 12/23/22  Yes Vesna Olivera MD   acetaminophen (TYLENOL) 325 MG tablet Take 650 mg by mouth 4 times daily And 650mg po daily prn pain 8/31/21   Reported, Patient   sertraline (ZOLOFT) 25 MG tablet Take 1 tablet (25 mg) by mouth daily 1/18/23   Vesna Olivera MD   traZODone (DESYREL) 50 MG tablet Take 0.5-1 tablets (25-50 mg) by mouth nightly as needed for sleep  Patient taking differently: Take 50 mg by mouth nightly as needed for sleep 12/23/22   Hector Hendrix MD       Scheduled Meds    acetaminophen  650 mg Oral 4x Daily     alectinib  600 mg Oral BID w/meals     artifical saliva  2 spray Swish & Spit 4x Daily     enoxaparin ANTICOAGULANT  40 mg Subcutaneous Q24H     latanoprost  1 drop Both Eyes QPM     levofloxacin  750 mg Intravenous Q24H     sodium chloride (PF)  3 mL Intracatheter Q8H       Infusion Meds      PRN Meds  acetaminophen **OR** acetaminophen, bisacodyl, hydrOXYzine, ibuprofen, lidocaine 4%, lidocaine (buffered or not buffered), melatonin, naloxone **OR** naloxone **OR** naloxone **OR** naloxone, oxyCODONE, polyethylene glycol, prochlorperazine **OR** prochlorperazine **OR** prochlorperazine, senna-docusate **OR**  senna-docusate, sodium chloride, sodium chloride (PF), tiZANidine, traMADol, traZODone    Allergies   Allergies   Allergen Reactions     Adhesive Tape Dermatitis     Skin Sensitivity to Adhesive ie. Lidocaine patch, tele patches, tapes     Augmentin Diarrhea     Celebrex [Celecoxib] Rash     Lidocaine      Skin sensitivity to Lidocaine patch adhesive     Family History   Family History   Problem Relation Age of Onset     Dementia Mother      Myocardial Infarction Father         later in life     Stomach Cancer Father      Breast Cancer Sister         x2 sisters (post-menopausal)     Diabetes No family hx of      Cerebrovascular Disease No family hx of      Coronary Artery Disease Early Onset No family hx of      Ovarian Cancer No family hx of      Colon Cancer No family hx of      Social History   Social History     Tobacco Use     Smoking status: Never     Smokeless tobacco: Never   Vaping Use     Vaping Use: Never used   Substance Use Topics     Alcohol use: Not Currently     Comment: rare     Drug use: No       Review of Systems   Review of systems not obtained due to patient factors - critical condition       PHYSICAL EXAMINATION  Temp:  [97.4  F (36.3  C)-97.9  F (36.6  C)] 97.4  F (36.3  C)  Pulse:  [70-82] 70  Resp:  [16-18] 16  BP: (116-136)/(49-67) 116/56  SpO2:  [89 %-94 %] 94 %     Neurologic  Mental Status:  alert, oriented x 3, follows commands, speech clear and fluent, naming and repetition normal  Cranial Nerves:  visual fields intact, PERRL, EOMI with normal smooth pursuit, facial sensation intact and symmetric, hearing not formally tested but intact to conversation, palate elevation symmetric and uvula midline, shoulder shrug strong bilaterally, tongue protrusion midline, rigth face droop. mild dysathria  Motor:  normal muscle tone and bulk, no abnormal movements, able to move all limbs spontaneously, strength 5/5 throughout upper and lower extremities, no pronator drift  Reflexes:  toes  down-going  Sensory:  light touch sensation intact and symmetric throughout upper and lower extremities, no extinction on double simultaneous stimulation   Coordination:  normal finger-to-nose and heel-to-shin bilaterally without dysmetria, rapid alternating movements symmetric  Station/Gait:  deferred    Dysphagia Screen  Per Nursing    Stroke Scales    NIHSS  1a. Level of Consciousness 0-->Alert, keenly responsive   1b. LOC Questions 0-->Answers both questions correctly   1c. LOC Commands 0-->Performs both tasks correctly   2.   Best Gaze 0-->Normal   3.   Visual 0-->No visual loss   4.   Facial Palsy 2-->Partial paralysis (total or near-total paralysis of lower face)   5a. Motor Arm, Left 0-->No drift, limb holds 90 (or 45) degrees for full 10 secs   5b. Motor Arm, Right 0-->No drift, limb holds 90 (or 45) degrees for full 10 secs   6a. Motor Leg, Left 0-->No drift, leg holds 30 degree position for full 5 secs   6b. Motor Leg, right 0-->No drift, leg holds 30 degree position for full 5 secs   7.   Limb Ataxia 0-->Absent   8.   Sensory 0-->Normal, no sensory loss   9.   Best Language 0-->No aphasia, normal   10. Dysarthria 1-->Mild-to-moderate dysarthria, patient slurs at least some words and, at worst, can be understood with some difficulty   11. Extinction and Inattention  0-->No abnormality   Total 3 (02/06/23 1428)       Modified Dickey Score (Pre-morbid)  3-Moderate disability; requiring some help, but able to walk without assistance    Imaging  I personally reviewed all imaging; relevant findings per HPI.     Lab Results Data   CBC  Recent Labs   Lab 02/05/23  0750 02/02/23  0534   WBC 9.7 8.3   RBC 3.97 4.08   HGB 12.8 13.2   HCT 38.2 38.9    417     Basic Metabolic Panel    Recent Labs   Lab 02/05/23  1752 02/05/23  0750 02/04/23  0704 02/03/23  1312 02/03/23  0647   NA  --  138 139  --  138   POTASSIUM 4.1 3.4 3.5  3.5   < > 3.2*   CHLORIDE  --  102 104  --  103   CO2  --  25 26  --  26   BUN  --   10.3 10.4  --  9.6   CR  --  0.55 0.57  --  0.56   GLC  --  112* 105*  --  98   SAGE  --  8.8 8.7*  --  8.3*    < > = values in this interval not displayed.     Liver Panel  Recent Labs   Lab 02/02/23  0534   PROTTOTAL 6.2*   ALBUMIN 3.7   BILITOTAL 0.2   ALKPHOS 70   AST 30   ALT 19     INR  No lab results found.   Lipid Profile    Recent Labs   Lab Test 10/08/19  1200 04/16/19  1051 05/31/18  1050 06/23/17  0931 06/08/16  1047 12/11/15  0959 06/01/15  1205 01/21/15  0931   CHOL 174 167  --   --  151   < > 137 133   HDL 45* 45*  --   --  51   < > 44* 54   LDL 90 99 106*   < > 86   < > 67 67   TRIG 197* 115  --   --  68   < > 129 61   CHOLHDLRATIO  --   --   --   --   --   --  3.1 2.5    < > = values in this interval not displayed.     A1C  No lab results found.  Troponin    Recent Labs   Lab 02/02/23  0534   CTROPT 12          Stroke Code Data Data   Stroke Code Data  (for stroke code without tele)  Stroke code activated 02/06/23   1425   First stroke provider response 02/06/23   1428   Last known normal 02/06/23   1250   Time of discovery   (or onset of symptoms) 02/06/23   1415   Head CT read by Stroke Neuro Dr/Provider 02/06/23       Was stroke code de-escalated? Yes 02/06/23

## 2023-02-06 NOTE — PROGRESS NOTES
Observation goals  PRIOR TO DISCHARGE       Comments:   -diagnostic tests and consults completed and resulted: MET  -vital signs normal or at patient baseline: MET  -returns to baseline functional status: NOT MET  -safe disposition plan has been identified: NOT MET   Nurse to notify provider when observation goals have been met and patient is ready for discharge.

## 2023-02-06 NOTE — PLAN OF CARE
Goal Outcome Evaluation:      Plan of Care Reviewed With: patient    Overall Patient Progress: improvingOverall Patient Progress: improving    Summary: Gen. Weakness, chest pain - XR showed displaced sternal fracture. Possible pneumonia - on abx  Orientation: A&Ox4   Behavior Color: green         VS/O2: VSS, placed on 2L o2 desaturating to mid 80's on RA.   Mobility: A1 GB&W   Pain: Chest/sternal pain managed w/scheduled tylenol; prn oxycodone,Zanaflex and tramadol. Zanaflex placed on hold.    Diet: Regular- good appetite  B/B: Cont - on chemo precautions  ABNL LAB: no labs  Drain/Device: L PIV SL   Tele: none   Skin: Non-blanchable redness, dime-size wound to back - mepilex in place. WOC consult ordered.   Test/Procedures: SW following, prior auth needed.   D/C Goal/Place: TCU. Pending improvement.     *See RRT note for code stroke called on pt

## 2023-02-06 NOTE — PROGRESS NOTES
Care Management Follow Up    Length of Stay (days): 0    Expected Discharge Date: 02/05/2023     Concerns to be Addressed:       Patient plan of care discussed at interdisciplinary rounds: Yes    Anticipated Discharge Disposition:  Transitional care     Anticipated Discharge Services: Transitional care   Anticipated Discharge DME:  None    Patient/family educated on Medicare website which has current facility and service quality ratings:    Education Provided on the Discharge Plan:  yes  Patient/Family in Agreement with the Plan:  yes    Referrals Placed by CM/SW:  Transitional care  Private pay costs discussed: Not applicable    Additional Information:  Writer left a message for [facility] TCU asking if they are able to accept patient.     Addendum 1040: Writer sent three more tcu referrals via DOD in case [facility] tcu can't accept. Writer sent to [facility], and [facility].     Addendum 1600: Jacqui said that patient is taking a pill named Alecensa which cost $15,000 and it treats lung cancer. Writer messaged patient's rounding oncologist asking if this pill can be on hold for tcu. Writer also sent to [facility] Younger and [facility] for a med agreement via DOD in case patient needs to continue med.    Will continue to follow.    JULIO Armas

## 2023-02-06 NOTE — PLAN OF CARE
Date: 2/5/23 1900-2300        Summary: Gen. Weakness, chest pain - XR showed displaced sternal fracture. Possible pneumonia - on abx  Orientation: A&Ox4   Behavior Color: green  CIWA: na           VS/O2: VSS RA  Mobility: A1 GB&W   Pain: Chest/sternal pain managed w/scheduled tylenol; prn oxycodone and tramadol.   Diet: Regular   B/B: Cont   ABNL LAB: K+ 4.1, recheck this morning.   Drain/Device: L PIV SL   Tele: none   Skin: Non-blanchable redness, dime-size wound to back - mepilex in place. Sticky note to MDs asking for WOC consult order.   Test/Procedures: SW following, prior auth needed.   D/C Goal/Place: TCU. Pending improvement.

## 2023-02-06 NOTE — PHARMACY-CONSULT NOTE
Pharmacy Consult to evaluate for medication related stroke core measures    Chiquita Berry, 82 year old female admitted for right face droop and dysarthria on 2/6/2023.    Thrombolytic was not given because of patient/family declined and minor deficits    VTE Prophylaxis Enoxaparin given on 2/5 as appropriate prior to end of hospital day 2.    Antithrombotic: aspirin and clopidogrel started on 2/6, as appropriate by end of hospital day 2. Continue antithrombotic therapy on discharge to meet quality measures, unless contraindicated.    Anticoagulation if history of A-fib/flutter: Patient does not have history of A-fib/flutter - anticoagulation not required for medication related stroke core measures.     LDL Cholesterol Calculated   Date Value Ref Range Status   10/08/2019 90 <100 mg/dL Final     Comment:     Desirable:       <100 mg/dl       Patient currently receiving Lipitor (atorvastatin) continue statin on discharge to meet quality measures, unless contraindicated.    Recommendations: None at this time    Thank you for the consult.    Calista Ely [facility] 2/6/2023 5:08 PM

## 2023-02-06 NOTE — PLAN OF CARE
Date: 1500-1930   Summary: Gen. Weakness, chest pain - XR showed displaced sternal fracture. Possible pneumonia - on abx  Orientation: A&Ox4   Behavior Color: green  CIWA: na   VS/O2: VSS on RA  Mobility: Ax1/G/W   Pain: Chest wall pain managed w/ PRN oxy.   Diet: Regular   B/B: Cont   ABNL LAB: Recheck K 4.1  Drain/Device: L PIV SL   Tele: none   Skin: Intact ex nonblanchable redness on back - mepilex in place. Blanchable redness to coccyx/sacrum. Up in chair.   Test/Procedures: SW following, prior auth needed.   D/C Goal/Place: TCU. Pending improvement.      Observation goals  PRIOR TO DISCHARGE       Comments:   -diagnostic tests and consults completed and resulted: met   -vital signs normal or at patient baseline: met   -returns to baseline functional status: not met   -safe disposition plan has been identified: not met   Nurse to notify provider when observation goals have been met and patient is ready for discharge.

## 2023-02-06 NOTE — PROGRESS NOTES
[facility]    Medicine Progress Note - Hospitalist Service    Date of Admission:  2/2/2023    Assessment & Plan   Chiquita Berry is a 82 year old female admitted on 2/2/2023. Past history metastatic adenocarcinoma of the lung, hypertension, chronic back pain secondary to compression fracture who presents with generalized weakness.  She also has possible healthcare associated pneumonia as well as chest wall pain due to recent fall where she struck her chest on her walker.  Registered to observation for further management.     Generalized weakness  Concern for possible HAP  Primarily suspect dehydration (see below), but cannot rule out chemotherapy.  She denies any recent infectious symptoms including fevers or chills, specifically denies any increase in shortness of breath or cough.  Initial work-up revealing mild acute kidney injury and mild hyponatremia.  She reports starting oral chemotherapy 1 week ago and had decreased appetite for about 2 days as well as 1 day of diarrhea which may contribute to dehydration.  LFTs, CBC, troponin, UA, COVID/flu/RSV are all negative.  Chest x-ray shows left basilar consolidation and effusion which is unchanged from prior.  EKG is unchanged from prior. Neuro exam non-focal.   --Management of GALE and hyponatremia as below  --PT evaluation  --Per review of up-to-date, fatigue is a common side effect of alectinib (<41% of individuals); Oncology consulted.  --Received cefepime and azithro in ED but reports no change in dyspnea or cough, denies subjective fever/chills, no leukocytosis and CXR unchanged from prior   --Procalcitonin was checked and is low.  Nonetheless, due to concern for pneumonia, oncology has started levofloxacin 750 mg IV every 24 hours.  Transition to p.o. antibiotics at discharge   -- Monitor vital signs for worsening signs of infection, symptoms, fever  -- Repeat labs in a.m. including BMP and CBC.  -- Discussed plans with her primary  oncologist, Dr. Pantoja     Chest wall pain, due to chest contusion from fall and possible mid sternal fracture  Prior to this presentation, patient became weak and fell, hitting her chest on her walker.  She has had subsequent pain in her sternum and ribs anteriorly.  She does not have shortness of breath, crepitus, or evidence of pneumothorax.    * Chest x-ray shows minimal notching of the anterior cortex of the mid sternum on lateral view that is new from prior and could represent an acute, nondisplaced fracture.  --Monitor O2 saturations.  --Pulmonary hygiene, incentive spirometry  -- Pain regimen:               --Tylenol scheduled.                --Tramadol 50 mg as needed              --Added oxycodone low-dose at 2.5 mg every 4 hours as needed for her ongoing pain.  Still was in significant pain, therefore will increase to 5 mg as needed.  Continue to titrate.              --She also had ibuprofen ordered, but would limit NSAID use if able.   - add diclofenac gel, warm packs    Acute kidney injury, mild  Hyponatremia  Hypokalemia, mild  Admission creatinine 0.7, up from baseline of 0.5; admission sodium 135.  Reports recent poor oral intake as above.  --Given 1 L LR over 10 hours  --BMP with sodium 138--139---138, potassium 3.2--3.9--3.5---3.4, creatinine 0.56, stable  --Replace potassium per protocol  -- Continue to monitor     Metastatic adenocarcinoma of the lung  Follows with Dr. Pantoja of [facility].  Started alectinib on 1/26/2023.  She reports she has been tolerating this very well without symptoms up until the past 24 hours when she was profoundly weak.  --Oncology consult as above, input appreciated.    --Resume alectinib as per Oncology's recommendation (patient will have home supply brought in as Obs)  -- Discussed with Dr. Pantoja on 2/4/2023.  Plan is to continue working on achieving better pain control, and transfer to TCU for ongoing cares.  Continue alectinib as above.     Chronic low back  "pain secondary to compression fracture  --Continue as needed Tylenol     Anxiety  Reports this is related to her cancer diagnosis  --Continue as needed hydroxyzine        Diet: Regular Diet Adult    DVT Prophylaxis: Enoxaparin (Lovenox) SQ  Mcallister Catheter: Not present  Lines: None     Cardiac Monitoring: None  Code Status: No CPR- Do NOT Intubate      Clinically Significant Risk Factors Present on Admission                       # Overweight: Estimated body mass index is 25.42 kg/m  as calculated from the following:    Height as of this encounter: 1.575 m (5' 2\").    Weight as of this encounter: 63 kg (139 lb).         Disposition Plan      Expected Discharge Date: 02/07/2023      Destination: inpatient rehabilitation facility  Discharge Comments: TCU placement pending: referrals sent to [facility]. SW following: prior auth needed. IV levoquin for possible pneumonia.        Kyler Garnica MD, MD  Hospitalist Service  [facility]  Securely message with [employer] (more info)  Text page via [employer] Paging/Directory   ______________________________________________________________________    Interval History    Feels better but continues to have pain in the chest wall.  Breathing stable  Denies any fever/chills    Physical Exam   /50 (BP Location: Left arm)   Pulse 70   Temp 97.4  F (36.3  C) (Oral)   Resp 16   Ht 1.575 m (5' 2\")   Wt 63 kg (139 lb)   LMP  (LMP Unknown)   SpO2 94%   BMI 25.42 kg/m    Gen- pleasant   HEENT- NAD, YENY  Neck- supple  CVS- I+II+ no m/r/g  RS- CTAB  Abdo- soft, no tenderness  Ext- no edema     Medical Decision Making       50 MINUTES SPENT BY ME on the date of service doing chart review, history, exam, documentation & further activities per the note.      Data   ------------------------- PAST 24 HR DATA REVIEWED -----------------------------------------------    I have personally reviewed the following data over the past 24 hrs:    N/A  \   N/A   / N/A "     N/A N/A N/A /  152 (H)   4.1 N/A N/A \       Imaging results reviewed over the past 24 hrs:   No results found for this or any previous visit (from the past 24 hour(s)).

## 2023-02-06 NOTE — PROVIDER NOTIFICATION
MD Notification    Notified Person: MD    Notified Person Name: Purnima Johnston    Notification Date/Time: 02/06/23 1600    Notification Interaction: [employer]    Purpose of Notification: Okay to continue Lovenox 40 MG or do you want to change dose/med?     Orders Received: Okay to give.     Comments:

## 2023-02-07 PROBLEM — C34.92 PRIMARY ADENOCARCINOMA OF LEFT LUNG (H): Status: ACTIVE | Noted: 2022-01-01

## 2023-02-07 PROBLEM — R53.1 WEAKNESS: Status: ACTIVE | Noted: 2023-01-01

## 2023-02-07 PROBLEM — Z85.118 HISTORY OF LUNG CANCER: Status: ACTIVE | Noted: 2023-01-01

## 2023-02-07 PROBLEM — Z92.21 STATUS POST CHEMOTHERAPY: Status: ACTIVE | Noted: 2023-01-01

## 2023-02-07 PROBLEM — J18.9 PNEUMONIA DUE TO INFECTIOUS ORGANISM, UNSPECIFIED LATERALITY, UNSPECIFIED PART OF LUNG: Status: ACTIVE | Noted: 2023-01-01

## 2023-02-07 NOTE — UTILIZATION REVIEW
Admission Status; Secondary Review Determination    Under the authority of the Utilization Management Committee, the utilization review process indicated a secondary review on the above patient. The review outcome is based on review of the medical records, discussions with staff, and applying clinical experience noted on the date of the review.    (x) Inpatient Status Appropriate - This patient's medical care is consistent with medical management for inpatient care and reasonable inpatient medical practice.    RATIONALE FOR DETERMINATION: 82-year-old female with known metastatic adenocarcinoma of the lung receiving chemotherapy who was admitted to the hospital on 2/2/2023 due to acute profound weakness unable to ambulate.  Patient awaiting transfer to a higher level of care she developed acute onset of new right-sided facial droop and dysarthria with head CT angiogram revealing high-grade narrowing of the proximal M1 segment of the left middle cerebral artery suggesting a filling defect.  MRI of the brain revealed multiple small foci of acute/subacute infarcts involving the left corona radiata and centrum semiovale bilaterally as well as parietal-occipital region.  Complex patient with metastatic lung cancer now with acute multiple embolic strokes appropriate for inpatient management.    At the time of admission with the information available to the attending physician more than 2 nights Hospital complex care was anticipated, based on patient risk of adverse outcome if treated as outpatient and complex care required. Inpatient admission is appropriate based on the Medicare guidelines.    This document was produced using voice recognition software    The information on this document is developed by the utilization review team in order for the business office to ensure compliance. This only denotes the appropriateness of proper admission status and does not reflect the quality of care rendered.    The definitions  of Inpatient Status and Observation Status used in making the determination above are those provided in the CMS Coverage Manual, Chapter 1 and Chapter 6, section 70.4.    Sincerely,    Neri Castellano MD  Utilization Review  Physician Advisor  [facility].

## 2023-02-07 NOTE — PROVIDER NOTIFICATION
Notified by Wilian RN, Samuel Barker, of R upper forearm IV extravasation during CT scan of 75 ml [id number]. Provider, Manuela Clay, following code stroke notified and extravasation orders placed. See pictures in chart. Hylenex administered in CT room. Site outlined, elevated, and warm pack placed. Will continue to monitor.

## 2023-02-07 NOTE — PROGRESS NOTES
Pt up in chair, with PLC for stroke learning over iPad. PLC RN called unit to report that pt was experiencing word finding difficulty. On assessment, pt was less responsive than earlier in day with marked new word finding difficulty, RUE strength of 2/5 compared to 4/5 on earlier assessment, and increased dysarthria. R facial droop unchanged. Pt also not following all commands. Pt found to have low BP of 93/46, blood sugar of 130, and was transferred to bed with assist of 3. RRT called.  HOB positioned flat and then placed in trendelenburg. BP stabilized during RRT and new symptoms resolved, with only slurred speech and R facial droop remaining. Shortly after house NP left the room, pt again experienced word finding difficulty. /57 at that time. House NP alerted, new orders placed for stat head CT and fluid bolus.

## 2023-02-07 NOTE — PLAN OF CARE
Goal Outcome Evaluation:      Plan of Care Reviewed With: patient    Overall Patient Progress: improvingOverall Patient Progress: improving    Reason for Admission: New stroke, Short stay transfer    Cognitive/Mentation: A/Ox 4.  Neuros/CMS: Intact ex R facial droop, dysarthria/slurred speech, Slight RUE weakness 4/5 strength, baseline LUE tremor.   VS: VSS. SBP goal< 220.   GI: BS active, + flatus, last BM 2/2/2023. Continent.  : WDL. Continent.  Pulmonary: LS clear.  Pain: Chest/back pain- improves w/ PRN oxy, PRN/scheduled Tylenol, PRN tramadol.     Drains/Lines: L PIV S.L.  Skin: Intact ex dime size wound on sacrum- mepilex in place, WOC consulted. R forearm bruise r/t extravasation.   Activity: Assist x 1 with GB/W.  Diet: Regular with thin liquids. Takes pills whole.     Therapies recs: TCU  Discharge: Pending    Aggression Stoplight Tool: GREEN    End of shift summary: Tsfer from short stay at 1615. No changes this shift.

## 2023-02-07 NOTE — PLAN OF CARE
Reason for Admission: New stroke- L MCA/PCA stenosis   Cognitive/Mentation: A/Ox 4  Neuros/CMS: Stable with slight R droop. Dysarthria, slurred speech, WFD. RUE weak, 4/5 strength. LUE tremor- baseline.   VS: VSS on 2L NC, SBP goal <220.   GI: BS audible, + flatus, no BM. Continent.  : Voiding adequately. Continent.  Pulmonary: LS clear  Pain: Chest pain- prn Oxycodone and Tramadol and scheduled Tylenol given.   Drains/Lines: PIV SL  Skin: Intact with scattered bruising. Redness on back- Mepilex in place. R forearm bruising and redness from extravasation.      Activity: Assist x 1 with GB/W.  Diet: Regular diet with thin liquids. Takes pills whole.   Therapies recs: TCU  Discharge: Pending  Aggression Stoplight Tool: Green  End of shift summary: No changes this shift. Plan for MRI- checklist complete.

## 2023-02-07 NOTE — PROGRESS NOTES
[facility]    Medicine Progress Note - Hospitalist Service    Date of Admission:  2/2/2023    Assessment & Plan   Chiquita Berry is a 82 year old female admitted on 2/2/2023. Past history metastatic adenocarcinoma of the lung, hypertension, chronic back pain secondary to compression fracture who presents with generalized weakness.  She also has possible healthcare associated pneumonia as well as chest wall pain due to recent fall where she struck her chest on her walker.  Registered to observation for further management.     Multiple small foci of acute/subacute infarcts involving the left corona radiata and centrum semiovale bilaterally as well as the parieto-occipital region, in a watershed type distribution.   - Rt sided facial droop 2/6  - appreciate stroke Neuro following patient  -ct neuro checks and vital signs every 4 hours, telemetry, blood glucose monitoring and permissive hypertension  -Continue  aspirin 81 mg, Plavix 75 mg p.o. daily, atorvastatin 40 mg (DAPT 90 days then aspirin 81mg monotherapy )   - We will get TTE  -PT/OT/SLP/stroke education    Generalized weakness  Concern for possible HAP  Rt sided pleural effusion   Primarily suspect dehydration (see below), but cannot rule out chemotherapy.  She denies any recent infectious symptoms including fevers or chills, specifically denies any increase in shortness of breath or cough.  Initial work-up revealing mild acute kidney injury and mild hyponatremia.  She reports starting oral chemotherapy 1 week ago and had decreased appetite for about 2 days as well as 1 day of diarrhea which may contribute to dehydration.  LFTs, CBC, troponin, UA, COVID/flu/RSV are all negative.  Chest x-ray shows left basilar consolidation and effusion which is unchanged from prior.  EKG is unchanged from prior. Neuro exam non-focal.   --Per review of up-to-date, fatigue is a common side effect of alectinib (<41% of individuals); Oncology  consulted.  --Received cefepime and azithro in ED but reports no change in dyspnea or cough, denies subjective fever/chills, no leukocytosis and CXR unchanged from prior   --Procalcitonin was checked and is low.  Nonetheless, due to concern for pneumonia, oncology has started levofloxacin 750 mg IV every 24 hours.  Transition to p.o. antibiotics at discharge   -- will repeat CXR 2/7     Chest wall pain, due to chest contusion from fall and possible mid sternal fracture  Prior to this presentation, patient became weak and fell, hitting her chest on her walker.  She has had subsequent pain in her sternum and ribs anteriorly.  She does not have shortness of breath, crepitus, or evidence of pneumothorax.    * Chest x-ray shows minimal notching of the anterior cortex of the mid sternum on lateral view that is new from prior and could represent an acute, nondisplaced fracture.  --Monitor O2 saturations.  --Pulmonary hygiene, incentive spirometry  -- Pain regimen:               --Tylenol scheduled.                --Tramadol 50 mg as needed              --Added oxycodone low-dose at 2.5 mg every 4 hours as needed for her ongoing pain.  Still was in significant pain, therefore will increase to 5 mg as needed.  Continue to titrate.              --She also had ibuprofen ordered, but would limit NSAID use if able.   - add diclofenac gel, warm packs    Acute kidney injury, mild  Hyponatremia  Hypokalemia, mild  Admission creatinine 0.7, up from baseline of 0.5; admission sodium 135.  Reports recent poor oral intake as above.  --Given 1 L LR over 10 hours  --BMP with sodium 138--139---138, potassium 3.2--3.9--3.5---3.4, creatinine 0.56, stable  --Replace potassium per protocol  -- Continue to monitor     Metastatic adenocarcinoma of the lung  Follows with Dr. Pantoja of [place] [facility].  Started alectinib on 1/26/2023.  She reports she has been tolerating this very well without symptoms up until the past 24 hours when she was  "profoundly weak.  --Oncology consult as above, input appreciated.    --Resume alectinib as per Oncology's recommendation (patient will have home supply brought in as Obs)  -- Discussed with Dr. Pantoja on 2/4/2023.  Plan is to continue working on achieving better pain control, and transfer to TCU for ongoing cares.  Continue alectinib as above.     Chronic low back pain secondary to compression fracture  --Continue as needed Tylenol     Anxiety  Reports this is related to her cancer diagnosis  --Continue as needed hydroxyzine        Diet: Regular Diet Adult    DVT Prophylaxis: Enoxaparin (Lovenox) SQ  Mcallister Catheter: Not present  Lines: None     Cardiac Monitoring: ACTIVE order. Indication: ICU  Code Status: No CPR- Do NOT Intubate      Clinically Significant Risk Factors Present on Admission                       # Overweight: Estimated body mass index is 25.42 kg/m  as calculated from the following:    Height as of this encounter: 1.575 m (5' 2\").    Weight as of this encounter: 63 kg (139 lb).         Disposition Plan      Expected Discharge Date: 02/08/2023      Destination: inpatient rehabilitation facility  Discharge Comments: TCU placement pending: referrals sent to [facility]. SW following: prior auth needed. IV levoquin for possible pneumonia.        Kyler Garnica MD, MD  Hospitalist Service  [facility]  Securely message with [employer] (more info)  Text page via [employer] Paging/Directory   ______________________________________________________________________    Interval History    Last 24-hour events noted.  Had stroke code yesterday evening due to right face droop.  Continues with a droop but feels speech is better    Denies any chest pain or palpitations or shortness of breath    Physical Exam   /57   Pulse 70   Temp 97.8  F (36.6  C) (Oral)   Resp 17   Ht 1.575 m (5' 2\")   Wt 63 kg (139 lb)   LMP  (LMP Unknown)   SpO2 96%   BMI 25.42 kg/m    Gen- pleasant   Neck- " supple  CVS- I+II+ no m/r/g  RS- CTAB, decreased at base  Abdo- soft, no tenderness  Ext- no edema   Right angle of mouth flattening.  Further as per neurology note    Medical Decision Making       50 MINUTES SPENT BY ME on the date of service doing chart review, history, exam, documentation & further activities per the note.      Data   ------------------------- PAST 24 HR DATA REVIEWED -----------------------------------------------    I have personally reviewed the following data over the past 24 hrs:    8.9  \   12.7   / 332     133 (L) 99 21.4 /  130 (H)   4.0 26 0.72 \       TSH: N/A T4: N/A A1C: N/A       INR:  1.06 PTT:  25   D-dimer:  N/A Fibrinogen:  N/A       Imaging results reviewed over the past 24 hrs:   Recent Results (from the past 24 hour(s))   CT Head w/o Contrast    Narrative    CT OF THE HEAD WITHOUT CONTRAST   2/6/2023 3:04 PM     COMPARISON: Right facial droop, weakness.    HISTORY: Code stroke     TECHNIQUE: Axial CT images of the head from the skull base to the  vertex were acquired without IV contrast. Dose reduction techniques  were used.     FINDINGS:   INTRACRANIAL CONTENTS: No intracranial hemorrhage. Mild diffuse  cerebral parenchymal volume loss. No midline shift. The basilar  cisterns are patent. Moderate periventricular and scattered foci of  deep white matter hypodensities involving both cerebral hemispheres.  No cerebellar tonsillar ectopia. No CT evidence for an acute infarct.    VISUALIZED ORBITS/SINUSES/MASTOIDS: No significant orbital  abnormality.  No significant paranasal sinus mucosal disease. No  significant middle ear or mastoid effusion.    OSSEOUS STRUCTURES/SOFT TISSUES: No significant abnormality.      Impression    IMPRESSION:  1.  No intracranial hemorrhage, mass lesions, hydrocephalus or CT  evidence for an acute infarct.  2.  Mild diffuse cerebral parenchymal volume loss. Presumed chronic  hypertensive/microvascular ischemic white matter changes.    Discussed the  findings with Manuela Clay at 3:20 PM, 02/06/2023.    TOBY ADAM MD         SYSTEM ID:  [id number]   CTA Head Neck w Contrast    Narrative    CT ANGIOGRAM OF THE HEAD AND NECK WITH CONTRAST  2/6/2023 3:12 PM     COMPARISON: Brain MRI 12/13/2022    HISTORY: code stroke    TECHNIQUE:    Precontrast localizing scans were followed by CT angiography with an  injection of 75mL ISOVUE-370 nonionic intravenous contrast material  with scans through the head and neck.  Images were transferred to a  separate 3-D workstation where multiplanar reformations and 3-D images  were created. Estimates of carotid stenoses are made relative to the  distal internal carotid artery diameters except as noted. Dose  reduction techniques were used.    FINDINGS:   HEAD CTA:  ANTERIOR CIRCULATION: Mild atherosclerotic calcifications of the  cavernous and supraclinoid internal carotid arteries. The anterior  cerebral arteries are patent without hemodynamically significant  stenosis. Short segment high-grade narrowing of proximal M2 segment of  the left middle cerebral artery from the left middle cerebral artery  has normal caliber distal to this area. The right middle cerebral  artery is patent without hemodynamically significant stenosis.  Standard [place] anatomy.    POSTERIOR CIRCULATION: Moderate narrowing of the P2 segment of the  left posterior cerebral artery. The left posterior cerebral artery has  normal caliber distal to this area. The right posterior cerebral  artery, the basilar artery and intracranial vertebral arteries are  patent without hemodynamically significant stenosis. Dominant right  vertebral artery.    ANEURYSM/VASCULAR MALFORMATION: None.    NECK CTA:  RIGHT CAROTID: No hemodynamically significant narrowing of the right  internal carotid artery based on the NASCET criteria. Beaded  appearance of the right internal carotid artery, suspicious for  fibromuscular dysplasia.    LEFT CAROTID: Mild  atherosclerotic lack of the left carotid bulb and  proximal left internal carotid artery. No hemodynamically significant  narrowing of the left internal carotid artery based on the NASCET  criteria.     VERTEBRAL ARTERIES: The vertebral arteries are patent throughout their  course in the neck. Dominant right vertebral artery.     AORTIC ARCH: Classic aortic arch anatomy with no significant stenosis  at the origin of the great vessels.    Large left pleural effusion and associated left upper lobe  atelectasis.      Impression    IMPRESSION:    HEAD CTA:  1.  Short segment high-grade narrowing of proximal M2 segment of the  left middle cerebral artery from the left middle cerebral artery has  normal caliber distal to this area.   2.  Moderate narrowing of the P2 segment of the left posterior  cerebral artery. The left posterior cerebral artery has normal caliber  distal to this area.   3.  No high-grade stenoses or occlusion of the rest of the major  intracranial arteries. No intracranial aneurysms.    NECK CTA:  1. No hemodynamically significant narrowing of the internal carotid  arteries bilaterally based on the NASCET criteria.   2. Beaded appearance of the right internal carotid artery, suspicious  for fibromuscular dysplasia.  3. The vertebral arteries are patent throughout their course in the  neck.  4. Large left pleural effusion and associated left upper lobe  atelectasis.    Discussed the findings with Manuela Clay at 3:20 PM, 02/06/2023.    TOBY ADAM MD         SYSTEM ID:  [id number]   CT Head Perfusion w Contrast    Narrative    HEAD CT AND CT ANGIOGRAM OF THE HEAD AND NECK WITHOUT AND WITH  CONTRAST  2/6/2023 3:27 PM     COMPARISON: Head CT and head and neck CT angiogram 02/06/2023    HISTORY: code stroke. Right facial droop, weakness.    TECHNIQUE:    CT BRAIN PERFUSION: Time sequential axial CT images of the head were  acquired during the administration of intravenous contrast (125ML  isovue 370).  CTA images of the [place] as well as color  perfusion maps of the brain were created from this time sequential  axial source data.    FINDINGS:     HEAD CT PERFUSION:  Small area of elevated T. Max involving the left temporo-occipital  region measuring 5 ml. No areas of decreased cerebral blood volume.      Impression    IMPRESSION:    HEAD CT PERFUSION:  1. Small area of elevated T. Max involving the left temporo-occipital  region measuring 5 ml, could be an area of acute ischemia. No areas of  decreased cerebral blood volume.    Radiation dose for this scan was reduced using automated exposure  control, adjustment of the mA and/or kV according to patient size, or  iterative reconstruction technique    TOBY ADAM MD         SYSTEM ID:  [id number]   MR Brain w/o & w Contrast    Narrative    MRI OF THE BRAIN WITHOUT AND WITH CONTRAST  2/7/2023 8:50 AM     HISTORY:  R facial droop, dysarthria, RUE ataxia, RUE drift     COMPARISON: Brain MRI 12/13/2022.. Head and neck CT angiogram  02/06/2023    TECHNIQUE: Axial diffusion-weighted with ADC map, T2-weighted with fat  saturation, T1-weighted and turboFLAIR and coronal T1-weighted images  of the brain were obtained without intravenous contrast.  Following 6  mL Gadavist IV,  axial turboFLAIR and coronal T1-weighted images of  the brain were obtained.     FINDINGS:   INTRACRANIAL CONTENTS: Multiple small foci of restricted diffusion  involving the left corona radiata and centrum semiovale bilaterally as  well as the parieto-occipital region, in a watershed type  distribution. No subacute or chronic intracranial hemorrhage.  Redemonstration of moderate-sized retrocerebellar arachnoid cyst. Mild  diffuse cerebral parenchymal volume loss. No cerebellar tonsillar  ectopia. Moderate periventricular and scattered foci of deep white  matter T2 prolongation involving both cerebral hemispheres.  Postcontrast images demonstrate no abnormal enhancement.    SELLA: No  significant abnormality accounting for technique.    OSSEOUS STRUCTURES/SOFT TISSUES: No aggressive osseous lesion  involving the calvarium, skull base, or visualized upper cervical  spine. The major intracranial vascular flow voids are maintained.    ORBITS: No significant abnormality accounting for technique.    SINUSES/MASTOIDS: Mild mucosal thickening of ethmoid air cells. No  significant middle ear or mastoid effusion.       Impression    IMPRESSION:  1. Multiple small foci of acute/subacute infarcts involving the left  corona radiata and centrum semiovale bilaterally as well as the  parieto-occipital region, in a watershed type distribution.   2. Mild diffuse cerebral parenchymal volume loss. Presumed chronic  hypertensive/microvascular ischemic white matter changes.    TOBY ADAM MD         SYSTEM ID:  [id number]

## 2023-02-07 NOTE — PROGRESS NOTES
[facility]    Stroke Progress Note    Reason for Consult: Stroke Code     Chief Complaint: Generalized Weakness      HPI  Chiquita Berry is a 82 year old female w/ PMhx of metastatic adenocarcinoma of the lung, hypertension and chronic back pain who originally presented with generalized weakness and chest wall pain after a fall. She had a stroke alert called 2/6 due to right face droop and dysarthria. She was LKN 1250 and was found at 215pm, she notes her speech is slurred and that her right face is drooping but that her symptoms are not disabling.     Imaging Findings  Per personal review: Head CT/CTA showed no acute hemorrhage, no acute stroke and no large vessel occlusion. There is sever L MCA stenosis and L PCA stenosis    Official Radiology read is pending if any acute findings on official report please page back for further management.          HEAD CTA:  1.  Short segment high-grade narrowing of proximal M2 segment of the  left middle cerebral artery from the left middle cerebral artery has  normal caliber distal to this area.   2.  Moderate narrowing of the P2 segment of the left posterior  cerebral artery. The left posterior cerebral artery has normal caliber  distal to this area.   3.  No high-grade stenoses or occlusion of the rest of the major  intracranial arteries. No intracranial aneurysms.     NECK CTA:  1. No hemodynamically significant narrowing of the internal carotid  arteries bilaterally based on the NASCET criteria.   2. Beaded appearance of the right internal carotid artery, suspicious  for fibromuscular dysplasia.  3. The vertebral arteries are patent throughout their course in the  neck.  4. Large left pleural effusion and associated left upper lobe  Atelectasis.                                                                     IMPRESSION:  1.  No intracranial hemorrhage, mass lesions, hydrocephalus or CT  evidence for an acute infarct.  2.  Mild diffuse  "cerebral parenchymal volume loss. Presumed chronic  hypertensive/microvascular ischemic white matter changes.    MRI brain WWO                                                                   IMPRESSION:  1. Multiple small foci of acute/subacute infarcts involving the left  corona radiata and centrum semiovale bilaterally as well as the  parieto-occipital region, in a watershed type distribution.   2. Mild diffuse cerebral parenchymal volume loss. Presumed chronic  hypertensive/microvascular ischemic white matter changes.    Endovascular Treatment  Not initiated due to absence of proximal vessel occlusion    Impression   R face droop/ dysathria- could be due to ischemic stroke, she finds her symptoms non-disabling and feels the risk of hemorrhage with TNK does not outweigh the benefit. Patient was in agreement that she would not want to receive TNK medication given the non-disabling nature of her symptoms. She has vascular risk factors with HTN. Stroke could be due to small vessel disease. Other possible etiologies include cardioembolic.   , A1c 6.3      Interval events 2/7:had a change in exam with word finding difficulty after BP dropped to 83/45, she was laid down and her symptoms improved but still had persistent word finding difficulty. Repeat CTA done showed consistent stenosis.     Recommendations  - Use orderset: \"Ischemic Stroke Routine Admission\" or \"Ischemic Stroke No Thrombolytics/No Thrombectomy ICU Admission\"  - Neurochecks and Vital Signs every Q4H   - Normalize -160  - Daily aspirin 81 mg for secondary stroke prevention  - Plavix (clopidogrel) 75 mg PO Daily  - Statin: Lipitor 40mg  - TTE (with Bubble Study if age 60 yrs or less)  - Bedside Glucose Monitoring  - PT/OT/SLP  - Stroke Education  - Euthermia, Euglycemia  -cardiac event monitor  - DAPT 90 days then aspirin 81mg monotherapy   -Long term goals, BP <130/80, LDL <70, A1c <7  -Would monitor for another 24 hours to make sure no " "recurrent episodes of L MCA syndrome TIA          Patient Follow-up     - final recommendation pending work-up  - in 6-8 weeks with general neurology ([phone])    Thank you for this consult. We will continue to follow.      The Stroke Staff is Dr. Lacy.    Purnima Johnston MD  Vascular Neurology Fellow    To page me or covering stroke neurology team member, click here: [facility]  Choose \"On Call\" tab at top, then select \"NEUROLOGY/ALL SITES\" from middle drop-down box, press Enter, then look for \"stroke\" or \"telestroke\" for your site.    ______________________________________________________    Clinically Significant Risk Factors Present on Admission                       # Overweight: Estimated body mass index is 25.42 kg/m  as calculated from the following:    Height as of this encounter: 1.575 m (5' 2\").    Weight as of this encounter: 63 kg (139 lb).              Past Medical History   Past Medical History:   Diagnosis Date     Anxiety     related to cancer diagnosis     Chronic back pain     due to compression fracture     Osteoporosis     followed by outside endocrinologist     Primary lung adenocarcinoma (H)     stage IV     Past Surgical History   Past Surgical History:   Procedure Laterality Date     CATARACT IOL, RT/LT Bilateral      OPEN REDUCTION INTERNAL FIXATION HUMERUS DISTAL Right      PHACOEMULSIFICATION CLEAR CORNEA WITH STANDARD INTRAOCULAR LENS IMPLANT Left 11/16/2015    Procedure: PHACOEMULSIFICATION CLEAR CORNEA WITH STANDARD INTRAOCULAR LENS IMPLANT;  Surgeon: Latrell Jessica MD;  Location: [facility]     TONSILLECTOMY & ADENOIDECTOMY       Medications   Home Meds  Prior to Admission medications    Medication Sig Start Date End Date Taking? Authorizing Provider   alectinib (ALECENSA) 150 MG CAPS Take 4 capsules (600 mg) by mouth 2 times daily (with meals) 1/9/23  Yes Fer Pantoja MD   Cholecalciferol (VITAMIN D) 2000 UNITS tablet Take 1 tablet by mouth daily.   Yes Reported, Patient "   hydrOXYzine (ATARAX) 10 MG tablet Take 1 tablet (10 mg) by mouth 3 times daily as needed for anxiety 12/28/22  Yes Aniya Cali APRN CNP   ibuprofen (ADVIL/MOTRIN) 400 MG tablet Take 1 tablet (400 mg) by mouth every 6 hours as needed for moderate pain 10/8/21  Yes Vesna Olivera MD   latanoprost (XALATAN) 0.005 % ophthalmic solution Place 1 drop into both eyes every evening  8/12/19  Yes Reported, Patient   melatonin 3 MG CAPS Take 3 mg by mouth At Bedtime And 3mg prn(total of 6mg ok for sleep)   Yes Reported, Patient   Multiple Vitamin (MULTIVITAMIN ADULT PO) Take 1 tablet by mouth daily   Yes Unknown, Entered By History   Multiple Vitamins-Minerals (PRESERVISION AREDS 2) CAPS Take 1 capsule by mouth 2 times daily   Yes Reported, Patient   polyethylene glycol (MIRALAX) 17 GM/Dose powder Take 17 g by mouth daily 8/18/21  Yes Jeanine Lieberman APRN CNP   polyethylene glycol 400 (BLINK TEARS) 0.25 % SOLN ophthalmic solution Place 1 drop into both eyes daily And Q2H PRN   Yes Unknown, Entered By History   prochlorperazine (COMPAZINE) 5 MG tablet Take 1 tablet (5 mg) by mouth every 6 hours as needed for nausea or vomiting 1/26/23  Yes Fer Pantoja MD   tiZANidine (ZANAFLEX) 2 MG tablet TAKE 1 TABLET BY MOUTH EVERY 12 HOURS AS NEEDED FOR MUSCLE SPASMS 5/20/22  Yes Vesna Olivera MD   traMADol (ULTRAM) 50 MG tablet TAKE 1 TABLET (50 MG) BY MOUTH EVERY 6 HOURS AS NEEDED FOR SEVERE PAIN 12/23/22  Yes Vesna Olivera MD   acetaminophen (TYLENOL) 325 MG tablet Take 650 mg by mouth 4 times daily And 650mg po daily prn pain 8/31/21   Reported, Patient   sertraline (ZOLOFT) 25 MG tablet Take 1 tablet (25 mg) by mouth daily 1/18/23   Vesna Olivera MD   traZODone (DESYREL) 50 MG tablet Take 0.5-1 tablets (25-50 mg) by mouth nightly as needed for sleep  Patient taking differently: Take 50 mg by mouth nightly as needed for sleep 12/23/22   Hector Hendrix MD       Scheduled Meds    acetaminophen   650 mg Oral 4x Daily     alectinib  600 mg Oral BID w/meals     artifical saliva  2 spray Swish & Spit 4x Daily     aspirin  81 mg Oral Daily    Or     aspirin  81 mg Oral or NG Tube Daily     atorvastatin  40 mg Oral QPM     clopidogrel  75 mg Oral or NG Tube Daily     diclofenac  4 g Topical 4x Daily     enoxaparin ANTICOAGULANT  40 mg Subcutaneous Q24H     latanoprost  1 drop Both Eyes QPM     levofloxacin  750 mg Intravenous Q24H     sodium chloride (PF)  3 mL Intracatheter Q8H     sodium chloride (PF)  3 mL Intracatheter Q8H       Infusion Meds    - MEDICATION INSTRUCTIONS -         PRN Meds  acetaminophen **OR** acetaminophen, bisacodyl, hydrOXYzine, ibuprofen, lidocaine 4%, lidocaine 4%, lidocaine (buffered or not buffered), lidocaine (buffered or not buffered), - MEDICATION INSTRUCTIONS -, melatonin, naloxone **OR** naloxone **OR** naloxone **OR** naloxone, oxyCODONE, polyethylene glycol, prochlorperazine **OR** prochlorperazine **OR** prochlorperazine, senna-docusate **OR** senna-docusate, sodium chloride, sodium chloride (PF), sodium chloride (PF), [Held by provider] tiZANidine, traMADol, traZODone    Allergies   Allergies   Allergen Reactions     Adhesive Tape Dermatitis     Skin Sensitivity to Adhesive ie. Lidocaine patch, tele patches, tapes     Augmentin Diarrhea     Celebrex [Celecoxib] Rash     Lidocaine      Skin sensitivity to Lidocaine patch adhesive     Family History   Family History   Problem Relation Age of Onset     Dementia Mother      Myocardial Infarction Father         later in life     Stomach Cancer Father      Breast Cancer Sister         x2 sisters (post-menopausal)     Diabetes No family hx of      Cerebrovascular Disease No family hx of      Coronary Artery Disease Early Onset No family hx of      Ovarian Cancer No family hx of      Colon Cancer No family hx of      Social History   Social History     Tobacco Use     Smoking status: Never     Smokeless tobacco: Never   Vaping Use      Vaping Use: Never used   Substance Use Topics     Alcohol use: Not Currently     Comment: rare     Drug use: No       Review of Systems   Review of systems not obtained due to patient factors - critical condition       PHYSICAL EXAMINATION  Temp:  [97.4  F (36.3  C)-98.4  F (36.9  C)] 97.5  F (36.4  C)  Pulse:  [70-86] 83  Resp:  [16-18] 18  BP: (116-140)/(50-69) 140/56  SpO2:  [88 %-95 %] 93 %     Neurologic  Mental Status:  alert, oriented x 3, follows commands, speech clear and fluent, naming and repetition normal  Cranial Nerves:  visual fields intact, PERRL, EOMI with normal smooth pursuit, facial sensation intact and symmetric, hearing not formally tested but intact to conversation, palate elevation symmetric and uvula midline, shoulder shrug strong bilaterally, tongue protrusion midline, rigth face droop. mild dysathria  Motor:  normal muscle tone and bulk, no abnormal movements, able to move all limbs spontaneously, strength 5/5 throughout upper and lower extremities, mild RUE pronator drift  Reflexes:  toes down-going  Sensory:  light touch sensation intact and symmetric throughout upper and lower extremities, no extinction on double simultaneous stimulation   Coordination:  normal finger-to-nose and heel-to-shin bilaterally without dysmetria, rapid alternating movements symmetric  Station/Gait:  deferred    Dysphagia Screen  Per Nursing    Stroke Scales    NIHSS  1a. Level of Consciousness 0-->Alert, keenly responsive   1b. LOC Questions 0-->Answers both questions correctly   1c. LOC Commands 0-->Performs both tasks correctly   2.   Best Gaze 0-->Normal   3.   Visual 0-->No visual loss   4.   Facial Palsy 1-->Minor paralysis (flattened nasolabial fold, asymmetry on smiling)   5a. Motor Arm, Left 0-->No drift, limb holds 90 (or 45) degrees for full 10 secs   5b. Motor Arm, Right 0-->No drift, limb holds 90 (or 45) degrees for full 10 secs   6a. Motor Leg, Left 0-->No drift, leg holds 30 degree position  for full 5 secs   6b. Motor Leg, right 0-->No drift, leg holds 30 degree position for full 5 secs   7.   Limb Ataxia 0-->Absent   8.   Sensory 0-->Normal, no sensory loss   9.   Best Language 0-->No aphasia, normal   10. Dysarthria 1-->Mild-to-moderate dysarthria, patient slurs at least some words and, at worst, can be understood with some difficulty   11. Extinction and Inattention  0-->No abnormality   Total 2 (02/06/23 2348)       Modified Marion Score (Pre-morbid)  3-Moderate disability; requiring some help, but able to walk without assistance    Imaging  I personally reviewed all imaging; relevant findings per HPI.     Lab Results Data   CBC  Recent Labs   Lab 02/06/23  1443 02/05/23  0750 02/02/23  0534   WBC 10.8 9.7 8.3   RBC 4.09 3.97 4.08   HGB 13.2 12.8 13.2   HCT 39.1 38.2 38.9    334 417     Basic Metabolic Panel    Recent Labs   Lab 02/06/23  2146 02/06/23  1537 02/06/23  1423 02/05/23  1752 02/05/23  0750 02/04/23  0704   NA  --  133*  --   --  138 139   POTASSIUM  --  4.0  --  4.1 3.4 3.5  3.5   CHLORIDE  --  99  --   --  102 104   CO2  --  26  --   --  25 26   BUN  --  21.4  --   --  10.3 10.4   CR  --  0.72  --   --  0.55 0.57   GLC 99 111* 152*  --  112* 105*   SAGE  --  8.7*  --   --  8.8 8.7*     Liver Panel  Recent Labs   Lab 02/02/23  0534   PROTTOTAL 6.2*   ALBUMIN 3.7   BILITOTAL 0.2   ALKPHOS 70   AST 30   ALT 19     INR    Recent Labs   Lab Test 02/06/23  1443   INR 0.97      Lipid Profile    Recent Labs   Lab Test 02/06/23  1537 10/08/19  1200 04/16/19  1051 12/11/15  0959 06/01/15  1205 01/21/15  0931   CHOL 171 174 167   < > 137 133   HDL 42* 45* 45*   < > 44* 54   * 90 99   < > 67 67   TRIG 124 197* 115   < > 129 61   CHOLHDLRATIO  --   --   --   --  3.1 2.5    < > = values in this interval not displayed.     A1C    Recent Labs   Lab Test 02/05/23  0750   A1C 6.3*     Troponin    Recent Labs   Lab 02/02/23  0534   CTROPT 12

## 2023-02-07 NOTE — CODE/RAPID RESPONSE
[facility]    House KELSEY RRT Note  2/7/2023   Time Called: 1138 am    RRT called for: worsening right arm weakness & dysarthria     Ms. Berry is an 82 year old female with a past medical history of metastatic adenocarcinoma of the lung hypertension chronic back pain secondary to compression fracture who presents on 2/2 with generalized weakness.  She was found to have possible healthcare associated pneumonia as well as chest wall pain secondary to a fall where she struck her chest against her walker.    A stroke code was called on 2/6 due to right face droop and dysarthria.  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    Assessment & Plan   Patient was up in bedside chair this morning completing virtual stroke education. Sitting up in chair ~ 2 hrs when exam changed. Stroke educator called nursing staff to alert them to patient's sudden onset of word finding difficulties at 1134 am.  On nursing assessment patient was found to be dysarthric, have right-sided weakness and word finding difficulties. Her blood pressure at that time was 83/45.  Patient was assisted back to bed with an assist of three to stand and pivot to bed.  Patient was laid supine and blood pressure reassessment 104/57 with a pulse of 71.Her blood glucose was 130.      On my arrival patient is lying supine in hospital bed.  In no acute distress. Skin is pale, warm, and dry without diaphoresis.  Blood pressure 127/62, pulse 70.  She continues with right facial droop, baseline.  Right upper extremity weakness has resolved.  On my initial exam patient did not have any word finding difficulties however mildly slurred language.  She is able to recall all of the events while up in the chair.  She is able to state she was frustrated by word finding difficulties.  She denies shortness of breath, chest pain, dizziness or palpitations lying at rest or when up in chair. Denies nausea. Mucous  membranes are dry.     Transient hypoperfusion with recrudence of symptoms secondary to hypovolemia vs positional ortho statis vs medication effect in the setting of severe left MCA & left PCA stenosis  Alternatively considered new onset CVA; on DAPT, acute arrhythmia; on telemetry during exam change with no acute events on telemetry review, infectious etiology; afebrile, no leukocytosis, and no encephalopathy, and hypoglycemia; BG wdl. Has not received any diuretics or antihypertensives. Received 5 mg oxycodone at 0752.     Exam change discussed with stroke fellow Dr. Purnima Jonhston.  And given waxing and waning word finding difficulties the recommendation was made to repeat CTA imaging.    INTERVENTIONS:   -1L IV NS bolus   -STAT CTA  --> no associated intracranial hemorrhage and no significant changes from 2/6 imaging   -Orthostatic blood pressure next time out of bed post bolus   -Continue q4 hr neuro checks  -Continue telemetry monitoring   -Noted echo ordered 2/6 to be completed   -Note receiving prn oxycodone. Last dose 0752 am   -Noted DAPT (asprin and plavix)   -Noted daily subcutaneous lovenox prophylaxis       At the end of the RRT patient is able to speak in a full sentence with good fluency. Language mildly slurred, baseline.     Disposition neuroscience unit     Discussed with and defer further cares to hospitalist attending physician Dr. Garnica &  Dr Johnston Neurology/Stroke     Addendum 1329:     Incidental finding on CTA 2/7. 1.4x2.2 cm heterogeneous nodule in the right lobe of thyroid gland. No further evaluation needed.     REFERENCE:  Managing Incidental Thyroid Nodules Detected on Imaging: White Paper  of the ACR Incidental Thyroid Findings Committee. J Am Lucio Radiol  2014.     Incidental thyroid nodule detected on CT or MRI without suspicious  findings. Applies to general population without limited life  expectancy or significant comorbidities.     Age greater than or equal to 35 years  Less  than 1.5 cm: No further evaluation.  Greater than or equal to 1.5 cm: Evaluate with thyroid ultrasound.    No charge addendum.     Code Status: No CPR- Do NOT Intubate    Allergies   Allergies   Allergen Reactions     Adhesive Tape Dermatitis     Skin Sensitivity to Adhesive ie. Lidocaine patch, tele patches, tapes     Augmentin Diarrhea     Celebrex [Celecoxib] Rash     Lidocaine      Skin sensitivity to Lidocaine patch adhesive       Physical Exam   Vital Signs with Ranges:  Temp:  [97.5  F (36.4  C)-98.4  F (36.9  C)] 97.8  F (36.6  C)  Pulse:  [70-86] 70  Resp:  [16-18] 18  BP: ()/(45-69) 127/62  SpO2:  [88 %-96 %] 96 %  I/O last 3 completed shifts:  In: 420 [P.O.:420]  Out: -     Physical Exam  Constitutional:       General: She is not in acute distress.     Appearance: She is not diaphoretic.   HENT:      Head: Normocephalic and atraumatic.      Mouth/Throat:      Mouth: Mucous membranes are dry.   Eyes:      Extraocular Movements: Extraocular movements intact.      Pupils: Pupils are equal, round, and reactive to light.   Cardiovascular:      Rate and Rhythm: Normal rate and regular rhythm.      Pulses: Normal pulses.      Heart sounds: Normal heart sounds. No murmur heard.    No friction rub.   Pulmonary:      Effort: Pulmonary effort is normal. No respiratory distress.      Breath sounds: Normal breath sounds.      Comments: Tenderness to sternum, baseline since PTA fall   Chest:      Chest wall: Tenderness present.   Abdominal:      General: Bowel sounds are normal.      Tenderness: There is no abdominal tenderness.      Comments: Mild left lower quadrant firmness, no discomfort to palpations   Musculoskeletal:         General: Normal range of motion.      Cervical back: Normal range of motion.      Right lower leg: Edema present.      Left lower leg: Edema present.      Comments: Trace bilat lower extremity edmea     Skin:     General: Skin is warm and dry.      Comments: Abdomen scattered bruising  from lovenox    Neurological:      Mental Status: She is alert and oriented to person, place, and time.      Cranial Nerves: Dysarthria and facial asymmetry present.      Comments: Right droop and mildly slurred speech. Word finding and dysarthria wax and weaned.    Psychiatric:         Mood and Affect: Mood normal.         Behavior: Behavior normal.         Thought Content: Thought content normal.         Data         IMAGING: (X-ray/CT/MRI)   Recent Results (from the past 24 hour(s))   CT Head w/o Contrast    Narrative    CT OF THE HEAD WITHOUT CONTRAST   2/6/2023 3:04 PM     COMPARISON: Right facial droop, weakness.    HISTORY: Code stroke     TECHNIQUE: Axial CT images of the head from the skull base to the  vertex were acquired without IV contrast. Dose reduction techniques  were used.     FINDINGS:   INTRACRANIAL CONTENTS: No intracranial hemorrhage. Mild diffuse  cerebral parenchymal volume loss. No midline shift. The basilar  cisterns are patent. Moderate periventricular and scattered foci of  deep white matter hypodensities involving both cerebral hemispheres.  No cerebellar tonsillar ectopia. No CT evidence for an acute infarct.    VISUALIZED ORBITS/SINUSES/MASTOIDS: No significant orbital  abnormality.  No significant paranasal sinus mucosal disease. No  significant middle ear or mastoid effusion.    OSSEOUS STRUCTURES/SOFT TISSUES: No significant abnormality.      Impression    IMPRESSION:  1.  No intracranial hemorrhage, mass lesions, hydrocephalus or CT  evidence for an acute infarct.  2.  Mild diffuse cerebral parenchymal volume loss. Presumed chronic  hypertensive/microvascular ischemic white matter changes.    Discussed the findings with Manuela Clay at 3:20 PM, 02/06/2023.    TOBY ADAM MD         SYSTEM ID:  [id number]   CTA Head Neck w Contrast    Narrative    CT ANGIOGRAM OF THE HEAD AND NECK WITH CONTRAST  2/6/2023 3:12 PM     COMPARISON: Brain MRI 12/13/2022    HISTORY: code  stroke    TECHNIQUE:    Precontrast localizing scans were followed by CT angiography with an  injection of 75mL ISOVUE-370 nonionic intravenous contrast material  with scans through the head and neck.  Images were transferred to a  separate 3-D workstation where multiplanar reformations and 3-D images  were created. Estimates of carotid stenoses are made relative to the  distal internal carotid artery diameters except as noted. Dose  reduction techniques were used.    FINDINGS:   HEAD CTA:  ANTERIOR CIRCULATION: Mild atherosclerotic calcifications of the  cavernous and supraclinoid internal carotid arteries. The anterior  cerebral arteries are patent without hemodynamically significant  stenosis. Short segment high-grade narrowing of proximal M2 segment of  the left middle cerebral artery from the left middle cerebral artery  has normal caliber distal to this area. The right middle cerebral  artery is patent without hemodynamically significant stenosis.  Standard [place] anatomy.    POSTERIOR CIRCULATION: Moderate narrowing of the P2 segment of the  left posterior cerebral artery. The left posterior cerebral artery has  normal caliber distal to this area. The right posterior cerebral  artery, the basilar artery and intracranial vertebral arteries are  patent without hemodynamically significant stenosis. Dominant right  vertebral artery.    ANEURYSM/VASCULAR MALFORMATION: None.    NECK CTA:  RIGHT CAROTID: No hemodynamically significant narrowing of the right  internal carotid artery based on the NASCET criteria. Beaded  appearance of the right internal carotid artery, suspicious for  fibromuscular dysplasia.    LEFT CAROTID: Mild atherosclerotic lack of the left carotid bulb and  proximal left internal carotid artery. No hemodynamically significant  narrowing of the left internal carotid artery based on the NASCET  criteria.     VERTEBRAL ARTERIES: The vertebral arteries are patent throughout their  course  in the neck. Dominant right vertebral artery.     AORTIC ARCH: Classic aortic arch anatomy with no significant stenosis  at the origin of the great vessels.    Large left pleural effusion and associated left upper lobe  atelectasis.      Impression    IMPRESSION:    HEAD CTA:  1.  Short segment high-grade narrowing of proximal M2 segment of the  left middle cerebral artery from the left middle cerebral artery has  normal caliber distal to this area.   2.  Moderate narrowing of the P2 segment of the left posterior  cerebral artery. The left posterior cerebral artery has normal caliber  distal to this area.   3.  No high-grade stenoses or occlusion of the rest of the major  intracranial arteries. No intracranial aneurysms.    NECK CTA:  1. No hemodynamically significant narrowing of the internal carotid  arteries bilaterally based on the NASCET criteria.   2. Beaded appearance of the right internal carotid artery, suspicious  for fibromuscular dysplasia.  3. The vertebral arteries are patent throughout their course in the  neck.  4. Large left pleural effusion and associated left upper lobe  atelectasis.    Discussed the findings with Manuela Clay at 3:20 PM, 02/06/2023.    TOBY ADAM MD         SYSTEM ID:  [id number]   CT Head Perfusion w Contrast    Narrative    HEAD CT AND CT ANGIOGRAM OF THE HEAD AND NECK WITHOUT AND WITH  CONTRAST  2/6/2023 3:27 PM     COMPARISON: Head CT and head and neck CT angiogram 02/06/2023    HISTORY: code stroke. Right facial droop, weakness.    TECHNIQUE:    CT BRAIN PERFUSION: Time sequential axial CT images of the head were  acquired during the administration of intravenous contrast (125ML  isovue 370). CTA images of the [place] as well as color  perfusion maps of the brain were created from this time sequential  axial source data.    FINDINGS:     HEAD CT PERFUSION:  Small area of elevated T. Max involving the left temporo-occipital  region measuring 5 ml. No areas of  decreased cerebral blood volume.      Impression    IMPRESSION:    HEAD CT PERFUSION:  1. Small area of elevated T. Max involving the left temporo-occipital  region measuring 5 ml, could be an area of acute ischemia. No areas of  decreased cerebral blood volume.    Radiation dose for this scan was reduced using automated exposure  control, adjustment of the mA and/or kV according to patient size, or  iterative reconstruction technique    TOBY ADAM MD         SYSTEM ID:  [id number]   MR Brain w/o & w Contrast    Narrative    MRI OF THE BRAIN WITHOUT AND WITH CONTRAST  2/7/2023 8:50 AM     HISTORY:  R facial droop, dysarthria, RUE ataxia, RUE drift     COMPARISON: Brain MRI 12/13/2022.. Head and neck CT angiogram  02/06/2023    TECHNIQUE: Axial diffusion-weighted with ADC map, T2-weighted with fat  saturation, T1-weighted and turboFLAIR and coronal T1-weighted images  of the brain were obtained without intravenous contrast.  Following 6  mL Gadavist IV,  axial turboFLAIR and coronal T1-weighted images of  the brain were obtained.     FINDINGS:   INTRACRANIAL CONTENTS: Multiple small foci of restricted diffusion  involving the left corona radiata and centrum semiovale bilaterally as  well as the parieto-occipital region, in a watershed type  distribution. No subacute or chronic intracranial hemorrhage.  Redemonstration of moderate-sized retrocerebellar arachnoid cyst. Mild  diffuse cerebral parenchymal volume loss. No cerebellar tonsillar  ectopia. Moderate periventricular and scattered foci of deep white  matter T2 prolongation involving both cerebral hemispheres.  Postcontrast images demonstrate no abnormal enhancement.    SELLA: No significant abnormality accounting for technique.    OSSEOUS STRUCTURES/SOFT TISSUES: No aggressive osseous lesion  involving the calvarium, skull base, or visualized upper cervical  spine. The major intracranial vascular flow voids are maintained.    ORBITS: No significant  abnormality accounting for technique.    SINUSES/MASTOIDS: Mild mucosal thickening of ethmoid air cells. No  significant middle ear or mastoid effusion.       Impression    IMPRESSION:  1. Multiple small foci of acute/subacute infarcts involving the left  corona radiata and centrum semiovale bilaterally as well as the  parieto-occipital region, in a watershed type distribution.   2. Mild diffuse cerebral parenchymal volume loss. Presumed chronic  hypertensive/microvascular ischemic white matter changes.    TOBY ADAM MD         SYSTEM ID:  [id number]         Time Spent on this Encounter   I spent 60 minutes on the unit/floor managing the care of Chiquita Berry. Over 50% of my time was spent counseling the patient and/or coordinating care regarding services listed in this note.      Jasmina Kwok NP  [facility]  Securely message with the Vocera Web Console (learn more here)  Text page via [employer] Paging/Directory

## 2023-02-07 NOTE — PROGRESS NOTES
02/07/23 1123   Appointment Info   Signing Clinician's Name / Credentials (OT) SANTIAGO Fernando   Student Supervision Direct supervision provided   Rehab Comments (OT) Abbreviated eval d/t scheduled stroke educaiton. Attempted to return to finish eval about an hour later and recent RRT called, possibility for new stroke/extension of stroke.   Living Environment   People in Home alone   Current Living Arrangements apartment   Home Accessibility no concerns   Transportation Anticipated family or friend will provide   Living Environment Comments pt lives alone in an apartment, one floor.   Self-Care   Usual Activity Tolerance good   Current Activity Tolerance moderate   Equipment Currently Used at Home grab bar, tub/shower;grab bar, toilet   Activity/Exercise/Self-Care Comment Recieves meals from apartment facility and assist for bathing. Uses FWW at baseline. Reports feeling more weak and does not feel safe to go home at this time. Previously had HHOT.   General Information   Onset of Illness/Injury or Date of Surgery 02/02/23   Referring Physician Manuela Clay, APRN CNP   Patient/Family Therapy Goal Statement (OT) Get better, reports feeling weak and does not want to go home at this time   Additional Occupational Profile Info/Pertinent History of Current Problem Chiquita Berry is a 82 year old female w/ PMhx of metastatic adenocarcinoma of the lung, hypertension and chronic back pain who originally presented with generalized weakness and chest wall pain after a fall. She had a stroke alert called 2/6 due to right face droop and dysarthria. She was LKN 1250 and was found at 215pm, she notes her speech is slurred and that her right face is drooping but that her symptoms are not disabling.   Existing Precautions/Restrictions fall   Limitations/Impairments safety/cognitive;sensory  (possibly cognitive or sensory, difficult to assess at this time)   General Observations and Info Eval cut short due to stroke  education class.   Cognitive Status Examination   Orientation Status orientation to person, place and time   Affect/Mental Status (Cognitive) WFL   Follows Commands follows one-step commands;over 90% accuracy   Visual Perception   Impact of Vision Impairment on Function (Vision) not assessed due to eval being cut short   Sensory   Sensory Quick Adds unable/difficult to assess   Sensory Comments Pt reported feeling L sensation while touching R UE. Reports feeling R sensation while touching L UE. Needs futher assessment, unsure if this is cognition or sensation related. Eval cut short for stroke education   Pain Assessment   Patient Currently in Pain Yes, see Vital Sign flowsheet   Posture   Posture forward head position;protracted shoulders   Range of Motion Comprehensive   General Range of Motion bilateral upper extremity ROM WFL   Strength Comprehensive (MMT)   General Manual Muscle Testing (MMT) Assessment other (see comments)   Comment, General Manual Muscle Testing (MMT) Assessment 3/5 R hip flexion, 4/5 L hip flexion. UE strength WFL. Appears to have generalized weakness d/t age   Muscle Tone Assessment   Muscle Tone Quick Adds No deficits were identified   Coordination   Upper Extremity Coordination No deficits were identified   Coordination Comments Not formally assessed due to session cut short. Pt washed hands while standing at sink with good success, able to grasp paper towels and FWW.   Bed Mobility   Bed Mobility other (see comments)   Comment (Bed Mobility) not assessed.   Transfers   Transfers toilet transfer   Toilet Transfer   Type (Toilet Transfer) sit-stand   Parker Level (Toilet Transfer) minimum assist (75% patient effort)   Assistive Device (Toilet Transfer) walker, front-wheeled   Balance   Balance Assessment sitting balance: static   Balance Comments good sitting balance, uses FWW while walking   Clinical Impression   Criteria for Skilled Therapeutic Interventions Met (OT) Yes, treatment  indicated   OT Diagnosis Pt is below baseline in ADLs and functional mobility   OT Problem List-Impairments impacting ADL problems related to;activity tolerance impaired;cognition;coordination;strength;mobility;sensation   Assessment of Occupational Performance 1-3 Performance Deficits   Identified Performance Deficits funcitonal mobility, home management, toileting   Planned Therapy Interventions (OT) ADL retraining;bed mobility training;cognition;strengthening;transfer training;motor coordination training;progressive activity/exercise;neuromuscular re-education   Clinical Decision Making Complexity (OT) low complexity   Risk & Benefits of therapy have been explained evaluation/treatment results reviewed   OT Total Evaluation Time   OT Eval, Low Complexity Minutes (78677) 14   Therapy Certification   Start of Care Date 02/02/23   Certification date from 02/07/23   Certification date to 02/12/23   Medical Diagnosis impaired functional mobility and self cares   OT Goals   Therapy Frequency (OT) Daily   OT Predicted Duration/Target Date for Goal Attainment 02/14/23   OT Goals Hygiene/Grooming;Toilet Transfer/Toileting;Bed Mobility;Lower Body Dressing;Cognition   OT: Hygiene/Grooming independent   OT: Lower Body Dressing Modified independent   OT: Bed Mobility Modified independent   OT: Toilet Transfer/Toileting Modified independent   OT: Cognitive Patient/caregiver will verbalize understanding of cognitive assessment results/recommendations as needed for safe discharge planning   OT Discharge Planning   OT Plan Evaluaition cut short d/t stroke education. Further assess sensation, vision, and cognition. g/h at sink   OT Discharge Recommendation (DC Rec) Transitional Care Facility;Acute Rehab Center-Motivated patient will benefit from intensive, interdisciplinary therapy.  Anticipate will be able to tolerate 3 hours of therapy per day   OT Rationale for DC Rec Pt recognizes that she is below baseline in functional  mobility and self cares. Reports feeling weaker and does not feel safe returning to her apartment alone. Pt presents with R facial droop and possibly sensation or cognition deficits - needs further eval due to session being cut short. Would benefit from TCU or ARU to increase independence in ADLs and increase strength and endurance prior to home alone. Further assessment to follow.   OT Brief overview of current status Pt Min A FWW toilet transfer, FWW SBA stand to sit on chair.   Total Session Time   Total Session Time (sum of timed and untimed services) 14    [facility]  OUTPATIENT OCCUPATIONAL THERAPY  EVALUATION  PLAN OF TREATMENT FOR OUTPATIENT REHABILITATION  (COMPLETE FOR INITIAL CLAIMS ONLY)  Patient's Last Name, First Name, M.I.  YOB: 1940  Chiquita Berry                          Provider's Name  [facility] Medical Record No.  [id number]                             Onset Date:  02/02/23   Start of Care Date:  (P) 02/02/23   Type:     ___PT   _X_OT   ___SLP Medical Diagnosis:  (P) impaired functional mobility and self cares                    OT Diagnosis:  Pt is below baseline in ADLs and functional mobility Visits from SOC:  1     See note for plan of treatment, functional goals and certification details    I CERTIFY THE NEED FOR THESE SERVICES FURNISHED UNDER        THIS PLAN OF TREATMENT AND WHILE UNDER MY CARE     (Physician co-signature of this document indicates review and certification of the therapy plan).

## 2023-02-08 NOTE — PLAN OF CARE
Reason for Admission: L corona radiata stroke, L MCA/PCA stenosis  Cognitive/Mentation: A/Ox 4  Neuros/CMS: Stable with slight R droop. Dysarthria, slurred speech, WFD. RUE weak, 4/5 strength. LUE tremor- baseline.   VS: VSS on 2L NC, SBP goal <220.   Tele: NSR  GI: BS audible, + flatus, no BM. Continent.  : Voiding adequately. Continent.  Pulmonary: LS clear  Pain: Chest pain- prn Oxycodone, Ibuprofen, Tramadol and scheduled Tylenol given.   Drains/Lines: PIV SL  Skin: Intact with scattered bruising. Redness on thoracic spine- Mepilex in place. R forearm bruising and redness from extravasation. L posterior thigh wound- Mepilex applied.       Activity: Assist x 1 with GB/W.  Diet: Regular diet with thin liquids. Takes pills whole.   Therapies recs: TCU  Discharge: Pending  Aggression Stoplight Tool: Green  End of shift summary: On chemo precautions. At 0310, pt had increased confusion, slurred speech, WFD, and RUE weakness (2/5 strength). Symptoms started to improve within 20 minutes. Neurology notified- no interventions at this time.

## 2023-02-08 NOTE — PLAN OF CARE
Goal Outcome Evaluation:  Low blood pressure and neurological changes this morning, see note. VSS since that time. Neuro assessment with no further changes, has R facial droop and slurred speech as well as slightly weaker R hand grasp. A and O x4. Up with 2, belt, walker to bedside commode and chair. Voiding without difficulty. No BM, passing flatus and positive bowel sounds. MRI, CT, chest xray, echocardiogram completed. Scoring green on aggression screening tool. Awaiting therapy assessments and recommendations to determine discharge disposition.

## 2023-02-08 NOTE — PROGRESS NOTES
[facility]    Medicine Progress Note - Hospitalist Service    Date of Admission:  2/2/2023    Assessment & Plan   Chiquita Berry is a 82 year old female admitted on 2/2/2023. Past history metastatic adenocarcinoma of the lung, hypertension, chronic back pain secondary to compression fracture who presents with generalized weakness.  She also has possible healthcare associated pneumonia as well as chest wall pain due to recent fall where she struck her chest on her walker.      Multiple small foci of acute/subacute infarcts involving the left corona radiata and centrum semiovale bilaterally as well as the parieto-occipital region, in a watershed type distribution.   - Rt sided facial droop 2/6  - appreciate stroke Neuro following patient (Continues to have waxing and waning symptoms - aphasia and worsened weakness last night)   -ct neuro checks and vital signs every 4 hours, telemetry, blood glucose monitoring and permissive hypertension  -Continue  aspirin 81 mg, Plavix 75 mg p.o. daily, atorvastatin 40 mg (DAPT 90 days then aspirin 81mg monotherapy )     -PT/OT/SLP/stroke education    Generalized weakness  Concern for possible HAP  Rt sided pleural effusion   Primarily suspect dehydration (see below), but cannot rule out chemotherapy.  She denies any recent infectious symptoms including fevers or chills, specifically denies any increase in shortness of breath or cough.  Initial work-up revealing mild acute kidney injury and mild hyponatremia.  She reports starting oral chemotherapy 1 week ago and had decreased appetite for about 2 days as well as 1 day of diarrhea which may contribute to dehydration.  LFTs, CBC, troponin, UA, COVID/flu/RSV are all negative.  Chest x-ray shows left basilar consolidation and effusion which is unchanged from prior.  EKG is unchanged from prior. Neuro exam non-focal.   --Per review of up-to-date, fatigue is a common side effect of alectinib (<41% of  individuals); Oncology consulted.  --Received cefepime and azithro in ED but reports no change in dyspnea or cough, denies subjective fever/chills, no leukocytosis and CXR unchanged from prior   --Procalcitonin was checked and is low.  Nonetheless, due to concern for pneumonia, oncology has started levofloxacin 750 mg IV every 24 hours.  Transition to p.o. antibiotics at discharge   --repeat CXR 2/7: Moderate effusion although mildly decreased when compared to previous.  If any worsening of breathing, may need to consider thoracentesis     Chest wall pain, due to chest contusion from fall and possible mid sternal fracture  Prior to this presentation, patient became weak and fell, hitting her chest on her walker.  She has had subsequent pain in her sternum and ribs anteriorly.  She does not have shortness of breath, crepitus, or evidence of pneumothorax.    * Chest x-ray shows minimal notching of the anterior cortex of the mid sternum on lateral view that is new from prior and could represent an acute, nondisplaced fracture.  --Monitor O2 saturations.  --Pulmonary hygiene, incentive spirometry  -- Pain regimen:               --Tylenol scheduled.                --Tramadol 50 mg as needed              --Added oxycodone low-dose at 2.5 mg every 4 hours as needed for her ongoing pain.  Still was in significant pain, therefore will increase to 5 mg as needed.  Continue to titrate.              --She also had ibuprofen ordered, but would limit NSAID use if able.   -warm packs (gets rxn to topical agents)     Acute kidney injury, mild  Hyponatremia  Hypokalemia, mild  Admission creatinine 0.7, up from baseline of 0.5; admission sodium 135.  Reports recent poor oral intake as above.  --Given 1 L LR over 10 hours  --BMP with sodium 138--139---138, potassium 3.2--3.9--3.5---3.4, creatinine 0.56, stable  --Replace potassium per protocol  -- Continue to monitor     Metastatic adenocarcinoma of the lung  Follows with Dr. Pantoja of  "[facility].  Started alectinib on 1/26/2023.  She reports she has been tolerating this very well without symptoms up until the past 24 hours when she was profoundly weak.  --Oncology consult as above, input appreciated.    --Resume alectinib as per Oncology's recommendation (patient will have home supply brought in as Obs)  -- Discussed with Dr. Pantoja on 2/4/2023.  Plan is to continue working on achieving better pain control.  Continue alectinib as above.     Chronic low back pain secondary to compression fracture  --Continue as needed Tylenol     Anxiety  Reports this is related to her cancer diagnosis  --Continue as needed hydroxyzine     Diet: Regular Diet Adult    DVT Prophylaxis: Enoxaparin (Lovenox) SQ  Mcallister Catheter: Not present  Lines: None     Cardiac Monitoring: ACTIVE order. Indication: Syncope- low cardiac risk (24 hours)  Code Status: No CPR- Do NOT Intubate      Clinically Significant Risk Factors Present on Admission                       # Overweight: Estimated body mass index is 25.42 kg/m  as calculated from the following:    Height as of this encounter: 1.575 m (5' 2\").    Weight as of this encounter: 63 kg (139 lb).         Disposition Plan      Expected Discharge Date: 02/10/2023      Destination: inpatient rehabilitation facility  Discharge Comments: TCU placement pending: referrals sent to [facility]. SW following: prior auth needed. IV levoquin for possible pneumonia.        Kyler Garnica MD  Hospitalist Service  [facility]  Securely message with [email or web address] (more info)  Text page via [employer]      \"This dictation was performed with voice recognition software and may contain errors,  omissions and inadvertent word substitution.\"     ______________________________________________________________________    Interval History    Last 24-hour events noted.  Had near aphasia last night and again increased weakness.  This morning is able to speak but has " "dysarthria    Denies any chest pain or palpitations or shortness of breath    Physical Exam   /57 (BP Location: Right arm)   Pulse 82   Temp 98  F (36.7  C) (Oral)   Resp 15   Ht 1.575 m (5' 2\")   Wt 63 kg (139 lb)   LMP  (LMP Unknown)   SpO2 93%   BMI 25.42 kg/m    Gen- pleasant   Neck- supple  CVS- I+II+ no m/r/g  RS- CTAB, decreased at base  Abdo- soft, no tenderness  Ext- no edema   Right angle of mouth flattening.  Further as per neurology note  Dysarthria    Medical Decision Making       50 MINUTES SPENT BY ME on the date of service doing chart review, history, exam, documentation & further activities per the note.      Data   ------------------------- PAST 24 HR DATA REVIEWED -----------------------------------------------    I have personally reviewed the following data over the past 24 hrs:    8.9  \   12.7   / 277     137 98 20.4 /  109 (H)   3.6 24 0.63 \       INR:  1.06 PTT:  25   D-dimer:  N/A Fibrinogen:  N/A       Imaging results reviewed over the past 24 hrs:   Recent Results (from the past 24 hour(s))   CT Head w/o Contrast    Narrative    CT OF THE HEAD WITHOUT CONTRAST   2/7/2023 12:25 PM     COMPARISON: Brain MRI 02/07/2023, head CT 02/06/2023    HISTORY: Aphasia    TECHNIQUE:  Axial CT images of the head from the skull base to the  vertex were acquired without IV contrast. Dose reduction techniques  were used.     FINDINGS:   INTRACRANIAL CONTENTS: No intracranial hemorrhage. Mild diffuse  cerebral parenchymal volume loss. No midline shift. The basal cisterns  are patent. Mild periventricular and scattered foci of deep white  matter hypodensities involving both cerebral hemispheres. These are  likely due to chronic hypertensive/microvascular ischemic white matter  changes. The patient's known multiple small foci of acute/subacute  infarct involving the left corona radiata and basal ganglia better  seen on the brain MRI from earlier today 02/07/2023. No cerebellar  tonsillar " ectopia. Redemonstration of moderate-sized retrocerebellar  arachnoid cyst. Stable ventricular size.    VISUALIZED ORBITS/SINUSES/MASTOIDS: No significant orbital  abnormality.  No significant paranasal sinus mucosal disease. No  significant middle ear or mastoid effusion.    OSSEOUS STRUCTURES/SOFT TISSUES: No significant abnormality.      Impression    IMPRESSION:  1. The patient's known multiple small foci of acute/subacute infarct  involving the left corona radiata and basal ganglia better seen on the  brain MRI from earlier today 02/07/2023.  2. No associated intracranial hemorrhage.    TOBY ADAM MD         SYSTEM ID:  [id number]   CTA Head Neck with Contrast    Narrative    HEAD CT AND CT ANGIOGRAM OF THE HEAD AND NECK WITHOUT AND WITH  CONTRAST  2/7/2023 12:45 PM     COMPARISON: Brain MRI 02/07/2023, head and neck CT angiogram  02/06/2023    HISTORY: known left MCA stenosis with aphagia and word finding  difficulties.    TECHNIQUE:      HEAD AND NECK CTA: Precontrast localizing scans were followed by CT  angiography with an injection of 75mL [id number]-[id number] nonionic intravenous  contrast material with scans through the head and neck.  Images were  transferred to a separate 3-D workstation where multiplanar  reformations and 3-D images were created.  Estimates of carotid  stenoses are made relative to the distal internal carotid artery  diameters except as noted.      FINDINGS:   HEAD CTA:  ANTERIOR CIRCULATION: The distal extracranial internal carotid  arteries are patent without hemodynamically significant stenosis. The  anterior cerebral arteries are patent without hemodynamically  significant stenosis. Short segment high-grade narrowing of the  proximal M1 segment of the left middle cerebral artery with suggestion  of filling defect. The left vertebral artery has normal caliber distal  to this area. Overall is not significantly changed compared to the  prior CTA. The right middle cerebral artery is  patent without  hemodynamically significant stenosis. Standard [place]  anatomy.    POSTERIOR CIRCULATION: The intracranial vertebral arteries, the  basilar artery and the right posterior cerebral artery are patent  without hemodynamically significant stenosis. The left posterior  cerebral arteries mostly fetal type. Moderate narrowing of the  proximal P2 segment of the left posterior cerebral artery. The left  posterior cerebral artery has normal caliber distal to this area.    ANEURYSM/VASCULAR MALFORMATION: None.    NECK CTA:  RIGHT CAROTID: Mild arthroscopic calcification of the right carotid  bulb and proximal right internal carotid artery. No hemodynamically  significant narrowing of the right internal carotid artery based on  the NASCET criteria. Beaded appearance of the mid to distal right  internal carotid artery. No associated dissection flap seen.    LEFT CAROTID: Mild atherosclerotic calcification of the left carotid  bulb and proximal left internal carotid artery without hemodynamically  significant narrowing based on the NASCET criteria.      VERTEBRAL ARTERIES: No hemodynamically significant narrowing of the  vertebral arteries bilaterally. Subtle beaded appearance of the distal  V2 and proximal V3 segments of both vertebral arteries. No dissection  flap seen.     AORTIC ARCH: Classic aortic arch anatomy with no significant stenosis  at the origin of the great vessels.    1.4 x 2.2 cm heterogeneous nodule in the right lobe of the thyroid  gland. Large left pleural effusion and associated left upper lobe  atelectasis.      Impression    IMPRESSION:  HEAD CTA:  1.  Not significantly changed compared to head CTA from yesterday  02/06/2023.  2.  Short segment high-grade narrowing of the proximal M1 segment of  the left middle cerebral artery with suggestion of filling defect. The  left vertebral artery has normal caliber distal to this area. Overall  is not significantly changed compared to the  prior CTA.   3.  Moderate narrowing of the proximal P2 segment of the left  posterior cerebral artery.     NECK CTA:  1.  Not significantly changed compared to next CTA 02/06/2023.  2.  No hemodynamically significant narrowing of the internal carotid  arteries bilaterally based on the NASCET criteria.  3.  Beaded appearance of the mid to distal right internal carotid  artery. Subtle beaded appearance of the distal V2 and proximal V3  segments of both vertebral arteries. No associated dissection flap  seen. This could represent areas of fibromuscular dysplasia.  4.  1.4 x 2.2 cm heterogeneous nodule in the right lobe of the thyroid  gland. Follow-up for reference below.  5.  Partially visualized large left pleural effusion and associated  left upper lobe atelectasis.    Radiation dose for this scan was reduced using automated exposure  control, adjustment of the mA and/or kV according to patient size, or  iterative reconstruction technique    REFERENCE:  Managing Incidental Thyroid Nodules Detected on Imaging: White Paper  of the ACR Incidental Thyroid Findings Committee. J Am Lucio Radiol  2014.    Incidental thyroid nodule detected on CT or MRI without suspicious  findings. Applies to general population without limited life  expectancy or significant comorbidities.    Age greater than or equal to 35 years  Less than 1.5 cm: No further evaluation.  Greater than or equal to 1.5 cm: Evaluate with thyroid ultrasound.    TOBY ADAM MD         SYSTEM ID:  [id number]   XR Chest 2 Views    Narrative    CHEST TWO VIEWS  2/7/2023 1:56 PM       INDICATION: Follow up on effusion.  COMPARISON: 2/2/2023       Impression    IMPRESSION: Moderate left pleural effusion appears mildly decreased  when compared to previous. Underlying atelectasis/consolidation left  lower lobe.       CINTHIA LAWRENCE MD         SYSTEM ID:  [id number]   Echocardiogram Complete   Result Value    LVEF  60-65%    Narrative     [id number]  [id number]  [id number]  [id number]^LORI^LISA^DANNY                                                                       Version 2     [facility]  Echocardiography Laboratory  [address] [place] [address]     Name: REFUGIO YADAV  MRN: [id number]  : 1940  Study Date: 2023 02:48 PM  Age: 82 yrs  Gender: Female  Patient Location: [facility]  Reason For Study: CVI  Ordering Physician: LISA SANDOVAL  Performed By: Mami Rose     BSA: 1.6 m2  Height: 62 in  Weight: 139 lb  HR: 74  BP: 142/59 mmHg  ______________________________________________________________________________  Procedure  Complete Portable Echo Adult.  ______________________________________________________________________________  Interpretation Summary     1. Normal biventricular size and function. Left ventricular ejection fraction  of 60-65%.  2. No segmental wall motion abnormalities noted.  3. No hemodynamically significant valvular disease. Aortic sclerosis without  stenosis.  Compared to prior study on 2013, no change.  ______________________________________________________________________________  Left Ventricle  The left ventricle is normal in size. There is concentric remodeling present.  Left ventricular systolic function is normal. The visual ejection fraction is  60-65%. Grade I or early diastolic dysfunction. No regional wall motion  abnormalities noted.     Right Ventricle  The right ventricle is normal in size and function.     Atria  Normal left atrial size. Right atrial size is normal.     Mitral Valve  The mitral valve leaflets appear normal. There is no evidence of stenosis,  fluttering, or prolapse. There is mild mitral annular calcification. There is  trace mitral regurgitation.     Tricuspid Valve  Normal tricuspid valve. There is trace tricuspid regurgitation. Right  ventricular systolic pressure could not be approximated due to inadequate  tricuspid regurgitation.     Aortic  Valve  There is moderate trileaflet aortic sclerosis. No aortic stenosis is present.  The mean AoV pressure gradient is 7.9 mmHg. The calculated aortic valve are is  2.2 cm^2.     Pulmonic Valve  The pulmonic valve is not well visualized.     Vessels  Normal size aorta. Normal size ascending aorta. IVC diameter <2.1 cm  collapsing >50% with sniff suggests a normal RA pressure of 3 mmHg.     Pericardium  There is no pericardial effusion. Small left pleural effusion.     Rhythm  Sinus rhythm was noted.  ______________________________________________________________________________  MMode/2D Measurements & Calculations     IVSd: 0.98 cm  LVIDd: 3.7 cm  LVIDs: 1.7 cm  LVPWd: 1.0 cm  FS: 54.5 %  LV mass(C)d: 115.5 grams  LV mass(C)dI: 70.5 grams/m2  Ao root diam: 3.0 cm  asc Aorta Diam: 2.8 cm  LVOT diam: 1.8 cm  LVOT area: 2.6 cm2  LA Volume (BP): 36.6 ml  LA Volume Index (BP): 22.3 ml/m2  RWT: 0.55     Time Measurements  Aortic HR: 77.0 BPM     Doppler Measurements & Calculations  MV E max ezra: 72.3 cm/sec  MV A max ezra: 107.7 cm/sec  MV E/A: 0.67  MV dec slope: 247.3 cm/sec2  MV dec time: 0.29 sec  Ao V2 max: 200.9 cm/sec  Ao max P.0 mmHg  Ao V2 mean: 131.1 cm/sec  Ao mean P.9 mmHg  Ao V2 VTI: 36.0 cm  ANTHONY(I,D): 2.2 cm2  ANTHONY(V,D): 1.7 cm2  LV V1 max P.1 mmHg  LV V1 max: 133.2 cm/sec  LV V1 VTI: 30.8 cm  CO(LVOT): 6.1 l/min  CI(LVOT): 3.7 l/min/m2  SV(LVOT): 78.8 ml  SI(LVOT): 48.1 ml/m2  PA acc time: 0.11 sec  AV Ezra Ratio (DI): 0.66  ANTHONY Index (cm2/m2): 1.3  E/E' av.5  Lateral E/e': 9.9  Medial E/e': 7.1     ______________________________________________________________________________  Report approved by: Josiah Wright 2023 04:40 PM

## 2023-02-08 NOTE — PROGRESS NOTES
[facility]    Stroke Progress Note    Reason for Consult: Stroke Code     Chief Complaint: Generalized Weakness      HPI  Chiquita Berry is a 82 year old female w/ PMhx of metastatic adenocarcinoma of the lung, hypertension and chronic back pain who originally presented with generalized weakness and chest wall pain after a fall. She had a stroke alert called 2/6 due to right face droop and dysarthria. She was LKN 1250 and was found at 215pm, she notes her speech is slurred and that her right face is drooping but that her symptoms are not disabling.     Imaging Findings  Per personal review: Head CT/CTA showed no acute hemorrhage, no acute stroke and no large vessel occlusion. There is sever L MCA stenosis and L PCA stenosis    Official Radiology read is pending if any acute findings on official report please page back for further management.        HEAD CTA:  1.  Short segment high-grade narrowing of proximal M2 segment of the  left middle cerebral artery from the left middle cerebral artery has  normal caliber distal to this area.   2.  Moderate narrowing of the P2 segment of the left posterior  cerebral artery. The left posterior cerebral artery has normal caliber  distal to this area.   3.  No high-grade stenoses or occlusion of the rest of the major  intracranial arteries. No intracranial aneurysms.     NECK CTA:  1. No hemodynamically significant narrowing of the internal carotid  arteries bilaterally based on the NASCET criteria.   2. Beaded appearance of the right internal carotid artery, suspicious  for fibromuscular dysplasia.  3. The vertebral arteries are patent throughout their course in the  neck.  4. Large left pleural effusion and associated left upper lobe  Atelectasis.                                                                     IMPRESSION:  1.  No intracranial hemorrhage, mass lesions, hydrocephalus or CT  evidence for an acute infarct.  2.  Mild diffuse  "cerebral parenchymal volume loss. Presumed chronic  hypertensive/microvascular ischemic white matter changes.    MRI brain WWO                                                                   IMPRESSION:  1. Multiple small foci of acute/subacute infarcts involving the left  corona radiata and centrum semiovale bilaterally as well as the  parieto-occipital region, in a watershed type distribution.   2. Mild diffuse cerebral parenchymal volume loss. Presumed chronic  hypertensive/microvascular ischemic white matter changes.    Endovascular Treatment  Not initiated due to absence of proximal vessel occlusion    Impression   R face droop/ dysathria- could be due to ischemic stroke, she finds her symptoms non-disabling and feels the risk of hemorrhage with TNK does not outweigh the benefit. Patient was in agreement that she would not want to receive TNK medication given the non-disabling nature of her symptoms. She has vascular risk factors with HTN. Stroke could be due to small vessel disease. Other possible etiologies include cardioembolic.   , A1c 6.3      Interval events 2/7:had a change in exam with word finding difficulty after BP dropped to 83/45, she was laid down and her symptoms improved but still had persistent word finding difficulty. Repeat CTA done showed consistent stenosis.Echo was negative for low EF.      Recommendations  - Use orderset: \"Ischemic Stroke Routine Admission\" or \"Ischemic Stroke No Thrombolytics/No Thrombectomy ICU Admission\"  - Neurochecks and Vital Signs every Q4H   - Normalize -160- avoid hypotension  - Daily aspirin 81 mg for secondary stroke prevention  - Plavix (clopidogrel) 75 mg PO Daily  - Statin: Lipitor 40mg  - Bedside Glucose Monitoring  - PT/OT/SLP  - Stroke Education  - Euthermia, Euglycemia  -cardiac event monitor  - DAPT 90 days then aspirin 81mg monotherapy   -Long term goals, BP <130/80, LDL <70, A1c <7- recommend slow titration down of blood pressure to " "meet long term goal over the next week.  -Routine EEG  -Recommend oral hydration- increase PO intake by 1 L of Gatorade/pedialyte   -MRI/MRP      Patient Follow-up     - final recommendation pending work-up  - in 6-8 weeks with general neurology ([phone])    Thank you for this consult. We will continue to follow.      The Stroke Staff is Dr. Lacy.    Purnima Johnston MD  Vascular Neurology Fellow    To page me or covering stroke neurology team member, click here: AMCOM  Choose \"On Call\" tab at top, then select \"NEUROLOGY/ALL SITES\" from middle drop-down box, press Enter, then look for \"stroke\" or \"telestroke\" for your site.    ______________________________________________________    Clinically Significant Risk Factors Present on Admission                       # Overweight: Estimated body mass index is 25.42 kg/m  as calculated from the following:    Height as of this encounter: 1.575 m (5' 2\").    Weight as of this encounter: 63 kg (139 lb).              Past Medical History   Past Medical History:   Diagnosis Date     Anxiety     related to cancer diagnosis     Chronic back pain     due to compression fracture     Osteoporosis     followed by outside endocrinologist     Primary lung adenocarcinoma (H)     stage IV     Past Surgical History   Past Surgical History:   Procedure Laterality Date     CATARACT IOL, RT/LT Bilateral      OPEN REDUCTION INTERNAL FIXATION HUMERUS DISTAL Right      PHACOEMULSIFICATION CLEAR CORNEA WITH STANDARD INTRAOCULAR LENS IMPLANT Left 11/16/2015    Procedure: PHACOEMULSIFICATION CLEAR CORNEA WITH STANDARD INTRAOCULAR LENS IMPLANT;  Surgeon: Latrell Jessica MD;  Location: [facility]     TONSILLECTOMY & ADENOIDECTOMY       Medications   Home Meds  Prior to Admission medications    Medication Sig Start Date End Date Taking? Authorizing Provider   alectinib (ALECENSA) 150 MG CAPS Take 4 capsules (600 mg) by mouth 2 times daily (with meals) 1/9/23  Yes Fer Pantoja MD   Cholecalciferol " (VITAMIN D) 2000 UNITS tablet Take 1 tablet by mouth daily.   Yes Reported, Patient   hydrOXYzine (ATARAX) 10 MG tablet Take 1 tablet (10 mg) by mouth 3 times daily as needed for anxiety 12/28/22  Yes Aniya Cali APRN CNP   ibuprofen (ADVIL/MOTRIN) 400 MG tablet Take 1 tablet (400 mg) by mouth every 6 hours as needed for moderate pain 10/8/21  Yes Vesna Olivera MD   latanoprost (XALATAN) 0.005 % ophthalmic solution Place 1 drop into both eyes every evening  8/12/19  Yes Reported, Patient   melatonin 3 MG CAPS Take 3 mg by mouth At Bedtime And 3mg prn(total of 6mg ok for sleep)   Yes Reported, Patient   Multiple Vitamin (MULTIVITAMIN ADULT PO) Take 1 tablet by mouth daily   Yes Unknown, Entered By History   Multiple Vitamins-Minerals (PRESERVISION AREDS 2) CAPS Take 1 capsule by mouth 2 times daily   Yes Reported, Patient   polyethylene glycol (MIRALAX) 17 GM/Dose powder Take 17 g by mouth daily 8/18/21  Yes Jeanine Lieberman APRN CNP   polyethylene glycol 400 (BLINK TEARS) 0.25 % SOLN ophthalmic solution Place 1 drop into both eyes daily And Q2H PRN   Yes Unknown, Entered By History   prochlorperazine (COMPAZINE) 5 MG tablet Take 1 tablet (5 mg) by mouth every 6 hours as needed for nausea or vomiting 1/26/23  Yes Fer Pantoja MD   tiZANidine (ZANAFLEX) 2 MG tablet TAKE 1 TABLET BY MOUTH EVERY 12 HOURS AS NEEDED FOR MUSCLE SPASMS 5/20/22  Yes Vesna Olivera MD   traMADol (ULTRAM) 50 MG tablet TAKE 1 TABLET (50 MG) BY MOUTH EVERY 6 HOURS AS NEEDED FOR SEVERE PAIN 12/23/22  Yes Vesna Olivera MD   acetaminophen (TYLENOL) 325 MG tablet Take 650 mg by mouth 4 times daily And 650mg po daily prn pain 8/31/21   Reported, Patient   sertraline (ZOLOFT) 25 MG tablet Take 1 tablet (25 mg) by mouth daily 1/18/23   Vesna Olivera MD   traZODone (DESYREL) 50 MG tablet Take 0.5-1 tablets (25-50 mg) by mouth nightly as needed for sleep  Patient taking differently: Take 50 mg by mouth nightly as needed  for sleep 12/23/22   Hector Hendrix MD       Scheduled Meds    acetaminophen  650 mg Oral 4x Daily     alectinib  600 mg Oral BID w/meals     artifical saliva  2 spray Swish & Spit 4x Daily     aspirin  81 mg Oral Daily    Or     aspirin  81 mg Oral or NG Tube Daily     atorvastatin  40 mg Oral QPM     clopidogrel  75 mg Oral or NG Tube Daily     diclofenac  4 g Topical 4x Daily     enoxaparin ANTICOAGULANT  40 mg Subcutaneous Q24H     latanoprost  1 drop Both Eyes QPM     levofloxacin  750 mg Intravenous Q24H     sodium chloride (PF)  3 mL Intracatheter Q8H       Infusion Meds    - MEDICATION INSTRUCTIONS -         PRN Meds  acetaminophen **OR** acetaminophen, bisacodyl, hydrOXYzine, ibuprofen, lidocaine 4%, lidocaine (buffered or not buffered), - MEDICATION INSTRUCTIONS -, melatonin, naloxone **OR** naloxone **OR** naloxone **OR** naloxone, oxyCODONE, polyethylene glycol, prochlorperazine **OR** prochlorperazine **OR** prochlorperazine, senna-docusate **OR** senna-docusate, sodium chloride, sodium chloride (PF), [Held by provider] tiZANidine, traMADol, traZODone    Allergies   Allergies   Allergen Reactions     Adhesive Tape Dermatitis     Skin Sensitivity to Adhesive ie. Lidocaine patch, tele patches, tapes     Augmentin Diarrhea     Celebrex [Celecoxib] Rash     Lidocaine      Skin sensitivity to Lidocaine patch adhesive     Family History   Family History   Problem Relation Age of Onset     Dementia Mother      Myocardial Infarction Father         later in life     Stomach Cancer Father      Breast Cancer Sister         x2 sisters (post-menopausal)     Diabetes No family hx of      Cerebrovascular Disease No family hx of      Coronary Artery Disease Early Onset No family hx of      Ovarian Cancer No family hx of      Colon Cancer No family hx of      Social History   Social History     Tobacco Use     Smoking status: Never     Smokeless tobacco: Never   Vaping Use     Vaping Use: Never used   Substance Use  Topics     Alcohol use: Not Currently     Comment: rare     Drug use: No       Review of Systems   Review of systems not obtained due to patient factors - critical condition       PHYSICAL EXAMINATION  Temp:  [97.3  F (36.3  C)-98  F (36.7  C)] 98  F (36.7  C)  Pulse:  [70-93] 82  Resp:  [15-18] 15  BP: ()/() 134/57  SpO2:  [92 %-97 %] 93 %     Neurologic  Mental Status:  alert, oriented x 3, follows commands, speech clear and fluent, naming and repetition normal  Cranial Nerves:  visual fields intact, PERRL, EOMI with normal smooth pursuit, facial sensation intact and symmetric, hearing not formally tested but intact to conversation, palate elevation symmetric and uvula midline, shoulder shrug strong bilaterally, tongue protrusion midline, rigth face droop. mild dysathria  Motor:  normal muscle tone and bulk, no abnormal movements, able to move all limbs spontaneously, strength 5/5 throughout upper and lower extremities,  Reflexes:  toes down-going  Sensory:  light touch sensation intact and symmetric throughout upper and lower extremities, no extinction on double simultaneous stimulation   Coordination:  normal finger-to-nose and heel-to-shin bilaterally without dysmetria, rapid alternating movements symmetric  Station/Gait:  deferred    Dysphagia Screen  Per Nursing    Stroke Scales    NIHSS  1a. Level of Consciousness 1-->Not alert, but arousable by minor stimulation to obey, answer, or respond   1b. LOC Questions 1-->Answers one question correctly   1c. LOC Commands 0-->Performs both tasks correctly   2.   Best Gaze 0-->Normal   3.   Visual 0-->No visual loss   4.   Facial Palsy 1-->Minor paralysis (flattened nasolabial fold, asymmetry on smiling)   5a. Motor Arm, Left 0-->No drift, limb holds 90 (or 45) degrees for full 10 secs   5b. Motor Arm, Right 1-->Drift, limb holds 90 (or 45) degrees, but drifts down before full 10 secs, does not hit bed or other support   6a. Motor Leg, Left 0-->No drift,  leg holds 30 degree position for full 5 secs   6b. Motor Leg, right 0-->No drift, leg holds 30 degree position for full 5 secs   7.   Limb Ataxia 0-->Absent   8.   Sensory 0-->Normal, no sensory loss   9.   Best Language 0-->No aphasia, normal   10. Dysarthria 2-->Severe dysarthria, patients speech is so slurred as to be unintelligible in the absence of or out of proportion to any dysphasia, or is mute/anarthric   11. Extinction and Inattention  0-->No abnormality   Total 6 (02/08/23 0310)       Modified Marion Score (Pre-morbid)  3-Moderate disability; requiring some help, but able to walk without assistance    Imaging  I personally reviewed all imaging; relevant findings per HPI.     Lab Results Data   CBC  Recent Labs   Lab 02/07/23  1125 02/06/23  1443 02/05/23  0750   WBC 8.9 10.8 9.7   RBC 3.92 4.09 3.97   HGB 12.7 13.2 12.8   HCT 38.2 39.1 38.2    226 334     Basic Metabolic Panel    Recent Labs   Lab 02/07/23  2112 02/07/23  1814 02/07/23  1136 02/07/23  1125 02/06/23  2146 02/06/23  1537 02/06/23  1423 02/05/23  1752 02/05/23  0750   NA  --   --   --  133*  --  133*  --   --  138   POTASSIUM  --   --   --  4.0  --  4.0  --  4.1 3.4   CHLORIDE  --   --   --  98  --  99  --   --  102   CO2  --   --   --  24  --  26  --   --  25   BUN  --   --   --  20.4  --  21.4  --   --  10.3   CR  --   --   --  0.66  --  0.72  --   --  0.55   * 123* 130* 132*   < > 111*   < >  --  112*   SAGE  --   --   --  9.1  --  8.7*  --   --  8.8    < > = values in this interval not displayed.     Liver Panel  Recent Labs   Lab 02/02/23  0534   PROTTOTAL 6.2*   ALBUMIN 3.7   BILITOTAL 0.2   ALKPHOS 70   AST 30   ALT 19     INR    Recent Labs   Lab Test 02/07/23  1125 02/06/23  1443   INR 1.06 0.97      Lipid Profile    Recent Labs   Lab Test 02/06/23  1537 10/08/19  1200 04/16/19  1051 12/11/15  0959 06/01/15  1205 01/21/15  0931   CHOL 171 174 167   < > 137 133   HDL 42* 45* 45*   < > 44* 54   * 90 99   < > 67 67    TRIG 124 197* 115   < > 129 61   CHOLHDLRATIO  --   --   --   --  3.1 2.5    < > = values in this interval not displayed.     A1C    Recent Labs   Lab Test 02/05/23  0750   A1C 6.3*     Troponin    Recent Labs   Lab 02/02/23  0534   CTROPT 12

## 2023-02-08 NOTE — PROGRESS NOTES
Paged in regards to transient RUE weakness, dysarthria, speech disturbance.     In setting of high grade focal L M1 stenosis - -140s during event.    Pt had similar transient RUE paresis yesterday.    Pt now improved, near baseline.     Defer additional imaging to AM stroke team - will assess with exam in AM.    Antoine Grullon MD PGY5 Stroke Fellow

## 2023-02-08 NOTE — PROVIDER NOTIFICATION
"MD Notification    Notified Person: MD    Notified Person Name: Antoine Grullon - Neurology     Notification Date/Time: 2/8/23 0330    Notification Interaction: AmCom    Purpose of Notification: \"Pt having another episode of slurred speech, WFD, and RUE weakness, 2/5 strength. BP's good at 142/58 and recheck of 135/56.\"     Orders Received:    Comments:    "

## 2023-02-09 NOTE — PROGRESS NOTES
[facility]    Medicine Progress Note - Hospitalist Service    Date of Admission:  2/2/2023    Assessment & Plan   Chiquita Berry is a 82 year old female admitted on 2/2/2023. Past history metastatic adenocarcinoma of the lung, hypertension, chronic back pain secondary to compression fracture who presents with generalized weakness.  She also has possible healthcare associated pneumonia as well as chest wall pain due to recent fall where she struck her chest on her walker.      Multiple small foci of acute/subacute infarcts involving the left corona radiata and centrum semiovale bilaterally as well as the parieto-occipital region, in a watershed type distribution - MRI brain 2/8/23    Stroke Neurology follow-up     Right-sided facial droop and aphasia with stroke neurology consult, ongoing follow-up appreciated; waxing and waning symptoms; monitor    Neurochecks, vital signs, telemetry, blood sugar monitoring, permissive hypertension    Daily aspirin, Plavix per stroke neurology recommendations; dual antiplatelet therapy for 90 days then aspirin 81 mg daily for monotherapy    Continue statin, atorvastatin    Telemetry    Physical therapy (PT), occupational therapy (OT), speech and language therapy (SLP) consults    AM labs as ordered    Generalized weakness  Concern for possible healthcare associated pneumonia (HAP)  Moderate left pleural effusion, chest x-ray 2/7/23  Multifactorial weakness in setting of lung cancer, dehydration, prior chemotherapy. Prior  chest x-ray shows left basilar consolidation and effusion which is unchanged from prior.    Continue levofloxacin per oncology recommendations; IV to oral therapy at time of discharge    Chest x-ray 2/7/2023 with moderate left pleural effusion, decreased compared to previous with underlying atelectasis/consolidation left lower lobe; observe, consider thoracentesis if fever, worsening leukocytosis, or oncology feels necessary     Chest  "wall pain, due to chest contusion from fall and possible mid sternal fracture  Prior to this presentation, patient became weak and fell, hitting her chest on her walker.  She has had subsequent pain in her sternum and ribs anteriorly.  She does not have shortness of breath, crepitus, or evidence of pneumothorax.    Prior chest x-ray shows minimal notching of the anterior cortex of the mid sternum on lateral view that is new from prior and could represent an acute, nondisplaced fracture.    Monitor oxygen saturations, pulmonary hygiene, incentive spirometry    Pain control as ordered, monitor for opiate side effects; not opiates as able for pain control    Acute kidney injury (GALE)  Hyponatremia  Hypokalemia, mild    Monitor serum creatinine, urine output, and electrolytes    Reason normalization of serum sodium, 137 on 2/8/2023    Replace low potassium as needed, monitor     Metastatic adenocarcinoma of the lung    Follows with [facility], Dr. Pantoja, help appreciated    alectinib (Alecensa) therapy as per Oncology, oral twice daily with meals     Chronic low back pain secondary to compression fracture    Pain control, monitor; therapy services consulted     Anxiety  Previously reported it is related to cancer diagnosis    Offer comfort and support; continue hydroxyzine as needed, monitor for side effects       Diet: Regular Diet Adult    DVT Prophylaxis: Enoxaparin (Lovenox) SQ  Mcallister Catheter: Not present  Lines: None     Cardiac Monitoring: ACTIVE order. Indication: Syncope- low cardiac risk (24 hours)  Code Status: No CPR- Do NOT Intubate      Clinically Significant Risk Factors                        # Overweight: Estimated body mass index is 25.42 kg/m  as calculated from the following:    Height as of this encounter: 1.575 m (5' 2\").    Weight as of this encounter: 63 kg (139 lb)., PRESENT ON ADMISSION         Disposition Plan      Expected Discharge Date: 02/10/2023      Destination: inpatient " "rehabilitation facility  Discharge Comments: TCU placement pending: referrals sent to [facility]. SW following: prior auth needed. IV levoquin for possible pneumonia.          Ezio Flores MD  Hospitalist Service  [facility]  Securely message with [employer] (more info)  Text page via [employer] Paging/Directory   ______________________________________________________________________    Interval History   Slept \"pretty well\", on/of sternal pain stable, talkative and interactive    Physical Exam   Vital Signs: Temp: 97.5  F (36.4  C) Temp src: Oral BP: (!) 150/70 Pulse: 86   Resp: 18 SpO2: 97 % O2 Device: Nasal cannula Oxygen Delivery: 2 LPM  Weight: 139 lbs 0 oz    GENERAL awake and alert, sitting on the edge of the bed  HEENT no conjunctival injection or jaundice  LUNGS moderate inspiratory effort, no wheezes or crackles  HEART S1, S2 regular rate and rhythm; no rubs or gallops  ABDOMEN soft, nontender to palpation; no guarding, rebound, or rigidity  MUSCULOSKELETAL extremities without edema  SKIN warm and dry  NEURO moves upper and lower extremities spontaneously and to command  MENTAL STATUS answering questions and following simple commands    Medical Decision Making             Data     I have personally reviewed the following data over the past 24 hrs:    N/A  \   N/A   / N/A     N/A N/A N/A /  N/A   3.5 N/A N/A \       Imaging results reviewed over the past 24 hrs:   Recent Results (from the past 24 hour(s))   MR Brain Perfusion wo & w Contrast    Narrative    EXAM: MR BRAIN PERFUSION W/O and W CONTRAST  LOCATION: [facility]  DATE/TIME: 2/8/2023 10:00 PM    INDICATION: perfusion deficit to evaluate L MCA stenosis  COMPARISON: CTA head and neck and MRI brain 02/07/2023  CONTRAST: 15mL Gadavist   TECHNIQUE: Multiplanar multisequence head MRI without and with intravenous contrast including dynamic susceptibility contrast perfusion " imaging.    FINDINGS:  INTRACRANIAL CONTENTS: Stable small volume cluster watershed-type infarcts in the left corona radiata/centrum semiovale. No definite new infarct. No mass, acute hemorrhage, or extra-axial fluid collections. Patchy nonspecific T2/FLAIR hyperintensities   within the cerebral white matter most consistent with mild to moderate chronic microvascular ischemic change. Mild to moderate generalized cerebral atrophy. No hydrocephalus. Normal position of the cerebellar tonsils.     SELLA: No abnormality accounting for technique.    OSSEOUS STRUCTURES/SOFT TISSUES: Normal marrow signal. The major intracranial vascular flow voids are maintained.     ORBITS: No abnormality accounting for technique.     SINUSES/MASTOIDS: No paranasal sinus mucosal disease. No middle ear or mastoid effusion.       Impression    IMPRESSION:  1.  Stable involving small volume of watershed territory infarcts in the left corona radiata/centrum semiovale ovale.  2.  The perfusion images are not loading fully and cannot be evaluated. An addendum will be issued if these images are corrected.

## 2023-02-09 NOTE — PROGRESS NOTES
RN spoke with kobi Samson. Informed her that pt unable to take morning chemo med at 0800 secondary to nausea, pt took her mediation at 1129. Per pharmacist, OK to give evening chemo med as scheduled at 1800 tonight.

## 2023-02-09 NOTE — CONSULTS
Stroke Education Note    The following information has been reviewed with the patient:    1. Warning signs of stroke    2. Calling 911 if having warning signs of stroke    3. All modifiable risk factors: hypertension, CAD, atrial fib, diabetes, hypercholesterolemia, smoking, substance abuse, diet, physical inactivity, obesity, sleep apnea.    4. Patient's risk factors for stroke which include: HTN, HLD, pre-diabetes, physical inactivity    5. Follow-up plan for after discharge    6. Discharge medications which include: ASA, Plavix, Lipitor    In addition, the above information was given to the patient in writing as a part of the [facility] Stroke Class Handout.    Learner's response to risk factors / lifestyle modification education: Need to change     Amira Demarco RN

## 2023-02-09 NOTE — PROGRESS NOTES
[facility]    Stroke Progress Note    Reason for Consult: Stroke Code     Chief Complaint: Generalized Weakness      HPI  Chiquita Berry is a 82 year old female w/ PMhx of metastatic adenocarcinoma of the lung, hypertension and chronic back pain who originally presented with generalized weakness and chest wall pain after a fall. She had a stroke alert called 2/6 due to right face droop and dysarthria. She was LKN 1250 and was found at 215pm, she notes her speech is slurred and that her right face is drooping but that her symptoms are not disabling.     Imaging Findings  Per personal review: Head CT/CTA showed no acute hemorrhage, no acute stroke and no large vessel occlusion. There is severe L MCA stenosis and L PCA stenosis    Official Radiology read is pending if any acute findings on official report please page back for further management.        HEAD CTA:  1.  Short segment high-grade narrowing of proximal M2 segment of the  left middle cerebral artery from the left middle cerebral artery has  normal caliber distal to this area.   2.  Moderate narrowing of the P2 segment of the left posterior  cerebral artery. The left posterior cerebral artery has normal caliber  distal to this area.   3.  No high-grade stenoses or occlusion of the rest of the major  intracranial arteries. No intracranial aneurysms.     NECK CTA:  1. No hemodynamically significant narrowing of the internal carotid  arteries bilaterally based on the NASCET criteria.   2. Beaded appearance of the right internal carotid artery, suspicious  for fibromuscular dysplasia.  3. The vertebral arteries are patent throughout their course in the  neck.  4. Large left pleural effusion and associated left upper lobe  Atelectasis.                                                                     IMPRESSION:  1.  No intracranial hemorrhage, mass lesions, hydrocephalus or CT  evidence for an acute infarct.  2.  Mild diffuse  cerebral parenchymal volume loss. Presumed chronic  hypertensive/microvascular ischemic white matter changes.    MRI brain [facility]                                                                   IMPRESSION:  1. Multiple small foci of acute/subacute infarcts involving the left  corona radiata and centrum semiovale bilaterally as well as the  parieto-occipital region, in a watershed type distribution.   2. Mild diffuse cerebral parenchymal volume loss. Presumed chronic  hypertensive/microvascular ischemic white matter changes.    ECHO:  1. Normal biventricular size and function. Left ventricular ejection fraction  of 60-65%.  2. No segmental wall motion abnormalities noted.  3. No hemodynamically significant valvular disease. Aortic sclerosis without  stenosis.  Compared to prior study on 9/11/2013, no change.    Endovascular Treatment  Not initiated due to absence of proximal vessel occlusion    Impression   R face droop/ dysathria- could be due to ischemic stroke, she finds her symptoms non-disabling and feels the risk of hemorrhage with TNK does not outweigh the benefit. Patient was in agreement that she would not want to receive TNK medication given the non-disabling nature of her symptoms. She has vascular risk factors with HTN. Stroke could be due to small vessel disease. Other possible etiologies include cardioembolic.   , A1c 6.3      Interval events 2/7:had a change in exam with word finding difficulty after BP dropped to 83/45, she was laid down and her symptoms improved but still had persistent word finding difficulty. Repeat CTA done showed consistent stenosis.Echo was negative for low EF.  Routine EEG.     Recommendations    - Neurochecks and Vital Signs every Q4H   - Normalize -160- avoid hypotension  - Daily aspirin 81 mg for secondary stroke prevention  - Plavix (clopidogrel) 75 mg PO Daily  - Statin: Lipitor 40mg  - Bedside Glucose Monitoring  - PT/OT/SLP- TCU  - Stroke Education  -  "Euthermia, Euglycemia  - cardiac event monitor  - DAPT 90 days then aspirin 81mg monotherapy   -Long term goals, BP <130/80, LDL <70, A1c <7- recommend slow titration down of blood pressure to meet long term goal over the next week.  -Recommend oral hydration- increase PO intake by 1 L of Gatorade/pedialyte         Patient Follow-up     - final recommendation pending work-up  - in 6-8 weeks with general neurology ([phone])    Thank you for this consult. We will continue to follow. peripherally for MRP results      The Stroke Staff is Dr. Lacy.    Purnima Johnston MD  Vascular Neurology Fellow    To page me or covering stroke neurology team member, click here: AMCOM  Choose \"On Call\" tab at top, then select \"NEUROLOGY/ALL SITES\" from middle drop-down box, press Enter, then look for \"stroke\" or \"telestroke\" for your site.    ______________________________________________________    Clinically Significant Risk Factors                         # Overweight: Estimated body mass index is 25.42 kg/m  as calculated from the following:    Height as of this encounter: 1.575 m (5' 2\").    Weight as of this encounter: 63 kg (139 lb)., PRESENT ON ADMISSION             Past Medical History   Past Medical History:   Diagnosis Date     Anxiety     related to cancer diagnosis     Chronic back pain     due to compression fracture     Osteoporosis     followed by outside endocrinologist     Primary lung adenocarcinoma (H)     stage IV     Past Surgical History   Past Surgical History:   Procedure Laterality Date     CATARACT IOL, RT/LT Bilateral      OPEN REDUCTION INTERNAL FIXATION HUMERUS DISTAL Right      PHACOEMULSIFICATION CLEAR CORNEA WITH STANDARD INTRAOCULAR LENS IMPLANT Left 11/16/2015    Procedure: PHACOEMULSIFICATION CLEAR CORNEA WITH STANDARD INTRAOCULAR LENS IMPLANT;  Surgeon: Latrell Jessica MD;  Location: [facility]     TONSILLECTOMY & ADENOIDECTOMY       Medications   Home Meds  Prior to Admission medications  "   Medication Sig Start Date End Date Taking? Authorizing Provider   alectinib (ALECENSA) 150 MG CAPS Take 4 capsules (600 mg) by mouth 2 times daily (with meals) 1/9/23  Yes Fer Pantoja MD   Cholecalciferol (VITAMIN D) 2000 UNITS tablet Take 1 tablet by mouth daily.   Yes Reported, Patient   hydrOXYzine (ATARAX) 10 MG tablet Take 1 tablet (10 mg) by mouth 3 times daily as needed for anxiety 12/28/22  Yes Aniya Cali APRN CNP   ibuprofen (ADVIL/MOTRIN) 400 MG tablet Take 1 tablet (400 mg) by mouth every 6 hours as needed for moderate pain 10/8/21  Yes Vesna Olivera MD   latanoprost (XALATAN) 0.005 % ophthalmic solution Place 1 drop into both eyes every evening  8/12/19  Yes Reported, Patient   melatonin 3 MG CAPS Take 3 mg by mouth At Bedtime And 3mg prn(total of 6mg ok for sleep)   Yes Reported, Patient   Multiple Vitamin (MULTIVITAMIN ADULT PO) Take 1 tablet by mouth daily   Yes Unknown, Entered By History   Multiple Vitamins-Minerals (PRESERVISION AREDS 2) CAPS Take 1 capsule by mouth 2 times daily   Yes Reported, Patient   polyethylene glycol (MIRALAX) 17 GM/Dose powder Take 17 g by mouth daily 8/18/21  Yes Jeanine Lieberman APRN CNP   polyethylene glycol 400 (BLINK TEARS) 0.25 % SOLN ophthalmic solution Place 1 drop into both eyes daily And Q2H PRN   Yes Unknown, Entered By History   prochlorperazine (COMPAZINE) 5 MG tablet Take 1 tablet (5 mg) by mouth every 6 hours as needed for nausea or vomiting 1/26/23  Yes Fer Pantoja MD   tiZANidine (ZANAFLEX) 2 MG tablet TAKE 1 TABLET BY MOUTH EVERY 12 HOURS AS NEEDED FOR MUSCLE SPASMS 5/20/22  Yes Vesna Olivera MD   traMADol (ULTRAM) 50 MG tablet TAKE 1 TABLET (50 MG) BY MOUTH EVERY 6 HOURS AS NEEDED FOR SEVERE PAIN 12/23/22  Yes Vesna Olivera MD   acetaminophen (TYLENOL) 325 MG tablet Take 650 mg by mouth 4 times daily And 650mg po daily prn pain 8/31/21   Reported, Patient   sertraline (ZOLOFT) 25 MG tablet Take 1 tablet (25 mg) by  mouth daily 1/18/23   Vesna Olivera MD   traZODone (DESYREL) 50 MG tablet Take 0.5-1 tablets (25-50 mg) by mouth nightly as needed for sleep  Patient taking differently: Take 50 mg by mouth nightly as needed for sleep 12/23/22   Hector Hendrix MD       Scheduled Meds    acetaminophen  650 mg Oral 4x Daily     alectinib  600 mg Oral BID w/meals     artifical saliva  2 spray Swish & Spit 4x Daily     aspirin  81 mg Oral Daily    Or     aspirin  81 mg Oral or NG Tube Daily     atorvastatin  40 mg Oral QPM     clopidogrel  75 mg Oral or NG Tube Daily     diclofenac  4 g Topical 4x Daily     enoxaparin ANTICOAGULANT  40 mg Subcutaneous Q24H     latanoprost  1 drop Both Eyes QPM     levofloxacin  750 mg Intravenous Q24H     sodium chloride (PF)  3 mL Intracatheter Q8H       Infusion Meds    - MEDICATION INSTRUCTIONS -         PRN Meds  acetaminophen **OR** acetaminophen, bisacodyl, hydrOXYzine, lidocaine 4%, lidocaine (buffered or not buffered), - MEDICATION INSTRUCTIONS -, melatonin, naloxone **OR** naloxone **OR** naloxone **OR** naloxone, oxyCODONE, polyethylene glycol, prochlorperazine **OR** prochlorperazine **OR** prochlorperazine, senna-docusate **OR** senna-docusate, sodium chloride, sodium chloride (PF), [Held by provider] tiZANidine, traMADol, traZODone    Allergies   Allergies   Allergen Reactions     Adhesive Tape Dermatitis     Skin Sensitivity to Adhesive ie. Lidocaine patch, tele patches, tapes     Augmentin Diarrhea     Celebrex [Celecoxib] Rash     Lidocaine      Skin sensitivity to Lidocaine patch adhesive     Family History   Family History   Problem Relation Age of Onset     Dementia Mother      Myocardial Infarction Father         later in life     Stomach Cancer Father      Breast Cancer Sister         x2 sisters (post-menopausal)     Diabetes No family hx of      Cerebrovascular Disease No family hx of      Coronary Artery Disease Early Onset No family hx of      Ovarian Cancer No family  hx of      Colon Cancer No family hx of      Social History   Social History     Tobacco Use     Smoking status: Never     Smokeless tobacco: Never   Vaping Use     Vaping Use: Never used   Substance Use Topics     Alcohol use: Not Currently     Comment: rare     Drug use: No       Review of Systems   Review of systems not obtained due to patient factors - critical condition       PHYSICAL EXAMINATION  Temp:  [97.4  F (36.3  C)-97.8  F (36.6  C)] 97.5  F (36.4  C)  Pulse:  [66-79] 73  Resp:  [16-18] 18  BP: (110-142)/(48-82) 131/62  SpO2:  [92 %-95 %] 95 %     Neurologic  Mental Status:  alert, oriented x 3, follows commands, speech clear and fluent, naming and repetition normal  Cranial Nerves:  visual fields intact, PERRL, EOMI with normal smooth pursuit, facial sensation intact and symmetric, hearing not formally tested but intact to conversation, palate elevation symmetric and uvula midline, shoulder shrug strong bilaterally, tongue protrusion midline, rigth face droop. mild dysathria  Motor:  normal muscle tone and bulk, no abnormal movements, able to move all limbs spontaneously, strength 5/5 throughout upper and lower extremities,  Reflexes:  toes down-going  Sensory:  light touch sensation intact and symmetric throughout upper and lower extremities, no extinction on double simultaneous stimulation   Coordination:  normal finger-to-nose and heel-to-shin bilaterally without dysmetria, rapid alternating movements symmetric  Station/Gait:  deferred    Dysphagia Screen  Per Nursing    Stroke Scales    NIHSS  1a. Level of Consciousness 0-->Alert, keenly responsive   1b. LOC Questions 0-->Answers both questions correctly   1c. LOC Commands 0-->Performs both tasks correctly   2.   Best Gaze 0-->Normal   3.   Visual 0-->No visual loss   4.   Facial Palsy 1-->Minor paralysis (flattened nasolabial fold, asymmetry on smiling)   5a. Motor Arm, Left 0-->No drift, limb holds 90 (or 45) degrees for full 10 secs   5b. Motor  Arm, Right 0-->No drift, limb holds 90 (or 45) degrees for full 10 secs   6a. Motor Leg, Left 0-->No drift, leg holds 30 degree position for full 5 secs   6b. Motor Leg, right 0-->No drift, leg holds 30 degree position for full 5 secs   7.   Limb Ataxia 0-->Absent   8.   Sensory 0-->Normal, no sensory loss   9.   Best Language 0-->No aphasia, normal   10. Dysarthria 1-->Mild-to-moderate dysarthria, patient slurs at least some words and, at worst, can be understood with some difficulty   11. Extinction and Inattention  0-->No abnormality   Total 2 (02/09/23 0400)       Modified Charlotte Score (Pre-morbid)  3-Moderate disability; requiring some help, but able to walk without assistance    Imaging  I personally reviewed all imaging; relevant findings per HPI.     Lab Results Data   CBC  Recent Labs   Lab 02/08/23  0818 02/07/23  1125 02/06/23  1443 02/05/23  0750   WBC  --  8.9 10.8 9.7   RBC  --  3.92 4.09 3.97   HGB  --  12.7 13.2 12.8   HCT  --  38.2 39.1 38.2    332 226 334     Basic Metabolic Panel    Recent Labs   Lab 02/08/23  0828 02/08/23  0818 02/07/23  2112 02/07/23  1814 02/07/23  1136 02/07/23  1125 02/06/23  2146 02/06/23  1537 02/05/23  1752 02/05/23  0750   NA  --  137  --   --   --  133*  --  133*  --  138   POTASSIUM  --  3.6  --   --   --  4.0  --  4.0   < > 3.4   CHLORIDE  --   --   --   --   --  98  --  99  --  102   CO2  --   --   --   --   --  24  --  26  --  25   BUN  --   --   --   --   --  20.4  --  21.4  --  10.3   CR  --  0.63  --   --   --  0.66  --  0.72  --  0.55   *  --  165* 123*   < > 132*   < > 111*   < > 112*   SAGE  --   --   --   --   --  9.1  --  8.7*  --  8.8    < > = values in this interval not displayed.     Liver Panel  No results for input(s): PROTTOTAL, ALBUMIN, BILITOTAL, ALKPHOS, AST, ALT, BILIDIRECT in the last 168 hours.  INR    Recent Labs   Lab Test 02/07/23  1125 02/06/23  1443   INR 1.06 0.97      Lipid Profile    Recent Labs   Lab Test 02/06/23  1537  10/08/19  1200 04/16/19  1051 12/11/15  0959 06/01/15  1205 01/21/15  0931   CHOL 171 174 167   < > 137 133   HDL 42* 45* 45*   < > 44* 54   * 90 99   < > 67 67   TRIG 124 197* 115   < > 129 61   CHOLHDLRATIO  --   --   --   --  3.1 2.5    < > = values in this interval not displayed.     A1C    Recent Labs   Lab Test 02/05/23  0750   A1C 6.3*     Troponin    No results for input(s): CTROPT, TROPONINIS, TROPONINI, GHTROP in the last 168 hours.

## 2023-02-09 NOTE — PLAN OF CARE
Reason for Admission: L corona radiata stroke, L MCA/PCA stenosis     Neuros: A/O x4, Slight R droop. Slurred speech, RUE weakness.  Cardio: NSR, VSS   Pulmonary: LS clear, on 2L NC  GI/: Regular diet with thin liquids. Takes pills whole. BM x2, continent x2  Pain: Chest pain- PRN Oxycodone, Tramadol and scheduled Tylenol given.   Drains/Lines: L F/A PIV x2  Skin: Intact with scattered bruising. Redness on thoracic spine- Mepilex in place. Activity: Assist x 1 with GB/Walker    Plan: Pain management, MRI completed, TCU Pending

## 2023-02-09 NOTE — PROGRESS NOTES
Care Management Follow Up    Length of Stay (days): 2    Expected Discharge Date: 02/10/2023     Concerns to be Addressed:       Patient plan of care discussed at interdisciplinary rounds: Yes    Anticipated Discharge Disposition:       Anticipated Discharge Services:    Anticipated Discharge DME:      Patient/family educated on Medicare website which has current facility and service quality ratings:    Education Provided on the Discharge Plan:    Patient/Family in Agreement with the Plan:      Referrals Placed by CM/SW:    Private pay costs discussed: Not applicable    Additional Information:  Writer paged  to ask if it would be ok to hold the chemo medication (Alectinib (Alecensa) 600mg) while at [facility].  called writer back and said that the Pt was just diagnosed with lung cancer and would be ok with it being held for 1 week. But also stated that they supply the Pt with her medication and that if the facility would let her bring in her own medication for her to take the facility wouldn't have to pay for it.     Please contact  for any other questions or needs.          Tarah Rubin, RN, BSN, Care Coordinator

## 2023-02-09 NOTE — PROGRESS NOTES
Brief Clinical Note:    Spoke with Radiology who is trying to fix the post-processed images in order for them to be diagnostic. She has had no further episodes so suspicion for ischemic tissue at risk is low.     Ok to dispo from a neuro standpoint.   Purnima Johnston  Vascular Stroke Fellow      Stroke staff is Dr. Lacy

## 2023-02-09 NOTE — PLAN OF CARE
Goal Outcome Evaluation:  A and O x4. Slurred speech, mild RUE weakness without drift, and R facial droop. NIH = 2. No episodes of increased weakness or aphasia today. VSS. Pain in sternum, taking PO ultram and Tylenol with fair relief, also using warm packs to chest. Fair appetite on regular diet. Gatorade ordered per neurology, pt encouraged to drink extra fluids as able. Up with 1, belt, walker to bathroom. Voiding without difficulty, small BM x2. EEG completed. Awaiting MRI. Scoring green on aggression screening tool.

## 2023-02-09 NOTE — PROGRESS NOTES
Care Management Follow Up    Length of Stay (days): 2    Expected Discharge Date: 02/10/2023     Concerns to be Addressed: discharge planning     Patient plan of care discussed at interdisciplinary rounds: Yes    Anticipated Discharge Disposition: Transitional Care     Anticipated Discharge Services:    Anticipated Discharge DME:      Patient/family educated on Medicare website which has current facility and service quality ratings: yes  Education Provided on the Discharge Plan:    Patient/Family in Agreement with the Plan: yes    Referrals Placed by CM/SW: Post Acute Facilities  Private pay costs discussed: Not applicable    Additional Information:  No accepting TCU yet. Barrier to placement is the cost of pt's chemo med. TCU's can not allow pt to bring in outside medications due to Medicare policy. Also, pt will need to be placed in a private room on chemo precautions. Have asked Joni Osman and Kimberly to re-asess with the above response and pt declined. Voicemail left for Raheel Younger. [facility] is re-assessing. SW following.      JUSTINA Marino, LGSW  [phone] Desk phone  [phone] Cell/text (Preferred)  [facility]

## 2023-02-09 NOTE — PROGRESS NOTES
Speech/Language Evaluation  02/09/23 0085   Appointment Info   Signing Clinician's Name / Credentials (SLP) Eli Marcum BS Student SLP   Student Supervision Direct supervision provided   General Information   Onset of Illness/Injury or Date of Surgery 02/02/23   Referring Physician Manuela Clay, NIMCO MALDONADO   Patient/Family Therapy Goal Statement (SLP) Didn't state.   Pertinent History of Current Problem Per provider documentation - Chiquita Berry is a 82 year old female admitted on 2/2/2023. Past history metastatic adenocarcinoma of the lung, hypertension, chronic back pain secondary to compression fracture who presents with generalized weakness.  She also has possible healthcare associated pneumonia as well as chest wall pain due to recent fall where she struck her chest on her walker.  Registered to observation for further management.   General Observations Pt alert and able to recall medical history. Conversational speech with no obvious deficits - pt denied any obvious changes in speech/language function.       Present no   Pain Assessment   Patient Currently in Pain   (Chronic cancer pain reported)   Type of Evaluation   Type of Evaluation Speech, Language, Cognition   Oral Motor   Oral Musculature generally intact   Structural Abnormalities none present   Mucosal Quality good   Dentition (Oral Motor)   Dentition (Oral Motor) natural dentition   Facial Symmetry (Oral Motor)   Facial Symmetry (Oral Motor) WNL   Lip Function (Oral Motor)   Lip Range of Motion (Oral Motor) WNL   Lip Sensitivity (Oral Motor) not tested   Lip Strength (Oral Motor) not tested   Tongue Function (Oral Motor)   Tongue Sensitivity (Oral Motor) not tested   Tongue Strength (Oral Motor) not tested   Tongue Coordination/Speed (Oral Motor) not tested   Tongue ROM (Oral Motor) not tested   Jaw Function (Oral Motor)   Jaw Function (Oral Motor) not tested   Cough/Swallow/Gag Reflex (Oral Motor)   Soft  Palate/Velum (Oral Motor) not tested   Gag Reflex (Oral Motor) not tested   Volitional Throat Clear/Cough (Oral Motor) not tested   Volitional Swallow (Oral Motor) not tested   Vocal Quality/Secretion Management (Oral Motor)   Vocal Quality (Oral Motor) not tested   Secretion Management (Oral Motor) WNL   Motor Speech   Speech Intelligibility (Motor Speech) WNL   Comment, Motor Speech Assessment No obvious dysarthria.   Respiration (motor speech) None   Phonation (motor speech) Adequate   Western Aphasia Battery- Revised Bedside Record From   Spontaneous Speech Content Score (out of 10) 9   Spontaneous Speech Fluency Score (out of 10) 8   Auditory Verbal Comprehension Score (out of 10) 10   Sequential Commands Score (out of 10) 10   Repetition Score (out of 10) 7   Object Naming Score (out of 10) 10   Bedside Aphasia Sum 54   WAB-R Bedside Aphasia Score 90   Aphasia Severity Level Mild Aphasia   Clinical Impression   Clinical Impression Comments Administered Western Aphasia Battery (WAB). Pt had mild deficits in picture description, as well as repetition of a sentence. Pt appeared alert and oriented. She was able to fluently discuss her reason for being in the hospital. Consider high level language tasks at next site.   SLP Goals   Therapy Frequency (SLP Eval) one time eval and treatment only   SLP Discharge Planning   SLP Plan d/c   SLP Discharge Recommendation Transitional Care Facility   SLP Rationale for DC Rec Language WNL, swallow goals previously met. Consider high-level cognitive evaluation at the next level of care.   SLP Brief overview of current status  Continue regular diet and thin liquids with complete upright position, avoid straws, small bites/sips, slow rate, and alternate solids and liquids.

## 2023-02-10 NOTE — PLAN OF CARE
FOCUS/GOAL  Medical management, Medication management, Pain management, Mobility, Skin integrity, Safety management, Bowel management, and Bladder management.    ASSESSMENT, INTERVENTIONS AND CONTINUING PLAN FOR GOAL:    Pt is alert and oriented x 4. NIH 2 for right facial droop and slurry speech, forgetful at times. Vital signs stable, in 2L O2 with nasal cannula at the beginning of shift but was able to wean off and now in room air. Tele  SR with BBB. Pain: generalized pain all over, managed well with prn and scheduled pain medications. Denied fever/chills, nausea and vomiting, numbness or tingling. Ongoing shortness of breath present. Tremors in LUE (baseline). IV Access/drains: flushed and saline locked. Wound/Skin: scattered bruises, wound on back and skin tear on left thigh, new foam dressing applied on both. Chemotherapy precautions maintained. Call light within reach, able to make needs known. Pt is continent of bowel and bladder, feels constipated, prn bowel agents administered, pt had 1 small bm afterwards. Reg diet, thin liquids. No new concerns at this time. Plan for discharge to TCU when place is available.

## 2023-02-10 NOTE — PLAN OF CARE
Goal Outcome Evaluation:  A and O x4. Neuros unchanged, NIH = 2 for R facial droop and slurred speech. Mild RUE weakness, no drift. Nausea this morning, relief from IV compazine. Requested atarax for anxiety this evening. BM today. Voiding without difficultly. Up with 1, belt, walker. Worked with therapies. Fair appetite on regular diet. Pain in sternum, taking scheduled tylenol along with prn oxycodone with good relief. Pt also using warm packs for pain. Scoring green on aggression screening tool. Plan is for TCU placement when ready for discharge.

## 2023-02-10 NOTE — PLAN OF CARE
Pt is here with L watershed stroke. A&Ox4. Neuros stable: R droop and slurred speech. Baseline LUE tremor. VSS on 2L O2 via NC. Tele SR with BBB. Regular die. Up with A1, gb and walker. Voiding without difficulty. No BM this shift. Pt c/o sternum and back pain, managed with scheduled tylenol and PRN oxycodone. Pt takes pills whole with water. Meplix on midback  and back of L thigh CDI. Pt scoring green on the aggression stoplight tool. Discharge to TCU pending, continue to monitor.

## 2023-02-10 NOTE — PROGRESS NOTES
Care Management Follow Up    Length of Stay (days): 3    Expected Discharge Date: 02/11/2023     Concerns to be Addressed: discharge planning     Patient plan of care discussed at interdisciplinary rounds: Yes   [facility] following but do not have an available bed for today.  Anticipated Discharge Disposition: Acute Rehab  Anticipated Discharge Services:    Anticipated Discharge DME:      Patient/family educated on Medicare website which has current facility and service quality ratings: yes  Education Provided on the Discharge Plan:    Patient/Family in Agreement with the Plan: yes    Referrals Placed by CM/SW: Post Acute Facilities  Private pay costs discussed:     Additional Information:  Pt is on chemotherapy medications and recommended to continue. [facility] is able to accept pt with this medication per report.  Pt has BC.BS Medicare Advantage so SW will need to secure insurance authorization prior to rehab placement.    JULIO Crowell  [facility]  Care Transitions  [phone]

## 2023-02-10 NOTE — PROGRESS NOTES
[facility]    Medicine Progress Note - Hospitalist Service    Date of Admission:  2/2/2023    Assessment & Plan   Chiquita Berry is a 82 year old female admitted on 2/2/2023. Past history metastatic adenocarcinoma of the lung, hypertension, chronic back pain secondary to compression fracture who presents with generalized weakness.  She also has possible healthcare associated pneumonia as well as chest wall pain due to recent fall where she struck her chest on her walker.      Multiple small foci of acute/subacute infarcts involving the left corona radiata and centrum semiovale bilaterally and parieto-occipital region, in a watershed type distribution - see report MRI brain 2/8/23    Stroke Neurology follow-up, help appreciated    Right-sided facial droop and aphasia with stroke neurology consulted; waxing and waning symptoms previously reported, continue to monitor    Neurochecks, vital signs, telemetry, blood sugar monitoring, permissive hypertension    Daily aspirin, Plavix per stroke neurology recommendations; dual antiplatelet therapy for 90 days then aspirin 81 mg daily for monotherapy thereafter - see neuro note 2/9    Continue statin, atorvastatin    Telemetry    Physical therapy (PT), occupational therapy (OT), speech and language therapy (SLP) consults    Generalized weakness  Concern for possible healthcare associated pneumonia (HAP)  Moderate left pleural effusion, chest x-ray 2/7/23  Multifactorial weakness in setting of lung cancer, dehydration, prior chemotherapy. Prior  chest x-ray shows left basilar consolidation and effusion which is unchanged from prior.    Continue levofloxacin per oncology recommendations; switch IV to oral therapy at time of discharge    Chest x-ray 2/7/2023 with moderate left pleural effusion, decreased compared to previous with underlying atelectasis/consolidation left lower lobe; observe, consider thoracentesis if fever, worsening leukocytosis,  or oncology feels necessary    Encourage incentive spirometry    Therapy consults as above     Chest wall pain, sternum, due to chest contusion from fall and possible mid sternal fracture  Prior to this presentation, patient became weak and fell, hitting her chest on her walker.  She has had subsequent pain in her sternum and ribs anteriorly.  She does not have shortness of breath, crepitus, or evidence of pneumothorax.    Prior chest x-ray shows minimal notching of the anterior cortex of the mid sternum on lateral view that is new from prior and could represent an acute, nondisplaced fracture.    Monitor oxygen saturations, pulmonary hygiene, incentive spirometry    Pain control as ordered, monitor for opiate side effects; not opiates as able for pain control, Tylenol     Acute kidney injury (GALE)  Hyponatremia  Hypokalemia, mild  Renal function stable with correction of hypokalemia and resolution of hyponatremia.    Monitor serum creatinine, urine output, and electrolytes    Replace low potassium as needed, monitor     Metastatic adenocarcinoma of the lung    Follows with [place] oncology, Dr. Pantoja, help appreciated with recent consult    alectinib (Alecensa) therapy as per Oncology, oral twice daily with meals, review at time of discharge with oncology     Chronic low back pain secondary to compression fracture    Pain control, monitor; therapy services consulted     Anxiety  Previously reported it is related to cancer diagnosis    Offer comfort and support; continue hydroxyzine as needed, monitor for side effects    Disposition    Estimated length of stay 48 hours    Anticipated discharge to rehabilitation/TCU, social work consulted    Change in hospitalist provider tomorrow with one of my [facility] hospitalist colleagues assuming care.       Diet: Regular Diet Adult    DVT Prophylaxis: Enoxaparin (Lovenox) SQ  Mcallister Catheter: Not present  Lines: None     Cardiac Monitoring: ACTIVE order. Indication:  Recent stroke  Code Status: No CPR- Do NOT Intubate      Clinically Significant Risk Factors                                Disposition Plan      Expected Discharge Date: 02/11/2023      Destination: inpatient rehabilitation facility  Discharge Comments: TCU placement pending: referrals sent to [facility]  homes. SW following: prior auth needed. IV levoquin for possible pneumonia.          Ezio Flores MD  Hospitalist Service  [facility]  Securely message with [employer] (more info)  Text page via [employer] Paging/Directory   ______________________________________________________________________    Interval History   Sitting in chair appearing comfortable; no new complaints; on/off lower sternal pain unchanged    Physical Exam   Vital Signs: Temp: 98.2  F (36.8  C) Temp src: Oral BP: 132/58 Pulse: 83   Resp: 17 SpO2: 92 % O2 Device: None (Room air) Oxygen Delivery: 2 LPM  Weight: 135 lbs 11.2 oz    GENERAL awake and alert, sitting in her chair, talkative and interactive  HEENT no conjunctival injection or jaundice  LUNGS moderate inspiratory effort, no wheezes or crackles  HEART S1, S2 regular rate and rhythm; no rubs or gallops  ABDOMEN soft, nontender to palpation; no guarding, rebound, or rigidity  MUSCULOSKELETAL extremities without edema  SKIN warm and dry  NEURO moves upper and lower extremities spontaneously and to command  MENTAL STATUS answering questions and following simple commands    Medical Decision Making             Data     I have personally reviewed the following data over the past 24 hrs:    7.6  \   12.1   / 285     138 101 12.5 /  106 (H)   3.5 29 0.66 \       Imaging results reviewed over the past 24 hrs:   No results found for this or any previous visit (from the past 24 hour(s)).

## 2023-02-11 NOTE — PLAN OF CARE
VSS. A&Ox4. R facial droop, tremors in upper extremities. 2L O2 nasal cannula while asleep. SR with BBB. Complaint of pain in chest (sternal area d/t fall) and back. Up Ax1 GBW. Chemo precautions maintained. Tolerating regular diet, prefers pills whole in apple sauce. Waiting for TCU placement.

## 2023-02-11 NOTE — PROGRESS NOTES
[facility]    Medicine Progress Note - Hospitalist Service    Date of Admission:  2/2/2023    Assessment & Plan   Chiquita Berry is a 82 year old female admitted on 2/2/2023. Past history metastatic adenocarcinoma of the lung, hypertension, chronic back pain secondary to compression fracture who presents with generalized weakness.  She also has possible healthcare associated pneumonia as well as chest wall pain due to recent fall where she struck her chest on her walker.      Multiple small foci of acute/subacute infarcts involving the left corona radiata and centrum semiovale bilaterally and parieto-occipital region, in a watershed type distribution - see report MRI brain 2/8/23    Stroke Neurology follow-up, help appreciated    Right-sided facial droop and aphasia with stroke neurology consulted; waxing and waning symptoms previously reported, continue to monitor    Neurochecks, vital signs, telemetry, blood sugar monitoring, permissive hypertension    Daily aspirin, Plavix per stroke neurology recommendations; dual antiplatelet therapy for 90 days then aspirin 81 mg daily for monotherapy thereafter - see neuro note 2/9    Continue statin, atorvastatin    Telemetry    Physical therapy (PT), occupational therapy (OT), speech and language therapy (SLP) consults    Generalized weakness  Concern for possible healthcare associated pneumonia (HAP)  Moderate left pleural effusion, chest x-ray 2/7/23  Multifactorial weakness in setting of lung cancer, dehydration, prior chemotherapy. Prior  chest x-ray shows left basilar consolidation and effusion which is unchanged from prior.    Continue levofloxacin per oncology recommendations; switch IV to oral therapy at time of discharge    Chest x-ray 2/7/2023 with moderate left pleural effusion, decreased compared to previous with underlying atelectasis/consolidation left lower lobe; observe, consider thoracentesis if fever, worsening leukocytosis,  or oncology feels necessary    Encourage incentive spirometry    Therapy consults as above     Chest wall pain, sternum, due to chest contusion from fall and possible mid sternal fracture  Prior to this presentation, patient became weak and fell, hitting her chest on her walker.  She has had subsequent pain in her sternum and ribs anteriorly.  She does not have shortness of breath, crepitus, or evidence of pneumothorax.    Prior chest x-ray shows minimal notching of the anterior cortex of the mid sternum on lateral view that is new from prior and could represent an acute, nondisplaced fracture.    Monitor oxygen saturations, pulmonary hygiene, incentive spirometry    Pain control as ordered, monitor for opiate side effects; not opiates as able for pain control, Tylenol     Acute kidney injury (GALE)  Hyponatremia  Hypokalemia, mild  Renal function stable with correction of hypokalemia and resolution of hyponatremia.    Monitor serum creatinine, urine output, and electrolytes    Replace low potassium as needed, monitor     Metastatic adenocarcinoma of the lung    Follows with [place] oncology, Dr. Pantoja, help appreciated with recent consult    alectinib (Alecensa) therapy as per Oncology, oral twice daily with meals, review at time of discharge with oncology     Chronic low back pain secondary to compression fracture    Pain control, monitor; therapy services consulted     Anxiety  Previously reported it is related to cancer diagnosis    Offer comfort and support; continue hydroxyzine as needed, monitor for side effects    Disposition    Estimated length of stay 48 hours    Anticipated discharge to rehabilitation/TCU, social work consulted         Diet: Regular Diet Adult    DVT Prophylaxis: Enoxaparin (Lovenox) SQ  Mcallister Catheter: Not present  Lines: None     Cardiac Monitoring: None  Code Status: No CPR- Do NOT Intubate      Clinically Significant Risk Factors                                Disposition Plan       Expected Discharge Date: 02/12/2023      Destination: inpatient rehabilitation facility  Discharge Comments: Needs auth          Jessie Ferguson MD  Hospitalist Service  [facility]  Securely message with [employer] (more info)  Text page via [employer] Paging/Directory   ______________________________________________________________________    Interval History   Sitting in bed  appearing comfortable; no new complaints; on/off lower sternal pain unchanged. No new acute issues     Physical Exam   Vital Signs: Temp: (P) 97.6  F (36.4  C) Temp src: (P) Oral BP: (P) 123/51 Pulse: (P) 69   Resp: (P) 16 SpO2: (P) 93 % O2 Device: (P) None (Room air) Oxygen Delivery: 2 LPM  Weight: 135 lbs 11.2 oz    GENERAL awake and alert, sitting in her chair, talkative and interactive  HEENT no conjunctival injection or jaundice  LUNGS moderate inspiratory effort, no wheezes or crackles  HEART S1, S2 regular rate and rhythm; no rubs or gallops  ABDOMEN soft, nontender to palpation; no guarding, rebound, or rigidity  MUSCULOSKELETAL extremities without edema  SKIN warm and dry  NEURO moves upper and lower extremities spontaneously and to command  MENTAL STATUS answering questions and following simple commands    Medical Decision Making             Data     I have personally reviewed the following data over the past 24 hrs:    N/A  \   N/A   / 278     N/A N/A N/A /  N/A   N/A N/A 0.71 \       Imaging results reviewed over the past 24 hrs:   No results found for this or any previous visit (from the past 24 hour(s)).

## 2023-02-11 NOTE — PROGRESS NOTES
MD Notification    Notified Person: MD    Notified Person Name: Dr. Ferguson    Notification Date/Time: 2/11/23    Notification Interaction: Vocera     Purpose of Notification: Pt had lovenox last evening, noted bleeding from site this AM, through dressing and gown. Dressing replaced. Noted again bleeding through dressing and gown this afternoon; dose scheduled for 1600 held.    Orders Received: Lovenox discontinued.    Comments:

## 2023-02-12 NOTE — PLAN OF CARE
Alert and oriented X 4. Neuros intact except generalized weakness, R facial droop, tremors in BUE. Up with A1 with GB/WW. Tolerating diet. Chronic pain controlled with scheduled APAP, PRN oxycodone. O2 stable on RA while awake. Lovenox stopped d/t bleeding from the site, see previous note. Plan to discharge to TCU when medically stable.

## 2023-02-12 NOTE — PLAN OF CARE
Goal Outcome Evaluation:  Reason for Admission: L watershed CVAs    Cognitive/Mentation: A/Ox 4  Neuros/CMS: Intact ex R droop, slurred/dysarthric, generalized weakness, BUE tremors  VS: O2 93-96% on 2L via NC otherwise VSS.   Tele: SR BBB.  GI: BS active, passing flatus, last BM 2/10. Continent.  : Voiding adequate amt. Continent.  Pulmonary: LS diminished.  Pain: c/o chronic back pain and chest wall pain, PRN oxy and tramadol providing relief.     Drains/Lines: PIV SL  Skin: Spine mepilex intact, scattered bruising  Activity: Assist x 1 with GB/W.  Diet: Reg with thin liquids. Takes pills whole in applesauce.     Therapies recs: TCU  Discharge: pending    Aggression Stoplight Tool: green    End of shift summary: no changes overnight, PRN hydroxyzine admin x1 for anxiety.

## 2023-02-12 NOTE — PROGRESS NOTES
[facility]    Medicine Progress Note - Hospitalist Service    Date of Admission:  2/2/2023    Assessment & Plan   Chiquita Berry is a 82 year old female admitted on 2/2/2023. Past history metastatic adenocarcinoma of the lung, hypertension, chronic back pain secondary to compression fracture who presents with generalized weakness.  She also has possible healthcare associated pneumonia as well as chest wall pain due to recent fall where she struck her chest on her walker.      Multiple small foci of acute/subacute infarcts involving the left corona radiata and centrum semiovale bilaterally and parieto-occipital region, in a watershed type distribution - see report MRI brain 2/8/23    Stroke Neurology follow-up, help appreciated    Right-sided facial droop and aphasia with stroke neurology consulted; waxing and waning symptoms previously reported, continue to monitor    Neurochecks, vital signs, telemetry, blood sugar monitoring, permissive hypertension    Daily aspirin, Plavix per stroke neurology recommendations; dual antiplatelet therapy for 90 days then aspirin 81 mg daily for monotherapy thereafter - see neuro note 2/9    Continue statin, atorvastatin    Telemetry    Physical therapy (PT), occupational therapy (OT), speech and language therapy (SLP) consults    Needs ARU on discharge.  They are following patient    Generalized weakness  Concern for possible healthcare associated pneumonia (HAP)  Moderate left pleural effusion, chest x-ray 2/7/23  Multifactorial weakness in setting of lung cancer, dehydration, prior chemotherapy. Prior  chest x-ray shows left basilar consolidation and effusion which is unchanged from prior.    Continue levofloxacin per oncology recommendations; switch IV to oral therapy at time of discharge    Chest x-ray 2/7/2023 with moderate left pleural effusion, decreased compared to previous with underlying atelectasis/consolidation left lower lobe; observe,  consider thoracentesis if fever, worsening leukocytosis, or oncology feels necessary    Encourage incentive spirometry    Therapy consults as above     Chest wall pain, sternum, due to chest contusion from fall and possible mid sternal fracture  Prior to this presentation, patient became weak and fell, hitting her chest on her walker.  She has had subsequent pain in her sternum and ribs anteriorly.  She does not have shortness of breath, crepitus, or evidence of pneumothorax.    Prior chest x-ray shows minimal notching of the anterior cortex of the mid sternum on lateral view that is new from prior and could represent an acute, nondisplaced fracture.    Monitor oxygen saturations, pulmonary hygiene, incentive spirometry    Pain control as ordered, monitor for opiate side effects; not opiates as able for pain control, Tylenol     Acute kidney injury (GALE)  Hyponatremia  Hypokalemia, mild  Renal function stable with correction of hypokalemia and resolution of hyponatremia.    Monitor serum creatinine, urine output, and electrolytes    Replace low potassium as needed, monitor     Metastatic adenocarcinoma of the lung    Follows with [place] oncology, Dr. Pantoja, help appreciated with recent consult    alectinib (Alecensa) therapy as per Oncology, oral twice daily with meals, review at time of discharge with oncology     Chronic low back pain secondary to compression fracture    Pain control, monitor; therapy services consulted     Anxiety  Previously reported it is related to cancer diagnosis    Offer comfort and support; continue hydroxyzine as needed, monitor for side effects    Disposition    Estimated length of stay 48 hours    Anticipated discharge to rehabilitation/TCU, social work consulted         Diet: Regular Diet Adult    DVT Prophylaxis: PCD's and ambulation.  Subcu Lovenox discontinued due to bleeding from the injection site    Mcallister Catheter: Not present  Lines: None     Cardiac Monitoring: None  Code  Status: No CPR- Do NOT Intubate      Clinically Significant Risk Factors                                Disposition Plan      Expected Discharge Date: 02/12/2023      Destination: inpatient rehabilitation facility  Discharge Comments: Needs auth          Jessie Ferguson MD  Hospitalist Service  [facility]  Securely message with [employer] (more info)  Text page via [email or web address] Paging/Directory   ______________________________________________________________________    Interval History   Sitting in bed  appearing comfortable; no new complaints; complains of mild lower extremity edema.  No other  new acute issues     Physical Exam   Vital Signs: Temp: 97.2  F (36.2  C) Temp src: Axillary BP: 127/48 Pulse: 68   Resp: 16 SpO2: 97 % O2 Device: Nasal cannula Oxygen Delivery: 2 LPM  Weight: 135 lbs 11.2 oz    GENERAL awake and alert, sitting in her chair, talkative and interactive  HEENT no conjunctival injection or jaundice  LUNGS moderate inspiratory effort, no wheezes or crackles  HEART S1, S2 regular rate and rhythm; no rubs or gallops  ABDOMEN soft, nontender to palpation; no guarding, rebound, or rigidity  MUSCULOSKELETAL extremities without edema  SKIN warm and dry  NEURO moves upper and lower extremities spontaneously and to command  MENTAL STATUS answering questions and following simple commands    Medical Decision Making             Data     I have personally reviewed the following data over the past 24 hrs:    N/A  \   N/A   / N/A     N/A N/A N/A /  N/A   3.5 N/A N/A \       Imaging results reviewed over the past 24 hrs:   No results found for this or any previous visit (from the past 24 hour(s)).

## 2023-02-12 NOTE — PROGRESS NOTES
Care Management Follow Up    Length of Stay (days): 5    Expected Discharge Date: 02/12/2023     Concerns to be Addressed: discharge planning     Patient plan of care discussed at interdisciplinary rounds: Yes    Anticipated Discharge Disposition: Transitional Care     Anticipated Discharge Services:    Anticipated Discharge DME:      Patient/family educated on Medicare website which has current facility and service quality ratings: yes  Education Provided on the Discharge Plan:  yes  Patient/Family in Agreement with the Plan: yes    Referrals Placed by CM/SW: Post Acute Facilities  Private pay costs discussed: oncology medications to be continued and may be a barrier  Additional Information:  [facility] continues to be recommended and they anticipate a bed for pt tomorrow.  [facility] called [employer] and opened case # [id number]  Clinicals given over the phone and awaitng determination by end of day.  [facility]U received fax that pt has been declined for [facility] coverage from [facility]. [facility] contacted Hospitalist to see if appeal and peer to peer can be done.    Per discussion with  Hospitalist, SW looking into TCU placement with holding medication for a week if needed and approved by pt's oncologist.  Call placed to [facility] admissions regarding referral..    JULIO Crowell  [facility]  Care Transitions  [phone]

## 2023-02-12 NOTE — PLAN OF CARE
FOCUS/GOAL  Medical management, Medication management, Pain management, Mobility, Skin integrity, Safety management, Bowel management, and Bladder management.     ASSESSMENT, INTERVENTIONS AND CONTINUING PLAN FOR GOAL:     Pt is alert and oriented x 4. NIH 2 for right facial droop and slurry speech, forgetful at times. Vital signs stable, O2 sat fluctuates throughout the day, on 2L O2 with nasal cannula on and off. Tele  SR with BBB. Pain: generalized pain all over, managed well with prn and scheduled pain medications. Denied fever/chills, nausea and vomiting, numbness or tingling. Ongoing shortness of breath present. Tremors in LUE (baseline). 1 dose of IV lasix administered for swelling in ankles. IV Access/drains: flushed and saline locked. Wound/Skin: scattered bruises, wound on back, new foam dressing applied. Chemotherapy precautions maintained. Call light within reach, able to make needs known. Pt is continent of bowel and bladder, feels constipated, prn bowel agents administered, no result yet. Reg diet, thin liquids. No new concerns at this time. Plan for discharge to ARU when place is available.

## 2023-02-12 NOTE — PROVIDER NOTIFICATION
MD notification    Notified person: Jessie Ferguson MD      Notification date/time: 2/12/2023 15:41    Platform: batterii    Purpose: Bowel medication    Message: Chiquita feels very constipated, received senna and miralax. Can she have some milk of magnesia ? Thanks, Emmie, RN    Order received: Milk of Magnesia ordered by MD.

## 2023-02-13 NOTE — PROGRESS NOTES
Care Management Discharge Note    Discharge Date: 02/13/2023     Discharge Disposition: Transitional Care    Discharge Services:      Discharge DME:      Discharge Transportation: family or friend will provide    Private pay costs discussed: transportation, private room rate     PAS Confirmation Code:    Patient/family educated on Medicare website which has current facility and service quality ratings: yes    Education Provided on the Discharge Plan: yes   Persons Notified of Discharge Plans: Hospitalist, CTRN,RN,HUC,BB    Patient/Family in Agreement with the Plan: yes    Handoff Referral Completed: Yes    Additional Information:  INDU spoke with Lexi in admissions at [facility].  She states that they are able to accept pt into TCU is she is bringing her own  Alectinib           and is not on more than 2 liters of O2.    INDU called into [employer] and opened case # [id number]. Clinicals given over phone to build case for TCU/ Pt does meet criteria for SNF level 1, Authorization # [id number] Authorization is from 2/13-2/19.    Pt updated and requests that a w/c transport be set up for her discharge anticipated for tomorrow.  Pt is on O2 and cannot manage by herself. She has a MRI ordered to be completed prior to discharge.  SW scheduled a stretcher transport for noon tomorrow.  Pt and Lexi at [facility] updated.    Leigh Ng [facility]  Care Transitions  [phone]

## 2023-02-13 NOTE — PROGRESS NOTES
[facility]    Stroke Progress Note    Reason for Consult: Stroke Code     Chief Complaint: Generalized Weakness      HPI  Chiquita Berry is a 82 year old female w/ PMhx of metastatic adenocarcinoma of the lung, hypertension and chronic back pain who originally presented with generalized weakness and chest wall pain after a fall. She had a stroke alert called 2/6 due to right face droop and dysarthria. She was LKN 1250 and was found at 215pm, she notes her speech is slurred and that her right face is drooping but that her symptoms are not disabling.     Imaging Findings  Per personal review: Head CT/CTA showed no acute hemorrhage, no acute stroke and no large vessel occlusion. There is severe L MCA stenosis and L PCA stenosis    Official Radiology read is pending if any acute findings on official report please page back for further management.        HEAD CTA:  1.  Short segment high-grade narrowing of proximal M2 segment of the  left middle cerebral artery from the left middle cerebral artery has  normal caliber distal to this area.   2.  Moderate narrowing of the P2 segment of the left posterior  cerebral artery. The left posterior cerebral artery has normal caliber  distal to this area.   3.  No high-grade stenoses or occlusion of the rest of the major  intracranial arteries. No intracranial aneurysms.     NECK CTA:  1. No hemodynamically significant narrowing of the internal carotid  arteries bilaterally based on the NASCET criteria.   2. Beaded appearance of the right internal carotid artery, suspicious  for fibromuscular dysplasia.  3. The vertebral arteries are patent throughout their course in the  neck.  4. Large left pleural effusion and associated left upper lobe  Atelectasis.                                                                     IMPRESSION:  1.  No intracranial hemorrhage, mass lesions, hydrocephalus or CT  evidence for an acute infarct.  2.  Mild diffuse  cerebral parenchymal volume loss. Presumed chronic  hypertensive/microvascular ischemic white matter changes.    MRI brain [facility]                                                                   IMPRESSION:  1. Multiple small foci of acute/subacute infarcts involving the left  corona radiata and centrum semiovale bilaterally as well as the  parieto-occipital region, in a watershed type distribution.   2. Mild diffuse cerebral parenchymal volume loss. Presumed chronic  hypertensive/microvascular ischemic white matter changes.    ECHO:  1. Normal biventricular size and function. Left ventricular ejection fraction  of 60-65%.  2. No segmental wall motion abnormalities noted.  3. No hemodynamically significant valvular disease. Aortic sclerosis without  stenosis.  Compared to prior study on 9/11/2013, no change.    Endovascular Treatment  Not initiated due to absence of proximal vessel occlusion    Impression   R face droop/ dysathria- could be due to ischemic stroke, she finds her symptoms non-disabling and feels the risk of hemorrhage with TNK does not outweigh the benefit. Patient was in agreement that she would not want to receive TNK medication given the non-disabling nature of her symptoms. She has vascular risk factors with HTN. Stroke could be due to small vessel disease. Other possible etiologies include cardioembolic.   , A1c 6.3      Interval events 2/7:had a change in exam with word finding difficulty after BP dropped to 83/45, she was laid down and her symptoms improved but still had persistent word finding difficulty. Repeat CTA done showed consistent stenosis.Echo was negative for low EF.  Routine EEG.     Interval events- had R pupil 1mm larger than right.     Recommendations    - Neurochecks and Vital Signs every Q4H   - Normalize -160- avoid hypotension  - Daily aspirin 81 mg for secondary stroke prevention  - Plavix (clopidogrel) 75 mg PO Daily  - Statin: Lipitor 40mg  - Bedside Glucose  "Monitoring  - PT/OT/SLP- TCU  - Stroke Education  - Euthermia, Euglycemia  - cardiac event monitor  - DAPT 90 days then aspirin 81mg monotherapy   -Long term goals, BP <130/80, LDL <70, A1c <7- recommend slow titration down of blood pressure to meet long term goal over the next week.  -Recommend oral hydration- increase PO intake by 1 L of Gatorade/pedialyte   - Repeat MRI brain due to pupil change- if positive for new stroke then will get a P2Y12 level   - IF MRI is negative for stroke then would consider General neurology consult for potential seizures       Patient Follow-up     - final recommendation pending work-up  - in 6-8 weeks with general neurology ([phone])    Thank you for this consult. We will continue to follow.      The Stroke Staff is Dr. Vinita Johnston MD  Vascular Neurology Fellow    To page me or covering stroke neurology team member, click here: AMCOM  Choose \"On Call\" tab at top, then select \"NEUROLOGY/ALL SITES\" from middle drop-down box, press Enter, then look for \"stroke\" or \"telestroke\" for your site.    ______________________________________________________    Clinically Significant Risk Factors        # Hypokalemia: Lowest K = 3.3 mmol/L in last 2 days, will replace as needed                              Past Medical History   Past Medical History:   Diagnosis Date     Anxiety     related to cancer diagnosis     Chronic back pain     due to compression fracture     Osteoporosis     followed by outside endocrinologist     Primary lung adenocarcinoma (H)     stage IV     Past Surgical History   Past Surgical History:   Procedure Laterality Date     CATARACT IOL, RT/LT Bilateral      OPEN REDUCTION INTERNAL FIXATION HUMERUS DISTAL Right      PHACOEMULSIFICATION CLEAR CORNEA WITH STANDARD INTRAOCULAR LENS IMPLANT Left 11/16/2015    Procedure: PHACOEMULSIFICATION CLEAR CORNEA WITH STANDARD INTRAOCULAR LENS IMPLANT;  Surgeon: Latrell Jessica MD;  Location[facility]"     TONSILLECTOMY & ADENOIDECTOMY       Medications   Home Meds  Prior to Admission medications    Medication Sig Start Date End Date Taking? Authorizing Provider   alectinib (ALECENSA) 150 MG CAPS Take 4 capsules (600 mg) by mouth 2 times daily (with meals) 1/9/23  Yes Fer Pantoja MD   Cholecalciferol (VITAMIN D) 2000 UNITS tablet Take 1 tablet by mouth daily.   Yes Reported, Patient   hydrOXYzine (ATARAX) 10 MG tablet Take 1 tablet (10 mg) by mouth 3 times daily as needed for anxiety 12/28/22  Yes Aniya Cali APRN CNP   ibuprofen (ADVIL/MOTRIN) 400 MG tablet Take 1 tablet (400 mg) by mouth every 6 hours as needed for moderate pain 10/8/21  Yes Vesna Olivera MD   latanoprost (XALATAN) 0.005 % ophthalmic solution Place 1 drop into both eyes every evening  8/12/19  Yes Reported, Patient   melatonin 3 MG CAPS Take 3 mg by mouth At Bedtime And 3mg prn(total of 6mg ok for sleep)   Yes Reported, Patient   Multiple Vitamin (MULTIVITAMIN ADULT PO) Take 1 tablet by mouth daily   Yes Unknown, Entered By History   Multiple Vitamins-Minerals (PRESERVISION AREDS 2) CAPS Take 1 capsule by mouth 2 times daily   Yes Reported, Patient   polyethylene glycol (MIRALAX) 17 GM/Dose powder Take 17 g by mouth daily 8/18/21  Yes Jeanine Lieberman APRN CNP   polyethylene glycol 400 (BLINK TEARS) 0.25 % SOLN ophthalmic solution Place 1 drop into both eyes daily And Q2H PRN   Yes Unknown, Entered By History   prochlorperazine (COMPAZINE) 5 MG tablet Take 1 tablet (5 mg) by mouth every 6 hours as needed for nausea or vomiting 1/26/23  Yes Fer Pantoja MD   tiZANidine (ZANAFLEX) 2 MG tablet TAKE 1 TABLET BY MOUTH EVERY 12 HOURS AS NEEDED FOR MUSCLE SPASMS 5/20/22  Yes Vesna Olivera MD   traMADol (ULTRAM) 50 MG tablet TAKE 1 TABLET (50 MG) BY MOUTH EVERY 6 HOURS AS NEEDED FOR SEVERE PAIN 12/23/22  Yes Vesna Olivera MD   acetaminophen (TYLENOL) 325 MG tablet Take 650 mg by mouth 4 times daily And 650mg po daily  prn pain 8/31/21   Reported, Patient   sertraline (ZOLOFT) 25 MG tablet Take 1 tablet (25 mg) by mouth daily 1/18/23   Vesna Olivera MD   traZODone (DESYREL) 50 MG tablet Take 0.5-1 tablets (25-50 mg) by mouth nightly as needed for sleep  Patient taking differently: Take 50 mg by mouth nightly as needed for sleep 12/23/22   Hector Hendrix MD       Scheduled Meds    acetaminophen  650 mg Oral 4x Daily     alectinib  600 mg Oral BID w/meals     artifical saliva  2 spray Swish & Spit 4x Daily     aspirin  81 mg Oral Daily    Or     aspirin  81 mg Oral or NG Tube Daily     atorvastatin  40 mg Oral QPM     clopidogrel  75 mg Oral or NG Tube Daily     latanoprost  1 drop Both Eyes QPM     levofloxacin  750 mg Intravenous Q24H     multivitamin  with lutein  1 capsule Oral BID     sodium chloride (PF)  3 mL Intracatheter Q8H       Infusion Meds    - MEDICATION INSTRUCTIONS -         PRN Meds  acetaminophen **OR** acetaminophen, bisacodyl, hydrOXYzine, lidocaine 4%, lidocaine (buffered or not buffered), magnesium hydroxide, - MEDICATION INSTRUCTIONS -, melatonin, naloxone **OR** naloxone **OR** naloxone **OR** naloxone, polyethylene glycol, prochlorperazine **OR** prochlorperazine **OR** prochlorperazine, senna-docusate **OR** senna-docusate, sodium chloride, sodium chloride (PF), [Held by provider] tiZANidine, traMADol, traZODone    Allergies   Allergies   Allergen Reactions     Adhesive Tape Dermatitis     Skin Sensitivity to Adhesive ie. Lidocaine patch, tele patches, tapes     Augmentin Diarrhea     Celebrex [Celecoxib] Rash     Lidocaine      Skin sensitivity to Lidocaine patch adhesive     Family History   Family History   Problem Relation Age of Onset     Dementia Mother      Myocardial Infarction Father         later in life     Stomach Cancer Father      Breast Cancer Sister         x2 sisters (post-menopausal)     Diabetes No family hx of      Cerebrovascular Disease No family hx of      Coronary Artery  Disease Early Onset No family hx of      Ovarian Cancer No family hx of      Colon Cancer No family hx of      Social History   Social History     Tobacco Use     Smoking status: Never     Smokeless tobacco: Never   Vaping Use     Vaping Use: Never used   Substance Use Topics     Alcohol use: Not Currently     Comment: rare     Drug use: No            PHYSICAL EXAMINATION  Temp:  [97  F (36.1  C)-97.9  F (36.6  C)] 97.6  F (36.4  C)  Pulse:  [68-80] 68  Resp:  [16] 16  BP: (107-133)/(44-53) 126/51  SpO2:  [90 %-96 %] 93 %     Neurologic  Mental Status:  alert, oriented x 3, follows commands, speech clear and fluent, naming and repetition normal  Cranial Nerves:  visual fields intact, Pupils right greater than left by 1 mm, EOMI with normal smooth pursuit, facial sensation intact and symmetric, hearing not formally tested but intact to conversation, palate elevation symmetric and uvula midline, shoulder shrug strong bilaterally, tongue protrusion midline, rigth face droop. mild dysathria  Motor:  normal muscle tone and bulk, no abnormal movements, able to move all limbs spontaneously, strength 5/5 throughout upper and lower extremities,  Reflexes:  toes down-going  Sensory:  light touch sensation intact and symmetric throughout upper and lower extremities, no extinction on double simultaneous stimulation   Coordination:  normal finger-to-nose and heel-to-shin bilaterally without dysmetria, rapid alternating movements symmetric  Station/Gait:  deferred    Dysphagia Screen  Per Nursing    Stroke Scales    NIHSS  1a. Level of Consciousness 0-->Alert, keenly responsive   1b. LOC Questions 0-->Answers both questions correctly   1c. LOC Commands 0-->Performs both tasks correctly   2.   Best Gaze 0-->Normal   3.   Visual 0-->No visual loss   4.   Facial Palsy 1-->Minor paralysis (flattened nasolabial fold, asymmetry on smiling)   5a. Motor Arm, Left 0-->No drift, limb holds 90 (or 45) degrees for full 10 secs   5b. Motor  Arm, Right 0-->No drift, limb holds 90 (or 45) degrees for full 10 secs   6a. Motor Leg, Left 0-->No drift, leg holds 30 degree position for full 5 secs   6b. Motor Leg, right 0-->No drift, leg holds 30 degree position for full 5 secs   7.   Limb Ataxia 0-->Absent   8.   Sensory 0-->Normal, no sensory loss   9.   Best Language 0-->No aphasia, normal   10. Dysarthria 0-->Normal   11. Extinction and Inattention  0-->No abnormality   Total 1 (02/13/23 0900)       Modified Sturdivant Score (Pre-morbid)  3-Moderate disability; requiring some help, but able to walk without assistance    Imaging  I personally reviewed all imaging; relevant findings per HPI.     Lab Results Data   CBC  Recent Labs   Lab 02/11/23  0824 02/10/23  0801 02/08/23  0818 02/07/23  1125 02/06/23  1443   WBC  --  7.6  --  8.9 10.8   RBC  --  3.74*  --  3.92 4.09   HGB  --  12.1  --  12.7 13.2   HCT  --  34.7*  --  38.2 39.1    285 277 332 226     Basic Metabolic Panel    Recent Labs   Lab 02/13/23  0758 02/12/23  0816 02/11/23  0824 02/10/23  0801 02/09/23  0838 02/08/23  0828 02/08/23  0818 02/07/23  2112 02/07/23  1136 02/07/23  1125 02/06/23  2146 02/06/23  1537   NA  --   --   --  138  --   --  137  --   --  133*  --  133*   POTASSIUM 3.3* 3.5  --  3.5   < >  --  3.6  --   --  4.0  --  4.0   CHLORIDE  --   --   --  101  --   --   --   --   --  98  --  99   CO2  --   --   --  29  --   --   --   --   --  24  --  26   BUN  --   --   --  12.5  --   --   --   --   --  20.4  --  21.4   CR  --   --  0.71 0.66  --   --  0.63  --   --  0.66  --  0.72   GLC  --   --   --  106*  --  109*  --  165*   < > 132*   < > 111*   SAGE  --   --   --  8.6*  --   --   --   --   --  9.1  --  8.7*    < > = values in this interval not displayed.     Liver Panel  No results for input(s): PROTTOTAL, ALBUMIN, BILITOTAL, ALKPHOS, AST, ALT, BILIDIRECT in the last 168 hours.  INR    Recent Labs   Lab Test 02/07/23  1125 02/06/23  1443   INR 1.06 0.97      Lipid Profile     Recent Labs   Lab Test 02/06/23  1537 10/08/19  1200 04/16/19  1051 12/11/15  0959 06/01/15  1205 01/21/15  0931   CHOL 171 174 167   < > 137 133   HDL 42* 45* 45*   < > 44* 54   * 90 99   < > 67 67   TRIG 124 197* 115   < > 129 61   CHOLHDLRATIO  --   --   --   --  3.1 2.5    < > = values in this interval not displayed.     A1C    Recent Labs   Lab Test 02/05/23  0750   A1C 6.3*     Troponin    No results for input(s): CTROPT, TROPONINIS, TROPONINI, GHTROP in the last 168 hours.

## 2023-02-13 NOTE — PROGRESS NOTES
[facility]    Medicine Progress Note - Hospitalist Service    Date of Admission:  2/2/2023    Assessment & Plan   Chiquita Berry is a 82 year old female admitted on 2/2/2023. Past history metastatic adenocarcinoma of the lung, hypertension, chronic back pain secondary to compression fracture who presents with generalized weakness.  She also has possible healthcare associated pneumonia as well as chest wall pain due to recent fall where she struck her chest on her walker.      Multiple small foci of acute/subacute infarcts involving the left corona radiata and centrum semiovale bilaterally and parieto-occipital region, in a watershed type distribution - see report MRI brain 2/8/23    Stroke Neurology follow-up, help appreciated    Right-sided facial droop and aphasia with stroke neurology consulted; waxing and waning symptoms previously reported, continue to monitor    Neurochecks, vital signs, telemetry, blood sugar monitoring, permissive hypertension    Daily aspirin, Plavix per stroke neurology recommendations; dual antiplatelet therapy for 90 days then aspirin 81 mg daily for monotherapy thereafter - see neuro note 2/9    Continue statin, atorvastatin    Telemetry    Physical therapy (PT), occupational therapy (OT), speech and language therapy (SLP) consults    Patient's right pupil appeared larger than the left pupil today on 2/13/2023 for bedside RN.  Neurology updated.  MRI of the brain and MRA ordered and results are currently pending    Generalized weakness  Concern for possible healthcare associated pneumonia (HAP)  Moderate left pleural effusion, chest x-ray 2/7/23  Multifactorial weakness in setting of lung cancer, dehydration, prior chemotherapy. Prior  chest x-ray shows left basilar consolidation and effusion which is unchanged from prior.    Continue levofloxacin per oncology recommendations; switch IV to oral therapy at time of discharge    Chest x-ray 2/7/2023 with  moderate left pleural effusion, decreased compared to previous with underlying atelectasis/consolidation left lower lobe; observe, consider thoracentesis if fever, worsening leukocytosis, or oncology feels necessary    Encourage incentive spirometry    Therapy consults as above    Completed course of antibiotics so DC'd antibiotics on 2/13/2023     Chest wall pain, sternum, due to chest contusion from fall and possible mid sternal fracture  Prior to this presentation, patient became weak and fell, hitting her chest on her walker.  She has had subsequent pain in her sternum and ribs anteriorly.  She does not have shortness of breath, crepitus, or evidence of pneumothorax.    Prior chest x-ray shows minimal notching of the anterior cortex of the mid sternum on lateral view that is new from prior and could represent an acute, nondisplaced fracture.    Monitor oxygen saturations, pulmonary hygiene, incentive spirometry    Pain control as ordered, monitor for opiate side effects; not opiates as able for pain control, Tylenol and as needed tramadol    Acute kidney injury (GALE)  Hyponatremia  Hypokalemia, mild  Renal function stable with correction of hypokalemia and resolution of hyponatremia.    Monitor serum creatinine, urine output, and electrolytes    Replace low potassium as needed, monitor     Metastatic adenocarcinoma of the lung    Follows with [facility], Dr. Pantoja, help appreciated with recent consult    alectinib (Alecensa) therapy as per Oncology, oral twice daily with meals, review at time of discharge with oncology     Chronic low back pain secondary to compression fracture    Pain control, monitor; therapy services consulted     Anxiety  Previously reported it is related to cancer diagnosis    Offer comfort and support; continue hydroxyzine as needed, monitor for side effects    Disposition    Estimated length of stay 48 hours    Anticipated discharge to rehabilitation/TCU, social work  consulted         Diet: Regular Diet Adult  Diet    DVT Prophylaxis: PCD's and ambulation.  Subcu Lovenox discontinued due to bleeding from the injection site    Mcallister Catheter: Not present  Lines: None     Cardiac Monitoring: None  Code Status: No CPR- Do NOT Intubate      Clinically Significant Risk Factors        # Hypokalemia: Lowest K = 3.3 mmol/L in last 2 days, will replace as needed                         Disposition Plan      Expected Discharge Date: 02/14/2023, 12:00 PM to TCU after repeat MRI and MRI of the brain on 2/13/2023       Discussed with bedside RN, patient and  on 2/13/2023    Jessie Ferguson MD  Hospitalist Service  [facility]  Securely message with [employer] (more info)  Text page via [facility] Paging/Directory   ______________________________________________________________________    Interval History   Sitting in bed  appearing comfortable; no new complaints; patient's right pupil appears larger than the left pupil today for bedside RN neurology updated repeat MRI and MRA ordered to be done today    Physical Exam   Vital Signs: Temp: 97.6  F (36.4  C) Temp src: Oral BP: 126/51 Pulse: 68   Resp: 16 SpO2: 93 % O2 Device: Nasal cannula Oxygen Delivery: 2 LPM  Weight: 135 lbs 11.2 oz    GENERAL awake and alert, pleasant female lying comfortably in bed, not in acute distress  HEENT no conjunctival injection or jaundice  LUNGS moderate inspiratory effort, no wheezes or crackles  HEART S1, S2 regular rate and rhythm; no rubs or gallops  ABDOMEN soft, nontender to palpation; no guarding, rebound, or rigidity  MUSCULOSKELETAL extremities without edema  SKIN warm and dry  NEURO moves upper and lower extremities spontaneously and to command  MENTAL STATUS answering questions and following simple commands    Medical Decision Making             Data     I have personally reviewed the following data over the past 24 hrs:    N/A  \   N/A   / N/A     N/A N/A N/A /  N/A   3.3  (L) N/A N/A \       Imaging results reviewed over the past 24 hrs:   No results found for this or any previous visit (from the past 24 hour(s)).

## 2023-02-13 NOTE — PROGRESS NOTES
MRI ordered to be done today, called about when, they are down one scanner and there are 4-5 pt's ahead of her so it will be a few hours before it can be done.

## 2023-02-13 NOTE — PLAN OF CARE
Pt here with multiple L CVAs and Fall. A&Ox4. Neuros show R facial droop and this morning R pupil was noted to be larger than L, Stat MRI ordered, results pending. VSS. Tele SR BBB. Regular diet, thin  liquids. Takes pills whole in applesauce. Up with ax1, GB, W. Voiding in bathroom. Suppository given x1, effective for small BM. Tramadol given for pain in back and sternum. Pt scoring green on the Aggression Stop Light Tool. Plan for discharge to TCU, possibly today pending MRI.

## 2023-02-13 NOTE — PLAN OF CARE
Reason for Admission: L watershed CVAs, pneumonia/weakness    Cognitive/Mentation: A/Ox 4  Neuros/CMS: Intact ex R droop, dysarthric, generally weak, BUE tremors, +1-2 edema BLE  VS: O2 93-96% on 2L via NC otherwise vital stable.   Tele: SR BBB.  GI: BS active, passing flatus, last BM 2/10. Continent.  : Continent.  Pulmonary: LS diminished.  Pain: c/o chronic back pain and chest wall pain, tylenol and tramadol providing relief.     Drains/Lines: PIV SL  Skin: Spine mepilex intact, scattered bruising  Activity: Assist x 1 with GB/W.  Diet: Reg with thin liquids. Takes pills whole in applesauce.     Therapies recs: TCU  Discharge: pending     Aggression Stoplight Tool: green

## 2023-02-13 NOTE — PROVIDER NOTIFICATION
Joe Message to Dr. Vickie CRYSTAL, pts R pupil appears larger than L pupil this morning. Please advise, thank you!    Order: MRI

## 2023-02-14 NOTE — PROGRESS NOTES
Assumed cares from [id number]. Up A-1 gait belt and walker. Tolerating current diet. Voiding spontaneously without difficulty. Calm and cooperative with cares. C/o chronic back pain. Prn miralax and senna given for c/o constipation. Discharge to TCU on 2/14.

## 2023-02-14 NOTE — PROGRESS NOTES
"[facility] Nurse Inpatient Assessment     Consulted for: Nonblanchable site to mid back, nickel size    Patient History (according to provider note(s):      \"Chiquita Berry is a 82 year old female admitted on 2/2/2023. Past history metastatic adenocarcinoma of the lung, hypertension, chronic back pain secondary to compression fracture who presents with generalized weakness.  She also has possible healthcare associated pneumonia as well as chest wall pain due to recent fall where she struck her chest on her walker.\"    Areas Assessed:      Areas visualized during today's visit: spine    Pressure Injury Location: Low spine        Last photo: 2/13/23  Wound type: Pressure Injury     Pressure Injury Stage: Deep Tissue Pressure Injury (DTPI), present on admission      This is a Medical Device Related Pressure Injury (MDRPI) due to unknown if r/t medical device  Wound history/plan of care: noted by nursing staff on admission. Pt has kyphosis and prominent vertebra. 4x4 Mepilex in place over wound.  2/13: Pt reports that previously she has had a wound in the same anatomical location and that she was able to heal it.   Wound base: ADRIANO,  non-blanchable and epidermis     Palpation of the wound bed: normal      Drainage: none     Description of drainage: none     Measurements (length x width x depth, in cm) 0.5  x 0.6  x  0 cm      Tunneling N/A     Undermining N/A  Periwound skin: Intact, Erythema- blanchable and Scar tissue      Color: pink      Temperature: normal   Odor: none  Pain: denies , none  Pain intervention prior to dressing change: patient tolerated well  Treatment goal: Heal  and Protection  STATUS: stable  Supplies ordered: at bedside and supplies stored on unit    My PI Risk Assessment     Sensory Perception: 3 - Slightly Limited     Moisture: 4 - Rarely moist     Activity: 3 - Walks occasionally      Mobility: 3 - Slightly limited      Nutrition: 3 - Adequate     " "Friction/Shear: 2 - Potential problem      TOTAL: 18     Treatment Plan:     Spine wound(s): Every shift when patient is awake   1. Gently roll back Mepilex dressing to assess wound daily. If wound is showing signs of deterioration, please reach out to [facility] nurse/team.  2. Do not change Mepilex dressing daily. It can safely remain in place up to five days.   3. Every five days, or sooner if dressing becomes saturated or damaged, gently cleanse area with wound cleanser.  4. Apply a new Mepilex to cover skin injury. Initial and date.     Pressure Injury Prevention (PIP) Plan:   -If patient is declining pressure injury prevention interventions: Explore reason why and address patient's concerns, Educate on pressure injury risk and prevention intervention(s), If patient is still declining, document \"informed refusal\" , and Ensure Care team is aware ( provider, charge nurse, etc)   -Mattress: Follow bed algorithm, reassess daily and order specialty mattress, if indicated.   -HOB: Maintain at or below 30 degrees, unless contraindicated   -Repositioning in bed: Every 1-2 hours , Left/right positioning; avoid supine, and Raise foot of bed prior to raising head of bed, to reduce patient sliding down (shear)   -Heels: Keep elevated off mattress and Pillows under calves   -Protective Dressing: None   -Positioning Equipment: pillows   -Chair positioning: Chair cushion (#[id number]) , Repositions self: patient to shift weight every 15 minutes, and Do NOT use a donut for sitting (this increases pressure to smaller area and creates a higher potential for injury)     -Moisture Management: Perineal cleansing /protection: Follow Incontinence Protocol, Avoid brief in bed, and Moisturize dry skin   -Under Devices: Inspect skin under all medical devices during skin inspection , Ensure tubes are stabilized without tension, and Ensure patient is not lying on medical devices or equipment when repositioned    Orders: Reviewed and " Updated    RECOMMEND PRIMARY TEAM ORDER: None, at this time  Education provided: importance of repositioning, plan of care and Off-loading pressure  Discussed plan of care with: Patient  WOC nurse follow-up plan: weekly  Notify WOC if wound(s) deteriorate.  Nursing to notify the Provider(s) and re-consult the WOC Nurse if new skin concern.    DATA:     Current support surface: Standard  Standard gel/foam mattress (IsoFlex, Atmos air, etc)  Containment of urine/stool: Continent of bladder and Continent of bowel  BMI: Body mass index is 24.82 kg/m .   Active diet order: Orders Placed This Encounter      Regular Diet Adult      Diet     Output: No intake/output data recorded.     Labs:   Recent Labs   Lab 02/10/23  0801 02/07/23  1125   HGB 12.1 12.7   INR  --  1.06   WBC 7.6 8.9     Pressure injury risk assessment:   Sensory Perception: 4-->no impairment  Moisture: 4-->rarely moist  Activity: 3-->walks occasionally  Mobility: 3-->slightly limited  Nutrition: 3-->adequate  Friction and Shear: 3-->no apparent problem  Jese Score: 20    Zuri Wagner, RN, CWCN   Pager no longer is use, please contact through [employer]   Joe group: WOC Nurse

## 2023-02-14 NOTE — PLAN OF CARE
Goal Outcome Evaluation:      Plan of Care Reviewed With: patient, caregiver        Patient discharged this afternoon, to TCU. Discharge instructions were discussed with patient and caregiver. Patient took her chemo meds with her.

## 2023-02-14 NOTE — DISCHARGE SUMMARY
[facility]  Discharge Summary        Chiquita Berry MRN# [id number]   YOB: 1940 Age: 82 year old     Date of Admission:  2/2/2023  Date of Discharge:  2/14/2023  Admitting Physician:  Kyler Garnica MD  Discharge Physician: Jessie Ferguson MD  Discharging Service: Hospitalist     Primary Provider: Vesna Olivera  Primary Care Physician Phone Number: [phone]         Discharge Diagnoses/Problem Oriented Hospital Course (Providers):    Chiquita Berry was admitted on 2/2/2023 by Kyler Garnica MD and I would refer you to their history and physical.  The following problems were addressed during her hospitalization:    Chiquita Berry is a 82 year old female admitted on 2/2/2023. Past history metastatic adenocarcinoma of the lung, hypertension, chronic back pain secondary to compression fracture who presents with generalized weakness.  She also has possible healthcare associated pneumonia as well as chest wall pain due to recent fall where she struck her chest on her walker.      Multiple small foci of acute/subacute infarcts involving the left corona radiata and centrum semiovale bilaterally and parieto-occipital region, in a watershed type distribution - see report MRI brain 2/8/23    Stroke Neurology follow-up, help appreciated    Right-sided facial droop and aphasia with stroke neurology consulted; waxing and waning symptoms previously reported, continue to monitor    Neurochecks, vital signs, telemetry, blood sugar monitoring, permissive hypertension    Daily aspirin, Plavix per stroke neurology recommendations; dual antiplatelet therapy for 90 days then aspirin 81 mg daily for monotherapy thereafter - see neuro note 2/9    Continue statin, atorvastatin    Telemetry    Physical therapy (PT), occupational therapy (OT), speech and language therapy (SLP) consults     Patient's right pupil appeared larger than the left pupil today on 2/13/2023 for bedside RN.  Neurology  updated.  MRI of the brain repeat done on 2/14/2023 showed known strokes since admission,  no new strokes otherwise no acute findings    Generalized weakness  Concern for possible healthcare associated pneumonia (HAP)  Moderate left pleural effusion, chest x-ray 2/7/23  Multifactorial weakness in setting of lung cancer, dehydration, prior chemotherapy. Prior  chest x-ray shows left basilar consolidation and effusion which is unchanged from prior.    Continue levofloxacin per oncology recommendations; switch IV to oral therapy at time of discharge    Chest x-ray 2/7/2023 with moderate left pleural effusion, decreased compared to previous with underlying atelectasis/consolidation left lower lobe; observe, consider thoracentesis if fever, worsening leukocytosis, or oncology feels necessary    Encourage incentive spirometry    Therapy consults as above    Completed course of antibiotics so DC'd antibiotics on 2/13/2023     Chest wall pain, sternum, due to chest contusion from fall and possible mid sternal fracture  Prior to this presentation, patient became weak and fell, hitting her chest on her walker.  She has had subsequent pain in her sternum and ribs anteriorly.  She does not have shortness of breath, crepitus, or evidence of pneumothorax.    Prior chest x-ray shows minimal notching of the anterior cortex of the mid sternum on lateral view that is new from prior and could represent an acute, nondisplaced fracture.    Monitor oxygen saturations, pulmonary hygiene, incentive spirometry    Pain control as ordered, monitor for opiate side effects; not opiates as able for pain control, Tylenol and as needed tramadol     Acute kidney injury (GALE)  Hyponatremia  Hypokalemia, mild  Renal function stable with correction of hypokalemia and resolution of hyponatremia.    Monitor serum creatinine, urine output, and electrolytes    Replace low potassium as needed, monitor    Hypokalemia and hyponatremia resolved  now     Metastatic adenocarcinoma of the lung    Follows with [facility], Dr. Pantoja, help appreciated with recent consult    alectinib (Alecensa) therapy as per Oncology, oral twice daily with meals, review at time of discharge with oncology     Chronic low back pain secondary to compression fracture    Pain control, monitor; therapy services consulted     Anxiety  Previously reported it is related to cancer diagnosis    Offer comfort and support; continue hydroxyzine as needed, monitor for side effects     Disposition    Estimated length of stay 48 hours    Anticipated discharge to rehabilitation/TCU, social work consulted              Diet: Regular Diet Adult  Diet    DVT Prophylaxis: PCD's and ambulation.  Subcu Lovenox discontinued due to bleeding from the injection site     Mcallister Catheter: Not present  Lines: None     Cardiac Monitoring: None  Code Status: No CPR- Do NOT Intubate          Clinically Significant Risk Factors            # Hypokalemia: Lowest K = 3.3 mmol/L in last 2 days, will replace as needed                                Disposition Plan        Expected Discharge Date: To TCU today in stable condition    Jessie Ferguson MD   Page [phone](7AM-6PM)             Code Status:      DNR / DNI         Important Results:      Please see below          Pending Results:        Unresulted Labs Ordered in the Past 30 Days of this Admission     No orders found from 1/3/2023 to 2/3/2023.               Discharge Instructions and Follow-Up:      Follow-up Appointments     Follow Up and recommended labs and tests      Follow up with halfway physician.  The following labs/tests are   recommended: CBC, BMP in 1week .  F/u with NEurology in 6weeks for stroke                  Discharge Disposition:      Discharged to short-term care facility         Discharge Medications:        Current Discharge Medication List      START taking these medications    Details   aspirin (ASA) 81 MG EC tablet Take 1  tablet (81 mg) by mouth daily Take baby aspirin indefinitely for stroke    Associated Diagnoses: Cerebrovascular accident (CVA) due to stenosis of left middle cerebral artery (H)      atorvastatin (LIPITOR) 40 MG tablet Take 1 tablet (40 mg) by mouth every evening  Qty: 30 tablet, Refills: 0    Associated Diagnoses: Cerebrovascular accident (CVA) due to stenosis of left middle cerebral artery (H)      bisacodyl (DULCOLAX) 10 MG suppository Place 1 suppository (10 mg) rectally daily as needed for constipation  Qty: 10 suppository, Refills: 0    Associated Diagnoses: Cerebrovascular accident (CVA) due to stenosis of left middle cerebral artery (H)      clopidogrel (PLAVIX) 75 MG tablet 1 tablet (75 mg) by Oral or NG Tube route daily for 83 days Take baby aspirin and Plavix daily for total of 90 days.  Then stop Plavix continue baby aspirin daily indefinitely for stroke prevention  Qty: 83 tablet, Refills: 0    Associated Diagnoses: Cerebrovascular accident (CVA) due to stenosis of left middle cerebral artery (H)         CONTINUE these medications which have CHANGED    Details   acetaminophen (TYLENOL) 325 MG tablet Take 2 tablets (650 mg) by mouth 4 times daily  Qty: 200 tablet, Refills: 0    Associated Diagnoses: Cerebrovascular accident (CVA) due to stenosis of left middle cerebral artery (H)      traMADol (ULTRAM) 50 MG tablet Take 1 tablet (50 mg) by mouth every 6 hours as needed for severe pain (7-10)  Qty: 10 tablet, Refills: 0    Associated Diagnoses: Chronic bilateral low back pain with right-sided sciatica      traZODone (DESYREL) 50 MG tablet Take 1 tablet (50 mg) by mouth nightly as needed for sleep    Associated Diagnoses: Insomnia, unspecified type         CONTINUE these medications which have NOT CHANGED    Details   alectinib (ALECENSA) 150 MG CAPS Take 4 capsules (600 mg) by mouth 2 times daily (with meals)  Qty: 240 capsule, Refills: 0    Associated Diagnoses: Primary adenocarcinoma of left lung (H)  "     Cholecalciferol (VITAMIN D) 2000 UNITS tablet Take 1 tablet by mouth daily.      hydrOXYzine (ATARAX) 10 MG tablet Take 1 tablet (10 mg) by mouth 3 times daily as needed for anxiety  Qty: 30 tablet, Refills: 1    Associated Diagnoses: Anxiety      latanoprost (XALATAN) 0.005 % ophthalmic solution Place 1 drop into both eyes every evening   Refills: 2      melatonin 3 MG CAPS Take 3 mg by mouth At Bedtime And 3mg prn(total of 6mg ok for sleep)      Multiple Vitamin (MULTIVITAMIN ADULT PO) Take 1 tablet by mouth daily      Multiple Vitamins-Minerals (PRESERVISION AREDS 2) CAPS Take 1 capsule by mouth 2 times daily      polyethylene glycol (MIRALAX) 17 GM/Dose powder Take 17 g by mouth daily  Qty: 510 g      polyethylene glycol 400 (BLINK TEARS) 0.25 % SOLN ophthalmic solution Place 1 drop into both eyes daily And Q2H PRN      prochlorperazine (COMPAZINE) 5 MG tablet Take 1 tablet (5 mg) by mouth every 6 hours as needed for nausea or vomiting  Qty: 30 tablet, Refills: 5    Associated Diagnoses: Primary adenocarcinoma of left lung (H)         STOP taking these medications       ibuprofen (ADVIL/MOTRIN) 400 MG tablet Comments:   Reason for Stopping:         sertraline (ZOLOFT) 25 MG tablet Comments:   Reason for Stopping:         tiZANidine (ZANAFLEX) 2 MG tablet Comments:   Reason for Stopping:                    Allergies:         Allergies   Allergen Reactions     Adhesive Tape Dermatitis     Skin Sensitivity to Adhesive ie. Lidocaine patch, tele patches, tapes     Augmentin Diarrhea     Celebrex [Celecoxib] Rash     Lidocaine      Skin sensitivity to Lidocaine patch adhesive            Consultations This Hospital Stay:      Consultation during this admission received from neurology         Condition and Physical Exam on Discharge:      Discharge condition: Stable   Discharge vitals: Blood pressure 119/55, pulse 62, temperature 97.9  F (36.6  C), temperature source Oral, resp. rate 16, height 1.575 m (5' 2\"), " weight 61.6 kg (135 lb 11.2 oz), SpO2 93 %, not currently breastfeeding.   GENERAL awake and alert, pleasant female lying comfortably in bed, not in acute distress  HEENT no conjunctival injection or jaundice  LUNGS moderate inspiratory effort, no wheezes or crackles  HEART S1, S2 regular rate and rhythm; no rubs or gallops  ABDOMEN soft, nontender to palpation; no guarding, rebound, or rigidity  MUSCULOSKELETAL extremities without edema  SKIN warm and dry  NEURO moves upper and lower extremities spontaneously and to command  MENTAL STATUS answering questions and following simple commands                   Discharge Orders for Skilled Facility (from Discharge Orders):        After Care Instructions     Activity - Up with nursing assistance      Diet      Follow this diet upon discharge: Orders Placed This Encounter      Regular Diet Adult         General info for SNF      Length of Stay Estimate: Short Term Care: Estimated # of Days 31-90  Condition at Discharge: Stable  Level of care:skilled   Rehabilitation Potential: Good  Admission H&P remains valid and up-to-date: Yes  Recent Chemotherapy: on oral chemo drug daily   Use Nursing Home Standing Orders: Yes         Mantoux instructions      Give two-step Mantoux (PPD) Per Facility Policy Yes         Oxygen (SNF/TCU) Discharge          Future tests that were ordered for you     Stroke Hospital Follow Up      [facility] will call you to coordinate care as prescribed by your provider. If you don t hear from a representative within 2 business days, please call [phone].                    Rehab orders for Skilled Facility (from Discharge Orders):      Referrals     Future Labs/Procedures    Primary Care - Care Coordination Referral     Process Instructions:    Services are provided by a Care Coordinator for people with complex needs   such as: medical, social, or financial troubles.  The Care Coordinator   works with the patient and their Primary Care  Provider to determine health   goals, obtain resources, achieve outcomes, and develop care plans that   help coordinate the patient's care.     Comments:         Occupational Therapy Adult Consult     Comments:    Evaluate and treat as clinically indicated.    Reason:  Acute stroke    Physical Therapy Adult Consult     Comments:    Evaluate and treat as clinically indicated.    Reason:  acute stroke             Discharge Time:      Greater than 30 minutes.        Image Results From This Hospital Stay (For Non-EPIC Providers):        Results for orders placed or performed during the hospital encounter of 02/02/23   Chest XR,  PA & LAT    Narrative    XR CHEST 2 VIEWS 2/2/2023 7:20 AM    HISTORY: Hit front of chest against walker.    COMPARISON: 1/6/2023      Impression    IMPRESSION: Low lung volumes. There is left basilar consolidation  and/or atelectasis with a moderate effusion.. There is pulmonary  venous congestion. Normal cardiac size accounting for imaging  technique.     There is diffuse demineralization in the bones. No acute displaced rib  fractures. Increased lower thoracic kyphosis. Minimal notching of the  anterior cortex of the mid sternum on the lateral view is new from  prior and could represent an acute, nondisplaced fracture.  Retrosternal clear space is clear.    CELSO ROCHA MD         SYSTEM ID:  [id number]   XR Pelvis 1/2 Views    Narrative    EXAM: XR PELVIS 1/2 VIEWS  LOCATION: [facility]  DATE/TIME: 2/4/2023 6:09 PM    INDICATION: recent fall, back pain, buttock pain  COMPARISON: None.      Impression    IMPRESSION: Bones are demineralized. There is no evidence of an acute fracture. No dislocation. Minimal degenerative changes bilateral hips.   XR Thoracic Spine 2 Views    Narrative    EXAM: XR THORACIC SPINE 2 VIEWS  LOCATION: [facility]  DATE/TIME: 2/4/2023 6:15 PM    INDICATION: fall: back pain: R o fx.   COMPARISON: X-ray  02/02/2023, 01/06/2023, 07/20/2021  TECHNIQUE: Radiographs of thoracic and lumbar spine in routine projections.    FINDINGS:   THORACIC SPINE:  Mild dextrocurvature centered at T8-T9. Chronic kyphosis centered at T12-L1, measures 65 degrees when measured from superior T10 to inferior L2, similar to prior. No compression deformities. Normal height of vertebral bodies. Mild multi level interbody   degeneration. Osteopenic bones The visualized ribs are normal. No gross disease in the visualized lungs.     LUMBAR SPINE:  Nomenclature based on 5 lumbar type vertebral bodies. Mild levocurvature centered at L2-L3, similar to prior. Alignment is unchanged. Mild step wise retrolisthesis at L2-L3, L3-L4 and L4-L5 similar to prior. Grade 1 anterolisthesis L5-S1 similar to   prior. New compression fractures. Chronic loss of height of L4 greater than 75%, unchanged. Advanced multilevel interbody degeneration. Advanced L4-L5 and L5-S1 facet arthropathy. Unremarkable visualized bony pelvis. Abdominal soft tissues are   unremarkable.       XR Lumbar Spine 2/3 Views    Narrative    EXAM: XR LUMBAR SPINE 2/3 VIEWS  LOCATION: [facility]  DATE/TIME: 2/4/2023 6:09 PM    INDICATION: fall: back pain: R o fx.   COMPARISON: X-ray 02/02/2023, 01/06/2023, 07/20/2021  TECHNIQUE: Radiographs of thoracic and lumbar spine in routine projections.    FINDINGS:   THORACIC SPINE:  Mild dextrocurvature centered at T8-T9. Chronic kyphosis centered at T12-L1, measures 65 degrees when measured from superior T10 to inferior L2, similar to prior. No compression deformities. Normal height of vertebral bodies. Mild multi level interbody   degeneration. Osteopenic bones The visualized ribs are normal. No gross disease in the visualized lungs.     LUMBAR SPINE:  Nomenclature based on 5 lumbar type vertebral bodies. Mild levocurvature centered at L2-L3, similar to prior. Alignment is unchanged. Mild step wise retrolisthesis at L2-L3,  L3-L4 and L4-L5 similar to prior. Grade 1 anterolisthesis L5-S1 similar to   prior. New compression fractures. Chronic loss of height of L4 greater than 75%, unchanged. Advanced multilevel interbody degeneration. Advanced L4-L5 and L5-S1 facet arthropathy. Unremarkable visualized bony pelvis. Abdominal soft tissues are   unremarkable.     CTA Head Neck w Contrast    Narrative    CT ANGIOGRAM OF THE HEAD AND NECK WITH CONTRAST  2/6/2023 3:12 PM     COMPARISON: Brain MRI 12/13/2022    HISTORY: code stroke    TECHNIQUE:    Precontrast localizing scans were followed by CT angiography with an  injection of 75mL ISOVUE-370 nonionic intravenous contrast material  with scans through the head and neck.  Images were transferred to a  separate 3-D workstation where multiplanar reformations and 3-D images  were created. Estimates of carotid stenoses are made relative to the  distal internal carotid artery diameters except as noted. Dose  reduction techniques were used.    FINDINGS:   HEAD CTA:  ANTERIOR CIRCULATION: Mild atherosclerotic calcifications of the  cavernous and supraclinoid internal carotid arteries. The anterior  cerebral arteries are patent without hemodynamically significant  stenosis. Short segment high-grade narrowing of proximal M2 segment of  the left middle cerebral artery from the left middle cerebral artery  has normal caliber distal to this area. The right middle cerebral  artery is patent without hemodynamically significant stenosis.  Standard [place] anatomy.    POSTERIOR CIRCULATION: Moderate narrowing of the P2 segment of the  left posterior cerebral artery. The left posterior cerebral artery has  normal caliber distal to this area. The right posterior cerebral  artery, the basilar artery and intracranial vertebral arteries are  patent without hemodynamically significant stenosis. Dominant right  vertebral artery.    ANEURYSM/VASCULAR MALFORMATION: None.    NECK CTA:  RIGHT CAROTID: No  hemodynamically significant narrowing of the right  internal carotid artery based on the NASCET criteria. Beaded  appearance of the right internal carotid artery, suspicious for  fibromuscular dysplasia.    LEFT CAROTID: Mild atherosclerotic lack of the left carotid bulb and  proximal left internal carotid artery. No hemodynamically significant  narrowing of the left internal carotid artery based on the NASCET  criteria.     VERTEBRAL ARTERIES: The vertebral arteries are patent throughout their  course in the neck. Dominant right vertebral artery.     AORTIC ARCH: Classic aortic arch anatomy with no significant stenosis  at the origin of the great vessels.    Large left pleural effusion and associated left upper lobe  atelectasis.      Impression    IMPRESSION:    HEAD CTA:  1.  Short segment high-grade narrowing of proximal M2 segment of the  left middle cerebral artery from the left middle cerebral artery has  normal caliber distal to this area.   2.  Moderate narrowing of the P2 segment of the left posterior  cerebral artery. The left posterior cerebral artery has normal caliber  distal to this area.   3.  No high-grade stenoses or occlusion of the rest of the major  intracranial arteries. No intracranial aneurysms.    NECK CTA:  1. No hemodynamically significant narrowing of the internal carotid  arteries bilaterally based on the NASCET criteria.   2. Beaded appearance of the right internal carotid artery, suspicious  for fibromuscular dysplasia.  3. The vertebral arteries are patent throughout their course in the  neck.  4. Large left pleural effusion and associated left upper lobe  atelectasis.    Discussed the findings with Manuela Clay at 3:20 PM, 02/06/2023.    TOBY ADAM MD         SYSTEM ID:  [id number]   CT Head w/o Contrast    Narrative    CT OF THE HEAD WITHOUT CONTRAST   2/6/2023 3:04 PM     COMPARISON: Right facial droop, weakness.    HISTORY: Code stroke     TECHNIQUE: Axial CT images of the  head from the skull base to the  vertex were acquired without IV contrast. Dose reduction techniques  were used.     FINDINGS:   INTRACRANIAL CONTENTS: No intracranial hemorrhage. Mild diffuse  cerebral parenchymal volume loss. No midline shift. The basilar  cisterns are patent. Moderate periventricular and scattered foci of  deep white matter hypodensities involving both cerebral hemispheres.  No cerebellar tonsillar ectopia. No CT evidence for an acute infarct.    VISUALIZED ORBITS/SINUSES/MASTOIDS: No significant orbital  abnormality.  No significant paranasal sinus mucosal disease. No  significant middle ear or mastoid effusion.    OSSEOUS STRUCTURES/SOFT TISSUES: No significant abnormality.      Impression    IMPRESSION:  1.  No intracranial hemorrhage, mass lesions, hydrocephalus or CT  evidence for an acute infarct.  2.  Mild diffuse cerebral parenchymal volume loss. Presumed chronic  hypertensive/microvascular ischemic white matter changes.    Discussed the findings with Manuela Clay at 3:20 PM, 02/06/2023.    TOBY ADAM MD         SYSTEM ID:  [id number]   CT Head Perfusion w Contrast    Narrative    HEAD CT AND CT ANGIOGRAM OF THE HEAD AND NECK WITHOUT AND WITH  CONTRAST  2/6/2023 3:27 PM     COMPARISON: Head CT and head and neck CT angiogram 02/06/2023    HISTORY: code stroke. Right facial droop, weakness.    TECHNIQUE:    CT BRAIN PERFUSION: Time sequential axial CT images of the head were  acquired during the administration of intravenous contrast (125ML  isovue 370). CTA images of the [place] as well as color  perfusion maps of the brain were created from this time sequential  axial source data.    FINDINGS:     HEAD CT PERFUSION:  Small area of elevated T. Max involving the left temporo-occipital  region measuring 5 ml. No areas of decreased cerebral blood volume.      Impression    IMPRESSION:    HEAD CT PERFUSION:  1. Small area of elevated T. Max involving the left  temporo-occipital  region measuring 5 ml, could be an area of acute ischemia. No areas of  decreased cerebral blood volume.    Radiation dose for this scan was reduced using automated exposure  control, adjustment of the mA and/or kV according to patient size, or  iterative reconstruction technique    TOBY ADAM MD         SYSTEM ID:  [id number]   MR Brain w/o & w Contrast    Narrative    MRI OF THE BRAIN WITHOUT AND WITH CONTRAST  2/7/2023 8:50 AM     HISTORY:  R facial droop, dysarthria, RUE ataxia, RUE drift     COMPARISON: Brain MRI 12/13/2022.. Head and neck CT angiogram  02/06/2023    TECHNIQUE: Axial diffusion-weighted with ADC map, T2-weighted with fat  saturation, T1-weighted and turboFLAIR and coronal T1-weighted images  of the brain were obtained without intravenous contrast.  Following 6  mL Gadavist IV,  axial turboFLAIR and coronal T1-weighted images of  the brain were obtained.     FINDINGS:   INTRACRANIAL CONTENTS: Multiple small foci of restricted diffusion  involving the left corona radiata and centrum semiovale bilaterally as  well as the parieto-occipital region, in a watershed type  distribution. No subacute or chronic intracranial hemorrhage.  Redemonstration of moderate-sized retrocerebellar arachnoid cyst. Mild  diffuse cerebral parenchymal volume loss. No cerebellar tonsillar  ectopia. Moderate periventricular and scattered foci of deep white  matter T2 prolongation involving both cerebral hemispheres.  Postcontrast images demonstrate no abnormal enhancement.    SELLA: No significant abnormality accounting for technique.    OSSEOUS STRUCTURES/SOFT TISSUES: No aggressive osseous lesion  involving the calvarium, skull base, or visualized upper cervical  spine. The major intracranial vascular flow voids are maintained.    ORBITS: No significant abnormality accounting for technique.    SINUSES/MASTOIDS: Mild mucosal thickening of ethmoid air cells. No  significant middle ear or mastoid  effusion.       Impression    IMPRESSION:  1. Multiple small foci of acute/subacute infarcts involving the left  corona radiata and centrum semiovale bilaterally as well as the  parieto-occipital region, in a watershed type distribution.   2. Mild diffuse cerebral parenchymal volume loss. Presumed chronic  hypertensive/microvascular ischemic white matter changes.    TOBY ADAM MD         SYSTEM ID:  [id number]   XR Chest 2 Views    Narrative    CHEST TWO VIEWS  2/7/2023 1:56 PM       INDICATION: Follow up on effusion.  COMPARISON: 2/2/2023       Impression    IMPRESSION: Moderate left pleural effusion appears mildly decreased  when compared to previous. Underlying atelectasis/consolidation left  lower lobe.       CINTHIA LAWRENCE MD         SYSTEM ID:  [id number]   CTA Head Neck with Contrast    Narrative    HEAD CT AND CT ANGIOGRAM OF THE HEAD AND NECK WITHOUT AND WITH  CONTRAST  2/7/2023 12:45 PM     COMPARISON: Brain MRI 02/07/2023, head and neck CT angiogram  02/06/2023    HISTORY: known left MCA stenosis with aphagia and word finding  difficulties.    TECHNIQUE:      HEAD AND NECK CTA: Precontrast localizing scans were followed by CT  angiography with an injection of 75mL [id number]-370 nonionic intravenous  contrast material with scans through the head and neck.  Images were  transferred to a separate 3-D workstation where multiplanar  reformations and 3-D images were created.  Estimates of carotid  stenoses are made relative to the distal internal carotid artery  diameters except as noted.      FINDINGS:   HEAD CTA:  ANTERIOR CIRCULATION: The distal extracranial internal carotid  arteries are patent without hemodynamically significant stenosis. The  anterior cerebral arteries are patent without hemodynamically  significant stenosis. Short segment high-grade narrowing of the  proximal M1 segment of the left middle cerebral artery with suggestion  of filling defect. The left vertebral artery has normal caliber  distal  to this area. Overall is not significantly changed compared to the  prior CTA. The right middle cerebral artery is patent without  hemodynamically significant stenosis. Standard [place]  anatomy.    POSTERIOR CIRCULATION: The intracranial vertebral arteries, the  basilar artery and the right posterior cerebral artery are patent  without hemodynamically significant stenosis. The left posterior  cerebral arteries mostly fetal type. Moderate narrowing of the  proximal P2 segment of the left posterior cerebral artery. The left  posterior cerebral artery has normal caliber distal to this area.    ANEURYSM/VASCULAR MALFORMATION: None.    NECK CTA:  RIGHT CAROTID: Mild arthroscopic calcification of the right carotid  bulb and proximal right internal carotid artery. No hemodynamically  significant narrowing of the right internal carotid artery based on  the NASCET criteria. Beaded appearance of the mid to distal right  internal carotid artery. No associated dissection flap seen.    LEFT CAROTID: Mild atherosclerotic calcification of the left carotid  bulb and proximal left internal carotid artery without hemodynamically  significant narrowing based on the NASCET criteria.      VERTEBRAL ARTERIES: No hemodynamically significant narrowing of the  vertebral arteries bilaterally. Subtle beaded appearance of the distal  V2 and proximal V3 segments of both vertebral arteries. No dissection  flap seen.     AORTIC ARCH: Classic aortic arch anatomy with no significant stenosis  at the origin of the great vessels.    1.4 x 2.2 cm heterogeneous nodule in the right lobe of the thyroid  gland. Large left pleural effusion and associated left upper lobe  atelectasis.      Impression    IMPRESSION:  HEAD CTA:  1.  Not significantly changed compared to head CTA from yesterday  02/06/2023.  2.  Short segment high-grade narrowing of the proximal M1 segment of  the left middle cerebral artery with suggestion of filling defect.  The  left vertebral artery has normal caliber distal to this area. Overall  is not significantly changed compared to the prior CTA.   3.  Moderate narrowing of the proximal P2 segment of the left  posterior cerebral artery.     NECK CTA:  1.  Not significantly changed compared to next CTA 02/06/2023.  2.  No hemodynamically significant narrowing of the internal carotid  arteries bilaterally based on the NASCET criteria.  3.  Beaded appearance of the mid to distal right internal carotid  artery. Subtle beaded appearance of the distal V2 and proximal V3  segments of both vertebral arteries. No associated dissection flap  seen. This could represent areas of fibromuscular dysplasia.  4.  1.4 x 2.2 cm heterogeneous nodule in the right lobe of the thyroid  gland. Follow-up for reference below.  5.  Partially visualized large left pleural effusion and associated  left upper lobe atelectasis.    Radiation dose for this scan was reduced using automated exposure  control, adjustment of the mA and/or kV according to patient size, or  iterative reconstruction technique    REFERENCE:  Managing Incidental Thyroid Nodules Detected on Imaging: White Paper  of the ACR Incidental Thyroid Findings Committee. J Am Lucio Radiol  2014.    Incidental thyroid nodule detected on CT or MRI without suspicious  findings. Applies to general population without limited life  expectancy or significant comorbidities.    Age greater than or equal to 35 years  Less than 1.5 cm: No further evaluation.  Greater than or equal to 1.5 cm: Evaluate with thyroid ultrasound.    TOBY ADAM MD         SYSTEM ID:  [id number]   CT Head w/o Contrast    Narrative    CT OF THE HEAD WITHOUT CONTRAST   2/7/2023 12:25 PM     COMPARISON: Brain MRI 02/07/2023, head CT 02/06/2023    HISTORY: Aphasia    TECHNIQUE:  Axial CT images of the head from the skull base to the  vertex were acquired without IV contrast. Dose reduction techniques  were used.     FINDINGS:    INTRACRANIAL CONTENTS: No intracranial hemorrhage. Mild diffuse  cerebral parenchymal volume loss. No midline shift. The basal cisterns  are patent. Mild periventricular and scattered foci of deep white  matter hypodensities involving both cerebral hemispheres. These are  likely due to chronic hypertensive/microvascular ischemic white matter  changes. The patient's known multiple small foci of acute/subacute  infarct involving the left corona radiata and basal ganglia better  seen on the brain MRI from earlier today 02/07/2023. No cerebellar  tonsillar ectopia. Redemonstration of moderate-sized retrocerebellar  arachnoid cyst. Stable ventricular size.    VISUALIZED ORBITS/SINUSES/MASTOIDS: No significant orbital  abnormality.  No significant paranasal sinus mucosal disease. No  significant middle ear or mastoid effusion.    OSSEOUS STRUCTURES/SOFT TISSUES: No significant abnormality.      Impression    IMPRESSION:  1. The patient's known multiple small foci of acute/subacute infarct  involving the left corona radiata and basal ganglia better seen on the  brain MRI from earlier today 02/07/2023.  2. No associated intracranial hemorrhage.    TOBY ADAM MD         SYSTEM ID:  [id number]   MR Brain Perfusion wo & w Contrast    Narrative    EXAM: MR BRAIN PERFUSION W/O and W CONTRAST  LOCATION: [facility]  DATE/TIME: 2/8/2023 10:00 PM    INDICATION: perfusion deficit to evaluate L MCA stenosis  COMPARISON: CTA head and neck and MRI brain 02/07/2023  CONTRAST: 15mL Gadavist   TECHNIQUE: Multiplanar multisequence head MRI without and with intravenous contrast including dynamic susceptibility contrast perfusion imaging.    FINDINGS:  INTRACRANIAL CONTENTS: Stable small volume cluster watershed-type infarcts in the left corona radiata/centrum semiovale. No definite new infarct. No mass, acute hemorrhage, or extra-axial fluid collections. Patchy nonspecific T2/FLAIR hyperintensities    within the cerebral white matter most consistent with mild to moderate chronic microvascular ischemic change. Mild to moderate generalized cerebral atrophy. No hydrocephalus. Normal position of the cerebellar tonsils.     SELLA: No abnormality accounting for technique.    OSSEOUS STRUCTURES/SOFT TISSUES: Normal marrow signal. The major intracranial vascular flow voids are maintained.     ORBITS: No abnormality accounting for technique.     SINUSES/MASTOIDS: No paranasal sinus mucosal disease. No middle ear or mastoid effusion.       Impression    IMPRESSION:  1.  Stable involving small volume of watershed territory infarcts in the left corona radiata/centrum semiovale ovale.  2.  The perfusion images are not loading fully and cannot be evaluated. An addendum will be issued if these images are corrected.   MR Brain w/o & w Contrast    Narrative    MRI BRAIN WITHOUT AND WITH CONTRAST  2/13/2023 3:56 PM     HISTORY:  Stroke     TECHNIQUE:  Multiplanar, multisequence MRI of the brain without and  with 6mL Gadavist     COMPARISON: Several prior comparisons, most recent brain MR 2/8/2023.     FINDINGS: Areas of restricted diffusion in the left periventricular  white matter appear similar to previous MR 2/8/2023. There is  associated T2 hyperintensity in the regions of restricted diffusion.  No susceptibility hypointensity to suggested hemorrhagic  transformation. No mass effect or midline shift. No new areas of  restricted diffusion identified. Moderate diffuse parenchymal volume  loss. Patchy periventricular white matter T2 hyperintensities are  nonspecific, but most likely related to chronic microvascular ischemic  disease. Ventricular size is within normal limits without evidence of  hydrocephalus.    There is no abnormal intracranial enhancement.     The facial structures appear normal.       Impression    IMPRESSION:    1. Subacute infarcts in the left periventricular white matter, similar  in extent to prior MR  2023. No evidence of hemorrhagic  transformation of infarct. No new infarcts appreciated.  2. Moderate diffuse parenchymal volume loss and white matter changes  most likely due to chronic microvascular ischemic disease.        PRIYA RODGERS MD         SYSTEM ID:  [id number]   Echocardiogram Complete     Value    LVEF  60-65%    Narrative    [id number]  [id number]  [id number]  [id number]^LORI^LISA^DANNY                                                                       Version 2     [facility]  Echocardiography Laboratory  [address] [place] [address]     Name: REFUGIO YADAV  MRN: [id number]  : 1940  Study Date: 2023 02:48 PM  Age: 82 yrs  Gender: Female  Patient Location: [facility]  Reason For Study: CVI  Ordering Physician: LISA SANDOVAL  Performed By: Mami Rose     BSA: 1.6 m2  Height: 62 in  Weight: 139 lb  HR: 74  BP: 142/59 mmHg  ______________________________________________________________________________  Procedure  Complete Portable Echo Adult.  ______________________________________________________________________________  Interpretation Summary     1. Normal biventricular size and function. Left ventricular ejection fraction  of 60-65%.  2. No segmental wall motion abnormalities noted.  3. No hemodynamically significant valvular disease. Aortic sclerosis without  stenosis.  Compared to prior study on 2013, no change.  ______________________________________________________________________________  Left Ventricle  The left ventricle is normal in size. There is concentric remodeling present.  Left ventricular systolic function is normal. The visual ejection fraction is  60-65%. Grade I or early diastolic dysfunction. No regional wall motion  abnormalities noted.     Right Ventricle  The right ventricle is normal in size and function.     Atria  Normal left atrial size. Right atrial size is normal.     Mitral Valve  The mitral valve leaflets appear normal.  There is no evidence of stenosis,  fluttering, or prolapse. There is mild mitral annular calcification. There is  trace mitral regurgitation.     Tricuspid Valve  Normal tricuspid valve. There is trace tricuspid regurgitation. Right  ventricular systolic pressure could not be approximated due to inadequate  tricuspid regurgitation.     Aortic Valve  There is moderate trileaflet aortic sclerosis. No aortic stenosis is present.  The mean AoV pressure gradient is 7.9 mmHg. The calculated aortic valve are is  2.2 cm^2.     Pulmonic Valve  The pulmonic valve is not well visualized.     Vessels  Normal size aorta. Normal size ascending aorta. IVC diameter <2.1 cm  collapsing >50% with sniff suggests a normal RA pressure of 3 mmHg.     Pericardium  There is no pericardial effusion. Small left pleural effusion.     Rhythm  Sinus rhythm was noted.  ______________________________________________________________________________  MMode/2D Measurements & Calculations     IVSd: 0.98 cm  LVIDd: 3.7 cm  LVIDs: 1.7 cm  LVPWd: 1.0 cm  FS: 54.5 %  LV mass(C)d: 115.5 grams  LV mass(C)dI: 70.5 grams/m2  Ao root diam: 3.0 cm  asc Aorta Diam: 2.8 cm  LVOT diam: 1.8 cm  LVOT area: 2.6 cm2  LA Volume (BP): 36.6 ml  LA Volume Index (BP): 22.3 ml/m2  RWT: 0.55     Time Measurements  Aortic HR: 77.0 BPM     Doppler Measurements & Calculations  MV E max ezra: 72.3 cm/sec  MV A max ezra: 107.7 cm/sec  MV E/A: 0.67  MV dec slope: 247.3 cm/sec2  MV dec time: 0.29 sec  Ao V2 max: 200.9 cm/sec  Ao max P.0 mmHg  Ao V2 mean: 131.1 cm/sec  Ao mean P.9 mmHg  Ao V2 VTI: 36.0 cm  ANTHONY(I,D): 2.2 cm2  ANTHONY(V,D): 1.7 cm2  LV V1 max P.1 mmHg  LV V1 max: 133.2 cm/sec  LV V1 VTI: 30.8 cm  CO(LVOT): 6.1 l/min  CI(LVOT): 3.7 l/min/m2  SV(LVOT): 78.8 ml  SI(LVOT): 48.1 ml/m2  PA acc time: 0.11 sec  AV Ezra Ratio (DI): 0.66  ANTHONY Index (cm2/m2): 1.3  E/E' av.5  Lateral E/e': 9.9  Medial E/e': 7.1      ______________________________________________________________________________  Report approved by: Josiah Wright 02/07/2023 04:40 PM                   Most Recent Lab Results In EPIC (For Non-EPIC Providers):    Most Recent 3 CBC's:  Recent Labs   Lab Test 02/14/23  0747 02/11/23  0824 02/10/23  0801 02/08/23  0818 02/07/23  1125 02/06/23  1443   WBC  --   --  7.6  --  8.9 10.8   HGB  --   --  12.1  --  12.7 13.2   MCV  --   --  93  --  97 96    278 285   < > 332 226    < > = values in this interval not displayed.      Most Recent 3 BMP's:  Recent Labs   Lab Test 02/14/23  0747 02/13/23  1523 02/13/23  0758 02/12/23  0816 02/11/23  0824 02/10/23  0801 02/09/23  0838 02/08/23  0828 02/08/23  0818 02/07/23  2112 02/07/23  1136 02/07/23  1125 02/06/23  2146 02/06/23  1537   NA  --   --   --   --   --  138  --   --  137  --   --  133*  --  133*   POTASSIUM 3.9 4.3 3.3*   < >  --  3.5   < >  --  3.6  --   --  4.0  --  4.0   CHLORIDE  --   --   --   --   --  101  --   --   --   --   --  98  --  99   CO2  --   --   --   --   --  29  --   --   --   --   --  24  --  26   BUN  --   --   --   --   --  12.5  --   --   --   --   --  20.4  --  21.4   CR 0.73  --   --   --  0.71 0.66  --   --  0.63  --   --  0.66  --  0.72   ANIONGAP  --   --   --   --   --  8  --   --   --   --   --  11  --  8   SAGE  --   --   --   --   --  8.6*  --   --   --   --   --  9.1  --  8.7*   GLC  --   --   --   --   --  106*  --  109*  --  165*   < > 132*   < > 111*    < > = values in this interval not displayed.     Most Recent 3 Troponin's:  Recent Labs   Lab Test 02/15/19  1549   TROPI <0.015     Most Recent 3 INR's:  Recent Labs   Lab Test 02/07/23  1125 02/06/23  1443   INR 1.06 0.97     Most Recent 2 LFT's:  Recent Labs   Lab Test 02/02/23  0534 12/10/22  1743   AST 30 27   ALT 19 31   ALKPHOS 70 64   BILITOTAL 0.2 0.4     Most Recent Cholesterol Panel:  Recent Labs   Lab Test 02/06/23  1537   CHOL 171   *   HDL 42*   TRIG  124     Most Recent 6 Bacteria Isolates From Any Culture (See EPIC Reports for Culture Details):No lab results found.  Most Recent TSH, T4 and HgbA1c:  Recent Labs   Lab Test 02/05/23  0750 02/02/23  0534   TSH  --  5.00*   T4  --  1.24   A1C 6.3*  --

## 2023-02-14 NOTE — DISCHARGE INSTRUCTIONS
Your risk factors for stroke or TIA (transient ischemic attack):    Your Risk Factors Your Results Normal Ranges   High blood pressure BP Readings from Last 1 Encounters:   02/14/23 116/50    Less than 120/80   Cholesterol              Total Lab Results   Component Value Date    CHOL 171 02/06/2023    CHOL 174 10/08/2019      Less than 150    Triglycerides   Lab Results   Component Value Date    TRIG 124 02/06/2023    TRIG 197 10/08/2019    Less than 150   LDL Lab Results   Component Value Date     02/06/2023    LDL 90 10/08/2019       Less than 70   HDL Lab Results   Component Value Date    HDL 42 02/06/2023    HDL 45 10/08/2019            Greater than 40 (men)  Greater than 50 (women)   Diabetes Recent Labs   Lab 02/10/23  0801   *    Fasting blood glucose    Smoking/tobacco use  Quit smoking and tobacco   Overweight  Lose 1-2 pounds a week   Lack of exercise  30 minutes moderate activity each day   Other risk factors include carotid (neck) artery disease, atrial fibrillation and stress. You may be on new medicine to treat high blood pressure, cholesterol, diabetes or atrial fibrillation.    Understanding Stroke Booklet given to patient. Please refer to booklet for further information.    Stroke warning signs and symptoms - CALL 911 right away for:  - Sudden numbness or weakness in the face, arm or leg (often on one side of the body).  - Sudden confusion or trouble understanding what is going on.  - Sudden blurred or decreased vision in one or both eyes.  - Sudden trouble speaking, loss of balance, dizziness or problems with coordination.  - Sudden, severe headache for no reason.  - Fainting or seizures.  - Symptoms may go away then come back suddenly.

## 2023-02-14 NOTE — PLAN OF CARE
Physical Therapy Discharge Summary    Reason for therapy discharge:    Discharged to transitional care facility.    Progress towards therapy goal(s). See goals on Care Plan in [facility] electronic health record for goal details.  Goals partially met.  Barriers to achieving goals:   discharge from facility.    Therapy recommendation(s):    Continued therapy is recommended.  Rationale/Recommendations:  Patient would benefit from continued skilled PT to progress her activity tolerance, independence with all functional mobility and increase speed of gait.    Goal Outcome Evaluation:

## 2023-02-14 NOTE — PROGRESS NOTES
MRI with no new strokes. Consider seizure workup. Stroke Neurology signing off, please page/call with questions/concerns.    Antoine Grullon MD PGY5 Stroke Neuro

## 2023-02-14 NOTE — PLAN OF CARE
Goal Outcome Evaluation:  Reason for Admission: L watershed CVAs    Cognitive/Mentation: A/Ox 4  Neuros/CMS: Intact ex R droop, slight dysarthria, generally weak, BUE tremors, +1-2 edema BLE  VS: O2 93-96% on 2L via NC otherwise vitals stable.   Tele: SR BBB.  GI: BS active, passing flatus, BM x3 overnight. Continent.  : Voiding adequate amt. Continent.  Pulmonary: LS diminished.  Pain: c/o back pain and chest wall pain, tylenol and tramadol providing relief.     Drains/Lines: PIV SL  Skin: Spine mepilex intact, scattered bruising  Activity: Assist x 1 with GB/W.  Diet: Reg with thin liquids. Takes pills whole in applesauce.     Therapies recs: TCU  Discharge: TCU today @ noon.

## 2023-02-15 NOTE — PROGRESS NOTES
Clinic Care Coordination Contact  Care Coordination Transition Communication         Clinical Data: Patient was hospitalized at [facility]  from 2/2/23 to 2/14/23 with diagnosis of Acute Strok.     Transition to Facility:              Facility Name: [facility]              Contact name and phone number/fax: [phone]    Plan: RN/SW Care Coordinator will await notification from facility staff informing RN/SW Care Coordinator of patient's discharge plans/needs. RN/SW Care Coordinator will review chart and outreach to facility staff every 4 weeks and as needed.     JULIO Lay Care Coordination Team  [phone]

## 2023-02-15 NOTE — LETTER
[facility]   To:   [facility]                Please give to facility    From:Pam Gonzalez/ HAKANW Care Coordinator   [facility]   P: [phone]  [email or web address]    Patient Name:  Chiquita Ruhsing YOB: 1940   Admit date: 2/14/23      *Information Needed:  Please contact me when the patient will discharge (or if they will move to long term care)- include the discharge date, disposition, and main diagnosis   - If the patient is discharged with home care services, please provide the name of the agency    Also- Please inform me if a care conference is being held.   Phone, Fax or Email with information                              Thank you, Pam Gonzalez

## 2023-02-16 NOTE — PROGRESS NOTES
Chiquita Berry is a 82 year old female seen February 16, 2023 at [facility] where she was admitted after [facility] hospitalization 2/2-14 following a fall at home.   States she took a fall onto her walker, couldn't get up and pushed her alert button, thought to be dehydrated and exhausted at hospital admission, was also treated for pneumonia.   But then a few days later developed stroke symptoms to include right facial droop, dysarthria and RUE ataxia.  MRI as below.  Seen by Neurology, with symptoms waxing and waning.   Started on DAPT for next 90 days, then aspirin alone indefinitely      Accurate historian but wordfinding difficulty and dysarthria  Knows all her medications and their  Indications. She reports having pain in low back and chest from sternal fracture, from the fall  In December 2022 couldn't breathe, found to have malignant pleural effusion and started tx per Oncology Dr Pantoja.  Remains on alectinib.        Past Medical History:   Diagnosis Date     Anxiety     related to cancer diagnosis     Chronic back pain     due to compression fracture     Osteoporosis     followed by outside endocrinologist     Primary lung adenocarcinoma (H)     stage IV     Past Surgical History:   Procedure Laterality Date     CATARACT IOL, RT/LT Bilateral      OPEN REDUCTION INTERNAL FIXATION HUMERUS DISTAL Right      PHACOEMULSIFICATION CLEAR CORNEA WITH STANDARD INTRAOCULAR LENS IMPLANT Left 11/16/2015    Procedure: PHACOEMULSIFICATION CLEAR CORNEA WITH STANDARD INTRAOCULAR LENS IMPLANT;  Surgeon: Latrell Jessica MD;  Location:  [facility]     TONSILLECTOMY & ADENOIDECTOMY       Family History   Problem Relation Age of Onset     Dementia Mother      Myocardial Infarction Father         later in life     Stomach Cancer Father      Breast Cancer Sister         x2 sisters (post-menopausal)     Diabetes No family hx of      Cerebrovascular Disease No family hx of      Coronary Artery Disease Early  "Onset No family hx of      Ovarian Cancer No family hx of      Colon Cancer No family hx of      Social History     Tobacco Use     Smoking status: Never     Smokeless tobacco: Never   Substance Use Topics     Alcohol use: Not Currently     Comment: rare      SH:  SH: Lives alone, [place] apartment in [place].   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     Review Of Systems  Skin: negative   Eyes: impaired vision, glasses  Ears/Nose/Throat: hearing loss  Respiratory: shortness of breath,, SATs in 90s with exertion  Cardiovascular: palpitations and tachycardia in setting of stress, per pt.   Gastrointestinal: reflux, eating okay, weight is stable   Genitourinary: negative  Musculoskeletal: bed mobility and transfers with CGA, ambulates 75' with FWW and CGA   TUG 47s  Ambulates with a FWW at baseline       Neurologic: negative  Psychiatric: negative  Hematologic/Lymphatic/Immunologic: negative  Endocrine: negative      GENERAL APPEARANCE: alert and no distress, wearing [place] brace  /53   Pulse 70   Temp 97.5  F (36.4  C)   Resp 18   Ht 1.575 m (5' 2\")   Wt 62.1 kg (136 lb 12.8 oz)   LMP  (LMP Unknown)   SpO2 95%   BMI 25.02 kg/m     HEENT: normocephalic, no lesion or abnormalities  +kyphoscoliosis  NECK: no adenopathy, no asymmetry, masses, or scars and thyroid normal to palpation  RESP: lungs clear to auscultation - no rales, rhonchi or wheezes  CV: regular rate and rhythm, normal S1 S2 @90, occ ectopy     ABDOMEN:  soft, nontender, no HSM or masses and bowel sounds normal  MS: extremities normal- no gross deformities noted, no ext edema  SKIN: no suspicious lesions or rashes  NEURO: Normal strength and tone, sensory exam grossly normal  Speech is slow and some mild wordfinding difficulty     PSYCH: affect okay, very pleasant     LYMPHATICS: No cervical,  or supraclavicular nodes    Last Comprehensive Metabolic Panel:  Lab Results   Component Value Date     02/10/2023 "    POTASSIUM 3.9 02/14/2023    CHLORIDE 101 02/10/2023    CO2 29 02/10/2023    ANIONGAP 8 02/10/2023     (H) 02/10/2023    BUN 12.5 02/10/2023    CR 0.73 02/14/2023    GFRESTIMATED 81 02/14/2023    SAGE 8.6 (L) 02/10/2023     Lab Results   Component Value Date    AST 30 02/02/2023      ALBUMIN 3.7 02/02/2023      ALKPHOS 70 02/02/2023     Lab Results   Component Value Date    WBC 7.6 02/10/2023      HGB 12.1 02/10/2023      MCV 93 02/10/2023       02/14/2023      MRI OF THE BRAIN WITHOUT AND WITH CONTRAST   2/7/2023   HISTORY:  R facial droop, dysarthria, RUE ataxia, RUE drift   INTRACRANIAL CONTENTS: Multiple small foci of restricted diffusion  involving the left corona radiata and centrum semiovale bilaterally as  well as the parieto-occipital region, in a watershed type  distribution. No subacute or chronic intracranial hemorrhage.  Redemonstration of moderate-sized retrocerebellar arachnoid cyst. Mild  diffuse cerebral parenchymal volume loss. No cerebellar tonsillar  ectopia. Moderate periventricular and scattered foci of deep white  matter T2 prolongation involving both cerebral hemispheres.  Postcontrast images demonstrate no abnormal enhancement.  OSSEOUS STRUCTURES/SOFT TISSUES: No aggressive osseous lesion  involving the calvarium, skull base, or visualized upper cervical  spine. The major intracranial vascular flow voids are maintained.                                                              IMPRESSION:  1. Multiple small foci of acute/subacute infarcts involving the left  corona radiata and centrum semiovale bilaterally as well as the  parieto-occipital region, in a watershed type distribution.   2. Mild diffuse cerebral parenchymal volume loss. Presumed chronic  hypertensive/microvascular ischemic white matter changes.    ECHO 2/7/2023     1. Normal biventricular size and function. Left ventricular ejection fraction of 60-65%.  2. No segmental wall motion abnormalities noted.  3. No  hemodynamically significant valvular disease. Aortic sclerosis without stenosis.  Compared to prior study on 9/11/2013, no change.     CT CHEST/ABDOMEN/PELVIS W CONTRAST 12/11/2022  INDICATION: likely malignancy, recent left effusion appears consistent with cancer  LUNGS AND PLEURA: Moderate left pleural effusion. Groundglass opacity identified in the right upper lobe measuring 14 x 11 mm in size. This is best identified on image #41, series 9. Atelectasis is identified involving the tendon aspect of the left upper lobe. Pulmonary nodule identified in the left upper lobe measuring 6 mm in size. This is best identified on image #102, series 9. Trace right pleural effusion.  MEDIASTINUM/AXILLAE: 8 mm pretracheal lymph node, best identified on image #46, series 8. 17 x 15 mm precarinal lymph node, best identified on image #62, series 8. Focal subcarinal lymph node measuring 15 x 12 mm. And atelectasis identified in the left lower lobe. No axillary lymphadenopathy.  CORONARY ARTERY CALCIFICATION: Moderate to severe  HEPATOBILIARY: The gallbladder is distended. The gallbladder has a unusual configuration. The gallbladder actually appears to extend anteriorly the liver extending through a diaphragmatic hernia to the right of midline in the seated within the right chest. This is best demonstrated on image #18, series 4. Small amount of free fluid is identified adjacent to the gallbladder. The common bile duct is dilated measuring up to 8 mm in size.  PANCREAS: A low-density lesion I believe is present within the head of the pancreas. This measures 2.4 x 1.9 cm in size. This is best seen on image #94, series 3.  SPLEEN: Low-density lesion identified in the inferior aspect of the spleen measuring 10 mm in size. This is best seen on image #76, series 3.  ADRENAL GLANDS: Nodularity of the left adrenal gland.  KIDNEYS/BLADDER: Multiple renal cysts. No hydronephrosis.                                                              IMPRESSION:   1.  Moderate left pleural effusion. Trace right pleural effusion.  2.  Ground glass opacity identified in the right upper lobe, follow-up should be performed according to the Fleischner Society criteria.  3.  Pulmonary nodule identified in the left upper lobe. Again, follow-up should be performed according to the Fleischner Society criteria.  4.  Mediastinal lymphadenopathy as described above.  5.  The gallbladder extends into the right chest. Diaphragmatic hernia. Small amount of fluid is identified adjacent to the gallbladder.  6.  Low-density lesion within the head of the pancreas. MRCP recommended for further evaluation.  7.  Nodularity of the left adrenal gland, uncertain significance.    PET scan 1/30/2023    1. Findings suspicious for adenocarcinoma involving the left pleura and thoracic lymph nodes without clear evidence of a primary site of disease.  2. FDG avid airspace opacities throughout the left upper and left lower lobes. While favored to represent a multifocal inflammatory/infectious process, their exact etiologies remain indeterminate.      IMP/PLAN:   MED REC REQUIRED  Post Medication Reconciliation Status: discharge medications reconciled and changed, per note/orders     (I63.9) Acute embolic stroke (H)    (I69.320) Aphasia following cerebral infarction  Comment: symptoms improving    Plan: PHYSICAL THERAPY / OCCUPATIONAL THERAPY / Speech Therapy for stroke rehab  Aspirin +clopidogrel for 90 days, reviewed timeline with pt.    Then aspirin alone indefinitely   Atrovastatin 40 mg/day   BP medications as indicated   Follow up with Neurology      (S22.22XD) Closed fracture of body of sternum with routine healing, subsequent encounter  Comment: nondisplaced, on CXR 2/2   Plan: pain management with acetaminophen 650 mg qid, local measures and tramadol 500 mg a6 hours PRN pain      (J18.9) Healthcare-associated pneumonia  Comment: finished course of abx   Plan: IS, supportive care        (J90) Pleural effusion, left  (C34.92) Primary adenocarcinoma of left lung (H)  Comment: imaging as above   Plan: follow up with Oncology Dr Pantoja   Continue alectinib 600 mg bid   O2 by nasal canula if O2 sats <90%    (R53.81) Physical deconditioning  Comment: after acute illness and hospital stay   Plan: therapies as above   Pt looking at AL as discharge goal.        Leidy Villa MD

## 2023-02-16 NOTE — LETTER
2/16/2023        RE: Chiquita Berry  [address]  [facility] [id number]        Chiquita Berry is a 82 year old female seen February 16, 2023 at [facility] where she was admitted after [facility] hospitalization 2/2-14 following a fall at home.   States she took a fall onto her walker, couldn't get up and pushed her alert button, thought to be dehydrated and exhausted at hospital admission, was also treated for pneumonia.   But then a few days later developed stroke symptoms to include right facial droop, dysarthria and RUE ataxia.  MRI as below.  Seen by Neurology, with symptoms waxing and waning.   Started on DAPT for next 90 days, then aspirin alone indefinitely      Accurate historian but wordfinding difficulty and dysarthria  Knows all her medications and their  Indications. She reports having pain in low back and chest from sternal fracture, from the fall  In December 2022 couldn't breathe, found to have malignant pleural effusion and started tx per Oncology Dr Pantoja.  Remains on alectinib.        Past Medical History:   Diagnosis Date     Anxiety     related to cancer diagnosis     Chronic back pain     due to compression fracture     Osteoporosis     followed by outside endocrinologist     Primary lung adenocarcinoma (H)     stage IV     Past Surgical History:   Procedure Laterality Date     CATARACT IOL, RT/LT Bilateral      OPEN REDUCTION INTERNAL FIXATION HUMERUS DISTAL Right      PHACOEMULSIFICATION CLEAR CORNEA WITH STANDARD INTRAOCULAR LENS IMPLANT Left 11/16/2015    Procedure: PHACOEMULSIFICATION CLEAR CORNEA WITH STANDARD INTRAOCULAR LENS IMPLANT;  Surgeon: Latrell Jessica MD;  Location:  [facility]     TONSILLECTOMY & ADENOIDECTOMY       Family History   Problem Relation Age of Onset     Dementia Mother      Myocardial Infarction Father         later in life     Stomach Cancer Father      Breast Cancer Sister         x2 sisters (post-menopausal)  "    Diabetes No family hx of      Cerebrovascular Disease No family hx of      Coronary Artery Disease Early Onset No family hx of      Ovarian Cancer No family hx of      Colon Cancer No family hx of      Social History     Tobacco Use     Smoking status: Never     Smokeless tobacco: Never   Substance Use Topics     Alcohol use: Not Currently     Comment: rare      SH:  SH: Lives alone, [place] apartment in [place].   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     Review Of Systems  Skin: negative   Eyes: impaired vision, glasses  Ears/Nose/Throat: hearing loss  Respiratory: shortness of breath,, SATs in 90s with exertion  Cardiovascular: palpitations and tachycardia in setting of stress, per pt.   Gastrointestinal: reflux, eating okay, weight is stable   Genitourinary: negative  Musculoskeletal: bed mobility and transfers with CGA, ambulates 75' with FWW and CGA   TUG 47s  Ambulates with a FWW at baseline       Neurologic: negative  Psychiatric: negative  Hematologic/Lymphatic/Immunologic: negative  Endocrine: negative      GENERAL APPEARANCE: alert and no distress, wearing [place] brace  /53   Pulse 70   Temp 97.5  F (36.4  C)   Resp 18   Ht 1.575 m (5' 2\")   Wt 62.1 kg (136 lb 12.8 oz)   LMP  (LMP Unknown)   SpO2 95%   BMI 25.02 kg/m     HEENT: normocephalic, no lesion or abnormalities  +kyphoscoliosis  NECK: no adenopathy, no asymmetry, masses, or scars and thyroid normal to palpation  RESP: lungs clear to auscultation - no rales, rhonchi or wheezes  CV: regular rate and rhythm, normal S1 S2 @90, occ ectopy     ABDOMEN:  soft, nontender, no HSM or masses and bowel sounds normal  MS: extremities normal- no gross deformities noted, no ext edema  SKIN: no suspicious lesions or rashes  NEURO: Normal strength and tone, sensory exam grossly normal  Speech is slow and some mild wordfinding difficulty     PSYCH: affect okay, very pleasant     LYMPHATICS: No cervical,  or " supraclavicular nodes    Last Comprehensive Metabolic Panel:  Lab Results   Component Value Date     02/10/2023    POTASSIUM 3.9 02/14/2023    CHLORIDE 101 02/10/2023    CO2 29 02/10/2023    ANIONGAP 8 02/10/2023     (H) 02/10/2023    BUN 12.5 02/10/2023    CR 0.73 02/14/2023    GFRESTIMATED 81 02/14/2023    SAGE 8.6 (L) 02/10/2023     Lab Results   Component Value Date    AST 30 02/02/2023      ALBUMIN 3.7 02/02/2023      ALKPHOS 70 02/02/2023     Lab Results   Component Value Date    WBC 7.6 02/10/2023      HGB 12.1 02/10/2023      MCV 93 02/10/2023       02/14/2023      MRI OF THE BRAIN WITHOUT AND WITH CONTRAST   2/7/2023   HISTORY:  R facial droop, dysarthria, RUE ataxia, RUE drift   INTRACRANIAL CONTENTS: Multiple small foci of restricted diffusion  involving the left corona radiata and centrum semiovale bilaterally as  well as the parieto-occipital region, in a watershed type  distribution. No subacute or chronic intracranial hemorrhage.  Redemonstration of moderate-sized retrocerebellar arachnoid cyst. Mild  diffuse cerebral parenchymal volume loss. No cerebellar tonsillar  ectopia. Moderate periventricular and scattered foci of deep white  matter T2 prolongation involving both cerebral hemispheres.  Postcontrast images demonstrate no abnormal enhancement.  OSSEOUS STRUCTURES/SOFT TISSUES: No aggressive osseous lesion  involving the calvarium, skull base, or visualized upper cervical  spine. The major intracranial vascular flow voids are maintained.                                                              IMPRESSION:  1. Multiple small foci of acute/subacute infarcts involving the left  corona radiata and centrum semiovale bilaterally as well as the  parieto-occipital region, in a watershed type distribution.   2. Mild diffuse cerebral parenchymal volume loss. Presumed chronic  hypertensive/microvascular ischemic white matter changes.    ECHO 2/7/2023     1. Normal biventricular size  and function. Left ventricular ejection fraction of 60-65%.  2. No segmental wall motion abnormalities noted.  3. No hemodynamically significant valvular disease. Aortic sclerosis without stenosis.  Compared to prior study on 9/11/2013, no change.     CT CHEST/ABDOMEN/PELVIS W CONTRAST 12/11/2022  INDICATION: likely malignancy, recent left effusion appears consistent with cancer  LUNGS AND PLEURA: Moderate left pleural effusion. Groundglass opacity identified in the right upper lobe measuring 14 x 11 mm in size. This is best identified on image #41, series 9. Atelectasis is identified involving the tendon aspect of the left upper lobe. Pulmonary nodule identified in the left upper lobe measuring 6 mm in size. This is best identified on image #102, series 9. Trace right pleural effusion.  MEDIASTINUM/AXILLAE: 8 mm pretracheal lymph node, best identified on image #46, series 8. 17 x 15 mm precarinal lymph node, best identified on image #62, series 8. Focal subcarinal lymph node measuring 15 x 12 mm. And atelectasis identified in the left lower lobe. No axillary lymphadenopathy.  CORONARY ARTERY CALCIFICATION: Moderate to severe  HEPATOBILIARY: The gallbladder is distended. The gallbladder has a unusual configuration. The gallbladder actually appears to extend anteriorly the liver extending through a diaphragmatic hernia to the right of midline in the seated within the right chest. This is best demonstrated on image #18, series 4. Small amount of free fluid is identified adjacent to the gallbladder. The common bile duct is dilated measuring up to 8 mm in size.  PANCREAS: A low-density lesion I believe is present within the head of the pancreas. This measures 2.4 x 1.9 cm in size. This is best seen on image #94, series 3.  SPLEEN: Low-density lesion identified in the inferior aspect of the spleen measuring 10 mm in size. This is best seen on image #76, series 3.  ADRENAL GLANDS: Nodularity of the left adrenal  gland.  KIDNEYS/BLADDER: Multiple renal cysts. No hydronephrosis.                                                             IMPRESSION:   1.  Moderate left pleural effusion. Trace right pleural effusion.  2.  Ground glass opacity identified in the right upper lobe, follow-up should be performed according to the Fleischner Society criteria.  3.  Pulmonary nodule identified in the left upper lobe. Again, follow-up should be performed according to the Fleischner Society criteria.  4.  Mediastinal lymphadenopathy as described above.  5.  The gallbladder extends into the right chest. Diaphragmatic hernia. Small amount of fluid is identified adjacent to the gallbladder.  6.  Low-density lesion within the head of the pancreas. MRCP recommended for further evaluation.  7.  Nodularity of the left adrenal gland, uncertain significance.    PET scan 1/30/2023    1. Findings suspicious for adenocarcinoma involving the left pleura and thoracic lymph nodes without clear evidence of a primary site of disease.  2. FDG avid airspace opacities throughout the left upper and left lower lobes. While favored to represent a multifocal inflammatory/infectious process, their exact etiologies remain indeterminate.      IMP/PLAN:   MED REC REQUIRED  Post Medication Reconciliation Status: discharge medications reconciled and changed, per note/orders     (I63.9) Acute embolic stroke (H)    (I69.320) Aphasia following cerebral infarction  Comment: symptoms improving    Plan: PHYSICAL THERAPY / OCCUPATIONAL THERAPY / Speech Therapy for stroke rehab  Aspirin +clopidogrel for 90 days, reviewed timeline with pt.    Then aspirin alone indefinitely   Atrovastatin 40 mg/day   BP medications as indicated   Follow up with Neurology      (S22.22XD) Closed fracture of body of sternum with routine healing, subsequent encounter  Comment: nondisplaced, on CXR 2/2   Plan: pain management with acetaminophen 650 mg qid, local measures and tramadol 500 mg a6  hours PRN pain      (J18.9) Healthcare-associated pneumonia  Comment: finished course of abx   Plan: IS, supportive care       (J90) Pleural effusion, left  (C34.92) Primary adenocarcinoma of left lung (H)  Comment: imaging as above   Plan: follow up with Oncology Dr Pantoja   Continue alectinib 600 mg bid   O2 by nasal canula if O2 sats <90%    (R53.81) Physical deconditioning  Comment: after acute illness and hospital stay   Plan: therapies as above   Pt looking at AL as discharge goal.        Leidy Villa MD           Sincerely,        Leidy Villa MD

## 2023-02-20 PROBLEM — I69.320 APHASIA FOLLOWING CEREBRAL INFARCTION: Status: ACTIVE | Noted: 2023-01-01

## 2023-02-20 PROBLEM — F41.9 ANXIETY DISORDER, UNSPECIFIED TYPE: Status: ACTIVE | Noted: 2023-01-01

## 2023-02-20 PROBLEM — J18.9 HEALTHCARE-ASSOCIATED PNEUMONIA: Status: ACTIVE | Noted: 2023-01-01

## 2023-02-20 PROBLEM — M54.50 CHRONIC LOW BACK PAIN WITHOUT SCIATICA, UNSPECIFIED BACK PAIN LATERALITY: Status: ACTIVE | Noted: 2023-01-01

## 2023-02-20 PROBLEM — J90 PLEURAL EFFUSION, LEFT: Status: ACTIVE | Noted: 2023-01-01

## 2023-02-20 PROBLEM — C34.92 ADENOCARCINOMA OF LEFT LUNG (H): Status: ACTIVE | Noted: 2023-01-01

## 2023-02-20 PROBLEM — C78.02: Status: ACTIVE | Noted: 2023-01-01

## 2023-02-20 PROBLEM — C34.92 ADENOCARCINOMA OF LEFT LUNG (H): Status: RESOLVED | Noted: 2023-01-01 | Resolved: 2023-01-01

## 2023-02-20 PROBLEM — G89.29 CHRONIC LOW BACK PAIN WITHOUT SCIATICA, UNSPECIFIED BACK PAIN LATERALITY: Status: ACTIVE | Noted: 2023-01-01

## 2023-02-20 PROBLEM — Z91.81 HISTORY OF RECENT FALL: Status: ACTIVE | Noted: 2023-01-01

## 2023-02-20 PROBLEM — R53.81 PHYSICAL DECONDITIONING: Status: ACTIVE | Noted: 2023-01-01

## 2023-02-20 PROBLEM — R07.89 PAIN OF STERNUM: Status: ACTIVE | Noted: 2023-01-01

## 2023-02-20 NOTE — PROGRESS NOTES
[place] [facility]  [place] Medical Record Number:  [id number]  Place of Service where encounter took place:  [facility] ([facility]) [[id number]]  Chief Complaint   Patient presents with     Nursing Home Acute       HPI:    Chiquita Berry  is a 82 year old (1940), who is being seen today for an episodic care visit.  HPI information obtained from: facility chart records, facility staff, patient report and [facility] chart review. Today's concern is:     Aphasia following cerebral infarction  Healthcare-associated pneumonia  Primary adenocarcinoma of left lung (H)  Metastatic lung carcinoma, left (H)  Pleural effusion, left  Chronic low back pain without sciatica, unspecified back pain laterality  Physical deconditioning  History of recent fall  Pain of sternum  Anxiety disorder, unspecified type      Past Medical and Surgical History reviewed in Epic today.    MEDICATIONS:     Current Outpatient Medications   Medication Sig Dispense Refill     acetaminophen (TYLENOL) 325 MG tablet Take 2 tablets (650 mg) by mouth 4 times daily 200 tablet 0     alectinib (ALECENSA) 150 MG CAPS Take 4 capsules (600 mg) by mouth 2 times daily (with meals) 240 capsule 0     aspirin (ASA) 81 MG EC tablet Take 1 tablet (81 mg) by mouth daily Take baby aspirin indefinitely for stroke       atorvastatin (LIPITOR) 40 MG tablet Take 1 tablet (40 mg) by mouth every evening 30 tablet 0     bisacodyl (DULCOLAX) 10 MG suppository Place 1 suppository (10 mg) rectally daily as needed for constipation 10 suppository 0     Cholecalciferol (VITAMIN D) 2000 UNITS tablet Take 1 tablet by mouth daily.       clopidogrel (PLAVIX) 75 MG tablet 1 tablet (75 mg) by Oral or NG Tube route daily for 83 days Take baby aspirin and Plavix daily for total of 90 days.  Then stop Plavix continue baby aspirin daily indefinitely for stroke prevention 83 tablet 0     hydrOXYzine (ATARAX) 10 MG tablet Take 1 tablet (10 mg) by  "mouth 3 times daily as needed for anxiety 30 tablet 1     latanoprost (XALATAN) 0.005 % ophthalmic solution Place 1 drop into both eyes every evening   2     melatonin 3 MG CAPS Take 3 mg by mouth At Bedtime And 3mg prn(total of 6mg ok for sleep)       Multiple Vitamin (MULTIVITAMIN ADULT PO) Take 1 tablet by mouth daily       Multiple Vitamins-Minerals (PRESERVISION AREDS 2) CAPS Take 1 capsule by mouth 2 times daily       polyethylene glycol (MIRALAX) 17 GM/Dose powder Take 17 g by mouth daily 510 g      polyethylene glycol 400 (BLINK TEARS) 0.25 % SOLN ophthalmic solution Place 1 drop into both eyes daily And Q2H PRN       prochlorperazine (COMPAZINE) 5 MG tablet Take 1 tablet (5 mg) by mouth every 6 hours as needed for nausea or vomiting 30 tablet 5     traMADol (ULTRAM) 50 MG tablet Take 1 tablet (50 mg) by mouth every 6 hours as needed for severe pain (7-10) 10 tablet 0     traZODone (DESYREL) 50 MG tablet Take 1 tablet (50 mg) by mouth nightly as needed for sleep        TODAY DURING EXAM/ROS:  No  SOB, Cough, dizziness, fevers, chills, HA, N/V, dysuria or Bowel Abnormalities. Appetite is better.  No pain except sternal and low back--right now said \"no pain\".  Said had a right facial droop and slurred speech when had stroke.    Objective:  /56   Pulse 69   Temp 98.1  F (36.7  C)   Resp 16   Ht 1.575 m (5' 2\")   Wt 62.1 kg (136 lb 12.8 oz)   LMP  (LMP Unknown)   SpO2 93%   BMI 25.02 kg/m    Exam:  GENERAL APPEARANCE:  Alert, in no distress, oriented, cooperative  ENT:  Mouth and posterior oropharynx normal, moist mucous membranes, normal hearing acuity  EYES:  Conjunctiva and lids normal  RESP:  respiratory effort and palpation of chest normal, lungs clear to auscultation , no respiratory distress, diminished breath sounds slightly on left-- a few crackles on left. on O2 at 2l/nc  CV:  Palpation and auscultation of heart done , regular rate and rhythm, no murmur, rub, or gallop, no edema, +2 pedal " pulses  ABDOMEN:  normal bowel sounds, soft, nontender, no hepatosplenomegaly or other masses  M/S:   WEBSTER equally, seen in bed  SKIN:  Inspection of skin and subcutaneous tissue baseline  NEURO:   Cranial nerves 2-12 are normal tested and grossly at patient's baseline, except slight right facial droop and slight slurred speech  PSYCH:  oriented X 3, normal insight, judgement and memory, affect and mood normal    Labs:   Recent Labs   Lab Test 02/14/23  0747 02/13/23  1523 02/10/23  0801 02/08/23  0818 02/07/23  1125   NA  --   --  138 137 133*   POTASSIUM 3.9 4.3 3.5 3.6 4.0   CHLORIDE  --   --  101  --  98   CO2  --   --  29  --  24   ANIONGAP  --   --  8  --  11   GLC  --   --  106*  --  132*   BUN  --   --  12.5  --  20.4   CR 0.73  --  0.66 0.63 0.66   SAGE  --   --  8.6*  --  9.1    < > = values in this interval not displayed.     CBC RESULTS: Recent Labs   Lab Test 02/14/23  0747 02/10/23  0801   WBC  --  7.6   RBC  --  3.74*   HGB  --  12.1   HCT  --  34.7*   MCV  --  93   MCH  --  32.4   MCHC  --  34.9   RDW  --  15.0    285     ASSESSMENT / PLAN:  (I69.320) Aphasia following cerebral infarction  (primary encounter diagnosis)  (R53.81) Physical deconditioning  Comment/Plan:  Seen by neuro--f/u as outpt--on Plavix and asa for 90 days together,  Discontinue Plavix 5/5/23 and then just asa.  On statin also. PT OT SLP    (J18.9) Healthcare-associated pneumonia  Comment/Plan: completed Abx 2/13, wean O2 as able,  IS, monitor.    (C34.92) Primary adenocarcinoma of left lung (H)   (C78.02) Metastatic lung carcinoma, left (H)   (J90) Pleural effusion, left  Comment/Plan:  On Tx Alecensa per Dr Pantoja--had pleural taps but seems fine right now, monitor.    (M54.50,  G89.29) Chronic low back pain without sciatica, unspecified back pain laterality  (Z91.81) History of recent fall   (R07.89) Pain of sternum  Comment/Plan: morena improved in sternum, has chronic back pain from a fall and L4 Fx in the past,  Tramadol  helps, cont same  And monitor.     (F41.9) Anxiety disorder, unspecified type  Comment/Plan: prn Vistaril,  Monitor.    **left msg on daughter  Regla's personal cell phone.**      Electronically signed by:  NIMCO Bain CNP

## 2023-02-24 PROBLEM — R09.02 HYPOXIA: Status: ACTIVE | Noted: 2023-01-01

## 2023-02-24 NOTE — PROGRESS NOTES
"[place] [facility]  [place] Medical Record Number:  [id number]  Place of Service where encounter took place:  [facility] ([facility]) [[id number]]  Chief Complaint   Patient presents with     Nursing Home Acute       HPI:    Chiquita Berry  is a 82 year old (1940), who is being seen today for an episodic care visit.  HPI information obtained from: facility chart records, facility staff, patient report and [facility] chart review. Today's concern is: she says feels stronger and not sob but \"shouldn't I be off the oxygen by now?\"  Do I need a tap?\"     Aphasia following cerebral infarction  Healthcare-associated pneumonia  Primary adenocarcinoma of left lung (H)  Metastatic lung carcinoma, left (H)  Pleural effusion, left  Hypoxia  Chronic low back pain without sciatica, unspecified back pain laterality  Physical deconditioning  History of recent fall  Anxiety disorder, unspecified type      Past Medical and Surgical History reviewed in Epic today.    MEDICATIONS:     Current Outpatient Medications   Medication Sig Dispense Refill     acetaminophen (TYLENOL) 325 MG tablet Take 2 tablets (650 mg) by mouth 4 times daily 200 tablet 0     alectinib (ALECENSA) 150 MG CAPS Take 4 capsules (600 mg) by mouth 2 times daily (with meals) 240 capsule 0     aspirin (ASA) 81 MG EC tablet Take 1 tablet (81 mg) by mouth daily Take baby aspirin indefinitely for stroke       atorvastatin (LIPITOR) 40 MG tablet Take 1 tablet (40 mg) by mouth every evening 30 tablet 0     bisacodyl (DULCOLAX) 10 MG suppository Place 1 suppository (10 mg) rectally daily as needed for constipation 10 suppository 0     Cholecalciferol (VITAMIN D) 2000 UNITS tablet Take 1 tablet by mouth daily.       clopidogrel (PLAVIX) 75 MG tablet 1 tablet (75 mg) by Oral or NG Tube route daily for 83 days Take baby aspirin and Plavix daily for total of 90 days.  Then stop Plavix continue baby aspirin daily indefinitely for " "stroke prevention 83 tablet 0     hydrOXYzine (ATARAX) 10 MG tablet Take 1 tablet (10 mg) by mouth 3 times daily as needed for anxiety 30 tablet 1     latanoprost (XALATAN) 0.005 % ophthalmic solution Place 1 drop into both eyes every evening   2     melatonin 3 MG CAPS Take 3 mg by mouth At Bedtime And 3mg prn(total of 6mg ok for sleep)       Multiple Vitamin (MULTIVITAMIN ADULT PO) Take 1 tablet by mouth daily       Multiple Vitamins-Minerals (PRESERVISION AREDS 2) CAPS Take 1 capsule by mouth 2 times daily       polyethylene glycol (MIRALAX) 17 GM/Dose powder Take 17 g by mouth daily 510 g      polyethylene glycol 400 (BLINK TEARS) 0.25 % SOLN ophthalmic solution Place 1 drop into both eyes daily And Q2H PRN       prochlorperazine (COMPAZINE) 5 MG tablet Take 1 tablet (5 mg) by mouth every 6 hours as needed for nausea or vomiting 30 tablet 5     traMADol (ULTRAM) 50 MG tablet Take 1 tablet (50 mg) by mouth every 6 hours as needed for severe pain (7-10) 10 tablet 0     traZODone (DESYREL) 50 MG tablet Take 1 tablet (50 mg) by mouth nightly as needed for sleep        TODAY DURING EXAM/ROS:  No  SOB, Cough, dizziness, fevers, chills, HA, N/V, dysuria or Bowel Abnormalities. Appetite is better.  No pain today.  Feels speech is better.    Objective:  /60   Pulse 70   Temp 97.5  F (36.4  C)   Resp 16   Ht 1.575 m (5' 2\")   Wt 62.1 kg (136 lb 12.8 oz)   LMP  (LMP Unknown)   SpO2 94%   BMI 25.02 kg/m    Exam:  GENERAL APPEARANCE:  Alert, in no distress, oriented, cooperative  ENT:  Mouth with moist mucous membranes, normal hearing acuity  RESP:  respiratory effort  of chest normal, lungs clear to auscultation with slightly decreased BS on left lower lung. No extraneous sounds. NAD, O2 at 2l/nc  CV:  Auscultation of heart done , RRR, no murmur.  Bilat +1 feet to mid shins' edema, +2 pedal pulses  ABDOMEN:  normal bowel sounds, soft, nontender.  M/S:   WEBSTER equally, seen in bed  SKIN:  Inspection of skin and " subcutaneous tissue baseline  NEURO:   Cranial nerves are grossly at patient's baseline, except slight right facial droop--speech clear  PSYCH:  oriented X 3, normal insight, judgement and memory, affect and mood normal    Labs:   Recent Labs   Lab Test 02/14/23  0747 02/13/23  1523 02/10/23  0801 02/08/23  0818 02/07/23  1125   NA  --   --  138 137 133*   POTASSIUM 3.9 4.3 3.5 3.6 4.0   CHLORIDE  --   --  101  --  98   CO2  --   --  29  --  24   ANIONGAP  --   --  8  --  11   GLC  --   --  106*  --  132*   BUN  --   --  12.5  --  20.4   CR 0.73  --  0.66 0.63 0.66   SAGE  --   --  8.6*  --  9.1    < > = values in this interval not displayed.     CBC RESULTS: Recent Labs   Lab Test 02/14/23  0747 02/10/23  0801   WBC  --  7.6   RBC  --  3.74*   HGB  --  12.1   HCT  --  34.7*   MCV  --  93   MCH  --  32.4   MCHC  --  34.9   RDW  --  15.0    285      ASSESSMENT / PLAN:  (I69.320) Aphasia following cerebral infarction  (primary encounter diagnosis)  Comment/Plan: improved, will need f/u with neuro post stroke--[facility] [place] make appt    RESP ISSUES:  (J18.9) Healthcare-associated pneumonia  (C34.92) Primary adenocarcinoma of left lung (H)  (C78.02) Metastatic lung carcinoma, left (H)  (J90) Pleural effusion, left  (R09.02) Hypoxia  Comment: improved,   Plan: cont to wean O2--has no distress and lungs improved so don't think extra fluid present.  Will wean as able and monitor/re eval next week.    (M54.50,  G89.29) Chronic low back pain without sciatica, unspecified back pain laterality  (Z91.81) History of recent fall   (R53.81) Physical deconditioning  Comment: No pain today, PT OT, SLP, no LCD yet, Pt said per Care Conf, she will be looking at AL apt here at [place].    (F41.9) Anxiety disorder, unspecified type  Comment: calm  Plan: Desyrel, prn atarax-usually takes once daily-no changes today, monitor.          Electronically signed by:  NIMCO Bain CNP

## 2023-02-27 PROBLEM — G47.00 INSOMNIA, UNSPECIFIED TYPE: Status: ACTIVE | Noted: 2023-01-01

## 2023-02-27 NOTE — PROGRESS NOTES
"[place] [facility]  [place] Medical Record Number:  [id number]  Place of Service where encounter took place:  [facility] ([facility]) [[id number]]  Chief Complaint   Patient presents with     Nursing Home Acute       HPI:    Chiquita Beryr  is a 82 year old (1940), who is being seen today for an episodic care visit.  HPI information obtained from: facility chart records, facility staff, patient report and [facility] chart review. Today's concern is: did not sleep well on wkd--\"I was breathing fine but my mind was busy with the assisted living changes coming\". Denies any sob, gomez, or breathing difficulties.     Aphasia following cerebral infarction  Physical deconditioning  Primary adenocarcinoma of left lung (H)  Metastatic lung carcinoma, left (H)  Pleural effusion, left  Hypoxia  History of recent fall  Chronic low back pain without sciatica, unspecified back pain laterality  Anxiety disorder, unspecified type  Insomnia, unspecified type      Past Medical and Surgical History reviewed in Epic today.    MEDICATIONS:     Current Outpatient Medications   Medication Sig Dispense Refill     traZODone (DESYREL) 50 MG tablet Take 50 mg by mouth At Bedtime       acetaminophen (TYLENOL) 325 MG tablet Take 2 tablets (650 mg) by mouth 4 times daily 200 tablet 0     alectinib (ALECENSA) 150 MG CAPS Take 4 capsules (600 mg) by mouth 2 times daily (with meals) 240 capsule 0     aspirin (ASA) 81 MG EC tablet Take 1 tablet (81 mg) by mouth daily Take baby aspirin indefinitely for stroke       atorvastatin (LIPITOR) 40 MG tablet Take 1 tablet (40 mg) by mouth every evening 30 tablet 0     bisacodyl (DULCOLAX) 10 MG suppository Place 1 suppository (10 mg) rectally daily as needed for constipation 10 suppository 0     Cholecalciferol (VITAMIN D) 2000 UNITS tablet Take 1 tablet by mouth daily.       clopidogrel (PLAVIX) 75 MG tablet 1 tablet (75 mg) by Oral or NG Tube route daily for 83 " "days Take baby aspirin and Plavix daily for total of 90 days.  Then stop Plavix continue baby aspirin daily indefinitely for stroke prevention 83 tablet 0     hydrOXYzine (ATARAX) 10 MG tablet Take 1 tablet (10 mg) by mouth 3 times daily as needed for anxiety 30 tablet 1     latanoprost (XALATAN) 0.005 % ophthalmic solution Place 1 drop into both eyes every evening   2     melatonin 3 MG CAPS Take 3 mg by mouth At Bedtime And 3mg prn po before 0200 hrs (total of 6mg)       Multiple Vitamin (MULTIVITAMIN ADULT PO) Take 1 tablet by mouth daily       Multiple Vitamins-Minerals (PRESERVISION AREDS 2) CAPS Take 1 capsule by mouth 2 times daily       polyethylene glycol (MIRALAX) 17 GM/Dose powder Take 17 g by mouth daily 510 g      polyethylene glycol 400 (BLINK TEARS) 0.25 % SOLN ophthalmic solution Place 1 drop into both eyes daily And Q2H PRN       traMADol (ULTRAM) 50 MG tablet Take 1 tablet (50 mg) by mouth every 6 hours as needed for severe pain (7-10) 10 tablet 0      TODAY DURING EXAM/ROS:  No  SOB,  dizziness, fevers, chills, HA, N/V, dysuria or Bowel Abnormalities. Appetite is better.  Takes the tramadol for back pain which is helpful. Speech is better, not sleeping well,  Has occas cough--not new.    Objective:  /78   Pulse 89   Temp 97.6  F (36.4  C)   Resp 20   Ht 1.575 m (5' 2\")   Wt 62.1 kg (136 lb 12.8 oz)   LMP  (LMP Unknown)   SpO2 94%   BMI 25.02 kg/m    Exam:  GENERAL APPEARANCE:  Alert, in no distress, oriented, cooperative  ENT:  Mouth with moist mucous membranes, normal hearing acuity  RESP:  respiratory effort  of chest normal, lungs clear to auscultation with slightly decreased BS in bases. NAD, O2 at 2l/nc  CV:  Auscultation of heart done , RRR, no murmur.  Bilat +1 feet to mid shins' edema, +2 pedal pulses  ABDOMEN:  normal bowel sounds, soft, nontender.  M/S:   WEBSTER equally, seen in bed  SKIN:  Inspection of skin and subcutaneous tissue baseline  NEURO:   Cranial nerves are " grossly at patient's baseline, except slight right facial droop--speech clear.  PSYCH:  oriented X 3, normal insight, judgement and memory, affect and mood normal    Labs:    Recent Labs   Lab Test 2/27/23 pending 02/14/23  0747 02/13/23  1523 02/10/23  0801 02/08/23  0818 02/07/23  1125   NA   --   --  138 137 133*   POTASSIUM  3.9 4.3 3.5 3.6 4.0   CHLORIDE   --   --  101  --  98   CO2   --   --  29  --  24   ANIONGAP   --   --  8  --  11   GLC   --   --  106*  --  132*   BUN   --   --  12.5  --  20.4   CR  0.73  --  0.66 0.63 0.66   SAGE   --   --  8.6*  --  9.1     < > = values in this interval not displayed.     CBC RESULTS: Recent Labs   Lab Test 02/10/23  0801   WBC 7.6   RBC 3.74*   HGB 12.1   HCT 34.7*   MCV 93   MCH 32.4   MCHC 34.9   RDW 15.0         ASSESSMENT / PLAN:  (I69.320) Aphasia following cerebral infarction  (primary encounter diagnosis)   (R53.81) Physical deconditioning  Comment/Plan: cont PT OT, SLP, no LCD yet, improved, will need f/u with neuro post stroke--[facility] making appt    RESP ISSUES:  (C34.92) Primary adenocarcinoma of left lung (H)  (C78.02) Metastatic lung carcinoma, left (H)  (J90) Pleural effusion, left  (R09.02) Hypoxia  Comment/Plan: Keep O2 at 1-2 l/--sats mid 90's but with lung cancer and anxiety, will not try to wean at this time. Does not have symptoms of increased pleural effusion, but will watch closely.  Has appt with Dr Pantoja on 3/1/23(telephone appt)     (Z38.81) History of recent fall  (M54.50,  G89.29) Chronic low back pain without sciatica, unspecified back pain laterality  Comment: some back pain, using tramadol usually 1-3/per day.   Cont same POC, no changes,.      (F41.9) Anxiety disorder, unspecified type  (G47.00) Insomnia, unspecified type  Comment.Plan: schedule the Desyrel, keep prn atarax, melatonin at hs and prn. Will  Monitor and adjust as needed.      Electronically signed by:  NIMCO Bain CNP

## 2023-02-28 NOTE — PROGRESS NOTES
Jose Daniel Almaguer returned my call and approved the [facility] tlso.  Shannon Larson.    [FreeTextEntry1] : Anoscopy w/ rubber band ligation - performed with small lighted disposable anoscope, RP and LP internal hemorrhoids, a band was placed RP without any bleeding at the end, pt tolerated the procedure.

## 2023-03-01 NOTE — LETTER
3/1/2023         RE: Chiquita Berry  [address]  [place] [facility] [address]        Dear Colleague,    Thank you for referring your patient, Chiquita Berry, to the [facility]. Please see a copy of my visit note below.    ONCOLOGY HISTORY:  Ms. Berry is a female with metastatic lung adenocarcinoma.  1.  Chest x-ray on 12/10/2022 revealed a large left pleural effusion.  -CT chest, abdomen, and pelvis on 12/11/2022 revealed a left pleural effusion.  There is a left upper lobe lung nodule and also ground glass opacity in right upper lobe.  There is mediastinal lymphadenopathy.  There is low density lesion in head of the pancreas and some nodularity of left adrenal gland.  2.  Left thoracocentesis was done on 12/11/2022.  Pathology reveals adenocarcinoma.  Immunostains compatible with metastasis from lung primary.  -TPS of 20-30%.  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  3.  Brain MRI on 12/13/2022 does not reveal any brain metastasis.  4. Alectinib started on 01/26/2023.  5. Stroke in 02/2023.    SUBJECTIVE:  Ms. 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  Alectinib was started on 01/26/2023.     The patient was admitted to the hospital between 02/02/2023 and 02/14/2023.  The patient had multiple small foci of acute/subacute infarct.  The patient is currently in rehabilitation.     I had a telephone visit with the patient and her friends.  The patient says that she is improving.  She has been getting physical therapy, which is helping.  She is hoping to go to an assisted living.     No headache.  No dizziness.  No chest pain. No shortness of breath.  Some cough.  No abdominal pain.  No nausea or vomiting.  Appetite is improving.  No bleeding.  No fever or chills.  She is not in pain.     PHYSICAL EXAMINATION:    GENERAL:  She is alert, oriented x 3.   Rest of systems not examined as this is a  telephone visit.     LABORATORY:  CBC and CMP done on 02/27/2023 reviewed.     ASSESSMENT:     1.  An 82-year-old female with metastatic lung adenocarcinoma on alectinib.  2.  Recent stroke.  3.  Mild elevated LFT.  4.  Normocytic anemia.     PLAN:     1.  Discussed regarding lung cancer.  Clinically, she is stable.  No suspicion of progression.  She will continue on Eliquis.  She is tolerating it well.  Side effects reviewed.  2.  I discussed regarding the scans.  We will plan on getting scans towards the end of April.  3.  She recently had stroke.  She had multiple brain imaging.  There is no brain metastases.  She will continue on aspirin and Plavix.  4.  Labs were reviewed.  Hemoglobin is mildly low.  We will monitor it.  LFTs mildly elevated.  This will be monitored.  5.  Previously she had pleural effusion.  She had thoracocentesis.  The patient is not short of breath.  The patient has pulse ox monitored.  Sometimes it is in upper 80s.  She is wondering if thoracocentesis is needed.   I told the patient that I will discuss with the nurse practitioner who is taking care of her.  After that, we will make a decision regarding chest x-ray and thoracocentesis if needed.  6.  I will see her in a month.  I advised her to call us with any questions or concerns.    ADDENDUM (03/02/2023): Case discussed with NIMCO Hoover CNP. She had ordered a chest x-ray. Pleural effusion is stable. Patient is not having any chest related symptoms. No thoracocentesis at this time. She will monitor the patient. Thoracocentesis will be done as clinically indicated.     TELEPHONE VISIT TIME:  23 minutes.  Time spent during today's visit, review of chart/investigations today and documentation today.    This office note has been dictated.        Again, thank you for allowing me to participate in the care of your patient.        Sincerely,        Fer Pantoja MD

## 2023-03-01 NOTE — PROGRESS NOTES
"Video-Visit Details    Type of service:  Video Visit    Video Start Time (time video started): ***    Video End Time (time video stopped): ***    Originating Location (pt. Location): {video visit patient location:[id number]::\"Home\"}    {PROVIDER LOCATION On-site should be selected for visits conducted from your clinic location or adjoining [facility] hospital, academic office, or other nearby [facility] building. Off-site should be selected for all other provider locations, including home:[id number]}    Distant Location (provider location):  {virtual location provider:[id number]}    Mode of Communication:  Video Conference via SnapOne        "

## 2023-03-01 NOTE — LETTER
3/1/2023         RE: Chiquita Berry  [address]  [facility] [address]        Dear Colleague,    Thank you for referring your patient, Chiquita Berry, to the [facility]. Please see a copy of my visit note below.    ONCOLOGY HISTORY:  Ms. Berry is a female with metastatic lung adenocarcinoma.  1.  Chest x-ray on 12/10/2022 revealed a large left pleural effusion.  -CT chest, abdomen, and pelvis on 12/11/2022 revealed a left pleural effusion.  There is a left upper lobe lung nodule and also ground glass opacity in right upper lobe.  There is mediastinal lymphadenopathy.  There is low density lesion in head of the pancreas and some nodularity of left adrenal gland.  2.  Left thoracocentesis was done on 12/11/2022.  Pathology reveals adenocarcinoma.  Immunostains compatible with metastasis from lung primary.  -TPS of 20-30%.  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  3.  Brain MRI on 12/13/2022 does not reveal any brain metastasis.  4. Alectinib started on 01/26/2023.  5. Stroke in 02/2023.    SUBJECTIVE:  Ms. 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  Alectinib was started on 01/26/2023.     The patient was admitted to the hospital between 02/02/2023 and 02/14/2023.  The patient had multiple small foci of acute/subacute infarct.  The patient is currently in rehabilitation.     I had a telephone visit with the patient and her friends.  The patient says that she is improving.  She has been getting physical therapy, which is helping.  She is hoping to go to an assisted living.     No headache.  No dizziness.  No chest pain. No shortness of breath.  Some cough.  No abdominal pain.  No nausea or vomiting.  Appetite is improving.  No bleeding.  No fever or chills.  She is not in pain.     PHYSICAL EXAMINATION:    GENERAL:  She is alert, oriented x 3.   Rest of systems not examined as this is a  telephone visit.     LABORATORY:  CBC and CMP done on 02/27/2023 reviewed.     ASSESSMENT:     1.  An 82-year-old female with metastatic lung adenocarcinoma on alectinib.  2.  Recent stroke.  3.  Mild elevated LFT.  4.  Normocytic anemia.     PLAN:     1.  Discussed regarding lung cancer.  Clinically, she is stable.  No suspicion of progression.  She will continue on Eliquis.  She is tolerating it well.  Side effects reviewed.  2.  I discussed regarding the scans.  We will plan on getting scans towards the end of April.  3.  She recently had stroke.  She had multiple brain imaging.  There is no brain metastases.  She will continue on aspirin and Plavix.  4.  Labs were reviewed.  Hemoglobin is mildly low.  We will monitor it.  LFTs mildly elevated.  This will be monitored.  5.  Previously she had pleural effusion.  She had thoracocentesis.  The patient is not short of breath.  The patient has pulse ox monitored.  Sometimes it is in upper 80s.  She is wondering if thoracocentesis is needed.   I told the patient that I will discuss with the nurse practitioner who is taking care of her.  After that, we will make a decision regarding chest x-ray and thoracocentesis if needed.  6.  I will see her in a month.  I advised her to call us with any questions or concerns.    ADDENDUM (03/02/2023): Case discussed with NIMCO Hoover CNP. She had ordered a chest x-ray. Pleural effusion is stable. Patient is not having any chest related symptoms. No thoracocentesis at this time. She will monitor the patient. Thoracocentesis will be done as clinically indicated.     TELEPHONE VISIT TIME:  23 minutes.  Time spent during today's visit, review of chart/investigations today and documentation today.    This office note has been dictated.        Again, thank you for allowing me to participate in the care of your patient.        Sincerely,        Fer Pantoja MD

## 2023-03-01 NOTE — PROGRESS NOTES
"[facility]  [facility] Medical Record Number:  [id number]  Place of Service where encounter took place:  [facility] ([facility]) [[id number]]  Chief Complaint   Patient presents with     Nursing Home Acute       HPI:    Chiquita Berry  is a 82 year old (1940), who is being seen today for an episodic care visit.  HPI information obtained from: facility chart records, facility staff, patient report and [facility] chart review. Today's concern is: feels anxious about AL move in future--thinks sleep a little better. Had telephone visit with Dr Pantoja yesterday--\"I thinks I am fine and done need tap but wants you to call him\"     Aphasia following cerebral infarction  Physical deconditioning  Primary adenocarcinoma of left lung (H)  Metastatic lung carcinoma, left (H)  Pleural effusion, left  Hypoxia  History of recent fall  Chronic low back pain without sciatica, unspecified back pain laterality  Anxiety disorder, unspecified type  Insomnia, unspecified type      Past Medical and Surgical History reviewed in Epic today.    MEDICATIONS:     Current Outpatient Medications   Medication Sig Dispense Refill     acetaminophen (TYLENOL) 325 MG tablet Take 2 tablets (650 mg) by mouth 4 times daily 200 tablet 0     alectinib (ALECENSA) 150 MG CAPS Take 4 capsules (600 mg) by mouth 2 times daily (with meals) 240 capsule 0     aspirin (ASA) 81 MG EC tablet Take 1 tablet (81 mg) by mouth daily Take baby aspirin indefinitely for stroke       atorvastatin (LIPITOR) 40 MG tablet Take 1 tablet (40 mg) by mouth every evening 30 tablet 0     bisacodyl (DULCOLAX) 10 MG suppository Place 1 suppository (10 mg) rectally daily as needed for constipation 10 suppository 0     Cholecalciferol (VITAMIN D) 2000 UNITS tablet Take 1 tablet by mouth daily.       clopidogrel (PLAVIX) 75 MG tablet 1 tablet (75 mg) by Oral or NG Tube route daily for 83 days Take baby aspirin and Plavix daily for total of " "90 days.  Then stop Plavix continue baby aspirin daily indefinitely for stroke prevention 83 tablet 0     hydrOXYzine (ATARAX) 10 MG tablet Take 1 tablet (10 mg) by mouth 3 times daily as needed for anxiety 30 tablet 1     latanoprost (XALATAN) 0.005 % ophthalmic solution Place 1 drop into both eyes every evening   2     melatonin 3 MG CAPS Take 3 mg by mouth At Bedtime And 3mg prn po before 0200 hrs (total of 6mg)       Multiple Vitamin (MULTIVITAMIN ADULT PO) Take 1 tablet by mouth daily (Patient not taking: Reported on 3/1/2023)       Multiple Vitamins-Minerals (PRESERVISION AREDS 2) CAPS Take 1 capsule by mouth 2 times daily       polyethylene glycol (MIRALAX) 17 GM/Dose powder Take 17 g by mouth daily 510 g      polyethylene glycol 400 (BLINK TEARS) 0.25 % SOLN ophthalmic solution Place 1 drop into both eyes daily And Q2H PRN       traMADol (ULTRAM) 50 MG tablet Take 1 tablet (50 mg) by mouth every 6 hours as needed for severe pain (7-10) 10 tablet 0     traZODone (DESYREL) 50 MG tablet Take 50 mg by mouth At Bedtime        TODAY DURING EXAM/ROS:  No  SOB,  dizziness, fevers, chills, HA, N/V, dysuria or Bowel Abnormalities. Appetite is better.  Takes the tramadol for back pain-helps. Speech is clear    Objective:  /54   Pulse 63   Temp 97.6  F (36.4  C)   Resp 20   Ht 1.575 m (5' 2\")   Wt 62.1 kg (136 lb 12.8 oz)   LMP  (LMP Unknown)   SpO2 98%   BMI 25.02 kg/m    Exam:  GENERAL APPEARANCE:  Alert, in no distress, oriented, cooperative  ENT:  Mouth with moist mucous membranes, normal hearing acuity  RESP:  respiratory effort  of chest normal, lungs clear to auscultation except fine crackles in bases and slightly decreased in bases. NAD, O2 at 1-2l/nc  CV:  Auscultation of heart done , RRR, no murmur.  Bilat trace pitting edema feet to mid shins' edema, +2 pedal pulses  ABDOMEN:  normal bowel sounds, soft, nontender.  M/S:   WEBSTER equally, seen in bed  SKIN:  Inspection of skin and subcutaneous " "tissue baseline  NEURO:   Cranial nerves are grossly at patient's baseline, speech clear, no facial droop.  PSYCH:  oriented X 3, normal insight, judgement and memory, affect and mood normal    Labs:   Recent Labs   Lab Test 02/27/23  1533 02/14/23  0747 02/11/23  0824 02/10/23  0801     --   --  138   POTASSIUM 4.5 3.9   < > 3.5   CHLORIDE 103  --   --  101   CO2 27  --   --  29   ANIONGAP 10  --   --  8   *  --   --  106*   BUN 17.4  --   --  12.5   CR 0.73 0.73   < > 0.66   SAGE 8.8  --   --  8.6*    < > = values in this interval not displayed.        CBC RESULTS: Recent Labs   Lab Test 02/27/23  1533   WBC 8.3   RBC 3.42*   HGB 10.8*   HCT 33.8*   MCV 99   MCH 31.6   MCHC 32.0   RDW 15.7*            CBC RESULTS: Recent Labs   Lab Test 02/10/23  0801   WBC 7.6   RBC 3.74*   HGB 12.1   HCT 34.7*   MCV 93   MCH 32.4   MCHC 34.9   RDW 15.0         ASSESSMENT / PLAN:  (I69.320) Aphasia following cerebral infarction  (primary encounter diagnosis)   (R53.81) Physical deconditioning  Comment/Plan: appears back to baseline, cont PT OT, SLP, no LCD yet, Needs f/u with neuro post stroke as outpt.    RESP ISSUES:  (C34.92) Primary adenocarcinoma of left lung (H)  (C78.02) Metastatic lung carcinoma, left (H)  (J90) Pleural effusion, left  (R09.02) Hypoxia  Comment/Plan: O2 at 1-2 l/--sats mid 90's but drops with activity to mid 80's per staff. Per CXR done today:  Some CHF, and mild Left effusion\"   Does not have symptoms of resp distress or increased effusion.  I d/w Dr Pantoja--agreed with no change in plans, or meds and to monitor.     (Z91.81) History of recent fall  (M54.50,  G89.29) Chronic low back pain without sciatica, unspecified back pain laterality  Comment: some back pain, using tramadol usually 1-3/per day.   Cont same POC, no change today, monitor.      (F41.9) Anxiety disorder, unspecified type  (G47.00) Insomnia, unspecified type  Comment.Plan: a bit better with scheduled the Desyrel, "  prn atarax, melatonin. Will monitor and adjust as needed.    **Msg left on friend Regla's cell with update and offer to talk if she leave a better time to call, with nurses**    Electronically signed by:  NIMCO Bain CNP           Face to Face and Medical Necessity Statement for DME Provider visit    Demographic Information on Chiquita Berry:  Gender: female  : 1940  [address]   [facility] [id number] [phone] (home)     Medical Record: [id number]  Social Security Number: [id number]  Primary Care Provider: Vesna Olivera  Insurance: Payor: BC / Plan: [employer] MEDICARE ADVANTAGE / Product Type: Medicare /     HPI:   Chiquita Berry is a 82 year old  (1940), who is being seen today for a face to face provider visit at [facility]; medical necessity statement for DME included. This patient requires the following:    ................................................................................................................................................................................................  Wheelchair Documentation  Size: 18 x 16  Corresponding cushion: No  Standard foot rests: Yes  Elevating leg rests: No   Arm rests: Yes:    Lap tray: No  Dose the patient use oxygen? Yes: intermittently as O2 sats drop to mid 80's on RA with ambulation so on 2l/nc   Is the patient able to propel wheelchair? Yes If no why not?   And is there someone who can?yes  1. The patient has mobility limitations that impairs their ability to participate in one or more mobility related activities: Toileting, Grooming and Bathing.  The wheelchair is suitable and necessary for use in the patient's home.  2. The patient's mobility limitations cannot be safely resolved by using a cane/walker:Yes    Reason why a cane or walker will not meet the patient's needs. (ie: balance, tolerance, level of assistance) O2 sats drop with increased ambulation and on 2l/nc  3. The  "patients home has adequate access to use a manual wheelchair:Yes  4. The use of a manual wheelchair on a regular basis will improve the patients ability to participate in mobility related ADL's at home:Yes  5. The patient is willing to use a manual wheelchair at home:Yes  6. The patient has adequate upper body strength and the mental capability to safely use a manual wheelchair and/or has a caregiver that is able to assist: Yes  7. Does the patient have a lower extremity injury or edema?No  Reason for Type of Wheelchair  Patient weight: 137 lbs and 60 inches tall  Needs aneta height due to short stature    Pt needing above DME with expected length of need of 99    months  due to medical necessity associated with following diagnosis:   requires Drive back cushion due to kyphosis and pressure areas on spine.   I 69. 320, C 34.92, M 54.50, M 40.209, M 41.9     Aphasia following cerebral infarction  Physical deconditioning  Primary adenocarcinoma of left lung (H)  Metastatic lung carcinoma, left (H)  Pleural effusion, left  Hypoxia  History of recent fall  Chronic low back pain without sciatica, unspecified back pain laterality  Anxiety disorder, unspecified type  Insomnia, unspecified type      PMH   has a past medical history of Anxiety, Chronic back pain, Osteoporosis, and Primary lung adenocarcinoma (H).    ROS:see above    EXAM--see above  Vitals: /54   Pulse 63   Temp 97.6  F (36.4  C)   Resp 20   Ht 1.575 m (5' 2\")   Wt 62.1 kg (136 lb 12.8 oz)   LMP  (LMP Unknown)   SpO2 98%   BMI 25.02 kg/m  ;BMI= Body mass index is 25.02 kg/m .      ASSESSMENT/PLAN:  1. Aphasia following cerebral infarction    2. Physical deconditioning    3. Primary adenocarcinoma of left lung (H)    4. Metastatic lung carcinoma, left (H)    5. Pleural effusion, left    6. Hypoxia    7. History of recent fall    8. Chronic low back pain without sciatica, unspecified back pain laterality    9. Anxiety disorder, unspecified type  "   10. Insomnia, unspecified type        Orders:   1. Facility staff/TC to contact DME company to get their order form for provider to fill out     ELECTRONICALLY SIGNED BY JEY CERTIFIED PROVIDER:  NIMCO Bain CNP   NPI: [id number]    [facility]  [address], [address]  [place] [address]

## 2023-03-01 NOTE — NURSING NOTE
Is the patient currently in the state of [place]? YES    Visit mode:VIDEO    If the visit is dropped, the patient can be reconnected by: TELEPHONE VISIT: Phone number: [phone]    Will anyone else be joining the visit? NO      How would you like to obtain your AVS? MyChart    Are changes needed to the allergy or medication list? NO    Reason for visit: Follow up

## 2023-03-05 NOTE — TELEPHONE ENCOUNTER
[facility] TELEPHONE ENCOUNTER    Chief Complaint   Patient presents with     Nausea       Chiquita Berry is a 82 year old  (1940),Nurse called today to report: Complaints of nausea complaints. Suspect due to being on plavix without PPI. She also takes cancer agent Alectinib BID which may also be contributing.     ASSESSMENT/PLAN      Start Protonix 40mg daily    Zofran every 6 hours PRN    Electronically signed by:   NIMCO Patel CNP

## 2023-03-05 NOTE — PROGRESS NOTES
ONCOLOGY HISTORY:  Ms. Berry is a female with metastatic lung adenocarcinoma.  1.  Chest x-ray on 12/10/2022 revealed a large left pleural effusion.  -CT chest, abdomen, and pelvis on 12/11/2022 revealed a left pleural effusion.  There is a left upper lobe lung nodule and also ground glass opacity in right upper lobe.  There is mediastinal lymphadenopathy.  There is low density lesion in head of the pancreas and some nodularity of left adrenal gland.  2.  Left thoracocentesis was done on 12/11/2022.  Pathology reveals adenocarcinoma.  Immunostains compatible with metastasis from lung primary.  -TPS of 20-30%.  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  3.  Brain MRI on 12/13/2022 does not reveal any brain metastasis.  4. Alectinib started on 01/26/2023.  5. Stroke in 02/2023.    SUBJECTIVE:  Ms. 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  Alectinib was started on 01/26/2023.     The patient was admitted to the hospital between 02/02/2023 and 02/14/2023.  The patient had multiple small foci of acute/subacute infarct.  The patient is currently in rehabilitation.     I had a telephone visit with the patient and her friends.  The patient says that she is improving.  She has been getting physical therapy, which is helping.  She is hoping to go to an assisted living.     No headache.  No dizziness.  No chest pain. No shortness of breath.  Some cough.  No abdominal pain.  No nausea or vomiting.  Appetite is improving.  No bleeding.  No fever or chills.  She is not in pain.     PHYSICAL EXAMINATION:    GENERAL:  She is alert, oriented x 3.   Rest of systems not examined as this is a telephone visit.     LABORATORY:  CBC and CMP done on 02/27/2023 reviewed.     ASSESSMENT:     1.  An 82-year-old female with metastatic lung adenocarcinoma on alectinib.  2.  Recent stroke.  3.  Mild elevated LFT.  4.  Normocytic anemia.     PLAN:     1.  Discussed regarding  lung cancer.  Clinically, she is stable.  No suspicion of progression.  She will continue on Eliquis.  She is tolerating it well.  Side effects reviewed.  2.  I discussed regarding the scans.  We will plan on getting scans towards the end of April.  3.  She recently had stroke.  She had multiple brain imaging.  There is no brain metastases.  She will continue on aspirin and Plavix.  4.  Labs were reviewed.  Hemoglobin is mildly low.  We will monitor it.  LFTs mildly elevated.  This will be monitored.  5.  Previously she had pleural effusion.  She had thoracocentesis.  The patient is not short of breath.  The patient has pulse ox monitored.  Sometimes it is in upper 80s.  She is wondering if thoracocentesis is needed.   I told the patient that I will discuss with the nurse practitioner who is taking care of her.  After that, we will make a decision regarding chest x-ray and thoracocentesis if needed.  6.  I will see her in a month.  I advised her to call us with any questions or concerns.    ADDENDUM (03/02/2023): Case discussed with NIMCO Hoover CNP. She had ordered a chest x-ray. Pleural effusion is stable. Patient is not having any chest related symptoms. No thoracocentesis at this time. She will monitor the patient. Thoracocentesis will be done as clinically indicated.     TELEPHONE VISIT TIME:  23 minutes.  Time spent during today's visit, review of chart/investigations today and documentation today.

## 2023-03-06 NOTE — TELEPHONE ENCOUNTER
Reason for Call:  Form, our goal is to have forms completed with 72 hours, however, some forms may require a visit or additional information.    Type of letter, form or note:  Admission Orders    Who is the form from?: [facility] (if other please explain)    Where did the form come from: form was faxed in    What clinic location was the form placed at?: Internal Medicine    Where the form was placed: Pcp's Bin    What number is listed as a contact on the form?:        Additional comments: Please fax completed form to [facility] at [phone].    Call taken on 3/6/2023 at 1:12 PM by Pedro Roland

## 2023-03-07 NOTE — PROGRESS NOTES
[facility]  [facility] Record Number:  [id number]  Place of Service where encounter took place:  [facility] ([facility]) [[id number]]  Chief Complaint   Patient presents with     Nursing Home Acute       HPI:    Chiquita Berry  is a 82 year old (1940), who is being seen today for an episodic care visit.  HPI information obtained from: facility chart records, facility staff, patient report and [facility] chart review. Today's concern is: says some anxiety about the  [place] to the [place]-- feels breathing is fine.  Was nauseated on wkd but felt it was form constipation and felt better after BM     Aphasia following cerebral infarction  Physical deconditioning  Primary adenocarcinoma of left lung (H)  Metastatic lung carcinoma, left (H)  Hypoxia  History of recent fall  Chronic low back pain without sciatica, unspecified back pain laterality  Anxiety disorder, unspecified type  Insomnia, unspecified type      Past Medical and Surgical History reviewed in Epic today.    MEDICATIONS:     Current Outpatient Medications   Medication Sig Dispense Refill     acetaminophen (TYLENOL) 325 MG tablet Take 2 tablets (650 mg) by mouth 4 times daily 200 tablet 0     alectinib (ALECENSA) 150 MG CAPS Take 4 capsules (600 mg) by mouth 2 times daily (with meals) 240 capsule 0     aspirin (ASA) 81 MG EC tablet Take 1 tablet (81 mg) by mouth daily Take baby aspirin indefinitely for stroke       atorvastatin (LIPITOR) 40 MG tablet Take 1 tablet (40 mg) by mouth every evening 30 tablet 0     bisacodyl (DULCOLAX) 10 MG suppository Place 1 suppository (10 mg) rectally daily as needed for constipation 10 suppository 0     Cholecalciferol (VITAMIN D) 2000 UNITS tablet Take 1 tablet by mouth daily.       clopidogrel (PLAVIX) 75 MG tablet 1 tablet (75 mg) by Oral or NG Tube route daily for 83 days Take baby aspirin and Plavix daily for total of 90 days.  Then stop Plavix continue baby aspirin  "daily indefinitely for stroke prevention 83 tablet 0     hydrOXYzine (ATARAX) 10 MG tablet Take 1 tablet (10 mg) by mouth 3 times daily as needed for anxiety 30 tablet 1     latanoprost (XALATAN) 0.005 % ophthalmic solution Place 1 drop into both eyes every evening   2     melatonin 3 MG CAPS Take 3 mg by mouth At Bedtime And 3mg prn po before 0200 hrs (total of 6mg)       Multiple Vitamin (MULTIVITAMIN ADULT PO) Take 1 tablet by mouth daily (Patient not taking: Reported on 3/1/2023)       Multiple Vitamins-Minerals (PRESERVISION AREDS 2) CAPS Take 1 capsule by mouth 2 times daily       ondansetron (ZOFRAN) 4 MG tablet Take 1 tablet (4 mg) by mouth every 6 hours as needed for nausea 30 tablet 0     pantoprazole (PROTONIX) 40 MG EC tablet Take 1 tablet (40 mg) by mouth daily 30 tablet 0     polyethylene glycol (MIRALAX) 17 GM/Dose powder Take 17 g by mouth daily 510 g      polyethylene glycol 400 (BLINK TEARS) 0.25 % SOLN ophthalmic solution Place 1 drop into both eyes daily And Q2H PRN       traMADol (ULTRAM) 50 MG tablet Take 1 tablet (50 mg) by mouth every 6 hours as needed for severe pain (7-10) 10 tablet 0     traZODone (DESYREL) 50 MG tablet Take 50 mg by mouth At Bedtime        TODAY DURING EXAM/ROS:  No  SOB,  dizziness, fevers, chills, HA, N/V, dysuria or Bowel Abnormalities. Appetite is better.  Takes the tramadol for back pain and atarax for anxiety.    Objective:  /50   Pulse 65   Temp 97.6  F (36.4  C)   Resp 16   Ht 1.575 m (5' 2\")   Wt 61.7 kg (136 lb)   LMP  (LMP Unknown)   SpO2 93%   BMI 24.87 kg/m    Exam:  GENERAL APPEARANCE:  Alert, in no distress, oriented, cooperative  ENT:  Mouth with moist mucous membranes, normal hearing acuity  RESP:  respiratory effort  of chest normal, lungs clear to auscultation except  slightly decreased in bases.O2 at 1-2l/nc  CV:  Auscultation of heart done , RRR, no murmur.  Bilat trace  edema feet to mid shins,, +2 pedal pulses  ABDOMEN:  normal bowel " sounds, soft, nontender.  M/S:   WEBSTER equally, seen in bed  SKIN:  Inspection of skin and subcutaneous tissue baseline  NEURO:   Cranial nerves are grossly at patient's baseline--no focal deficits.  PSYCH:  oriented X 3, normal insight, judgement and memory, affect and mood normal    Labs:   Recent Labs   Lab Test 02/27/23  1533 02/10/23  0801    138   POTASSIUM 4.5 3.5   CHLORIDE 103 101   CO2 27 29   ANIONGAP 10 8   * 106*   BUN 17.4 12.5   CR 0.73 0.66   SAGE 8.8 8.6*    < > = values in this interval not displayed.     Hemoglobin   Date Value Ref Range Status   02/27/2023 10.8 (L) 11.7 - 15.7 g/dL Final   02/10/2023 12.1 11.7 - 15.7 g/dL Final   02/15/2019 13.3 11.7 - 15.7 g/dL Final   03/08/2013 14.1 11.7 - 15.7 g/dL Final          ASSESSMENT / PLAN:  (I69.320) Aphasia following cerebral infarction  (primary encounter diagnosis)   (R53.81) Physical deconditioning  Comment/Plan:   back to baseline, cont PT OT, SLP, no LCD yet, Needs f/u with neuro post stroke as outpt.    RESP ISSUES:  (C34.92) Primary adenocarcinoma of left lung (H)  (C78.02) Metastatic lung carcinoma, left (H)  (R09.02) Hypoxia  Comment/Plan: O2 at 1-2 l/--sats mid 90's but drops with activity to mid 80's per staff--will need to have O2 sats done RA/O2 at rest and activity as may need to order home O2--no changes today, monitor.     (Z91.81) History of recent fall  (M54.50,  G89.29) Chronic low back pain without sciatica, unspecified back pain laterality  Comment: some back pain relieved by using tramadol usually 1-2/per day.   Cont same POC  monitor.      (F41.9) Anxiety disorder, unspecified type  (G47.00) Insomnia, unspecified type  Comment.Plan: a bit better with scheduled Desyrel,  prn atarax(*usually once daily*), cont  melatonin. Will monitor and consider increased of Desyrel.       Electronically signed by:  NIMCO Bain CNP

## 2023-03-07 NOTE — TELEPHONE ENCOUNTER
Spoke to pt, pt wants to pass along her thanks to Dr Olivera.  Pt is going to establish care at assisted living facility, their is a NP on site.    Aurelia VILLANUEVA    [facility]

## 2023-03-08 NOTE — TELEPHONE ENCOUNTER
Completed form faxed to [facility] at [phone].

## 2023-03-14 NOTE — PATIENT INSTRUCTIONS
[facility] Discharge Orders    Name: Chiquita Berry  : 1940  Planned Discharge Date: 3/16/2023  Discharged to: new assisted living for patient  [facility]    MEDICAL FOLLOW UP  Follow up with PCP in 1-2 weeks  Naz Jackson NP and Dr Leidy Villa MD  Follow up with [facility] per their recs      FUTURE LABS: No labs orders/due    ORDER CHANGES:  Discontinue dulcolax supp    DISCHARGE MEDICATIONS:  The patient s pharmacy is authorized to dispense a 30-day supply of medications. Refill requests should be directed to the primary provider, Naz Jackson .   Additional hard copy prescription for tramadol, medication left in facility chart for patient/family to fill at pharmacy of choice after discharge from facility.  Current Outpatient Medications   Medication Sig Dispense Refill    acetaminophen (TYLENOL) 325 MG tablet Take 2 tablets (650 mg) by mouth 4 times daily 200 tablet 0    alectinib (ALECENSA) 150 MG CAPS Take 4 capsules (600 mg) by mouth 2 times daily (with meals) 240 capsule 0    aspirin (ASA) 81 MG EC tablet Take 1 tablet (81 mg) by mouth daily Take baby aspirin indefinitely for stroke      atorvastatin (LIPITOR) 40 MG tablet Take 1 tablet (40 mg) by mouth every evening 30 tablet 0    Cholecalciferol (VITAMIN D) 2000 UNITS tablet Take 1 tablet by mouth daily.      clopidogrel (PLAVIX) 75 MG tablet 1 tablet (75 mg) by Oral or NG Tube route daily for 83 days Take baby aspirin and Plavix daily for total of 90 days.  Then stop Plavix continue baby aspirin daily indefinitely for stroke prevention 83 tablet 0    hydrOXYzine (ATARAX) 10 MG tablet Take 1 tablet (10 mg) by mouth 3 times daily as needed for anxiety 30 tablet 1    latanoprost (XALATAN) 0.005 % ophthalmic solution Place 1 drop into both eyes every evening   2    melatonin 3 MG CAPS Take 3 mg by mouth At Bedtime And 3mg prn po before 0200 hrs (total of 6mg)      Multiple Vitamin (MULTIVITAMIN ADULT PO) Take 1  tablet by mouth daily      Multiple Vitamins-Minerals (PRESERVISION AREDS 2) CAPS Take 1 capsule by mouth 2 times daily      ondansetron (ZOFRAN) 4 MG tablet Take 1 tablet (4 mg) by mouth every 6 hours as needed for nausea 30 tablet 0    pantoprazole (PROTONIX) 40 MG EC tablet Take 1 tablet (40 mg) by mouth daily 30 tablet 0    polyethylene glycol (MIRALAX) 17 GM/Dose powder Take 17 g by mouth daily 510 g     polyethylene glycol 400 (BLINK TEARS) 0.25 % SOLN ophthalmic solution Place 1 drop into both eyes daily And Q2H PRN      traMADol (ULTRAM) 50 MG tablet Take 1 tablet (50 mg) by mouth every 6 hours as needed for severe pain (7-10) 10 tablet 0    traZODone (DESYREL) 50 MG tablet Take 50 mg by mouth At Bedtime         SERVICES:  Home Care:  Physical Therapy, Home Health Aide, and From:  [facility]    ADDITIONAL INSTRUCTIONS:  None    NIMCO Bain CNP  This document was electronically signed on March 14, 2023

## 2023-03-14 NOTE — PROGRESS NOTES
"[facility] DISCHARGE SUMMARY    PATIENT'S NAME: Chiquita Berry  YOB: 1940    PRIMARY CARE PROVIDER AND CLINIC RESPONSIBLE AFTER TRANSFER: Naz Jackson     CODE STATUS: No CPR- Do NOT Intubate    TRANSFERRING PROVIDERS: NIMCO Bain CNP, Dr. Leidy Villa MD      DATE OF SNF ADMISSION:  February / 14 / 2023.    DATE OF SNF DISCHARGE (including anticipating DC): March / 16 / 2023. to [facility].     DISCHARGE DISPOSITION: FM Provider    Nursing Facility: [facility] stay 2/2/23 to 2/14/23.     Condition on Discharge:  Improving.    Function:  Ambulates: 125 ft w/fww, Transfers: s  Cognitive Scores: SLUMS 17/30 and ACL-4.4/5.8, Meds: .6/7.    Physical Function: TBA  Equipment: walker  DME: Walker    DISCHARGE DIAGNOSIS:      Aphasia following cerebral infarction  Healthcare-associated pneumonia  Primary adenocarcinoma of left lung (H)  Metastatic lung carcinoma, left (H)  Pleural effusion, left  Chronic low back pain without sciatica, unspecified back pain laterality  Physical deconditioning  History of recent fall  Pain of sternum  Anxiety disorder, unspecified type        HOSPITAL COURSE: Per Dr. Leidy Villa's note:  \"Chiquita Berry is a 82 year old female seen February 16, 2023 at [facility] TCU where she was admitted after [facility] hospitalization 2/2-14 following a fall at home.   States she took a fall onto her walker, couldn't get up and pushed her alert button, thought to be dehydrated and exhausted at hospital admission, was also treated for pneumonia.   But then a few days later developed stroke symptoms to include right facial droop, dysarthria and RUE ataxia.  MRI as below.  Seen by Neurology, with symptoms waxing and waning.   Started on DAPT for next 90 days, then aspirin alone indefinitely      Accurate historian but word finding difficulty " "and dysarthria  Knows all her medications and their  Indications. She reports having pain in low back and chest from sternal fracture, from the fall.  In December 2022 couldn't breathe, found to have malignant pleural effusion and started TX per Oncology Dr Pantoja.  Remains on alectinib\"     TCU/SNF COURSE: has progressed with PT and OT,, took a bit of time to wean off O2--CXR done once as pt concerned about effusion recurring---she had a virtual visit with Dr Pantoja and he agreed with review of CXR--no need to send pt for tap as minimal effusion present.   She has monitored her O2 sats on RA Though with activity, occas desats to high 80's--recovers quickly. Her speech and facial droop have resolved. Is on Plavix and asa thru 5.5.23 together, then only asa thereafter. She uses the hydroxyzine regularly for anxiety and it is helpful to her. She is stronger, ready [place] move [place] on [place].      PAST MEDICAL HISTORY:  Past Medical History:   Diagnosis Date     Anxiety     related to cancer diagnosis     Chronic back pain     due to compression fracture     Osteoporosis     followed by outside endocrinologist     Primary lung adenocarcinoma (H)     stage IV       DISCHARGE MEDICATIONS:  Current Outpatient Medications   Medication Sig Dispense Refill     acetaminophen (TYLENOL) 325 MG tablet Take 2 tablets (650 mg) by mouth 4 times daily 200 tablet 0     alectinib (ALECENSA) 150 MG CAPS Take 4 capsules (600 mg) by mouth 2 times daily (with meals) 240 capsule 0     aspirin (ASA) 81 MG EC tablet Take 1 tablet (81 mg) by mouth daily Take baby aspirin indefinitely for stroke       atorvastatin (LIPITOR) 40 MG tablet Take 1 tablet (40 mg) by mouth every evening 30 tablet 0     Cholecalciferol (VITAMIN D) 2000 UNITS tablet Take 1 tablet by mouth daily.       clopidogrel (PLAVIX) 75 MG tablet 1 tablet (75 mg) by Oral or NG Tube route daily for 83 days Take baby aspirin and Plavix daily for total of 90 days.  Then stop Plavix " continue baby aspirin daily indefinitely for stroke prevention (Patient taking differently: 75 mg by Oral or NG Tube route daily Take baby aspirin and Plavix daily for total of 90 days thru 5/5/2023.      Then stop Plavix continue baby aspirin daily indefinitely for stroke prevention) 83 tablet 0     hydrOXYzine (ATARAX) 10 MG tablet Take 1 tablet (10 mg) by mouth 3 times daily as needed for anxiety 30 tablet 1     latanoprost (XALATAN) 0.005 % ophthalmic solution Place 1 drop into both eyes every evening   2     melatonin 3 MG CAPS Take 3 mg by mouth At Bedtime And 3mg prn po before 0200 hrs (total of 6mg)       Multiple Vitamin (MULTIVITAMIN ADULT PO) Take 1 tablet by mouth daily       Multiple Vitamins-Minerals (PRESERVISION AREDS 2) CAPS Take 1 capsule by mouth 2 times daily       ondansetron (ZOFRAN) 4 MG tablet Take 1 tablet (4 mg) by mouth every 6 hours as needed for nausea 30 tablet 0     pantoprazole (PROTONIX) 40 MG EC tablet Take 1 tablet (40 mg) by mouth daily 30 tablet 0     polyethylene glycol (MIRALAX) 17 GM/Dose powder Take 17 g by mouth daily 510 g      polyethylene glycol 400 (BLINK TEARS) 0.25 % SOLN ophthalmic solution Place 1 drop into both eyes daily And Q2H PRN       traMADol (ULTRAM) 50 MG tablet Take 1 tablet (50 mg) by mouth every 6 hours as needed for severe pain (7-10) 10 tablet 0     traZODone (DESYREL) 50 MG tablet Take 50 mg by mouth At Bedtime       MED REC REQUIRED   Post Medication Reconciliation Status:  Discharge medications reconciled and changed, see notes/orders       MEDICATION CHANGES/RATIONALE:   See in discharge orders  /55   Pulse 63   Temp 98.1  F (36.7  C)   Resp 18   Ht 1.524 m (5')   Wt 62.1 kg (137 lb)   LMP  (LMP Unknown)   SpO2 93%   BMI 26.76 kg/m      TODAY DURING EXAM/ROS:  No CP, SOB, Cough, dizziness, fevers, chills, HA, N/V, dysuria or Bowel Abnormalities. Appetite is good.  No pain today--takes tramadol at times for CBP lumbar  fractures,      PHYSICAL EXAM Today:  A & O x 3, NAD. Lungs CTA mostly though decreased left base and rare scattered crackle, non labored. RRR, S1/S2 w/o murmur,gallop or rub.  Trac bilat pedal edema.  Abdomen soft, nontender, +BT'S. No focal neurological deficits. WEBSTER and up with walker. .       SNF LABS  Recent Labs   Lab Test 02/27/23  1533 02/14/23  0747 02/11/23  0824 02/10/23  0801     --   --  138   POTASSIUM 4.5 3.9   < > 3.5   CHLORIDE 103  --   --  101   CO2 27  --   --  29   ANIONGAP 10  --   --  8   *  --   --  106*   BUN 17.4  --   --  12.5   CR 0.73 0.73   < > 0.66   SAGE 8.8  --   --  8.6*    < > = values in this interval not displayed.     CBC RESULTS: Recent Labs   Lab Test 02/27/23  1533   WBC 8.3   RBC 3.42*   HGB 10.8*   HCT 33.8*   MCV 99   MCH 31.6   MCHC 32.0   RDW 15.7*                DISCHARGE PLAN:  Physical Therapy, Home Health Aide and From:  [facility].  Follow-up with PCP in 7 days: 7 days.    Current [place] or other scheduled appointments:  Future Appointments   Date Time Provider Department Center   4/6/2023  9:40 AM Fer Pantoja MD [facility]   5/12/2023  2:30 PM Sandra, Sana WILEY PA-C [facility]         MTM referral needed and placed by this provider: none    Pending labs: none     Discharge Treatments:none       TOTAL DISCHARGE TIME:   Greater than 30 minutes    NIMCO Bain [facility]              Documentation of Face-to-Face and Certification for Home Health Services     Patient: Chiquita Berry   YOB: 1940  MR Number: [id number]  Today's Date: 3/13/2023    I certify that patient: Chiquita Berry is under my care and that I, or a nurse practitioner or physician's assistant working with me, had a face-to-face encounter that meets the physician face-to-face encounter requirements with this patient on: 3/14/2023.    This encounter with the patient was in whole, or in part, for the following medical  condition, which is the primary reason for home health care:      Aphasia following cerebral infarction  Healthcare-associated pneumonia  Primary adenocarcinoma of left lung (H)  Metastatic lung carcinoma, left (H)  Pleural effusion, left  Chronic low back pain without sciatica, unspecified back pain laterality  Physical deconditioning  History of recent fall  Pain of sternum  Anxiety disorder, unspecified type  .    I certify that, based on my findings, the following services are medically necessary home health services: Physical Therapy.    My clinical findings support the need for the above services because: Physical Therapy Services are needed to assess and treat the following functional impairments: and HHA.    Further, I certify that my clinical findings support that this patient is homebound (i.e. absences from home require considerable and taxing effort and are for medical reasons or Yaz[employer] services or infrequently or of short duration when for other reasons) because: Requires assistance of another person or specialized equipment to access medical services because patient: Is unable to walk greater than 125  feet without rest and deconditioning    Based on the above findings. I certify that this patient is confined to the home and needs intermittent skilled nursing care, physical therapy and/or speech therapy.  The patient is under my care, and I have initiated the establishment of the plan of care.  This patient will be followed by a physician who will periodically review the plan of care.  Physician/Provider to provide follow up care: Naz Jackson    Responsible Medicare certified PECOS Physician: Naz Jackson RN, CNP  Physician Signature: See electronic signature associated with these discharge orders.  Date: 3/13/2023

## 2023-03-17 NOTE — PROGRESS NOTES
Clinic Care Coordination Contact    Situation: Patient chart reviewed by care coordinator.    Background: Chiquita discharged form TCU to [facility].    Assessment:  INDU GAUTHIER spoke to Chiquita by phone and she has moved in to her new apartment at the [facility] and it went smoothly.  She will biw receive medical care though provider at her [facility]    Plan/Recommendations: No further outreaches will be made at this time.      JULIO Lay Care Coordination Team  [phone]

## 2023-03-21 NOTE — LETTER
3/21/2023        RE: Chiquita Berry  [address]  [facility] [address]        [facility]  PRIMARY CARE PROVIDER AND CLINIC:  NIMCO Bain [facility], [address] [facility] / [place] [address]  Chief Complaint   Patient presents with     Establish Care     [place] Medical Record Number:  [id number]  Place of Service where encounter took place:  [facility] ([facility] [[id number]]    Chiquita Berry  is a 82 year old  (1940), admitted to the above facility from [facility]CU. Hospitalized [facility] 2/2/23-2/14/23...  Admitted to this facility for  rehab, medical management and nursing care.     Aphasia following cerebral infarction  Chronic low back pain without sciatica, unspecified back pain laterality  Anxiety disorder, unspecified type  Insomnia, unspecified type  Primary adenocarcinoma of left lung (H)  Metastatic lung carcinoma, left (H)  Physical deconditioning  History of recent fall  Annual physical exam    HPI:    HPI information obtained from: facility chart records, facility staff, patient report, [facility] chart review and Care Everywhere [facility] chart review.   Brief Summary of Hospital Course: please see discharge summary from TCU 3/14/2023 and other EPIC records.   In short, pt admitted after a fall, did have a sternal Fx and low back pain. Also  she was found to have pneumonia, dehydration and while there, developed a right facial droop, dysarthria and RUE ataxia.  She was seen by Neuro--symptoms did improved and she started on Plavix and asa for 90 days and then after may 5th, just asa.  She has lung cancer, had a pleural effusion which was tapped with malignant cells noted. Started on Alectinib--followed by Dr Pantoja. During her stay in TCU, her neuro symptoms completely resolved, She was weaned off the Oxygen though occas desat'd to <90% with activity.  She was concerned at one point re: was her pleural effusion recurring,  CXR done in TCU and Dr Pantoja agreed, no need for any tap since she was improving resp wise.  She has anxiety and takes atarax 1-2 x per day, shanae at hs. [place] to [place] as not able to manage all her cares any longer. (*She has very supportive friends and POA who happen to have the last name as she but not related *)    TODAY DURING EXAM/ROS: seen in apt, she is feeling pretty good.   No CP, SOB, dizziness, fevers, chills, HA, N/V, dysuria or Bowel Abnormalities has occas dry cough--not new.  Appetite is good.  No pain except  Low back and usually takes a tramadol during the night if she wakes up.     CODE STATUS/ADVANCE DIRECTIVES DISCUSSION:   DNR / DNI  Patient's living condition: lives in an assisted living facility  ALLERGIES: Adhesive tape, Augmentin, Celebrex [celecoxib], and Lidocaine  PAST MEDICAL HISTORY:  has a past medical history of Anxiety, Chronic back pain, Osteoporosis, and Primary lung adenocarcinoma (H).  PAST SURGICAL HISTORY:   has a past surgical history that includes cataract iol, rt/lt (Bilateral); Phacoemulsification clear cornea with standard intraocular lens implant (Left, 11/16/2015); tonsillectomy & adenoidectomy; and Open reduction internal fixation humerus distal (Right).  FAMILY HISTORY: family history includes Breast Cancer in her sister; Dementia in her mother; Myocardial Infarction in her father; Stomach Cancer in her father.  SOCIAL HISTORY:   reports that she has never smoked. She has never used smokeless tobacco. She reports that she does not currently use alcohol. She reports that she does not use drugs.    Post Discharge Medication Reconciliation Status: discharge medications reconciled and changed, per note/orders     Current Outpatient Medications   Medication Sig Dispense Refill     acetaminophen (TYLENOL) 325 MG tablet Take 2 tablets (650 mg) by mouth 4 times daily 200 tablet 0     alectinib (ALECENSA) 150 MG CAPS Take 4 capsules (600 mg) by mouth 2 times daily (with meals)  240 capsule 0     aspirin (ASA) 81 MG EC tablet Take 1 tablet (81 mg) by mouth daily Take baby aspirin indefinitely for stroke       atorvastatin (LIPITOR) 40 MG tablet Take 1 tablet (40 mg) by mouth every evening 30 tablet 0     Cholecalciferol (VITAMIN D) 2000 UNITS tablet Take 1 tablet by mouth daily.       clopidogrel (PLAVIX) 75 MG tablet 1 tablet (75 mg) by Oral or NG Tube route daily for 83 days Take baby aspirin and Plavix daily for total of 90 days.  Then stop Plavix continue baby aspirin daily indefinitely for stroke prevention (Patient taking differently: 75 mg by Oral or NG Tube route daily Take baby aspirin and Plavix daily for total of 90 days thru 5/5/2023.      Then stop Plavix continue baby aspirin daily indefinitely for stroke prevention) 83 tablet 0     hydrOXYzine (ATARAX) 10 MG tablet Take 1 tablet (10 mg) by mouth 3 times daily as needed for anxiety 30 tablet 1     latanoprost (XALATAN) 0.005 % ophthalmic solution Place 1 drop into both eyes every evening   2     melatonin 3 MG CAPS Take 3 mg by mouth At Bedtime And 3mg prn po before 0200 hrs (total of 6mg)       Multiple Vitamin (MULTIVITAMIN ADULT PO) Take 1 tablet by mouth daily       Multiple Vitamins-Minerals (PRESERVISION AREDS 2) CAPS Take 1 capsule by mouth 2 times daily       ondansetron (ZOFRAN) 4 MG tablet Take 1 tablet (4 mg) by mouth every 6 hours as needed for nausea 30 tablet 0     pantoprazole (PROTONIX) 40 MG EC tablet Take 1 tablet (40 mg) by mouth daily 30 tablet 0     polyethylene glycol (MIRALAX) 17 GM/Dose powder Take 17 g by mouth daily 510 g      polyethylene glycol 400 (BLINK TEARS) 0.25 % SOLN ophthalmic solution Place 1 drop into both eyes daily And Q2H PRN       traMADol (ULTRAM) 50 MG tablet Take 1 tablet (50 mg) by mouth every 6 hours as needed for severe pain (7-10) 10 tablet 0     traZODone (DESYREL) 50 MG tablet Take 50 mg by mouth At Bedtime           ROS:see above    Vitals:  /55   Pulse 63   Temp 98.1   F (36.7  C)   Resp 18   Ht 1.524 m (5')   Wt 62.1 kg (137 lb)   LMP  (LMP Unknown)   SpO2 93%   BMI 26.76 kg/m    Exam:  GENERAL APPEARANCE:  Alert, in no distress, oriented, cooperative  ENT:  Mouth with moist mucous membranes, normal hearing acuity  EYES:  Conjunctiva and lids normal  RESP:  respiratory effort  of chest normal, lungs clear to auscultation , no respiratory distress, diminished breath sounds slightly on left.  CV:  Palpation and auscultation of heart done , regular rate and rhythm, no murmur, rub, or gallop, trace pedal edema, +2 pedal pulses  ABDOMEN:  normal bowel sounds, soft, nontender  M/S:   WEBSTER equally, seen in chair   SKIN:  Inspection of skin and subcutaneous tissue baseline  NEURO:   Cranial nerves 2-12 are normal tested and grossly at patient's baseline--no abnormalities in speech noted.  PSYCH:  oriented X 3, normal insight, judgement and memory, affect and mood normal    Lab/Diagnostic data:  Recent Labs   Lab Test 02/27/23  1533 02/10/23  0801    138   POTASSIUM 4.5 3.5   CHLORIDE 103 101   CO2 27 29   ANIONGAP 10 8   * 106*   BUN 17.4 12.5   CR 0.73 0.66   SAEG 8.8 8.6*    < > = values in this interval not displayed.     CBC RESULTS: Recent Labs   Lab Test 02/27/23  1533   WBC 8.3   RBC 3.42*   HGB 10.8*   HCT 33.8*   MCV 99   MCH 31.6   MCHC 32.0   RDW 15.7*        ASSESSMENT / PLAN:  (I69.320) Aphasia following cerebral infarction  (primary encounter diagnosis)  Comment/Plan: resolved, on Plavix and asa, discontinue Plavix on 5/5/23 and just then on asa, monitor.    (M54.50,  G89.29) Chronic low back pain without sciatica, unspecified back pain laterality  (F41.9) Anxiety disorder, unspecified type  (G47.00) Insomnia, unspecified type  Comment/Plan: melatonin, trazodone,  Prn atarax and prn tramadol for back pain. Monitor. Adjust as needed.        (C34.92) Primary adenocarcinoma of left lung (H)   (C78.02) Metastatic lung carcinoma, left (H)  Comment/Plan:  has video visit with Dr Pantoja on 4/6 and he wishes a CMP and CBC on 3/28, cont current ChemoTx, POC, monitor.    (R53.81) Physical deconditioning   (Z91.81) History of recent fall  Comment/Plan: PT in apt, monitor.    (Z00.00) Annual physical exam: Initial AL visit 3/21/2023        I called and d/w HCPA and michael Gaona,  an update, the POC and care coordination.  This included discussion of pertinent diagnostic results,  risk and benefits of current and new treatments . All questions answered and there is agreement   on the Care Plan.       Electronically signed by:  NIMCO Bain CNP                           Sincerely,        NIMCO Bain CNP

## 2023-03-21 NOTE — PROGRESS NOTES
[facility]  PRIMARY CARE PROVIDER AND CLINIC:  Naz Jackson, NIMCO CNP, [address] [facility] [address]  Chief Complaint   Patient presents with     Establish Care     [place] Medical Record Number:  [id number]  Place of Service where encounter took place:  [facility] [[id number]]    Chiquita Berry  is a 82 year old  (1940), admitted to the above facility from [facility]CU. Hospitalized [facility] 2/2/23-2/14/23...  Admitted to this facility for  rehab, medical management and nursing care.     Aphasia following cerebral infarction  Chronic low back pain without sciatica, unspecified back pain laterality  Anxiety disorder, unspecified type  Insomnia, unspecified type  Primary adenocarcinoma of left lung (H)  Metastatic lung carcinoma, left (H)  Physical deconditioning  History of recent fall  Annual physical exam    HPI:    HPI information obtained from: facility chart records, facility staff, patient report, [facility] chart review and Care Everywhere [facility] chart review.   Brief Summary of Hospital Course: please see discharge summary from TCU 3/14/2023 and other EPIC records.   In short, pt admitted after a fall, did have a sternal Fx and low back pain. Also  she was found to have pneumonia, dehydration and while there, developed a right facial droop, dysarthria and RUE ataxia.  She was seen by Neuro--symptoms did improved and she started on Plavix and asa for 90 days and then after may 5th, just asa.  She has lung cancer, had a pleural effusion which was tapped with malignant cells noted. Started on Alectinib--followed by Dr Pantoja. During her stay in TCU, her neuro symptoms completely resolved, She was weaned off the Oxygen though occas desat'd to <90% with activity.  She was concerned at one point re: was her pleural effusion recurring, CXR done in TCU and Dr Pantoja agreed, no need for any tap since she was improving resp wise.  She has anxiety  and takes atarax 1-2 x per day, shanae at hs. [place] moved to [place] as not able to manage all her cares any longer. (*She has very supportive friends and POA who happen to have the last name as she but not related *)    TODAY DURING EXAM/ROS: seen in apt, she is feeling pretty good.   No CP, SOB, dizziness, fevers, chills, HA, N/V, dysuria or Bowel Abnormalities has occas dry cough--not new.  Appetite is good.  No pain except  Low back and usually takes a tramadol during the night if she wakes up.     CODE STATUS/ADVANCE DIRECTIVES DISCUSSION:   DNR / DNI  Patient's living condition: lives in an assisted living facility  ALLERGIES: Adhesive tape, Augmentin, Celebrex [celecoxib], and Lidocaine  PAST MEDICAL HISTORY:  has a past medical history of Anxiety, Chronic back pain, Osteoporosis, and Primary lung adenocarcinoma (H).  PAST SURGICAL HISTORY:   has a past surgical history that includes cataract iol, rt/lt (Bilateral); Phacoemulsification clear cornea with standard intraocular lens implant (Left, 11/16/2015); tonsillectomy & adenoidectomy; and Open reduction internal fixation humerus distal (Right).  FAMILY HISTORY: family history includes Breast Cancer in her sister; Dementia in her mother; Myocardial Infarction in her father; Stomach Cancer in her father.  SOCIAL HISTORY:   reports that she has never smoked. She has never used smokeless tobacco. She reports that she does not currently use alcohol. She reports that she does not use drugs.    Post Discharge Medication Reconciliation Status: discharge medications reconciled and changed, per note/orders     Current Outpatient Medications   Medication Sig Dispense Refill     acetaminophen (TYLENOL) 325 MG tablet Take 2 tablets (650 mg) by mouth 4 times daily 200 tablet 0     alectinib (ALECENSA) 150 MG CAPS Take 4 capsules (600 mg) by mouth 2 times daily (with meals) 240 capsule 0     aspirin (ASA) 81 MG EC tablet Take 1 tablet (81 mg) by mouth daily Take baby aspirin  indefinitely for stroke       atorvastatin (LIPITOR) 40 MG tablet Take 1 tablet (40 mg) by mouth every evening 30 tablet 0     Cholecalciferol (VITAMIN D) 2000 UNITS tablet Take 1 tablet by mouth daily.       clopidogrel (PLAVIX) 75 MG tablet 1 tablet (75 mg) by Oral or NG Tube route daily for 83 days Take baby aspirin and Plavix daily for total of 90 days.  Then stop Plavix continue baby aspirin daily indefinitely for stroke prevention (Patient taking differently: 75 mg by Oral or NG Tube route daily Take baby aspirin and Plavix daily for total of 90 days thru 5/5/2023.      Then stop Plavix continue baby aspirin daily indefinitely for stroke prevention) 83 tablet 0     hydrOXYzine (ATARAX) 10 MG tablet Take 1 tablet (10 mg) by mouth 3 times daily as needed for anxiety 30 tablet 1     latanoprost (XALATAN) 0.005 % ophthalmic solution Place 1 drop into both eyes every evening   2     melatonin 3 MG CAPS Take 3 mg by mouth At Bedtime And 3mg prn po before 0200 hrs (total of 6mg)       Multiple Vitamin (MULTIVITAMIN ADULT PO) Take 1 tablet by mouth daily       Multiple Vitamins-Minerals (PRESERVISION AREDS 2) CAPS Take 1 capsule by mouth 2 times daily       ondansetron (ZOFRAN) 4 MG tablet Take 1 tablet (4 mg) by mouth every 6 hours as needed for nausea 30 tablet 0     pantoprazole (PROTONIX) 40 MG EC tablet Take 1 tablet (40 mg) by mouth daily 30 tablet 0     polyethylene glycol (MIRALAX) 17 GM/Dose powder Take 17 g by mouth daily 510 g      polyethylene glycol 400 (BLINK TEARS) 0.25 % SOLN ophthalmic solution Place 1 drop into both eyes daily And Q2H PRN       traMADol (ULTRAM) 50 MG tablet Take 1 tablet (50 mg) by mouth every 6 hours as needed for severe pain (7-10) 10 tablet 0     traZODone (DESYREL) 50 MG tablet Take 50 mg by mouth At Bedtime           ROS:see above    Vitals:  /55   Pulse 63   Temp 98.1  F (36.7  C)   Resp 18   Ht 1.524 m (5')   Wt 62.1 kg (137 lb)   LMP  (LMP Unknown)   SpO2 93%    BMI 26.76 kg/m    Exam:  GENERAL APPEARANCE:  Alert, in no distress, oriented, cooperative  ENT:  Mouth with moist mucous membranes, normal hearing acuity  EYES:  Conjunctiva and lids normal  RESP:  respiratory effort  of chest normal, lungs clear to auscultation , no respiratory distress, diminished breath sounds slightly on left.  CV:  Palpation and auscultation of heart done , regular rate and rhythm, no murmur, rub, or gallop, trace pedal edema, +2 pedal pulses  ABDOMEN:  normal bowel sounds, soft, nontender  M/S:   WEBSTER equally, seen in chair   SKIN:  Inspection of skin and subcutaneous tissue baseline  NEURO:   Cranial nerves 2-12 are normal tested and grossly at patient's baseline--no abnormalities in speech noted.  PSYCH:  oriented X 3, normal insight, judgement and memory, affect and mood normal    Lab/Diagnostic data:  Recent Labs   Lab Test 02/27/23  1533 02/10/23  0801    138   POTASSIUM 4.5 3.5   CHLORIDE 103 101   CO2 27 29   ANIONGAP 10 8   * 106*   BUN 17.4 12.5   CR 0.73 0.66   SAGE 8.8 8.6*    < > = values in this interval not displayed.     CBC RESULTS: Recent Labs   Lab Test 02/27/23  1533   WBC 8.3   RBC 3.42*   HGB 10.8*   HCT 33.8*   MCV 99   MCH 31.6   MCHC 32.0   RDW 15.7*        ASSESSMENT / PLAN:  (I69.320) Aphasia following cerebral infarction  (primary encounter diagnosis)  Comment/Plan: resolved, on Plavix and asa, discontinue Plavix on 5/5/23 and just then on asa, monitor.    (M54.50,  G89.29) Chronic low back pain without sciatica, unspecified back pain laterality  (F41.9) Anxiety disorder, unspecified type  (G47.00) Insomnia, unspecified type  Comment/Plan: melatonin, trazodone,  Prn atarax and prn tramadol for back pain. Monitor. Adjust as needed.        (C34.92) Primary adenocarcinoma of left lung (H)   (C78.02) Metastatic lung carcinoma, left (H)  Comment/Plan: has video visit with Dr Pantoja on 4/6 and he wishes a CMP and CBC on 3/28, cont current ChemoTx, POC,  monitor.    (R53.81) Physical deconditioning   (Z91.81) History of recent fall  Comment/Plan: PT in apt, monitor.    (Z00.00) Annual physical exam: Initial AL visit 3/21/2023        I called and d/w HCPA and michael Gaona,  an update, the POC and care coordination.  This included discussion of pertinent diagnostic results,  risk and benefits of current and new treatments . All questions answered and there is agreement   on the Care Plan.       Electronically signed by:  NIMCO Bain CNP

## 2023-03-22 PROBLEM — Z00.00 ANNUAL PHYSICAL EXAM: Status: ACTIVE | Noted: 2023-01-01

## 2023-03-22 NOTE — TELEPHONE ENCOUNTER
Writer called and LVM with care coordinator of the [facility] that patient resides in requesting a return call.     Isabela Schwartz RN

## 2023-03-22 NOTE — TELEPHONE ENCOUNTER
----- Message from Figueroa Guaman [facility] sent at 3/15/2023 11:06 AM CDT -----  Luis Jaimes,     Per chart notes it looks like Chiquita might discharge to her assisted living facility tomorrow. Do you know if she still has in-home lab draw services available and how we could arrange for labs sometime in the next few days?    Thanks,  Figueroa

## 2023-03-24 NOTE — PROGRESS NOTES
Oral Chemotherapy Monitoring Program     Placed call to patient's TCU RN coordinator in follow up of Alectinib therapy. Attempting to ensure CMP, CBC, and CK lab monitoring every 2 weeks x first 2 months while on Alectinib. Left detailed VM with Chiquita German RN at [facility]. [facility] is placing orders with assisted living for CMP, CBC, and CK q 2 weeks x 3, then monthly.     Follow Up:  3/28 lab appt at [facility] (labs being monitored on Tuesdays at [facility])  4/6 Dr. Pantoja appt    Felix Chapa PharmD  [facility]  [phone]  March 24, 2023

## 2023-03-28 NOTE — PROGRESS NOTES
CC: Lab Recheck     HPI:    Chiquita Berry is a 82 year old  (1940), who is being seen today for an episodic care visit at [facility]. Today's concern: lab recheck of CMP, CBC.    Metastatic lung carcinoma, left (H)          SUBJECTIVE/OBJECTIVE: no complaints, with home care currently in apt. Confirmed appt with Dr Pantoja-- on 4/6/23--virtual visit.      /55   Pulse 63   Temp 98.1  F (36.7  C)   Resp 18   Ht 1.524 m (5')   Wt 62.1 kg (137 lb)   LMP  (LMP Unknown)   SpO2 93%   BMI 26.76 kg/m      LABS: today   Last Comprehensive Metabolic Panel:  Sodium   Date Value Ref Range Status   03/28/2023 139 136 - 145 mmol/L Final   12/21/2020 140 133 - 144 mmol/L Final     Potassium   Date Value Ref Range Status   03/28/2023 4.1 3.4 - 5.3 mmol/L Final   12/15/2022 3.9 3.4 - 5.3 mmol/L Final   12/21/2020 4.8 3.4 - 5.3 mmol/L Final     Chloride   Date Value Ref Range Status   03/28/2023 102 98 - 107 mmol/L Final   12/13/2022 107 94 - 109 mmol/L Final   12/21/2020 108 94 - 109 mmol/L Final     Carbon Dioxide   Date Value Ref Range Status   12/21/2020 31 20 - 32 mmol/L Final     Carbon Dioxide (CO2)   Date Value Ref Range Status   03/28/2023 23 22 - 29 mmol/L Final   12/13/2022 28 20 - 32 mmol/L Final     Anion Gap   Date Value Ref Range Status   03/28/2023 14 7 - 15 mmol/L Final   12/13/2022 4 3 - 14 mmol/L Final   12/21/2020 1 (L) 3 - 14 mmol/L Final     Glucose   Date Value Ref Range Status   03/28/2023 73 70 - 99 mg/dL Final   12/13/2022 107 (H) 70 - 99 mg/dL Final   12/21/2020 102 (H) 70 - 99 mg/dL Final     GLUCOSE BY METER POCT   Date Value Ref Range Status   02/08/2023 109 (H) 70 - 99 mg/dL Final     Urea Nitrogen   Date Value Ref Range Status   03/28/2023 16.7 8.0 - 23.0 mg/dL Final   12/13/2022 13 7 - 30 mg/dL Final   12/21/2020 16 7 - 30 mg/dL Final     Creatinine   Date Value Ref Range Status   03/28/2023 0.80 0.51 - 0.95 mg/dL Final   12/21/2020 0.66 0.52 - 1.04 mg/dL  Final     GFR Estimate   Date Value Ref Range Status   03/28/2023 73 >60 mL/min/1.73m2 Final     Comment:     eGFR calculated using 2021 CKD-EPI equation.   12/21/2020 83 >60 mL/min/[1.73_m2] Final     Comment:     Non  GFR Calc  Starting 12/18/2018, serum creatinine based estimated GFR (eGFR) will be   calculated using the Chronic Kidney Disease Epidemiology Collaboration   (CKD-EPI) equation.       Calcium   Date Value Ref Range Status   03/28/2023 8.7 (L) 8.8 - 10.2 mg/dL Final   12/21/2020 9.0 8.5 - 10.1 mg/dL Final     Bilirubin Total   Date Value Ref Range Status   03/28/2023 0.8 <=1.2 mg/dL Final   12/21/2020 0.5 0.2 - 1.3 mg/dL Final     Alkaline Phosphatase   Date Value Ref Range Status   03/28/2023 123 (H) 35 - 104 U/L Final   12/21/2020 85 40 - 150 U/L Final     ALT   Date Value Ref Range Status   03/28/2023 39 (H) 10 - 35 U/L Final   12/21/2020 24 0 - 50 U/L Final     AST   Date Value Ref Range Status   03/28/2023 59 (H) 10 - 35 U/L Final   12/21/2020 23 0 - 45 U/L Final     CBC RESULTS: Recent Labs   Lab Test 03/28/23  1155   WBC 8.2   RBC 3.50*   HGB 11.0*   HCT 34.7*   MCV 99   MCH 31.4   MCHC 31.7   RDW 17.6*        Hemoglobin   Date Value Ref Range Status   03/28/2023 11.0 (L) 11.7 - 15.7 g/dL Final   02/27/2023 10.8 (L) 11.7 - 15.7 g/dL Final   02/15/2019 13.3 11.7 - 15.7 g/dL Final   03/08/2013 14.1 11.7 - 15.7 g/dL Final       ASSESSMENT / PLAN:  (C78.02) Metastatic lung carcinoma, left (H)  (primary encounter diagnosis)  Comment: labs about same--as previous  Plan: f/u with Dr Pantoja on 4/6      Electronically Signed by:    Naz Jackson RN, CNP

## 2023-03-28 NOTE — LETTER
3/28/2023        RE: Chiquita Berry  [address]  [facility] [address]        CC: Lab Recheck     HPI:    Chiquita Berry is a 82 year old  (1940), who is being seen today for an episodic care visit at [facility]. Today's concern: lab recheck of CMP, CBC.    Metastatic lung carcinoma, left (H)          SUBJECTIVE/OBJECTIVE: no complaints, with home care currently in apt. Confirmed appt with Dr Pantoja-- on 4/6/23--virtual visit.      /55   Pulse 63   Temp 98.1  F (36.7  C)   Resp 18   Ht 1.524 m (5')   Wt 62.1 kg (137 lb)   LMP  (LMP Unknown)   SpO2 93%   BMI 26.76 kg/m      LABS: today   Last Comprehensive Metabolic Panel:  Sodium   Date Value Ref Range Status   03/28/2023 139 136 - 145 mmol/L Final   12/21/2020 140 133 - 144 mmol/L Final     Potassium   Date Value Ref Range Status   03/28/2023 4.1 3.4 - 5.3 mmol/L Final   12/15/2022 3.9 3.4 - 5.3 mmol/L Final   12/21/2020 4.8 3.4 - 5.3 mmol/L Final     Chloride   Date Value Ref Range Status   03/28/2023 102 98 - 107 mmol/L Final   12/13/2022 107 94 - 109 mmol/L Final   12/21/2020 108 94 - 109 mmol/L Final     Carbon Dioxide   Date Value Ref Range Status   12/21/2020 31 20 - 32 mmol/L Final     Carbon Dioxide (CO2)   Date Value Ref Range Status   03/28/2023 23 22 - 29 mmol/L Final   12/13/2022 28 20 - 32 mmol/L Final     Anion Gap   Date Value Ref Range Status   03/28/2023 14 7 - 15 mmol/L Final   12/13/2022 4 3 - 14 mmol/L Final   12/21/2020 1 (L) 3 - 14 mmol/L Final     Glucose   Date Value Ref Range Status   03/28/2023 73 70 - 99 mg/dL Final   12/13/2022 107 (H) 70 - 99 mg/dL Final   12/21/2020 102 (H) 70 - 99 mg/dL Final     GLUCOSE BY METER POCT   Date Value Ref Range Status   02/08/2023 109 (H) 70 - 99 mg/dL Final     Urea Nitrogen   Date Value Ref Range Status   03/28/2023 16.7 8.0 - 23.0 mg/dL Final   12/13/2022 13 7 - 30 mg/dL Final   12/21/2020 16 7 - 30 mg/dL Final     Creatinine   Date  Value Ref Range Status   03/28/2023 0.80 0.51 - 0.95 mg/dL Final   12/21/2020 0.66 0.52 - 1.04 mg/dL Final     GFR Estimate   Date Value Ref Range Status   03/28/2023 73 >60 mL/min/1.73m2 Final     Comment:     eGFR calculated using 2021 CKD-EPI equation.   12/21/2020 83 >60 mL/min/[1.73_m2] Final     Comment:     Non  GFR Calc  Starting 12/18/2018, serum creatinine based estimated GFR (eGFR) will be   calculated using the Chronic Kidney Disease Epidemiology Collaboration   (CKD-EPI) equation.       Calcium   Date Value Ref Range Status   03/28/2023 8.7 (L) 8.8 - 10.2 mg/dL Final   12/21/2020 9.0 8.5 - 10.1 mg/dL Final     Bilirubin Total   Date Value Ref Range Status   03/28/2023 0.8 <=1.2 mg/dL Final   12/21/2020 0.5 0.2 - 1.3 mg/dL Final     Alkaline Phosphatase   Date Value Ref Range Status   03/28/2023 123 (H) 35 - 104 U/L Final   12/21/2020 85 40 - 150 U/L Final     ALT   Date Value Ref Range Status   03/28/2023 39 (H) 10 - 35 U/L Final   12/21/2020 24 0 - 50 U/L Final     AST   Date Value Ref Range Status   03/28/2023 59 (H) 10 - 35 U/L Final   12/21/2020 23 0 - 45 U/L Final     CBC RESULTS: Recent Labs   Lab Test 03/28/23  1155   WBC 8.2   RBC 3.50*   HGB 11.0*   HCT 34.7*   MCV 99   MCH 31.4   MCHC 31.7   RDW 17.6*        Hemoglobin   Date Value Ref Range Status   03/28/2023 11.0 (L) 11.7 - 15.7 g/dL Final   02/27/2023 10.8 (L) 11.7 - 15.7 g/dL Final   02/15/2019 13.3 11.7 - 15.7 g/dL Final   03/08/2013 14.1 11.7 - 15.7 g/dL Final       ASSESSMENT / PLAN:  (C78.02) Metastatic lung carcinoma, left (H)  (primary encounter diagnosis)  Comment: labs about same--as previous  Plan: f/u with Dr Pantoja on 4/6      Electronically Signed by:    Naz Jackson RN, CNP            Sincerely,        Naz Jackson, NIMCO CNP

## 2023-04-06 NOTE — NURSING NOTE
Is the patient currently in the state of [place]? YES    Visit mode:TELEPHONE    If the visit is dropped, the patient can be reconnected by: TELEPHONE VISIT: Phone number: [phone]    Will anyone else be joining the visit? Yes, friend      How would you like to obtain your AVS? MyChart    Are changes needed to the allergy or medication list? NO    Reason for visit: Chiquita Berry 82 year old female presents today for f/u on lung cancer and review lab results.  Jannette Allison, Virtual Facilitator

## 2023-04-06 NOTE — PROGRESS NOTES
ONCOLOGY HISTORY:  Ms. Berry is a female with metastatic lung adenocarcinoma.  1.  Chest x-ray on 12/10/2022 revealed a large left pleural effusion.  -CT chest, abdomen, and pelvis on 12/11/2022 revealed a left pleural effusion.  There is a left upper lobe lung nodule and also ground glass opacity in right upper lobe.  There is mediastinal lymphadenopathy.  There is low density lesion in head of the pancreas and some nodularity of left adrenal gland.  2.  Left thoracocentesis was done on 12/11/2022.  Pathology reveals adenocarcinoma.  Immunostains compatible with metastasis from lung primary.  -TPS of 20-30%.  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  3.  Brain MRI on 12/13/2022 does not reveal any brain metastasis.  4. Alectinib started on 01/26/2023.  5. Stroke in 02/2023.     SUBJECTIVE:  Ms. 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  She is on Alectinib since January,2023.    Patient is overall doing well.  She is recovering well from her stroke.  She has now moved into an apartment in assisted living.    No headache.  Occasional dizziness.  No chest pain.  No shortness of breath.  No abdominal pain.  No nausea or vomiting.  Appetite good.  No urinary complaints.  Occasional constipation.  No bleeding.  She has some back from spinal stenosis but is not getting worse.  She walks with the help of a walker.  All other review of system is negative.     PHYSICAL EXAMINATION:    GENERAL:  She is alert, oriented x 3.   Rest of systems not examined as this is a telephone visit.     LABORATORY:  CBC and CMP reviewed.     ASSESSMENT:     1.  An 82-year-old female with metastatic lung adenocarcinoma on alectinib.  2.  Stroke.  Improving from it.  3.  Mildly elevated LFT. Stable.  4.  Normocytic anemia. Stable.     PLAN:     1.  Patient's overall condition is stable.  Her strength is improving.  She is ambulating better with the help of a  walker.    Discussed regarding metastatic lung cancer.  No clinical suspicion of progression.  We will get CT chest, abdomen and pelvis in a month.  She is agreeable for it.    She is on alectinib for lung cancer.  She is tolerating it well.  She will continue on it.    2.  She is on aspirin and Plavix for the stroke.  No bleeding.  She will follow-up with her neurologist.    3.  Labs were reviewed with her.  LFTs are mildly elevated.  We will continue to monitor it.  She is mildly anemic.  We will continue to monitor it.    4.  She had few questions which were all answered.  I will see her after the CT scan.    TELEPHONE VISIT TIME:  11 minutes.  Time spent during today's visit, review of chart/investigations today, monitoring for toxicity of high risk drug and documentation today.

## 2023-04-06 NOTE — LETTER
4/6/2023         RE: Chiquita Berry  [address]  [facility] [address]        Dear Colleague,    Thank you for referring your patient, Chiquita Berry, to the [facility]CER [facility]. Please see a copy of my visit note below.    ONCOLOGY HISTORY:  Ms. Berry is a female with metastatic lung adenocarcinoma.  1.  Chest x-ray on 12/10/2022 revealed a large left pleural effusion.  -CT chest, abdomen, and pelvis on 12/11/2022 revealed a left pleural effusion.  There is a left upper lobe lung nodule and also ground glass opacity in right upper lobe.  There is mediastinal lymphadenopathy.  There is low density lesion in head of the pancreas and some nodularity of left adrenal gland.  2.  Left thoracocentesis was done on 12/11/2022.  Pathology reveals adenocarcinoma.  Immunostains compatible with metastasis from lung primary.  -TPS of 20-30%.  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  3.  Brain MRI on 12/13/2022 does not reveal any brain metastasis.  4. Alectinib started on 01/26/2023.  5. Stroke in 02/2023.     SUBJECTIVE:  Ms. 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  She is on Alectinib since January,2023.    Patient is overall doing well.  She is recovering well from her stroke.  She has now moved into an apartment in assisted living.    No headache.  Occasional dizziness.  No chest pain.  No shortness of breath.  No abdominal pain.  No nausea or vomiting.  Appetite good.  No urinary complaints.  Occasional constipation.  No bleeding.  She has some back from spinal stenosis but is not getting worse.  She walks with the help of a walker.  All other review of system is negative.     PHYSICAL EXAMINATION:    GENERAL:  She is alert, oriented x 3.   Rest of systems not examined as this is a telephone visit.     LABORATORY:  CBC and CMP reviewed.     ASSESSMENT:     1.  An 82-year-old female with metastatic lung  adenocarcinoma on alectinib.  2.  Stroke.  Improving from it.  3.  Mildly elevated LFT. Stable.  4.  Normocytic anemia. Stable.     PLAN:     1.  Patient's overall condition is stable.  Her strength is improving.  She is ambulating better with the help of a walker.    Discussed regarding metastatic lung cancer.  No clinical suspicion of progression.  We will get CT chest, abdomen and pelvis in a month.  She is agreeable for it.    She is on alectinib for lung cancer.  She is tolerating it well.  She will continue on it.    2.  She is on aspirin and Plavix for the stroke.  No bleeding.  She will follow-up with her neurologist.    3.  Labs were reviewed with her.  LFTs are mildly elevated.  We will continue to monitor it.  She is mildly anemic.  We will continue to monitor it.    4.  She had few questions which were all answered.  I will see her after the CT scan.    TELEPHONE VISIT TIME:  11 minutes.  Time spent during today's visit, review of chart/investigations today, monitoring for toxicity of high risk drug and documentation today.        Again, thank you for allowing me to participate in the care of your patient.        Sincerely,        Fer Pantoja MD

## 2023-04-06 NOTE — LETTER
4/6/2023         RE: Chiquita Berry  [address]  [facility] [address]        Dear Colleague,    Thank you for referring your patient, Chiquita Berry, to the [facility]CER [facility]. Please see a copy of my visit note below.    ONCOLOGY HISTORY:  Ms. Berry is a female with metastatic lung adenocarcinoma.  1.  Chest x-ray on 12/10/2022 revealed a large left pleural effusion.  -CT chest, abdomen, and pelvis on 12/11/2022 revealed a left pleural effusion.  There is a left upper lobe lung nodule and also ground glass opacity in right upper lobe.  There is mediastinal lymphadenopathy.  There is low density lesion in head of the pancreas and some nodularity of left adrenal gland.  2.  Left thoracocentesis was done on 12/11/2022.  Pathology reveals adenocarcinoma.  Immunostains compatible with metastasis from lung primary.  -TPS of 20-30%.  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  3.  Brain MRI on 12/13/2022 does not reveal any brain metastasis.  4. Alectinib started on 01/26/2023.  5. Stroke in 02/2023.     SUBJECTIVE:  Ms. 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  She is on Alectinib since January,2023.    Patient is overall doing well.  She is recovering well from her stroke.  She has now moved into an apartment in assisted living.    No headache.  Occasional dizziness.  No chest pain.  No shortness of breath.  No abdominal pain.  No nausea or vomiting.  Appetite good.  No urinary complaints.  Occasional constipation.  No bleeding.  She has some back from spinal stenosis but is not getting worse.  She walks with the help of a walker.  All other review of system is negative.     PHYSICAL EXAMINATION:    GENERAL:  She is alert, oriented x 3.   Rest of systems not examined as this is a telephone visit.     LABORATORY:  CBC and CMP reviewed.     ASSESSMENT:     1.  An 82-year-old female with metastatic lung  adenocarcinoma on alectinib.  2.  Stroke.  Improving from it.  3.  Mildly elevated LFT. Stable.  4.  Normocytic anemia. Stable.     PLAN:     1.  Patient's overall condition is stable.  Her strength is improving.  She is ambulating better with the help of a walker.    Discussed regarding metastatic lung cancer.  No clinical suspicion of progression.  We will get CT chest, abdomen and pelvis in a month.  She is agreeable for it.    She is on alectinib for lung cancer.  She is tolerating it well.  She will continue on it.    2.  She is on aspirin and Plavix for the stroke.  No bleeding.  She will follow-up with her neurologist.    3.  Labs were reviewed with her.  LFTs are mildly elevated.  We will continue to monitor it.  She is mildly anemic.  We will continue to monitor it.    4.  She had few questions which were all answered.  I will see her after the CT scan.    TELEPHONE VISIT TIME:  11 minutes.  Time spent during today's visit, review of chart/investigations today, monitoring for toxicity of high risk drug and documentation today.        Again, thank you for allowing me to participate in the care of your patient.        Sincerely,        Fer Pantoja MD

## 2023-04-07 NOTE — TELEPHONE ENCOUNTER
[facility] Geriatrics Triage Nurse Telephone Encounter    Provider: NIMCO Hoover CNP    Facility: [facility]  Facility Type:  [place]    Caller: Samuel   Call Back Number: [phone]    Allergies:    Allergies   Allergen Reactions     Adhesive Tape Dermatitis     Skin Sensitivity to Adhesive ie. Lidocaine patch, tele patches, tapes     Augmentin Diarrhea     Celebrex [Celecoxib] Rash     Lidocaine      Skin sensitivity to Lidocaine patch adhesive        Reason for call: Pt has an order for tramadol 50mg q6h prn for pain, the pt usually takes her prn around 2am and every time she has to use the medication she gets charged for it. 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     Verbal Order/Direction given by Provider: Primary NP to address next week.    Provider giving Order:  Leidy Villa MD    Verbal Order given to: Samuel Toro RN

## 2023-04-10 NOTE — LETTER
4/10/2023        RE: Chiquita Berry  [address]  [facility] [address]        Chief Complaint: pain and insomnia at night and post oncology appt    HPI:    Chiquita Berry is a 82 year old  (1940), who is being seen today for an episodic care visit at [facility]). Today's concern is: asked to d/w pt re: can she had a dose of tramadol and melatonin at her bedside if she needs to take during the night--she does not always need it but then has to call for it.     Chronic low back pain without sciatica, unspecified back pain laterality  Anxiety disorder, unspecified type  Insomnia, unspecified type  Primary adenocarcinoma of left lung (H)  Metastatic lung carcinoma, left (H)  Physical deconditioning      /62   Pulse 63   Temp 98.1  F (36.7  C)   Resp 16   Ht 1.524 m (5')   Wt 62.1 kg (137 lb)   LMP  (LMP Unknown)   SpO2 95%   BMI 26.76 kg/m    TODAY'S EXAM:  SUBJECTIVE:  No CP, SOB, Cough, dizziness, fevers, chills, HA, N/V, dysuria or Bowel Abnormalities. Appetite is good.  No pain except low back shanae at night.    OBJECTIVE DATA:   GENERAL APPEARANCE:  Alert,oriented x 4 in no distress. Lungs CTA but mildly decreased, S1/S2 with soft murmur.. trace bilat pedal edema. Abdomen is soft, +BTS . No focal neurological deficits.     Current Outpatient Medications   Medication Sig Dispense Refill     acetaminophen (TYLENOL) 325 MG tablet Take 2 tablets (650 mg) by mouth 4 times daily 220 tablet 11     alectinib (ALECENSA) 150 MG CAPS Take 4 capsules (600 mg) by mouth 2 times daily (with meals) 240 capsule 0     aspirin (ASA) 81 MG EC tablet Take 1 tablet (81 mg) by mouth daily Take baby aspirin indefinitely for stroke 30 tablet 11     atorvastatin (LIPITOR) 40 MG tablet Take 1 tablet (40 mg) by mouth every evening 30 tablet 11     Cholecalciferol (VITAMIN D) 2000 UNITS tablet Take 1 tablet by mouth daily.       clopidogrel (PLAVIX) 75 MG  tablet 1 tablet (75 mg) by Oral or NG Tube route daily for 83 days Take baby aspirin and Plavix daily for total of 90 days.  Then stop Plavix continue baby aspirin daily indefinitely for stroke prevention (Patient taking differently: 75 mg by Oral or NG Tube route daily Take baby aspirin and Plavix daily for total of 90 days thru 5/5/2023.      Then stop Plavix continue baby aspirin daily indefinitely for stroke prevention) 83 tablet 0     hydrOXYzine (ATARAX) 10 MG tablet Take 1 tablet (10 mg) by mouth 3 times daily as needed for anxiety 30 tablet 1     latanoprost (XALATAN) 0.005 % ophthalmic solution Place 1 drop into both eyes every evening   2     melatonin 3 MG CAPS Take 3 mg by mouth At Bedtime And 3mg prn po before 0200 hrs (total of 6mg) 60 capsule 11     Multiple Vitamins-Minerals (PRESERVISION AREDS 2) CAPS Take 1 capsule by mouth 2 times daily       multivitamin w/minerals (THERA-VIT-M) tablet Take 1 tablet by mouth daily 30 tablet 11     ondansetron (ZOFRAN) 4 MG tablet Take 1 tablet (4 mg) by mouth every 6 hours as needed for nausea 30 tablet 0     pantoprazole (PROTONIX) 40 MG EC tablet Take 1 tablet (40 mg) by mouth daily 30 tablet 11     polyethylene glycol (MIRALAX) 17 GM/Dose powder Take 17 g by mouth daily 510 g      polyethylene glycol 400 (BLINK TEARS) 0.25 % SOLN ophthalmic solution Place 1 drop into both eyes daily And Q2H PRN       traMADol (ULTRAM) 50 MG tablet Take 1 tablet (50 mg) by mouth every 6 hours as needed for severe pain (7-10) 10 tablet 0     traZODone (DESYREL) 50 MG tablet Take 50 mg by mouth At Bedtime       Recent Labs   Lab Test 03/28/23  1155 02/27/23  1533    140   POTASSIUM 4.1 4.5   CHLORIDE 102 103   CO2 23 27   ANIONGAP 14 10   GLC 73 109*   BUN 16.7 17.4   CR 0.80 0.73   SAGE 8.7* 8.8     CBC RESULTS: Recent Labs   Lab Test 03/28/23  1155   WBC 8.2   RBC 3.50*   HGB 11.0*   HCT 34.7*   MCV 99   MCH 31.4   MCHC 31.7   RDW 17.6*            ASSESSMENT /  PLAN:  (M54.50,  G89.29) Chronic low back pain without sciatica, unspecified back pain laterality  (primary encounter diagnosis)  (F41.9) Anxiety disorder, unspecified type  (G47.00) Insomnia, unspecified type  Comment/Plan: d/w pt and staff- they will set up the prn doses of melatonin and tramadol in a weekly med machine which pt can access--will we do this for 1 month and see how it goes.. monitor.    (R53.81) Physical deconditioning  Comment/Plan: cont PT OT, getting stronger.     (C34.92) Primary adenocarcinoma of left lung (H)  (C78.02) Metastatic lung carcinoma, left (H)  Comment/Plan:  F/U oncology per their recs--saw Dr Pantoja last week and he feels pt doing well. They  will have scan in a month, follow LFTS and cont the alectinib        Electronically Signed by:  NIMCO Bain CNP        Sincerely,        NIMCO Bain CNP

## 2023-04-10 NOTE — PROGRESS NOTES
Chief Complaint: pain and insomnia at night and post oncology appt    HPI:    Chiquita Berry is a 82 year old  (1940), who is being seen today for an episodic care visit at [facility] Today's concern is: asked to d/w pt re: can she had a dose of tramadol and melatonin at her bedside if she needs to take during the night--she does not always need it but then has to call for it.     Chronic low back pain without sciatica, unspecified back pain laterality  Anxiety disorder, unspecified type  Insomnia, unspecified type  Primary adenocarcinoma of left lung (H)  Metastatic lung carcinoma, left (H)  Physical deconditioning      /62   Pulse 63   Temp 98.1  F (36.7  C)   Resp 16   Ht 1.524 m (5')   Wt 62.1 kg (137 lb)   LMP  (LMP Unknown)   SpO2 95%   BMI 26.76 kg/m    TODAY'S EXAM:  SUBJECTIVE:  No CP, SOB, Cough, dizziness, fevers, chills, HA, N/V, dysuria or Bowel Abnormalities. Appetite is good.  No pain except low back shanae at night.    OBJECTIVE DATA:   GENERAL APPEARANCE:  Alert,oriented x 4 in no distress. Lungs CTA but mildly decreased, S1/S2 with soft murmur.. trace bilat pedal edema. Abdomen is soft, +BTS . No focal neurological deficits.     Current Outpatient Medications   Medication Sig Dispense Refill     acetaminophen (TYLENOL) 325 MG tablet Take 2 tablets (650 mg) by mouth 4 times daily 220 tablet 11     alectinib (ALECENSA) 150 MG CAPS Take 4 capsules (600 mg) by mouth 2 times daily (with meals) 240 capsule 0     aspirin (ASA) 81 MG EC tablet Take 1 tablet (81 mg) by mouth daily Take baby aspirin indefinitely for stroke 30 tablet 11     atorvastatin (LIPITOR) 40 MG tablet Take 1 tablet (40 mg) by mouth every evening 30 tablet 11     Cholecalciferol (VITAMIN D) 2000 UNITS tablet Take 1 tablet by mouth daily.       clopidogrel (PLAVIX) 75 MG tablet 1 tablet (75 mg) by Oral or NG Tube route daily for 83 days Take baby aspirin and Plavix daily for  total of 90 days.  Then stop Plavix continue baby aspirin daily indefinitely for stroke prevention (Patient taking differently: 75 mg by Oral or NG Tube route daily Take baby aspirin and Plavix daily for total of 90 days thru 5/5/2023.      Then stop Plavix continue baby aspirin daily indefinitely for stroke prevention) 83 tablet 0     hydrOXYzine (ATARAX) 10 MG tablet Take 1 tablet (10 mg) by mouth 3 times daily as needed for anxiety 30 tablet 1     latanoprost (XALATAN) 0.005 % ophthalmic solution Place 1 drop into both eyes every evening   2     melatonin 3 MG CAPS Take 3 mg by mouth At Bedtime And 3mg prn po before 0200 hrs (total of 6mg) 60 capsule 11     Multiple Vitamins-Minerals (PRESERVISION AREDS 2) CAPS Take 1 capsule by mouth 2 times daily       multivitamin w/minerals (THERA-VIT-M) tablet Take 1 tablet by mouth daily 30 tablet 11     ondansetron (ZOFRAN) 4 MG tablet Take 1 tablet (4 mg) by mouth every 6 hours as needed for nausea 30 tablet 0     pantoprazole (PROTONIX) 40 MG EC tablet Take 1 tablet (40 mg) by mouth daily 30 tablet 11     polyethylene glycol (MIRALAX) 17 GM/Dose powder Take 17 g by mouth daily 510 g      polyethylene glycol 400 (BLINK TEARS) 0.25 % SOLN ophthalmic solution Place 1 drop into both eyes daily And Q2H PRN       traMADol (ULTRAM) 50 MG tablet Take 1 tablet (50 mg) by mouth every 6 hours as needed for severe pain (7-10) 10 tablet 0     traZODone (DESYREL) 50 MG tablet Take 50 mg by mouth At Bedtime       Recent Labs   Lab Test 03/28/23  1155 02/27/23  1533    140   POTASSIUM 4.1 4.5   CHLORIDE 102 103   CO2 23 27   ANIONGAP 14 10   GLC 73 109*   BUN 16.7 17.4   CR 0.80 0.73   SAGE 8.7* 8.8     CBC RESULTS: Recent Labs   Lab Test 03/28/23  1155   WBC 8.2   RBC 3.50*   HGB 11.0*   HCT 34.7*   MCV 99   MCH 31.4   MCHC 31.7   RDW 17.6*            ASSESSMENT / PLAN:  (M54.50,  G89.29) Chronic low back pain without sciatica, unspecified back pain laterality  (primary  encounter diagnosis)  (F41.9) Anxiety disorder, unspecified type  (G47.00) Insomnia, unspecified type  Comment/Plan: d/w pt and staff- they will set up the prn doses of melatonin and tramadol in a weekly med machine which pt can access--will we do this for 1 month and see how it goes.. monitor.    (R53.81) Physical deconditioning  Comment/Plan: cont PT OT, getting stronger.     (C34.92) Primary adenocarcinoma of left lung (H)  (C78.02) Metastatic lung carcinoma, left (H)  Comment/Plan:  F/U oncology per their recs--saw Dr Pantoja last week and he feels pt doing well. They  will have scan in a month, follow LFTS and cont the alectinib        Electronically Signed by:  NIMCO Bain CNP

## 2023-04-25 NOTE — PROGRESS NOTES
Oral Chemotherapy Monitoring Program     Placed call to patient's home health facility in follow up of oral chemotherapy labs.  Needs monthly CMP drawn.  Labs are only done on Tuesdays.  Too late to do it today.  Chiquita North RN requested we fax the orders to her fax number and she will have the lab drawn next Tuesday 5/2.  They will fax lab results to [facility] fax.      Jennifer Mccain, PharmD  Oral Chemotherapy Pharmacist  [phone]

## 2023-05-01 PROBLEM — S52.501A CLOSED FRACTURE OF DISTAL ENDS OF RIGHT RADIUS AND ULNA, INITIAL ENCOUNTER: Status: ACTIVE | Noted: 2023-01-01

## 2023-05-01 PROBLEM — S60.222A CONTUSION OF LEFT HAND, INITIAL ENCOUNTER: Status: ACTIVE | Noted: 2023-01-01
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
## 2023-05-01 PROBLEM — S52.601A CLOSED FRACTURE OF DISTAL ENDS OF RIGHT RADIUS AND ULNA, INITIAL ENCOUNTER: Status: ACTIVE | Noted: 2023-01-01
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
## 2023-05-01 PROBLEM — S72.141A CLOSED DISPLACED INTERTROCHANTERIC FRACTURE OF RIGHT FEMUR, INITIAL ENCOUNTER (H): Status: ACTIVE | Noted: 2023-01-01

## 2023-05-01 NOTE — ED TRIAGE NOTES
Pt reports she was walking to lunch, taking elevator. The doors elevators closed on her caused her to fall. Right wrist and hip injury

## 2023-05-01 NOTE — ED NOTES
[facility]  ED Nurse Handoff Report    ED Chief complaint: Fall      ED Diagnosis:   Final diagnoses:   Closed displaced intertrochanteric fracture of right femur, initial encounter (H)   Closed fracture of distal ends of right radius and ulna, initial encounter   Fall in home, initial encounter       Code Status: not addressed by ED MD    Allergies:   Allergies   Allergen Reactions     Adhesive Tape Dermatitis     Skin Sensitivity to Adhesive ie. Lidocaine patch, tele patches, tapes     Amoxicillin-Pot Clavulanate Diarrhea     Celebrex [Celecoxib] Rash     Lidocaine      Skin sensitivity to Lidocaine patch adhesive       Patient Story: Patient brought in by EMS from home. Patient was on her way to lunch with her walker. Patient reports elevator door closed on her and knocked patient over onto her right side. Patient has right hip fracture and right wrist fracture.   Focused Assessment:  Upon arrival to ED patient has swollen, deformed, bruised right wrist. Right hip pain, unable to bear weight due to the pain. As well as some bruising and swelling to left hand/thumb. Patient alert and oriented times four. Wrist was set in ED and splinted.    Treatments and/or interventions provided: Morphine, splint  Patient's response to treatments and/or interventions: improvement of pain    To be done/followed up on inpatient unit:  Continue to monitor    Does this patient have any cognitive concerns?: alert and oriented times four    Activity level - Baseline/Home:  Independent  Activity Level - Current:   Total Care    Patient's Preferred language: English   Needed?: No    Isolation: None  Infection: Not Applicable  Patient tested for COVID 19 prior to admission: NO  Bariatric?: No    Vital Signs: There were no vitals filed for this visit.    Cardiac Rhythm:     Was the PSS-3 completed:   Yes  What interventions are required if any?               Family Comments: Friend at bedside  OBS  brochure/video discussed/provided to patient/family: No              Name of person given brochure if not patient: n/a              Relationship to patient: n/a    For the majority of the shift this patient's behavior was Green.   Behavioral interventions performed were none.    ED NURSE PHONE NUMBER: [phone]

## 2023-05-01 NOTE — CONSULTS
[facility]    Orthopedic Consultation    Chiquita Berry MRN# [id number]   Age: 82 year old YOB: 1940     Date of Admission: 5/1/2023    Reason for consult: Right hip intertrochanteric fracture  Right distal radius fracture       Requesting provider: Call from ED       Level of consult: Consult, follow and place orders           Assessment and Plan:   Assessment:   Right hip intertrochanteric fracture  Right distal radius fracture      Plan:   Patient will be scheduled for a right hip and wrist ORIF tomorrow pending patient is optimized for surgery per hospitalist.    Surgeon: Dr. Mehdi Vega  NPO Status effective midnight  NWB/Bedrest until postop  Vit D ordered   Hold anticoagulants if taken: Plavix/ASA   Pain medication as needed, minimize narcotics as able           Chief Complaint:   Right hip and wrist pain          History of Present Illness:   Chiquita Berry is a right-handed 82 year old female who presented to the ED for evaluation following a fall. The patient reports that she was on her way to lunch today with her walker, and after getting onto her apartment's elevator, the door closed on her, pushing her over and causing her to land on her right side. She had an immediate onset of pain and obvious deformity of the right wrist, as well as pain in her right hip. Xrays revealed the above fracture.  She ambulates with a walker at baseline.           Past Medical History:     Past Medical History:   Diagnosis Date     Anxiety     related to cancer diagnosis     Chronic back pain     due to compression fracture     Osteoporosis     followed by outside endocrinologist     Primary lung adenocarcinoma (H)     stage IV             Past Surgical History:     Past Surgical History:   Procedure Laterality Date     CATARACT IOL, RT/LT Bilateral      OPEN REDUCTION INTERNAL FIXATION HUMERUS DISTAL Right      PHACOEMULSIFICATION CLEAR CORNEA WITH STANDARD INTRAOCULAR  LENS IMPLANT Left 11/16/2015    Procedure: PHACOEMULSIFICATION CLEAR CORNEA WITH STANDARD INTRAOCULAR LENS IMPLANT;  Surgeon: Latrell Jessica MD;  Location:  [facility]     TONSILLECTOMY & ADENOIDECTOMY               Social History:     Social History     Tobacco Use     Smoking status: Never     Smokeless tobacco: Never   Vaping Use     Vaping status: Never Used   Substance Use Topics     Alcohol use: Not Currently     Comment: rare             Family History:     Family History   Problem Relation Age of Onset     Dementia Mother      Myocardial Infarction Father         later in life     Stomach Cancer Father      Breast Cancer Sister         x2 sisters (post-menopausal)     Diabetes No family hx of      Cerebrovascular Disease No family hx of      Coronary Artery Disease Early Onset No family hx of      Ovarian Cancer No family hx of      Colon Cancer No family hx of              Immunizations:     VACCINE/DOSE   Diptheria   DPT   DTAP   HBIG   Hepatitis A   Hepatitis B   HIB   Influenza   Measles   Meningococcal   MMR   Mumps   Pneumococcal   Polio   Rubella   Small Pox   TDAP   Varicella   Zoster             Allergies:     Allergies   Allergen Reactions     Adhesive Tape Dermatitis     Skin Sensitivity to Adhesive ie. Lidocaine patch, tele patches, tapes     Amoxicillin-Pot Clavulanate Diarrhea     Celebrex [Celecoxib] Rash     Lidocaine      Skin sensitivity to Lidocaine patch adhesive             Medications:     No current facility-administered medications for this encounter.     Current Outpatient Medications   Medication Sig     acetaminophen (TYLENOL) 325 MG tablet Take 2 tablets (650 mg) by mouth 4 times daily     alectinib (ALECENSA) 150 MG CAPS Take 4 capsules (600 mg) by mouth 2 times daily (with meals)     aspirin (ASA) 81 MG EC tablet Take 1 tablet (81 mg) by mouth daily Take baby aspirin indefinitely for stroke     atorvastatin (LIPITOR) 40 MG tablet Take 1 tablet (40 mg) by mouth every evening      clopidogrel (PLAVIX) 75 MG tablet 1 tablet (75 mg) by Oral or NG Tube route daily for 83 days Take baby aspirin and Plavix daily for total of 90 days.  Then stop Plavix continue baby aspirin daily indefinitely for stroke prevention (Patient taking differently: 75 mg by Oral or NG Tube route daily Take baby aspirin and Plavix daily for total of 90 days thru 5/5/2023.      Then stop Plavix continue baby aspirin daily indefinitely for stroke prevention)     hydrOXYzine (ATARAX) 10 MG tablet Take 1 tablet (10 mg) by mouth 3 times daily as needed for anxiety     latanoprost (XALATAN) 0.005 % ophthalmic solution Place 1 drop into both eyes every evening      melatonin 3 MG CAPS Take 3 mg by mouth At Bedtime And 3mg prn po before 0200 hrs (total of 6mg)     Multiple Vitamins-Minerals (PRESERVISION AREDS 2) CAPS TAKE 1 CAPSULE BY MOUTH TWICE A DAY     multivitamin w/minerals (THERA-VIT-M) tablet Take 1 tablet by mouth daily     ondansetron (ZOFRAN) 4 MG tablet Take 1 tablet (4 mg) by mouth every 6 hours as needed for nausea     pantoprazole (PROTONIX) 40 MG EC tablet Take 1 tablet (40 mg) by mouth daily     polyethylene glycol (MIRALAX) 17 GM/Dose powder Take 17 g by mouth daily     polyethylene glycol 400 (BLINK TEARS) 0.25 % SOLN ophthalmic solution Place 1 drop into both eyes daily And Q2H PRN     traMADol (ULTRAM) 50 MG tablet Take 1 tablet (50 mg) by mouth every 6 hours as needed for severe pain (7-10)     traZODone (DESYREL) 50 MG tablet TAKE ONE TABLET BY MOUTH EVERY NIGHT AT BEDTIME     vitamin D3 (CHOLECALCIFEROL) 50 mcg (2000 units) tablet Take 1 tablet (50 mcg) by mouth daily             Review of Systems:   ROS:  10 point ROS neg other than the symptoms noted above in the HPI.            Physical Exam:   All vitals have been reviewed  Patient Vitals for the past 24 hrs:   BP Pulse Resp SpO2   05/01/23 1540 -- 65 16 95 %   05/01/23 1255 129/53 -- -- --     No intake or output data in the 24 hours ending 05/01/23  1623      Physical Exam       BP: 129/53 Pulse: 65   Resp: 16 SpO2: 95 % O2 Device: Nasal cannula Oxygen Delivery: 2 LPM  Vital Signs with Ranges  Pulse:  [65] 65  Resp:  [16] 16  BP: (129)/(53) 129/53  SpO2:  [95 %] 95 %  0 lbs 0 oz    Constitutional: Pleasant, alert, appropriate, following commands.  HEENT: Head atraumatic normocephalic. Pupils equal round and reactive to light.  Respiratory: Unlabored breathing no audible wheeze  Cardiovascular: Regular rate and rhythm per pulses  GI: Abdomen non-distended.  Lymph/Hematologic: No lymphadenopathy in areas examined  Genitourinary:  No bruno  Skin: No rashes, no cyanosis, no edema.  Musculoskeletal: On physical exam of the right lower extremity, patient's leg is resting in a slightly  shortened and externally rotated position.  No skin abrasions seen at the hip.  Patient is able to dorsi and plantarflex both ankles.  Full sensation to light touch on the left versus right lower extremities.  Distal pulses are intact and equal bilaterally.    Right UE: splint in place.  Able to flex/extend fingers.  Brisk cap refill.  Sensation intact.   Neurologic: normal without focal findings, mental status, speech normal, alert and oriented x iii  Neuropsychiatric: stable             Data:   All laboratory data reviewed  Results for orders placed or performed during the hospital encounter of 05/01/23   XR Wrist Right G/E 3 Views     Status: None    Narrative    WRIST RIGHT THREE OR MORE VIEWS May 1, 2023 1:30 PM     HISTORY: Fall, pain, deformity.    COMPARISON: None.      Impression    IMPRESSION:  1. Comminuted intra-articular fracture of the distal radius. There is  1 cm of displacement and impaction and there is moderate to marked  dorsal tilt of the distal radial articular surface.  2. Mildly displaced comminuted fracture of the ulnar styloid process.  3. Secondary to the radius impaction there is positive ulnar variance.  4. Diffuse bone demineralization.  5. Multifocal  osteoarthrosis particularly at the STT joint, first CMC  joint and first MCP joint.    CHARLEY SOLOMON MD         SYSTEM ID:  [id number]   XR Pelvis w Hip Right 1 View     Status: None    Narrative    PELVIS AND HIP RIGHT ONE VIEW   5/1/2023 1:43 PM     HISTORY: Fall, right hip pain.    COMPARISON: None.      Impression    IMPRESSION: There is a comminuted intertrochanteric fracture of the  right hip. There is several centimeters of displacement and impaction.  There is moderate apex superolateral angulation. Extensive  degenerative changes in the spine, diffuse bone demineralization, and  convex left curvature in the spine incidentally noted.    CHARLEY SOLOMON MD         SYSTEM ID:  [id number]   XR Hand Left G/E 3 Views     Status: None    Narrative    HAND LEFT THREE OR MORE VIEWS May 1, 2023 2:48 PM     INDICATION: Left hand pain and swelling after a fall.     COMPARISON: None.      Impression    IMPRESSION:  1.  No acute fracture or joint malalignment.  2.  Moderate thumb CMC degenerative arthrosis.  3.  Probable old healed fracture of the fourth finger distal phalanx  dorsal base.  4.  Bone demineralization.  5.  Catheter in the dorsal wrist.    RONAL SILVESTRE MD         SYSTEM ID:  [id number]   XR Wrist Right 2 Views     Status: None    Narrative    WRIST RIGHT TWO VIEWS  DATE/TIME: 5/1/2023 4:01 PM     INDICATION: Left wrist fractures. Interval closed reduction.   COMPARISON: 5/1/2023.      Impression    IMPRESSION:  1.  Comminuted intra-articular fracture of the right distal radius.  Improved alignment of the fracture, with decreased impaction. The  distal articular surface is congruent.  2.  Mildly displaced fracture of the ulnar styloid process, unchanged.  3.  Resolution of ulna positive variance.  4.  New wrist splint.  5.  Remainder unchanged.    RONAL SILVESTRE MD         SYSTEM ID:  [id number]   Basic metabolic panel     Status: Abnormal   Result Value Ref Range    Sodium 137 136 - 145 mmol/L     Potassium 4.0 3.4 - 5.3 mmol/L    Chloride 102 98 - 107 mmol/L    Carbon Dioxide (CO2) 23 22 - 29 mmol/L    Anion Gap 12 7 - 15 mmol/L    Urea Nitrogen 20.4 8.0 - 23.0 mg/dL    Creatinine 0.81 0.51 - 0.95 mg/dL    Calcium 8.5 (L) 8.8 - 10.2 mg/dL    Glucose 111 (H) 70 - 99 mg/dL    GFR Estimate 72 >60 mL/min/1.73m2   [place] Draw     Status: None    Narrative    The following orders were created for panel order [place] Draw.  Procedure                               Abnormality         Status                     ---------                               -----------         ------                     Extra Blue Top Tube[[id number]]                              Final result               Extra Red Top Tube[[id number]]                               Final result               Extra Blood Bank Purple ...[[id number]]                      Final result                 Please view results for these tests on the individual orders.   CBC with platelets and differential     Status: Abnormal   Result Value Ref Range    WBC Count 16.1 (H) 4.0 - 11.0 10e3/uL    RBC Count 3.04 (L) 3.80 - 5.20 10e6/uL    Hemoglobin 9.8 (L) 11.7 - 15.7 g/dL    Hematocrit 29.0 (L) 35.0 - 47.0 %    MCV 95 78 - 100 fL    MCH 32.2 26.5 - 33.0 pg    MCHC 33.8 31.5 - 36.5 g/dL    RDW 18.4 (H) 10.0 - 15.0 %    Platelet Count 310 150 - 450 10e3/uL    % Neutrophils 75 %    % Lymphocytes 18 %    % Monocytes 4 %    % Eosinophils 1 %    % Basophils 1 %    % Immature Granulocytes 1 %    NRBCs per 100 WBC 0 <1 /100    Absolute Neutrophils 12.0 (H) 1.6 - 8.3 10e3/uL    Absolute Lymphocytes 2.9 0.8 - 5.3 10e3/uL    Absolute Monocytes 0.7 0.0 - 1.3 10e3/uL    Absolute Eosinophils 0.2 0.0 - 0.7 10e3/uL    Absolute Basophils 0.1 0.0 - 0.2 10e3/uL    Absolute Immature Granulocytes 0.2 <=0.4 10e3/uL    Absolute NRBCs 0.0 10e3/uL   Extra Blue Top Tube     Status: None   Result Value Ref Range    Hold Specimen [facility]    Extra Red Top Tube     Status: None   Result Value Ref Range     Hold Specimen [facility]    Extra Blood Bank Purple Top Tube     Status: None   Result Value Ref Range    Hold Specimen [facility]    CBC with platelets differential     Status: Abnormal    Narrative    The following orders were created for panel order CBC with platelets differential.  Procedure                               Abnormality         Status                     ---------                               -----------         ------                     CBC with platelets and d...[[id number]]  Abnormal            Final result                 Please view results for these tests on the individual orders.          Attestation:  I have reviewed today's vital signs, notes, medications, labs and imaging with Dr. Mehdi Vega.  Amount of time performed on this consult: 55 minutes.    Kimberly Hernandez PA-C

## 2023-05-01 NOTE — ED PROVIDER NOTES
History     Chief Complaint:  Fall     The history is provided by the patient.      Chiquita Berry is a right-handed 82 year old female on ASA and Plavix who presents to the ED for evaluation following a fall. The patient reports that she was on her way to lunch today with her walker, and after getting onto her apartment's elevator, the door closed on her, pushing her over and causing her to land on her right side. She had an immediate onset of pain and obvious deformity of the right wrist, as well as pain in her right hip. She also notes some bruising on her left hand, but no pain there. She felt normal this morning, ate breakfast as normal, and had her normal morning medications. She denies hitting her head, any loss of consciousness, bleeding wounds, numbness, chest pain, shortness of breath, pain with breathing, abdominal pain, headache, neck pain, or new/worsening back pain other than her chronic back pain at baseline. Denies any personal history of asthma or COPD. She denies any other symptoms.    Independent Historian:   None - Patient Only    Review of External Notes:  I reviewed past medical history, medication list from the patient's assisted living facility.    ROS:  See HPI    Allergies:  Adhesive Tape  Amoxicillin-Pot Clavulanate  Celecoxib  Lidocaine     Physical Exam     Patient Vitals for the past 24 hrs:   BP SpO2   05/01/23 1540 -- 95 %   05/01/23 1255 129/53 --        Physical Exam  General: Well appearing, nontoxic. Resting comfortably  Head:  Scalp, face, and head appear normal, atraumatic non tender to palpation  Eyes:  Pupils are equal, round, reactive to light EOMI, no nystagmus    Conjunctivae non-injected and sclerae white  ENT:    The external nose is normal    Pinnae are normal  Neck:  Normal range of motion. Cervical spine nontender, no stepoffs    There is no rigidity noted    Trachea is in the midline  CV:  Regular rate and rhythm     Normal S1/S2, no S3/S4    No murmur or rub.  Radial pulses 2+ bilaterally.  Resp:  Lungs are clear and equal bilaterally  There is no tachypnea    No increased work of breathing    No rales, wheezing, or rhonchi  GI:  Abdomen is soft, no rigidity or guarding    No distension, or mass    No tenderness or rebound tenderness   MS:  Normal muscular tone.  Significant pain and deformity to the distal right forearm/wrist.  No wounds.  CMS intact distally in all peripheral distributions of the right upper extremity.  Radial pulses 2+ bilaterally.  Proximal forearm, elbow, upper arm, bilateral shoulders and clavicles are normal and atraumatic.  Left upper and left lower extremities are atraumatic with full painless range of motion.  Right lower extremity with pain on hip flexion and internal/external rotation at the hip.  No distal femur, knee, lower leg, foot or ankle tenderness or pain to palpation.  Chest wall is stable and nontender.    Symmetric motor strength    No lower extremity edema  Skin:  No rash or acute skin lesions note  Neuro: A&Ox3, GCS 15    CN II - XII intact    Speech clear, fluent, and normal    Strength 5/5 and symmetric in bilateral upper and lower extremities.    No pronator drift. No leg drift. SILT throughout.    No ankle clonus    No tremor.     No meningismus   Psych: Normal affect. Appropriate interactions.    Emergency Department Course     Imaging:  XR Hand Left G/E 3 Views   Final Result   IMPRESSION:   1.  No acute fracture or joint malalignment.   2.  Moderate thumb CMC degenerative arthrosis.   3.  Probable old healed fracture of the fourth finger distal phalanx   dorsal base.   4.  Bone demineralization.   5.  Catheter in the dorsal wrist.      RONAL SILVESTRE MD            SYSTEM ID:  [id number]      XR Pelvis w Hip Right 1 View   Final Result   IMPRESSION: There is a comminuted intertrochanteric fracture of the   right hip. There is several centimeters of displacement and impaction.   There is moderate apex superolateral  angulation. Extensive   degenerative changes in the spine, diffuse bone demineralization, and   convex left curvature in the spine incidentally noted.      CHARLEY SOLOMON MD            SYSTEM ID:  [id number]      XR Wrist Right G/E 3 Views   Final Result   IMPRESSION:   1. Comminuted intra-articular fracture of the distal radius. There is   1 cm of displacement and impaction and there is moderate to marked   dorsal tilt of the distal radial articular surface.   2. Mildly displaced comminuted fracture of the ulnar styloid process.   3. Secondary to the radius impaction there is positive ulnar variance.   4. Diffuse bone demineralization.   5. Multifocal osteoarthrosis particularly at the STT joint, first CMC   joint and first MCP joint.      CHARLEY SOLOMON MD            SYSTEM ID:  [id number]      XR Wrist Right 2 Views    (Results Pending)      Report per radiology    Laboratory:  Labs Ordered and Resulted from Time of ED Arrival to Time of ED Departure   BASIC METABOLIC PANEL - Abnormal       Result Value    Sodium 137      Potassium 4.0      Chloride 102      Carbon Dioxide (CO2) 23      Anion Gap 12      Urea Nitrogen 20.4      Creatinine 0.81      Calcium 8.5 (*)     Glucose 111 (*)     GFR Estimate 72     CBC WITH PLATELETS AND DIFFERENTIAL - Abnormal    WBC Count 16.1 (*)     RBC Count 3.04 (*)     Hemoglobin 9.8 (*)     Hematocrit 29.0 (*)     MCV 95      MCH 32.2      MCHC 33.8      RDW 18.4 (*)     Platelet Count 310      % Neutrophils 75      % Lymphocytes 18      % Monocytes 4      % Eosinophils 1      % Basophils 1      % Immature Granulocytes 1      NRBCs per 100 WBC 0      Absolute Neutrophils 12.0 (*)     Absolute Lymphocytes 2.9      Absolute Monocytes 0.7      Absolute Eosinophils 0.2      Absolute Basophils 0.1      Absolute Immature Granulocytes 0.2      Absolute NRBCs 0.0          [facility]    -Fracture    Date/Time: 5/1/2023 3:25 PM    Performed by: Kj Vasquez,  MD  Authorized by: Kj Vasquez MD    Risks, benefits and alternatives discussed.      INJURY      Injury location:  Forearm    Forearm injury location:  R forearm    Forearm fracture type comment:  Distal radius and distal ulna    PRE PROCEDURE ASSESSMENT      Neurological function: normal      Distal perfusion: normal      Range of motion: reduced      ANESTHESIA (see MAR for exact dosages)      Anesthesia method: Hematoma block using total of 7mL of 0.5% bupivacaine infiltrated with a 21G needle into the area of the distal radius and ulna fracture sites.    PROCEDURE DETAILS:     Manipulation performed: yes      Skeletal traction used: yes (Finger traps and 12lbs of weight)      Reduction successful: yes      X-ray confirmed reduction: yes      Immobilization:  Splint    Splint type:  Sugar tong    Supplies used:  Cotton padding, Ortho-Glass and elastic bandage    POST PROCEDURE ASSESSMENT      Neurological function: normal      Distal perfusion: normal      Range of motion: unchanged        PROCEDURE    Patient Tolerance:  Patient tolerated the procedure well with no immediate complications    Emergency Department Course & Assessments:        Splint Placement     Procedure: Splint Placement     Indication: Fracture    Consent: Verbal     Location: Right Forearm    Preparation: none    Procedure detail:   Splint was applied by Myself  Splint type: Sugar-tong   Splint materilal: Fiberglass  After placement I checked and adjusted the fit as needed to ensure proper positioning/fit   Sensation and circulation are intact after splint placement     Patient Status: The patient tolerated the procedure well: Yes. There were no complications.        Interventions:  Medications   morphine (PF) injection 4 mg (4 mg Intravenous $Given 5/1/23 1501)        Assessments, Independent Interpretation, Consult/Discussion of ManagementTests:  ED Course as of 05/01/23 1558   Mon May 01, 2023   1251 I obtained history and examined  the patient as noted above.     1341 I performed an independent interpretation of the patient's right wrist radiographs.  There is a significantly impacted displaced distal radius and ulna fracture.   1342 I performed an independent interpretation of the patient's right hip/pelvis radiographs there is a displaced intertrochanteric fracture of the right proximal femur.   1353 I rechecked the patient and explained findings.    1410 I spoke with Martin Harris, regarding the patient and her imaging    1558 I spoke with Dr. Maguire, Hospitalist, regarding the patient        Social Determinants of Health affecting care:  None    Disposition:  The patient was admitted to the hospital under the care of Dr. Maguire.     Impression & Plan      Medical Decision Making:  Chiquita Berry is a 82 year old female who presents to the ED for evaluation of right hip and right wrist pain after mechanical fall at home. On my evaluation the patient is well appearing, hemodynamically stable and afebrile.  Patient is on dual antiplatelet therapy but denies hitting her head, head injury, loss of consciousness or syncope.  She was otherwise feeling normal prior to the fall.  On head to toe trauma evaluation patient has significant pain with movement of the right hip as well as pain and deformity to the right wrist.  Radiographs reveal a displaced right intertrochanteric hip fracture and displaced, comminuted, impacted and angulated intra-articular distal radius and ulna fractures also on the right side.  Laboratory studies with leukocytosis likely related to acute fractures.  Hemoglobin 9.8.  No evidence of significant bleeding on exam.  No open fractures.  Radiographs of the left hand negative for fractures.  There are left hand contusions.  Closed reduction of the right distal radius and ulna fractures was performed after discussion with orthopedics for improvement of the patient's pain and reduction in swelling while she awaits surgical  intervention.  Both fractures will likely need surgical intervention.  Closed reduction and splinting was performed as noted above.  Given the findings the patient will be admitted to the hospital for ongoing monitoring evaluation and treatment.  The case was discussed with the hospitalist and with orthopedics and plan for likely operative intervention tomorrow.      Diagnosis:    ICD-10-CM    1. Closed displaced intertrochanteric fracture of right femur, initial encounter (H)  S72.141A       2. Closed fracture of distal ends of right radius and ulna, initial encounter  S52.501A     S52.601A       3. Fall in home, initial encounter  W19.XXXA     Y92.009       4. Contusion of left hand, initial encounter  S60.222A          Shyam MINA, am serving as a scribe on 5/1/2023 at 1:01 PM to personally document services performed by Kj Vasquez MD based on my observations and the provider's statements to me.     5/1/2023   Kj Vasquez MD       Historical Data:  ______________________________________________________________________  Medications:    Desyrel  Ultram  Miralax  Protoniz  Zofran  Xalatan  Atarax  Plavix  Lipitor  Aspirin 81 mg  Alecensa      Past Medical History:   Past Surgical History:     Past Medical History:   Diagnosis Date     Anxiety      Chronic back pain      Osteoporosis      Primary lung adenocarcinoma (H)     Past Surgical History:   Procedure Laterality Date     CATARACT IOL, RT/LT Bilateral      OPEN REDUCTION INTERNAL FIXATION HUMERUS DISTAL Right      PHACOEMULSIFICATION CLEAR CORNEA WITH STANDARD INTRAOCULAR LENS IMPLANT Left 11/16/2015    Procedure: PHACOEMULSIFICATION CLEAR CORNEA WITH STANDARD INTRAOCULAR LENS IMPLANT;  Surgeon: Latrell Jessica MD;  [facility]     TONSILLECTOMY & ADENOIDECTOMY        Patient Active Problem List    Diagnosis Date Noted     Contusion of left hand, initial encounter 05/01/2023     Priority: Medium     Closed displaced intertrochanteric  fracture of right femur, initial encounter (H) 05/01/2023     Priority: Medium     Fall in home, initial encounter 05/01/2023     Priority: Medium     Closed fracture of distal ends of right radius and ulna, initial encounter 05/01/2023     Priority: Medium     Annual physical exam: Initial AL visit 3/21/2023 03/22/2023     Priority: Medium     Insomnia, unspecified type 02/27/2023     Priority: Medium     Hypoxia 02/24/2023     Priority: Medium     Aphasia following cerebral infarction 02/20/2023     Priority: Medium     Healthcare-associated pneumonia 02/20/2023     Priority: Medium     Pleural effusion, left 02/20/2023     Priority: Medium     Metastatic lung carcinoma, left (H) 02/20/2023     Priority: Medium     Chronic low back pain without sciatica, unspecified back pain laterality 02/20/2023     Priority: Medium     Physical deconditioning 02/20/2023     Priority: Medium     Anxiety disorder, unspecified type 02/20/2023     Priority: Medium     History of recent fall 02/20/2023     Priority: Medium     Pain of sternum 02/20/2023     Priority: Medium     Weakness 02/07/2023     Priority: Medium     Status post chemotherapy 02/07/2023     Priority: Medium     History of lung cancer 02/07/2023     Priority: Medium     Pneumonia due to infectious organism, unspecified laterality, unspecified part of lung 02/07/2023     Priority: Medium     Primary adenocarcinoma of left lung (H) 12/22/2022     Priority: Medium     Osteoporosis 04/12/2022     Priority: Medium     followed by outside endocrinologist            Family History:    family history includes Breast Cancer in her sister; Dementia in her mother; Myocardial Infarction in her father; Stomach Cancer in her father. Social History:   reports that she has never smoked. She has never used smokeless tobacco. She reports that she does not currently use alcohol. She reports that she does not use drugs.     PCP: Naz Jackson, Kj Guy,  MD  05/01/23 1724       Kj Vasquez MD  05/01/23 1721

## 2023-05-01 NOTE — ED NOTES
Right wrist is deformed, with bruising and swelling. Left hand as bruising to back of hand and thumb with swelling around the thumb. Patient complains of right hip pain and is unable to bear weight on her leg.

## 2023-05-01 NOTE — H&P
[facility]    History and Physical  Hospitalist       Date of Admission:  5/1/2023    Assessment & Plan   Chiquita Berry is a 82 year old female with PMH of metastatic lung cancer, hx CVA Feb 2023, who presents with a fall and fractures.    Acute comminuted fracture of the right hip  Acute comminuted fracture of the right distal radius  Mechanical fall  Patient with mechanical fall, pushed by elevator door, no head trauma, no LOC, no premonitory symptoms. Workup in ED with fractures above and leukocytosis 16.1k. Revised cardiac risk score is 1 indicating relatively low intraoperative cardiac risk, patient is medically stable for orthopedic intervention.  - Ortho consult appreciated, surgery planned tomorrow  - Hold ASA and Plavix and restart per ortho  - Pain control  - Laxatives PRN  - NS @ 75ml/hr  - PT/OT    Metastatic lung cancer  Sees Dr. Pantoja at [facility], this was diagnosed via thoracentesis. No disease progression noted per latest office visit 4/6/2023.  - Resume alectinib at discharge    Hx CVA Feb 2023  Patient reports residual expressive aphasia, mild.  - Speech path  - Hold ASA and Plavix and restart per ortho    Elevated LFTs  In setting of cancer, followed by Onocology.  - Check LFTs here    DVT Prophylaxis: PCDs  Code Status: DNR / DNI    Disposition: Expected discharge ~3-4 days    Amandeep Maguire MD, MD    Primary Care Physician   Naz Jackson    Chief Complaint   Fall    History is obtained from the patient  Health care agent Fabi at bedside  Case discussed with ED provider    History of Present Illness   Chiquita Berry is a 82 year old female who presents with a mechanical fall. She fell in the elevator as the elevator door closed on her. She denies any premonitory symptoms, LOC, or head trauma.enies any recent falls or illness. She lives in an [facility] and ambulates with a walker. She has mild expressive aphasia from a recent stroke for which she  sees speech pathology.    Past Medical History    I have reviewed this patient's medical history and updated it with pertinent information if needed.   Past Medical History:   Diagnosis Date     Anxiety     related to cancer diagnosis     Chronic back pain     due to compression fracture     Osteoporosis     followed by outside endocrinologist     Primary lung adenocarcinoma (H)     stage IV       Past Surgical History   I have reviewed this patient's surgical history and updated it with pertinent information if needed.  Past Surgical History:   Procedure Laterality Date     CATARACT IOL, RT/LT Bilateral      OPEN REDUCTION INTERNAL FIXATION HUMERUS DISTAL Right      PHACOEMULSIFICATION CLEAR CORNEA WITH STANDARD INTRAOCULAR LENS IMPLANT Left 2015    Procedure: PHACOEMULSIFICATION CLEAR CORNEA WITH STANDARD INTRAOCULAR LENS IMPLANT;  Surgeon: Latrell Jessica MD;  Location: [facility]     TONSILLECTOMY & ADENOIDECTOMY         Prior to Admission Medications   Prior to Admission Medications   Prescriptions Last Dose Informant Patient Reported? Taking?   Multiple Vitamins-Minerals (PRESERVISION AREDS 2) CAPS   No No   Sig: TAKE 1 CAPSULE BY MOUTH TWICE A DAY   acetaminophen (TYLENOL) 325 MG tablet   No No   Sig: Take 2 tablets (650 mg) by mouth 4 times daily   alectinib (ALECENSA) 150 MG CAPS   Yes No   Sig: Take 4 capsules (600 mg) by mouth 2 times daily (with meals)   aspirin (ASA) 81 MG EC tablet   No No   Sig: Take 1 tablet (81 mg) by mouth daily Take baby aspirin indefinitely for stroke   atorvastatin (LIPITOR) 40 MG tablet   No No   Sig: Take 1 tablet (40 mg) by mouth every evening   clopidogrel (PLAVIX) 75 MG tablet   No No   Si tablet (75 mg) by Oral or NG Tube route daily for 83 days Take baby aspirin and Plavix daily for total of 90 days.  Then stop Plavix continue baby aspirin daily indefinitely for stroke prevention   Patient taking differently: 75 mg by Oral or NG Tube route daily Take baby aspirin  and Plavix daily for total of 90 days thru 5/5/2023.      Then stop Plavix continue baby aspirin daily indefinitely for stroke prevention   hydrOXYzine (ATARAX) 10 MG tablet   No No   Sig: Take 1 tablet (10 mg) by mouth 3 times daily as needed for anxiety   latanoprost (XALATAN) 0.005 % ophthalmic solution  Self Yes No   Sig: Place 1 drop into both eyes every evening    melatonin 3 MG CAPS   No No   Sig: Take 3 mg by mouth At Bedtime And 3mg prn po before 0200 hrs (total of 6mg)   multivitamin w/minerals (THERA-VIT-M) tablet   No No   Sig: Take 1 tablet by mouth daily   ondansetron (ZOFRAN) 4 MG tablet   No No   Sig: Take 1 tablet (4 mg) by mouth every 6 hours as needed for nausea   pantoprazole (PROTONIX) 40 MG EC tablet   No No   Sig: Take 1 tablet (40 mg) by mouth daily   polyethylene glycol (MIRALAX) 17 GM/Dose powder  Self Yes No   Sig: Take 17 g by mouth daily   polyethylene glycol 400 (BLINK TEARS) 0.25 % SOLN ophthalmic solution  Self Yes No   Sig: Place 1 drop into both eyes daily And Q2H PRN   traMADol (ULTRAM) 50 MG tablet   No No   Sig: Take 1 tablet (50 mg) by mouth every 6 hours as needed for severe pain (7-10)   traZODone (DESYREL) 50 MG tablet   No No   Sig: TAKE ONE TABLET BY MOUTH EVERY NIGHT AT BEDTIME   vitamin D3 (CHOLECALCIFEROL) 50 mcg (2000 units) tablet   No No   Sig: Take 1 tablet (50 mcg) by mouth daily      Facility-Administered Medications: None     Allergies   Allergies   Allergen Reactions     Adhesive Tape Dermatitis     Skin Sensitivity to Adhesive ie. Lidocaine patch, tele patches, tapes     Amoxicillin-Pot Clavulanate Diarrhea     Celebrex [Celecoxib] Rash     Lidocaine      Skin sensitivity to Lidocaine patch adhesive       Social History   I have reviewed this patient's social history and updated it with pertinent information if needed. Chiquita Berry  reports that she has never smoked. She has never used smokeless tobacco. She reports that she does not currently use alcohol.  She reports that she does not use drugs.    Family History   I have reviewed this patient's family history and updated it with pertinent information if needed.   Family History   Problem Relation Age of Onset     Dementia Mother      Myocardial Infarction Father         later in life     Stomach Cancer Father      Breast Cancer Sister         x2 sisters (post-menopausal)     Diabetes No family hx of      Cerebrovascular Disease No family hx of      Coronary Artery Disease Early Onset No family hx of      Ovarian Cancer No family hx of      Colon Cancer No family hx of        Review of Systems   The 10 point Review of Systems is negative other than noted in the HPI or here.    Physical Exam       BP: 129/53 Pulse: 65   Resp: 16 SpO2: 95 % O2 Device: Nasal cannula Oxygen Delivery: 2 LPM  Vital Signs with Ranges  Pulse:  [65] 65  Resp:  [16] 16  BP: (129)/(53) 129/53  SpO2:  [95 %] 95 %  0 lbs 0 oz    Constitutional: Thin female appears weak, tired  Eyes: PERRL, nonicteric  HEENT: Normocephalic, atraumatic, oral mucosa moist  Respiratory: CTAB, no wheezing or crackles  Cardiovascular: RRR, normal S1/2, no m/r/g  GI: Nontender, nondistended  Vascular: No lower extremity pitting edema  Skin: No rashes, R arm casted, right fingers slightly cool  Musculoskeletal: Moves all extremities  Neurologic: A&Ox3  Psychiatric: Appropriate affect and mood    Data   Data reviewed today:  I personally reviewed labwork, XR.  Recent Labs   Lab 05/01/23  1315   WBC 16.1*   HGB 9.8*   MCV 95         POTASSIUM 4.0   CHLORIDE 102   CO2 23   BUN 20.4   CR 0.81   ANIONGAP 12   SAGE 8.5*   *       Imaging:  Recent Results (from the past 24 hour(s))   XR Wrist Right G/E 3 Views    Narrative    WRIST RIGHT THREE OR MORE VIEWS May 1, 2023 1:30 PM     HISTORY: Fall, pain, deformity.    COMPARISON: None.      Impression    IMPRESSION:  1. Comminuted intra-articular fracture of the distal radius. There is  1 cm of displacement and  impaction and there is moderate to marked  dorsal tilt of the distal radial articular surface.  2. Mildly displaced comminuted fracture of the ulnar styloid process.  3. Secondary to the radius impaction there is positive ulnar variance.  4. Diffuse bone demineralization.  5. Multifocal osteoarthrosis particularly at the STT joint, first CMC  joint and first MCP joint.    CHARLEY SOLOMON MD         SYSTEM ID:  [id number]   XR Pelvis w Hip Right 1 View    Narrative    PELVIS AND HIP RIGHT ONE VIEW   5/1/2023 1:43 PM     HISTORY: Fall, right hip pain.    COMPARISON: None.      Impression    IMPRESSION: There is a comminuted intertrochanteric fracture of the  right hip. There is several centimeters of displacement and impaction.  There is moderate apex superolateral angulation. Extensive  degenerative changes in the spine, diffuse bone demineralization, and  convex left curvature in the spine incidentally noted.    CHARLEY SOLOMON MD         SYSTEM ID:  [id number]   XR Hand Left G/E 3 Views    Narrative    HAND LEFT THREE OR MORE VIEWS May 1, 2023 2:48 PM     INDICATION: Left hand pain and swelling after a fall.     COMPARISON: None.      Impression    IMPRESSION:  1.  No acute fracture or joint malalignment.  2.  Moderate thumb CMC degenerative arthrosis.  3.  Probable old healed fracture of the fourth finger distal phalanx  dorsal base.  4.  Bone demineralization.  5.  Catheter in the dorsal wrist.    RONAL SILVESTRE MD         SYSTEM ID:  [id number]   XR Wrist Right 2 Views    Narrative    WRIST RIGHT TWO VIEWS  DATE/TIME: 5/1/2023 4:01 PM     INDICATION: Left wrist fractures. Interval closed reduction.   COMPARISON: 5/1/2023.      Impression    IMPRESSION:  1.  Comminuted intra-articular fracture of the right distal radius.  Improved alignment of the fracture, with decreased impaction. The  distal articular surface is congruent.  2.  Mildly displaced fracture of the ulnar styloid process, unchanged.  3.  Resolution  of ulna positive variance.  4.  New wrist splint.  5.  Remainder unchanged.

## 2023-05-01 NOTE — PHARMACY-ADMISSION MEDICATION HISTORY
Pharmacist Admission Medication History    Admission medication history is complete. The information provided in this note is only as accurate as the sources available at the time of the update.    Medication reconciliation/reorder completed by provider prior to medication history? No    Information Source(s): Facility ([facility]/) medication list/MAR via N/A    Pertinent Information:     Changes made to PTA medication list:    Added: None    Deleted: None    Changed: None    Medication Affordability:       Allergies reviewed with patient and updates made in EHR: unable to assess    Medication History Completed By: Akil Aliheyder, [facility] 5/1/2023 4:33 PM    Prior to Admission medications    Medication Sig Last Dose Taking? Auth Provider Long Term End Date   acetaminophen (TYLENOL) 325 MG tablet Take 2 tablets (650 mg) by mouth 4 times daily 5/1/2023 at am Yes Naz Jackson APRN CNP No    alectinib (ALECENSA) 150 MG CAPS Take 4 capsules (600 mg) by mouth 2 times daily (with meals) 5/1/2023 at am Yes Fer Pantoja MD     aspirin (ASA) 81 MG EC tablet Take 1 tablet (81 mg) by mouth daily Take baby aspirin indefinitely for stroke 5/1/2023 Yes Naz Jackson APRN CNP     atorvastatin (LIPITOR) 40 MG tablet Take 1 tablet (40 mg) by mouth every evening 4/30/2023 Yes Naz Jackson APRN CNP Yes    clopidogrel (PLAVIX) 75 MG tablet 1 tablet (75 mg) by Oral or NG Tube route daily for 83 days Take baby aspirin and Plavix daily for total of 90 days.  Then stop Plavix continue baby aspirin daily indefinitely for stroke prevention  Patient taking differently: 75 mg by Oral or NG Tube route daily Ends 5/5/23 per MAR from nursing home. 5/1/2023 Yes Jessie Ferguson MD Yes 5/8/23   hydrOXYzine (ATARAX) 10 MG tablet Take 1 tablet (10 mg) by mouth 3 times daily as needed for anxiety  Yes Aniya Cali APRN CNP     latanoprost (XALATAN) 0.005 % ophthalmic solution Place 1 drop into both eyes every evening  4/30/2023 Yes  Reported, Patient Yes    melatonin 3 MG CAPS Take 3 mg by mouth At Bedtime And 3mg prn po before 0200 hrs (total of 6mg) 4/30/2023 Yes Naz Jackson APRN CNP     Multiple Vitamins-Minerals (PRESERVISION AREDS 2) CAPS TAKE 1 CAPSULE BY MOUTH TWICE A DAY 5/1/2023 at am Yes Naz Jackson APRN CNP     multivitamin w/minerals (THERA-VIT-M) tablet Take 1 tablet by mouth daily 5/1/2023 Yes Naz Jackson APRN CNP     ondansetron (ZOFRAN) 4 MG tablet Take 1 tablet (4 mg) by mouth every 6 hours as needed for nausea  Yes Rebecca Razo APRN CNP     pantoprazole (PROTONIX) 40 MG EC tablet Take 1 tablet (40 mg) by mouth daily 5/1/2023 Yes Naz Jackson APRN CNP     polyethylene glycol (MIRALAX) 17 GM/Dose powder Take 17 g by mouth daily 5/1/2023 Yes Jeanine Lieberman APRN CNP     polyethylene glycol 400 (BLINK TEARS) 0.25 % SOLN ophthalmic solution Place 1 drop into both eyes daily And Q2H PRN 5/1/2023 at am Yes Unknown, Entered By History     traMADol (ULTRAM) 50 MG tablet Take 1 tablet (50 mg) by mouth every 6 hours as needed for severe pain (7-10)  Yes Gayatri Landeros APRN CNP     traZODone (DESYREL) 50 MG tablet TAKE ONE TABLET BY MOUTH EVERY NIGHT AT BEDTIME 4/30/2023 Yes Naz Jackson APRN CNP Yes    vitamin D3 (CHOLECALCIFEROL) 50 mcg (2000 units) tablet Take 1 tablet (50 mcg) by mouth daily  Yes Naz Jackson APRN CNP

## 2023-05-02 NOTE — ANESTHESIA CARE TRANSFER NOTE
Patient: Chiquita Berry    Procedure: Procedure(s):  OPEN REDUCTION INTERNAL FIXATION RIGHT HIP FRACTURE USING INTRAMEDULLARY NAIL  OPEN REDUCTION INTERNAL FIXATION RIGHT DISTAL RADIUS FRACTURE       Diagnosis: Closed displaced intertrochanteric fracture of right femur, initial encounter (H) [S72.141A]  Closed fracture of distal ends of right radius and ulna, initial encounter [S52.501A, S52.601A]  Diagnosis Additional Information: No value filed.    Anesthesia Type:   General     Note:    Oropharynx: oropharynx clear of all foreign objects and spontaneously breathing  Level of Consciousness: drowsy  Oxygen Supplementation: nasal cannula  Level of Supplemental Oxygen (L/min / FiO2): 4  Independent Airway: airway patency satisfactory and stable  Dentition: dentition unchanged  Vital Signs Stable: post-procedure vital signs reviewed and stable  Report to RN Given: handoff report given  Patient transferred to: PACU  Comments: Neuromuscular blockade reversed after TOF 4/4, spontaneous respirations, adequate tidal volumes, followed commands to voice, oropharynx suctioned with soft flexible catheter, extubated atraumatically, extubated with suction, airway patent after extubation.  Oxygen via nasal cannula at 4 liters per minute to PACU. Oxygen tubing connected to wall O2 in PACU, SpO2, NiBP, and EKG monitors and alarms on and functioning, report on patient's clinical status given to PACU RN, RN questions answered.     Handoff Report: Identifed the Patient, Identified the Reponsible Provider, Reviewed the pertinent medical history, Discussed the surgical course, Reviewed Intra-OP anesthesia mangement and issues during anesthesia, Set expectations for post-procedure period and Allowed opportunity for questions and acknowledgement of understanding      Vitals:  Vitals Value Taken Time   /52 05/02/23 1638   Temp     Pulse 98 05/02/23 1640   Resp 0 05/02/23 1640   SpO2 95 % 05/02/23 1640   Vitals shown include  unvalidated device data.    Electronically Signed By: NIMCO Martinez CRNA  May 2, 2023  4:40 PM

## 2023-05-02 NOTE — ANESTHESIA PROCEDURE NOTES
Fascia iliaca Procedure Note    Pre-Procedure   Staff -        Anesthesiologist:  Bar Guerrero MD       Performed By: anesthesiologist       Location: pre-op       Pre-Anesthestic Checklist: patient identified, IV checked, site marked, risks and benefits discussed, informed consent, monitors and equipment checked, pre-op evaluation, at physician/surgeon's request and post-op pain management  Timeout:       Correct Patient: Yes        Correct Procedure: Yes        Correct Site: Yes        Correct Position: Yes        Correct Laterality: Yes        Site Marked: Yes  Procedure Documentation  Procedure: Fascia iliaca       Laterality: right       Patient Position: sitting       Patient Prep/Sterile Barriers: sterile gloves, mask       Skin prep: Chloraprep       Local skin infiltrated with 3 mL of 1% lidocaine.        Needle Gauge: 21.        Needle Length (millimeters): 100        Ultrasound guided       1. Ultrasound was used to identify targeted nerve, plexus, vascular marker, or fascial plane and place a needle adjacent to it in real-time.       2. Ultrasound was used to visualize the spread of anesthetic in close proximity to the above referenced structure.       3. A permanent image is entered into the patient's record.       4. The visualized anatomic structures appeared normal.       5. There were no apparent abnormal pathologic findings.    Assessment/Narrative         The placement was negative for: blood aspirated, painful injection and site bleeding       Paresthesias: No.       Bolus given via needle..        Secured via.        Insertion/Infusion Method: Single Shot       Complications: none    Medication(s) Administered   Bupivacaine 0.25% w/ 1:400K Epi (Injection) - Injection   30 mL - 5/2/2023 1:37:00 PM   Comments:  Bupivacaine 0.25% with 1:400,000 epi 30ml   Patient sedated but commutative throughout the procedure.   Patient tolerated well.    Ultrasound Interpretation, peripheral nerve  "block    1.  Under ultrasound guidance, the needle was inserted and placed in close proximity to the nerve.  2. Ultrasound was also used to visualize the spread of the anesthetic in close proximity to the nerve being blocked.  3. The nerve(s) appeared anatomically normal.   4. There were no apparent abnormal pathological findings.  5. A permanent ultrasound image was saved n the patient's record.    The surgeon has given a verbal order transferring this pt to me for a performance of regional analgesia block for post op pain control. It is requested of me because I am trained and qualifed to perform this block and the surgeon is nether trained nor qualified to perform this procedure.       FOR [employer] ([facility]) ONLY:   Pain Team Contact information: please page the Pain Team Via [employer]. Search \"Pain\". During daytime hours, please page the attending first. At night please page the resident first.      "

## 2023-05-02 NOTE — BRIEF OP NOTE
[facility]    Brief Operative Note    Pre-operative diagnosis: Closed displaced intertrochanteric fracture of right femur, initial encounter (H) [S72.141A]  Closed fracture of distal ends of right radius and ulna, initial encounter [S52.501A, S52.601A]  Post-operative diagnosis Same as pre-operative diagnosis    Procedure: Procedure(s):  OPEN REDUCTION INTERNAL FIXATION RIGHT HIP FRACTURE USING INTRAMEDULLARY NAIL  OPEN REDUCTION INTERNAL FIXATION RIGHT DISTAL RADIUS FRACTURE  Surgeon: Surgeon(s) and Role:     * Mehdi Vega MD - Primary     * Jose Pederson - Assisting  Anesthesia: Choice with Block   Estimated Blood Loss: 200 ml    Drains: None  Specimens: * No specimens in log *  Findings:   None.  Complications: None.  Implants: * No implants in log *     Plan:  DVT prophylaxis: Lovenox inpatient QD. Aspirin 325 mg on discharge.  Activity: WBAT to right lower extremity. Non-WB to right wrist, able to WB to elbow  DME information: Right platform walker for ambulation.   PT/social work for discharge planning.     Please call as soon as possible to make an appointment to be seen in Dr. Mehdi Vega's clinic in 2-3 weeks.     Dr. Vega's care coordinator is La Mccann. Please contact her at [phone] to schedule an appointment.      Dr. Vega sees patients at 2 clinic locations:    [facility]  o [address] [place] [address]    [facility]   o [address], [address], [place] [address]      Please call the on-call phone number [phone] during evenings, nights and weekends for any urgent needs. Prescription refills must be done during business hours by calling [phone]    Neelima Quinonez PA-C

## 2023-05-02 NOTE — ANESTHESIA PROCEDURE NOTES
Airway       Patient location during procedure: OR       Procedure Start/Stop Times: 5/2/2023 1:45 PM  Staff -        Anesthesiologist:  Bar Guerrero MD       CRNA: Mishel Reyes APRN CRNA       Performed By: CRNA  Consent for Airway        Urgency: elective  Indications and Patient Condition       Indications for airway management: cash-procedural       Induction type:intravenous       Mask difficulty assessment: 1 - vent by mask    Final Airway Details       Final airway type: endotracheal airway       Successful airway: ETT - single and Oral  Endotracheal Airway Details        ETT size (mm): 7.0       Cuffed: yes       Successful intubation technique: direct laryngoscopy       DL Blade Type: Dc 2       Grade View of Cords: 1       Adjucts: stylet       Position: Right       Measured from: gums/teeth       Secured at (cm): 21       Bite block used: None    Post intubation assessment        Placement verified by: capnometry, equal breath sounds and chest rise        Number of attempts at approach: 1       Secured with: pink tape       Ease of procedure: easy       Dentition: Intact and Unchanged    Medication(s) Administered   Medication Administration Time: 5/2/2023 1:45 PM

## 2023-05-02 NOTE — OP NOTE
[facility]  Orthopedic Operative Note    Chiquita Berry MRN# [id number]   YOB: 1940  Procedure Date:  5/2/2023  Age: 82 year old     PREOPERATIVE DIAGNOSIS:    1. Intertrochanteric femur fracture right.  2. Right distal radius fracture, intra-articular    POSTOPERATIVE DIAGNOSIS:    Same    PROCEDURE PERFORMED:    1. Surgical treatment of right intertrochanteric femur fracture with cephalomedullary device  2. Right wrist open reduction and internal fixation of an intra-articular distal radius fracture.    SURGEON:  GABRIELLE CHAPA MD    FIRST ASSISTANT: Pascale Keane PA-C; A skilled first assistant was necessary for this procedure for assistance with patient positioning, prepping, draping, surgical visualization, wound closure, and application of the dressing.     SECOND ASSISTANT: SILVIA Huertas     ANESTHESIA:  General + Axillary + FI block    EBL: hip 150cc; wrist 25cc    COMPLICATIONS: None     DISPOSITION: Post Anesthesia Care Unit     RUE TOURNIQUET: 34 minutes at 250mmHg    CONDITION: Stable     INDICATIONS:  Chiquita Berry is a 82 year old female with a history CVA (residual aphasia), lung cancer (no disease progression at last visit), who presents with a right hip intertrochanteric femur fracture and a right distal radius fracture after a fall.  Patient reports she sustained a mechanical fall when she was utilizing an elevator and the door started to close on her.  She ambulates with a walker at baseline.    Radiographs of the left wrist reveal a dorsally angulated distal radius fracture with an intra-articular split.  Radiographs of the right femur reveal a displaced intertrochanteric femur fracture.     I discussed these findings with the patient.  We discussed possible treatment options including nonoperative and operative intervention along with the risks and benefits of expected recovery.  I recommended surgery involving her right hip  fracture to stabilize her fracture and allow for earlier function.  Regarding her right distal radius.      We discussed potentially treating this nonoperatively with a closed reduction to improve the alignment.  She is concerned due to her need for weightbearing and considering she is already undergoing surgery is interested in having her distal radius stabilized with internal fixation.  I discussed the risks and benefits and after a thorough discussion we are in agreement and elected to proceed with a right hip ORIF with intramedullary nail and right distal radius ORIF.  All questions were answered.    Risks of surgery discussed included but not limited to bleeding, infection, damage to surrounding neurovascular structures, leg length inequality, nonunion, malunion, need for revision surgery, blood clots, pulmonary embolus, and the medical risks of anesthesia.      Benefits of surgery discussed included improved chance of union in acceptable alignment, improve function, and reduction in medical risks of hip fractures.  Alternatives discussed included nonoperative management which I did not recommend.      The patient  acknowledges risks and wishes to proceed. The informed consent was signed and documented.  I met with the patient preoperatively and marked the operative extremity.      IMPLANTS:  Implant Name Type Inv. Item Serial No.  Lot No. LRB No. Used Action   IMP NAIL SYN CAN FEM PROX TFNA [id number] 130D 04.037.042S - [id number] Met[employer]ware/[place] IMP NAIL SYN CAN FEM PROX TFNA [id number] 130D 04.037.042S  [employer] [id number] Right 1 Implanted   IMP SCR SYN TFNA FENESTRATED LAG 90MM 04.038.190S - [id number] Met[employer]/[place] IMP SCR SYN TFNA FENESTRATED LAG 90MM 04.038.190S  [employer] [id number] Right 1 Implanted   SCREW BN 32MM 5MM LCK X25 STRL IM NL 04.045.032S - [id number] Metallic [employer]ware[employer] SCREW BN 32MM 5MM LCK X25 STRL IM NL 04.045.032S  [employer] [id number] Right 1  Implanted   IMP PLATE ACUMED VDR ACULOC 2 RT NARROW  - [id number] Metallic Hardware/[place] IMP PLATE ACUMED VDR ACULOC 2 RT NARROW   [employer]UMED [facility] 05/01/2023 42 05 Right 1 Implanted   3.5 x 10mm Screw    [employer] 05/01/2023 42 05 Right 2 Implanted   SCREW NON-LOCKING HEXALOBE 3.5MM X 12MM - [id number] Metallic Hardware/[place] SCREW NON-LOCKING HEXALOBE 3.5MM X 12MM  [employer] [facility] 05/01/2023 42 05 Right 1 Implanted   IMP SCR BONE ACUMED LACEY THRD LOCK 2.3X16MM TI CO- - [id number] Met[employer]/[place] IMP SCR BONE ACUMED LACEY THRD LOCK 2.3X16MM TI CO-  [employer] 05/01/2023 42 05 Right 1 Implanted   IMP SCR BONE ACUMED LACEY THRD LOCK 2.3X18MM TI CO- - [id number] Metallic [employer]ware/[place] IMP SCR BONE ACUMED LACEY THRD LOCK 2.3X18MM TI CO-  [employer] 05/01/2023 42 05 Right 1 Implanted   IMP SCR BONE ACUMED LACEY THRD LOCK 2.3X22MM TI CO- - [id number] Metallic [employer]ware/[place] IMP SCR BONE ACUMED LACEY THRD LOCK 2.3X22MM TI CO-  [employer] 05/01/2023 42 05 Right 2 Implanted   IMP SCR BONE ACUMED LACEY THRD LOCK 2.3X24MM TI CO- - [id number] Metallic Hardware/[place] IMP SCR BONE ACUMED LACEY THRD LOCK 2.3X24MM TI CO-  [employer] 05/01/2023 42 05 Right 1 Implanted       PROCEDURE:   The patient was brought into the operating room and placed on operating table.  The patient underwent general anesthesia.  The patient was positioned supine on a [place] table with the contralateral leg hyperextended.  All bony prominences were well-padded.  The operative extremity was cleansed with Hibiclens. The operative extremity was then prepped with ChloraPrep.  The surgical team scrubbed in.  A WHO timeout was conducted to verify correct patient, correct extremity, presence of the surgeon's initials on the operative extremity, and administration of IV antibiotics, in this case Ancef.     Prior to incision the fracture was reduced utilizing traction, internal rotation and adduction via the [place]  table. Radiographs confirmed good reduction of the fracture.      Right intertrochanteric femur fracture ORIF with intramedullary nail  Fluoroscopy was brought in to confirm good start point. 5cc of 0.5% bupivacaine with epinephrine was injected along the intended incision.   A incision was made just proximal to the greater tuberosity.  The starting guidepin was advanced down to the start point on the greater trochanter.  We confirmed the start point on fluoroscopy and then advanced the guidewire into the femur.  The position of the guidewire was confirmed with fluoroscopy.  The greater trochanter with a reamer. All instruments were removed.  A Synthes 130 degree x 170 cm x 10 mm nail was selected.  This was advanced to an appropriate depth, confirmed by fluoroscopy.       We then advanced the guide for the lag screw down to bone through a small lateral thigh incision.   The guidewire was then advanced into the central position within the femoral neck and head confirmed by fluoroscopy.  We then measured for and selected an appropriate length lag screw, 90 mm.     Next the reamer was utilized over the guide wire and then the lag screw was placed by hand.      Prior to placing the set screw, compression at the fracture site was achieved through the guide. This was done under fluoroscopy with approximately 4 mm of compression.     The setscrew was advanced and tightened.  The guide for the interlocking screw was advanced through the jig down to bone through a small lateral thigh incision.  We then drilled for and placed one statically interlocked distal interlocking screw.  Good bicortical purchase was achieved.      All instruments were then removed.  Final fluoroscopic imaging was obtained demonstrating good alignment of the fracture good positioning of all hardware.  We then thoroughly irrigated and closed in layers with 2-0 Monocryl, and 3-0 Monocryl on the skin.  Dermabond was used on the wounds.     Right distal  radius ORIF  The patient was then repositioned with the operative upper extremity placed on a radiolucent hand table. All bony prominences were well-padded.  The operative extremity was cleansed with Hibclens and then prepped with Chloraprep.  The operative extremity was draped in normal sterile fashion.  A procedural pause was conducted to verify correct patient, correct extremity, presence of the surgeon's initials on the operative extremity.     Prior to incision the tourniquet was inflated to 250 mm Hg for 34 minutes. A standard volar approach to distal radius dissecting through the skin, subcutaneous tissue, and identifying the FCR tendon.  The FCR fascia was then assigned and the FCR tendon was retracted. The FPL tendon was identified and retracted ulnarly. The pronator quadratus tendon was then raised along its radial border to expose the fracture. The fracture was then exposed. A  tenotomy of brachioradialis was then performed at its insertion along the radial styloid fragment under direct visualization.      The fracture was then further identified and debrided.  The fracture was noted to be transverse with a sagittal intra-articular split.  The fracture was then reduced with traction and ulnar deviation.  An Acumed narrow 3 hole plate was found to be a good fit.  The plate was then placed in position with a kickstand screw in the most proximal hole and held in place with K wires.   The plate was carefully maneuvered to fit the patient's anatomy and help reduce the patient's fracture.   Multiple fluoroscopic images were then obtained in AP and lateral projections to ensure good placement of the plate along with good reduction of the fracture.      A proximal cortical screw was then placed to reduce the plate to the radius.  We then turned our attention to the distal locking screws. A locking tower was used to place 5 subsequent locking screws, fully threaded were placed in the distal aspect of the radius.   This was followed by 2 additional proximal cortical screws.     C-arm showed acceptable articular reduction with restoration of radial height and angulation.  Safe position of the implants.       Copiously irrigated the wound, tourniquet let down, hemostasis obtained.  The pronator quadratus was reapproximated with Vicryl.  The subcutaneous tissue was then closed with 3-0 Monocryl followed by 4-0 nylon along the skin.  The patient was then placed in a short arm splint with a volar and posterior slab.      All instruments were accounted for at the end of the case. The patient awoke from anesthesia and was transferred to the PACU in stable condition.    PLAN:    Right hip  1.  Ancef x 24 hours.   2.  Lovenox while in the hospital, followed by Aspirin enteric coated 325 mg daily for DVT prophylaxis.   3.  PACU x-rays.   4.  Weightbearing as tolerated with a walker x 6 weeks.     5.  Physical therapy/occupational therapy.   6.  Keep dressing on for 2 weeks.  OK to shower.  No submerging wound.  7.  Osteoporosis workup and treatment with primary care provider within 2 months.   8.  Discharge:  Case management social work consult for placement    Right distal radius  1.  No lifting heavier than a coffee cup left upper extremity.   2.  Pain control scheduled ibuprofen and tylenol with first line break though oxycodone as needed.  Minimize narcotics.     3.  Elevate arm.  Keep splint clean and dry.  4.  Follow up with primary care provider over the next 1-2 months for osteoporosis workup and treatment.  5.  Vitamin D 2000 units daily x 2 months  6.  Vitamin C 500 mg daily x 2 months       FOLLOWUP:     1.  Plan 2-week wound check with x-rays (AP and Lateral Xrays).  This could also be done at patients rehab facility with x-rays sent to me at [facility].   2.  Eight-week followup with X-rays.    Please call as soon as possible to make an appointment to be seen in Dr. Mehdi Vega's clinic in 2-3 weeks.     Dr. Vega's care  coordinator is La Mccann. Please contact her at [phone] to schedule an appointment.      Dr. Vega sees patient's at 2 clinic locations:    [facility] [place]  o [address] [place] [address]    [facility]   o [address], [address], [place] [address]      Please call the on-call phone number [phone] during evenings, nights and weekends for any urgent needs. Prescription refills must be done during business hours by calling [phone]        Mehdi Vega MD  [facility]

## 2023-05-02 NOTE — ANESTHESIA PROCEDURE NOTES
Brachial plexus Procedure Note    Pre-Procedure   Staff -        Anesthesiologist:  Bar Guerrero MD       Performed By: anesthesiologist       Location: pre-op       Pre-Anesthestic Checklist: patient identified, IV checked, site marked, risks and benefits discussed, informed consent, monitors and equipment checked, pre-op evaluation, at physician/surgeon's request and post-op pain management  Timeout:       Correct Patient: Yes        Correct Procedure: Yes        Correct Site: Yes        Correct Position: Yes        Correct Laterality: Yes        Site Marked: Yes  Procedure Documentation  Procedure: Brachial plexus       Laterality: right       Patient Position: sitting       Patient Prep/Sterile Barriers: sterile gloves, mask       Skin prep: Chloraprep       Local skin infiltrated with 3 mL of 1% lidocaine.  (axillary approach).       Needle Gauge: 21.        Needle Length (millimeters): 100        Ultrasound guided       1. Ultrasound was used to identify targeted nerve, plexus, vascular marker, or fascial plane and place a needle adjacent to it in real-time.       2. Ultrasound was used to visualize the spread of anesthetic in close proximity to the above referenced structure.       3. A permanent image is entered into the patient's record.       4. The visualized anatomic structures appeared normal.       5. There were no apparent abnormal pathologic findings.    Assessment/Narrative         The placement was negative for: blood aspirated, painful injection and site bleeding       Paresthesias: No.       Bolus given via needle..        Secured via.        Insertion/Infusion Method: Single Shot       Complications: none    Medication(s) Administered   Bupivacaine 0.25% w/ 1:400K Epi (Injection) - Injection   30 mL - 5/2/2023 1:38:00 PM   Comments:  Bupivacaine 0.25% with 1:400,000 epi 30ml   Patient sedated but commutative throughout the procedure.   Patient tolerated well.    Ultrasound  "Interpretation, peripheral nerve block    1.  Under ultrasound guidance, the needle was inserted and placed in close proximity to the nerve.  2. Ultrasound was also used to visualize the spread of the anesthetic in close proximity to the nerve being blocked.  3. The nerve(s) appeared anatomically normal.   4. There were no apparent abnormal pathological findings.  5. A permanent ultrasound image was saved n the patient's record.    The surgeon has given a verbal order transferring this pt to me for a performance of regional analgesia block for post op pain control. It is requested of me because I am trained and qualifed to perform this block and the surgeon is nether trained nor qualified to perform this procedure.       FOR [facility] ([facility] [place]) ONLY:   Pain Team Contact information: please page the Pain Team Via [employer]. Search \"Pain\". During daytime hours, please page the attending first. At night please page the resident first.      "

## 2023-05-02 NOTE — PLAN OF CARE
SLP: Order received for swallow evaluation. Pt is NPO for surgery today. 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 Will re-schedule evaluation to 5/3.

## 2023-05-02 NOTE — ANESTHESIA PREPROCEDURE EVALUATION
Anesthesia Pre-Procedure Evaluation    Patient: Chiquita Berry   MRN: [id number] : 1940        Procedure : Procedure(s):  OPEN REDUCTION INTERNAL FIXATION RIGHT HIP FRACTURE USING INTRAMEDULLARY NAIL  OPEN REDUCTION INTERNAL FIXATION RIGHT DISTAL RADIUS FRACTURE          Past Medical History:   Diagnosis Date     Anxiety     related to cancer diagnosis     Chronic back pain     due to compression fracture     Osteoporosis     followed by outside endocrinologist     Primary lung adenocarcinoma (H)     stage IV      Past Surgical History:   Procedure Laterality Date     CATARACT IOL, RT/LT Bilateral      OPEN REDUCTION INTERNAL FIXATION HUMERUS DISTAL Right      PHACOEMULSIFICATION CLEAR CORNEA WITH STANDARD INTRAOCULAR LENS IMPLANT Left 2015    Procedure: PHACOEMULSIFICATION CLEAR CORNEA WITH STANDARD INTRAOCULAR LENS IMPLANT;  Surgeon: Latrell Jessica MD;  Location: [facility]     TONSILLECTOMY & ADENOIDECTOMY        Allergies   Allergen Reactions     Adhesive Tape Dermatitis     Skin Sensitivity to Adhesive ie. Lidocaine patch, tele patches, tapes     Amoxicillin-Pot Clavulanate Diarrhea     Celebrex [Celecoxib] Rash     Lidocaine      Skin sensitivity to Lidocaine patch adhesive      Social History     Tobacco Use     Smoking status: Never     Smokeless tobacco: Never   Vaping Use     Vaping status: Never Used   Substance Use Topics     Alcohol use: Not Currently     Comment: rare      Wt Readings from Last 1 Encounters:   04/10/23 62.1 kg (137 lb)        Anesthesia Evaluation   Pt has had prior anesthetic.     No history of anesthetic complications       ROS/MED HX  ENT/Pulmonary:    (-) tobacco use and sleep apnea   Neurologic:     (+) CVA, with deficits, -  mild expressive aphasia .  (-) no seizures   Cardiovascular:     (+) -----Previous cardiac testing   Echo: Date: 2023 Results:  Interpretation Summary     1. Normal biventricular size and function. Left ventricular ejection fraction of  60-65%.  2. No segmental wall motion abnormalities noted.  3. No hemodynamically significant valvular disease. Aortic sclerosis without stenosis.  Compared to prior study on 9/11/2013, no change.  Stress Test: Date: Results:    ECG Reviewed: Date: Results:    Cath: Date: Results:   (-) hypertension   METS/Exercise Tolerance:     Hematologic:       Musculoskeletal:   (+) fracture (Right hip intertrochanteric fracture, RIGHT wrist fracture), Fracture location: RLE and RUE,     GI/Hepatic:    (-) GERD and liver disease   Renal/Genitourinary:    (-) renal disease   Endo:    (-) Type II DM and thyroid disease   Psychiatric/Substance Use:       Infectious Disease:       Malignancy:   (+) Malignancy (Metastatic lung cancer),     Other:            Physical Exam    Airway        Mallampati: II   TM distance: > 3 FB   Neck ROM: full   Mouth opening: > 3 cm    Respiratory Devices and Support         Dental       (+) Minor Abnormalities - some fillings, tiny chips      Cardiovascular   cardiovascular exam normal          Pulmonary   pulmonary exam normal                OUTSIDE LABS:  CBC:   Lab Results   Component Value Date    WBC 16.1 (H) 05/01/2023    WBC 8.2 03/28/2023    HGB 9.8 (L) 05/01/2023    HGB 11.0 (L) 03/28/2023    HCT 29.0 (L) 05/01/2023    HCT 34.7 (L) 03/28/2023     05/01/2023     03/28/2023     BMP:   Lab Results   Component Value Date     05/01/2023     03/28/2023    POTASSIUM 4.0 05/01/2023    POTASSIUM 4.1 03/28/2023    CHLORIDE 102 05/01/2023    CHLORIDE 102 03/28/2023    CO2 23 05/01/2023    CO2 23 03/28/2023    BUN 20.4 05/01/2023    BUN 16.7 03/28/2023    CR 0.81 05/01/2023    CR 0.80 03/28/2023     (H) 05/01/2023    GLC 73 03/28/2023     COAGS:   Lab Results   Component Value Date    PTT 25 02/07/2023    INR 1.06 02/07/2023     POC: No results found for: BGM, HCG, HCGS  HEPATIC:   Lab Results   Component Value Date    ALBUMIN 3.7 05/01/2023    PROTTOTAL 5.4 (L)  05/01/2023    ALT 43 (H) 05/01/2023    AST 51 (H) 05/01/2023    ALKPHOS 110 (H) 05/01/2023    BILITOTAL 1.0 05/01/2023     OTHER:   Lab Results   Component Value Date    A1C 6.3 (H) 02/05/2023    SAGE 8.5 (L) 05/01/2023    PHOS 3.0 02/02/2023    MAG 2.1 02/02/2023    TSH 5.00 (H) 02/02/2023    T4 1.24 02/02/2023       Anesthesia Plan    ASA Status:  3   NPO Status:  NPO Appropriate    Anesthesia Type: General.     - Airway: ETT   Induction: Intravenous, Propofol.   Maintenance: Balanced.        Consents    Anesthesia Plan(s) and associated risks, benefits, and realistic alternatives discussed. Questions answered and patient/representative(s) expressed understanding.     - Discussed: Risks, Benefits and Alternatives for BOTH SEDATION and the PROCEDURE were discussed     - Discussed with:  Patient      - Patient is DNR/DNI Status: Yes             Suspend during perioperative period? Yes.             Agree to: chemical resuscitation, chest compression/defibrillation.        Postoperative Care    Pain management: Multi-modal analgesia, IV analgesics, Peripheral nerve block (Single Shot).   PONV prophylaxis: Ondansetron (or other 5HT-3), Dexamethasone or Solumedrol, Background Propofol Infusion     Comments:                Adri Olivo MD

## 2023-05-02 NOTE — PROGRESS NOTES
Patient off to surgery. Hospitalist service will see patient if returned from surgery this afternoon, otherwise tomorrow.    Amandeep Maguire MD

## 2023-05-03 NOTE — PROVIDER NOTIFICATION
MD Notification    Notified Person: MD    Notified Person Name: hospitalist    Notification Date/Time: 5/3/23 1158    Notification Interaction: paged    Purpose of Notification: hgb 4.6    Orders Received:     Comments:

## 2023-05-03 NOTE — PLAN OF CARE
Goal Outcome Evaluation:    POD1 of ORIF of R hip and R distal radius. Pt more drowsy d/t Trazadone given in evening. Arouses to voice, ADRIANO orientation d/t somnolence. Able to answer some yes/no questions for assessment. Pt has mild expressive aphasia. VSS on 3L NC. RUE in splint, sling in place. Pt unable to move fingers d/t nerve block, RUE warm. R hip dressing CDI.  Denies n/t in LEs. On regular diet, but unable to take pills overnight d/t drowsiness. Writer attempted to dangle pt, but pt was too drowsy. Pt appears comfortable. Mcallister patent w/ adequate output. Discharge pending TCU placement.

## 2023-05-03 NOTE — PROGRESS NOTES
[facility]    Hospitalist Progress Note    Interval History   - Reportedly no post-operative concerns yesterday after surgery, however this morning per nurse patient was very lethargic, pale. Hgb returned 4.8, now receiving 2u pRBC. Patient continues to be lethargic and delirious    Assessment & Plan   Summary: Chiquita Berry is a 82 year old female with PMH  metastatic lung cancer, hx CVA Feb 2023, who was adnutted 5/2/2023 with a fall and R hip and R wrist fractures.    Mechanical fall  Acute comminuted fracture of the right hip  Acute comminuted fracture of the right distal radius  S/p ORIF R hip and R wrist 5/2/2023  Post-operative acute blood loss anemia  Patient with mechanical fall, pushed by elevator door, no head trauma, no LOC, no premonitory symptoms. Workup in ED with fractures above and leukocytosis 16.1k. S/p ORIF of R hip and R wrist on 5/2/2023. Postoperatively complicated by anemia and delirium. Hgb 8.7 prior to surgery, Hgb 4.8 on 5/3 post op.  - Ortho consult appreciated    - Post op management   - Blood transfusions   - Pain control  - Hold ASA and Plavix and restart per ortho  - PT/OT/Speech path  - Hgb q8h    Acute hypoxic respiratory failure  Post-operative delirium  Patient requiring 3L O2 to maintain O2 sats 92%, likely hypoxia is due to severe anemia. Patient is also notably delirious on 5/3. The etiology of this could be multifaceted, from post-operative state, to acute metabolic encephalopathy, to hypoperfusion due to anemia.  - Scheduled tylenol  - Stop trazodone  - If persistently lethargic consider CT head, MRI brain  - Continue NS @ 75ml/hr  - Seroquel PRN for agitation if present    Metastatic lung cancer  Sees Dr. Pantoja at [facility], this was diagnosed via thoracentesis. No disease progression noted per latest office visit 4/6/2023.  - Alectinib restarted by Dr. Arias    Hx CVA Feb 2023  Patient reports residual expressive aphasia, mild. On a  modified diet here per speech.  - Speech path  - Hold ASA and Plavix and restart per ortho  - Hold atorvastatin    Elevated LFTs  In setting of cancer, followed by Onocology. LFT trend here appears to be improving  - Outpatient follow up with Oncology    DVT Prophylaxis: Pneumatic Compression Devices  Code Status: No CPR- Do NOT Intubate  PT/OT: ordered  Diet: Combination Diet Full Liquid Diet; Mildly Thick (level 2) (1:1 assist, when alert/participatory/upright, liquids via tsp or cup, alternate between solids/liquids; HOLD if any difficulty, respiratory decline.)      Disposition: Expected discharge possibly 2-3 days, likely to TCU    Amandeep Maguire MD, MD  Text Page  (7am to 6pm)  -Data reviewed today: I reviewed all new labs and imaging results over the last 24 hours.    Physical Exam   Temp: 99.4  F (37.4  C) Temp src: Axillary BP: 109/50 Pulse: 91   Resp: 16 SpO2: 97 % O2 Device: Nasal cannula Oxygen Delivery: 3 LPM  There were no vitals filed for this visit.  Vital Signs with Ranges  Temp:  [97.6  F (36.4  C)-99.7  F (37.6  C)] 99.4  F (37.4  C)  Pulse:  [] 91  Resp:  [0-20] 16  BP: (105-135)/(40-66) 109/50  SpO2:  [91 %-100 %] 97 %  I/O last 3 completed shifts:  In: 2130 [P.O.:680; I.V.:1200]  Out: 1275 [Urine:1100; Blood:175]  O2 requirements: yes    Constitutional: Thin female appears pale, confused, ill  Eyes: Nonicteric  HEENT: Normocephalic, atraumatic  Respiratory: CTAB, no wheezing or crackles  Cardiovascular: RRR, normal S1/2, no m/r/g  GI: Nontender, nondistended  Vascular: No lower extremity pitting edema  Skin: Post op dressings noted c/d/i  Musculoskeletal: Moves all extremities  Neurologic: Delirious, lethargic, not alert or oriented    Medications     sodium chloride 75 mL/hr at 05/02/23 0639       acetaminophen  975 mg Oral Q8H     alectinib  600 mg Oral BID w/meals     atorvastatin  40 mg Oral QPM     [Held by provider] enoxaparin ANTICOAGULANT  40 mg Subcutaneous Q24H      latanoprost  1 drop Both Eyes QPM     pantoprazole  40 mg Oral Daily     polyethylene glycol  17 g Oral Daily     senna-docusate  1 tablet Oral BID     sodium chloride (PF)  3 mL Intracatheter Q8H     sodium chloride (PF)  3 mL Intracatheter Q8H     traZODone  50 mg Oral At Bedtime     cholecalciferol  50 mcg Oral Daily       Data   Recent Labs   Lab 05/03/23  1127 05/02/23  0929 05/02/23  0923 05/01/23  1315   WBC  --  20.4*  --  16.1*   HGB 4.6* 8.7*  --  9.8*   MCV  --  95  --  95   PLT  --  258  --  310   * 136  --  137   POTASSIUM 4.4  4.3 4.1  --  4.0   CHLORIDE 99 103  --  102   CO2 21* 22  --  23   BUN 19.0 15.4  --  20.4   CR 0.90  0.91 0.68  --  0.81   ANIONGAP 13 11  --  12   SAGE 7.8* 8.2*  --  8.5*   *  137* 124* 124* 111*   ALBUMIN 2.8* 3.4*  --  3.7   PROTTOTAL 4.7* 5.3*  --  5.4*   BILITOTAL 1.0 1.4*  --  1.0   ALKPHOS 80 108*  --  110*   ALT 21 37*  --  43*   AST 47* 48*  --  51*       Imaging:   Recent Results (from the past 24 hour(s))   XR Surgery LOIDA L/T 5 Min Fluoro w Stills    Narrative    XR SURGERY LOIDA FLUORO LESS THAN 5 MIN W STILLS 5/2/2023 4:17 PM     HISTORY: Open Reduction Internal Fixation Right Hip and Open Reduction  Internal Fixation Right Distal Radius    NUMBER OF IMAGES ACQUIRED: 9    VIEWS: 4 views of the right hip and 5 views of the right wrist    FLUOROSCOPY TIME: 1.4      Impression    IMPRESSION: Intramedullary nail with compression locking screw  fixation has been placed across the right intertrochanteric fracture.  Volar plate and screw fixation have been placed across the distal  right radial fracture.    JONO BLACK MD         SYSTEM ID:  [id number]   XR Wrist Port Right 2 Views    Narrative    EXAM: XR WRIST PORT RIGHT 2 VIEWS  LOCATION: [facility]  DATE/TIME: 5/2/2023 5:33 PM CDT    INDICATION: Right wrist postoperative follow-up.  COMPARISON: 05/01/2023.      Impression    IMPRESSION:  1.  Interval ORIF right distal  radius fracture with volar plate/screw fixation. The hardware is intact and the fracture is reduced. The distal radius articular surface appears grossly congruent.  2.  Tiny nondisplaced fracture of the ulna styloid process, unchanged.  3.  Bone demineralization.  4.  Wrist splint.   XR Pelvis w Hip Port Right 1 View    Narrative    EXAM: XR PELVIS AND HIP PORTABLE RIGHT 1 VIEW  LOCATION: [facility]  DATE/TIME: 5/2/2023 5:33 PM CDT    INDICATION: Right hip postoperative follow-up.  COMPARISON: 05/01/2023.      Impression    IMPRESSION:  1.  Interval ORIF right femur intertrochanteric fracture with intramedullary nail and locking femoral neck screw fixation. The hardware is intact and the fracture is reduced.  2.  Superomedially displaced lesser trochanteric fracture fragment.  3.  Normal right hip joint spacing and alignment.  4.  Postoperative soft tissue gas about the right hip.  5.  Bone demineralization.

## 2023-05-03 NOTE — PROGRESS NOTES
Clinical Swallow Evaluation (CSE):     05/03/23 0913   Appointment Info   Signing Clinician's Name / Credentials (SLP) Lily Luevano MS CCC-SLP   General Information   Onset of Illness/Injury or Date of Surgery 05/01/23   Referring Physician Dr. Maguire   Patient/Family Therapy Goal Statement (SLP) Pt did not state   Pertinent History of Current Problem   Chiquita Berry is a 82 year old female with PMH of metastatic lung cancer, hx CVA Feb 2023, who presents with a fall and fractures. Found to have acute comminuted fracture of the right hip and an acute comminuted fracture of the right distal radius - POD #1 ORIF of both. SLP consulted for swallow eval post-op in the setting of AMS/lethargy post-anesthesia.     SLP services saw in Feb 2023 s/p CVA for swallow (recommended regular/thin) and communication (mild expresive aphasia)     General Observations   Pt tired but able to attempt to converse (perseverations, single/few word responses), keeping eyes closed most of time but able to open them intermittently, reduced direction following.     Pain Assessment   Patient Currently in Pain   (pt did not report)   Type of Evaluation   Type of Evaluation Swallow Evaluation   Oral Motor   Oral Musculature unable to assess due to poor participation/comprehension   Structural Abnormalities none present   Mucosal Quality good   General Swallowing Observations   Respiratory Support (General Swallowing Observations) nasal cannula  (3L)   Current Diet/Method of Nutritional Intake (General Swallowing Observations, NIS)   (ADAT regular s/p surgery)   Swallowing Evaluation Clinical swallow evaluation   Clinical Swallow Evaluation   Feeding Assistance dependent   Clinical Swallow Evaluation Textures Trialed thin liquids;mildly thick liquids;pureed   Clinical Swallow Eval: Thin Liquid Texture Trial   Mode of Presentation, Thin Liquids spoon;fed by clinician   Volume of Liquid or Food Presented x4 tsps   Oral Phase of Swallow   (oral  holding)   Pharyngeal Phase of Swallow reduction in laryngeal movement   Diagnostic Statement oral holding/delay in swallow, no overt signs/sx aspiration noted   Clinical Swallow Eval: Mildly Thick Liquids   Mode of Presentation spoon;cup;fed by clinician   Volume Presented x15 tsps, x2 cup sips (pt unable to suck in from straw during attempts)   Oral Phase   (oral holding)   Pharyngeal Phase reduction in laryngeal movement   Diagnostic Statement oral holding/delay in swallow, no overt signs/sx aspiration noted   Clinical Swallow Evaluation: Puree Solid Texture Trial   Mode of Presentation, Puree spoon;fed by clinician   Volume of Puree Presented x4 bites   Oral Phase, Puree delayed AP movement;effortful AP movement  (very limited mouth opening despite cues)   Pharyngeal Phase, Puree reduction in laryngeal movement   Diagnostic Statement oral holding/delay in swallow, no overt signs/sx aspiration noted   Esophageal Phase of Swallow   Patient reports or presents with symptoms of esophageal dysphagia No   Swallowing Recommendations   Diet Consistency Recommendations full liquid diet;mildly thick liquids (level 2)   Supervision Level for Intake 1:1 supervision needed   Mode of Delivery Recommendations bolus size, small;no straws;slow rate of intake   Swallowing Maneuver Recommendations alternate food and liquid intake   Monitoring/Assistance Required (Eating/Swallowing) check mouth frequently for oral residue/pocketing;cue for finger/lingual sweep if oral pocketing present;stop eating activities when fatigue is present;monitor for cough or change in vocal quality with intake   Recommended Feeding/Eating Techniques (Swallow Eval) maintain upright sitting position for eating;maintain upright posture during/after eating for 30 minutes;minimize distractions during oral intake;moisten oral mucosa prior to intake;provide assist with feeding;provide oral hygiene prior to intake   Medication Administration Recommendations,  Swallowing (SLP) crush with puree + liquid wash   General Therapy Interventions   Planned Therapy Interventions Dysphagia Treatment   Clinical Impression   Criteria for Skilled Therapeutic Interventions Met (SLP Eval) Yes, treatment indicated   SLP Diagnosis moderate oral, potential pharyngeal dysphagia   Risks & Benefits of therapy have been explained evaluation/treatment results reviewed;care plan/treatment goals reviewed;participants included;patient   Clinical Impression Comments   Clinical swallow evaluation completed with thin liquids, mildly thick liquids, puree solids - pt currently presents with moderate oral, potential pharyngeal dysphagia, suspected 2/2 current AMS, post-op status. Very limited mouth opening despite cues though pt orally accepting small bites/sips via tsp/cup, she was unable to suck in through straw despite cues. Mild-mod delay in swallow: propulsion, swallow initiation though consistent laryngeal elevation achieved. No overt signs/sx aspiration noted. Diet modifications indicated at this time.     SLP Total Evaluation Time   Eval: oral/pharyngeal swallow function, clinical swallow Minutes (18690) 12   SLP Goals   Therapy Frequency (SLP Eval) daily   SLP Predicted Duration/Target Date for Goal Attainment 05/10/23   SLP Goals Swallow   SLP: Safely tolerate diet without signs/symptoms of aspiration Regular diet;Thin liquids;With use of swallow precautions;Independently   SLP Discharge Planning   SLP Plan PO tolerance, ADAT   SLP Discharge Recommendation Transitional Care Facility   SLP Rationale for DC Rec Pt below baseline: currently requiring 1:1 feeding assist, diet modifications, swallow strategies   SLP Brief overview of current status  Full Liquid Diet; Mildly Thick (level 4, 2)  with 1:1 assist, when alert/participatory/upright, liquids via tsp or cup, alternate between solids/liquids; HOLD if any difficulty, respiratory decline.   Total Session Time   Total Session Time (sum of  timed and untimed services) 12

## 2023-05-03 NOTE — PROVIDER NOTIFICATION
MD Notification    Notified Person: MD    Notified Person Name: dario Cobb    Notification Date/Time: 5/3/23 1155    Notification Interaction: voicemail left    Purpose of Notification: hgb 4.6    Orders Received: awaiting call back    Comments: dario Harris on floor, order received for 2 units PRBC's

## 2023-05-03 NOTE — PLAN OF CARE
Goal Outcome Evaluation:    Date/Time: 5/2/23 1830    Trauma/Ortho/Medical (Choose one) trauma    Diagnosis: R hip/wrist fx, ORIF hip/wrist  POD#: 0, to floor at 1830  Mental Status: A&Ox4 with expressive aphasia from prior CVI in Feb  Activity/dangle: BR  Diet: regular  Pain: block to RUE/RLE  Bruno/Voiding: bruno  Tele/Restraints/Iso: chemo precautions  02/LDA: O2 3L/NC, IVF, bruno  D/C Date: TBD to TCU  Other Info: splint to RUE with sling

## 2023-05-03 NOTE — PROGRESS NOTES
Orthopedic Surgery  Chiquita Berry  05/03/2023     Admit Date:  5/1/2023    POD: 1 Day Post-Op   Procedure(s):  OPEN REDUCTION INTERNAL FIXATION RIGHT HIP FRACTURE USING INTRAMEDULLARY NAIL  OPEN REDUCTION INTERNAL FIXATION RIGHT DISTAL RADIUS FRACTURE     Patient resting in bed.    Mumbled speech  Critical lab:  HGB 4.6     Temp:  [97.6  F (36.4  C)-99.7  F (37.6  C)] 98.9  F (37.2  C)  Pulse:  [] 96  Resp:  [0-20] 16  BP: (105-135)/(40-66) 131/57  SpO2:  [91 %-100 %] 95 %    Mumbling speech    Right hip:   Dressing is clean, dry, and intact.   Moderate right thigh swelling present but compartments are soft with compression.    Bilateral calves are soft  Sensation intact bilateral lower extremities  Patient wiggled toes.    +Dp pulse    Right wrist:  Splint is intact  Ecchymosis and swelling present along fingers  Reports sensation to light touch  Difficulty following instruction to flex/extend fingers.      Labs:  Recent Labs   Lab Test 05/03/23  1127 05/02/23  0929 05/01/23  1315 03/28/23  1155   WBC  --  20.4* 16.1* 8.2   HGB 4.6* 8.7* 9.8* 11.0*   PLT  --  258 310 356     Recent Labs   Lab Test 02/07/23  1125 02/06/23  1443   INR 1.06 0.97       1. PLAN:   2 units PRBC ordered and being transfused.    Lovenox held for DVT prophylaxis.     Mobilize with PT/OT    WBAT Right LE with use of platform walker   NWB right UE, max lift coffee cup    Continue current pain regiment.   Dressings: Keep intact.  Change if >60% saturated or peeling off on hip.  Keep UE splint intact.    Follow-up: 2 weeks post-op with Dr Vega team    2. Disposition   Anticipate d/c to TCU 2+ days when medically cleared and progressing in PT.    Kimberly Hernandez PA-C

## 2023-05-03 NOTE — PROGRESS NOTES
Pt was alert,  drowsy after Trazadone given. VSS , Oxygen on at 2 liters per NC. Pt has mild expressive aphasia. Pt stated she knew she was in the hospital and that she had surgery. Unable to move R fingers, Pt is able to lift her R arm off the bed. Cast intact to R arm. R arm is in sling. R hip dressings are CDI. IV infusing at 100/hr. Mcallister intact, adequate urine output. Pt did not have enough strength to dangle at the side of the bed. Pt is swallowing pills whole. Tolerating fluids. Turned and repositioned every 2 hours. Remains on Chemo precautions.

## 2023-05-03 NOTE — PROGRESS NOTES
[facility]  Patient is currently receiving home care services from [facility]. The patient is currently receiving PT and SLP services.  and home health team have been notified of patient admission. [facility] liaison will continue to follow patient during stay. If appropriate provide orders to resume home care at time of discharge.

## 2023-05-03 NOTE — PLAN OF CARE
Goal Outcome Evaluation:    Date/Time: 5/3/23     Trauma/Ortho/Medical (Choose one) trauma  Diagnosis: R hip/R wrist ORIF  POD#: 1  Mental Status: A&O with intermittent confusion, lethargy, baseline expressive aphasia  Activity/dangle: WBAT RLE, NWB RUE, unable due to lethargy, low hgb  Diet: pureed with mildly thick liquids per ST  Pain: scheduled tylenol   Bruno/Voiding: bruno  Tele/Restraints/Iso: chemo precautions  02/LDA: IV, bruno, O2 3L/NC  D/C Date: TBD   Other Info: hgb 4.6 today, transfusing 2 units of PRBC's, 2nd unit started 1525, pt lethargy improving, VSS, drg C,D,I to hip, splint C,D,I, bruno patent.

## 2023-05-03 NOTE — PROGRESS NOTES
Notified provider about indwelling bruno catheter discussed removal or continued need.    Did provider choose to remove indwelling bruno catheter? NO    Provider's bruno indication for keeping indwelling bruno catheter: Indication for continued use: Retention, mobility    Is there an order for indwelling bruno catheter? YES    *If there is a plan to keep bruno catheter in place at discharge daily notification with provider is not necessary, but please add a notation in the treatment team sticky note that the patient will be discharging with the catheter.

## 2023-05-03 NOTE — ANESTHESIA POSTPROCEDURE EVALUATION
Patient: Chiquita Berry    Procedure: Procedure(s):  OPEN REDUCTION INTERNAL FIXATION RIGHT HIP FRACTURE USING INTRAMEDULLARY NAIL  OPEN REDUCTION INTERNAL FIXATION RIGHT DISTAL RADIUS FRACTURE       Anesthesia Type:  General    Note:     Postop Pain Control: Uneventful            Sign Out: Well controlled pain   PONV: No   Neuro/Psych: Uneventful            Sign Out: Acceptable/Baseline neuro status   Airway/Respiratory: Uneventful            Sign Out: Acceptable/Baseline resp. status   CV/Hemodynamics: Uneventful            Sign Out: Acceptable CV status; No obvious hypovolemia; No obvious fluid overload   Other NRE: NONE   DID A NON-ROUTINE EVENT OCCUR? No           Last vitals:  Vitals Value Taken Time   /50 05/02/23 1800   Temp 36.4  C (97.6  F) 05/02/23 1638   Pulse 100 05/02/23 1804   Resp 15 05/02/23 1804   SpO2 99 % 05/02/23 1804   Vitals shown include unvalidated device data.    Electronically Signed By: Scott Jacinto MD  May 2, 2023  8:00 PM

## 2023-05-04 NOTE — PLAN OF CARE
Goal Outcome Evaluation:    ([id number])    Pt. lethargy continues to improved. Hgb last 7.0. Another unit of PRBC was given, recheck in am. VSS on 3L NC. PIV infusing. Frequent weight shift. Dressing to RLE & LUE CDI. Tolerating small pills whole w/ applesauce. Mcallister patent. Will continue with plan of care.

## 2023-05-04 NOTE — PLAN OF CARE
Goal Outcome Evaluation:    ([id number])    Pt. cofused. However, Lethargy improved after second unit of PRBC's, able to take pills whole w/ pudding . Tylenol  x1 for pain. Q8h Hgb check. VSS on 3L NC. Mcallister patents w/ adequate output. PIV infusing. Frequent weigh shift in bed. Dressing to RLE CDI. Splint to LUE supported w/ sling and elevated with pillow. Tolerating pureeed mildly thick liquid. On chemo precautions.

## 2023-05-04 NOTE — PROGRESS NOTES
[facility]    Hospitalist Progress Note    Interval History   - Mental status slightly improved today but still confused and unable to answer basic questions. Probably too confused to undergo MRI, will check CT head  - Hemoglobin improved  - Called and updated GABI [facility]    Assessment & Plan   Summary: Chiquita Berry is a 82 year old female with PMH  metastatic lung cancer, hx CVA Feb 2023, who was adnutted 5/2/2023 with a fall and R hip and R wrist fractures.    Mechanical fall  Acute comminuted fracture of the right hip  Acute comminuted fracture of the right distal radius  S/p ORIF R hip and R wrist 5/2/2023  Post-operative acute blood loss anemia  Patient with mechanical fall, pushed by elevator door, no head trauma, no LOC, no premonitory symptoms. Workup in ED with fractures above and leukocytosis 16.1k. S/p ORIF of R hip and R wrist on 5/2/2023. Postoperatively complicated by anemia and delirium. Hgb 8.7 prior to surgery, Hgb 4.8 on 5/3 post op.  - Ortho consult appreciated    - Post op management   - Blood transfusions   - Pain control  - Hold ASA and Plavix and restart per ortho  - PT/OT/Speech path  - Hgb q8h    Post-operative delirium  Acute hypoxic respiratory failure  Patient requiring 3L O2 to maintain O2 sats 92%, likely hypoxia is due to severe anemia. Patient is also notably delirious on 5/3. The etiology of this could be multifaceted, from post-operative state, to acute metabolic encephalopathy, to hypoperfusion due to anemia.  - Scheduled tylenol  - Continue NS @ 75ml/hr  - Seroquel PRN for agitation if present  - Check CT head today    Metastatic lung cancer  Sees Dr. Pantoja at [facility], this was diagnosed via thoracentesis. No disease progression noted per latest office visit 4/6/2023.  - Hold Alectinib    Hx CVA Feb 2023  Patient reports residual expressive aphasia, mild. On a modified diet here per speech.  - Speech path  - Hold ASA and Plavix and restart  per ortho  - Hold atorvastatin    Elevated LFTs  In setting of cancer, followed by Onocology. LFT trend here appears to be improving  - Outpatient follow up with Oncology    DVT Prophylaxis: Pneumatic Compression Devices  Code Status: No CPR- Do NOT Intubate  PT/OT: ordered  Diet: Combination Diet Full Liquid Diet; Mildly Thick (level 2) (1:1 assist, when alert/participatory/upright, liquids via tsp or cup, alternate between solids/liquids; HOLD if any difficulty, respiratory decline.)      Disposition: Expected discharge possibly 3+ days, likely to TCU    Amandeep Maguire MD, MD  Text Page  (7am to 6pm)  -Data reviewed today: I reviewed all new labs and imaging results over the last 24 hours.    Physical Exam   Temp: 99.4  F (37.4  C) Temp src: Axillary BP: 110/52 Pulse: 81   Resp: 16 SpO2: 94 % O2 Device: Nasal cannula Oxygen Delivery: 4 LPM  There were no vitals filed for this visit.  Vital Signs with Ranges  Temp:  [97.9  F (36.6  C)-99.7  F (37.6  C)] 99.4  F (37.4  C)  Pulse:  [] 81  Resp:  [16-18] 16  BP: ()/(47-64) 110/52  SpO2:  [92 %-97 %] 94 %  I/O last 3 completed shifts:  In: 1200   Out: 2000 [Urine:2000]  O2 requirements: yes    Constitutional: Thin female appears pale, confused, ill  Eyes: Nonicteric  HEENT: Normocephalic, atraumatic  Respiratory: CTAB, no wheezing or crackles  Cardiovascular: RRR, normal S1/2, no m/r/g  GI: Nontender, nondistended  Vascular: No lower extremity pitting edema  Skin: Post op dressings noted c/d/i  Musculoskeletal: Moves all extremities  Neurologic: Delirious, lethargic, not alert or oriented    Medications     sodium chloride 75 mL/hr at 05/04/23 0644       acetaminophen  975 mg Oral Q8H     alectinib  600 mg Oral BID w/meals     calcium carbonate  600 mg Oral BID w/meals     [Held by provider] enoxaparin ANTICOAGULANT  40 mg Subcutaneous Q24H     latanoprost  1 drop Both Eyes QPM     pantoprazole  40 mg Oral Daily     polyethylene glycol  17 g Oral  Daily     senna-docusate  1 tablet Oral BID     sodium chloride (PF)  3 mL Intracatheter Q8H     sodium chloride (PF)  3 mL Intracatheter Q8H     cholecalciferol  50 mcg Oral Daily       Data   Recent Labs   Lab 05/04/23  0657 05/04/23  0656 05/04/23  0019 05/03/23  1127 05/02/23  0929 05/02/23  0923 05/01/23  1315   WBC  --   --  14.7*  --  20.4*  --  16.1*   HGB  --  8.0* 7.0* 4.6* 8.7*  --  9.8*   MCV  --   --  89  --  95  --  95   PLT  --   --  164  --  258  --  310     --   --  133* 136  --  137   POTASSIUM 3.5  --   --  4.4  4.3 4.1  --  4.0   CHLORIDE 107  --   --  99 103  --  102   CO2 22  --   --  21* 22  --  23   BUN 12.3  --   --  19.0 15.4  --  20.4   CR 0.66  --   --  0.90  0.91 0.68  --  0.81   ANIONGAP 11  --   --  13 11  --  12   SAGE 7.4*  --   --  7.8* 8.2*  --  8.5*   *  --   --  138*  137* 124*   < > 111*   ALBUMIN 2.7*  --   --  2.8* 3.4*  --  3.7   PROTTOTAL 4.6*  --   --  4.7* 5.3*  --  5.4*   BILITOTAL 1.1  --   --  1.0 1.4*  --  1.0   ALKPHOS 80  --   --  80 108*  --  110*   ALT 23  --   --  21 37*  --  43*   AST 45*  --   --  47* 48*  --  51*    < > = values in this interval not displayed.       Imaging:   No results found for this or any previous visit (from the past 24 hour(s)).

## 2023-05-04 NOTE — PLAN OF CARE
Occupational Therapy: Orders received. Chart reviewed and discussed with care team, including IP PT. Chart indicates plans for TCU and per discussion with IP PT, pt with limited tolerance for two therapy disciplines. All therapy needs are being met with IP PT. Will defer therapy recommendations to IP PT. Will complete orders.

## 2023-05-04 NOTE — PLAN OF CARE
Goal Outcome Evaluation:    Date/Time: 5/4/23 [id number]    Trauma/Ortho/Medical (Choose one) trauma    Diagnosis: hip/wrist fx, ORIF hip/wrist  POD#: 2  Mental Status: A&O with intermittent confusion, expressive aphasia  Activity/dangle: unable to tolerate dangle, assist of 2  Diet: full liquid with mildly thick  Pain: schduled tylenol  Bruno/Voiding: bruno  Tele/Restraints/Iso: chemo precautions  02/LDA: O2 4L/NC, IVF, bruno  D/C Date: TBD to TCU  Other Info: pt lethargic this am, pt more alert this pm, tolerated cream of wheat, taking pills whole in applesauce, hgb 8.0, 7.9 today with scheduled recheck. Pulsate mattress ordered.

## 2023-05-04 NOTE — PROGRESS NOTES
"   05/04/23 1100   Appointment Info   Signing Clinician's Name / Credentials (PT) Suzanna Baumann DPT   Rehab Comments (PT) R hip WBAT, NWB RUE, ok for platform walker   Living Environment   Current Living Arrangements assisted living   Home Accessibility no concerns   Living Environment Comments Pt unable to provide any significant history, subjective info gleaned from chart as able   Self-Care   Usual Activity Tolerance moderate   Current Activity Tolerance poor   Equipment Currently Used at Home grab bar, tub/shower;grab bar, toilet;walker, rolling   Fall history within last six months yes   Number of times patient has fallen within last six months 1  (at least 1 per chart)   Activity/Exercise/Self-Care Comment Unclear how much assist pt has for mobility and ADLs at baseline. Per chart, it appears she was Britt with a walker at baseline.   General Information   Onset of Illness/Injury or Date of Surgery 05/01/23   Referring Physician Neelima Quinonez PA-C   Pertinent History of Current Problem (include personal factors and/or comorbidities that impact the POC) POD 2 R hip and distal radius ORIF   Existing Precautions/Restrictions fall;weight bearing   Weight-Bearing Status - RUE nonweight-bearing  (ok for platform walker, nothing heavier than a coffee cup)   Weight-Bearing Status - RLE weight-bearing as tolerated   Cognition   Affect/Mental Status (Cognition) low arousal/lethargic   Orientation Status (Cognition) oriented to;place;person   Follows Commands (Cognition) follows one-step commands;25-49% accuracy   Cognitive Status Comments Per chart, expressive aphasia at baseline. Pt very lethargic, able to open eyes at times with cues. Pt   Pain Assessment   Patient Currently in Pain No  (\"not too bad\" per pt)   Integumentary/Edema   Integumentary/Edema Comments Split to RUE, scattered brusing throuhgout BLES and UES. Age related integumentary changes noted   Posture    Posture Forward head position   Range of " Motion (ROM)   ROM Comment R wrist and RLE limitations consistent with surgery   Strength (Manual Muscle Testing)   Strength Comments Able to demo B LE PF and DF, moderated quad contraction noted B   Bed Mobility   Comment, (Bed Mobility) maxA, pt with limited participation in bed mobility, despite cues   Transfers   Comment, (Transfers) NT due to safety concerns and low arousal   Gait/Stairs (Locomotion)   Comment, (Gait/Stairs) NT due to safety concerns and low arousal   Balance   Balance Comments requires physical assist for safe upright sitting balance, unable to test standing balance   Sensory Examination   Sensory Perception Comments BLE light touch intact   Clinical Impression   Criteria for Skilled Therapeutic Intervention Yes, treatment indicated   PT Diagnosis (PT) impaired IND with functional mobility   Influenced by the following impairments decreased ROM, strength, activity tolerance, post-op precautions, impaired cognition   Functional limitations due to impairments impaired bed mobility tx, ambulation   Clinical Presentation (PT Evaluation Complexity) Stable/Uncomplicated   Clinical Presentation Rationale Based on current presentation, PMH, social support   Clinical Decision Making (Complexity) low complexity   Planned Therapy Interventions (PT) balance training;bed mobility training;gait training;home exercise program;patient/family education;ROM (range of motion);strengthening;transfer training;progressive activity/exercise;home program guidelines   Risk & Benefits of therapy have been explained evaluation/treatment results reviewed;care plan/treatment goals reviewed;current/potential barriers reviewed;risks/benefits reviewed;participants voiced agreement with care plan;participants included;patient   PT Total Evaluation Time   PT Eval, Low Complexity Minutes (12208) 10   Plan of Care Review   Plan of Care Reviewed With patient   Physical Therapy Goals   PT Frequency Daily   PT Predicted  "Duration/Target Date for Goal Attainment 05/11/23   PT Goals Bed Mobility;Transfers;Gait   PT: Bed Mobility Minimal assist;Supine to/from sit;Within precautions   PT: Transfers Minimal assist;Sit to/from stand;Assistive device;Within precautions   PT: Gait Minimal assist;Assistive device;Within precautions;Rolling walker;25 feet  (platform walker)   Interventions   Interventions Quick Adds Therapeutic Activity   Therapeutic Activity   Therapeutic Activities: dynamic activities to improve functional performance Minutes (40214) 5   Treatment Detail/Skilled Intervention Increased time spent to find R sided platform walker for pt use, as well as for line management throughout session. Pt cued for increased alertness, encouraged to keep eyes open. Continues to state \"I will try\" but quickly closing eyes again. No further mobility initiated beyond eval due to continued low arousal and poor participation with cuing. Pt remained supine with alarm armed and needs in reach, RN updated.   PT Discharge Planning   PT Plan progress bed mob, trial tx with FWW   PT Discharge Recommendation (DC Rec) Transitional Care Facility   PT Rationale for DC Rec Pt below baseline. Today's session limited by decreased alertness and ability to participate in session. Per chart, pt lives at [facility] and ambulates with FWW. Currently maxA for bed mob. Anticipate will need further skilled PT at TCU to maximize safety and IND with mobility.   PT Brief overview of current status maxA bed mob, low arousal   Total Session Time   Timed Code Treatment Minutes 5   Total Session Time (sum of timed and untimed services) 15     "

## 2023-05-04 NOTE — PROGRESS NOTES
Orthopedic Surgery  Chiquita Berry  05/04/2023     Admit Date:  5/1/2023    POD: 2 Days Post-Op   Procedure(s):  OPEN REDUCTION INTERNAL FIXATION RIGHT HIP FRACTURE USING INTRAMEDULLARY NAIL  OPEN REDUCTION INTERNAL FIXATION RIGHT DISTAL RADIUS FRACTURE     Patient resting in bed.  Continues to have mumbled speech and difficulty following instruction.   Received 3 units PRBC yesterday, HGB improved.      Temp:  [97.9  F (36.6  C)-99.7  F (37.6  C)] 99.4  F (37.4  C)  Pulse:  [] 81  Resp:  [16-18] 16  BP: ()/(47-64) 110/52  SpO2:  [92 %-97 %] 94 %      Right hip:   Dressing is clean, dry, and intact.   Moderate right thigh swelling present but compartments are soft with compression.    Bilateral calves are soft  Sensation intact bilateral lower extremities  Patient wiggled toes.    +Dp pulse    Right wrist:  Splint is intact  Ecchymosis and swelling present along fingers  Reports sensation to light touch  Continues to have difficulty following instruction to flex/extend fingers.      Labs:  Recent Labs   Lab Test 05/04/23  0656 05/04/23  0019 05/03/23  1127 05/02/23  0929 05/01/23  1315   WBC 12.9* 14.7*  --  20.4* 16.1*   HGB 8.0* 7.0* 4.6* 8.7* 9.8*   PLT  --  164  --  258 310     Recent Labs   Lab Test 02/07/23  1125 02/06/23  1443   INR 1.06 0.97       1. PLAN:   Trend Hgb    Resume Lovenox tomorrow if Hgb remains stable.  ASA/Plavix at discharge    Mobilize with PT/OT    WBAT Right LE with use of platform walker   NWB right UE, max lift coffee cup    Continue current pain regiment.   Dressings: Keep intact.  Change if >60% saturated or peeling off on hip.  Keep UE splint intact.    Head CT ordered by hospitalist given continued confusion/mumbling speech     Follow-up: 2 weeks post-op with Dr Vega team    2. Disposition   Anticipate d/c to TCU 2+ days when medically cleared and progressing in PT.    Kimberly Hernandez PA-C

## 2023-05-05 NOTE — PROGRESS NOTES
CLINICAL NUTRITION SERVICES  -  ASSESSMENT NOTE      Recommendations Ordered by Registered Dietitian (RD):   - ordered vanilla Magic cup with breakfast and dinner   - pt ok to order additional ONS PRN    Malnutrition:   % Weight Loss:  None noted  % Intake:  Unable to assess   Subcutaneous Fat Loss:  Orbital region mild+ depletion and Upper arm region mild+ depletion  Muscle Loss:  Temporal region mild-moderate depletion and Clavicle bone region mild-moderate depletion  Fluid Retention:  Mild to moderate right hand and hip edema     Malnutrition Diagnosis: Unable to determine due to lack of nutrition hx       REASON FOR ASSESSMENT  Chiquita Berry is a 82 year old female seen by Registered Dietitian for Admission Nutrition Risk Screen for positive (+MST => unsure wt loss, yes decreased appetite).      NUTRITION HISTORY  - Information obtained from chart review  - Unable to obtain nutrition history from pt. Attempted to visit earlier today, pt was soundly asleep  - social hx: lives in [facility]  - PMH of metastatic lung cancer, hx CVA (Feb 2023)    CURRENT NUTRITION ORDERS  Diet Order: Level 6: Soft & Bite-Sized Dysphagia Diet     Current Intake/Tolerance:  - reviewed ordering hx, will order ONS as appropriate   - per nursing flow sheet, % intakes this admit  - per health touch, pt has been receiving BID meals. Received Ensure Enlive trial     PER CHART REVIEW  5/3: SLP eval = oral holding/delay in swallow, no overt signs/sx aspiration noted --> full liquid diet;mildly thick liquids (level 2)    5/2: OPEN REDUCTION INTERNAL FIXATION RIGHT HIP FRACTURE USING INTRAMEDULLARY NAIL    I/O: 3x BM today, 1x yesterday    NUTRITION FOCUSED PHYSICAL ASSESSMENT FOR DIAGNOSING MALNUTRITION)  Yes-- visual         Observed:    Muscle wasting (refer to documentation in Malnutrition section) and Subcutaneous fat loss (refer to documentation in Malnutrition section)    Obtained from Chart/Interdisciplinary Team:  Hospitalist--  "thin    ANTHROPOMETRICS  Height: 5' 0\"  Weight: 140 lbs 10.46 oz  Body mass index is 27.47 kg/m .  Weight Status:  Overweight BMI 25-29.9  IBW: 45.5 kg  % IBW: 140%  Weight History: wt trending up  Wt Readings from Last 20 Encounters:   05/05/23 63.8 kg (140 lb 10.5 oz)   04/10/23 62.1 kg (137 lb)   03/28/23 62.1 kg (137 lb)   03/21/23 62.1 kg (137 lb)   03/14/23 62.1 kg (137 lb)   03/07/23 61.7 kg (136 lb)   03/01/23 62.1 kg (136 lb 12.8 oz)   02/27/23 62.1 kg (136 lb 12.8 oz)   02/24/23 62.1 kg (136 lb 12.8 oz)   02/20/23 62.1 kg (136 lb 12.8 oz)   02/16/23 62.1 kg (136 lb 12.8 oz)   02/09/23 61.6 kg (135 lb 11.2 oz)   12/16/22 62.8 kg (138 lb 7.2 oz)   12/10/22 64.7 kg (142 lb 9.6 oz)   10/12/22 62.8 kg (138 lb 6.4 oz)   08/09/22 61.3 kg (135 lb 4 oz)   04/13/22 59.9 kg (132 lb)   09/16/21 60.3 kg (133 lb)   08/26/21 61 kg (134 lb 6.4 oz)   08/23/21 60.8 kg (134 lb)       LABS  Labs reviewed: K+ low, AST elevated  Lab Results   Component Value Date    POTASSIUM 3.2 (L) 05/05/2023    AST 45 (H) 05/04/2023     MEDICATIONS  Medications reviewed: calcium carbonate, protonix, miralax, senna-docusate, vitamin D3       ASSESSED NUTRITION NEEDS PER APPROVED PRACTICE GUIDELINES:  Dosing Weight: 50.1 kg (adjusted, based on 63.8 kg-- 5/5)  Estimated Energy Needs: 4389-5183 kcals (25-30 Kcal/Kg)  Justification: maintenance  Estimated Protein Needs: 60-75 grams protein (1.2-1.5 g pro/Kg)  Justification: post-op and preservation of lean body mass  Estimated Fluid Needs: 1 mL/Kcal  Justification: maintenance OR per provider pending fluid status    MALNUTRITION:  % Weight Loss:  None noted  % Intake:  Unable to assess   Subcutaneous Fat Loss:  Orbital region mild+ depletion and Upper arm region mild+ depletion  Muscle Loss:  Temporal region mild-moderate depletion and Clavicle bone region mild-moderate depletion  Fluid Retention:  Mild to moderate right hand and hip edema     Malnutrition Diagnosis: Unable to determine due to " lack of nutrition hx       NUTRITION DIAGNOSIS:  Predicted inadequate nutrient intake related to potential for decreased appetite post-op and change of settings       NUTRITION INTERVENTIONS  Recommendations / Nutrition Prescription  - ordered vanilla Magic cup with breakfast and dinner   - pt ok to order additional ONS PRN       Implementation  Medical Food Supplement    Nutrition Goals  Pt to consume >75% of BID meals + 1 supplement/day      MONITORING AND EVALUATION:  Progress towards goals will be monitored and evaluated per protocol and Practice Guidelines      Nina Crowell RD, LD

## 2023-05-05 NOTE — PLAN OF CARE
Trauma/Ortho/Medical (Choose one) Trauma    Diagnosis:ORIF Right hip  POD#:3  Mental Status: A&O with forgetfulness, expressive aphasia   Activity/dangle Assist of 2 with lift  Diet: Soft and bite sized  Pain: Oxycodone  Mcallister/Voiding: Mcallister removed per order. DTV  Tele/Restraints/Iso: Chemo precaution  02/LDA:1L NC/ SL  D/C Date: TBD  Other Info: Dressing CDI. Potassium 3.2 replaced this morning. On pulsate mattress.

## 2023-05-05 NOTE — PROGRESS NOTES
Date/Time 5/5/23 1900-0730    Trauma/Ortho/Medical (Choose one) Trauma    Diagnosis: Right hip and right wrist ORIF  POD#:3  Mental Status: A&O with some confusion and expressive Aphasia  Activity/dangle Assist of 2 with lift during the shift.   Diet: Full liquid with Mildly thick  Pain: Scheduled tylenol for pain management  Mcallister/Voiding: Mcallister  Tele/Restraints/Iso: Chemo Precautions  02/LDA: On O2 4L/NC, IVF, Mcallister  D/C Date: Pending TCU placement

## 2023-05-05 NOTE — PROGRESS NOTES
Orthopedic Surgery  Chiquita Berry  05/05/2023     Admit Date:  5/1/2023    POD: 3 Days Post-Op   Procedure(s):  OPEN REDUCTION INTERNAL FIXATION RIGHT HIP FRACTURE USING INTRAMEDULLARY NAIL  OPEN REDUCTION INTERNAL FIXATION RIGHT DISTAL RADIUS FRACTURE     Patient resting in bed.  Speech is clearer today, she reports feeling more clear and improvement in her status.   Received 3 units PRBC Wednesday, HGB improved.      Temp:  [98.3  F (36.8  C)] 98.3  F (36.8  C)  Pulse:  [76] 76  Resp:  [20] 20  BP: (128)/(53) 128/53  SpO2:  [95 %] 95 %      Right hip:   Dressing is clean, dry, and intact.   Moderate right thigh swelling present but compartments are soft with compression.    Bilateral calves are soft  Sensation intact bilateral lower extremities  Patient wiggled toes.    +Dp pulse    Right wrist:  Splint is intact  Ecchymosis and swelling present along fingers  Reports sensation to light touch  Continues to have difficulty following instruction to flex/extend fingers.      Labs:  Recent Labs   Lab Test 05/05/23  1532 05/05/23  0835 05/04/23  2304 05/04/23  1528 05/04/23  0656 05/04/23  0019 05/03/23  1127 05/02/23  0929   WBC  --  10.7  --   --  12.9* 14.7*  --  20.4*   HGB 8.3* 7.7*  7.7* 7.6*   < > 8.0* 7.0*   < > 8.7*   PLT  --  235  --   --   --  164  --  258    < > = values in this interval not displayed.     Recent Labs   Lab Test 02/07/23  1125 02/06/23  1443   INR 1.06 0.97       1. PLAN:   Trend Hgb    Lovenox for DVT. ASA/Plavix at discharge    Mobilize with PT/OT    WBAT Right LE with use of platform walker   NWB right UE, max lift coffee cup    Continue current pain regiment.   Dressings: Keep intact.  Change if >60% saturated or peeling off on hip.  Keep UE splint intact.    Head CT ordered by hospitalist given continued confusion/mumbling speech     Follow-up: 2 weeks post-op with Dr Vega team    2. Disposition   Anticipate d/c to TCU 1+ days when medically cleared and progressing in  PT.    Aury Mauricio PA-C

## 2023-05-05 NOTE — PLAN OF CARE
Goal Outcome Evaluation:      Plan of Care Reviewed With: patient    Overall Patient Progress: improvingOverall Patient Progress: improving    Outcome Evaluation: Pt had Large and small BM today.  DTV r/t Mcallister removed during Day shift.    Patient vital signs are at baseline: Yes  Patient able to ambulate as they were prior to admission or with assist devices provided by therapies during their stay:  No,  Reason:  Ceiling Lift for Transfers.  Patient MUST void prior to discharge:  Yes   Due to Void  Patient able to tolerate oral intake:  Yes  Pain has adequate pain control using Oral analgesics:  Yes  Does patient have an identified :  No,  Reason:  Discharge pending TCU placement.  Has goal D/C date and time been discussed with patient:  No,  Reason:  Discharge pending TCU placement.    Weaned to O2 @1L via NC.       A&O x4 w/ Word-Finding Difficulty .   CMS Intact.   VSS on O2 @1L.   Up with Ax2 w/ Ceiling Lift for Transfers.   Due to Void.   Pain controlled Tylenol.   Continue to monitor.

## 2023-05-05 NOTE — PROGRESS NOTES
SW: patient lives at [facility]. Writer sent a referral to [facility] TCU and will follow up with patient tomorrow regarding plan. Full consult to be completed.    JULIO Velazquez  Inpatient   [facility]

## 2023-05-05 NOTE — PROGRESS NOTES
[facility]    Hospitalist Progress Note    Interval History   - Mental status significantly improved today, appears to be near her admission baseline  - Remains hypoxic requiring up to 4L---get CXR, stop IVF    Assessment & Plan   Summary: Chiquita Berry is a 82 year old female with PMH  metastatic lung cancer, hx CVA Feb 2023, who was adnutted 5/2/2023 with a fall and R hip and R wrist fractures.    Mechanical fall  Acute comminuted fracture of the right hip  Acute comminuted fracture of the right distal radius  S/p ORIF R hip and R wrist 5/2/2023  Post-operative acute blood loss anemia, improved  Patient with mechanical fall, pushed by elevator door, no head trauma, no LOC, no premonitory symptoms. Workup in ED with fractures above and leukocytosis 16.1k. S/p ORIF of R hip and R wrist on 5/2/2023. Postoperatively complicated by anemia and delirium. Hgb 8.7 prior to surgery, Hgb 4.8 on 5/3 post op, s/p 3u pRBC now Hgb stable in 7s.  - Ortho consult appreciated    - Post op management   - Blood transfusions   - Pain control   - Lovenox  - Per ortho, restart ASA and Plavix at discharge  - PT/OT/Speech path    Acute metabolic encephalopathy, improved  Acute hypoxic respiratory failure  Patient requiring 3-4L O2 to maintain O2 sats 92%, likely hypoxia is due to severe anemia. Patient is also notably delirious on 5/3. The etiology of this could be multifaceted, from post-operative state, to acute metabolic encephalopathy, to hypoperfusion due to anemia. CT head no acute pathology. Delirium improved on 5/5, continues to be hypoxic on 5/5.  - Scheduled tylenol  - Stop IVF  - Wean O2 as able  - Check CXR to evaluate volume status, infection, etc  Addendum: CXR shows left base consolidation/pleural effusion not significantly changed from prior CXR in Feb 2023. Will await formal read. Check BNP    Hypocalcemia: Oscal started here    Metastatic lung cancer  Sees Dr. Pantoja at [facility],  this was diagnosed via thoracentesis. No disease progression noted per latest office visit 4/6/2023.  - Okay to resume alectinib today    Hx CVA Feb 2023  Patient reports residual expressive aphasia, mild. On a modified diet here per speech.  - Speech path  - Hold ASA and Plavix and restart at discharge per ortho  - Hold atorvastatin--consider holding at discharge due to elevated LFTs    Elevated LFTs  In setting of cancer, followed by Onocology. LFT trend here appears to be improving  - Outpatient follow up with Oncology    DVT Prophylaxis: Pneumatic Compression Devices  Code Status: No CPR- Do NOT Intubate  PT/OT: ordered  Diet: Combination Diet Full Liquid Diet; Mildly Thick (level 2) (1:1 assist, when alert/participatory/upright, liquids via tsp or cup, alternate between solids/liquids; HOLD if any difficulty, respiratory decline.)      Disposition: Expected discharge possibly 1-2 days to U    Amandeep Maguire MD, MD  Text Page  (7am to 6pm)  -Data reviewed today: I reviewed all new labs and imaging results over the last 24 hours.    Physical Exam   Temp: 98.3  F (36.8  C) Temp src: Axillary BP: 128/53 Pulse: 76   Resp: 20 SpO2: 95 % O2 Device: Nasal cannula Oxygen Delivery: 4 LPM  Vitals:    05/04/23 2138 05/05/23 0701   Weight: 66.3 kg (146 lb 2.6 oz) 63.8 kg (140 lb 10.5 oz)     Vital Signs with Ranges  Temp:  [98.3  F (36.8  C)-99.4  F (37.4  C)] 98.3  F (36.8  C)  Pulse:  [76-81] 76  Resp:  [16-20] 20  BP: (110-128)/(52-54) 128/53  SpO2:  [92 %-95 %] 95 %  I/O last 3 completed shifts:  In: 580 [P.O.:580]  Out: 250 [Urine:250]  O2 requirements: yes    Constitutional: Thin female appears pale, tired, weak, NAD today  Eyes: Nonicteric  HEENT: Normocephalic, atraumatic  Respiratory: Mild basilar crackles  Cardiovascular: RRR, normal S1/2, no m/r/g  GI: Nontender, nondistended  Vascular: No lower extremity pitting edema  Skin: Post op dressings noted c/d/i  Musculoskeletal: Moves all  extremities  Neurologic: A&O to self,  Place, situation    Medications     sodium chloride 75 mL/hr at 05/05/23 0736       [Held by provider] alectinib  600 mg Oral BID w/meals     calcium carbonate  600 mg Oral BID w/meals     [Held by provider] enoxaparin ANTICOAGULANT  40 mg Subcutaneous Q24H     latanoprost  1 drop Both Eyes QPM     pantoprazole  40 mg Oral Daily     polyethylene glycol  17 g Oral Daily     senna-docusate  1 tablet Oral BID     sodium chloride (PF)  3 mL Intracatheter Q8H     sodium chloride (PF)  3 mL Intracatheter Q8H     cholecalciferol  50 mcg Oral Daily       Data   Recent Labs   Lab 05/05/23  0835 05/04/23  2304 05/04/23  1528 05/04/23  0657 05/04/23  0656 05/04/23  0019 05/03/23  1127 05/02/23  0929   WBC 10.7  --   --   --  12.9* 14.7*  --  20.4*   HGB 7.7*  7.7* 7.6* 7.9*  --  8.0* 7.0* 4.6* 8.7*   MCV 92  --   --   --   --  89  --  95     --   --   --   --  164  --  258     --   --  140  --   --  133* 136   POTASSIUM 3.2*  --   --  3.5  --   --  4.4  4.3 4.1   CHLORIDE 110*  --   --  107  --   --  99 103   CO2 22  --   --  22  --   --  21* 22   BUN 11.0  --   --  12.3  --   --  19.0 15.4   CR 0.53  --   --  0.66  --   --  0.90  0.91 0.68   ANIONGAP 10  --   --  11  --   --  13 11   SAGE 7.3*  --   --  7.4*  --   --  7.8* 8.2*   GLC 94  --   --  107*  --   --  138*  137* 124*   ALBUMIN  --   --   --  2.7*  --   --  2.8* 3.4*   PROTTOTAL  --   --   --  4.6*  --   --  4.7* 5.3*   BILITOTAL  --   --   --  1.1  --   --  1.0 1.4*   ALKPHOS  --   --   --  80  --   --  80 108*   ALT  --   --   --  23  --   --  21 37*   AST  --   --   --  45*  --   --  47* 48*       Imaging:   Recent Results (from the past 24 hour(s))   CT Head w/o Contrast    Narrative    CT HEAD W/O CONTRAST 5/4/2023 2:01 PM    INDICATION: ongoing encephalopathy  TECHNIQUE: CT scan of the head without contrast. Dose reduction  techniques were used.  CONTRAST: None.  COMPARISON: 2/13/2023 brain  MRI.    FINDINGS:   No intracranial hemorrhage, extraaxial collection, mass effect or CT  evidence of acute infarct.  Moderate presumed chronic small vessel  ischemic changes. Chronic lacunar infarct in the posterior right  centrum semiovale. Moderate generalized volume loss. The ventricles  are proportional to the sulci. No skull fracture. No scalp hematoma.  Postoperative changes to the bilateral lenses. Paranasal sinuses are  free of significant disease. Clear mastoid air cells.      Impression    IMPRESSION:  1.  No acute intracranial abnormality.    2.  Moderate diffuse parenchymal volume loss with a moderate burden  scattered chronic small vessel ischemic change.    3.  Now chronic appearance of the small lacunar infarcts of the  posterior left centrum semiovale.    ASIYA PEDERSEN MD         SYSTEM ID:  [id number]

## 2023-05-06 NOTE — PROGRESS NOTES
Orthopedic Surgery  Chiquita Berry  05/06/2023     Admit Date:  5/1/2023    POD: 4 Days Post-Op   Procedure(s):  OPEN REDUCTION INTERNAL FIXATION RIGHT HIP FRACTURE USING INTRAMEDULLARY NAIL  OPEN REDUCTION INTERNAL FIXATION RIGHT DISTAL RADIUS FRACTURE     Patient resting in bed. Aphasia increased today in comparison to yesterday. Reports stable condition, does not feel improvement. Unable to get out of bed, stand or work with therapies up to now  Received 3 units PRBC Wednesday, HGB improved.      Temp:  [97.9  F (36.6  C)-100  F (37.8  C)] 99.3  F (37.4  C)  Pulse:  [] 97  Resp:  [16-18] 16  BP: (124-153)/(57-83) 153/72  SpO2:  [83 %-97 %] 94 %      Right hip:   Dressing is clean, dry, and intact.   Moderate right thigh swelling present but compartments are soft with compression.    Bilateral calves are soft  Sensation intact bilateral lower extremities  Patient wiggled toes.    +Dp pulse    Right wrist:  Splint is intact  Ecchymosis and swelling present along fingers  Reports sensation to light touch  Continues to have difficulty following instruction to flex/extend fingers.      Labs:  Recent Labs   Lab Test 05/06/23  0702 05/05/23  2244 05/05/23  1532 05/05/23  0835 05/04/23  1528 05/04/23  0656 05/04/23  0019 05/03/23  1127 05/02/23  0929   WBC  --   --   --  10.7  --  12.9* 14.7*  --  20.4*   HGB 8.6* 8.0* 8.3* 7.7*  7.7*   < > 8.0* 7.0*   < > 8.7*   PLT  --   --   --  235  --   --  164  --  258    < > = values in this interval not displayed.     Recent Labs   Lab Test 02/07/23  1125 02/06/23  1443   INR 1.06 0.97       1. PLAN:   Trend Hgb    Lovenox for DVT. ASA/Plavix at discharge    Mobilize with PT/OT    WBAT Right LE with use of platform walker   NWB right UE, max lift coffee cup    Continue current pain regiment.   Dressings: Keep intact.  Change if >60% saturated or peeling off on hip.  Keep UE splint intact.      Follow-up: 2 weeks post-op with Dr Vega team    2.  Disposition   Anticipate d/c to TCU when medically cleared and progressing in PT.    Aury Mauricio PA-C

## 2023-05-06 NOTE — PLAN OF CARE
Goal Outcome Evaluation:    [id number]    Dx: ORIF R hip and R wrist   DOA: 5/1/2023  Mental Statues: A&O x4, with expressive aphasia   VS/O2: VSS on 3L NC   Activity: A2 with ceiling lift   Diet: Soft and bite sized, thin liquids   Bowel/bladder: Purewick in place, voiding adequately. 2 medium BM this shift.    Skin: Surgical dressing to R hip CDI. R wrist splint CDI.  LDAs: PIV saline locked   Discharge: Pending TCU

## 2023-05-06 NOTE — PROGRESS NOTES
[facility]    Medicine Progress Note - Hospitalist Service    Date of Admission:  5/1/2023    Assessment & Plan     Summary: Chiquita Berry is a 82 year old female with PMH  metastatic lung cancer, hx CVA Feb 2023, who was adnutted 5/2/2023 with a fall and R hip and R wrist fractures.     Mechanical fall  Acute comminuted fracture of the right hip  Acute comminuted fracture of the right distal radius  S/p ORIF R hip and R wrist 5/2/2023  Post-operative acute blood loss anemia, improved  Patient with mechanical fall, pushed by elevator door, no head trauma, no LOC, no premonitory symptoms. Workup in ED with fractures above and leukocytosis 16.1k. S/p ORIF of R hip and R wrist on 5/2/2023. Postoperatively complicated by anemia and delirium. Hgb 8.7 prior to surgery, Hgb 4.8 on 5/3 post op, s/p 3u pRBC now Hgb stable in 7s.  - Ortho consult appreciated                         - Post op management                - Blood transfusions                - Pain control                - Lovenox  - Per ortho, restart ASA and Plavix at discharge  - PT/OT/Speech path     Acute metabolic encephalopathy, improved  Acute hypoxic respiratory failure  Patient requiring 3-4L O2 to maintain O2 sats 92%, likely hypoxia is due to severe anemia. Patient is also notably delirious on 5/3. The etiology of this could be multifaceted, from post-operative state, to acute metabolic encephalopathy, to hypoperfusion due to anemia. CT head no acute pathology. Delirium improved on 5/5, continues to be hypoxic on 5/5.  - Scheduled tylenol  - Stop IVF  - Wean O2 as able  - Check CXR to evaluate volume status, infection, etc  Addendum: CXR shows left base consolidation/pleural effusion not significantly changed from prior CXR in Feb 2023. Will await formal read. Check BNP     Hypocalcemia: Oscal started here     Metastatic lung cancer  Sees Dr. Pantoja at [facility], this was diagnosed via thoracentesis. No disease  progression noted per latest office visit 4/6/2023.  - Okay to resume alectinib today     Hx CVA Feb 2023  Patient reports residual expressive aphasia, mild. On a modified diet here per speech.  - Speech path  - Hold ASA and Plavix and restart at discharge per ortho  - Hold atorvastatin--consider holding at discharge due to elevated LFTs     Elevated LFTs  In setting of cancer, followed by Onocology. LFT trend here appears to be improving  - Outpatient follow up with Oncology     Diet: Combination Diet Soft and Bite Sized Diet (level 6); Thin Liquids (level 0) (no straws, single cup sips, liquid wash PRN, 1:1 assist/supervision)  Snacks/Supplements Adult: Magic Cup; With Meals    DVT Prophylaxis: Pneumatic Compression Devices  Mcallister Catheter: Not present  Lines: None     Cardiac Monitoring: None  Code Status: No CPR- Do NOT Intubate      Clinically Significant Risk Factors        # Hypokalemia: Lowest K = 3.2 mmol/L in last 2 days, will replace as needed       # Hypoalbuminemia: Lowest albumin = 2.7 g/dL at 5/4/2023  6:57 AM, will monitor as appropriate           # Overweight: Estimated body mass index is 27.47 kg/m  as calculated from the following:    Height as of this encounter: 1.524 m (5').    Weight as of this encounter: 63.8 kg (140 lb 10.5 oz).          Disposition Plan     Expected Discharge Date: 05/06/2023        Discharge Comments: TCU toshia Larson MD  Hospitalist Service  [facility]  Securely message with [employer] (more info)  Text page via [employer] Paging/Directory   ______________________________________________________________________    Interval History    Continues to require O2, no additional complaints today    Physical Exam   Vital Signs: Temp: 99.3  F (37.4  C) Temp src: Oral BP: (!) 153/72 Pulse: 97   Resp: 16 SpO2: 94 % O2 Device: Nasal cannula Oxygen Delivery: 3 LPM  Weight: 140 lbs 10.46 oz    General Appearance: Well appearing for stated age.  Respiratory:  CTAB, no rales or ronchi  Cardiovascular: S1, S2 normal, no murmurs  GI: non-tender on palpation, BS present      Medical Decision Making       45 MINUTES SPENT BY ME on the date of service doing chart review, history, exam, documentation & further activities per the note.      Data     I have personally reviewed the following data over the past 24 hrs:    12.7 (H)  \   8.6 (L); 8.6 (L)   / 311     138 103 7.6 (L) /  106 (H)   3.5 29 0.47 (L) \       Imaging results reviewed over the past 24 hrs:   No results found for this or any previous visit (from the past 24 hour(s)).

## 2023-05-06 NOTE — CONSULTS
Care Management Initial Consult    General Information  Assessment completed with: Patient, VM-chart reviewChiquita  Type of CM/SW Visit: Initial Assessment    Primary Care Provider verified and updated as needed: Yes   Readmission within the last 30 days:        Reason for Consult: discharge planning  Advance Care Planning:            Communication Assessment  Patient's communication style: spoken language (English or Bilingual)    Hearing Difficulty or Deaf: no   Wear Glasses or Blind: yes    Cognitive  Cognitive/Neuro/Behavioral: .WDL except, speech  Level of Consciousness: alert  Arousal Level: opens eyes spontaneously  Orientation: oriented x 4  Mood/Behavior: calm, cooperative  Best Language: 1 - Mild to moderate  Speech: slow, word-finding difficulty    Living Environment:   People in home: alone     Current living Arrangements: apartment      Able to return to prior arrangements: yes       Family/Social Support:  Care provided by: self  Provides care for: no one  Marital Status: Single  Children          Description of Support System: Supportive, Involved         Current Resources:   Patient receiving home care services:       Community Resources:    Equipment currently used at home: grab bar, tub/shower, grab bar, toilet, walker, rolling  Supplies currently used at home:      Employment/Financial:  Employment Status: retired        Financial Concerns:             Does the patient's insurance plan have a 3 day qualifying hospital stay waiver?  No    Lifestyle & Psychosocial Needs:  Social Determinants of Health     Tobacco Use: Low Risk  (5/3/2023)    Patient History      Smoking Tobacco Use: Never      Smokeless Tobacco Use: Never      Passive Exposure: Not on file   Alcohol Use: Not At Risk (12/4/2020)    AUDIT-C      Frequency of Alcohol Consumption: Monthly or less      Average Number of Drinks: 1 or 2      Frequency of Binge Drinking: Never   Financial Resource Strain: Not on file   Food Insecurity: Not  on file   Transportation Needs: Not on file   Physical Activity: Not on file   Stress: Not on file   Social Connections: Not on file   Intimate Partner Violence: Not on file   Depression: Not at risk (1/6/2023)    PHQ-2      PHQ-2 Score: 1   Housing Stability: Not on file       Functional Status:  Prior to admission patient needed assistance:              Mental Health Status:          Chemical Dependency Status:                Values/Beliefs:  Spiritual, Cultural Beliefs, [place] Practices, Values that affect care:                 Additional Information:  Consult for discharge planning. Patient admitted for contusion of left hand and fracture of right femur after a fall on 5/1/23 and a tentative discharge date of 5/6/23. Writer reviewed chart and TCU recommendations at discharge. Writer met with patient at bedside and introduced self and role. Patient in agreement for [facility] TCU at discharge as she lives at [facility]. Writer explained that a referral had been sent there yesterday and patient in agreement. Patient was very tired so writer noted that SW will follow up with her when plan is known. Transportation determined at discharge.     JULIO Velazquez

## 2023-05-06 NOTE — PLAN OF CARE
Trauma/Ortho/Medical (Choose one) Trauma    Diagnosis: ORIF Right Hip and Right   POD#:4  Mental Status: A&O x4, expressive aphasia  Activity/dangle assist of 2 with lift  Diet: Soft and bite sized  Pain: tylenol  Mcallister/Voiding:external catheter  Tele/Restraints/Iso: Chemo precaution  02/LDA:3L NC/SL  D/C Date: TCU pending  Other Info: Incontinent of B&B. Dressing CDI.

## 2023-05-06 NOTE — PROGRESS NOTES
Ortho Daily Progress Note  Pain controlled.  Denies cp or sob.   Concerned about loose stools and abdominal cramping/gas pain. Notes significant irritation in labial folds and near buttock.      Temp:  [97.9  F (36.6  C)-100  F (37.8  C)] 99.3  F (37.4  C)  Pulse:  [] 97  Resp:  [16-18] 16  BP: (124-153)/(57-83) 153/72  SpO2:  [83 %-97 %] 94 %  Hemoglobin   Date Value Ref Range Status   05/06/2023 8.6 (L) 11.7 - 15.7 g/dL Final   05/06/2023 8.6 (L) 11.7 - 15.7 g/dL Final   02/15/2019 13.3 11.7 - 15.7 g/dL Final     Physical Exam:  Some aphasia present - improving from admission date. Alert to questions but confusion does persist. Does drift off to sleep with conversing. Was more alert when sitting up.   Breathing easily without wheeze  Dressing/wound c/d/i, palpable dp pulse      A/P - 82 year old female s/p ORIF right radius and ORIF right hip fracture using IM nail  DVT proph: Lovenox has not been restarted per MAR. HBG is stable, so able to resume. Order placed.  WBAT to RLE with platform walker. Non-WB to right wrist.   PT/OT  Order for barrier cream and simethicone placed. Hold Miralax and Senna for loose stools. Able to resume when stools become more firm.   Wound recommend having patient sit up in chair throughout day.     Dispo: TCU planning per social work.  Follow up with Dr. Vega in 2-3 weeks.         Neelima Quinonez PA-C

## 2023-05-07 NOTE — PROGRESS NOTES
Orthopedic Surgery  Chiquita Berry  05/07/2023     Admit Date:  5/1/2023    POD: 5 Days Post-Op   Procedure(s):  OPEN REDUCTION INTERNAL FIXATION RIGHT HIP FRACTURE USING INTRAMEDULLARY NAIL  OPEN REDUCTION INTERNAL FIXATION RIGHT DISTAL RADIUS FRACTURE     Patient resting in bed. Confusion and speech improved today. Conversation seems easier for Chiquita today, responds to questions. Conversational. She does feel like she has a long road ahead of her.   Received 3 units PRBC Wednesday, HGB improved, stable over last several days.     Temp:  [97.4  F (36.3  C)-98.2  F (36.8  C)] 98.2  F (36.8  C)  Pulse:  [78-94] 78  Resp:  [16] 16  BP: (120-141)/(53-63) 120/53  SpO2:  [86 %-96 %] 95 %      Right hip:   Dressing is clean, dry, and intact.   Moderate right thigh swelling present but compartments are soft with compression.    Bilateral calves are soft  Sensation intact bilateral lower extremities  Patient wiggled toes.    +Dp pulse    Right wrist:  Splint is intact  Ecchymosis and swelling present along fingers  Reports sensation to light touch  Able to flex and extend digits.     Labs:  Recent Labs   Lab Test 05/07/23  0704 05/06/23  2235 05/06/23  1536 05/06/23  0702 05/05/23  1532 05/05/23  0835   WBC 10.8  --   --  12.7*  --  10.7   HGB 8.6*  8.6* 8.1* 8.4* 8.6*  8.6*   < > 7.7*  7.7*     --   --  311  --  235    < > = values in this interval not displayed.     Recent Labs   Lab Test 02/07/23  1125 02/06/23  1443   INR 1.06 0.97       1. PLAN:   Lovenox for DVT. ASA/Plavix at discharge    Mobilize with PT/OT    WBAT Right LE with use of platform walker   NWB right UE, max lift coffee cup    Continue current pain regimen   Dressings: Keep intact.  Change if >60% saturated or peeling off on hip.  Keep UE splint intact.      Follow-up: 2 weeks post-op with Dr Vega team    2. Disposition   Anticipate d/c to TCU when medically cleared and progressing in PT.    Aury Mauricio PA-C

## 2023-05-07 NOTE — PLAN OF CARE
Goal Outcome Evaluation:         Summary: ORIF right hip and right wrist POD #5. On chemo precautions (hx metastatic lung cancer)    Orientation: A&O x4 -- aphasia at baseline, improved today per report    Vitals/Tele: VSS on 2 L NC -- weaned from 3 L per previous nursing report    IV Access/drains: PIV left wrist SL     Diet: Easy to Chew    Mobility: Ax2 lift    GI/: Purewick in place; last BM today 5/7 in afternoon    Wound/Skin: right hip dressing CDI; splint CDI    Consults:     Discharge Plan: TCU pending placement       See Flow sheets for assessment

## 2023-05-07 NOTE — PLAN OF CARE
Trauma/Ortho/Medical (Choose one) Trauma  Diagnosis: ORIF Right Hip and Right Wrist  POD#: 5  Mental Status: A&O x4. Speech more improved today  Activity/dangle assist of 2 with lift  Diet: Easy to chew. Total feed  Pain: tylenol  Mcallister/Voiding: External catheter  Tele/Restraints/Iso: Chemo precaution  02/LDA: 2L NC/ IV-SL  D/C Date:TCU pending placement  Other Info: Dressing CDI. CMS intact. Splint intact. WBAT RLE, NWB RUE.

## 2023-05-07 NOTE — PROGRESS NOTES
[facility]    Medicine Progress Note - Hospitalist Service    Date of Admission:  5/1/2023    Assessment & Plan     Summary: Chiquita Berry is a 82 year old female with PMH  metastatic lung cancer, hx CVA Feb 2023, who was adnutted 5/2/2023 with a fall and R hip and R wrist fractures.     Mechanical fall  Acute comminuted fracture of the right hip  Acute comminuted fracture of the right distal radius  S/p ORIF R hip and R wrist 5/2/2023  Post-operative acute blood loss anemia, improved  Patient with mechanical fall, pushed by elevator door, no head trauma, no LOC, no premonitory symptoms. Workup in ED with fractures above and leukocytosis 16.1k. S/p ORIF of R hip and R wrist on 5/2/2023. Postoperatively complicated by anemia and delirium. Hgb 8.7 prior to surgery, Hgb 4.8 on 5/3 post op, s/p 3u pRBC now Hgb stable in 7s.  - Ortho consult appreciated                         - Post op management                - Blood transfusions                - Pain control                - Lovenox  - Per ortho, restart ASA and Plavix at discharge  - PT/OT/Speech path     Acute metabolic encephalopathy, improved  Acute hypoxic respiratory failure  Patient requiring 3-4L O2 to maintain O2 sats 92%, likely hypoxia is due to severe anemia. Patient is also notably delirious on 5/3. The etiology of this could be multifaceted, from post-operative state, to acute metabolic encephalopathy, to hypoperfusion due to anemia. CT head no acute pathology. Delirium improved on 5/5, continues to be hypoxic on 5/5.  - Scheduled tylenol  - Stop IVF  - Wean O2 as able  - CXR shows left base consolidation/pleural effusion not significantly changed from prior CXR in Feb 2023.   -Continue to encourage use of incentive spirometer.  -Continue to wean down supplemental O2 as tolerated.      Hypocalcemia: Oscal started here     Metastatic lung cancer  Sees Dr. Pantoja at [facility], this was diagnosed via thoracentesis. No  disease progression noted per latest office visit 4/6/2023.  - Okay to resume alectinib today     Hx CVA Feb 2023  Patient reports residual expressive aphasia, mild. On a modified diet here per speech.  - Speech path  - Hold ASA and Plavix and restart at discharge per ortho  - Hold atorvastatin--consider holding at discharge due to elevated LFTs     Elevated LFTs  In setting of cancer, followed by Onocology. LFT trend here appears to be improving  - Outpatient follow up with Oncology     Diet: Snacks/Supplements Adult: Magic Cup; With Meals  Easy to Chew Diet (level 7)    DVT Prophylaxis: Pneumatic Compression Devices  Mcallister Catheter: Not present  Lines: None     Cardiac Monitoring: None  Code Status: No CPR- Do NOT Intubate      Clinically Significant Risk Factors              # Hypoalbuminemia: Lowest albumin = 2.7 g/dL at 5/4/2023  6:57 AM, will monitor as appropriate           # Overweight: Estimated body mass index is 27.47 kg/m  as calculated from the following:    Height as of this encounter: 1.524 m (5').    Weight as of this encounter: 63.8 kg (140 lb 10.5 oz).          Disposition Plan      Expected Discharge Date: 05/08/2023        Discharge Comments: TCU rec          Colleen Larson MD  Hospitalist Service  [facility]  Securely message with [employer] (more info)  Text page via [employer] Paging/Directory   ______________________________________________________________________    Interval History    Patient appears to be doing much better today.  Less slow in her responses.  Also being weaned down off her supplemental oxygen.    Physical Exam   Vital Signs: Temp: 98.2  F (36.8  C) Temp src: Oral BP: 120/53 Pulse: 78   Resp: 16 SpO2: 95 % O2 Device: Nasal cannula Oxygen Delivery: 2 LPM  Weight: 140 lbs 10.46 oz    General Appearance: Well appearing for stated age.  Respiratory: CTAB, no rales or ronchi  Cardiovascular: S1, S2 normal, no murmurs  GI: non-tender on palpation, BS  present      Medical Decision Making       45 MINUTES SPENT BY ME on the date of service doing chart review, history, exam, documentation & further activities per the note.      Data     I have personally reviewed the following data over the past 24 hrs:    10.8  \   8.6 (L); 8.6 (L)   / 388     140 103 8.6 /  104 (H)   3.7 28 0.55 \       Imaging results reviewed over the past 24 hrs:   No results found for this or any previous visit (from the past 24 hour(s)).

## 2023-05-07 NOTE — PLAN OF CARE
Goal Outcome Evaluation:  A&O, mild- mod aphasia at baseline. VSS on 2L NC, unable to wean off this shift. Up AX2 with lift. R hip dressing c/d/I. R arm in splint. Pain managed with tylenol. On modified diet. Needs feeding assistance. Incontinent of BM and purewick in place. PIV Sled.Turned and repoed Q 2 hrs.Discharge pending TCU placement. Continue to monitor.

## 2023-05-07 NOTE — PLAN OF CARE
Goal Outcome Evaluation:    [id number]     Dx: ORIF R hip and R wrist   DOA: 5/1/2023  Mental Statues: A&O x4, with expressive aphasia   VS/O2: VSS on 3L NC   Activity: A2 with ceiling lift   Diet: Soft and bite sized, thin liquids   Bowel/bladder: Purewick in place, voiding adequately.    Skin: Surgical dressing to R hip CDI. R wrist splint intact.  Pain: PRN Tylenol   LDAs: PIV saline locked   Discharge: Pending TCU  Other: Chemo precautions

## 2023-05-07 NOTE — PROGRESS NOTES
Care Management Follow Up    Length of Stay (days): 6    Expected Discharge Date: 05/08/2023     Concerns to be Addressed:       Patient plan of care discussed at interdisciplinary rounds: Yes    Anticipated Discharge Disposition: Transitional Care     Anticipated Discharge Services: None  Anticipated Discharge DME: None    Patient/family educated on Medicare website which has current facility and service quality ratings: no  Education Provided on the Discharge Plan:    Patient/Family in Agreement with the Plan: yes    Referrals Placed by CM/SW: Post Acute Facilities  Private pay costs discussed: Not applicable    Additional Information:  Prior auth started with [employer] for TCU stay. Clinical information faxed. SW to follow up for complete auth when patient ready for discharge.       JULIO Velazquez

## 2023-05-08 NOTE — PROGRESS NOTES
"SPIRITUAL HEALTH SERVICES Progress Note  [facility]  Ortho    Saw pt Chiquita Berry per length of stay. Friend Candelaria at bedside.    Patient/Family Understanding of Illness and Goals of Care - Chiquita reports that she is in hospital following a fall in which she injured her wrist and hip. Chiquita looks forward to recovering sufficiently to return to her residence at [facility].    Distress and Loss - Friend Candelaria observed and PT affirms that \"Chiquita has had a very rough four months\" which have included cancer.    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 Chiquita is deeply spiritual and states that she recognizes the importance of spiritual well-being as a part of the healing process. Friend Candelaria asserts that Chiquita has \"many people praying for her.\"    Meaning, Beliefs, and Spirituality - Not assessed fully in this visit. Follow-up visit will explore this in more depth.    Plan of Care - PT is aware of the availability of  services by request. Follow-up with PT while in hospital.    Giovani Sloan  Associate   Pager: [phone]   "

## 2023-05-08 NOTE — PROGRESS NOTES
Care Management Follow Up    Length of Stay (days): 7    Expected Discharge Date: 05/08/2023     Concerns to be Addressed:       Patient plan of care discussed at interdisciplinary rounds: Yes    Anticipated Discharge Disposition: Transitional Care     Anticipated Discharge Services: None  Anticipated Discharge DME: None    Patient/family educated on Medicare website which has current facility and service quality ratings: no  Education Provided on the Discharge Plan:    Patient/Family in Agreement with the Plan: yes    Referrals Placed by CM/SW: Post Acute Facilities  Private pay costs discussed: Not applicable    Additional Information:  [facility] authorization approved. Authorization number: [id number]. This is approved from 5/8/23-5/14/23.     1445: Call to [facility] to follow up on referral. Waiting on a call back.      JULIO Velazquez

## 2023-05-08 NOTE — PLAN OF CARE
Goal Outcome Evaluation:  Pt. A&o, forgetful at times, vss, on 1L NC, desats to 88-89% in RA, expressive aphasia noted,  per family the aphasia got worst after her surgery, MD notified of the family concern, head CT ordered. up with 1-2 with sera steady, voiding per pure wick, incontinent of bowl, dressing to RUE and RLE CDI, discharge pending clinical improvement and placement, will continue to monitor.

## 2023-05-08 NOTE — PROGRESS NOTES
Medicine Progress Note - Hospitalist Service    Date of Admission:  5/1/2023    Assessment & Plan     Summary: Chiquita Berry is a 82 year old female with PMH  metastatic lung cancer, hx CVA Feb 2023, who was adnutted 5/2/2023 with a fall and R hip and R wrist fractures.     Mechanical fall  Acute comminuted fracture of the right hip  Acute comminuted fracture of the right distal radius  S/p ORIF R hip and R wrist 5/2/2023  Post-operative acute blood loss anemia, improved  Patient with mechanical fall, pushed by elevator door, no head trauma, no LOC, no premonitory symptoms. Workup in ED with fractures above and leukocytosis 16.1k. S/p ORIF of R hip and R wrist on 5/2/2023. Postoperatively complicated by anemia and delirium. Hgb 8.7 prior to surgery, Hgb 4.8 on 5/3 post op, s/p 3u pRBC now Hgb stable in 8s.  - Ortho consult appreciated                         - Post op management                - Blood transfusions                - Pain control                - Lovenox  - Per ortho, restart ASA and Plavix at discharge  - PT/OT/Speech path  - will repeat Heaf CT today as family states her word finding is new. Given patient has been off her asa and plavix due to recent surgery, will obtain CT head STAT to reevaluate.      Acute metabolic encephalopathy, improved  Acute hypoxic respiratory failure  Patient requiring 3-4L O2 to maintain O2 sats 92%, likely hypoxia is due to severe anemia. Patient is also notably delirious on 5/3. The etiology of this could be multifaceted, from post-operative state, to acute metabolic encephalopathy, to hypoperfusion due to anemia. CT head no acute pathology. Delirium improved on 5/5, continues to be hypoxic on 5/5.  - Scheduled tylenol  - Stop IVF  - Wean O2 as able  - CXR shows left base consolidation/pleural effusion not significantly changed from prior CXR in Feb 2023.   -Continue to encourage use of incentive spirometer.  -Continue to  wean down supplemental O2 as tolerated.  - will repeat CXR today given slow wean off of O2      Hypocalcemia: Oscal started here     Metastatic lung cancer  Sees Dr. Pantoja at [facility], this was diagnosed via thoracentesis. No disease progression noted per latest office visit 4/6/2023.  - Okay to resume alectinib today     Hx CVA Feb 2023  Patient reports residual expressive aphasia, mild. On a modified diet here per speech.  - Speech path  - Hold ASA and Plavix and restart at discharge per ortho  - Hold atorvastatin--consider holding at discharge due to elevated LFTs     Elevated LFTs  In setting of cancer, followed by Onocology. LFT trend here appears to be improving  - Outpatient follow up with Oncology     Diet: Snacks/Supplements Adult: Magic Cup; With Meals  Easy to Chew Diet (level 7)    DVT Prophylaxis: Pneumatic Compression Devices  Mcallister Catheter: Not present  Lines: None     Cardiac Monitoring: None  Code Status: No CPR- Do NOT Intubate      Clinically Significant Risk Factors              # Hypoalbuminemia: Lowest albumin = 2.7 g/dL at 5/4/2023  6:57 AM, will monitor as appropriate           # Overweight: Estimated body mass index is 25.32 kg/m  as calculated from the following:    Height as of this encounter: 1.524 m (5').    Weight as of this encounter: 58.8 kg (129 lb 10.1 oz).          Disposition Plan  TCU placement when medically cleared.     Expected Discharge Date: 05/08/2023        Discharge Comments: TCU rec          Colleen Larson MD  Hospitalist Service  [facility]  Securely message with [employer] (more info)  Text page via [employer] Paging/Directory   ______________________________________________________________________    Interval History    Patient continues on supplemental O2   Continues to have word finding issues which have been noticed most recently    Physical Exam   Vital Signs: Temp: 98.3  F (36.8  C) Temp src: Oral BP: 135/57 Pulse: 84   Resp: 16 SpO2: 94 %  O2 Device: Nasal cannula Oxygen Delivery: 2 LPM  Weight: 129 lbs 10.09 oz    General Appearance: Well appearing for stated age.  Respiratory: CTAB, no rales or ronchi  Cardiovascular: S1, S2 normal, no murmurs  GI: non-tender on palpation, BS present      Medical Decision Making       45 MINUTES SPENT BY ME on the date of service doing chart review, history, exam, documentation & further activities per the note.      Data     I have personally reviewed the following data over the past 24 hrs:    N/A  \   8.7 (L)   / N/A     139 102 10.1 /  110 (H)   3.7 30 (H) 0.46 (L) \       Imaging results reviewed over the past 24 hrs:   No results found for this or any previous visit (from the past 24 hour(s)).

## 2023-05-08 NOTE — PLAN OF CARE
Goal Outcome Evaluation:    Diagnosis: ORIF right hip and right wrist POD #6.   Orientation: A&O x4 -- aphasia at baseline, improved today per report  Vitals/Tele: VSS on 2 L NC -- weaned to 2L and sats good.   IV Access/drains: PIV left wrist SL   Diet: Easy to Chew- Total Feed  Mobility: Ax2 lift  GI/: Purewick in place; last BM today 5/7 in afternoon  Wound/Skin: right hip dressing CDI; splint CDI   Discharge Plan: TCU pending placement      Other:  On chemo precautions (hx metastatic lung cancer)- meds in the room

## 2023-05-09 NOTE — PROGRESS NOTES
"Care Management Follow Up    Length of Stay (days): 8    Expected Discharge Date: 05/09/2023     Concerns to be Addressed:       Patient plan of care discussed at interdisciplinary rounds: Yes    Anticipated Discharge Disposition: Transitional Care     Anticipated Discharge Services: None  Anticipated Discharge DME: None    Patient/family educated on Medicare website which has current facility and service quality ratings: no  Education Provided on the Discharge Plan:    Patient/Family in Agreement with the Plan: yes    Referrals Placed by CM/SW: Post Acute Facilities  Private pay costs discussed: Not applicable    Additional Information:  Spoke with Mishel at [facility] ([phone]). Mishel reports she is \"unsure if we will be able to take patient because of her acuity. We wouldn't be able to take her until we get our higher acuity patient's to the LTC unit\". Writer asked if patient is still being reviewed or if the final answer is they cannot accept patient. Mishel stated \"she is still being reviewed by our clinical team for acceptance and are hoping to give an answer today or tomorrow morning. Writer explained that it is important they try to accommodate patient into their facility as she is from their [place] and has limited support from family friends. Writer explained we are trying to get her to [facility] due to having more support/familiar area. Mishel will communicate this to the staff and give a call to social work team by the end of the day today or tomorrow morning. Writer paged the MD to determine if patient is medically ready.       Addendum 1520: Response from  confirming that patient is not medically ready at this time as they are still working her up. Anticipate a discharge potentially later in the week. Writer called Mishel at [facility] in admissions and updated her regarding this. Writer explained that hopefully this will help them determine if they would be " able to take patient.       JUSTINA Alaniz, LGSW   Social Work   [facility]

## 2023-05-09 NOTE — PROGRESS NOTES
[facility]    Medicine Progress Note - Hospitalist Service    Date of Admission:  5/1/2023    Assessment & Plan     Summary: Chiquita Berry is a 82 year old female with PMH  metastatic lung cancer, hx CVA Feb 2023, who was adnutted 5/2/2023 with a fall and R hip and R wrist fractures.     Mechanical fall  Acute comminuted fracture of the right hip  Acute comminuted fracture of the right distal radius  S/p ORIF R hip and R wrist 5/2/2023  Post-operative acute blood loss anemia, improved  Patient with mechanical fall, pushed by elevator door, no head trauma, no LOC, no premonitory symptoms. Workup in ED with fractures above and leukocytosis 16.1k. S/p ORIF of R hip and R wrist on 5/2/2023. Postoperatively complicated by anemia and delirium. Hgb 8.7 prior to surgery, Hgb 4.8 on 5/3 post op, s/p 3u pRBC now Hgb stable in 8s.  - Ortho consult appreciated                         - Post op management                - Blood transfusions                - Pain control                - Lovenox  - Per ortho, restart ASA and Plavix at discharge  - PT/OT/Speech path  -Repeat head CT obtained 5/8/2023 for new word finding and lower extremity weakness... Which per POA initially was  present with her initial stroke in februrary but symptms had resolved.  Repeat head CT was negative for acute stroke.     Acute metabolic encephalopathy, improved  Acute hypoxic respiratory failure - persistent  Patient requiring 3-4L O2 to maintain O2 sats 92%, likely hypoxia is due to severe anemia. Patient is also notably delirious on 5/3. The etiology of this could be multifaceted, from post-operative state, to acute metabolic encephalopathy, to hypoperfusion due to anemia. CT head no acute pathology. Delirium improved on 5/5, continues to be hypoxic on 5/5.  - Scheduled tylenol  - Stop IVF  - Wean O2 as able  - CXR shows left base consolidation/pleural effusion not significantly changed from prior CXR in Feb 2023.    -Continue to encourage use of incentive spirometer.  -Continue to wean down supplemental O2 as tolerated.  -Chest x-ray with slight improvement in atelectasis and/or infiltrate at the left base with associated pleural fluid.  -With a slightly minimal increase in procalcitonin, will start empiric treatment of possible healthcare associated pneumonia with Levaquin.    Leukocytosis  -WBC 16,000 today, up from 10,000 on 2 days ago.  -- This is likely hemoconcentration given also significant elevation in her platelets as well however cannot rule out infection.  -- We will obtain UA  -- Chest x-ray obtained 2 days ago questionable for infection versus atelectasis  --      Hypocalcemia: Oscal started here     Metastatic lung cancer  Sees Dr. Pantoja at [facility], this was diagnosed via thoracentesis. No disease progression noted per latest office visit 4/6/2023.  - Okay to resume alectinib today     Hx CVA Feb 2023  Patient reports residual expressive aphasia, mild. On a modified diet here per speech.  - Speech path  - ASA and Plavix had been on hold since orthopedic surgery, patient had been on Lovenox for prophylaxis while here.  Spoke with orthopedic surgery PA today 5/9/2023 regarding restarting her antiplatelet therapy especially in the wake of recrudescence of her initial stroke symptoms.  They were okay with that.we will discontinue Lovenox today and restart aspirin and Plavix.  -Repeat head CT negative for acute CVA.  - Hold atorvastatin--consider holding at discharge due to elevated LFTs     Elevated LFTs  In setting of cancer, followed by Onocology. LFT trend here appears to be improving  - Outpatient follow up with Oncology     Diet: Snacks/Supplements Adult: Magic Cup; With Meals  Easy to Chew Diet (level 7)    DVT Prophylaxis: Pneumatic Compression Devices  Mcallister Catheter: Not present  Lines: None     Cardiac Monitoring: None  Code Status: No CPR- Do NOT Intubate      Clinically Significant Risk  Factors              # Hypoalbuminemia: Lowest albumin = 2.7 g/dL at 5/4/2023  6:57 AM, will monitor as appropriate           # Overweight: Estimated body mass index is 25.32 kg/m  as calculated from the following:    Height as of this encounter: 1.524 m (5').    Weight as of this encounter: 58.8 kg (129 lb 10.1 oz).          Disposition Plan  TCU placement when medically cleared.      Expected Discharge Date: 05/09/2023        Discharge Comments: TCU rec          Colleen Larson MD  Hospitalist Service  [facility]  Securely message with [employer] (more info)  Text page via [employer] Paging/Directory   ______________________________________________________________________    Interval History    Patient continues on supplemental O2   She notes her speech is improved today compared to yesterday.  I suspect her symptoms are waxing and waning likely due to an underlying infection of some sort.    Physical Exam   Vital Signs: Temp: 98.6  F (37  C) Temp src: Oral BP: 133/58 Pulse: 89   Resp: 16 SpO2: 94 % O2 Device: Nasal cannula Oxygen Delivery: 1 LPM  Weight: 129 lbs 10.09 oz    General Appearance: Well appearing for stated age.  Respiratory: CTAB, no rales or ronchi  Cardiovascular: S1, S2 normal, no murmurs  GI: non-tender on palpation, BS present      Medical Decision Making       45 MINUTES SPENT BY ME on the date of service doing chart review, history, exam, documentation & further activities per the note.      Data     I have personally reviewed the following data over the past 24 hrs:    16.0 (H)  \   9.0 (L); 9.0 (L)   / 571 (H)     136 99 15.2 /  107 (H)   3.6 29 0.47 (L) \       Procal: 0.12 (H) CRP: N/A Lactic Acid: N/A         Imaging results reviewed over the past 24 hrs:   Recent Results (from the past 24 hour(s))   XR Chest Port 1 View    Narrative    CHEST ONE VIEW  5/8/2023 4:09 PM     HISTORY: Hypoxia    COMPARISON: May 5, 2023      Impression    IMPRESSION: Slight improvement in  atelectasis and/or infiltrate at the  left base with associated pleural fluid. Stable minimal linear  atelectasis and/or fibrosis on the right.    JOSSIE HUGHES MD         SYSTEM ID:  [id number]   CT Head w/o Contrast    Narrative    EXAM: CT HEAD W/O CONTRAST  LOCATION: [facility]  DATE/TIME: 5/8/2023 9:24 PM CDT    INDICATION: increased aphasia  COMPARISON: CT 05/04/2023  TECHNIQUE: Routine CT Head without IV contrast. Multiplanar reformats. Dose reduction techniques were used.    FINDINGS:  INTRACRANIAL CONTENTS: No intracranial hemorrhage, extraaxial collection, or mass effect.  No CT evidence of acute infarct. Mild to moderate presumed chronic small vessel ischemic changes. Moderate generalized volume loss. No hydrocephalus.   Retrocerebellar CSF space is stable.    VISUALIZED ORBITS/SINUSES/MASTOIDS: No intraorbital abnormality. No paranasal sinus mucosal disease. No middle ear or mastoid effusion.    BONES/SOFT TISSUES: No acute abnormality.      Impression    IMPRESSION:  1.  No CT evidence for acute intracranial process.  2.  Brain atrophy and presumed chronic microvascular ischemic changes as above.

## 2023-05-09 NOTE — PROGRESS NOTES
"      [facility]    Stroke Note    Consult received regarding concern for possibility of new stroke. Patient was admitted in 2/2023 with multiple small L hemispheric strokes, at that time symptoms did include word finding difficulty. 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 She is now admitted since 5/2 with a fall and R hip and R wrist fractures, underwent ORIF R hip and R wrist 5/2. Aspirin and Plavix were held since admission. Head CT was obtained 5/8 due to word finding difficulty which family state had improved since her hospitalization in 2/2023 but is now noted to be worse again. CT showed no acute findings.    Recommend to obtain MRI/A. Further evaluation/recommendations after MRI is complete.      Sana Flores PA-C  Vascular Neurology    To page me or covering stroke neurology team member, click here: [facility]  Choose \"On Call\" tab at top, then select \"NEUROLOGY/ALL SITES\" from middle drop-down box, press Enter, then look for \"stroke\" or \"telestroke\" for your site.    "

## 2023-05-09 NOTE — PROGRESS NOTES
"Care Management Follow Up    Length of Stay (days): 8    Expected Discharge Date: 05/09/2023     Concerns to be Addressed:       Patient plan of care discussed at interdisciplinary rounds: Yes    Anticipated Discharge Disposition: Transitional Care     Anticipated Discharge Services: None  Anticipated Discharge DME: None    Patient/family educated on Medicare website which has current facility and service quality ratings: no  Education Provided on the Discharge Plan:    Patient/Family in Agreement with the Plan: yes    Referrals Placed by CM/SW: Post Acute Facilities  Private pay costs discussed: Not applicable    Additional Information:  Writer called Mishel at [facility] ([phone]) to see if they can take the patient. Mishel asked if the patient was currently taking chemo, what her assist was, and if she is independent in feeding. Writer looked over the chart quickly and informed Mishel that the patient was not currently on any chemo, was unsure of the ADL for eating, and updated that the patient was an assist of 1-2 with a jenny steady. Mishel stated that she will call back to see if they can accept the patient. Writer called Mishel back ([phone]) to update her that the patient was resuming oral chemo today and that speech stated that she was a 1:1 feeding assist. Writer will wait to hear back.    Addendum 1145:  Writer called [facility] again [phone] to see if they have made a decision. RN there stated that they are still looking \"deeper\" into it and will get back to me at the end of the day. SW will follow.      JULIO Morris  [facility]  Social Work        "

## 2023-05-09 NOTE — PLAN OF CARE
Goal Outcome Evaluation:  Pt. A&o, confused at times, difficulty word finding, vss, up with lift and two assist, incontinent of bowl, had large b.m this shift, pure wick in place with adequate out put, turn repositioned frequently this shift, taking tylenol for pain, dressing to RUE and RLE  CDI, discharge pending medical clearance. Will continue to monitor.

## 2023-05-09 NOTE — PLAN OF CARE
Goal Outcome Evaluation:    Pt A&Ox4. Baseline expressive aphasia. VSS on 1L NC. Easy to chew diet. Purewick in place. T&R Q2h. Head CT done. RUE and RLE dressing CDI. Pain controlled w/ PRN tylenol. Melatonin given for sleep per pt request. IV SL.  Discharge pending TCU placement.

## 2023-05-10 NOTE — PROGRESS NOTES
Summary: POD 8 ORIF of right hand and right wrist    Orientation/Cognitive: A/Ox4, expressive aphasia  Mobility Level/Assist Equipment: AX2, lift and sliding scale, turn and repo  Fall Risk (Y/N): yes  Behavior Concerns: none  Pain Management: pain in pelvic region and right arm, PRN tylenol x1 and oxycodone x1 this shift along with ice pack  Tele/VS/O2: VSS on RA  ABNL Lab/BG: hgb 8.8, positive UA  Diet: easy to chew diet, takes pills in applesauce  Bowel/Bladder: incontinent of B&B, purwick in place  Skin Concerns: right arm in splint but cap refill less than 3 seconds, perineal area slightly reddened, blanchable redness on back with 2 mepilexs in place.   Drains/Devices: Left PIV SL  Tests/Procedures for next shift: ECHO ordered, WOC consult ordered, Neuro following, SW following for TCU  Anticipated DC date & active delays: tbd

## 2023-05-10 NOTE — PLAN OF CARE
Goal Outcome Evaluation:    Pt A&Ox4, forgetful at times. Baseline expressive aphasia. VSS on RA. Easy to chew diet. Purewick in place. T&R Q2h. RUE and RLE dressing CDI. Pain controlled w/ PRN tylenol. MRI done. IV SL. Ortho and neurology following.  Discharge pending TCU placement.

## 2023-05-10 NOTE — CONSULTS
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 Presented 5/2 after a mechanical fall resulting in right hip and femur fractures no s/p ORIF. Her antiplatelets were held since admission. Had a leukocytosis and Hgb of 4.6 after surgery, evidence of a urinary tract infection as well. She has had increased lethargy and aphasia.     Stroke Evaluation Summarized    MRI/Head CT                                                                  IMPRESSION:  1.  Left frontoparietal centrum semiovale white matter demonstrates multiple small and prominent acute infarcts.     2.  Left frontoparietal centrum semiovale white matter demonstrates multiple small areas of enhancement likely due to subacute infarcts. Recommend follow-up to resolution to exclude a more progressive etiology.     3.  Left frontoparietal centrum semiovale white matter demonstrate multiple small chronic infarcts.     4.  These acute, subacute, and chronic infarcts are within the same location.     5.  Chronic intracranial changes described above.      Intracranial Vasculature FINDINGS:  Left distal A2/A3 occlusion or near occlusion with reconstitution. This is new compared to prior exam.     Left M2 segment inferior division proximal aspect occlusion with reconstitution. This is stable compared to prior exam.     Left proximal anterior temporal artery occlusion or near occlusion with reconstitution. A severe stenosis was notified on prior exam.     Left distal V4 segment severe stenosis. This appears worse compared to prior exam.     Right proximal P2 segment moderate stenosis. This is similar compared to prior exam.     Left proximal P2 segment severe  "stenosis. This is similar compared to prior exam.   Cervical Vasculature                                                                  IMPRESSION:  1.  No significant stenosis, occlusion, dissection.  2.  Right thyroid lobe nodule measuring 1.2 cm.     Echocardiogram ordered   EKG/Telemetry    Other Testing Not Applicable      LDL  No lab value available in past 30 days   A1C  No lab value available in past 90 days   Troponin No lab value available in past 48 hrs       Impression  Acute ischemic stroke of L MCA due to large-artery atherosclerosis- has evidence of new stroke in the same vascular territory as the L MCA stenosis as before. She was not on her antiplatelet medication due to surgery. Her strokes appear to be more watershed territory which would be consistent with her anemia episode with Hgb 4.6, alternatively she has worsening ICAD with new left A2/A3 near occlusion. Alternatively she has a history of metastatic lung cancer and could have a hypercoagulable state but given location of stroke this is less likely.     #Acute blood loss anemia    #Metastatic lung cancer: low density lesions to pancrease and left adrenal gland with LAD.       Recommendations  - Use orderset: \"Ischemic Stroke Routine Admission\" or \"Ischemic Stroke No Thrombolytics/No Thrombectomy ICU Admission\"  - Neurochecks and Vital Signs every Q4h   - Normalize BP <160  - Daily aspirin 81 mg for secondary stroke prevention  - Plavix 75mg   - DAPT 90 days can consider asa + cilastazol after 90 days  - Statin: lipitor 40mg titrate pending on LDL  - TTE (with Bubble Study if age 60 yrs or less)  - Telemetry, EKG  - Bedside Glucose Monitoring  - A1c, Lipid Panel, Troponin x 3  - PT/OT/SLP  - Stroke Education  - Euthermia, Euglycemia   -Infectious management per primary team    Patient Follow-up    - in 6-8 weeks with any stroke KELSEY ([phone])    Thank you for this consult. We will continue to follow. peripherally for echo results    The " "Stroke Staff is Dr. Talamantes  .    Purnima Johnston MD  Vascular Neurology Fellow    To page me or covering stroke neurology team member, click here: AMCOM  Choose \"On Call\" tab at top, then select \"NEUROLOGY/ALL SITES\" from middle drop-down box, press Enter, then look for \"stroke\" or \"telestroke\" for your site.    _____________________________________________________    Clinically Significant Risk Factors              # Hypoalbuminemia: Lowest albumin = 2.7 g/dL at 5/9/2023  6:53 PM, will monitor as appropriate           # Overweight: Estimated body mass index is 25.32 kg/m  as calculated from the following:    Height as of this encounter: 1.524 m (5').    Weight as of this encounter: 58.8 kg (129 lb 10.1 oz).            Past Medical History   Past Medical History:   Diagnosis Date     Anxiety     related to cancer diagnosis     Chronic back pain     due to compression fracture     Osteoporosis     followed by outside endocrinologist     Primary lung adenocarcinoma (H)     stage IV     Past Surgical History   Past Surgical History:   Procedure Laterality Date     CATARACT IOL, RT/LT Bilateral      OPEN REDUCTION INTERNAL FIXATION HIP NAILING Right 5/2/2023    Procedure: OPEN REDUCTION INTERNAL FIXATION RIGHT HIP FRACTURE USING INTRAMEDULLARY NAIL;  Surgeon: Mehdi Vega MD;  Location:  OR     OPEN REDUCTION INTERNAL FIXATION HUMERUS DISTAL Right      OPEN REDUCTION INTERNAL FIXATION WRIST Right 5/2/2023    Procedure: OPEN REDUCTION INTERNAL FIXATION RIGHT DISTAL RADIUS FRACTURE;  Surgeon: Mehdi Vega MD;  Location:  OR     PHACOEMULSIFICATION CLEAR CORNEA WITH STANDARD INTRAOCULAR LENS IMPLANT Left 11/16/2015    Procedure: PHACOEMULSIFICATION CLEAR CORNEA WITH STANDARD INTRAOCULAR LENS IMPLANT;  Surgeon: Latrell Jessica MD;  Location:  [facility]     TONSILLECTOMY & ADENOIDECTOMY       Medications   Home Meds  Prior to Admission medications    Medication Sig Start Date End Date Taking? " "Authorizing Provider   acetaminophen (TYLENOL) 325 MG tablet Take 2 tablets (650 mg) by mouth every 4 hours as needed for other (For optimal non-opioid multimodal pain management to improve pain control.) 5/5/23  Yes Kimberly Hernandez PA-C   alectinib (ALECENSA) 150 MG CAPS Take 4 capsules (600 mg) by mouth 2 times daily (with meals) 1/9/23  Yes Fer Pantoja MD   aspirin (ASA) 81 MG EC tablet Take 1 tablet (81 mg) by mouth daily Take baby aspirin indefinitely for stroke 4/3/23  Yes Naz Jackson APRN CNP   atorvastatin (LIPITOR) 40 MG tablet Take 1 tablet (40 mg) by mouth every evening 4/3/23  Yes Naz Jackson APRN CNP   clopidogrel (PLAVIX) 75 MG tablet 1 tablet (75 mg) by Oral or NG Tube route daily for 83 days Take baby aspirin and Plavix daily for total of 90 days.  Then stop Plavix continue baby aspirin daily indefinitely for stroke prevention 2/14/23 5/8/23 Yes Jessie Ferguson MD   hydrOXYzine (ATARAX) 10 MG tablet Take 1 tablet (10 mg) by mouth 3 times daily as needed for anxiety 12/28/22  Yes Aniya Cali APRN CNP   latanoprost (XALATAN) 0.005 % ophthalmic solution Place 1 drop into both eyes every evening  8/12/19  Yes Reported, Patient   melatonin 3 MG CAPS Take 3 mg by mouth At Bedtime And 3mg prn po before 0200 hrs (total of 6mg) 4/3/23  Yes Naz Jackson APRN CNP   Multiple Vitamins-Minerals (PRESERVISION AREDS 2) CAPS TAKE 1 CAPSULE BY MOUTH TWICE A DAY 4/24/23  Yes Naz Jackson APRN CNP   multivitamin w/minerals (THERA-VIT-M) tablet Take 1 tablet by mouth daily 4/3/23  Yes Naz Jackson APRN CNP   ondansetron (ZOFRAN) 4 MG tablet Take 1 tablet (4 mg) by mouth every 6 hours as needed for nausea 3/5/23  Yes Rebecca Razo APRN CNP   oxyCODONE (ROXICODONE) 5 MG tablet Take 0.5-1 tablets (2.5-5 mg) by mouth every 4 hours as needed for moderate pain 1 tab po q 4 hrs prn pain scale \"3-5\"  2 tabs po q 4 hrs prn pain scale \"6-10\" 5/4/23  Yes Kimberly Hernandez, NELSON   pantoprazole " (PROTONIX) 40 MG EC tablet Take 1 tablet (40 mg) by mouth daily 4/3/23  Yes Naz Jackson APRN CNP   polyethylene glycol (MIRALAX) 17 GM/Dose powder Take 17 g by mouth daily 8/18/21  Yes Jeanine Lieberman APRN CNP   polyethylene glycol 400 (BLINK TEARS) 0.25 % SOLN ophthalmic solution Place 1 drop into both eyes daily And Q2H PRN   Yes Unknown, Entered By History   senna-docusate (SENOKOT-S/PERICOLACE) 8.6-50 MG tablet Take 1 tablet by mouth 2 times daily as needed for constipation 5/4/23  Yes Kimberly Hernandez PA-C   traZODone (DESYREL) 50 MG tablet TAKE ONE TABLET BY MOUTH EVERY NIGHT AT BEDTIME 4/21/23  Yes Naz Jackson APRN CNP   vitamin D3 (CHOLECALCIFEROL) 50 mcg (2000 units) tablet Take 1 tablet (50 mcg) by mouth daily 4/17/23  Yes Naz Jackson APRN CNP       Scheduled Meds    alectinib  600 mg Oral BID w/meals     aspirin  81 mg Oral Daily     calcium carbonate  600 mg Oral BID w/meals     clopidogrel  75 mg Oral or NG Tube Daily     latanoprost  1 drop Both Eyes QPM     levofloxacin  500 mg Intravenous Q24H     pantoprazole  40 mg Oral Daily     polyethylene glycol  17 g Oral Daily     senna-docusate  1 tablet Oral BID     sodium chloride (PF)  3 mL Intracatheter Q8H     cholecalciferol  50 mcg Oral Daily       Infusion Meds      PRN Meds  sodium chloride 0.9%, sodium chloride 0.9%, acetaminophen, sore throat, bisacodyl, HYDROmorphone **OR** HYDROmorphone, lidocaine 4%, lidocaine (buffered or not buffered), magnesium hydroxide, melatonin, methocarbamol, naloxone **OR** naloxone **OR** naloxone **OR** naloxone, ondansetron **OR** ondansetron, oxyCODONE **OR** oxyCODONE, petrolatum-zinc oxide, polyethylene glycol, prochlorperazine **OR** prochlorperazine, QUEtiapine, simethicone, sodium chloride (PF), sodium chloride (PF)    Allergies   Allergies   Allergen Reactions     Adhesive Tape Dermatitis     Skin Sensitivity to Adhesive ie. Lidocaine patch, tele patches, tapes     Amoxicillin-Pot  Clavulanate Diarrhea     Celebrex [Celecoxib] Rash     Lidocaine      Skin sensitivity to Lidocaine patch adhesive     Family History   Family History   Problem Relation Age of Onset     Dementia Mother      Myocardial Infarction Father         later in life     Stomach Cancer Father      Breast Cancer Sister         x2 sisters (post-menopausal)     Diabetes No family hx of      Cerebrovascular Disease No family hx of      Coronary Artery Disease Early Onset No family hx of      Ovarian Cancer No family hx of      Colon Cancer No family hx of      Social History   Social History     Tobacco Use     Smoking status: Never     Smokeless tobacco: Never   Vaping Use     Vaping status: Never Used   Substance Use Topics     Alcohol use: Not Currently     Comment: rare     Drug use: No       Review of Systems   The 5 point Review of Systems is negative other than noted in the HPI or here.        PHYSICAL EXAMINATION   Temp:  [98.3  F (36.8  C)-98.8  F (37.1  C)] 98.8  F (37.1  C)  Pulse:  [] 101  Resp:  [16] 16  BP: (123-144)/(55-62) 144/62  SpO2:  [91 %-95 %] 91 %    Neurologic  Mental Status:  alert, oriented x 3, follows commands, naming and repetition normal, mild expressive aphasia  Cranial Nerves:  EOMI with normal smooth pursuit, facial sensation intact and symmetric, hearing not formally tested but intact to conversation, no dysarthria, shoulder shrug strong bilaterally, tongue protrusion midline  Motor:  normal muscle tone and bulk, no abnormal movements, able to move all limbs spontaneously, right leg not tested fully due to recent surgery and fracture. ABle to dorsiflex 5/5  Reflexes:  toes down-going  Sensory:  light touch sensation intact and symmetric throughout upper and lower extremities, no extinction on double simultaneous stimulation   Coordination:  deferred  Station/Gait:  deferred    Dysphagia Screen  Per Nursing    Stroke Scales    NIHSS  1a. Level of Consciousness  0   1b. LOC Questions  0    1c. LOC Commands  0   2.   Best Gaze  0   3.   Visual  0   4.   Facial Palsy  1   5a. Motor Arm, Left  0   5b. Motor Arm, Right  0   6a. Motor Leg, Left  0   6b. Motor Leg, right  0   7.   Limb Ataxia  0   8.   Sensory  0   9.   Best Language  2   10. Dysarthria  1   11. Extinction and Inattention   0   Total  4       Modified Puryear Score (Pre-morbid)  2-Slight disability; unable to carry out all previous activities, but able to look after own affairs     Imaging  I personally reviewed all imaging; relevant findings per HPI.    Labs Data   CBC  Recent Labs   Lab 05/09/23  0723 05/08/23  0646 05/07/23  2244 05/07/23  1545 05/07/23  0704 05/06/23  1536 05/06/23  0702   WBC 16.0*  --   --   --  10.8  --  12.7*   RBC 2.90*  --   --   --  2.78*  --  2.75*   HGB 9.0*  9.0* 8.7* 8.3*   < > 8.6*  8.6*   < > 8.6*  8.6*   HCT 27.4*  --   --   --  25.9*  --  25.0*   *  --   --   --  388  --  311    < > = values in this interval not displayed.     Basic Metabolic Panel   Recent Labs   Lab 05/09/23  0723 05/08/23  0646 05/07/23  0704    139 140   POTASSIUM 3.6 3.7 3.7   CHLORIDE 99 102 103   CO2 29 30* 28   BUN 15.2 10.1 8.6   CR 0.47* 0.46* 0.55   * 110* 104*   SAGE 8.7* 8.2* 8.3*     Liver Panel  Recent Labs   Lab 05/09/23  1853 05/04/23  0657 05/03/23  1127   PROTTOTAL 5.0* 4.6* 4.7*   ALBUMIN 2.7* 2.7* 2.8*   BILITOTAL 1.1 1.1 1.0   ALKPHOS 112* 80 80   AST 35 45* 47*   ALT 19 23 21     INR    Recent Labs   Lab Test 02/07/23  1125 02/06/23  1443   INR 1.06 0.97      Lipid Profile    Recent Labs   Lab Test 02/06/23  1537 10/08/19  1200 04/16/19  1051 12/11/15  0959 06/01/15  1205   CHOL 171 174 167   < > 137   HDL 42* 45* 45*   < > 44*   * 90 99   < > 67   TRIG 124 197* 115   < > 129   CHOLHDLRATIO  --   --   --   --  3.1    < > = values in this interval not displayed.     A1C    Recent Labs   Lab Test 02/05/23  0750   A1C 6.3*     Troponin  No results for input(s): CTROPT, TROPONINIS,  TROPONINI, GHTROP in the last 168 hours.       Stroke Consult Data Data   This was a non-emergent, non-telestroke consult.

## 2023-05-10 NOTE — PROGRESS NOTES
Care Management Follow Up    Length of Stay (days): 9    Expected Discharge Date: 05/11/2023     Concerns to be Addressed:       Patient plan of care discussed at interdisciplinary rounds: Yes    Anticipated Discharge Disposition: Transitional Care     Anticipated Discharge Services: None  Anticipated Discharge DME: None    Patient/family educated on Medicare website which has current facility and service quality ratings: no  Education Provided on the Discharge Plan:    Patient/Family in Agreement with the Plan: yes    Referrals Placed by CM/SW: Post Acute Facilities  Private pay costs discussed: Not applicable    Additional Information:  Left voicemail with admissions at [facility] to determine if they have made a determination on acceptance.     JUSTINA Alaniz, LGSW   Social Work   [facility]

## 2023-05-11 NOTE — PROGRESS NOTES
[facility]    Medicine Progress Note - Hospitalist Service    Date of Admission:  5/1/2023  Date of Service: 05/11/2023    Assessment & Plan     Summary: Chiquita Berry is a 82 year old female with PMH  metastatic lung cancer, hx CVA Feb 2023, who was adnutted 5/2/2023 with a fall and R hip and R wrist fractures. S/p ORIF. Dual anitplatelets held. Unfortunately,post op course complicated by repeat Stroke in same area as previous strokes. Neuro reconsulted. Course has also been complicated by pneumonia and UTI. Started on abx.   Anticipate discharge to TCU when acute issues      Mechanical fall  Acute comminuted fracture of the right hip  Acute comminuted fracture of the right distal radius  S/p ORIF R hip and R wrist 5/2/2023  Post-operative acute blood loss anemia, improved  Patient with mechanical fall, pushed by elevator door, no head trauma, no LOC, no premonitory symptoms. Workup in ED with fractures above and leukocytosis 16.1k. S/p ORIF of R hip and R wrist on 5/2/2023. Postoperatively complicated by anemia and delirium. Hgb 8.7 prior to surgery, Hgb 4.8 on 5/3 post op, s/p 3u pRBC now Hgb stable in 8s.  - Ortho consult appreciated                         - Post op management                - Pain control                - Lovenox  - Per ortho, ASA and Plavix held and was to be restarted at discharge; patient was on lovenox for DVT ppx. Discussed with ortho given acute development of stroke like symptoms and this has now been restarted on 5/9/23.   - PT/OT/Speech path    Acute CVA  Hx CVA Feb 2023  Patient reports residual expressive aphasia, mild from previous CVA. On a modified diet here per speech.  - Speech path  - ASA and Plavix had been on hold since orthopedic surgery on 5/2/23, had been on Lovenox for prophylaxis while here per ortho.  Spoke with orthopedic surgery PA  5/8/2023 regarding restarting her antiplatelet therapy especially in the wake of recurrence of her  initial stroke symptoms.  They were okay with that.we will discontinue Lovenox today and restart aspirin and Plavix.  - Atorvastatin held due to elevated LFTs  - Repeat head CT obtained 5/8/2023 for new word finding and lower extremity weakness... Which per POA initially was  present with her initial stroke in februrary but symptms had resolved.  Repeat head CT was negative for acute stroke.  - Stroke neurology consulted due to on-going symptoms; echo ordered.   -- MRI/MRA reveals 1.  Left frontoparietal centrum semiovale white matter demonstrates multiple small and prominent acute infarcts.  2.  Left frontoparietal centrum semiovale white matter demonstrates multiple small areas of enhancement likely due to subacute infarcts. Recommend follow-up to resolution to exclude a more progressive etiology.  3.  Left frontoparietal centrum semiovale white matter demonstrate multiple small chronic infarcts.  4.  These acute, subacute, and chronic infarcts are within the same location.  -- Echo ordered and pending.   -- Follow neuro recs  -- Discharge to TCU when stroke w/u completed.       Acute metabolic encephalopathy, improved  Acute hypoxic respiratory failure - resolved  Probable Pneumonia  Patient initially requiring 3-4L O2 to maintain O2 sats 92%. Patient was also notably delirious on 5/3. The etiology of this could be multifaceted, from post-operative state, to acute metabolic encephalopathy, to hypoperfusion due to anemia. CT head no acute pathology. Delirium improved on 5/5, but continued to be hypoxic on 5/5. CXR done showed possible pneumonia - left base consolidation.  Plan  - Stop IVF  - Wean O2 as able  - CXR shows left base consolidation/pleural effusion not significantly changed from prior CXR in Feb 2023.   -Continue to encourage use of incentive spirometer.  -Chest x-ray with slight improvement in atelectasis and/or infiltrate at the left base with associated pleural fluid.  -With a slightly minimal  increase in procalcitonin, was started on empiric treatment of possible healthcare associated pneumonia with Levaquin.  -- 5/10/23: Improvement in hypoxia, weaned to RA. Plan to complete 7 day course of levaquin .    Leukocytosis  UTI  -WBC 16,000 today, up from 10,000 on 2 days ago.  -- UA positive  -- already on levaquin for pneumonia, will cover for this  -- follow up urine cultures.      Hypocalcemia: Oscal started here     Metastatic lung cancer  Sees Dr. Pantoja at [facility], this was diagnosed via thoracentesis. No disease progression noted per latest office visit 4/6/2023.  - Okay to resume alectinib      Elevated LFTs  In setting of cancer, followed by Onocology. LFT trend here appears to be improving  - Outpatient follow up with Oncology     Diet: Snacks/Supplements Adult: Magic Cup; With Meals  Easy to Chew Diet (level 7)    DVT Prophylaxis: Pneumatic Compression Devices  Mcallister Catheter: Not present  Lines: None     Cardiac Monitoring: None  Code Status: No CPR- Do NOT Intubate      Clinically Significant Risk Factors              # Hypoalbuminemia: Lowest albumin = 2.7 g/dL at 5/9/2023  6:53 PM, will monitor as appropriate           # Overweight: Estimated body mass index is 25.32 kg/m  as calculated from the following:    Height as of this encounter: 1.524 m (5').    Weight as of this encounter: 58.8 kg (129 lb 10.1 oz).          Disposition Plan  TCU placement when medically cleared.     Expected Discharge Date: 05/11/2023        Discharge Comments: TCU rec          Mg Montes MD  Hospitalist Service  [facility]  Securely message with Joe (more info)  Text page via [facility] Paging/Directory   ______________________________________________________________________    Interval History      Patient now on RA  She notes her speech is improved today  Still with intermittent confusion  No CP/SOB  No fevers  No nausea / vomiting  No new complaints    Physical Exam   Vital  Signs: Temp: 98.3  F (36.8  C) Temp src: Oral BP: 131/85 Pulse: 93   Resp: 16 SpO2: 93 % O2 Device: None (Room air)    Weight: 129 lbs 10.09 oz    General Appearance: Well appearing for stated age.  Respiratory: CTAB, no rales or ronchi  Cardiovascular: S1, S2 normal, no murmurs  GI: non-tender on palpation, BS present  Neuro: no focal deficits aside from residual expressive aphasia,       Medical Decision Making       45 MINUTES SPENT BY ME on the date of service doing chart review, history, exam, documentation & further activities per the note.      Data     I have personally reviewed the following data over the past 24 hrs:    N/A  \   9.4 (L)   / N/A     137 102 13.6 /  104 (H)   4.1 26 0.60 \       Imaging results reviewed over the past 24 hrs:   Recent Results (from the past 24 hour(s))   Echo Limited   Result Value    LVEF  55-60%    Narrative    [id number]  [id number]  [id number]  [id number]^[id number]     [facility]  Echocardiography Laboratory  [address] [place] [address]     Name: REFUGIO YADAV  MRN: [id number]  : 1940  Study Date: 2023 10:23 AM  Age: 82 yrs  Gender: Female  Patient Location: [facility]  Reason For Study: CVA  Ordering Physician: MISBAH CABAN  Referring Physician: MISBAH CABAN  Performed By: TALIA Soares     BSA: 1.5 m2  Height: 60 in  Weight: 129 lb  HR: 91  BP: 131/85 mmHg  ______________________________________________________________________________  Procedure  Limited Portable Echo Adult.  ______________________________________________________________________________  Interpretation Summary     The rhythm was undetermined. NSR in recent study.  ______________________________________________________________________________  Left Ventricle  The left ventricle is normal in size. There is normal left ventricular wall  thickness. The visual ejection fraction is 55-60%. Septal motion is consistent  with conduction abnormality.     Right  Ventricle  The right ventricle is normal in structure, function and size.     Atria  Normal left atrial size. Right atrial size is normal.     Mitral Valve  There is mild to moderate mitral annular calcification. The mitral valve  leaflets appear thickened, but open well. There is mild to moderate (1-2+)  mitral regurgitation.     Tricuspid Valve  The tricuspid valve is not well visualized, but is grossly normal.     Aortic Valve  No aortic stenosis is present.     Pulmonic Valve  Normal pulmonic valve.     Vessels  The aortic root is normal size. Normal size ascending aorta. The inferior vena  cava is normal.     Pericardium  There is no pericardial effusion.     Rhythm  The rhythm was undetermined.  ______________________________________________________________________________  MMode/2D Measurements & Calculations  IVSd: 1.0 cm     LVIDd: 4.2 cm  LVIDs: 2.3 cm  LVPWd: 1.1 cm  FS: 45.2 %  LV mass(C)d: 147.0 grams  LV mass(C)dI: 94.9 grams/m2  Ao root diam: 2.6 cm  asc Aorta Diam: 2.5 cm  RV Base: 2.6 cm  RWT: 0.52  TAPSE: 2.7 cm     Doppler Measurements & Calculations  PA acc time: 0.11 sec     ______________________________________________________________________________  Report approved by: Josiah Hale 05/11/2023 12:08 PM

## 2023-05-11 NOTE — PROGRESS NOTES
[facility]    Medicine Progress Note - Hospitalist Service    Date of Admission:  5/1/2023    Assessment & Plan     Summary: Chiquita Berry is a 82 year old female with PMH  metastatic lung cancer, hx CVA Feb 2023, who was adnutted 5/2/2023 with a fall and R hip and R wrist fractures. S/p ORIF. Dual anitplatelets held. Unfortunately,post op course complicated by repeat Stroke in same area as previous strokes. Neuro reconsulted. Course has also been complicated by pneumonia and UTI. Started on abx.   Anticipate discharge to TCU when acute issues      Mechanical fall  Acute comminuted fracture of the right hip  Acute comminuted fracture of the right distal radius  S/p ORIF R hip and R wrist 5/2/2023  Post-operative acute blood loss anemia, improved  Patient with mechanical fall, pushed by elevator door, no head trauma, no LOC, no premonitory symptoms. Workup in ED with fractures above and leukocytosis 16.1k. S/p ORIF of R hip and R wrist on 5/2/2023. Postoperatively complicated by anemia and delirium. Hgb 8.7 prior to surgery, Hgb 4.8 on 5/3 post op, s/p 3u pRBC now Hgb stable in 8s.  - Ortho consult appreciated                         - Post op management                - Pain control                - Lovenox  - Per ortho, ASA and Plavix held and was to be restarted at discharge; patient was on lovenox for DVT ppx. Discussed with ortho given acute development of stroke like symptoms and this has now been restarted on 5/9/23.   - PT/OT/Speech path    Acute CVA  Hx CVA Feb 2023  Patient reports residual expressive aphasia, mild from previous CVA. On a modified diet here per speech.  - Speech path  - ASA and Plavix had been on hold since orthopedic surgery on 5/2/23, had been on Lovenox for prophylaxis while here per ortho.  Spoke with orthopedic surgery PA  5/8/2023 regarding restarting her antiplatelet therapy especially in the wake of recurrence of her initial stroke symptoms.  They were  okay with that.we will discontinue Lovenox today and restart aspirin and Plavix.  - Atorvastatin held due to elevated LFTs  - Repeat head CT obtained 5/8/2023 for new word finding and lower extremity weakness... Which per POA initially was  present with her initial stroke in februrary but symptms had resolved.  Repeat head CT was negative for acute stroke.  - Stroke neurology consulted due to on-going symptoms; echo ordered.   -- MRI/MRA reveals 1.  Left frontoparietal centrum semiovale white matter demonstrates multiple small and prominent acute infarcts.  2.  Left frontoparietal centrum semiovale white matter demonstrates multiple small areas of enhancement likely due to subacute infarcts. Recommend follow-up to resolution to exclude a more progressive etiology.  3.  Left frontoparietal centrum semiovale white matter demonstrate multiple small chronic infarcts.  4.  These acute, subacute, and chronic infarcts are within the same location.  -- Echo ordered and pending.   -- Follow neuro recs  -- Discharge to TCU when stroke w/u completed.       Acute metabolic encephalopathy, improved  Acute hypoxic respiratory failure - resolved  Probable Pneumonia  Patient initially requiring 3-4L O2 to maintain O2 sats 92%. Patient was also notably delirious on 5/3. The etiology of this could be multifaceted, from post-operative state, to acute metabolic encephalopathy, to hypoperfusion due to anemia. CT head no acute pathology. Delirium improved on 5/5, but continued to be hypoxic on 5/5. CXR done showed possible pneumonia - left base consolidation.  Plan  - Stop IVF  - Wean O2 as able  - CXR shows left base consolidation/pleural effusion not significantly changed from prior CXR in Feb 2023.   -Continue to encourage use of incentive spirometer.  -Chest x-ray with slight improvement in atelectasis and/or infiltrate at the left base with associated pleural fluid.  -With a slightly minimal increase in procalcitonin, will start  empiric treatment of possible healthcare associated pneumonia with Levaquin.  -- 5/10/23: Improvement in hypoxia, weaned to RA. Plan to complete 7 day course of levaquin .    Leukocytosis  UTI  -WBC 16,000 today, up from 10,000 on 2 days ago.  -- UA positive  -- already on levaquin for pneumonia, will cover for this  -- follow up urine cultures.      Hypocalcemia: Oscal started here     Metastatic lung cancer  Sees Dr. Pantoja at [facility], this was diagnosed via thoracentesis. No disease progression noted per latest office visit 4/6/2023.  - Okay to resume alectinib              Elevated LFTs  In setting of cancer, followed by Onocology. LFT trend here appears to be improving  - Outpatient follow up with Oncology     Diet: Snacks/Supplements Adult: Magic Cup; With Meals  Easy to Chew Diet (level 7)    DVT Prophylaxis: Pneumatic Compression Devices  Mcallister Catheter: Not present  Lines: None     Cardiac Monitoring: None  Code Status: No CPR- Do NOT Intubate      Clinically Significant Risk Factors              # Hypoalbuminemia: Lowest albumin = 2.7 g/dL at 5/9/2023  6:53 PM, will monitor as appropriate           # Overweight: Estimated body mass index is 25.32 kg/m  as calculated from the following:    Height as of this encounter: 1.524 m (5').    Weight as of this encounter: 58.8 kg (129 lb 10.1 oz).          Disposition Plan  TCU placement when medically cleared.      Expected Discharge Date: 05/11/2023        Discharge Comments: TCU rec          Colleen Larson MD  Hospitalist Service  [facility]  Securely message with Joe (more info)  Text page via [employer] Paging/Directory   ______________________________________________________________________    Interval History    Patient continues on supplemental O2   She notes her speech is improved today compared to yesterday.  I suspect her symptoms are waxing and waning likely due to an underlying infection of some sort.    Physical Exam    Vital Signs: Temp: 98.7  F (37.1  C) Temp src: Oral BP: 127/59 Pulse: 95   Resp: 16 SpO2: 94 % O2 Device: None (Room air)    Weight: 129 lbs 10.09 oz    General Appearance: Well appearing for stated age.  Respiratory: CTAB, no rales or ronchi  Cardiovascular: S1, S2 normal, no murmurs  GI: non-tender on palpation, BS present      Medical Decision Making       45 MINUTES SPENT BY ME on the date of service doing chart review, history, exam, documentation & further activities per the note.      Data     I have personally reviewed the following data over the past 24 hrs:    14.9 (H)  \   8.8 (L); 8.8 (L)   / 599 (H)     138 103 14.5 /  111 (H)   3.6 24 0.50 (L) \       TSH: N/A T4: N/A A1C: 4.9       Imaging results reviewed over the past 24 hrs:   Recent Results (from the past 24 hour(s))   MR Brain w/o & w Contrast    Narrative    EXAM: MR BRAIN W/O and W CONTRAST  LOCATION: [facility]  DATE/TIME: 5/10/2023 4:25 AM CDT    INDICATION: increased aphasia, history of stroke  COMPARISON: MRI brain 02/13/2023  CONTRAST: 10mL Gadavist  TECHNIQUE: Routine multiplanar multisequence head MRI without and with intravenous contrast.    FINDINGS:  INTRACRANIAL CONTENTS:  Left frontoparietal centrum semiovale white matter demonstrates multiple small and prominent acute infarcts. (Restricted diffusion, positive FLAIR).    Left frontoparietal centrum semiovale white matter demonstrates multiple small areas of enhancement likely due to subacute infarcts. Recommend follow-up to resolution to exclude a more progressive etiology.    Left frontoparietal centrum semiovale white matter demonstrate multiple small chronic infarcts.     No midline shift. No mass, acute hemorrhage, or extra-axial fluid collections. Patchy nonspecific T2/FLAIR hyperintensities within the cerebral white matter most consistent with mild to moderate chronic microvascular ischemic change. Moderate   generalized cerebral atrophy. No  hydrocephalus. Mild to moderate cerebellar atrophy. Prominent retrocerebellar arachnoid cyst redemonstrated.    Otherwise, no intracranial pathologic enhancement.    SELLA: No abnormality accounting for technique.    OSSEOUS STRUCTURES/SOFT TISSUES: Normal marrow signal. The major intracranial vascular flow voids are maintained.     ORBITS: No abnormality accounting for technique.     SINUSES/MASTOIDS: Mild to moderate mucosal thickening scattered about the paranasal sinuses. No middle ear or mastoid effusion.       Impression    IMPRESSION:  1.  Left frontoparietal centrum semiovale white matter demonstrates multiple small and prominent acute infarcts.    2.  Left frontoparietal centrum semiovale white matter demonstrates multiple small areas of enhancement likely due to subacute infarcts. Recommend follow-up to resolution to exclude a more progressive etiology.    3.  Left frontoparietal centrum semiovale white matter demonstrate multiple small chronic infarcts.    4.  These acute, subacute, and chronic infarcts are within the same location.    5.  Chronic intracranial changes described above.     MRA Brain ([place]) w/o Contrast    Narrative    EXAM: MRA BRAIN ([place]) W/O CONTRAST  LOCATION: [facility]  DATE/TIME: 5/10/2023 4:25 AM CDT    INDICATION: possible stroke, increased aphasia  COMPARISON: CTA head and neck 02/17/2023  TECHNIQUE: 3D time-of-flight head MRA without intravenous contrast.    FINDINGS:  Left distal A2/A3 occlusion or near occlusion with reconstitution. This is new compared to prior exam.    Left M2 segment inferior division proximal aspect occlusion with reconstitution. This is stable compared to prior exam.    Left proximal anterior temporal artery occlusion or near occlusion with reconstitution. A severe stenosis was notified on prior exam.    Left distal V4 segment severe stenosis. This appears worse compared to prior exam.    Right proximal P2  segment moderate stenosis. This is similar compared to prior exam.    Left proximal P2 segment severe stenosis. This is similar compared to prior exam.    No additional significant stenosis/occlusion.    No brain aneurysm. No AVM/AVF.    Left posterior communicating artery infundibulum with visualized apical artery.    Left fetal origin PCA.    Dominant right and smaller left vertebral artery contribute to a normal basilar artery.       Impression    IMPRESSION:  Multiple intracranial stenoses and occlusions described above. Please see above for discussion.   MRA Neck (Carotids) wo & w Contrast    Narrative    EXAM: MRA NECK (CAROTIDS) W/O and W CONTRAST  LOCATION: [facility]  DATE/TIME: 5/10/2023 4:25 AM CDT    INDICATION: possible stroke, increased aphasia  COMPARISON: CT head 05/04/2023  CONTRAST: 10mL Gadavist  TECHNIQUE: Neck MRA without and with IV contrast. Stenosis measurements made according to NASCET criteria unless otherwise specified.    FINDINGS:  RIGHT CAROTID: No measurable stenosis or dissection.    LEFT CAROTID: Left carotid bulb plaque. No measurable stenosis or dissection.    VERTEBRAL ARTERIES: Bilateral extradural vertebral arteries demonstrate no significant stenosis, occlusion, dissection. Dominant right and smaller left vertebral arteries.    AORTIC ARCH: Classic aortic arch anatomy with no significant stenosis at the origin of the great vessels.    Small left pleural effusion. Right thyroid lobe nodule measuring 1.2 cm.      Impression    IMPRESSION:  1.  No significant stenosis, occlusion, dissection.  2.  Right thyroid lobe nodule measuring 1.2 cm.

## 2023-05-11 NOTE — PROGRESS NOTES
Brief Clinical Note:    Patient received their echo as below:  Left Ventricle  The left ventricle is normal in size. There is normal left ventricular wall  thickness. The visual ejection fraction is 55-60%. Septal motion is consistent  with conduction abnormality.     Right Ventricle  The right ventricle is normal in structure, function and size.     Atria  Normal left atrial size. Right atrial size is normal.     Mitral Valve  There is mild to moderate mitral annular calcification. The mitral valve  leaflets appear thickened, but open well. There is mild to moderate (1-2+)  mitral regurgitation.     Tricuspid Valve  The tricuspid valve is not well visualized, but is grossly normal.     Aortic Valve  No aortic stenosis is present.     Pulmonic Valve  Normal pulmonic valve.     LDL 99, A1c 5.9    Final recommendations:  - Neurochecks and Vital Signs every Q4h   - Daily aspirin 81 mg for secondary stroke prevention  - Plavix 75mg   - DAPT 90 days can consider asa + cilastazol after 90 days  - Statin: lipitor 40mg    - Long term goals, BP <130/80, LDL <70, A1c <7  - Telemetry, EKG  - PT/OT/SLP  - Euthermia, Euglycemia     No further recommendations at this time. We will sign off at this time, please call with any questions or concerns.

## 2023-05-11 NOTE — PROGRESS NOTES
Pt is AOx2- not to place or situation does go in and out of confusion at time, VSS on RA, Up with A2l jenny steady (non weight bearing on left wrist, WBAT on lefft hip)-turn and reposition q2hrs- does refuse most of the time, provider aware. Patient has severe expressive aphasia due to a stroke in February of this year, does get frusterated at times due to inability to convey needs. Easy to chew diet with thin liquids, takes pills crushed in applesauce. Patient has own supply of chemo medications locked in med room. Potassium protocol 4.1 this morning redraw scheduled for morning labs. Incontinent of BB does have a purewic in place. Discharge pending.

## 2023-05-11 NOTE — PROGRESS NOTES
Care Management Follow Up    Length of Stay (days): 10    Expected Discharge Date: 05/11/2023     Concerns to be Addressed:       Patient plan of care discussed at interdisciplinary rounds: Yes    Anticipated Discharge Disposition: Transitional Care     Anticipated Discharge Services: None  Anticipated Discharge DME: None    Patient/family educated on Medicare website which has current facility and service quality ratings: no  Education Provided on the Discharge Plan:    Patient/Family in Agreement with the Plan: yes    Referrals Placed by CM/SW: Post Acute Facilities  Private pay costs discussed: NA    Additional Information:  Call from [facility] and they are meeting about patient today and will be moving residents around at their TCU in order for patient to go there. They do wish to accept her but need to make sure she goes into the proper room. [facility] auth was received and good through 5/14/23. SW will need to call [employer] to change the dates when discharge is known.    Call from [facility] with [facility] regarding authorization requesting an update on plan. Writer returned call at [phone] and left detailed information on confidential voicemail.    Call received from [facility] requesting an update on medication status for cancer and date that patient is ready for discharge. Call returned but voicemail was full and writer was unable to leave a message.      JULIO Velazquez

## 2023-05-11 NOTE — CONSULTS
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 No  Local s/s infection  Superior:  Stage 1, no local s/s infection  Medial: Stage 1, no local s/s infection  Distal: DTPI, no local s/s infection     Treatment Plan  Wound care: PI on spine  -change dressings on odd days and prn   -Clean gently with MicroKlenz. Pat dry  -Apply 2 x Mepilex borders to area    -Diligent PIP measures  - continue pulsate  -diligent turning with heel suspension  -HOB below 30 degrees if not medically contraindicated  -Incontinence protocol: PureWick for UIC / brief for FIC  -Encourage mobility as medically indicated  -encourage nutrition  -Jese risk     Orders In Epic  Recommended provider order: None  WOC Nurse follow-up plan: weekly and prn   Nursing to notify the Provider(s) and re-consult the WOC Nurse if wound(s) deteriorates or new skin concern.    Patient History  According to provider note(s):    Assessment & Plan  Summary: Chiquita Berry is a 82 year old female with PMH  metastatic lung cancer, hx CVA Feb 2023, who was adnutted 5/2/2023 with a fall and R hip and R wrist fractures. S/p ORIF. Dual anitplatelets held. Unfortunately,post op course complicated by repeat Stroke in same area as previous strokes. Neuro reconsulted. Course has also been complicated by pneumonia and UTI. Started on abx.   Anticipate discharge to TCU when acute issues      Mechanical fall  Acute comminuted fracture of the right hip  Acute comminuted fracture of the right distal radius  S/p ORIF R hip and R wrist 5/2/2023  Post-operative acute blood loss anemia, improved  Patient with mechanical fall, pushed by elevator door, no head trauma, no LOC, no premonitory symptoms. Workup in ED with fractures above and leukocytosis 16.1k. S/p ORIF of R hip and R wrist on 5/2/2023. Postoperatively complicated by  anemia and delirium. Hgb 8.7 prior to surgery, Hgb 4.8 on 5/3 post op, s/p 3u pRBC now Hgb stable in 8s.  - Ortho consult appreciated                         - Post op management                - Pain control                - Lovenox  - Per ortho, ASA and Plavix held and was to be restarted at discharge; patient was on lovenox for DVT ppx. Discussed with ortho given acute development of stroke like symptoms and this has now been restarted on 5/9/23.   - PT/OT/Speech path     Acute CVA  Hx CVA Feb 2023    Objective Data  Containment of urine/stool: PureWic for UIC and brief vs BSC  For FIc    Active Diet Order:  Orders Placed This Encounter      Easy to Chew Diet (level 7)    Output:   I/O last 3 completed shifts:  In: -   Out: 1750 [Urine:1750]    Risk Assessment:   Sensory Perception: 3-->slightly limited  Moisture: 3-->occasionally moist  Activity: 2-->chairfast  Mobility: 3-->slightly limited  Nutrition: 3-->adequate  Friction and Shear: 2-->potential problem  Jese Score: 16                          Labs: Recent Labs   Lab 05/11/23  0727 05/10/23  0859 05/09/23  1853   ALBUMIN  --   --  2.7*   HGB 9.4* 8.8*  8.8*  --    WBC  --  14.9*  --    A1C  --  4.9  --        Physical Exam  Skin inspection: Spine    Wound Location:  3 x Linear wounds on spine  Date of last WOC  photo: 5-11-23        Wound History: hx fall prior to admission on 5-1-23  Measurements:   Superior: 1.0cm x 1.0cm x 100% skin intact with faint non- blanchable erythema  Medial: 2.0cm x 1.5cm x 100% 100% skin intact with non-blanchable erythema  Distal:  1.0cm x 1.0cm x 100% skin intact non-blanchable ecchymosis  Palpation of the wound bed: firm  Periwound skin: faint erythema  Temperature:warm  Drainage:none  Odor: none  Pain:denies    Interventions  Current support surface: Pulsate  Current off-loading measures:turning with heel suspension  Visual inspection of wound(s) completed  Wound Care: completed per POC  Supplies: on floor supply room    Education provided:discussed turning with patient   Discussed plan of care with Nursing     Ricarda Hurst, RN, CWOCN

## 2023-05-11 NOTE — PLAN OF CARE
Goal Outcome Evaluation:         Pt A&Ox4, expressive aphasia. VSS-RA. Up 2 lift. WBAT-RLE. NWB RUE. Pills taken with applesauce. Easy to chew diet. Purewick in place, incontinent B&B. IV SL. Dressing CDI. Pain managed with tylenol and oxycodone. PRN melatonin and seroquel given. Turn and repo q2h. Neutropenic precautions maintained.

## 2023-05-12 NOTE — PLAN OF CARE
Speech Language Therapy Discharge Summary    Reason for therapy discharge:    All goals and outcomes met, no further needs identified.    Progress towards therapy goal(s). See goals on Care Plan in Epic electronic health record for goal details.  Swallow goals met - pt tolerating regular/thin diet.     Therapy recommendation(s):    No further dysphagia tx indicated. SLP at TCU (planned/recommended per PT/medical team) for residue communication deficits s/p recent CVA

## 2023-05-12 NOTE — PROGRESS NOTES
[id number]  Cleaned the patient with the aide. Staff to remove all tubings underneath the patient. Turned and repositioned this shift. Yanelis in placed and changed this shift.

## 2023-05-12 NOTE — PLAN OF CARE
Goal Outcome Evaluation:    [id number]     Dx: ORIF R hip and R wrist   DOA: 5/1/2023  Mental Statues: A&O x4, with expressive aphasia   VS/O2: VSS on RA  Activity: A2 with ceiling lift   Diet: easy to chew diet, thin liquids   Bowel/bladder: Purewick in place, voiding adequately.    Skin: Surgical dressing to R hip CDI. R wrist splint intact.  Pain: PRN Tylenol   LDAs: PIV saline locked   Discharge: Pending TCU  Other: Chemo precautions

## 2023-05-12 NOTE — PROGRESS NOTES
[facility]    Medicine Progress Note - Hospitalist Service    Date of Admission:  5/1/2023  Date of Service: 05/12/2023    Assessment & Plan     Summary: Chiquita Berry is a 82 year old female with PMH  metastatic lung cancer, hx CVA Feb 2023, who was adnutted 5/2/2023 with a fall and R hip and R wrist fractures. S/p ORIF. Dual anitplatelets held. Unfortunately,post op course complicated by repeat Stroke in same area as previous strokes. Neuro reconsulted. Course has also been complicated by pneumonia and UTI. Started on abx.   Anticipate discharge to TCU when acute issues      Mechanical fall  Acute comminuted fracture of the right hip  Acute comminuted fracture of the right distal radius  S/p ORIF R hip and R wrist 5/2/2023  Post-operative acute blood loss anemia, improved  Patient with mechanical fall, pushed by elevator door, no head trauma, no LOC, no premonitory symptoms. Workup in ED with fractures above and leukocytosis 16.1k. S/p ORIF of R hip and R wrist on 5/2/2023. Postoperatively complicated by anemia and delirium. Hgb 8.7 prior to surgery, Hgb 4.8 on 5/3 post op, s/p 3u pRBC now Hgb stable in 8s.  - Ortho consult appreciated                         - Post op management                - Pain control  - Per ortho, ASA and Plavix held and was to be restarted at discharge; patient was on lovenox for DVT ppx. Discussed with ortho given acute development of stroke like symptoms and this has now been restarted on 5/9/23.   - PT/OT/Speech path    Acute CVA  Hx CVA Feb 2023  Patient reports residual expressive aphasia, mild from previous CVA. On a modified diet here per speech.  - Speech path  - ASA and Plavix had been on hold since orthopedic surgery on 5/2/23, had been on Lovenox for prophylaxis while here per ortho.  Spoke with orthopedic surgery PA  5/8/2023 regarding restarting her antiplatelet therapy especially in the wake of recurrence of her initial stroke symptoms.  They  were okay with that.we will discontinue Lovenox today and restart aspirin and Plavix.  - Repeat head CT obtained 5/8/2023 for new word finding and lower extremity weakness... Which per POA initially was  present with her initial stroke in februrary but symptms had resolved.  Repeat head CT was negative for acute stroke.  - Stroke neurology consulted due to on-going symptoms; echo ordered.   -- MRI/MRA reveals 1.  Left frontoparietal centrum semiovale white matter demonstrates multiple small and prominent acute infarcts.  2.  Left frontoparietal centrum semiovale white matter demonstrates multiple small areas of enhancement likely due to subacute infarcts. Recommend follow-up to resolution to exclude a more progressive etiology.  3.  Left frontoparietal centrum semiovale white matter demonstrate multiple small chronic infarcts.  4.  These acute, subacute, and chronic infarcts are within the same location.  -- Echo ordered and pending.   -- Follow neuro recs  --Daily aspirin 81 mg for secondary stroke prevention  -- Plavix 75mg  -- DAPT 90 days can consider asa + cilastazol after 90 day  -- Statin: lipitor 40mg    -- Long term goals, BP <130/80, LDL <70, A1c <7      Acute metabolic encephalopathy, improved  Acute hypoxic respiratory failure - resolved  Probable Pneumonia  Patient initially requiring 3-4L O2 to maintain O2 sats 92%. Patient was also notably delirious on 5/3. The etiology of this could be multifaceted, from post-operative state, to acute metabolic encephalopathy, to hypoperfusion due to anemia. CT head no acute pathology. Delirium improved on 5/5, but continued to be hypoxic on 5/5. CXR done showed possible pneumonia - left base consolidation.  Plan:  - Stop IVF  - Wean O2 as able  - CXR shows left base consolidation/pleural effusion not significantly changed from prior CXR in Feb 2023.   -Continue to encourage use of incentive spirometer.  -Chest x-ray with slight improvement in atelectasis and/or  infiltrate at the left base with associated pleural fluid.  -With a slightly minimal increase in procalcitonin, was started on empiric treatment of possible healthcare associated pneumonia with Levaquin.  -- 5/10/23: Improvement in hypoxia, weaned to RA. Plan to complete 7 day course of levaquin .    Leukocytosis  UTI  -- WBC 16,000 today, up from 10,000 on 2 days ago.  -- UA positive  -- follow up urine cultures -> 50,000-100,000 CFU/mL Enterococcus faecalis      Hypocalcemia: Oscal started here     Metastatic lung cancer  Sees Dr. Pantoja at [facility], this was diagnosed via thoracentesis. No disease progression noted per latest office visit 4/6/2023.  - Okay to resume alectinib   - No plans for chemotherapy in setting of active infections.     Elevated LFTs  In setting of cancer, followed by Onocology. LFT trend here appears to be improving  - Outpatient follow up with Oncology     Diet: Snacks/Supplements Adult: Magic Cup; With Meals  Regular Diet Adult    DVT Prophylaxis: Pneumatic Compression Devices  Mcallister Catheter: Not present  Lines: None     Cardiac Monitoring: None  Code Status: No CPR- Do NOT Intubate      Clinically Significant Risk Factors              # Hypoalbuminemia: Lowest albumin = 2.7 g/dL at 5/9/2023  6:53 PM, will monitor as appropriate           # Overweight: Estimated body mass index is 25.32 kg/m  as calculated from the following:    Height as of this encounter: 1.524 m (5').    Weight as of this encounter: 58.8 kg (129 lb 10.1 oz).          Disposition Plan  TCU placement     Expected Discharge Date: 05/12/2023    Discharge Delays: Placement - TCU    Discharge Comments: TCU rec          Mg Montes MD  Hospitalist Service  [facility]  Securely message with [employer] (more info)  Text page via [employer] Paging/Directory   ______________________________________________________________________    Interval History      No acute events overnight.   Overall improving  No  CP/SOB  No fevers  No nausea / vomiting  No new complaints  Working on placement    Physical Exam   Vital Signs: Temp: 97.9  F (36.6  C) Temp src: Oral BP: 121/55 Pulse: 88   Resp: 16 SpO2: 93 % O2 Device: None (Room air)    Weight: 129 lbs 10.09 oz    General Appearance: Well appearing for stated age.  Respiratory: CTAB, no rales or ronchi  Cardiovascular: S1, S2 normal, no murmurs  GI: non-tender on palpation, BS present  Neuro: no focal deficits aside from residual expressive aphasia,       Medical Decision Making       45 MINUTES SPENT BY ME on the date of service doing chart review, history, exam, documentation & further activities per the note.      Data     I have personally reviewed the following data over the past 24 hrs:    N/A  \   9.4 (L)   / N/A     137 101 18.3 /  142 (H)   3.8 25 0.69 \       Imaging results reviewed over the past 24 hrs:   No results found for this or any previous visit (from the past 24 hour(s)).

## 2023-05-12 NOTE — PLAN OF CARE
Diagnosis: 5/2 ORIF right hip/wrist  POD#: 10  Mental Status: A&O, expressive aphasia  Activity/dangle up with lift  Diet: regular, assist feed  Pain: tylenol  Mcallister/Voiding: purewick  Tele/Restraints/Iso: chemo pcns  02/LDA:LAVONNE  D/C Date: 5/13 TCU  Other Info: today

## 2023-05-12 NOTE — PROGRESS NOTES
Care Management Follow Up    Length of Stay (days): 11    Expected Discharge Date: 05/12/2023     Concerns to be Addressed:       Patient plan of care discussed at interdisciplinary rounds: Yes    Anticipated Discharge Disposition: Transitional Care     Anticipated Discharge Services: None  Anticipated Discharge DME: None    Patient/family educated on Medicare website which has current facility and service quality ratings: no  Education Provided on the Discharge Plan:    Patient/Family in Agreement with the Plan: yes    Referrals Placed by CM/SW: Post Acute Facilities  Private pay costs discussed: Not applicable    Additional Information:  Call to [facility] to update that patient has not discharged yet but will likely discharge today to [facility] pending some clarification with medications. Patient is authorized until 5/14/23 so facility will need to update auth at that time.    Call to Lexi at [facility] regarding medication Alectinib. Writer informed that this is an expensive medication and are asking if she can bring it from home. They also would like clarificiation regarding chemo plan and precautions.     If all questions are answered, they can tentatively accept today. Writer will update Lexi of above questions.     Addendum: Writer spoke to Lexi who noted that they can accept but it was unclear if they can accept her Saturday or Monday. Lexi noted that she would call back to confirm.    1630: Call to [facility] for clarification on whether patient can discharge tomorrow to TCU or if they needed her to wait until 5/15/23. Writer requested a call back to confirm. No ride has been set up at this time. Patient updated.      JULIO Velazquez

## 2023-05-13 NOTE — PLAN OF CARE
VSS, CMS intact. Up with 2 and lift. Pain managed with oxycodone and tylenol. Hip Dressing and RUE splint C/D/I. redness mid spin, mepelix applied. Pulsate mattress, repo every 2 hours. Purewick for incontinent. A&OX3. Discharge to TCU on Monday.

## 2023-05-13 NOTE — PLAN OF CARE
Goal Outcome Evaluation:    Date/time: 5/12/23  [id number]  Diagnosis: 5/2 ORIF right hip/wrist, Hx of metastatic lung cancer, CVA   Mental Status: A&O x4 , expressive aphasia  Activity/dangle up with lift, PT following with her   Diet: regular, assist feed  Pain: tylenol  Mcallister/Voiding: purewick  Tele/Restraints/Iso: chemo pcns  02/LDA: SL  D/C Date: Expected  5/13 to TCU  Other Info:BM today, ref senna  at bedtime

## 2023-05-13 NOTE — PROGRESS NOTES
[facility]    Medicine Progress Note - Hospitalist Service    Date of Admission:  5/1/2023  Date of Service: 05/13/2023    Assessment & Plan     Summary: Chiquita Berry is a 82 year old female with PMH  metastatic lung cancer, hx CVA Feb 2023, who was adnutted 5/2/2023 with a fall and R hip and R wrist fractures. S/p ORIF. Dual anitplatelets held. Unfortunately,post op course complicated by repeat Stroke in same area as previous strokes. Neuro reconsulted. Course has also been complicated by pneumonia and UTI. Started on abx.   Anticipate discharge to TCU when acute issues      Mechanical fall  Acute comminuted fracture of the right hip  Acute comminuted fracture of the right distal radius  S/p ORIF R hip and R wrist 5/2/2023  Post-operative acute blood loss anemia, improved  Patient with mechanical fall, pushed by elevator door, no head trauma, no LOC, no premonitory symptoms. Workup in ED with fractures above and leukocytosis 16.1k. S/p ORIF of R hip and R wrist on 5/2/2023. Postoperatively complicated by anemia and delirium. Hgb 8.7 prior to surgery, Hgb 4.8 on 5/3 post op, s/p 3u pRBC now Hgb stable in 8s.  - Ortho consult appreciated                         - Post op management                - Pain control  - Per ortho, ASA and Plavix held and was to be restarted at discharge; patient was on lovenox for DVT ppx. Discussed with ortho given acute development of stroke like symptoms and this has now been restarted on 5/9/23.   - PT/OT/Speech path    Acute CVA  Hx CVA Feb 2023  Patient reports residual expressive aphasia, mild from previous CVA. On a modified diet here per speech.  - Speech path  - ASA and Plavix had been on hold since orthopedic surgery on 5/2/23, had been on Lovenox for prophylaxis while here per ortho.  Spoke with orthopedic surgery PA  5/8/2023 regarding restarting her antiplatelet therapy especially in the wake of recurrence of her initial stroke symptoms.  They  were okay with that.we will discontinue Lovenox today and restart aspirin and Plavix.  - Repeat head CT obtained 5/8/2023 for new word finding and lower extremity weakness... Which per POA initially was  present with her initial stroke in februrary but symptms had resolved.  Repeat head CT was negative for acute stroke.  - Stroke neurology consulted due to on-going symptoms; echo ordered.   -- MRI/MRA reveals 1.  Left frontoparietal centrum semiovale white matter demonstrates multiple small and prominent acute infarcts.  2.  Left frontoparietal centrum semiovale white matter demonstrates multiple small areas of enhancement likely due to subacute infarcts. Recommend follow-up to resolution to exclude a more progressive etiology.  3.  Left frontoparietal centrum semiovale white matter demonstrate multiple small chronic infarcts.  4.  These acute, subacute, and chronic infarcts are within the same location.  -- Echo ordered and pending.   -- Follow neuro recs  --Daily aspirin 81 mg for secondary stroke prevention  -- Plavix 75mg  -- DAPT 90 days can consider asa + cilastazol after 90 day  -- Statin: lipitor 40mg    -- Long term goals, BP <130/80, LDL <70, A1c <7      Acute metabolic encephalopathy, improved  Acute hypoxic respiratory failure - resolved  Probable Pneumonia  Patient initially requiring 3-4L O2 to maintain O2 sats 92%. Patient was also notably delirious on 5/3. The etiology of this could be multifaceted, from post-operative state, to acute metabolic encephalopathy, to hypoperfusion due to anemia. CT head no acute pathology. Delirium improved on 5/5, but continued to be hypoxic on 5/5. CXR done showed possible pneumonia - left base consolidation.  Plan:  - Stop IVF  - Wean O2 as able  - CXR shows left base consolidation/pleural effusion not significantly changed from prior CXR in Feb 2023.   -Continue to encourage use of incentive spirometer.  -Chest x-ray with slight improvement in atelectasis and/or  infiltrate at the left base with associated pleural fluid.  -With a slightly minimal increase in procalcitonin, was started on empiric treatment of possible healthcare associated pneumonia with Levaquin.  -- 5/10/23: Improvement in hypoxia, weaned to RA. Plan to complete 7 day course of levaquin .    Leukocytosis  UTI  -- WBC 16,000 today, up from 10,000 on 2 days ago.  -- UA positive  -- follow up urine cultures -> 50,000-100,000 CFU/mL Enterococcus faecalis      Hypocalcemia: Oscal started here     Metastatic lung cancer  Sees Dr. Pantoja at [facility], this was diagnosed via thoracentesis. No disease progression noted per latest office visit 4/6/2023.  - Okay to resume alectinib   - No plans for chemotherapy in setting of active infections.     Elevated LFTs  In setting of cancer, followed by Onocology. LFT trend here appears to be improving  - Outpatient follow up with Oncology     Diet: Snacks/Supplements Adult: Magic Cup; With Meals  Regular Diet Adult  Diet    DVT Prophylaxis: Pneumatic Compression Devices  Mcallister Catheter: Not present  Lines: None     Cardiac Monitoring: None  Code Status: No CPR- Do NOT Intubate      Clinically Significant Risk Factors              # Hypoalbuminemia: Lowest albumin = 2.7 g/dL at 5/9/2023  6:53 PM, will monitor as appropriate           # Overweight: Estimated body mass index is 25.32 kg/m  as calculated from the following:    Height as of this encounter: 1.524 m (5').    Weight as of this encounter: 58.8 kg (129 lb 10.1 oz).          Disposition Plan  TCU placement     Expected Discharge Date: 05/15/2023        Discharge Comments: TCU rec          Mg Montes MD  Hospitalist Service  [facility]  Securely message with [employer] (more info)  Text page via [employer] Paging/Directory   ______________________________________________________________________    Interval History      No acute events overnight.   Overall improving  No CP/SOB  No fevers  No  nausea / vomiting  No new complaints  Working on placement, [facility] unable to accept patient until Monday.    Physical Exam   Vital Signs: Temp: 98  F (36.7  C) Temp src: Oral BP: 116/52 Pulse: 90   Resp: 16 SpO2: 95 % O2 Device: None (Room air)    Weight: 129 lbs 10.09 oz    General Appearance: Well appearing for stated age.  Respiratory: CTAB, no rales or ronchi  Cardiovascular: S1, S2 normal, no murmurs  GI: non-tender on palpation, BS present  Neuro: no focal deficits aside from residual expressive aphasia,       Medical Decision Making       35 MINUTES SPENT BY ME on the date of service doing chart review, history, exam, documentation & further activities per the note.      Data     I have personally reviewed the following data over the past 24 hrs:    N/A  \   9.1 (L)   / N/A     137 102 16.5 /  158 (H)   3.9 22 0.59 \       Imaging results reviewed over the past 24 hrs:   No results found for this or any previous visit (from the past 24 hour(s)).

## 2023-05-13 NOTE — CONSULTS
[facility]    Stroke Telephone Note    I was called by Mg Montes on 05/13/23 regarding patient Chiquita Berry. The patient is a 82 year old female who was admitted with L MCA strokes and L M2 stenosis in Feb 2/2 ICAD. She came in on 5/2 after a fall with femur fractures s/p ORIF.  She then had watershed appearing strokes on MRI on 5/10.   LKN 430pm   Onset 630pm profound expressive aphasia she waxes and wanes and will have some     Stroke Code Data (for stroke code without tele)  Stroke code activated 05/13/23   1835   Stroke provider first response  05/13/23   1838            Last known normal 05/13/23   1630        Time of discovery   (or onset of symptoms) 05/13/23   1830   Head CT read by Stroke Neuro Dr/Provider 05/13/23   1901   Was stroke code de-escalated? Yes 05/13/23            Imaging Findings   Per personal review: Head CT/CTA showed no acute hemorrhage, no acute stroke and no large vessel occlusion but severe L M2 stenosis that has increased since last CTA.     Official Radiology read is pending if any acute findings on official report please page back for further management.       Intravenous Thrombolysis  Not given due to:   - head trauma/stroke within the past 3 months    Endovascular Treatment   Not initiated due to absence of proximal vessel occlusion    Impression  #Expressive aphasia- has waxing and waning expressive aphasia, she has had a stroke within 3 months and is not a TNK candidate. Her symptoms could be perfusion dependence vs recrudescences given known L M2 stenosis.       Recommendations       MRI brain WWO repeat if symptoms persist   -180  P2Y12 level  Continue DAPT  PTOT  IVfluids  Would recommend trasnfer to 7th floor   Q2h neuro checks       My recommendations are based on the information provided over the phone by Chiquita Berry's in-person providers. They are not intended to replace the clinical judgment of her in-person providers. I  "was not requested to personally see or examine the patient at this time.    The Stroke Staff is Dr. Talamantes  .    Purnima Johnston MD  Vascular Neurology Fellow    To page me or covering stroke neurology team member, click here: AMCOM  Choose \"On Call\" tab at top, then select \"NEUROLOGY/ALL SITES\" from middle drop-down box, press Enter, then look for \"stroke\" or \"telestroke\" for your site.        "

## 2023-05-13 NOTE — PROGRESS NOTES
Care Management Follow Up    Length of Stay (days): 12    Expected Discharge Date: 05/15/2023     Concerns to be Addressed:     Discharge planning  Patient plan of care discussed at interdisciplinary rounds: Yes    Anticipated Discharge Disposition: Transitional Care     Anticipated Discharge Services: TTherapy  Anticipated Discharge DME: None    Patient/family educated on Medicare website which has current facility and service quality ratings: no  Education Provided on the Discharge Plan:  yes  Patient/Family in Agreement with the Plan: yes    Referrals Placed by CM/SW: Post Acute Facilities  Private pay costs discussed: Not applicable    Additional Information:  Call placed to [facility] to follow up on the referral from yesterday.  Per Lexi, they are unable to accept patient until Monday.    Will continue to follow.      JUSTINA Chavez, [facility]    [phone]  [facility]

## 2023-05-13 NOTE — PLAN OF CARE
Goal Outcome Evaluation:    A/o x3, forgetful. VSS on RA. CMS intact. PIV sl. Takes meds crushed with applesauce. Pain managed with Oxycodone and Tylenol. Turned and repositioned. Dressing CDI. Lift with transfer.Voiding adequately with pure wick. Will continue to monitor.

## 2023-05-13 NOTE — CODE/RAPID RESPONSE
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 LNW at 1630 with nursing staff. Upon returning to re assess the patient at 1830, pt had multiple sentences which were incomprehensible due to the degree of expressive aphasia. Normotensive 121/63, HR 80s, oxygen 97% on RA, afebrile. Glucose 113.     Discussed with friend at the bedside who states this is \"worse than usual\". During my exam, the patient does transiently improve, but again has sentences which are incomprehensible.     She is post op ORIF right hip/wrist on 5/2/23. Stay has been prolonged/complicated by post op pneumonia/resp failure, UTI and stroke like symptoms.  On ASA/Plavix - of note, DAPT had been held post operatively until hte patient had acute development of stroke like symptoms on 5/8/23, thus plavix/ASA was restarted. Evaluated formally by stroke neuro, MRI shows:  \"1.  Left frontoparietal centrum semiovale white matter demonstrates multiple small and prominent acute infarcts.  2.  Left frontoparietal centrum semiovale white matter demonstrates multiple small areas of enhancement likely due to subacute infarcts. Recommend follow-up to resolution to exclude a more progressive etiology.  3.  Left frontoparietal centrum semiovale white matter demonstrate multiple small chronic infarcts.  4.  These acute, subacute, and chronic infarcts are within the same location.\"    INTERVENTIONS:  - CTA, CTP, head CT w/o contrast  - Discussed case with Dr. Johnston of Stroke Neuro - not a candidate for TNK, will obtain neuro imaging to rule out LVO.     Neuroimaging shows non contrast shows recent subacute infarcts left frontoparietal region, no convincing evidence on imaging for acute changes. " Discussed with Dr. Johnston of Stroke Neuro, concern of slight worsening of M2 stenosis, may need intervention on this area prior to discharge to prevent recurrent expressive aphasia.    -  MR imaging tonight   - q2h neuros overnight on station 73  - bedside nursing swallow exam before eating   - p2y12 pending to determine if plavix non responder    Addendum 2030  Plan for patient to transfer to ICU for low dose pressors to keep SBP>160. Discussed w/ Dr. Lacy.  - warm handoff given to Intensivist (will remain on Hospitalist service given short time plan for pressors)  - low dose peripheral norepi to achieve SBP>160  - If patient decompensates overnight despite pressors- plan for emergent angio overnight  - if patient remains stable on pressors, will defer to stroke neuro for potential angio in AM    Glucose level: 113    Physical Exam   Vital Signs with Ranges:  Temp:  [97.9  F (36.6  C)-98.1  F (36.7  C)] 97.9  F (36.6  C)  Pulse:  [83-90] 83  Resp:  [16] 16  BP: ()/(46-52) 108/48  SpO2:  [93 %-96 %] 96 %  I/O last 3 completed shifts:  In: 405 [P.O.:405]  Out: 1700 [Urine:1700]    Neurologic Exam  Mental Status:  fully alert, follows commands, speech slow, aphasic and difficult to understand at time.   Cranial Nerves:  PERRL, facial sensation intact and symmetric, facial movements symmetric, slight dysarthria  Motor:  no abnormal movements, Muscle bulk globally decreased, RUE and RLE decreased as expected post operatively.  Sensory:  Intact and symmetric touch sensation for face, hands, and feet bilaterally  Station/Gait:  unable to test    Stroke evaluation: TTE Date: EF 55-60%, no PFO.  A1c 4.9, LDL 99       Discussed with and defer further cares to Dr. Johnston, Stroke Neuro    Code Status: No CPR- Do NOT Intubate    [facility]  Securely message with the Vocera Web Console (learn more here)  Text page via [facility] Paging/Directory      Allergies   Allergies    Allergen Reactions     Adhesive Tape Dermatitis     Skin Sensitivity to Adhesive ie. Lidocaine patch, tele patches, tapes     Amoxicillin-Pot Clavulanate Diarrhea     Celebrex [Celecoxib] Rash     Lidocaine      Skin sensitivity to Lidocaine patch adhesive     Data     IMAGING: (X-ray/CT/MRI)   No results found for this or any previous visit (from the past 24 hour(s)).    CBC with Diff:  Recent Labs   Lab Test 05/13/23  1135 05/11/23  0727 05/10/23  0859 02/08/23  0818 02/07/23  1125   WBC  --   --  14.9*   < > 8.9   HGB 9.1*   < > 8.8*  8.8*   < > 12.7   MCV  --   --  94   < > 97   PLT  --   --  599*   < > 332   INR  --   --   --   --  1.06    < > = values in this interval not displayed.      Comprehensive Metabolic Panel:  Recent Labs   Lab 05/13/23  1830 05/13/23  1135 05/10/23  0859 05/09/23  1853   NA  --  137   < >  --    POTASSIUM  --  3.9   < >  --    CHLORIDE  --  102   < >  --    CO2  --  22   < >  --    ANIONGAP  --  13   < >  --    * 158*   < >  --    BUN  --  16.5   < >  --    CR  --  0.59   < >  --    GFRESTIMATED  --  89   < >  --    SAGE  --  8.3*   < >  --    PROTTOTAL  --   --   --  5.0*   ALBUMIN  --   --   --  2.7*   BILITOTAL  --   --   --  1.1   ALKPHOS  --   --   --  112*   AST  --   --   --  35   ALT  --   --   --  19    < > = values in this interval not displayed.       INR:    Recent Labs   Lab Test 02/07/23  1125   INR 1.06     UA:  Recent Labs   Lab 05/09/23  1520   COLOR Light Yellow   APPEARANCE Clear   URINEGLC Negative   URINEBILI Negative   URINEKETONE Negative   SG 1.016   UBLD Negative   URINEPH 7.0   PROTEIN Negative   NITRITE Negative   LEUKEST Large*   RBCU 5*   WBCU 74*       Time Spent on this Encounter     I spent 40 minutes (1820 - 1900) of critical care time on the unit/floor managing the care of  Chiquita Berry. Upon evaluation, this patient had a high probability of imminent or life-threatening deterioration due to acute neurological change which required my  direct attention, intervention, and personal management. 100% of my time was spent at the bedside counseling the patient and/or coordinating care regarding services listed in this note

## 2023-05-14 NOTE — PROVIDER NOTIFICATION
Paged neuro re: SBP <160 despite pressor at max dose ordered.  Neuro exam remains unchanged.  PRNs for pain may decrease SBP, wanted provider to be aware.    Neuro provider returned page, will notify Neuro IR.  No new orders placed per Neuro, continue to monitor for changes in neuro exam.  No return call from Neuro IR.

## 2023-05-14 NOTE — PLAN OF CARE
Pt underwent intracranial arterial angioplasty this afternoon. S/p balloon angioplasty x2. Pt back to ICU at 1500. Developed rt groin hematoma x2 requiring manual pressure 15 minutes the first time and 20 minutes the second. Dr. Lacy informed of both events and requested that bedrest be extended for 6 hours after last manual pressure at 1715. Neuro checks q1h until 2200 and then q2h overnight. Pt continues to have word finding difficulty. Able to engage in meaningful interaction if given time and prompts.     Levo gtt remains on at 0.123mcg/kg/min. Target -160. SR/BBB with HR 80-90s. On 2L NP. Dilaudid PRN for pain.     Purewick in situ with adequate urine. Regular diet ordered.

## 2023-05-14 NOTE — PROGRESS NOTES
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 This stenosis has been refractory to very good medical management. We have determined that cerebral angiography to define the stenosis is indicated. If the stenosis appears amenable to revascularization with acceptably low risk with balloon angioplasty, we shall proceed accordingly. Our team discussed the risks of the procedure with the patient. These risks include death, arterial rupture, new ischemic strokes, intracranial hemorrhage, failure to improve, need to retreat, groin hematoma, and she agrees to proceed.  MARY Leigh MD

## 2023-05-14 NOTE — PROGRESS NOTES
[facility]    Medicine Progress Note - Hospitalist Service    Date of Admission:  5/1/2023  Date of Service: 05/14/2023    Assessment & Plan     Summary: Chiquita Berry is a 82 year old female with PMH  metastatic lung cancer, hx CVA Feb 2023, who was adnutted 5/2/2023 with a fall and R hip and R wrist fractures. S/p ORIF. Dual anitplatelets held. Unfortunately,post op course complicated by repeat Stroke in same area as previous strokes. Neuro reconsulted. Course has also been complicated by pneumonia and UTI. Started on abx.  Anticipate discharge to TCU when acute issues resolved.     Acute CVA  Hx CVA Feb 2023  Patient reports residual expressive aphasia, mild from previous CVA. On a modified diet here per speech.  - Speech path  - ASA and Plavix had been on hold since orthopedic surgery on 5/2/23, had been on Lovenox for prophylaxis while here per ortho.  Spoke with orthopedic surgery PA  5/8/2023 regarding restarting her antiplatelet therapy especially in the wake of recurrence of her initial stroke symptoms.  They were okay with that.we will discontinue Lovenox today and restart aspirin and Plavix.  - Repeat head CT obtained 5/8/2023 for new word finding and lower extremity weakness... Which per POA initially was  present with her initial stroke in februrary but symptms had resolved.  Repeat head CT was negative for acute stroke.  - Stroke neurology consulted due to on-going symptoms; echo ordered.   -- MRI/MRA reveals 1.  Left frontoparietal centrum semiovale white matter demonstrates multiple small and prominent acute infarcts.  2.  Left frontoparietal centrum semiovale white matter demonstrates multiple small areas of enhancement likely due to subacute infarcts. Recommend follow-up to resolution to exclude a more progressive etiology.  3.  Left frontoparietal centrum semiovale white matter demonstrate multiple small chronic infarcts.  4.  These acute, subacute, and chronic  infarcts are within the same location.  -- Echo -> The left ventricle is normal in size. There is normal left ventricular wall thickness. The visual ejection fraction is 55-60%. Septal motion is consistent with conduction abnormality.  -- Follow neuro recs  --Daily aspirin 81 mg for secondary stroke prevention  -- Plavix 75mg  -- DAPT 90 days then can consider asa + cilastazol after 90 day  -- Statin: lipitor 40mg    -- RRT 05/13 for worsening aphasia -> transferred to ICU and on Levophed to maintain pressures. BPs now better   -- Plan today is to address left middle cerebral artery (MCA) stenosis. Per Neuro ->  stenosis has been refractory to very good medical management. Determined that cerebral angiography to define the stenosis indicated. If the stenosis appears amenable to revascularization with acceptably low risk with balloon angioplasty.  -- SBP goal > 110     Mechanical fall  Acute comminuted fracture of the right hip  Acute comminuted fracture of the right distal radius  S/p ORIF R hip and R wrist 5/2/2023  Post-operative acute blood loss anemia, improved  Patient with mechanical fall, pushed by elevator door, no head trauma, no LOC, no premonitory symptoms. Workup in ED with fractures above and leukocytosis 16.1k. S/p ORIF of R hip and R wrist on 5/2/2023. Postoperatively complicated by anemia and delirium. Hgb 8.7 prior to surgery, Hgb 4.8 on 5/3 post op, s/p 3u pRBC now Hgb stable in 8s.  - Ortho consult appreciated                         - Post op management                - Pain control  - Per ortho, ASA and Plavix held and was to be restarted at discharge; patient was on lovenox for DVT ppx. Discussed with ortho given acute development of stroke like symptoms and this has now been restarted on 5/9/23.   - PT/OT/Speech path      Acute metabolic encephalopathy, improved  Acute hypoxic respiratory failure - resolved  Probable Pneumonia  Patient initially requiring 3-4L O2 to maintain O2 sats 92%.  Patient was also notably delirious on 5/3. The etiology of this could be multifaceted, from post-operative state, to acute metabolic encephalopathy, to hypoperfusion due to anemia. CT head no acute pathology. Delirium improved on 5/5, but continued to be hypoxic on 5/5. CXR done showed possible pneumonia - left base consolidation.  Plan:  - Stop IVF  - Wean O2 as able  - CXR shows left base consolidation/pleural effusion not significantly changed from prior CXR in Feb 2023.   -Continue to encourage use of incentive spirometer.  -Chest x-ray with slight improvement in atelectasis and/or infiltrate at the left base with associated pleural fluid.  -With a slightly minimal increase in procalcitonin, was started on empiric treatment of possible healthcare associated pneumonia with Levaquin.  -- 5/10/23: Improvement in hypoxia, weaned to RA. Plan to complete 7 day course of levaquin .    Leukocytosis  UTI  -- WBC 16,000 today, up from 10,000 on 2 days ago.  -- UA positive and follow up urine cultures -> 50,000-100,000 CFU/mL Enterococcus faecalis -> now on Levaquin which covers     Hypocalcemia: Oscal started here     Metastatic lung cancer  Sees Dr. Pantoja at [facility], this was diagnosed via thoracentesis. No disease progression noted per latest office visit 4/6/2023.  - Okay to resume alectinib   - No plans for chemotherapy in setting of active infections.     Elevated LFTs  In setting of cancer, followed by Onocology. LFT trend here appears to be improving  - Outpatient follow up with Oncology     Diet: Snacks/Supplements Adult: Magic Cup; With Meals  Diet  NPO per Anesthesia Guidelines for Procedure/Surgery Except for: Meds    DVT Prophylaxis: Pneumatic Compression Devices  Mcallister Catheter: Not present  Lines: PRESENT             Cardiac Monitoring: None  Code Status: No CPR- Do NOT Intubate      Clinically Significant Risk Factors              # Hypoalbuminemia: Lowest albumin = 2.7 g/dL at 5/9/2023  6:53 PM,  will monitor as appropriate           # Overweight: Estimated body mass index is 25.83 kg/m  as calculated from the following:    Height as of this encounter: 1.524 m (5').    Weight as of this encounter: 60 kg (132 lb 4.4 oz).          Disposition Plan  TCU placement    Expected Discharge Date: 05/15/2023        Discharge Comments: TCU rec          Mg Montes MD  Hospitalist Service  [facility]  Securely message with [employer] (more info)  Text page via [employer] Paging/Directory   ______________________________________________________________________    Interval History      Overnight had RRT for baseline aphasia worsened in the setting of hypotension, was transferred to ICU and started on levophed to maintain pressures.   No CP/SOB  No fevers  No nausea / vomiting  Aphasia stable this AM prior to angiogram    Physical Exam   Vital Signs: Temp: 98.2  F (36.8  C) Temp src: Oral BP: 130/54 Pulse: 73   Resp: 15 SpO2: 98 % O2 Device: Nasal cannula Oxygen Delivery: 4 LPM  Weight: 132 lbs 4.42 oz    General Appearance: Well appearing for stated age.  Respiratory: CTAB, no rales or ronchi  Cardiovascular: S1, S2 normal, no murmurs  GI: non-tender on palpation, BS present  Neuro: no focal deficits aside from expressive aphasia,       Medical Decision Making       High Complexity Decision Making      Data     I have personally reviewed the following data over the past 24 hrs:    15.7 (H)  \   9.0 (L); 9.0 (L)   / 706 (H)     137 102 14.7 /  122 (H)   4.0 25 0.58 \       Imaging results reviewed over the past 24 hrs:   Recent Results (from the past 24 hour(s))   CT Head w/o Contrast    Narrative    EXAM: CT HEAD W/O CONTRAST, CTA HEAD NECK W CONTRAST, CT HEAD PERFUSION W CONTRAST  LOCATION: [facility]  DATE/TIME: 5/13/2023 7:00 PM CDT    INDICATION: code stroke  COMPARISON: CT head 05/08/2023 and MR brain 05/10/2023.  CONTRAST: 70mL Isovue 370 (accession [id number]),  50 ml  Isovue 370 (accession [id number]), 70mL Isovue 370 (accession [id number])  TECHNIQUE: Head and neck CT angiogram with IV contrast. Noncontrast head CT followed by axial helical CT images of the head and neck vessels obtained during the arterial phase of intravenous contrast administration. Axial 2D reconstructed images and   multiplanar 3D MIP reconstructed images of the head and neck vessels were performed by the technologist. Additional CT cerebral perfusion was performed utilizing a second contrast bolus. Perfusion data were post processed with generation of standard   perfusion maps and estimation of ischemic/infarcted volumes utilizing standard threshold values. Dose reduction techniques were used. All stenosis measurements made according to NASCET criteria unless otherwise specified.    FINDINGS:    NONCONTRAST HEAD CT:   INTRACRANIAL CONTENTS: Patchy subcortical hypodensities in the left posterior corona radiata and centrum semiovale most likely corresponding with the known subacute on chronic ischemic insults as seen to better advantage on recent comparison MRI. No   definite new additional acute transcortical infarct. No hemorrhage or extraaxial collection. No mass effect. Subarachnoid cisterns are patent. Patchy white matter hypodensities are, while nonspecific, most compatible with chronic microvascular ischemic   changes. Unchanged proportional prominence of the ventricles and sulci is compatible with diffuse cerebral volume loss. Mehdi cisterna magna.    VISUALIZED ORBITS/SINUSES/MASTOIDS: Bilateral lens replacements. Paranasal sinuses are clear. No middle ear or mastoid effusion.    BONES/SOFT TISSUES: No acute abnormality.    HEAD CTA:  ANTERIOR CIRCULATION: No proximal branch vessel occlusion. Redemonstrated multifocal atherosclerosis, including focal severe stenosis at the distal A2/A3 segment left JOSI as well as severe tandem stenoses at the proximal and mid inferior division M2   branch of the left  "MCA. Additional mild M1 segment narrowing and scattered atherosclerotic calcification about the carotid siphons. Similar presumed infundibular origin of the left posterior communicating artery.    POSTERIOR CIRCULATION: No proximal branch vessel occlusion. Severe stenosis at the distal V4 segment of the left vertebral artery. Mild atherosclerotic irregularity and tandem narrowings about the basilar artery. Essentially exclusive supply of left PCA   via posterior communicating artery with moderate stenosis at the P1/P2 junction as well as tandem severe stenoses at the proximal P3 branches. Moderate narrowing at the P1 segment of the right PCA with moderate distal P2 stenosis and severe proximal P3   stenosis. No aneurysm or high-flow vascular malformation.    DURAL VENOUS SINUSES: Expected enhancement of the major dural venous sinuses.    NECK CTA:  RIGHT CAROTID: Mild atherosclerosis without hemodynamically significant stenosis by NASCET criteria at the carotid bifurcation. Segment of luminal irregularity in a \"beading\" pattern at the distal right cervical ICA compatible with fibromuscular   dysplasia. No andrew dissection.    LEFT CAROTID: Patent without flow-limiting stenosis by NASCET criteria at the carotid bifurcation. No dissection.    VERTEBRAL ARTERIES: Codominant vertebral arteries. No flow-limiting stenosis. Luminal irregularity with alternating narrowings and ectasia at the near the V2/V3 segment of the left vertebral artery can be compatible with fibromuscular dysplasia. No andrew   dissection.    AORTIC ARCH: Three-vessel aortic arch configuration. Mild atherosclerosis at the visualized arch and great vessel origins without flow-limiting stenosis. Mild eccentric plaque at the left proximal subclavian artery causes mild (less than 50%) focal   stenosis.    NONVASCULAR STRUCTURES: Atelectasis in the visualized upper lungs. No neck mass or lymphadenopathy. Thyroid gland is mildly heterogeneous with suspected " nodules. No acute or aggressive appearing osseous abnormalities. Multilevel degenerative changes in   the visualized cervical spine. Grade 1 retrolisthesis at C3-C4. Osseous structures appear diffusely demineralized.    CT PERFUSION:  PERFUSION MAPS: There is asymmetric prolong transit time corresponding with the region of known infarcts within the left posterior corona radiata/centrum semiovale. There is also slight increase in cerebral blood volume as well as decrease in cerebral   blood flow at this region.    RAPID ANALYSIS:  CBF<30%: 0 mL  Tmax>6sec: 8 mL  Mismatch volume: 8 mL  Mismatch ratio: Infinite      Impression    IMPRESSION:   HEAD CT:  1.  CT correlate of known recent ischemic insult in the left posterior corona radiata/centrum semiovale seen to better advantage on recent comparison MRI.  2.  No definite new acute transcortical infarct or acute intracranial hemorrhage.  3.  Moderate diffuse cerebral volume loss and features compatible with mild chronic microangiopathic ischemic white matter changes.    HEAD CTA:  1.  No proximal branch vessel occlusion, aneurysm, or vascular malformation.   2.  Intracranial atherosclerosis with multifocal stenoses, as detailed. Briefly, severe stenoses at the A2/A3 segment left JOSI, inferior division M2 segment left MCA, P3 segments of bilateral PCAs, and distal V4 segment left vertebral artery.    NECK CTA:  1.  No flow-limiting stenosis or findings of dissection.   2.  Question features compatible with fibromuscular dysplasia at the distal right cervical ICA and V2/V3 junction of left vertebral artery.    CT PERFUSION:  1.  Alterations on perfusion maps corresponding with region of known infarct in left posterior corona radiata/centrum semiovale, as detailed.    Faustino Pittman MD notified provider, LISA ROGERS, of CTA/CTA results via telephone at 5/13/2023 7:16 PM CDT, who acknowledge understanding.   CTA Head Neck w Contrast    Narrative    EXAM: CT HEAD W/O  CONTRAST, CTA HEAD NECK W CONTRAST, CT HEAD PERFUSION W CONTRAST  LOCATION: [facility]  DATE/TIME: 5/13/2023 7:00 PM CDT    INDICATION: code stroke  COMPARISON: CT head 05/08/2023 and MR brain 05/10/2023.  CONTRAST: 70mL Isovue 370 (accession [id number]),  50 ml Isovue 370 (accession [id number]), 70mL Isovue 370 (accession [id number])  TECHNIQUE: Head and neck CT angiogram with IV contrast. Noncontrast head CT followed by axial helical CT images of the head and neck vessels obtained during the arterial phase of intravenous contrast administration. Axial 2D reconstructed images and   multiplanar 3D MIP reconstructed images of the head and neck vessels were performed by the technologist. Additional CT cerebral perfusion was performed utilizing a second contrast bolus. Perfusion data were post processed with generation of standard   perfusion maps and estimation of ischemic/infarcted volumes utilizing standard threshold values. Dose reduction techniques were used. All stenosis measurements made according to NASCET criteria unless otherwise specified.    FINDINGS:    NONCONTRAST HEAD CT:   INTRACRANIAL CONTENTS: Patchy subcortical hypodensities in the left posterior corona radiata and centrum semiovale most likely corresponding with the known subacute on chronic ischemic insults as seen to better advantage on recent comparison MRI. No   definite new additional acute transcortical infarct. No hemorrhage or extraaxial collection. No mass effect. Subarachnoid cisterns are patent. Patchy white matter hypodensities are, while nonspecific, most compatible with chronic microvascular ischemic   changes. Unchanged proportional prominence of the ventricles and sulci is compatible with diffuse cerebral volume loss. Mehdi cisterna magna.    VISUALIZED ORBITS/SINUSES/MASTOIDS: Bilateral lens replacements. Paranasal sinuses are clear. No middle ear or mastoid effusion.    BONES/SOFT TISSUES: No acute  "abnormality.    HEAD CTA:  ANTERIOR CIRCULATION: No proximal branch vessel occlusion. Redemonstrated multifocal atherosclerosis, including focal severe stenosis at the distal A2/A3 segment left JOSI as well as severe tandem stenoses at the proximal and mid inferior division M2   branch of the left MCA. Additional mild M1 segment narrowing and scattered atherosclerotic calcification about the carotid siphons. Similar presumed infundibular origin of the left posterior communicating artery.    POSTERIOR CIRCULATION: No proximal branch vessel occlusion. Severe stenosis at the distal V4 segment of the left vertebral artery. Mild atherosclerotic irregularity and tandem narrowings about the basilar artery. Essentially exclusive supply of left PCA   via posterior communicating artery with moderate stenosis at the P1/P2 junction as well as tandem severe stenoses at the proximal P3 branches. Moderate narrowing at the P1 segment of the right PCA with moderate distal P2 stenosis and severe proximal P3   stenosis. No aneurysm or high-flow vascular malformation.    DURAL VENOUS SINUSES: Expected enhancement of the major dural venous sinuses.    NECK CTA:  RIGHT CAROTID: Mild atherosclerosis without hemodynamically significant stenosis by NASCET criteria at the carotid bifurcation. Segment of luminal irregularity in a \"beading\" pattern at the distal right cervical ICA compatible with fibromuscular   dysplasia. No andrew dissection.    LEFT CAROTID: Patent without flow-limiting stenosis by NASCET criteria at the carotid bifurcation. No dissection.    VERTEBRAL ARTERIES: Codominant vertebral arteries. No flow-limiting stenosis. Luminal irregularity with alternating narrowings and ectasia at the near the V2/V3 segment of the left vertebral artery can be compatible with fibromuscular dysplasia. No andrew   dissection.    AORTIC ARCH: Three-vessel aortic arch configuration. Mild atherosclerosis at the visualized arch and great vessel " origins without flow-limiting stenosis. Mild eccentric plaque at the left proximal subclavian artery causes mild (less than 50%) focal   stenosis.    NONVASCULAR STRUCTURES: Atelectasis in the visualized upper lungs. No neck mass or lymphadenopathy. Thyroid gland is mildly heterogeneous with suspected nodules. No acute or aggressive appearing osseous abnormalities. Multilevel degenerative changes in   the visualized cervical spine. Grade 1 retrolisthesis at C3-C4. Osseous structures appear diffusely demineralized.    CT PERFUSION:  PERFUSION MAPS: There is asymmetric prolong transit time corresponding with the region of known infarcts within the left posterior corona radiata/centrum semiovale. There is also slight increase in cerebral blood volume as well as decrease in cerebral   blood flow at this region.    RAPID ANALYSIS:  CBF<30%: 0 mL  Tmax>6sec: 8 mL  Mismatch volume: 8 mL  Mismatch ratio: Infinite      Impression    IMPRESSION:   HEAD CT:  1.  CT correlate of known recent ischemic insult in the left posterior corona radiata/centrum semiovale seen to better advantage on recent comparison MRI.  2.  No definite new acute transcortical infarct or acute intracranial hemorrhage.  3.  Moderate diffuse cerebral volume loss and features compatible with mild chronic microangiopathic ischemic white matter changes.    HEAD CTA:  1.  No proximal branch vessel occlusion, aneurysm, or vascular malformation.   2.  Intracranial atherosclerosis with multifocal stenoses, as detailed. Briefly, severe stenoses at the A2/A3 segment left JOSI, inferior division M2 segment left MCA, P3 segments of bilateral PCAs, and distal V4 segment left vertebral artery.    NECK CTA:  1.  No flow-limiting stenosis or findings of dissection.   2.  Question features compatible with fibromuscular dysplasia at the distal right cervical ICA and V2/V3 junction of left vertebral artery.    CT PERFUSION:  1.  Alterations on perfusion maps corresponding  with region of known infarct in left posterior corona radiata/centrum semiovale, as detailed.    Faustino Pittman MD notified provider, LISA ROGERS, of CTA/CTA results via telephone at 5/13/2023 7:16 PM CDT, who acknowledge understanding.   CT Head Perfusion w Contrast    Narrative    EXAM: CT HEAD W/O CONTRAST, CTA HEAD NECK W CONTRAST, CT HEAD PERFUSION W CONTRAST  LOCATION: [facility]  DATE/TIME: 5/13/2023 7:00 PM CDT    INDICATION: code stroke  COMPARISON: CT head 05/08/2023 and MR brain 05/10/2023.  CONTRAST: 70mL Isovue 370 (accession [id number]),  50 ml Isovue 370 (accession [id number]), 70mL Isovue 370 (accession [id number])  TECHNIQUE: Head and neck CT angiogram with IV contrast. Noncontrast head CT followed by axial helical CT images of the head and neck vessels obtained during the arterial phase of intravenous contrast administration. Axial 2D reconstructed images and   multiplanar 3D MIP reconstructed images of the head and neck vessels were performed by the technologist. Additional CT cerebral perfusion was performed utilizing a second contrast bolus. Perfusion data were post processed with generation of standard   perfusion maps and estimation of ischemic/infarcted volumes utilizing standard threshold values. Dose reduction techniques were used. All stenosis measurements made according to NASCET criteria unless otherwise specified.    FINDINGS:    NONCONTRAST HEAD CT:   INTRACRANIAL CONTENTS: Patchy subcortical hypodensities in the left posterior corona radiata and centrum semiovale most likely corresponding with the known subacute on chronic ischemic insults as seen to better advantage on recent comparison MRI. No   definite new additional acute transcortical infarct. No hemorrhage or extraaxial collection. No mass effect. Subarachnoid cisterns are patent. Patchy white matter hypodensities are, while nonspecific, most compatible with chronic microvascular ischemic   changes.  "Unchanged proportional prominence of the ventricles and sulci is compatible with diffuse cerebral volume loss. Mehdi cisterna magna.    VISUALIZED ORBITS/SINUSES/MASTOIDS: Bilateral lens replacements. Paranasal sinuses are clear. No middle ear or mastoid effusion.    BONES/SOFT TISSUES: No acute abnormality.    HEAD CTA:  ANTERIOR CIRCULATION: No proximal branch vessel occlusion. Redemonstrated multifocal atherosclerosis, including focal severe stenosis at the distal A2/A3 segment left JOSI as well as severe tandem stenoses at the proximal and mid inferior division M2   branch of the left MCA. Additional mild M1 segment narrowing and scattered atherosclerotic calcification about the carotid siphons. Similar presumed infundibular origin of the left posterior communicating artery.    POSTERIOR CIRCULATION: No proximal branch vessel occlusion. Severe stenosis at the distal V4 segment of the left vertebral artery. Mild atherosclerotic irregularity and tandem narrowings about the basilar artery. Essentially exclusive supply of left PCA   via posterior communicating artery with moderate stenosis at the P1/P2 junction as well as tandem severe stenoses at the proximal P3 branches. Moderate narrowing at the P1 segment of the right PCA with moderate distal P2 stenosis and severe proximal P3   stenosis. No aneurysm or high-flow vascular malformation.    DURAL VENOUS SINUSES: Expected enhancement of the major dural venous sinuses.    NECK CTA:  RIGHT CAROTID: Mild atherosclerosis without hemodynamically significant stenosis by NASCET criteria at the carotid bifurcation. Segment of luminal irregularity in a \"beading\" pattern at the distal right cervical ICA compatible with fibromuscular   dysplasia. No andrew dissection.    LEFT CAROTID: Patent without flow-limiting stenosis by NASCET criteria at the carotid bifurcation. No dissection.    VERTEBRAL ARTERIES: Codominant vertebral arteries. No flow-limiting stenosis. Luminal " irregularity with alternating narrowings and ectasia at the near the V2/V3 segment of the left vertebral artery can be compatible with fibromuscular dysplasia. No andrew   dissection.    AORTIC ARCH: Three-vessel aortic arch configuration. Mild atherosclerosis at the visualized arch and great vessel origins without flow-limiting stenosis. Mild eccentric plaque at the left proximal subclavian artery causes mild (less than 50%) focal   stenosis.    NONVASCULAR STRUCTURES: Atelectasis in the visualized upper lungs. No neck mass or lymphadenopathy. Thyroid gland is mildly heterogeneous with suspected nodules. No acute or aggressive appearing osseous abnormalities. Multilevel degenerative changes in   the visualized cervical spine. Grade 1 retrolisthesis at C3-C4. Osseous structures appear diffusely demineralized.    CT PERFUSION:  PERFUSION MAPS: There is asymmetric prolong transit time corresponding with the region of known infarcts within the left posterior corona radiata/centrum semiovale. There is also slight increase in cerebral blood volume as well as decrease in cerebral   blood flow at this region.    RAPID ANALYSIS:  CBF<30%: 0 mL  Tmax>6sec: 8 mL  Mismatch volume: 8 mL  Mismatch ratio: Infinite      Impression    IMPRESSION:   HEAD CT:  1.  CT correlate of known recent ischemic insult in the left posterior corona radiata/centrum semiovale seen to better advantage on recent comparison MRI.  2.  No definite new acute transcortical infarct or acute intracranial hemorrhage.  3.  Moderate diffuse cerebral volume loss and features compatible with mild chronic microangiopathic ischemic white matter changes.    HEAD CTA:  1.  No proximal branch vessel occlusion, aneurysm, or vascular malformation.   2.  Intracranial atherosclerosis with multifocal stenoses, as detailed. Briefly, severe stenoses at the A2/A3 segment left JOSI, inferior division M2 segment left MCA, P3 segments of bilateral PCAs, and distal V4 segment  left vertebral artery.    NECK CTA:  1.  No flow-limiting stenosis or findings of dissection.   2.  Question features compatible with fibromuscular dysplasia at the distal right cervical ICA and V2/V3 junction of left vertebral artery.    CT PERFUSION:  1.  Alterations on perfusion maps corresponding with region of known infarct in left posterior corona radiata/centrum semiovale, as detailed.    Faustino Pittman MD notified provider, LISA ROGERS, of CTA/CTA results via telephone at 5/13/2023 7:16 PM CDT, who acknowledge understanding.   MR Brain w/o Contrast    Narrative    EXAM: MR BRAIN W/O CONTRAST  LOCATION: [facility]  DATE/TIME: 5/13/2023 9:38 PM CDT    INDICATION: Recurrent expressive aphasia.  COMPARISON: Brain MRI dated 05/10/2023, CTA head and neck and CT perfusion dated 05/13/2023.  TECHNIQUE: Routine multiplanar multisequence head MRI without intravenous contrast.    FINDINGS:  INTRACRANIAL CONTENTS: Stable evolving subacute infarcts within the left centrum semiovale. No new diffusion restriction. No acute hemorrhage. Stable volume loss and sequelae of chronic microvascular ischemic disease. Stable retrocerebellar arachnoid   cyst. Normal position of the cerebellar tonsils.     SELLA: No abnormality accounting for technique.    OSSEOUS STRUCTURES/SOFT TISSUES: Normal marrow signal. The major intracranial vascular flow voids are maintained.     ORBITS: No abnormality accounting for technique.     SINUSES/MASTOIDS: Mild mucosal thickening scattered about the paranasal sinuses. No middle ear or mastoid effusion.       Impression    IMPRESSION:  1.  No significant interval change.

## 2023-05-14 NOTE — PROGRESS NOTES
"A/o x 4 with transient word finding and mild expressive aphasia which waxes and wanes.  No slurred speech noted.  Neuro exams have remained consistent since arriving to ICU. Remains on levophed gtt running at 0.125 mcg/kg/min. Neuro and Neuro IR updated that SBP goal of 160 not met with levo running at ordered max dosing; no new orders were provided. SR with bbb. Afebrile. LS clear bilaterally. Received prn dilaudid I.v. x 2 for hip and back pain (see mar) as patient NPO. Adequate voiding with purewick [place].  Patient expressed desire to speak with family about potentially choosing to pursue going comfort route vs surgical procedures. \"If I , I would know peace. Maybe I'll feel better in the morning if I get some sleep\".   Completed bedside neuro exam with oncoming RN and provided update about patient's desire to talk with family about pursuing comfort measures vs additional procedures.  "

## 2023-05-14 NOTE — IR NOTE
Interventional Radiology Intra-procedural Nursing Note    Patient Name: Chiquita Berry  Medical Record Number: [id number]  Today's Date: May 14, 2023    Procedure: Cerebral angiogram with possible stenting and or angioplasty with moderate sedation  Start time: 1321    End time: 1426  Report provided to: ICU RN  Patient depart time and location: 1500 to 349    Note: Patient entered Interventional Radiology Suite number 1 via cart. Patient awake, alert and oriented. Assisted onto procedural table in supine position. Prepped and draped.  Dr. Lacy and Dr Leigh in room. Time out and procedure started. Vital signs stable. Telemetry reading SR with BBB.    Procedure well tolerated by patient without complications. Procedure end with debrief by Dr. Lacy and Dr Leigh.  Pressure held until hemostasis achieved. Angioseal Quick clot and tegaderm dressing applied to right groin IR access site and hand off with receiving RN    Administered medication totals:  Lidocaine 1% 14 mL Intradermal   Heparin 4500 Units iv  Versed 1.5 mg IVP  Fentanyl 75 mcg IVP    Last dose of sedation administered at 1432.

## 2023-05-14 NOTE — PROGRESS NOTES
"Stroke Addendum    Discussed case with neuro IR team (Dr Lacy). Will plan for pt to transfer to ICU as opposed to 7th floor, Q1 neurochecks. Start pressors with goal SBP ~160, monitor exam. If exam worsens, please page stroke covering provider ([place] through 7am 5/14). If exam improves please note at what SBP exam improvement occurred (to help titrate BP medications).    Remainder as plan in earlier attestation to Dr Johnston's note.     Aimee Talamantes MD  Vascular Neurology  To page me or covering stroke neurology team member, click here: AMCOM   Choose \"On Call\" tab at top, then search dropdown box for \"Neurology Adult\", select location, press Enter, then look for stroke/neuro ICU/telestroke.    "

## 2023-05-14 NOTE — PROGRESS NOTES
[facility]    Stroke Progress Note    Interval EventsExpressive aphasia episode overnight with waxing and waning features , stroke code was called.    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 Presented 5/2 after a mechanical fall resulting in right hip and femur fractures no s/p ORIF. Her antiplatelets were held since admission. Had a leukocytosis and Hgb of 4.6 after surgery, evidence of a urinary tract infection as well. She has had increased lethargy and aphasia. She had another stroke code called on her for expressive aphasia, increased L M2 stenosis but currently on  DAPT and statin.     Stroke Evaluation Summarized    MRI/Head CT                                                                     IMPRESSION:  1.  No significant interval change    MRI 5/10:                                                                   IMPRESSION:  1.  Left frontoparietal centrum semiovale white matter demonstrates multiple small and prominent acute infarcts.     2.  Left frontoparietal centrum semiovale white matter demonstrates multiple small areas of enhancement likely due to subacute infarcts. Recommend follow-up to resolution to exclude a more progressive etiology.     3.  Left frontoparietal centrum semiovale white matter demonstrate multiple small chronic infarcts.     4.  These acute, subacute, and chronic infarcts are within the same location.   Intracranial Vasculature HEAD CTA:  1.  No proximal branch vessel occlusion, aneurysm, or vascular malformation.   2.  Intracranial atherosclerosis with multifocal stenoses, as detailed. Briefly, severe stenoses at the A2/A3 segment left JOSI, inferior division M2 segment left MCA, P3 segments of bilateral PCAs, and distal V4 segment left  "vertebral artery.   Cervical Vasculature    NECK CTA:  1.  No flow-limiting stenosis or findings of dissection.   2.  Question features compatible with fibromuscular dysplasia at the distal right cervical ICA and V2/V3 junction of left vertebral artery.     Echocardiogram No atrial dilation, EF 55-60%   EKG/Telemetry    Other Testing Not Applicable      LDL  5/10/2023: 99 mg/dL   A1C  5/10/2023: 4.9 %   Troponin No lab value available in past 48 hrs       Impression   #Expressive aphasia- has waxing and waning expressive aphasia, she has had a stroke within 3 months and is not a TNK candidate. Her symptoms could be perfusion dependence vs recrudescences given known L M2 stenosis  Plan  -Q2H neurochecks  -SBP >110  -Aspirin 325mg  -Plavix 75mg  -DAPT  -NeuroIR consulted, appreciate recs- may require angioplasty   -Pressors per primary team   -P2Y12 level  -Lipitor 40mg    Patient Follow-up    - final recommendation pending work-up    We will continue to follow.     The Stroke Staff is Dr. Talamantes  .    Purnima Johnston MD  Vascular Neurology Fellow    To page me or covering stroke neurology team member, click here: AMCOM  Choose \"On Call\" tab at top, then select \"NEUROLOGY/ALL SITES\" from middle drop-down box, press Enter, then look for \"stroke\" or \"telestroke\" for your site.    ______________________________________________________    Clinically Significant Risk Factors              # Hypoalbuminemia: Lowest albumin = 2.7 g/dL at 5/9/2023  6:53 PM, will monitor as appropriate            # Overweight: Estimated body mass index is 25.83 kg/m  as calculated from the following:    Height as of this encounter: 1.524 m (5').    Weight as of this encounter: 60 kg (132 lb 4.4 oz).             Medications   Scheduled Meds    alectinib  600 mg Oral BID w/meals     aspirin  81 mg Oral Daily     atorvastatin  40 mg Oral QPM     calcium carbonate  600 mg Oral BID w/meals     clopidogrel  75 mg Oral or NG Tube Daily     " latanoprost  1 drop Both Eyes QPM     levofloxacin  500 mg Intravenous Q24H     pantoprazole  40 mg Oral Daily     polyethylene glycol  17 g Oral Daily     senna-docusate  1 tablet Oral BID     sodium chloride (PF)  3 mL Intracatheter Q8H     cholecalciferol  50 mcg Oral Daily       Infusion Meds    lactated ringers 75 mL/hr at 05/14/23 0400     norepinephrine 0.125 mcg/kg/min (05/14/23 0600)       PRN Meds  sodium chloride 0.9%, sodium chloride 0.9%, acetaminophen, sore throat, bisacodyl, HYDROmorphone **OR** HYDROmorphone, lidocaine 4%, lidocaine (buffered or not buffered), magnesium hydroxide, melatonin, methocarbamol, naloxone **OR** naloxone **OR** naloxone **OR** naloxone, ondansetron **OR** ondansetron, oxyCODONE **OR** oxyCODONE, petrolatum-zinc oxide, polyethylene glycol, prochlorperazine **OR** prochlorperazine, QUEtiapine, simethicone, sodium chloride (PF), sodium chloride (PF)       PHYSICAL EXAMINATION  Temp:  [97.9  F (36.6  C)-99.1  F (37.3  C)] 98.5  F (36.9  C)  Pulse:  [] 90  Resp:  [7-33] 17  BP: ()/() 124/55  SpO2:  [88 %-98 %] 93 %      Neurologic  Mental Status:  alert, oriented x 3, follows commands, naming and repetition normal, mild expressive aphasia, mild receptive aphasia with complex commands   Cranial Nerves:  visual fields intact, EOMI with normal smooth pursuit, hearing not formally tested but intact to conversation, no face droop,   Motor:  normal muscle tone and bulk, no abnormal movements, able to move all limbs spontaneously, strength 5/5 throughout upper and lower extremities  Sensory:  no extinction on double simultaneous stimulation    Coordination:  normal finger-to-nose and heel-to-shin bilaterally without dysmetria, rapid alternating movements symmetric  Station/Gait:  deferred    Stroke Scales    NIHSS  1a. Level of Consciousness 0-->Alert, keenly responsive   1b. LOC Questions 1-->Answers one question correctly   1c. LOC Commands 0-->Performs both tasks  correctly   2.   Best Gaze 0-->Normal   3.   Visual 0-->No visual loss   4.   Facial Palsy 0-->Normal symmetrical movements   5a. Motor Arm, Left 0-->No drift, limb holds 90 (or 45) degrees for full 10 secs   5b. Motor Arm, Right 1-->Drift, limb holds 90 (or 45) degrees, but drifts down before full 10 secs, does not hit bed or other support (recent wrist orif; usually in arm splint)   6a. Motor Leg, Left 0-->No drift, leg holds 30 degree position for full 5 secs   6b. Motor Leg, right 1-->Drift, leg falls by the end of the 5-sec period but does not hit bed (recent right hip orif)   7.   Limb Ataxia 0-->Absent   8.   Sensory 0-->Normal, no sensory loss   9.   Best Language 1-->Mild-to-moderate aphasia, some obvious loss of fluency or facility of comprehension, without significant limitation on ideas expressed or form of expression. Reduction of speech and/or comprehension, however, makes conversation. . . (see row details)   10. Dysarthria 1-->Mild-to-moderate dysarthria, patient slurs at least some words and, at worst, can be understood with some difficulty   11. Extinction and Inattention  0-->No abnormality   Total 5 (05/14/23 0000)       Modified Marion Score (Pre-morbid)    -      Imaging  I personally reviewed all imaging; relevant findings per HPI.     Lab Results Data   CBC  Recent Labs   Lab 05/14/23  0457 05/13/23  1135 05/12/23  1152 05/11/23  0727 05/10/23  0859 05/09/23  0723   WBC  --   --   --   --  14.9* 16.0*   RBC  --   --   --   --  2.83* 2.90*   HGB 9.0* 9.1* 9.4*   < > 8.8*  8.8* 9.0*  9.0*   HCT  --   --   --   --  26.7* 27.4*   PLT  --   --   --   --  599* 571*    < > = values in this interval not displayed.     Basic Metabolic Panel    Recent Labs   Lab 05/14/23  0457 05/13/23  2202 05/13/23  1830 05/13/23  1135 05/12/23  1152     --   --  137 137   POTASSIUM 4.0  --   --  3.9 3.8   CHLORIDE 102  --   --  102 101   CO2 25  --   --  22 25   BUN 14.7  --   --  16.5 18.3   CR 0.58  --    --  0.59 0.69   * 119* 113* 158* 142*   SAGE 8.3*  --   --  8.3* 8.9     Liver Panel  Recent Labs   Lab 05/09/23  1853   PROTTOTAL 5.0*   ALBUMIN 2.7*   BILITOTAL 1.1   ALKPHOS 112*   AST 35   ALT 19     INR    Recent Labs   Lab Test 02/07/23  1125 02/06/23  1443   INR 1.06 0.97      Lipid Profile    Recent Labs   Lab Test 05/10/23  0859 02/06/23  1537 10/08/19  1200 12/11/15  0959 06/01/15  1205   CHOL 171 171 174   < > 137   HDL 34* 42* 45*   < > 44*   LDL 99 104* 90   < > 67   TRIG 190* 124 197*   < > 129   CHOLHDLRATIO  --   --   --   --  3.1    < > = values in this interval not displayed.     A1C    Recent Labs   Lab Test 05/10/23  0859 02/05/23  0750   A1C 4.9 6.3*     Troponin  No results for input(s): CTROPT, TROPONINIS, TROPONINI, GHTROP in the last 168 hours.       Data

## 2023-05-14 NOTE — PLAN OF CARE
Patient vital signs are at baseline: Yes  Patient able to ambulate as they were prior to admission or with assist devices provided by therapies during their stay:  No,  Reason:  Lift Ax2  Patient MUST void prior to discharge:  Yes  Patient able to tolerate oral intake:  Yes  Pain has adequate pain control using Oral analgesics:  Yes  Does patient have an identified :  Yes  Has goal D/C date and time been discussed with patient:  Yes    AxOx4, worsening expressive aphasia (RRT called at 1820), S/L, Ax2 lift turn and repo, using purewick with adequate output, oxy given for back pain.

## 2023-05-14 NOTE — PROGRESS NOTES
Neuro IR Phone Consult Note    Called by Vascular Neurology re: pt with known L MCA M2 segment symptomatic stenosis, admitted for femur fracture, L MCA territory infarct while DAPT held for ORIF. Tonight, baseline aphasia worsened in the setting of hypotension, despite DAPT having been restarted. Pressures raised with laying flat and bolus, but still not reverted to baseline. She will likely need intracranial angioplasty, possibly stenting.    Discussed with House Officer:  Transfer to ICU for peripheral NE infusion to target -180 (I ordered)  NPO in anticipation of procedure (I ordered)  Continue q2 hour neuro checks (already ordered)    If she continues to have worsening symptoms despite this, we will take her for emergent cerebral angiogram and angioplasty at that time.    If her neuro exam remains stable as it is now, we will go in the morning.    If she reverts to her pre-stroke code baseline, we will go on Monday during business hours.    The patient was discussed with the Vascular Neurology Attending, the ICU charge RN, and the Neuro IR attending.    Swati Lacy MD  Endovascular Surgical Neuroradiology Fellow, PGY-6  [id number]

## 2023-05-14 NOTE — PROCEDURES
[facility]     Endovascular Surgical Neuroradiology Post-Procedure Note    Pre-Procedure Diagnosis:  Left middle cerebral artery stenosis  Post-Procedure Diagnosis:  Same    Procedure(s):   Intracranial arterial angioplasty and stenting    Findings:  Left middle cerebral artery frontal M2 segment stenosis s/p balloon angioplasty x2    Plan:  -160, continue DAPT, 2 hours flat bedrest    Primary Surgeon:  Dr. Nakul Leigh  Secondary Surgeon:  none  Secondary Surgeon Review:  None  Fellow:  Regino  Additional Assistants:  none    Prior to the start of the procedure and with procedural staff participation, I verbally confirmed: the patient s identity using two indicators, relevant allergies, that the procedure was appropriate and matched the consent or emergent situation, and that the correct equipment/implants were available. Immediately prior to starting the procedure I conducted the Time Out with the procedural staff and re-confirmed the patient s name, procedure, and site/side. (The Joint Commission universal protocol was followed.)  Yes    PRU value: Not applicable    Anesthesia:  Conscious Sedation  Medications:  Lidocaine 1% 14 ml intradermal, heparin 4500 units IV, midazolam 1.5 mg IV, fentanyl 75 mcg IV  Puncture site:  Right Femoral Artery    Fluoroscopy time (minutes):  19.9  Radiation dose (mGy):  500.24    Contrast amount (mL):  84     Estimated blood loss (mL):  20    Closure:  Device    Disposition:  Will be followed in hospital by the Neuro Critical Care/Stroke team.        Sedation Post-Procedure Summary    Sedatives: Fentanyl and Midazolam (Versed)    Vital signs and pulse oximetry were monitored and remained stable throughout the procedure, and sedation was maintained until the procedure was complete.  The patient was monitored by staff until sedation discharge criteria were met.    Patient tolerance:  Patient tolerated the procedure well with no immediate  complications.    Time of sedation in minutes:  65 minutes from beginning to end of physician one to one monitoring.    Swati Lacy MD  Pager:  [phone]

## 2023-05-15 NOTE — PROGRESS NOTES
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 DAPT was discontinued pre-op, and post op had sig blood loss (Hgb 4.6).     Following surgery had worsened aphasia. MRI done showed new L posterior MCA ischemic stroke (watershed distribution) with worsened regions of ICAD and stroke service consulted.     She was restarted on DAPT _ statin, TTE unremarkable.     On 5/13 she had new worsened aphasia and stroke code again activated. SBP was in low 100s during this stroke code, which was the same as it has been for the past few days. CTA H/N did show worsening M2 stenosis. Per discussions with AVANI, she was transferred to ICU for peripheral pressors and close monitoring. MRI did not show new infarcts, but imaging was done early unfortunately - may have been too early to  new ischemic changes. No acute interventions (IVT) given recent strokes.     5/14: AVANI performed DSA with balloon angioplasty, did not pursue intracranial stenting at this time     Stroke Evaluation Summarized    MRI/Head CT MRI brain 5/13  1.  No significant interval change.    MRI brain 5/10  IMPRESSION:  1.  Left frontoparietal centrum semiovale white matter demonstrates multiple small and prominent acute infarcts.     2.  Left frontoparietal centrum semiovale white matter demonstrates multiple small areas of enhancement likely due to subacute infarcts. Recommend follow-up to resolution to exclude a more progressive etiology.     3.  Left frontoparietal centrum semiovale white matter demonstrate multiple small chronic infarcts.     4.  These acute, subacute, and chronic infarcts are within the same location.     5.  Chronic intracranial changes  described above.   Intracranial Vasculature HEAD CTA:  1.  No proximal branch vessel occlusion, aneurysm, or vascular malformation.   2.  Intracranial atherosclerosis with multifocal stenoses, as detailed. Briefly, severe stenoses at the A2/A3 segment left JOSI, inferior division M2 segment left MCA, P3 segments of bilateral PCAs, and distal V4 segment left vertebral artery.   Cervical Vasculature NECK CTA:  1.  No flow-limiting stenosis or findings of dissection.   2.  Question features compatible with fibromuscular dysplasia at the distal right cervical ICA and V2/V3 junction of left vertebral artery.     Echocardiogram    EKG/Telemetry Limited TTE  The rhythm was undetermined. NSR in recent study.  EF 55-60%  Normal LA   Other Testing CTP   1.  Alterations on perfusion maps corresponding with region of known infarct in left posterior corona radiata/centrum semiovale, as detailed.     LDL  5/10/2023: 99 mg/dL   A1C  5/10/2023: 4.9 %   Troponin No lab value available in past 48 hrs       Impression/Plan  # Recurrent L MCA watershed distribution strokes 2/2 L inferior division M2 stenosis (symptomatic intracranial atherosclerotic disease). S/p balloon angioplasty (intracranial stenting DEFERRED by AVANI at this time) on 5/14. Initial stroke during admission likely 2/2 being off DAPT, acute blood loss anemia (Hgb 4's) perioperatively. Second event on 5/13 concerning for failure of medical management hence AVANI consult for intracranial intervention consideration. P2Y12 on plavix 10. Does not appear to be pressure dependent given SBP in 100-110s during hospitalization. Exam today c/w with mild global aphasia.   - Q2H neurochecks, SBP goal 120-160 per AVANI  - Will work on weaning vasopressors and making sure pt tolerates/does not have worsening/fluctuating exam  - Cont to work with PT/OT/ST  - Cont  mg daily  - Cont plavix 75 mg daily - plan for 90 days per [facility] Trial for sICAD  - Cont atorvastatin 40 mg (LDL  "99)  - If clinically stable while off pressors, will consider transferring pt to floor    Patient Follow-up    - final recommendation pending work-up    We will continue to follow.     The Stroke Staff is Dr. Herron.    Antoine Grullon MD  Vascular Neurology Fellow    To page me or covering stroke neurology team member, click here: AMCOM  Choose \"On Call\" tab at top, then select \"NEUROLOGY/ALL SITES\" from middle drop-down box, press Enter, then look for \"stroke\" or \"telestroke\" for your site.    ______________________________________________________    Clinically Significant Risk Factors              # Hypoalbuminemia: Lowest albumin = 2.7 g/dL at 5/9/2023  6:53 PM, will monitor as appropriate            # Overweight: Estimated body mass index is 26.05 kg/m  as calculated from the following:    Height as of this encounter: 1.524 m (5').    Weight as of this encounter: 60.5 kg (133 lb 6.1 oz).             Medications   Scheduled Meds    alectinib  600 mg Oral BID w/meals     aspirin  325 mg Oral Daily     atorvastatin  40 mg Oral QPM     calcium carbonate  600 mg Oral BID w/meals     clopidogrel  75 mg Oral or NG Tube Daily     latanoprost  1 drop Both Eyes QPM     levofloxacin  500 mg Intravenous Q24H     pantoprazole  40 mg Oral Daily     polyethylene glycol  17 g Oral Daily     senna-docusate  1 tablet Oral BID     sodium chloride (PF)  3 mL Intracatheter Q8H     cholecalciferol  50 mcg Oral Daily       Infusion Meds    - MEDICATION INSTRUCTIONS -       lactated ringers 75 mL/hr at 05/15/23 0526     norepinephrine 0.02 mcg/kg/min (05/15/23 1508)       PRN Meds  sodium chloride 0.9%, sodium chloride 0.9%, acetaminophen, sore throat, bisacodyl, - MEDICATION INSTRUCTIONS -, HYDROmorphone **OR** HYDROmorphone, lidocaine 4%, lidocaine (buffered or not buffered), magnesium hydroxide, melatonin, methocarbamol, naloxone **OR** naloxone **OR** naloxone **OR** naloxone, ondansetron **OR** ondansetron, oxyCODONE **OR** oxyCODONE, " petrolatum-zinc oxide, polyethylene glycol, prochlorperazine **OR** prochlorperazine, QUEtiapine, simethicone, sodium chloride (PF), sodium chloride (PF)       PHYSICAL EXAMINATION  Temp:  [97.6  F (36.4  C)-99  F (37.2  C)] 98  F (36.7  C)  Pulse:  [] 87  Resp:  [10-31] 16  BP: ()/(40-97) 98/44  SpO2:  [84 %-99 %] 91 %      Neuro Exam  MS: Some word finding difficulties/difficulty with following multistep commands, naming overall mostly intact with mild errors  CN: EOEMI, VFF bilat, symmetric facial movemetns  Motor: BUE, BLE antigravity with no drift  Sensory: LT intact in all 4 limbs  Coordination: FTN intact bilat     Stroke Scales    NIHSS  1a. Level of Consciousness 0-->Alert, keenly responsive   1b. LOC Questions 0-->Answers both questions correctly   1c. LOC Commands 0-->Performs both tasks correctly   2.   Best Gaze 0-->Normal   3.   Visual 0-->No visual loss   4.   Facial Palsy 0-->Normal symmetrical movements   5a. Motor Arm, Left 0-->No drift, limb holds 90 (or 45) degrees for full 10 secs   5b. Motor Arm, Right 1-->Drift, limb holds 90 (or 45) degrees, but drifts down before full 10 secs, does not hit bed or other support (Hx ORIF- R) arm in cast)   6a. Motor Leg, Left 0-->No drift, leg holds 30 degree position for full 5 secs   6b. Motor Leg, right (UN) Amputation or joint fusion (ADRIANO- post-op order R) Leg straight)   7.   Limb Ataxia 0-->Absent   8.   Sensory 0-->Normal, no sensory loss   9.   Best Language 1-->Mild-to-moderate aphasia, some obvious loss of fluency or facility of comprehension, without significant limitation on ideas expressed or form of expression. Reduction of speech and/or comprehension, however, makes conversation. . . (see row details)   10. Dysarthria 1-->Mild-to-moderate dysarthria, patient slurs at least some words and, at worst, can be understood with some difficulty   11. Extinction and Inattention  0-->No abnormality   Total 3 (05/14/23 1900)       Modified  Vilas Score (Pre-morbid)    -      Imaging  I personally reviewed all imaging; relevant findings per HPI.     Lab Results Data   CBC  Recent Labs   Lab 05/15/23  1216 05/14/23  0457 05/13/23  1135 05/11/23  0727 05/10/23  0859   WBC 13.5* 15.7*  --   --  14.9*   RBC 2.48* 2.87*  --   --  2.83*   HGB 7.8* 9.0*  9.0* 9.1*   < > 8.8*  8.8*   HCT 24.0* 27.5*  --   --  26.7*   * 706*  --   --  599*    < > = values in this interval not displayed.     Basic Metabolic Panel    Recent Labs   Lab 05/15/23  1216 05/14/23  0457 05/13/23  2202 05/13/23  1830 05/13/23  1135    137  --   --  137   POTASSIUM 4.0 4.0  --   --  3.9   CHLORIDE 104 102  --   --  102   CO2 23 25  --   --  22   BUN 10.5 14.7  --   --  16.5   CR 0.50* 0.58  --   --  0.59   * 122* 119*   < > 158*   SAGE 8.1* 8.3*  --   --  8.3*    < > = values in this interval not displayed.     Liver Panel  Recent Labs   Lab 05/09/23  1853   PROTTOTAL 5.0*   ALBUMIN 2.7*   BILITOTAL 1.1   ALKPHOS 112*   AST 35   ALT 19     INR    Recent Labs   Lab Test 02/07/23  1125 02/06/23  1443   INR 1.06 0.97      Lipid Profile    Recent Labs   Lab Test 05/10/23  0859 02/06/23  1537 10/08/19  1200 12/11/15  0959 06/01/15  1205   CHOL 171 171 174   < > 137   HDL 34* 42* 45*   < > 44*   LDL 99 104* 90   < > 67   TRIG 190* 124 197*   < > 129   CHOLHDLRATIO  --   --   --   --  3.1    < > = values in this interval not displayed.     A1C    Recent Labs   Lab Test 05/10/23  0859 02/05/23  0750   A1C 4.9 6.3*     Troponin  No results for input(s): CTROPT, TROPONINIS, TROPONINI, GHTROP in the last 168 hours.       Data

## 2023-05-15 NOTE — PLAN OF CARE
Goal Outcome Evaluation:      Plan of Care Reviewed With:  (Interdisciplinary bedside rounds)    Overall Patient Progress: no changeOverall Patient Progress: no change    Outcome Evaluation: Patient consuming meals fairly well, sending Magic Cup BID.  Continue current nutrition plan.    Grisel Reese RD, LD, CNSC   Clinical Dietitian - [facility]

## 2023-05-15 NOTE — PLAN OF CARE
May 14, 2023 [id number]:   - 2315: Neuro IR paged r/t SBP 100s-110s (Goal 120s-160s) w/ max P)IV Levo gtt at 0.125mcg/kg/min & Epic hard stop indicating Levo infusing >22hrs through P)IV. Dr. Lacy verbal to wean Levo as Neuro status tolerates w/ MAP >65. Levo order updated to titrate to MAP >65 & assess P)IV Q1H for phlebitis or infiltration.       Neuro Assessments Q2H  - LOC: A/O x4 w/ assistance of choices to indicate correct orientation d/t ongoing expressive aphasia w/ word-finding difficulty.  - WEBSTER: Sensation present throughout extremities.   RUE 3/5 strength d/t recent wrist ORIF + cast.   RLE 3/5 strength d/t recent hip ORIF.  - Pupils: 3mm, equal, round, brisk.    Cardiac  - SR w/ BBB, HR 70s-90s, SBP 90s-120s, MAP >65 w/ Levo gtt.   - Pulses: BUE +2, BLE +2. Edema: +1 R) Hip.    Resp   - 3L NC, SpO2 >90%. Regular/Unlabored. LS: Clear/Dim. Good cough.    GI  - Diet: Regular. Pills w/ apple sauce. Abdomen: active, soft, nontender.      - Purewick in place, UOP adequate.    Skin  - R) Groin IR sheath site- hematoma decreased from outlined area.  - Pressure Injury- Mid Back, Reddened, erythema surrounding, Mepilex in place.  - R) Hip incision- WDL, no drainage, dressing in place.  - R) arm in cast from ORIF.    Pain  - Dilaudid 0.2mg-0.4mg IV given x3. Oxycodone 5mg PO given x1. C/o pain to back, R) Hip/Groin.    Gtts/Infusions  - Levo 0.12 mcg/kg/min. LR @ 75mL/hr.

## 2023-05-15 NOTE — PROGRESS NOTES
Neuro IR Progress Note    No acute events overnight. Has a stable expressive aphasia, moving all 4 extremities spontaneously. SBP overnight remains in 100-110s range. Required a peripheral dose of pressors to keep MAPs > 65. Groin site is C/D/I    HPI:   83 yo F pt with known L MCA M2 segment symptomatic stenosis, admitted for femur fracture, L MCA territory infarct while DAPT held for ORIF. Tonight, baseline aphasia worsened in the setting of hypotension, despite DAPT having been restarted. Pressures raised with laying flat and bolus, but still not reverted to baseline. She will likely need intracranial angioplasty, possibly stenting. S/p Angioplasty only on 5/14/2023    Plan  - Wean Pressors as Tolerated  - SBP Goal 100-140  - Continue DAPT  - Neuro IR will continue to follow    Maximus Corey MD, MPH  Neuroendovascular Surgery Fellow  [facility]  Pager: [phone]    I have reviewed the history. I have seen and examined the patient myself and agree with the assessment and plan above. Wean off.vasopressors based on stability of neurological examination. If tolerating a lower blood pressure, we can reset the parameters accordingly.  MARY Leigh MD

## 2023-05-15 NOTE — PROGRESS NOTES
CLINICAL NUTRITION SERVICES - REASSESSMENT NOTE      Malnutrition: % Weight Loss:  Weight loss does not meet criteria for malnutrition (weight up on admit, now back to usual weight)  % Intake:  Decreased intake does not meet criteria for malnutrition (eating mostly % of meals)  Subcutaneous Fat Loss:  Orbital region mild+ depletion and Upper arm region mild+ depletion  Muscle Loss:  Temporal region mild-moderate depletion and Clavicle bone region mild-moderate depletion (suspecting more age-related sarcopenia)  Fluid Retention:  None noted    Malnutrition Diagnosis: Patient does not meet two of the above criteria necessary for diagnosing malnutrition       EVALUATION OF PROGRESS TOWARD GOALS   Diet:  Regular + Magic Cup BID     Intake/Tolerance:  Patient currently in the ICU with expressive aphasia.  Noted per review of flowsheets that she has been taking % of her meals a majority of the time (>90-95%).  Also noted that she has been ordering at least 2 meals per day,    Breakfast this morning was eggs, cantaloupe, and milk.  Lunch consisted of a spinach salad, tomato soup, and milk.     Noted patient requires assist with feeding       ASSESSED NUTRITION NEEDS:  Dosing Weight: 50.1 kg (adjusted, based on 63.8 kg-- 5/5)  Estimated Energy Needs: 8785-4604 kcals (25-30 Kcal/Kg)  Justification: maintenance  Estimated Protein Needs: 60-75 grams protein (1.2-1.5 g pro/Kg)  Justification: post-op and preservation of lean body mass  Estimated Fluid Needs: 1 mL/Kcal      NEW FINDINGS:   Weight = 60.5 kg (down from admit but back towards usual body weight - appears weight was up on admit)    5/13:  CODE STROKE   5/14:  Cerebral angiogram with possible stenting and or angioplasty with moderate sedation     WOCN 5/11 -   Pt noted to have 3 x HAPI on spine. No  Local s/s infection   Superior:  Stage 1, no local s/s infection   Medial: Stage 1, no local s/s infection   Distal: DTPI, no local s/s infection     Previous  Goals (5/5):   Pt to consume >75% of BID meals + 1 supplement/day  Evaluation: Met based on information in flowsheets     Previous Nutrition Diagnosis (5/5):   Predicted inadequate nutrient intake related to potential for decreased appetite post-op and change of settings  Evaluation: No change      MALNUTRITION  % Weight Loss:  Weight loss does not meet criteria for malnutrition (weight up on admit, now back to usual weight)  % Intake:  Decreased intake does not meet criteria for malnutrition (eating mostly % of meals)  Subcutaneous Fat Loss:  Orbital region mild+ depletion and Upper arm region mild+ depletion  Muscle Loss:  Temporal region mild-moderate depletion and Clavicle bone region mild-moderate depletion (suspecting more age-related sarcopenia)  Fluid Retention:  None noted    Malnutrition Diagnosis: Patient does not meet two of the above criteria necessary for diagnosing malnutrition    CURRENT NUTRITION DIAGNOSIS  Predicted inadequate nutrient intake related to prolonged hospital stay, need for assist with meals      INTERVENTIONS  Recommendations / Nutrition Prescription  Continue current diet as tolerated + Magic Cup BID     Implementation  Collaboration and Referral of Nutrition care:  Patient discussed today during interdisciplinary bedside rounds     Goals  Patient will continue to consume at least 75% meals + supplements BID     MONITORING AND EVALUATION:  Progress towards goals will be monitored and evaluated per protocol and Practice Guidelines    Grisel Reese, RD, LD, CNSC   Clinical Dietitian - [facility]

## 2023-05-15 NOTE — PROGRESS NOTES
05/15/23 1150   Appointment Info   Signing Clinician's Name / Credentials (OT) Charlene Olmos, OTR/L   Living Environment   People in Home alone   Current Living Arrangements assisted living   Home Accessibility no concerns   Transportation Anticipated other (see comments)  (provided by facility)   Living Environment Comments Pt reports she has walk-in shower at [facility]   Self-Care   Current Activity Tolerance fair   Equipment Currently Used at Home grab bar, toilet;grab bar, tub/shower;walker, rolling   Fall history within last six months yes   Number of times patient has fallen within last six months 1   Activity/Exercise/Self-Care Comment Per pt: baseline indep with dressing, toileting. Has A with bathing, all meals, laundry and cleaning service provided. A with meds as well and she reports she ambulates with FWW.   General Information   Onset of Illness/Injury or Date of Surgery 05/14/23   Referring Physician Dr. Mg Montes   Patient/Family Therapy Goal Statement (OT) did not state   Additional Occupational Profile Info/Pertinent History of Current Problem 82 year old female with PMH  metastatic lung cancer, hx CVA Feb 2023, who was adnutted 5/2/2023 with a fall and R hip and R wrist fractures. S/p ORIF. Dual anitplatelets held. Unfortunately,post op course complicated by repeat Stroke in same area as previous strokes. Neuro reconsulted. Course has also been complicated by pneumonia and UTI.   Existing Precautions/Restrictions weight bearing  (R hip WBAT, NWB R UE, ok for platform walker)   Limitations/Impairments aphasia  (expressive)   Right Upper Extremity (Weight-bearing Status) non weight-bearing (NWB)   Right Lower Extremity (Weight-bearing Status) non weight-bearing (NWB);weight-bearing as tolerated (WBAT);touch down weight-bearing (TDWB)   Cognitive Status Examination   Orientation Status person;time  (Orientation skewed by expressed aphasia)   Affect/Mental Status (Cognitive) WNL   Follows Commands  follows two-step commands;over 90% accuracy   Cognitive Status Comments Expressive aphasia interferes with responses at times however cognition appears fairly intact   Visual Perception   Visual Impairment/Limitations corrective lenses full-time;corrective lenses for reading;corrective lenses for distance   Impact of Vision Impairment on Function (Vision) Pt denies changes   Pain Assessment   Patient Currently in Pain Yes, see Vital Sign flowsheet   Range of Motion Comprehensive   Comment, General Range of Motion LUE WFL grossly; R jorge (forearm casted) AROM aprox 90o   Strength Comprehensive (MMT)   Comment, General Manual Muscle Testing (MMT) Assessment L jorge 3+/5 .  Weak bilaterally from jorge through hand.   Bed Mobility   Comment (Bed Mobility) max A of 2   Transfers   Transfer Comments max A of 2 for sit-stand to Anjelica Steady   Balance   Balance Comments Max A of 2 using Anjelica Steady   Lower Body Dressing Assessment/Training   [place] Level (Lower Body Dressing) maximum assist (25% patient effort);assist of 2   Grooming Assessment/Training   [place] Level (Grooming) minimum assist (75% patient effort)   Position (Grooming) sitting up in bed   Clinical Impression   Criteria for Skilled Therapeutic Interventions Met (OT) Yes, treatment indicated   OT Diagnosis decreased ADL/IADL performance   OT Problem List-Impairments impacting ADL problems related to;activity tolerance impaired;balance;cognition;communication;range of motion (ROM);strength;postural control   Assessment of Occupational Performance 3-5 Performance Deficits   Identified Performance Deficits functional mobility, dressing, toileting   Planned Therapy Interventions (OT) ADL retraining;IADL retraining;cognition;transfer training   Clinical Decision Making Complexity (OT) low complexity   Risk & Benefits of therapy have been explained evaluation/treatment results reviewed;care plan/treatment goals reviewed;risks/benefits  "reviewed;current/potential barriers reviewed;participants voiced agreement with care plan;participants included;patient   OT Total Evaluation Time   OT Eval, Low Complexity Minutes (93901) 15   OT Goals   Therapy Frequency (OT) Daily   OT Predicted Duration/Target Date for Goal Attainment 23   OT Goals Hygiene/Grooming;Upper Body Dressing;Meal Preparation;Cognition   OT: Hygiene/Grooming supervision/stand-by assist  (while seated EOB)   OT: Upper Body Dressing Minimal assist  (w/set-up seated EOB using 1-handed technique)   OT: Transfer Moderate assist;with assistive device  (of 2 bedside commode transfer)   OT: Cognitive Patient/caregiver will verbalize understanding of cognitive assessment results/recommendations as needed for safe discharge planning   Self-Care/Home Management   Self-Care/Home Mgmt/ADL, Compensatory, Meal Prep Minutes (73690) 10   Treatment Detail/Skilled Intervention OT: pt attempting to converse and at times able to give complete sentences and at other times struggles with word-finding and expression. Pt oriented to self, , month, year and able to give baseline info which appears accurate. Pt asked for assistance for basic self-care tasks and OT encouraged her to do on own. Provided set-up on tray table and positioned her upright in bed and she was able to use non-dom L hand to brush teeth (A to manipulate toothpaste/cap etc) and comb her hair thoroughly both sides/back of head without cueing or assist. Positioned pt in comfortable position at completion, bed alarm engaged and all needs within reach.   Therapeutic Activities   Therapeutic Activity Minutes (01247) 30   Treatment Detail/Skilled Intervention Corx with PT for optimal SPH and to maximize pt mobility potential. Sup>sit single step cues both verbal and tacitel, able to intiate with LE movement. \"forgets\" what she is doing at times and needs repeat cues. Mod A -Min A x 2 at the trunk up to sitting. Increased time to achieve " "midline and balance during dangle, able to progress toward CGA in sitting. L hand on bed, R aarm resting in lap. Jenny steady trial, stood with Max A x 2, signficant push/lean R and posterior, stands \"at an angle\" secondary to such push. Cued to look R, shifting hips more L, to allow for jenny steady set to be placed. Sat with CGA in jenny steady x 5 minutes, attemtped tx to chair, blue chair has too high of arm rests to place jenny steady safely. Pt feeling light headed, returned to bed, Max A x 2 stand>sit, sit>supine.   OT Discharge Planning   OT Plan EOB sitting balance w/advance to simple H/G at EOB, BUE strengthening (R jorge only)   OT Discharge Recommendation (DC Rec) Transitional Care Facility   OT Rationale for DC Rec Pt significantly below her indep baseline with mobility, self-cares and will benefit from continued therapies in TCU setting to address underlying problems affecting function prior to return to her LEFTY   OT Brief overview of current status Set-up for basic grooming tasks and able to use L hand to complete well, Max A of 2 with bed mob, standing trials w/Jenny Steady Max A of 2.   Total Session Time   Timed Code Treatment Minutes 40     "

## 2023-05-15 NOTE — PROGRESS NOTES
[facility]    Medicine Progress Note - Hospitalist Service    Date of Admission:  5/1/2023  Date of Service: 05/15/2023    Assessment & Plan     Summary: Chiquita Berry is a 82 year old female with PMH  metastatic lung cancer, hx CVA Feb 2023, who was adnutted 5/2/2023 with a fall and R hip and R wrist fractures. S/p ORIF. Dual anitplatelets held. Unfortunately,post op course complicated by repeat Stroke in same area as previous strokes. Neuro reconsulted. Course has also been complicated by pneumonia and UTI. Started on abx.  Anticipate discharge to TCU when acute issues resolved.     Acute CVA  Hx CVA Feb 2023  Patient reports residual expressive aphasia, mild from previous CVA. On a modified diet here per speech.  - Speech path  - ASA and Plavix had been on hold since orthopedic surgery on 5/2/23, had been on Lovenox for prophylaxis while here per ortho.  Spoke with orthopedic surgery PA  5/8/2023 regarding restarting her antiplatelet therapy especially in the wake of recurrence of her initial stroke symptoms.  They were okay with that.we will discontinue Lovenox today and restart aspirin and Plavix.  - Repeat head CT obtained 5/8/2023 for new word finding and lower extremity weakness... Which per POA initially was  present with her initial stroke in februrary but symptms had resolved.  Repeat head CT was negative for acute stroke.  - Stroke neurology consulted due to on-going symptoms; echo ordered.   -- MRI/MRA reveals 1.  Left frontoparietal centrum semiovale white matter demonstrates multiple small and prominent acute infarcts.  2.  Left frontoparietal centrum semiovale white matter demonstrates multiple small areas of enhancement likely due to subacute infarcts. Recommend follow-up to resolution to exclude a more progressive etiology.  3.  Left frontoparietal centrum semiovale white matter demonstrate multiple small chronic infarcts.  4.  These acute, subacute, and chronic  infarcts are within the same location.  -- Echo -> The left ventricle is normal in size. There is normal left ventricular wall thickness. The visual ejection fraction is 55-60%. Septal motion is consistent with conduction abnormality.  -- Follow neuro recs  --Daily aspirin 81 mg for secondary stroke prevention  -- Plavix 75mg  -- DAPT 90 days then can consider asa + cilastazol after 90 day  -- Statin: lipitor 40mg    -- RRT 05/13 for worsening aphasia -> transferred to ICU and on Levophed to maintain pressures > 110. BPs now better   -- address left middle cerebral artery (MCA) stenosis. Per Neuro ->  S/p Angioplasty only on 5/14/2023  -- SBP goal > 110  -- Continue Midodrine 10 mg TID  -- Wean off Levophed     Mechanical fall  Acute comminuted fracture of the right hip  Acute comminuted fracture of the right distal radius  S/p ORIF R hip and R wrist 5/2/2023  Post-operative acute blood loss anemia, improved  Patient with mechanical fall, pushed by elevator door, no head trauma, no LOC, no premonitory symptoms. Workup in ED with fractures above and leukocytosis 16.1k. S/p ORIF of R hip and R wrist on 5/2/2023. Postoperatively complicated by anemia and delirium. Hgb 8.7 prior to surgery, Hgb 4.8 on 5/3 post op, s/p 3u pRBC now Hgb stable in 8s.  - Ortho consult appreciated                         - Post op management                - Pain control  - Per ortho, ASA and Plavix held and was to be restarted at discharge; patient was on lovenox for DVT ppx. Discussed with ortho given acute development of stroke like symptoms and this has now been restarted on 5/9/23.   - PT/OT/Speech path      Acute metabolic encephalopathy, improved  Acute hypoxic respiratory failure - resolved  Probable Pneumonia  Patient initially requiring 3-4L O2 to maintain O2 sats 92%. Patient was also notably delirious on 5/3. The etiology of this could be multifaceted, from post-operative state, to acute metabolic encephalopathy, to hypoperfusion  due to anemia. CT head no acute pathology. Delirium improved on 5/5, but continued to be hypoxic on 5/5. CXR done showed possible pneumonia - left base consolidation.  Plan:  - Stop IVF  - Wean O2 as able  - CXR shows left base consolidation/pleural effusion not significantly changed from prior CXR in Feb 2023.   -Continue to encourage use of incentive spirometer.  -Chest x-ray with slight improvement in atelectasis and/or infiltrate at the left base with associated pleural fluid.  -With a slightly minimal increase in procalcitonin, was started on empiric treatment of possible healthcare associated pneumonia with Levaquin 05/09.  -- 5/10/23: Improvement in hypoxia, weaned to RA. Plan to complete 7 day course of levaquin .    Leukocytosis  UTI  -- UA positive and follow up urine cultures -> 50,000-100,000 CFU/mL Enterococcus faecalis -> now on Levaquin (started 05/09) which covers     Hypocalcemia: Oscal started here     Metastatic lung cancer  Sees Dr. Pantoja at [facility], this was diagnosed via thoracentesis. No disease progression noted per latest office visit 4/6/2023.  - Okay to resume alectinib at discharge  - No plans for chemotherapy in setting of active infections.     Elevated LFTs  In setting of cancer, followed by Onocology. LFT trend here appears to be improving  - Outpatient follow up with Oncology     Diet: Snacks/Supplements Adult: Magic Cup; With Meals  Diet  Regular Diet Adult    DVT Prophylaxis: Pneumatic Compression Devices  Mcallister Catheter: Not present  Lines: None     Cardiac Monitoring: None  Code Status: No CPR- Do NOT Intubate      Clinically Significant Risk Factors              # Hypoalbuminemia: Lowest albumin = 2.7 g/dL at 5/9/2023  6:53 PM, will monitor as appropriate           # Overweight: Estimated body mass index is 26.05 kg/m  as calculated from the following:    Height as of this encounter: 1.524 m (5').    Weight as of this encounter: 60.5 kg (133 lb 6.1 oz).           Disposition Plan  TCU placement          Mg Montes MD  Hospitalist Service  [facility]  Securely message with [employer] (more info)  Text page via [employer] Paging/Directory   ______________________________________________________________________    Interval History      Stable post cerebral angiogram  Aphasia back to baseline  No CP/SOB  No fevers  No nausea / vomiting    Physical Exam   Vital Signs: Temp: 98  F (36.7  C) Temp src: Oral BP: 98/44 Pulse: 87   Resp: 16 SpO2: 91 % O2 Device: Nasal cannula Oxygen Delivery: 2 LPM  Weight: 133 lbs 6.05 oz    General Appearance: Well appearing for stated age.  Respiratory: CTAB, no rales or ronchi  Cardiovascular: S1, S2 normal, no murmurs  GI: non-tender on palpation, BS present  Neuro: no focal deficits aside from expressive aphasia,       Medical Decision Making       High Complexity Decision Making      Data     I have personally reviewed the following data over the past 24 hrs:    13.5 (H)  \   7.8 (L)   / 634 (H)     137 104 10.5 /  130 (H)   4.0 23 0.50 (L) \       Imaging results reviewed over the past 24 hrs:   Recent Results (from the past 24 hour(s))   XR Wrist Right G/E 3 Views    Narrative    XR WRIST RIGHT G/E 3 VIEWS  5/15/2023 9:16 AM     HISTORY: Routine postop x-rays of right wrist (s/p distal radius ORIF)  COMPARISON: 5/2/2023      Impression    IMPRESSION: Overlying splint material limits fine bony detail. Status  post plate and screw fixation of the distal radial fracture with  similar alignment. Interval healing with increased osseous bridging.  No hardware complication. Nondisplaced ulnar styloid process fracture  without healing. Otherwise unchanged.     SIGRID BOO MD         SYSTEM ID:  [id number]   XR Pelvis w Hip Right 1 View    Narrative    XR PELVIS AND HIP RIGHT 1 VIEW  5/15/2023 9:17 AM     HISTORY: S/p right IT femur fracture IM nail, routine films  COMPARISON: 5/2/2023      Impression    IMPRESSION: Status  post intramedullary nail and screw fixation of the  intertrochanteric right femur fracture with similar alignment. No  hardware complication. Interval resolution of subcutaneous emphysema.  No significant callus formation. Otherwise unchanged.     SIGRID BOO MD         SYSTEM ID:  [id number]

## 2023-05-15 NOTE — PROGRESS NOTES
Given stable exam with SBP 100s, and per discussions with AVANI, new SBP goal 100-140 with hopes of weaning off pressors.    Antoine Grullon MD PGY5 Stroke Neuro

## 2023-05-15 NOTE — PROGRESS NOTES
Care Management Follow Up    Length of Stay (days): 14    Expected Discharge Date:  TBD     Concerns to be Addressed:     Discharge planning  Patient plan of care discussed at interdisciplinary rounds: Yes    Anticipated Discharge Disposition: Transitional Care     Anticipated Discharge Services: therapy  Anticipated Discharge DME: None    Patient/family educated on Medicare website which has current facility and service quality ratings: no  Education Provided on the Discharge Plan:  no  Patient/Family in Agreement with the Plan: yes    Referrals Placed by CM/SW: Post Acute Facilities  Private pay costs discussed: Not applicable    Additional Information:  Call placed and message left for [facility] to update them as to patient's status.    Will continue to follow.      JUSTINA Chavez, [facility]    [phone]

## 2023-05-16 NOTE — PROGRESS NOTES
Orthopedic Surgery  Chiquita Berry  05/16/2023     Admit Date:  5/1/2023    POD: 14 Days Post-Op   Procedure(s):  OPEN REDUCTION INTERNAL FIXATION RIGHT HIP FRACTURE USING INTRAMEDULLARY NAIL  OPEN REDUCTION INTERNAL FIXATION RIGHT DISTAL RADIUS FRACTURE    Patient seen in ICU with RNs at bedside. Patient has had a complicated hospital course postoperatively including postoperative anemia and CVA on 5/13/23 and IR angioplasty and stenting on 5/14/23. Now off pressors. Patient has resumed DAPT (ASA and Plavix). Able to respond to some questions, primarily with yes and no responses, due to likely expressive aphasia. Reports that right wrist is doing well, but the right hip remains painful. Right hip pain seems to be more muscular in nature and location. Patient has not been able to mobilize well due to concomitant medical problems. Tolerating PO intake. No acute events overnight.    Temp:  [97.8  F (36.6  C)-98.4  F (36.9  C)] 98.2  F (36.8  C)  Pulse:  [] 79  Resp:  [11-24] 18  BP: ()/(34-98) 139/75  SpO2:  [89 %-100 %] 91 %    Alert and oriented.   Right upper extremity: Short arm splint clean and intact. This was removed and reveals well-healed volar wrist surgical site with diffuse ecchymosis. Mild swelling. No erythema or drainage. Able to complete gentle active ROM of the wrist and digits. With passive repositioning of the right elbow/shoulder, patient denies pain. Radial and ulnar pulses 2+. Sensation grossly intact to light touch.    Right lower extremity: Dressings clean, dry, and intact. Removed and two surgical sites are healing well without surrounding erythema or ecchymosis. No drainage. Mild swelling to right lateral thigh. Thigh compartments are soft and nontender. Calf is soft and nontender. No point tenderness. Able to actively DF and PF the ankle. Active toe ROM intact. 2+ DP pulse. Sensation grossly intact to light touch.    Labs/Imaging:  Recent Labs   Lab Test 05/16/23  0545  05/15/23  1216 05/14/23  0457   WBC 13.3* 13.5* 15.7*   HGB 8.0* 7.8* 9.0*  9.0*   * 634* 706*     Recent Labs   Lab Test 02/07/23  1125 02/06/23  1443   INR 1.06 0.97     Recent Labs   Lab Test 05/09/23  0723   CRPI 19.79*     Right hip/pelvis x-rays dated 5/15/23:  IMPRESSION: Status post intramedullary nail and screw fixation of the intertrochanteric right femur fracture with similar alignment. No hardware complication. Interval resolution of subcutaneous emphysema. No significant callus formation. Otherwise unchanged.     Right wrist x-rays dated 5/15/23:  IMPRESSION: Overlying splint material limits fine bony detail. Status post plate and screw fixation of the distal radial fracture with similar alignment. Interval healing with increased osseous bridging.  No hardware complication. Nondisplaced ulnar styloid process fracture without healing. Otherwise unchanged.     A/P    1. S/p right intertrochanteric femur fracture ORIF   -Reviewed typical pain level expectations and general postoperative protocol. No signs of infection.  -Continue Plavix and ASA per neurology/primary teams.   -Mobilize with PT/OT.  -WBAT RLE with platform walker and assist(s).  -Continue current pain regimen.  -Dressings removed today. Surgical sites to right hip can remain open to air. Dermabond to slough off naturally. Okay to shower, no soaking x 2-3 more weeks.  -Follow-up at 6 weeks postop with Dr. Mehdi Vega.    2. S/p right distal radius fracture ORIF  -Splint and sutures (7) removed today without complication. Steri-Strips can fall off naturally over next 2-3 days. May consider larger bandage for barrier between brace over surgical site until fully healed. Okay for showering, no soaking x 2-3 more weeks.  -Brace ordered through orthotics today. This should be worn at all times aside from hygiene and ROM exercises for the next 4-6 weeks.  -Mobilize with PT/OT. Updated OT order placed to begin gentle/AROM of the right  wrist. Shoulder, elbow, and digit ROM as tolerated.   -No lifting more than 1-2 pounds with the right upper extremity x 4 more weeks, then can slowly progress. Okay for platform walker use.  -Follow-up at 6 weeks postop with Dr. Mehdi Vega.    3. Disposition  -Anticipate d/c to TCU when medically cleared. If the patient is still inpatient at the 6 week postop point (6/13/23) ortho trauma team will see her. If she is discharged/at TCU, she can follow up with Dr. Mehdi Vega. Discharge orders updated. Please contact ortho trauma team if any questions or concerns arise in the meantime.    Jennifer Hammer PA-C  [facility]

## 2023-05-16 NOTE — PLAN OF CARE
Neuro:  A/Ox4, PERRL.  WEBSTER.  Follows commands.  EOMS, Word finding difficulty, otherwise coherent and answers questions appropriately    Cardiovascular:  RRR, SR, Levophed restarted at 0002h, titrated to achieve MAP >65, palpable peripheral pulses, skin warm to touch    Pulmonary: O2 at 2lpm per NC, SPO2s >90%. Probe changed w/ better Oximetry noted, Regular/Unlabored. LS Dim.     GI:  Regular, pills taken whole w/ applesauce, one pill at a time , normoactive BS , no BM tonight, Senna @HS per MAR     :  Adequate UOP per isael     Endocrine: euglycemia    Skin:  right groin hematoma stable, ecchymosis fading, PI at back ,erythema,new mepilex applied,  R Hip post op incision- WDL, no drainage, CDI ; R arm in soft cast p ORIF, elastic wrap CDI    Pain: right hip/arm pain, constant, aching, moderate, Dilaudid 0.2 mg x1, Oxycodone 10 mg PO x2, Methocarbamol x1, Tylenol x1

## 2023-05-16 NOTE — PLAN OF CARE
Goal Outcome Evaluation:       Pt neuro status has slightly improved. Titrated off Levo this AM. Up to chair w/ PT/OT for lunch. VSS. Transferred to Neuro around 1630.

## 2023-05-16 NOTE — PROGRESS NOTES
[facility]    Medicine Progress Note - Hospitalist Service    Date of Admission:  5/1/2023  Date of Service: 05/16/2023    Assessment & Plan     Summary: Chiquita Berry is a 82 year old female with PMH  metastatic lung cancer, hx CVA Feb 2023, who was admitted 5/2/2023 with a fall and R hip and R wrist fractures. S/p ORIF. Dual anitplatelets held post-operatively. Unfortunately, post op course complicated by repeat Stroke on 5/13/23 in L posterior MCA (watershed distribution). She underwent neuro IR angioplasty and stenting of L MCA M2 segment on 5/14/23. She was subsequently transferred to ICU on 5/13 for pressors due to hypotension and worsening neuro findings. Titrated off pressors AM of 5/16.    Acute CVA  Hx CVA Feb 2023  S/p L MCA angioplasty on 5/14/23  * Developed stroke like symptoms post ORIF while DAPT on hold on 5/8  * RRT 05/13 for worsening aphasia -> transferred to ICU and on Levophed to maintain pressures > 110. Titrated off 5/15  * ASA and Plavix had been on hold since orthopedic surgery on 5/2/23, had been on Lovenox for prophylaxis while here per ortho.  Resume DAPT on 5/8 due to repeat CVA   * Repeat head CT obtained 5/8/2023 for new word finding and lower extremity weakness. Reportedly was  present with her initial stroke in Februrary but symptms had resolved.  Repeat head CT was negative for acute stroke.  * MRI/MRA reveals 1.  Left frontoparietal centrum semiovale white matter demonstrates multiple small and prominent acute infarcts.  2.  Left frontoparietal centrum semiovale white matter demonstrates multiple small areas of enhancement likely due to subacute infarcts. Recommend follow-up to resolution to exclude a more progressive etiology.  3.  Left frontoparietal centrum semiovale white matter demonstrate multiple small chronic infarcts.  4.  These acute, subacute, and chronic infarcts are within the same location.  * Echo -> The left ventricle is normal in  size. There is normal left ventricular wall thickness. The visual ejection fraction is 55-60%. Septal motion is consistent with conduction abnormality.  -- Follow neuro recs  -- Appreciate stroke neurology consulted due to on-going symptoms  --Daily aspirin 325 started 5/14 post neuro IR (continue indefinitely)  -- Plavix 75mg  -- DAPT 90 days then can consider asa + cilastazol after 90 day  -- Statin: lipitor 40mg    -- SLP/PT/OT  -- SBP goal > 110  -- Continue Midodrine 10 mg TID     Mechanical fall  Acute comminuted fracture of the right hip  Acute comminuted fracture of the right distal radius  S/p ORIF R hip and R wrist 5/2/2023  Post-operative acute blood loss anemia, improved  Patient with mechanical fall, pushed by elevator door, no head trauma, no LOC, no premonitory symptoms. Workup in ED with fractures above and leukocytosis 16.1k. S/p ORIF of R hip and R wrist on 5/2/2023. Postoperatively complicated by anemia and delirium. Hgb 8.7 prior to surgery, Hgb 4.8 on 5/3 post op, s/p 3u pRBC now Hgb stable in 8s.  - Ortho consult appreciated                         - Post op management                - Pain control  - Per ortho, ASA and Plavix held and were to be restarted at discharge; patient was on lovenox for DVT ppx. DAPT was restarted on 5/9/23 due to CVA   - PT/OT/Speech path      Acute metabolic encephalopathy, improved  Acute hypoxic respiratory failure - resolved  Probable Pneumonia, treated  Patient initially requiring 3-4L O2 to maintain O2 sats 92%. Patient was also notably delirious on 5/3. The etiology of this could be multifaceted, from post-operative state, to acute metabolic encephalopathy, to hypoperfusion due to anemia. CT head no acute pathology. Delirium improved on 5/5, but continued to be hypoxic on 5/5. CXR done showed possible pneumonia - left base consolidation.  Plan:  - Wean O2 as able  -Continue to encourage use of incentive spirometer.  - completed 7 days of ABX on 5/15      Leukocytosis  UTI  -- UA positive and follow up urine cultures -> 50,000-100,000 CFU/mL Enterococcus faecalis -> completed 7 days of levofloxacin     Hypocalcemia: Oscal started here     Metastatic lung cancer  Sees Dr. Pantoja at [facility], this was diagnosed via thoracentesis. No disease progression noted per latest office visit 4/6/2023.  - Okay to resume alectinib at discharge  - No plans for chemotherapy in setting of active infections.     Elevated LFTs  In setting of cancer, followed by Onocology. LFT trend here appears to be improving  - Outpatient follow up with Oncology     Diet: Snacks/Supplements Adult: Magic Cup; With Meals  Diet  Regular Diet Adult    DVT Prophylaxis: Pneumatic Compression Devices  Mcallister Catheter: Not present  Lines: None     Cardiac Monitoring: None  Code Status: No CPR- Do NOT Intubate      Clinically Significant Risk Factors              # Hypoalbuminemia: Lowest albumin = 2.7 g/dL at 5/9/2023  6:53 PM, will monitor as appropriate           # Overweight: Estimated body mass index is 26.13 kg/m  as calculated from the following:    Height as of this encounter: 1.524 m (5').    Weight as of this encounter: 60.7 kg (133 lb 13.1 oz).          Disposition Plan  TCU placement          Keagan Pettit MD  Hospitalist Service  [facility]  Securely message with [employer] (more info)  Text page via [employer] Paging/Directory   ______________________________________________________________________    Interval History    Titrated off pressors this AM. Saw patient with RN who reports that she is as clear on my interview as she has been. Some expressive aphasia but mostly speaking in full sentences.   Discussed plan for day.     Physical Exam   Vital Signs: Temp: 98.2  F (36.8  C) Temp src: Oral BP: (!) 141/53 Pulse: 66   Resp: 13 SpO2: (!) 89 % O2 Device: Nasal cannula Oxygen Delivery: 2 LPM  Weight: 133 lbs 13.11 oz    General Appearance: Well appearing for  stated age.  Respiratory: CTAB, no rales or ronchi  Cardiovascular: S1, S2 normal, no murmurs  GI: non-tender on palpation, BS present  Neuro: no focal deficits aside from expressive aphasia,        Medical Decision Making       MANAGEMENT DISCUSSED with the following over the past 24 hours: RN, neurology, intensivist   NOTE(S)/MEDICAL RECORDS REVIEWED over the past 24 hours: h&P, AVANI notes, neuro stroke, RN notes, intensivit notes  Tests ORDERED & REVIEWED in the past 24 hours:  - See lab/imaging results included in the data section of the note  - CBC  - cyp12  Tests personally interpreted in the past 24 hours:  - HEAD CT showing stable post procedure  - BRAIN MRI showing CVA  Medical complexity over the past 24 hours:  - Parenteral (IV) CONTROLLED SUBSTANCES ordered      Data     I have personally reviewed the following data over the past 24 hrs:    13.3 (H)  \   8.0 (L)   / 589 (H)     132 (L) 93 (L) 7.5 (L) /  99   3.5 30 (H) 0.73 \       ALT: 14 AST: 29 AP: 171 (H) TBILI: 0.5   ALB: 2.9 (L) TOT PROTEIN: 6.5 LIPASE: N/A       Imaging results reviewed over the past 24 hrs:   No results found for this or any previous visit (from the past 24 hour(s)).

## 2023-05-16 NOTE — PLAN OF CARE
Neuro: A&Ox4, forgetful/anxious. Neuros intact exp expressive aphasia.    Fever/Pain: Afebrile. C/o 6/10 pain, given PRN dilaudid x1/oxycodone x3.   CV: SR. Levo weaned off and midodrine started. No changes to neuros noted this shift.   Pulm: Lung sounds clear.  SpO2 stable on 2L O2 per NC.    : External catheter in place. Excellent urine output.    GI: Regular diet, good appetite. No BM this shift, bowel meds given.     Skin: Pressure injury on back, mepilex in place.   Plan: Frequent neuro assessment. PRN pain management. Possible transfer to floor tomorrow (Tues).  Medical POA updated by phone on POC.          Goal Outcome Evaluation:      Plan of Care Reviewed With: patient, friend    Overall Patient Progress: improvingOverall Patient Progress: improving

## 2023-05-16 NOTE — PROVIDER NOTIFICATION
MD Notification    Notified Person: MD    Notified Person Name: Hodan    Notification Date/Time: 1342 5/16/23    Notification Interaction: ???? Messaging    Purpose of Notification: Neuro ok w/ transfer, charge nurse needing beds. Remains off of Levo, can transfer orders be placed?    Orders Received:    Comments:

## 2023-05-17 NOTE — CONSULTS
[facility]    Stroke Telephone Note    I was called by Keagan Pettit on 05/16/23 regarding patient Chiquita Berry.     Please see prior note for full hx.     Essentially, this is x3 IP stroke code activated on this pt for aphasia.    Nursing noted aphasia at 1815 - she was just TTF after being in ICU. Reportedly, they were not aware she still has residual fluctuating mild global aphasia from recent L MCA stroke.     Per reported exam - mild word finding difficulty. Which is similar to exam this AM - and actually improved from previous day.     SBP was 150s (she was just given midodrine at 1800). 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     NCCT: Reassuring against ICH.  CTA H/N: Improved L M2 stenosis from previous.  CTP: Unremarkable.     Not TNK candidate given recent strokes.     Stroke Code Data (for stroke code without tele)  Stroke code activated 05/16/23 1900   Stroke provider first response  05/16/23 1905            Last known normal 05/16/23 1810        Time of discovery   (or onset of symptoms) 05/16/23 1815   Head CT read by Stroke Neuro Dr/Provider 05/16/23 1925   Was stroke code de-escalated? Yes 05/16/23 1930          Intravenous Thrombolysis  Not given due to:   - head trauma/stroke within the past 3 months  - minor/isolated/quickly resolving symptoms    Endovascular Treatment  Not initiated due to absence of proximal vessel occlusion    Impression/Recommendation  # Stroke mimic. Transient recrudescence 2/2 toxometabolic etiology or baseline midl aphasia. Neuroaxis imaging reassuring against acute pathology.   - Toxometabolic workup per primary team  - Will consider repeat MRI brain tomorrow based on exam in AM  - SBP goals 100-140   - Rest of cares per previous note    My recommendations are based on the information  "provided over the phone by Chiquita Berry's in-person providers. They are not intended to replace the clinical judgment of her in-person providers. I was not requested to personally see or examine the patient at this time.    The Stroke Staff is Dr. Herron.    Antoine Grullon MD  Vascular Neurology Fellow    To page me or covering stroke neurology team member, click here: AMCOM  Choose \"On Call\" tab at top, then select \"NEUROLOGY/ALL SITES\" from middle drop-down box, press Enter, then look for \"stroke\" or \"telestroke\" for your site.      "

## 2023-05-17 NOTE — PROGRESS NOTES
[facility]    Medicine Progress Note - Hospitalist Service    Date of Admission:  5/1/2023  Date of Service: 05/17/2023    Assessment & Plan     Summary: Chiquita Berry is a 82 year old female with PMH  metastatic lung cancer, hx CVA Feb 2023, who was admitted 5/2/2023 with a fall and R hip and R wrist fractures. S/p ORIF. Dual anitplatelets held post-operatively. Unfortunately, post op course complicated by repeat Stroke on 5/13/23 in L posterior MCA (watershed distribution). She underwent neuro IR angioplasty and stenting of L MCA M2 segment on 5/14/23. She was subsequently transferred to ICU on 5/13 for pressors due to hypotension and worsening neuro findings. Titrated off pressors AM of 5/16.    Acute CVA  Hx CVA Feb 2023  S/p L MCA angioplasty on 5/14/23  * Developed stroke like symptoms post ORIF while DAPT on hold on 5/8  * RRT 05/13 for worsening aphasia -> transferred to ICU and on Levophed to maintain pressures > 110. Titrated off 5/15  * ASA and Plavix had been on hold since orthopedic surgery on 5/2/23, had been on Lovenox for prophylaxis while here per ortho.  Resumed DAPT on 5/8 due to repeat CVA   * Repeat head CT obtained 5/8/2023 for new word finding and lower extremity weakness. Reportedly was  present with her initial stroke in Februrary but symptms had resolved.  Repeat head CT was negative for acute stroke.  * MRI/MRA reveals 1.  Left frontoparietal centrum semiovale white matter demonstrates multiple small and prominent acute infarcts.  2.  Left frontoparietal centrum semiovale white matter demonstrates multiple small areas of enhancement likely due to subacute infarcts. Recommend follow-up to resolution to exclude a more progressive etiology.  3.  Left frontoparietal centrum semiovale white matter demonstrate multiple small chronic infarcts.  4.  These acute, subacute, and chronic infarcts are within the same location.  * Echo -> The left ventricle is normal in  size. There is normal left ventricular wall thickness. The visual ejection fraction is 55-60%. Septal motion is consistent with conduction abnormality.  RRT called again on 5/16 for aphasia confusion.  Mild expressive dysphasia this am.  Oriented x3,  Neuro was contacted overnight.  -- Follow neuro recs  -- Appreciate stroke neurology consulted due to on-going symptoms (?MRI this am).  --Daily aspirin 325 started 5/14 post neuro IR (continue indefinitely)  -- Plavix 75mg  -- DAPT 90 days then can consider asa + cilastazol after 90 day  -- Statin: lipitor 40mg    -- SLP/PT/OT  -- SBP goal > 110  -- Continue Midodrine 10 mg TID     Mechanical fall  Acute comminuted fracture of the right hip  Acute comminuted fracture of the right distal radius  S/p ORIF R hip and R wrist 5/2/2023  Post-operative acute blood loss anemia, improved  Patient with mechanical fall, pushed by elevator door, no head trauma, no LOC, no premonitory symptoms. Workup in ED with fractures above and leukocytosis 16.1k. S/p ORIF of R hip and R wrist on 5/2/2023. Postoperatively complicated by anemia and delirium. Hgb 8.7 prior to surgery, Hgb 4.8 on 5/3 post op, s/p 3u pRBC now Hgb stable in 8s.  - Ortho consult appreciated                         - Post op management                - Pain control  - Per ortho, ASA and Plavix held and were to be restarted at discharge; patient was on lovenox for DVT ppx. DAPT was restarted on 5/9/23 due to CVA   - PT/OT/Speech path      Acute metabolic encephalopathy, improved  Acute hypoxic respiratory failure - resolved  Probable Pneumonia, treated  Patient initially requiring 3-4L O2 to maintain O2 sats 92%. Patient was also notably delirious on 5/3. The etiology of this could be multifaceted, from post-operative state, to acute metabolic encephalopathy, to hypoperfusion due to anemia. CT head no acute pathology. Delirium improved on 5/5, but continued to be hypoxic on 5/5. CXR done showed possible pneumonia -  "left base consolidation.  Plan:  - Wean O2 as able  -Continue to encourage use of incentive spirometer.  - completed 7 days of ABX on 5/15     Leukocytosis  UTI  -- UA positive and follow up urine cultures -> 50,000-100,000 CFU/mL Enterococcus faecalis -> completed 7 days of levofloxacin     Hypocalcemia: Oscal started here     Metastatic lung cancer  Sees Dr. Pantoja at [facility], this was diagnosed via thoracentesis. No disease progression noted per latest office visit 4/6/2023.  - Okay to resume alectinib at discharge  - No plans for chemotherapy in setting of active infections.     Elevated LFTs  In setting of cancer, followed by Onocology. LFT trend here appears to be improving  - Outpatient follow up with Oncology     Diet: Snacks/Supplements Adult: Magic Cup; With Meals  Diet  Regular Diet Adult    DVT Prophylaxis: Pneumatic Compression Devices  Mcallister Catheter: Not present  Lines: None     Cardiac Monitoring: None  Code Status: No CPR- Do NOT Intubate      Clinically Significant Risk Factors              # Hypoalbuminemia: Lowest albumin = 2.7 g/dL at 5/9/2023  6:53 PM, will monitor as appropriate           # Overweight: Estimated body mass index is 26.13 kg/m  as calculated from the following:    Height as of this encounter: 1.524 m (5').    Weight as of this encounter: 60.7 kg (133 lb 13.1 oz).          Disposition Plan  TCU placement     Expected Discharge Date: 05/18/2023        Discharge Comments: pending resolution of neuro sxs. TCU rec        Hallie Carrillo MD  Hospitalist Service  [facility]  ______________________________________________________________________    Interval History    RRT called last night for aphasia, confusion. Neg CTH for acute changes.  This morning she appears to have mild expressive dysphasia. Oriented x3.  Reports pain \"all over\".     Physical Exam   Vital Signs: Temp: 98  F (36.7  C) Temp src: Oral BP: 133/57 Pulse: 91   Resp: 18 SpO2: 96 % O2 " Device: Nasal cannula Oxygen Delivery: 1 LPM  Weight: 133 lbs 13.11 oz    General Appearance: Well appearing for stated age.  Respiratory: CTAB, no rales or ronchi  Cardiovascular: S1, S2 normal, no murmurs  GI: non-tender on palpation, BS present  Neuro: no focal deficits aside from expressive aphasia,        Data         Imaging results reviewed over the past 24 hrs:   Recent Results (from the past 24 hour(s))   CT Head w/o Contrast   Result Value    Radiologist flags Acute intracranial hemorrhage (AA)    Narrative    EXAM: CTA HEAD NECK W CONTRAST, CT HEAD W/O CONTRAST, CT HEAD PERFUSION W CONTRAST  LOCATION: [facility]  DATE/TIME: 5/16/2023 7:18 PM CDT    INDICATION: code stroke, achalasia and numbness  COMPARISON: None.  TECHNIQUE: Head and neck CT angiogram with IV contrast. Noncontrast head CT followed by axial helical CT images of the head and neck vessels obtained during the arterial phase of intravenous contrast administration. Axial 2D reconstructed images and   multiplanar 3D MIP reconstructed images of the head and neck vessels were performed by the technologist. Additional CT cerebral perfusion was performed utilizing a second contrast bolus. Perfusion data were post processed with generation of standard   perfusion maps and estimation of ischemic/infarcted volumes utilizing standard threshold values. Dose reduction techniques were used. All stenosis measurements made according to NASCET criteria unless otherwise specified.  CONTRAST: 75 mL Isovue 370 (accession [id number]), 75 mL Isovue 370 (accession [id number]), 50 mL Isovue 370 (accession [id number])    FINDINGS:   NONCONTRAST HEAD CT:   INTRACRANIAL CONTENTS: There is nonspecific ill-defined hyperdensity at the left frontal convexity best seen axial image 17 and 18 and coronal image 15 and 16. This may represent artifact or small acute subarachnoid hemorrhage. No large extra-axial   hematoma. No CT evidence of acute infarct.  Expected interval evolution of known left white matter infarcts. Mild presumed chronic small vessel ischemic changes. Mild generalized volume loss. No hydrocephalus. Stable retrocerebellar arachnoid cyst.     VISUALIZED ORBITS/SINUSES/MASTOIDS: Prior bilateral cataract surgery. Visualized portions of the orbits are otherwise unremarkable. No paranasal sinus mucosal disease. No middle ear or mastoid effusion.    BONES/SOFT TISSUES: No acute abnormality.    HEAD CTA:  ANTERIOR CIRCULATION: No occlusion, aneurysm, or high flow vascular malformation. Multifocal intracranial atherosclerosis are again noted and similar to prior, most prominent involving the anterior cerebral arteries. There is nonstenotic atherosclerosis   calcification of bilateral carotid siphons Fetal origin of the left posterior cerebral artery from the anterior circulation.    POSTERIOR CIRCULATION: No occlusion, aneurysm, or high flow vascular malformation. Multifocal intracranial atherosclerosis are again noted and similar prior, most prominent at the P3 segments bilaterally. Balanced vertebral arteries supply a normal   basilar artery.     DURAL VENOUS SINUSES: Expected enhancement of the major dural venous sinuses.    NECK CTA:  RIGHT CAROTID: Atherosclerotic plaque results in less than 50% stenosis in the right ICA. No dissection. Similar alternating irregularities at the distal right cervical internal carotid artery suggestive of fibromuscular dysplasia.    LEFT CAROTID: Atherosclerotic plaque results in less than 50% stenosis in the left ICA. No dissection.    VERTEBRAL ARTERIES: No focal stenosis or dissection. Similar alternating irregularities at the junctions of the left V2 and V3 segments suggestive of fibromuscular dysplasia. Balanced vertebral arteries.    AORTIC ARCH: Classic aortic arch anatomy with no significant stenosis at the origin of the great vessels.    NONVASCULAR STRUCTURES: 9 mm low-density right thyroid nodule. Small left  pleural effusion.    CT PERFUSION:  PERFUSION MAPS: Small area of apparent elevated Tmax at the right posterior fossa is favored to be artifactual. Otherwise symmetrical cerebral perfusion. No focal deficits in cerebral blood flow or volume to suggest ischemia/oligemia.    RAPID ANALYSIS:  CBF<30%: 0  Tmax>6sec: 3 mL, likely artifactual  Mismatch volume: 3 mL, likely artifactual  Mismatch ratio: Infinite      Impression    IMPRESSION:   HEAD CT:  1.  Subtle hyperdensity at the left frontal convexity, acute subarachnoid hemorrhage not excluded.    HEAD CTA:   1.  Negative for thrombotic occlusion.  2.  Similar multifocal intracranial atherosclerosis.    NECK CTA:  1.  No hemodynamically significant stenosis in the neck vessels.   2.  No evidence for dissection.  3.  Similar findings of fibromuscular dysplasia at the distal right carotid and left vertebral arteries.  4.  Small left pleural effusion.    CT PERFUSION:  1.  Normal cerebral perfusion.      [Critical Result: Acute intracranial hemorrhage]    Finding was identified on 5/16/2023 7:24 PM CDT.     1.  Dr. Barney was contacted by me on 5/16/2023 7:43 PM CDT and verbalized understanding of the critical result.    CTA Head Neck w Contrast   Result Value    Radiologist flags Acute intracranial hemorrhage (AA)    Narrative    EXAM: CTA HEAD NECK W CONTRAST, CT HEAD W/O CONTRAST, CT HEAD PERFUSION W CONTRAST  LOCATION: [facility]  DATE/TIME: 5/16/2023 7:18 PM CDT    INDICATION: code stroke, achalasia and numbness  COMPARISON: None.  TECHNIQUE: Head and neck CT angiogram with IV contrast. Noncontrast head CT followed by axial helical CT images of the head and neck vessels obtained during the arterial phase of intravenous contrast administration. Axial 2D reconstructed images and   multiplanar 3D MIP reconstructed images of the head and neck vessels were performed by the technologist. Additional CT cerebral perfusion was performed utilizing a  second contrast bolus. Perfusion data were post processed with generation of standard   perfusion maps and estimation of ischemic/infarcted volumes utilizing standard threshold values. Dose reduction techniques were used. All stenosis measurements made according to NASCET criteria unless otherwise specified.  CONTRAST: 75 mL Isovue 370 (accession [id number]), 75 mL Isovue 370 (accession [id number]), 50 mL Isovue 370 (accession [id number])    FINDINGS:   NONCONTRAST HEAD CT:   INTRACRANIAL CONTENTS: There is nonspecific ill-defined hyperdensity at the left frontal convexity best seen axial image 17 and 18 and coronal image 15 and 16. This may represent artifact or small acute subarachnoid hemorrhage. No large extra-axial   hematoma. No CT evidence of acute infarct. Expected interval evolution of known left white matter infarcts. Mild presumed chronic small vessel ischemic changes. Mild generalized volume loss. No hydrocephalus. Stable retrocerebellar arachnoid cyst.     VISUALIZED ORBITS/SINUSES/MASTOIDS: Prior bilateral cataract surgery. Visualized portions of the orbits are otherwise unremarkable. No paranasal sinus mucosal disease. No middle ear or mastoid effusion.    BONES/SOFT TISSUES: No acute abnormality.    HEAD CTA:  ANTERIOR CIRCULATION: No occlusion, aneurysm, or high flow vascular malformation. Multifocal intracranial atherosclerosis are again noted and similar to prior, most prominent involving the anterior cerebral arteries. There is nonstenotic atherosclerosis   calcification of bilateral carotid siphons Fetal origin of the left posterior cerebral artery from the anterior circulation.    POSTERIOR CIRCULATION: No occlusion, aneurysm, or high flow vascular malformation. Multifocal intracranial atherosclerosis are again noted and similar prior, most prominent at the P3 segments bilaterally. Balanced vertebral arteries supply a normal   basilar artery.     DURAL VENOUS SINUSES: Expected enhancement of the  major dural venous sinuses.    NECK CTA:  RIGHT CAROTID: Atherosclerotic plaque results in less than 50% stenosis in the right ICA. No dissection. Similar alternating irregularities at the distal right cervical internal carotid artery suggestive of fibromuscular dysplasia.    LEFT CAROTID: Atherosclerotic plaque results in less than 50% stenosis in the left ICA. No dissection.    VERTEBRAL ARTERIES: No focal stenosis or dissection. Similar alternating irregularities at the junctions of the left V2 and V3 segments suggestive of fibromuscular dysplasia. Balanced vertebral arteries.    AORTIC ARCH: Classic aortic arch anatomy with no significant stenosis at the origin of the great vessels.    NONVASCULAR STRUCTURES: 9 mm low-density right thyroid nodule. Small left pleural effusion.    CT PERFUSION:  PERFUSION MAPS: Small area of apparent elevated Tmax at the right posterior fossa is favored to be artifactual. Otherwise symmetrical cerebral perfusion. No focal deficits in cerebral blood flow or volume to suggest ischemia/oligemia.    RAPID ANALYSIS:  CBF<30%: 0  Tmax>6sec: 3 mL, likely artifactual  Mismatch volume: 3 mL, likely artifactual  Mismatch ratio: Infinite      Impression    IMPRESSION:   HEAD CT:  1.  Subtle hyperdensity at the left frontal convexity, acute subarachnoid hemorrhage not excluded.    HEAD CTA:   1.  Negative for thrombotic occlusion.  2.  Similar multifocal intracranial atherosclerosis.    NECK CTA:  1.  No hemodynamically significant stenosis in the neck vessels.   2.  No evidence for dissection.  3.  Similar findings of fibromuscular dysplasia at the distal right carotid and left vertebral arteries.  4.  Small left pleural effusion.    CT PERFUSION:  1.  Normal cerebral perfusion.      [Critical Result: Acute intracranial hemorrhage]    Finding was identified on 5/16/2023 7:24 PM CDT.     1.  Dr. Barney was contacted by me on 5/16/2023 7:43 PM CDT and verbalized understanding of the critical  result.    CT Head Perfusion w Contrast   Result Value    Radiologist flags Acute intracranial hemorrhage (AA)    Narrative    EXAM: CTA HEAD NECK W CONTRAST, CT HEAD W/O CONTRAST, CT HEAD PERFUSION W CONTRAST  LOCATION: [facility]  DATE/TIME: 5/16/2023 7:18 PM CDT    INDICATION: code stroke, achalasia and numbness  COMPARISON: None.  TECHNIQUE: Head and neck CT angiogram with IV contrast. Noncontrast head CT followed by axial helical CT images of the head and neck vessels obtained during the arterial phase of intravenous contrast administration. Axial 2D reconstructed images and   multiplanar 3D MIP reconstructed images of the head and neck vessels were performed by the technologist. Additional CT cerebral perfusion was performed utilizing a second contrast bolus. Perfusion data were post processed with generation of standard   perfusion maps and estimation of ischemic/infarcted volumes utilizing standard threshold values. Dose reduction techniques were used. All stenosis measurements made according to NASCET criteria unless otherwise specified.  CONTRAST: 75 mL Isovue 370 (accession [id number]), 75 mL Isovue 370 (accession [id number]), 50 mL Isovue 370 (accession [id number])    FINDINGS:   NONCONTRAST HEAD CT:   INTRACRANIAL CONTENTS: There is nonspecific ill-defined hyperdensity at the left frontal convexity best seen axial image 17 and 18 and coronal image 15 and 16. This may represent artifact or small acute subarachnoid hemorrhage. No large extra-axial   hematoma. No CT evidence of acute infarct. Expected interval evolution of known left white matter infarcts. Mild presumed chronic small vessel ischemic changes. Mild generalized volume loss. No hydrocephalus. Stable retrocerebellar arachnoid cyst.     VISUALIZED ORBITS/SINUSES/MASTOIDS: Prior bilateral cataract surgery. Visualized portions of the orbits are otherwise unremarkable. No paranasal sinus mucosal disease. No middle ear or  mastoid effusion.    BONES/SOFT TISSUES: No acute abnormality.    HEAD CTA:  ANTERIOR CIRCULATION: No occlusion, aneurysm, or high flow vascular malformation. Multifocal intracranial atherosclerosis are again noted and similar to prior, most prominent involving the anterior cerebral arteries. There is nonstenotic atherosclerosis   calcification of bilateral carotid siphons Fetal origin of the left posterior cerebral artery from the anterior circulation.    POSTERIOR CIRCULATION: No occlusion, aneurysm, or high flow vascular malformation. Multifocal intracranial atherosclerosis are again noted and similar prior, most prominent at the P3 segments bilaterally. Balanced vertebral arteries supply a normal   basilar artery.     DURAL VENOUS SINUSES: Expected enhancement of the major dural venous sinuses.    NECK CTA:  RIGHT CAROTID: Atherosclerotic plaque results in less than 50% stenosis in the right ICA. No dissection. Similar alternating irregularities at the distal right cervical internal carotid artery suggestive of fibromuscular dysplasia.    LEFT CAROTID: Atherosclerotic plaque results in less than 50% stenosis in the left ICA. No dissection.    VERTEBRAL ARTERIES: No focal stenosis or dissection. Similar alternating irregularities at the junctions of the left V2 and V3 segments suggestive of fibromuscular dysplasia. Balanced vertebral arteries.    AORTIC ARCH: Classic aortic arch anatomy with no significant stenosis at the origin of the great vessels.    NONVASCULAR STRUCTURES: 9 mm low-density right thyroid nodule. Small left pleural effusion.    CT PERFUSION:  PERFUSION MAPS: Small area of apparent elevated Tmax at the right posterior fossa is favored to be artifactual. Otherwise symmetrical cerebral perfusion. No focal deficits in cerebral blood flow or volume to suggest ischemia/oligemia.    RAPID ANALYSIS:  CBF<30%: 0  Tmax>6sec: 3 mL, likely artifactual  Mismatch volume: 3 mL, likely artifactual  Mismatch  ratio: Infinite      Impression    IMPRESSION:   HEAD CT:  1.  Subtle hyperdensity at the left frontal convexity, acute subarachnoid hemorrhage not excluded.    HEAD CTA:   1.  Negative for thrombotic occlusion.  2.  Similar multifocal intracranial atherosclerosis.    NECK CTA:  1.  No hemodynamically significant stenosis in the neck vessels.   2.  No evidence for dissection.  3.  Similar findings of fibromuscular dysplasia at the distal right carotid and left vertebral arteries.  4.  Small left pleural effusion.    CT PERFUSION:  1.  Normal cerebral perfusion.      [Critical Result: Acute intracranial hemorrhage]    Finding was identified on 5/16/2023 7:24 PM CDT.     1.  Dr. Barney was contacted by me on 5/16/2023 7:43 PM CDT and verbalized understanding of the critical result.

## 2023-05-17 NOTE — PROGRESS NOTES
A&Ox3, disoriented to time. VSS on 1L NC, goal -140 per NSG. Up via lift, T/R q2hr. C/o pain to hip/abd, managed with tylenol/oxycodone and prn simethicone. Regular diet, swallows pills whole with water. Neuros intact ex expressive aphasia, which seems to wax/wane, RUE/RLE weakness from fall. Purewick in place. See RRT note from change of shift at 1900. No change in care plan.

## 2023-05-17 NOTE — PLAN OF CARE
Pt here with a fall and R hip and R wrist fractures. Postop L MCA stroke. A&Ox3, disoriented to time. VSS on 2L NC, goal -140 per NSG. Up with A2 lift, T/R q2hr. C/o pain to hip/abd, managed with PRN tylenol, oxycodone, robaxin, simethicone. Abd pain, pt feels constipated, PRN dulcolax with results. Regular diet, thin liquids swallows pills whole with applesauce. Neuros intact ex expressive aphasia, RUE/RLE weakness from fall. Purewick in place. Plan EEG. Discharge pending TCU.

## 2023-05-17 NOTE — PLAN OF CARE
Goal Outcome Evaluation:    DATE & TIME: 05/17/2023, [id number]  Cognitive Concerns/ Orientation : Able to state her full name  However she has Expressive aphasia, word finding difficulty for other questions  BEHAVIOR & AGGRESSION TOOL COLOR: calm  ABNL VS/O2: VSS, room air  MOBILITY: assist of two. Turn and repo  PAIN MANAGMENT: Denied  DIET: Regular  BOWEL/BLADDER: incontinent of stool. External cath for urine, but occasionally incontinent   ABNL LAB/BG: hgb=7.9  DRAIN/DEVICES: PIV infusing  SKIN: bruises  Arms, wrist, right groin. Incision right hip, redness on back  TESTS/PROCEDURES: on EEG monitor  D/C DATE: pending  OTHER IMPORTANT INFO: continue to monitor.,

## 2023-05-17 NOTE — PROGRESS NOTES
Patient stated that something felt off, and that she was confused. Last time she was well was at 1804, when I administered her medication.     BG was 119. VSS; on supplemental oxygen; following commands.     I called Rapid Response Team.

## 2023-05-17 NOTE — CODE/RAPID RESPONSE
"[facility]    RRT Note -- Code Stroke Activated   5/16/2023   Time Called: 1900    RRT called for difficulty word finding, concern for CVA.     Code Status: No CPR- Do NOT Intubate    Assessment & Plan    I was called to evaluate Chiquita Berry, who is a 82 year old female with a past medical history metastatic lung cancer, hx CVA Feb 2023, who was admitted 5/2/2023 with a fall and R hip and R wrist fractures. S/p ORIF R hip and R wrist with orthopedics on 5/2/2023. Hospitalization has been complicated by pneumonia and UTI. She was stared on 7 day course of Levaquin on 05/09. She also experienced postoperative anemia and delirium and has now had 3 code stroke activations throughout this hospitalization. On ASA/Plavix - of note, DAPT had been held post operatively until hte patient had acute development of stroke like symptoms on 5/8/23, thus plavix/ASA was restarted. She has been evaluated formally by stroke neuro, MRI shows:  \"1.  Left frontoparietal centrum semiovale white matter demonstrates multiple small and prominent acute infarcts.  2.  Left frontoparietal centrum semiovale white matter demonstrates multiple small areas of enhancement likely due to subacute infarcts. Recommend follow-up to resolution to exclude a more progressive etiology.  3.  Left frontoparietal centrum semiovale white matter demonstrate multiple small chronic infarcts.  4.  These acute, subacute, and chronic infarcts are within the same location.\"    At 1900 an RRT was called for worsening aphasia and confusion. On my arrival, patient was found sitting in bed. She was diaphoretic and clammy. Initial vital signs /70, HR 99, RR 20, SpO2 100% on 1L. Bedside RN reports that patient's last known well was at 1804, just prior to receiving midodrine. Patient denies CP, SOB, nausea, vomiting. Her neurological exam is largely unremarkable except for intermittent aphasia and reported left sided facial numbness. Per " both charge RN and bedside RN, patient has known aphasia at baseline, however current episode appeared to be worse and was accompanied with acute onset confusion. A code stroke was therefore called. Patient was transported to CT.     Differentials:   ICH given recent balloon angioplasty in the setting of chronic stenosis   Recrudescence of prior stroke symptoms     INTERVENTIONS:  -EKG   -Blood glucose 119  -Code stroke activated  - CTA, CTP, head CT w/o contrast  - Discussed case with Dr. Grullon of Stroke Neuro - not a candidate for TNK    At the conclusion of this RRT patient was hemodynamically stable and will remain on current unit. Patient discussed with vascular neurology fellow.       [facility]  Securely message with the Vocera Web Console (learn more here)  Text page via [employer] Paging/Directory        Physical Exam   Vital Signs with Ranges:  Temp:  [97.8  F (36.6  C)-98.7  F (37.1  C)] 98.7  F (37.1  C)  Pulse:  [] 105  Resp:  [11-32] 16  BP: ()/(34-98) 157/70  SpO2:  [87 %-100 %] 95 %  I/O last 3 completed shifts:  In: 3130.97 [P.O.:1240; I.V.:1890.97]  Out: 4350 [Urine:4350]    Physical Exam  Vitals and nursing note reviewed.   Eyes:      Visual Fields: Right eye visual fields normal and left eye visual fields normal.   Cardiovascular:      Rate and Rhythm: Regular rhythm. Tachycardia present.      Heart sounds: Normal heart sounds.   Pulmonary:      Effort: Pulmonary effort is normal.      Breath sounds: Normal breath sounds.   Abdominal:      General: There is no distension.      Palpations: Abdomen is soft.   Skin:     General: Skin is cool and moist.   Neurological:      Mental Status: She is alert. She is confused.      Sensory: Sensory deficit present.      Comments: Aphasia    Psychiatric:         Behavior: Behavior is cooperative.       Data     IMAGING: (X-ray/CT/MRI)   No results found for this or any previous visit (from the past  24 hour(s)).    CBC with Diff:  Recent Labs   Lab Test 05/16/23  0539 02/08/23  0818 02/07/23  1125   WBC 13.3*   < > 8.9   HGB 8.0*   < > 12.7   MCV 96   < > 97   *   < > 332   INR  --   --  1.06    < > = values in this interval not displayed.      No results found for: RETICABSCT  No results found for: RETP    Comprehensive Metabolic Panel:  Recent Labs   Lab 05/16/23  1850 05/16/23  0539   NA  --  132*   POTASSIUM  --  3.5   CHLORIDE  --  93*   CO2  --  30*   ANIONGAP  --  9   * 99   BUN  --  7.5*   CR  --  0.73   GFRESTIMATED  --  81   SAGE  --  9.1   PROTTOTAL  --  6.5   ALBUMIN  --  2.9*   BILITOTAL  --  0.5   ALKPHOS  --  171*   AST  --  29   ALT  --  14         Time Spent on this Encounter   CRITICAL CARE TIME  I spent 35 minutes (1905 - 1940) of critical care time on the unit/floor managing the care of Chiquita Berry. Upon evaluation, this patient had a high probability of imminent or life-threatening deterioration due to rule out acute stroke or hemorrhagic transformation which required my direct attention, intervention, and personal management. 100% of my time was spent at the bedside counseling the patient and/or coordinating care regarding services listed in this note.

## 2023-05-17 NOTE — PROGRESS NOTES
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 DAPT was discontinued pre-op, and post op had sig blood loss (Hgb 4.6).     Following surgery had worsened aphasia. MRI done showed new L posterior MCA ischemic stroke (watershed distribution) with worsened regions of ICAD and stroke service consulted.     She was restarted on DAPT, statin, TTE unremarkable.     On 5/13 she had new worsened aphasia and stroke code again activated. SBP was in low 100s during this stroke code, which was the same as it has been for the past few days. CTA H/N did show worsening M2 stenosis. Per discussions with AVANI, she was transferred to ICU for peripheral pressors and close monitoring. MRI did not show new infarcts, but imaging was done early unfortunately - may have been too early to  new ischemic changes. No acute interventions (IVT) given recent strokes.     5/14: AVANI performed DSA with balloon angioplasty, did not pursue intracranial stenting at this time   5/16: Weaned off pressors with stable BP, tolerating activity with PT/OT without worsening of symptoms, TTF   5/16: Third stroke code activated - fluctuating aphasia vs floor team did not know she had baseline aphasia from recent stroke, NCCT with new interval small L frontal SAH, CTA H/N unrevealing (improved L M2 stenosis post procedure), not TNK candidate given recent strokes/SAH    Stroke Evaluation Summarized    MRI/Head CT MRI brain 5/13  1.  No significant interval change.    MRI brain 5/10  IMPRESSION:  1.  Left frontoparietal centrum semiovale white matter demonstrates multiple small and prominent acute infarcts.     2.  Left frontoparietal centrum  semiovale white matter demonstrates multiple small areas of enhancement likely due to subacute infarcts. Recommend follow-up to resolution to exclude a more progressive etiology.     3.  Left frontoparietal centrum semiovale white matter demonstrate multiple small chronic infarcts.     4.  These acute, subacute, and chronic infarcts are within the same location.     5.  Chronic intracranial changes described above.   Intracranial Vasculature HEAD CTA:  1.  No proximal branch vessel occlusion, aneurysm, or vascular malformation.   2.  Intracranial atherosclerosis with multifocal stenoses, as detailed. Briefly, severe stenoses at the A2/A3 segment left JOSI, inferior division M2 segment left MCA, P3 segments of bilateral PCAs, and distal V4 segment left vertebral artery.   Cervical Vasculature NECK CTA:  1.  No flow-limiting stenosis or findings of dissection.   2.  Question features compatible with fibromuscular dysplasia at the distal right cervical ICA and V2/V3 junction of left vertebral artery.     Echocardiogram    EKG/Telemetry Limited TTE  The rhythm was undetermined. NSR in recent study.  EF 55-60%  Normal LA   Other Testing CTP   1.  Alterations on perfusion maps corresponding with region of known infarct in left posterior corona radiata/centrum semiovale, as detailed.     LDL  5/10/2023: 99 mg/dL   A1C  5/10/2023: 4.9 %   Troponin No lab value available in past 48 hrs       Impression/Plan  # Small L frontal SAH, noted on 5/16 scan. Asymptomatic, incidentally found, main concern is for seizures as etiology for fluctuating exam. Location makes iatrogenic from balloon angioplasty unlikely, most likely etiology is spontaneous. Considered small stroke with hemorrhagic conversion related to procedure as etiology as well.   # Recurrent L MCA watershed distribution strokes 2/2 L inferior division M2 stenosis (symptomatic intracranial atherosclerotic disease). S/p balloon angioplasty (intracranial stenting DEFERRED  "by AVANI at this time) on 5/14. Initial stroke during admission likely 2/2 being off DAPT, acute blood loss anemia (Hgb 4's) perioperatively. Second event on 5/13 concerning for failure of medical management hence AVANI consult for intracranial intervention consideration. P2Y12 on plavix 10. Does not appear to be pressure dependent given SBP in 100-110s during hospitalization and tolerating PT/OT with similar SBP. Exam today c/w with mild/fluctuating global aphasia.   - Q4H neurochecks, SBP goal 100-140 per AVANI  - Cont  mg daily  - Cont plavix 75 mg daily - plan for 90 days per [facility] Trial for sICAD   - Given ischemic risk vs asymptomatic/small/stable L frontal SAH, benefit of continuing DAPT > risks   - Cont atorvastatin 40 mg (LDL 99)  - cEEG ordered on 5/17 to screen for seizures given new SAH  - Stroke Neurology will cont to follow      Patient Follow-up    - final recommendation pending work-up    We will continue to follow.     The Stroke Staff is Dr. Herron.    Antoine Grullon MD  Vascular Neurology Fellow    To page me or covering stroke neurology team member, click here: AMCOM  Choose \"On Call\" tab at top, then select \"NEUROLOGY/ALL SITES\" from middle drop-down box, press Enter, then look for \"stroke\" or \"telestroke\" for your site.    ______________________________________________________    Clinically Significant Risk Factors              # Hypoalbuminemia: Lowest albumin = 2.7 g/dL at 5/9/2023  6:53 PM, will monitor as appropriate            # Overweight: Estimated body mass index is 26.13 kg/m  as calculated from the following:    Height as of this encounter: 1.524 m (5').    Weight as of this encounter: 60.7 kg (133 lb 13.1 oz).             Medications   Scheduled Meds    aspirin  325 mg Oral Daily     atorvastatin  40 mg Oral QPM     calcium carbonate  600 mg Oral BID w/meals     clopidogrel  75 mg Oral or NG Tube Daily     latanoprost  1 drop Both Eyes QPM     midodrine  10 mg Oral TID w/meals     " pantoprazole  40 mg Oral Daily     polyethylene glycol  17 g Oral Daily     senna-docusate  1 tablet Oral BID     sodium chloride (PF)  3 mL Intracatheter Q8H     cholecalciferol  50 mcg Oral Daily       Infusion Meds    - MEDICATION INSTRUCTIONS -       lactated ringers 75 mL/hr at 05/17/23 1101       PRN Meds  sodium chloride 0.9%, sodium chloride 0.9%, acetaminophen, sore throat, bisacodyl, - MEDICATION INSTRUCTIONS -, HYDROmorphone **OR** HYDROmorphone, lidocaine 4%, lidocaine (buffered or not buffered), magnesium hydroxide, melatonin, methocarbamol, naloxone **OR** naloxone **OR** naloxone **OR** naloxone, ondansetron **OR** ondansetron, oxyCODONE **OR** oxyCODONE, petrolatum-zinc oxide, polyethylene glycol, prochlorperazine **OR** prochlorperazine, QUEtiapine, simethicone, sodium chloride (PF), sodium chloride (PF)       PHYSICAL EXAMINATION  Temp:  [97.8  F (36.6  C)-98.7  F (37.1  C)] 98.6  F (37  C)  Pulse:  [] 84  Resp:  [12-19] 16  BP: (102-157)/(42-70) 120/45  SpO2:  [87 %-99 %] 95 %      Neuro Exam  MS: Mild word finding difficulties/difficulty with following multistep commands, naming overall mostly intact   CN: EOEMI, VFF bilat, symmetric facial movemetns  Motor: BUE, BLE antigravity with no drift  Sensory: LT intact in all 4 limbs  Coordination: FTN intact bilat     Stroke Scales    NIHSS  1a. Level of Consciousness 0-->Alert, keenly responsive   1b. LOC Questions 0-->Answers both questions correctly   1c. LOC Commands 0-->Performs both tasks correctly   2.   Best Gaze 0-->Normal   3.   Visual 0-->No visual loss   4.   Facial Palsy 0-->Normal symmetrical movements   5a. Motor Arm, Left 0-->No drift, limb holds 90 (or 45) degrees for full 10 secs   5b. Motor Arm, Right 1-->Drift, limb holds 90 (or 45) degrees, but drifts down before full 10 secs, does not hit bed or other support (Hx ORIF- R) arm in cast)   6a. Motor Leg, Left 0-->No drift, leg holds 30 degree position for full 5 secs   6b.  Motor Leg, right (UN) Amputation or joint fusion (ADRIANO- post-op order R) Leg straight)   7.   Limb Ataxia 0-->Absent   8.   Sensory 0-->Normal, no sensory loss   9.   Best Language 1-->Mild-to-moderate aphasia, some obvious loss of fluency or facility of comprehension, without significant limitation on ideas expressed or form of expression. Reduction of speech and/or comprehension, however, makes conversation. . . (see row details)   10. Dysarthria 1-->Mild-to-moderate dysarthria, patient slurs at least some words and, at worst, can be understood with some difficulty   11. Extinction and Inattention  0-->No abnormality   Total 3 (05/14/23 1900)       Modified [place] Score (Pre-morbid)    -      Imaging  I personally reviewed all imaging; relevant findings per HPI.     Lab Results Data   CBC  Recent Labs   Lab 05/17/23  0748 05/16/23  0539 05/15/23  1216 05/14/23  0457   WBC  --  13.3* 13.5* 15.7*   RBC  --  2.52* 2.48* 2.87*   HGB 7.9* 8.0* 7.8* 9.0*  9.0*   HCT  --  24.1* 24.0* 27.5*   PLT  --  589* 634* 706*     Basic Metabolic Panel    Recent Labs   Lab 05/16/23  1850 05/16/23  0539 05/16/23  0413 05/15/23  2022 05/15/23  1216 05/14/23  0457   NA  --  132*  --   --  137 137   POTASSIUM  --  3.5  --   --  4.0 4.0   CHLORIDE  --  93*  --   --  104 102   CO2  --  30*  --   --  23 25   BUN  --  7.5*  --   --  10.5 14.7   CR  --  0.73  --   --  0.50* 0.58   * 99 93   < > 130* 122*   SAGE  --  9.1  --   --  8.1* 8.3*    < > = values in this interval not displayed.     Liver Panel  Recent Labs   Lab 05/16/23  0539   PROTTOTAL 6.5   ALBUMIN 2.9*   BILITOTAL 0.5   ALKPHOS 171*   AST 29   ALT 14     INR    Recent Labs   Lab Test 02/07/23  1125 02/06/23  1443   INR 1.06 0.97      Lipid Profile    Recent Labs   Lab Test 05/10/23  0859 02/06/23  1537 10/08/19  1200 12/11/15  0959 06/01/15  1205   CHOL 171 171 174   < > 137   HDL 34* 42* 45*   < > 44*   LDL 99 104* 90   < > 67   TRIG 190* 124 197*   < > 129    CHOLHDLRATIO  --   --   --   --  3.1    < > = values in this interval not displayed.     A1C    Recent Labs   Lab Test 05/10/23  0859 02/05/23  0750   A1C 4.9 6.3*     Troponin  No results for input(s): CTROPT, TROPONINIS, TROPONINI, GHTROP in the last 168 hours.       Data

## 2023-05-17 NOTE — PLAN OF CARE
Reason for Admission: L MCA    Cognitive/Mentation: A/Ox 2-4  Neuros/CMS: Intact ex inconsistent with commands, confused, aphasia   VS: stable. 1L NC. -140.  GI: BS active, passing flatus. Incontinent.  : Incontinent. Purewick  Pulmonary: LS clear.  Pain: tylenol given for pain.     Drains/Lines: LR running at 75mL/hr  Skin: spine sore. Woc following. Scattered bruising. R hip incision.  Activity: Assist x 2 with lift.  Diet: regular with thin liquids. Takes pills whole.     Therapies recs: pending  Discharge: pending    Aggression Stoplight Tool: green    End of shift summary: pt wax/wanes a lot. R wrist brace in place for fracture

## 2023-05-18 NOTE — PROGRESS NOTES
Neuro IR Progress Note     No acute events overnight. Stable neurological examination.  Blood pressure overnight has been trending up towards 150s especially in presence of midodrine.  Recommend discontinuing midodrine.  CT head has shown stability of the left frontal convexity subarachnoid hemorrhage.  Repeat CT head to confirm resolution.  This becomes especially important as patient is currently on dual antiplatelet therapy.  Updates were discussed with the stroke team.     HPI:   83 yo F pt with known L MCA M2 segment symptomatic stenosis, admitted for femur fracture, L MCA territory infarct while DAPT held for ORIF. Tonight, baseline aphasia worsened in the setting of hypotension, despite DAPT having been restarted. Pressures raised with laying flat and bolus, but still not reverted to baseline. She will likely need intracranial angioplasty, possibly stenting. S/p Angioplasty only on 5/14/2023    BP (!) 156/68   Pulse 96   Temp 98.4  F (36.9  C) (Oral)   Resp 16   Ht 1.524 m (5')   Wt 60.7 kg (133 lb 13.1 oz)   LMP  (LMP Unknown)   SpO2 94%   BMI 26.13 kg/m         Plan  - SBP Goal 100-140  - Continue DAPT  - Discontinue midodrine  -Repeat CT head to ensure stability/resolution of subarachnoid hemorrhage.    Case discussed with Dr. Leigh.      Maximus Corey MD, MPH  Neuroendovascular Surgery Fellow  [facility]  Pager: [phone]

## 2023-05-18 NOTE — PLAN OF CARE
Reason for Admission: L MCA    Cognitive/Mentation: A/Ox 4  Neuros/CMS: Intact ex inconsistent with commands, confused, aphasia   VS: stable. -140.  GI: BS active, passing flatus. Incontinent.  : Incontinent. Purewick  Pulmonary: LS clear.  Pain: tylenol given for pain.     Drains/Lines: LR running at 75mL/hr  Skin: spine sore. Woc following. Scattered bruising. R hip incision.  Activity: Assist x 2 with lift.  Diet: regular with thin liquids. Takes pills whole with applesauce.     Therapies recs: TCU  Discharge: pending    Aggression Stoplight Tool: green    End of shift summary: pt wax/wanes a lot. R wrist brace in place for fracture. 24hr EEG currently on.

## 2023-05-18 NOTE — PLAN OF CARE
Pt here with left MCA CVA and new left SAH with recent history of right hip and right wrist fracture. Expressive aphasia/WFD; answers well to multiple choice or yes/no questions. Attempted to have patient write out words with pen and paper with no success. Oriented x4 when given multiple choices to choose from. RUE/RLE weakness 3/5. RUE brace on and skin intact. Followed all commands except ADRIANO heel/shin and intermittently finger/nose. SBP > 140 x1, but resolved with intervention. PRNs available, but not needed this shift, if SBP > 140. Scheduled midodrine discontinued. Other VSS on RA. Continuous EEG throughout day. Remained in bed when not downstairs for imaging. Turned/repo'd q2h. Incontinent B&B; purewick in place and skin intact. Multiple small BMs today, not loose.  Tolerating regular diet with assist x1 for feeding. Hgb check today 8.5. CT head/perfusion completed; see results. Discharge plan pending; therapies recommending TCU. Updates provided to patient's friend, Candelaria.       Skin: Large bruise to right groin/hip. Hip incision glue intact, KYLE. Scattered bruising to BUE noted. Blanchable redness noted to mid-spine. Foam dressing over area C/D/I. Mepilex on coccyx for prevention. Pulsate mattress initiated today. Difficult for PIV access; vascular placed two PIVs today.

## 2023-05-18 NOTE — PROGRESS NOTES
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 DAPT was discontinued pre-op, and post op had sig blood loss (Hgb 4.6).     Following surgery had worsened aphasia. MRI done showed new L posterior MCA ischemic stroke (watershed distribution) with worsened regions of ICAD and stroke service consulted.     She was restarted on DAPT, statin, TTE unremarkable.     On 5/13 she had new worsened aphasia and stroke code again activated. SBP was in low 100s during this stroke code, which was the same as it has been for the past few days. CTA H/N did show worsening M2 stenosis. Per discussions with AVANI, she was transferred to ICU for peripheral pressors and close monitoring. MRI did not show new infarcts, but imaging was done early unfortunately - may have been too early to  new ischemic changes. No acute interventions (IVT) given recent strokes.     5/14: AVANI performed DSA with balloon angioplasty, did not pursue intracranial stenting at this time   5/16: Weaned off pressors with stable BP, tolerating activity with PT/OT without worsening of symptoms, TTF   5/16: Third stroke code activated - fluctuating aphasia vs floor team did not know she had baseline aphasia from recent stroke, NCCT with new interval small L frontal SAH, CTA H/N unrevealing (improved L M2 stenosis post procedure), not TNK candidate given recent strokes/SAH    Stroke Evaluation Summarized    MRI/Head CT MRI brain 5/13  1.  No significant interval change.    MRI brain 5/10  IMPRESSION:  1.  Left frontoparietal centrum semiovale white matter demonstrates multiple small and prominent acute infarcts.     2.  Left frontoparietal centrum  semiovale white matter demonstrates multiple small areas of enhancement likely due to subacute infarcts. Recommend follow-up to resolution to exclude a more progressive etiology.     3.  Left frontoparietal centrum semiovale white matter demonstrate multiple small chronic infarcts.     4.  These acute, subacute, and chronic infarcts are within the same location.     5.  Chronic intracranial changes described above.   Intracranial Vasculature HEAD CTA:  1.  No proximal branch vessel occlusion, aneurysm, or vascular malformation.   2.  Intracranial atherosclerosis with multifocal stenoses, as detailed. Briefly, severe stenoses at the A2/A3 segment left JOSI, inferior division M2 segment left MCA, P3 segments of bilateral PCAs, and distal V4 segment left vertebral artery.   Cervical Vasculature NECK CTA:  1.  No flow-limiting stenosis or findings of dissection.   2.  Question features compatible with fibromuscular dysplasia at the distal right cervical ICA and V2/V3 junction of left vertebral artery.     Echocardiogram    EKG/Telemetry Limited TTE  The rhythm was undetermined. NSR in recent study.  EF 55-60%  Normal LA   Other Testing CTP   1.  Alterations on perfusion maps corresponding with region of known infarct in left posterior corona radiata/centrum semiovale, as detailed.     LDL  5/10/2023: 99 mg/dL   A1C  5/10/2023: 4.9 %   Troponin No lab value available in past 48 hrs       Impression/Plan  # Small L frontal SAH, noted on 5/16 scan. Asymptomatic, incidentally found, main concern is for seizures as etiology for fluctuating exam. Likely iatrogenic and related to procedure as reperfusion injury from dysregulated cerebral autoregulation from chronically stenotic intracranial vessel.   # Recurrent L MCA watershed distribution strokes 2/2 L inferior division M2 stenosis (symptomatic intracranial atherosclerotic disease). S/p balloon angioplasty (intracranial stenting DEFERRED by AVANI at this time) on 5/14. Initial  "stroke during admission likely 2/2 being off DAPT, acute blood loss anemia (Hgb 4's) perioperatively. Second event on 5/13 concerning for failure of medical management hence AVANI consult for intracranial intervention consideration. P2Y12 on plavix 10. Does not appear to be pressure dependent given SBP in 100-110s during hospitalization and tolerating PT/OT with similar SBP. Last NCCT - intervally improved SAH (now stable x3). Repeat CTP unremarkble. Exam today c/w with mild/fluctuating global aphasia.   - Q4H neurochecks, SBP goal 100-140 per AVANI   - Recommend resuming home BP meds  - Cont  mg daily  - Cont plavix 75 mg daily - plan for 90 days per SAMMPRIS Trial for sICAD   - Given ischemic risk vs asymptomatic/small/stable L frontal SAH, benefit of continuing DAPT > risks   - Cont atorvastatin 40 mg (LDL 99)  - cEEG ordered on 5/17 to screen for seizures given new SAH  - Stroke Neurology will cont to follow      Patient Follow-up    - final recommendation pending work-up    We will continue to follow.     The Stroke Staff is Dr. Herron.    Antoine Grullon MD  Vascular Neurology Fellow    To page me or covering stroke neurology team member, click here: AMCOM  Choose \"On Call\" tab at top, then select \"NEUROLOGY/ALL SITES\" from middle drop-down box, press Enter, then look for \"stroke\" or \"telestroke\" for your site.    ______________________________________________________    Clinically Significant Risk Factors              # Hypoalbuminemia: Lowest albumin = 2.7 g/dL at 5/9/2023  6:53 PM, will monitor as appropriate            # Overweight: Estimated body mass index is 26.13 kg/m  as calculated from the following:    Height as of this encounter: 1.524 m (5').    Weight as of this encounter: 60.7 kg (133 lb 13.1 oz).             Medications   Scheduled Meds    aspirin  325 mg Oral Daily     atorvastatin  40 mg Oral QPM     calcium carbonate  600 mg Oral BID w/meals     clopidogrel  75 mg Oral or NG Tube Daily     " latanoprost  1 drop Both Eyes QPM     pantoprazole  40 mg Oral Daily     polyethylene glycol  17 g Oral Daily     senna-docusate  1 tablet Oral BID     sodium chloride (PF)  3 mL Intracatheter Q8H     cholecalciferol  50 mcg Oral Daily       Infusion Meds    - MEDICATION INSTRUCTIONS -       lactated ringers 75 mL/hr at 05/17/23 2353       PRN Meds  sodium chloride 0.9%, sodium chloride 0.9%, acetaminophen, sore throat, bisacodyl, - MEDICATION INSTRUCTIONS -, hydrALAZINE, HYDROmorphone **OR** HYDROmorphone, lidocaine 4%, lidocaine (buffered or not buffered), magnesium hydroxide, melatonin, methocarbamol, naloxone **OR** naloxone **OR** naloxone **OR** naloxone, ondansetron **OR** ondansetron, oxyCODONE **OR** oxyCODONE, petrolatum-zinc oxide, polyethylene glycol, prochlorperazine **OR** prochlorperazine, QUEtiapine, simethicone, sodium chloride (PF), sodium chloride (PF)       PHYSICAL EXAMINATION  Temp:  [97.5  F (36.4  C)-98.8  F (37.1  C)] 98.8  F (37.1  C)  Pulse:  [] 96  Resp:  [16-18] 16  BP: (109-156)/(51-89) 118/60  SpO2:  [93 %-100 %] 100 %      Neuro Exam  MS: Fluctuating word finding difficulties/difficulty with following multistep commands, naming overall mostly intact   CN: EOEMI, VFF bilat, symmetric facial movemetns  Motor: BUE, BLE antigravity with no drift  Sensory: LT intact in all 4 limbs  Coordination: FTN intact bilat     Stroke Scales    NIHSS  1a. Level of Consciousness 0-->Alert, keenly responsive   1b. LOC Questions 0-->Answers both questions correctly   1c. LOC Commands 0-->Performs both tasks correctly   2.   Best Gaze 0-->Normal   3.   Visual 0-->No visual loss   4.   Facial Palsy 0-->Normal symmetrical movements   5a. Motor Arm, Left 0-->No drift, limb holds 90 (or 45) degrees for full 10 secs   5b. Motor Arm, Right 1-->Drift, limb holds 90 (or 45) degrees, but drifts down before full 10 secs, does not hit bed or other support   6a. Motor Leg, Left 0-->No drift, leg holds 30 degree  position for full 5 secs   6b. Motor Leg, right (UN) Amputation or joint fusion   7.   Limb Ataxia 0-->Absent   8.   Sensory 0-->Normal, no sensory loss   9.   Best Language 1-->Mild-to-moderate aphasia, some obvious loss of fluency or facility of comprehension, without significant limitation on ideas expressed or form of expression. Reduction of speech and/or comprehension, however, makes conversation. . . (see row details)   10. Dysarthria 1-->Mild-to-moderate dysarthria, patient slurs at least some words and, at worst, can be understood with some difficulty   11. Extinction and Inattention  0-->No abnormality   Total 3 (05/18/23 0341)       Modified Gustavus Score (Pre-morbid)    -      Imaging  I personally reviewed all imaging; relevant findings per HPI.     Lab Results Data   CBC  Recent Labs   Lab 05/18/23  1543 05/17/23  0748 05/16/23  0539 05/15/23  1216   WBC 10.4  --  13.3* 13.5*   RBC 2.74*  --  2.52* 2.48*   HGB 8.5* 7.9* 8.0* 7.8*   HCT 26.6*  --  24.1* 24.0*   *  --  589* 634*     Basic Metabolic Panel    Recent Labs   Lab 05/16/23  1850 05/16/23  0539 05/16/23  0413 05/15/23  2022 05/15/23  1216 05/14/23  0457   NA  --  132*  --   --  137 137   POTASSIUM  --  3.5  --   --  4.0 4.0   CHLORIDE  --  93*  --   --  104 102   CO2  --  30*  --   --  23 25   BUN  --  7.5*  --   --  10.5 14.7   CR  --  0.73  --   --  0.50* 0.58   * 99 93   < > 130* 122*   SAGE  --  9.1  --   --  8.1* 8.3*    < > = values in this interval not displayed.     Liver Panel  Recent Labs   Lab 05/16/23  0539   PROTTOTAL 6.5   ALBUMIN 2.9*   BILITOTAL 0.5   ALKPHOS 171*   AST 29   ALT 14     INR    Recent Labs   Lab Test 02/07/23  1125 02/06/23  1443   INR 1.06 0.97      Lipid Profile    Recent Labs   Lab Test 05/10/23  0859 02/06/23  1537 10/08/19  1200 12/11/15  0959 06/01/15  1205   CHOL 171 171 174   < > 137   HDL 34* 42* 45*   < > 44*   LDL 99 104* 90   < > 67   TRIG 190* 124 197*   < > 129   CHOLHDLRATIO  --   --    --   --  3.1    < > = values in this interval not displayed.     A1C    Recent Labs   Lab Test 05/10/23  0859 02/05/23  0750   A1C 4.9 6.3*     Troponin  No results for input(s): CTROPT, TROPONINIS, TROPONINI, GHTROP in the last 168 hours.       Data

## 2023-05-18 NOTE — PROGRESS NOTES
[facility]  WOC Nurse Inpatient Assessment     Consulted for: Spine    Patient History (according to provider note(s):      82 year old female with PMH  metastatic lung cancer, hx CVA Feb 2023, who was adnutted 5/2/2023 with a fall and R hip and R wrist fractures. S/p ORIF. Dual anitplatelets held. Unfortunately,post op course complicated by repeat Stroke in same area as previous strokes. Neuro reconsulted. Course has also been complicated by pneumonia and UTI. Started on abx.   Anticipate discharge to TCU when acute issues      Mechanical fall  Acute comminuted fracture of the right hip  Acute comminuted fracture of the right distal radius  S/p ORIF R hip and R wrist 5/2/2023  Post-operative acute blood loss anemia, improved    Assessment:      Areas visualized during today's visit: spine    Pressure Injury Location: Spine    Last photo: 5/18/23      Wound type: Pressure Injury     Pressure Injury Stage: Deep Tissue Pressure Injury (DTPI), hospital acquired        Wound history/plan of care:  Patient admitted after a fall at home on 5/1. [facility] began following patient on 5/11 for wounds to spine. 3 linear wounds to spine identified on initial visit. Superior and medial wounds are now blanchable redness on 5/18    Wound base: 100 % non-blanchable and maroon,epidermis     Palpation of the wound bed: normal      Drainage: none     Description of drainage: none     Measurements (length x width x depth, in cm) 1  x 1  x  0 cm      Tunneling N/A     Undermining N/A  Periwound skin: Erythema- blanchable      Color: pink      Temperature: normal   Odor: none  Pain: absent and denies , none  Pain intervention prior to dressing change: N/A  Treatment goal: Heal  and Protection  STATUS: initial assessment  Supplies ordered: supplies stored on unit    My PI Risk Assessment     Sensory Perception: 3 - Slightly Limited     Moisture: 2 - Very moist      Activity: 2 - Chairfast     Mobility: 2 - Very  "limited     Nutrition: 3 - Adequate     Friction/Shear: 1 - Problem     TOTAL: 13      Treatment Plan:     Spine wound: Every 3 days, peel back Q12H for assessment  1. Clean wound with saline or MicroKlenz Spray, pat dry  2. Wipe / \"clean\" the surrounding periwound tissue with skin prep (Cavilon No Sting Skin Prep #[id number]) and allow to dry. This will help protect periwound and help dressing adherence  3. Press a Mepilex 4x4 to the area, making sure to conform nicely to skin curvatures.   4. Time and date dressing change  NOTE  -Reposition pt side to side satish when in bed, every 2 hours-get the pt way over on side to completely offload pressure. This will benefit skin and respiratory function   -Keep heels elevated and floating on pillows at all times. Try using at least 2 pillows under each calf.  -When up to the chair pt needs to fully offload every 2 hours and use a chair cushion if needed     Orders: Written    RECOMMEND PRIMARY TEAM ORDER: None, at this time  Education provided: importance of repositioning, plan of care and Off-loading pressure  Discussed plan of care with: Patient and Nurse  WOC nurse follow-up plan: weekly  Notify WOC if wound(s) deteriorate.  Nursing to notify the Provider(s) and re-consult the WOC Nurse if new skin concern.    DATA:     Current support surface: Standard  Standard gel/foam mattress (IsoFlex, Atmos air, etc), bedside RN had reorded pulsate today  Containment of urine/stool: Incontinent pad in bed  BMI: Body mass index is 26.13 kg/m .   Active diet order: Orders Placed This Encounter      Diet      Regular Diet Adult     Output: I/O last 3 completed shifts:  In: 240 [P.O.:240]  Out: 2900 [Urine:2900]     Labs: Recent Labs   Lab 05/17/23  0748 05/16/23  0539   ALBUMIN  --  2.9*   HGB 7.9* 8.0*   WBC  --  13.3*     Pressure injury risk assessment:   Sensory Perception: 3-->slightly limited  Moisture: 3-->occasionally moist  Activity: 2-->chairfast  Mobility: 2-->very " limited  Nutrition: 2-->probably inadequate  Friction and Shear: 2-->potential problem  Jese Score: 14    Jannette Mejia CWOCN   Dept. Vocera- Contact [facility] Nurse (Tor) via  Vocera   Dept. Office Number: [phone]

## 2023-05-18 NOTE — PROGRESS NOTES
[facility]    Medicine Progress Note - Hospitalist Service    Date of Admission:  5/1/2023    Assessment & Plan     Chiquita Berry is a 82 year old female with PMH  metastatic lung cancer, hx CVA Feb 2023, who was admitted 5/2/2023 with a fall and R hip and R wrist fractures. S/p ORIF. Dual anitplatelets held post-operatively. Unfortunately, post op course complicated by repeat Stroke on 5/13/23 in L posterior MCA (watershed distribution). She underwent neuro IR angioplasty and stenting of L MCA M2 segment on 5/14/23. She was subsequently transferred to ICU on 5/13 for pressors due to hypotension and worsening neuro findings. Titrated off pressors AM of 5/16.     Acute CVA  Hx CVA Feb 2023  S/p L MCA angioplasty on 5/14/23  * Developed stroke like symptoms post ORIF while DAPT on hold on 5/8  * RRT 05/13 for worsening aphasia -> transferred to ICU and on Levophed to maintain pressures > 110. Titrated off 5/15  * ASA and Plavix had been on hold since orthopedic surgery on 5/2/23, had been on Lovenox for prophylaxis while here per ortho.  Resumed DAPT on 5/8 due to repeat CVA   * Repeat head CT obtained 5/8/2023 for new word finding and lower extremity weakness. Reportedly was  present with her initial stroke in Februrary but symptms had resolved.  Repeat head CT was negative for acute stroke.  * MRI/MRA reveals 1.  Left frontoparietal centrum semiovale white matter demonstrates multiple small and prominent acute infarcts.  2.  Left frontoparietal centrum semiovale white matter demonstrates multiple small areas of enhancement likely due to subacute infarcts. Recommend follow-up to resolution to exclude a more progressive etiology.  3.  Left frontoparietal centrum semiovale white matter demonstrate multiple small chronic infarcts.  4.  These acute, subacute, and chronic infarcts are within the same location.  * Echo -> The left ventricle is normal in size. There is normal left ventricular  wall thickness. The visual ejection fraction is 55-60%. Septal motion is consistent with conduction abnormality.  RRT called again on 5/16 for aphasia confusion.  Mild expressive dysphasia this am.  Oriented x3,  Neuro was contacted overnight.  -- Follow neuro recs  -- Appreciate stroke neurology consulted due to on-going symptoms (?MRI this am).  --Daily aspirin 325 started 5/14 post neuro IR (continue indefinitely)  -- Plavix 75mg  -- DAPT 90 days then can consider asa + cilastazol after 90 day  -- Statin: lipitor 40mg    -- SLP/PT/OT  -- SBP goal > 110  -- Continue Midodrine 10 mg TID     Mechanical fall  Acute comminuted fracture of the right hip  Acute comminuted fracture of the right distal radius  S/p ORIF R hip and R wrist 5/2/2023  Post-operative acute blood loss anemia, improved  Patient with mechanical fall, pushed by elevator door, no head trauma, no LOC, no premonitory symptoms. Workup in ED with fractures above and leukocytosis 16.1k. S/p ORIF of R hip and R wrist on 5/2/2023. Postoperatively complicated by anemia and delirium. Hgb 8.7 prior to surgery, Hgb 4.8 on 5/3 post op, s/p 3u pRBC now Hgb stable in 8s.  - Ortho consult appreciated                         - Post op management                - Pain control  - Per ortho, ASA and Plavix held and were to be restarted at discharge; patient was on lovenox for DVT ppx. DAPT was restarted on 5/9/23 due to CVA   - PT/OT/Speech path      Acute metabolic encephalopathy, improved  Acute hypoxic respiratory failure - resolved  Probable Pneumonia, treated  Patient initially requiring 3-4L O2 to maintain O2 sats 92%. Patient was also notably delirious on 5/3. The etiology of this could be multifaceted, from post-operative state, to acute metabolic encephalopathy, to hypoperfusion due to anemia. CT head no acute pathology. Delirium improved on 5/5, but continued to be hypoxic on 5/5. CXR done showed possible pneumonia - left base consolidation.  Plan:  - Wean  O2 as able  -Continue to encourage use of incentive spirometer.  - completed 7 days of ABX on 5/15      Leukocytosis  UTI  -- UA positive and follow up urine cultures -> 50,000-100,000 CFU/mL Enterococcus faecalis -> completed 7 days of levofloxacin     Hypocalcemia: Oscal started here     Metastatic lung cancer  Sees Dr. Pantoja at [facility], this was diagnosed via thoracentesis. No disease progression noted per latest office visit 4/6/2023.  - Okay to resume alectinib at discharge  - No plans for chemotherapy in setting of active infections.     Elevated LFTs  In setting of cancer, followed by Onocology. LFT trend here appears to be improving  - Outpatient follow up with Oncology        Diet: Snacks/Supplements Adult: Magic Cup; With Meals  Diet  Regular Diet Adult  Room Service    DVT Prophylaxis: Pneumatic Compression Devices  Mcallister Catheter: Not present  Lines: None     Cardiac Monitoring: None  Code Status: No CPR- Do NOT Intubate      Clinically Significant Risk Factors              # Hypoalbuminemia: Lowest albumin = 2.7 g/dL at 5/9/2023  6:53 PM, will monitor as appropriate            # Overweight: Estimated body mass index is 26.13 kg/m  as calculated from the following:    Height as of this encounter: 1.524 m (5').    Weight as of this encounter: 60.7 kg (133 lb 13.1 oz).           Disposition Plan      Expected Discharge Date: 05/22/2023        Discharge Comments: TCU toshia Larson MD  Hospitalist Service  [facility]  Securely message with SmartPill (more info)  Text page via [employer] Paging/Directory   ______________________________________________________________________    Interval History   Patient laying in bed, denies any headaches or visual changes.  No focal weaknesses.  Speech remains somewhat aphasic.    Physical Exam   Vital Signs: Temp: 98.6  F (37  C) Temp src: Oral BP: (!) 144/66 (will recheck upon return from CT) Pulse: 100   Resp: 16 SpO2: 95 % O2  Device: None (Room air)    Weight: 133 lbs 13.11 oz    General Appearance: Well appearing for stated age.  Respiratory: CTAB, no rales or ronchi  Cardiovascular: S1, S2 normal, no murmurs  GI: non-tender on palpation, BS present      Medical Decision Making       55 MINUTES SPENT BY ME on the date of service doing chart review, history, exam, documentation & further activities per the note.      Data         Imaging results reviewed over the past 24 hrs:   Recent Results (from the past 24 hour(s))   CT Head w/o Contrast    Narrative    CT OF THE HEAD WITHOUT CONTRAST 5/18/2023 12:56 PM     COMPARISON: Head CT 5/17/2023    HISTORY: Stability scan.    TECHNIQUE: 5 mm thick axial CT images of the head were acquired  without IV contrast material.    FINDINGS: Tiny subarachnoid hemorrhage in the anterior aspect of the  left frontal lobe has decreased in conspicuity. No evidence for new or  increasing hemorrhage.     There is moderate diffuse cerebral volume loss. There are subtle  patchy areas of decreased density in the cerebral white matter  bilaterally that are consistent with sequela of chronic small vessel  ischemic disease. The ventricles and basal cisterns are within normal  limits in configuration given the degree of cerebral volume loss.   There is no midline shift.     No intracranial mass or recent infarct.    The visualized paranasal sinuses are well-aerated. There is no  mastoiditis. There are no fractures of the visualized bones.       Impression    IMPRESSION:   1. Interval decrease in conspicuity of the subarachnoid hemorrhage at  the anterior aspect of the left frontal lobe.  2. Diffuse cerebral volume loss and cerebral white matter changes  consistent with chronic small vessel ischemic disease.        Radiation dose for this scan was reduced using automated exposure  control, adjustment of the mA and/or kV according to patient size, or  iterative reconstruction technique.    GAGE VERDUGO MD          SYSTEM ID:  [id number]

## 2023-05-18 NOTE — PROVIDER NOTIFICATION
"MD Notification    Notified Person: MD    Notified Person Name: Dr. Larson    Notification Date/Time: 5/18/23 at 0845    Notification Interaction: jenelle    Purpose of Notification: \"Patient's BP are above parameters of 140. Are you able to please order PRN BP meds?\"    Orders Received: will order    Comments:    "

## 2023-05-18 NOTE — PROVIDER NOTIFICATION
"MD Notification    Notified Person: MD    Notified Person Name: Dr. Grullon    Notification Date/Time: 5/18/23 at 0844    Notification Interaction: jenelle    Purpose of Notification: \"did you want a repeat CT done for this patient today at all? This AM, her BPs are elevated > 140 as well. About to ask hospitalist for PRN BP meds\"    Orders Received:     Comments: MD will try and talk patient into MRI, holding off on CT for now    "

## 2023-05-19 NOTE — PLAN OF CARE
Reason for Admission: L MCA CVA, L SAH, R wrist and hop fx    Cognitive/Mentation: A/Ox 4, forgetful  Neuros/CMS: Intact ex expressive aphasia, R sided weakness  VS: stable, SBP <140.   Tele: NSR.  GI: BS active x4, passing flatus, last BM 5/19/23. inContinent.  : Incontinent, purewick in place.  Pulmonary: LS clear throughout.  Pain: reports back pain 6/10, receiving tylenol and oxy PRN.     Drains/Lines: PIV w/ LR at 75 ml/h  Skin: R hip incision, back wound  Activity: Assist x 2 with lift.  Diet: Regular with thin liquids. Takes pills whole with applesauce.     Therapies recs: TCU  Discharge: pending, final recs from neuro     Aggression Stoplight Tool: green

## 2023-05-19 NOTE — PLAN OF CARE
Pt here with L MCA CVA. Recent hx of fall w R hip and R wrist fractures. A&Ox4. Expressive aphasia/WFD continues. RUE/RLE 3/5. Denies numbness/tingling. VSS on RA. Hydralazine admin x1 for SBP>140. Reg diet, thins, pillows whole in applesauce. Incontinent of B&B. Frequent smear BM. Adequate UOP. T&R Q2h. Preventative mepilex on sacrum. Redness on spine covered with mepi. Bruising to R groin/hip. PIV infusing LR @ 75/hr. Continuous EEG in place overnight.

## 2023-05-19 NOTE — PROGRESS NOTES
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 DAPT was discontinued pre-op, and post op had sig blood loss (Hgb 4.6).     Following surgery had worsened aphasia. MRI done showed new L posterior MCA ischemic stroke (watershed distribution) with worsened regions of ICAD and stroke service consulted.     She was restarted on DAPT, statin, TTE unremarkable.     On 5/13 she had new worsened aphasia and stroke code again activated. SBP was in low 100s during this stroke code, which was the same as it has been for the past few days. CTA H/N did show worsening M2 stenosis. Per discussions with [facility], she was transferred to ICU for peripheral pressors and close monitoring. MRI did not show new infarcts, but imaging was done early unfortunately - may have been too early to  new ischemic changes. No acute interventions (IVT) given recent strokes.     5/14: [facility] performed DSA with balloon angioplasty, did not pursue intracranial stenting at this time   5/16: Weaned off pressors with stable BP, tolerating activity with PT/OT without worsening of symptoms, TTF   5/16: Third stroke code activated - fluctuating aphasia vs floor team did not know she had baseline aphasia from recent stroke, NCCT with new interval small L frontal SAH, CTA H/N unrevealing (improved L M2 stenosis post procedure), not TNK candidate given recent strokes/SAH    Stroke Evaluation Summarized    MRI/Head CT MRI brain 5/13  1.  No significant interval change.    MRI brain 5/10  IMPRESSION:  1.  Left frontoparietal centrum semiovale white matter demonstrates multiple small and prominent acute infarcts.     2.  Left frontoparietal centrum  semiovale white matter demonstrates multiple small areas of enhancement likely due to subacute infarcts. Recommend follow-up to resolution to exclude a more progressive etiology.     3.  Left frontoparietal centrum semiovale white matter demonstrate multiple small chronic infarcts.     4.  These acute, subacute, and chronic infarcts are within the same location.     5.  Chronic intracranial changes described above.   Intracranial Vasculature HEAD CTA:  1.  No proximal branch vessel occlusion, aneurysm, or vascular malformation.   2.  Intracranial atherosclerosis with multifocal stenoses, as detailed. Briefly, severe stenoses at the A2/A3 segment left JOSI, inferior division M2 segment left MCA, P3 segments of bilateral PCAs, and distal V4 segment left vertebral artery.   Cervical Vasculature NECK CTA:  1.  No flow-limiting stenosis or findings of dissection.   2.  Question features compatible with fibromuscular dysplasia at the distal right cervical ICA and V2/V3 junction of left vertebral artery.     Echocardiogram    EKG/Telemetry Limited TTE  The rhythm was undetermined. NSR in recent study.  EF 55-60%  Normal LA   Other Testing CTP   1.  Alterations on perfusion maps corresponding with region of known infarct in left posterior corona radiata/centrum semiovale, as detailed.     LDL  5/10/2023: 99 mg/dL   A1C  5/10/2023: 4.9 %   Troponin No lab value available in past 48 hrs       Impression/Plan  # Small L frontal SAH, noted on 5/16 scan. Asymptomatic, incidentally found, main concern is for seizures as etiology for fluctuating exam. Likely iatrogenic and related to procedure as reperfusion injury from dysregulated cerebral autoregulation from chronically stenotic intracranial vessel.   # Recurrent L MCA watershed distribution strokes 2/2 L inferior division M2 stenosis (symptomatic intracranial atherosclerotic disease). S/p balloon angioplasty (intracranial stenting DEFERRED by [facility] at this time) on 5/14. Initial  "stroke during admission likely 2/2 being off DAPT, acute blood loss anemia (Hgb 4's) perioperatively. Second event on 5/13 concerning for failure of medical management hence [facility] consult for intracranial intervention consideration. P2Y12 on plavix 10. Does not appear to be pressure dependent given SBP in 100-110s during hospitalization and tolerating PT/OT with similar SBP. Last NCCT - intervally improved SAH (now stable x3). CTA H/N following 3rd stroke code (following BA) with patent L M2. Repeat CTP unremarkble. Exam today c/w with fluctuating global aphasia related to recent stroke - which has been typical of hospital course. Anticipate ongoing fluctuating aphasia related to stress of hospitalization, lack of sleep, etc.   - Q4H neurochecks, SBP goal 100-140 per [facility]   - Recommend resuming home BP meds  - Cont  mg daily  - Cont plavix 75 mg daily - plan for 90 days per [employer] Trial for sICAD   - Given ischemic risk vs asymptomatic/small/stable L frontal SAH, benefit of continuing DAPT > risks   - Cont atorvastatin 40 mg (LDL 99)  - cEEG 5/17-5/19 to screen for seizures given new SAH - reassuring against abnormal electrical activity   - Outpt goals: SBP < 130, LDL < 70, A1c < 7  - Outpt stroke f/u - ORDERED   - Pt will f/u with [facility] as outpt in 1 month  - From neurological perspective, pt can dispo per PT/OT recs     Patient Follow-up    - in 8 weeks with general neurology ([phone])    No further stroke evaluation is recommended, so we will sign off. Please contact us with any additional questions.    The Stroke Staff is Dr. Herron.    Antoine Grullon MD  Vascular Neurology Fellow    To page me or covering stroke neurology team member, click here: AMCOM  Choose \"On Call\" tab at top, then select \"NEUROLOGY/ALL SITES\" from middle drop-down box, press Enter, then look for \"stroke\" or \"telestroke\" for your site.    ______________________________________________________    Clinically Significant Risk Factors    "           # Hypoalbuminemia: Lowest albumin = 2.7 g/dL at 5/9/2023  6:53 PM, will monitor as appropriate            # Overweight: Estimated body mass index is 26.13 kg/m  as calculated from the following:    Height as of this encounter: 1.524 m (5').    Weight as of this encounter: 60.7 kg (133 lb 13.1 oz).             Medications   Scheduled Meds    aspirin  325 mg Oral Daily     atorvastatin  40 mg Oral QPM     calcium carbonate  600 mg Oral BID w/meals     clopidogrel  75 mg Oral or NG Tube Daily     latanoprost  1 drop Both Eyes QPM     pantoprazole  40 mg Oral Daily     polyethylene glycol  17 g Oral Daily     senna-docusate  1 tablet Oral BID     sodium chloride (PF)  3 mL Intracatheter Q8H     cholecalciferol  50 mcg Oral Daily       Infusion Meds    - MEDICATION INSTRUCTIONS -       lactated ringers 75 mL/hr at 05/19/23 0900       PRN Meds  sodium chloride 0.9%, sodium chloride 0.9%, acetaminophen, sore throat, bisacodyl, - MEDICATION INSTRUCTIONS -, hydrALAZINE, HYDROmorphone **OR** HYDROmorphone, lidocaine 4%, lidocaine (buffered or not buffered), magnesium hydroxide, melatonin, methocarbamol, naloxone **OR** naloxone **OR** naloxone **OR** naloxone, ondansetron **OR** ondansetron, oxyCODONE **OR** oxyCODONE, petrolatum-zinc oxide, polyethylene glycol, prochlorperazine **OR** prochlorperazine, QUEtiapine, simethicone, sodium chloride (PF), sodium chloride (PF)       PHYSICAL EXAMINATION  Temp:  [98.4  F (36.9  C)-98.8  F (37.1  C)] 98.8  F (37.1  C)  Pulse:  [] 99  Resp:  [16-18] 16  BP: (109-152)/(50-78) 132/78  SpO2:  [93 %-100 %] 95 %      Neuro Exam  MS: Fluctuating moderate word finding difficulties/difficulty with following multistep commands, naming overall mostly intact   CN: EOEMI, VFF bilat, symmetric facial movemetns  Motor: BUE, BLE antigravity with no drift  Sensory: LT intact in all 4 limbs  Coordination: FTN intact bilat     Stroke Scales    NIHSS  1a. Level of Consciousness 0-->Alert,  keenly responsive   1b. LOC Questions 0-->Answers both questions correctly   1c. LOC Commands 0-->Performs both tasks correctly   2.   Best Gaze 0-->Normal   3.   Visual 0-->No visual loss   4.   Facial Palsy 0-->Normal symmetrical movements   5a. Motor Arm, Left 0-->No drift, limb holds 90 (or 45) degrees for full 10 secs   5b. Motor Arm, Right 0-->No drift, limb holds 90 (or 45) degrees for full 10 secs   6a. Motor Leg, Left 0-->No drift, leg holds 30 degree position for full 5 secs   6b. Motor Leg, right 0-->No drift, leg holds 30 degree position for full 5 secs   7.   Limb Ataxia 0-->Absent   8.   Sensory 0-->Normal, no sensory loss   9.   Best Language 1-->Mild-to-moderate aphasia, some obvious loss of fluency or facility of comprehension, without significant limitation on ideas expressed or form of expression. Reduction of speech and/or comprehension, however, makes conversation. . . (see row details)   10. Dysarthria 0-->Normal   11. Extinction and Inattention  0-->No abnormality   Total 1 (05/19/23 0830)       Modified Marion Score (Pre-morbid)    -      Imaging  I personally reviewed all imaging; relevant findings per HPI.     Lab Results Data   CBC  Recent Labs   Lab 05/19/23  0808 05/18/23  1543 05/17/23  0748 05/16/23  0539 05/15/23  1216   WBC  --  10.4  --  13.3* 13.5*   RBC  --  2.74*  --  2.52* 2.48*   HGB 8.9* 8.5* 7.9* 8.0* 7.8*   HCT  --  26.6*  --  24.1* 24.0*   PLT  --  641*  --  589* 634*     Basic Metabolic Panel    Recent Labs   Lab 05/18/23  1543 05/16/23  1850 05/16/23  0539 05/15/23  2022 05/15/23  1216     --  132*  --  137   POTASSIUM 3.7  --  3.5  --  4.0   CHLORIDE 101  --  93*  --  104   CO2 28  --  30*  --  23   BUN 9.2  --  7.5*  --  10.5   CR 0.50*  --  0.73  --  0.50*   * 119* 99   < > 130*   SAGE 8.3*  --  9.1  --  8.1*    < > = values in this interval not displayed.     Liver Panel  Recent Labs   Lab 05/16/23  0539   PROTTOTAL 6.5   ALBUMIN 2.9*   BILITOTAL 0.5    ALKPHOS 171*   AST 29   ALT 14     INR    Recent Labs   Lab Test 02/07/23  1125 02/06/23  1443   INR 1.06 0.97      Lipid Profile    Recent Labs   Lab Test 05/10/23  0859 02/06/23  1537 10/08/19  1200 12/11/15  0959 06/01/15  1205   CHOL 171 171 174   < > 137   HDL 34* 42* 45*   < > 44*   LDL 99 104* 90   < > 67   TRIG 190* 124 197*   < > 129   CHOLHDLRATIO  --   --   --   --  3.1    < > = values in this interval not displayed.     A1C    Recent Labs   Lab Test 05/10/23  0859 02/05/23  0750   A1C 4.9 6.3*     Troponin  No results for input(s): CTROPT, TROPONINIS, TROPONINI, GHTROP in the last 168 hours.       Data

## 2023-05-19 NOTE — PROGRESS NOTES
[facility]    Medicine Progress Note - Hospitalist Service    Date of Admission:  5/1/2023    Assessment & Plan     Chiquita Berry is a 82 year old female with PMH  metastatic lung cancer, hx CVA Feb 2023, who was admitted 5/2/2023 with a fall and R hip and R wrist fractures. S/p ORIF. Dual anitplatelets held pre and post-operatively. Unfortunately, post op course complicated by significant blood loss (Hb 4.6). Also complicated by   repeat Stroke on 5/13/23 in L posterior MCA (watershed distribution). She was subsequently transferred to ICU on 5/13 for pressors due to hypotension and worsening neuro findings. She underwent neuro IR angioplasty and stenting of L MCA M2 segment on 5/14/23.  Titrated off pressors AM of 5/16.  Stroke code activated on 5/16/2023 upon transfer to floor for aphasia. CT head showed small L frontal SAH       Acute ischemic CVA  Acute SAH  Hx CVA Feb 2023   S/p L MCA angioplasty on 5/14/23  * Developed stroke like symptoms post ORIF while DAPT on hold on 5/8  * RRT 05/13 for worsening aphasia -> transferred to ICU and on Levophed to maintain pressures > 110. Titrated off 5/15  * ASA and Plavix had been on hold since orthopedic surgery on 5/2/23, had been on Lovenox for prophylaxis instead.  Resumed DAPT on 5/8 due to repeat CVA   * Repeat head CT obtained 5/8/2023 for new word finding and lower extremity weakness. Reportedly was  present with her initial stroke in Februrary but symptms had resolved.  Repeat head CT was negative for acute stroke.  * MRI/MRA reveals 1.  Left frontoparietal centrum semiovale white matter demonstrates multiple small and prominent acute infarcts.  2.  Left frontoparietal centrum semiovale white matter demonstrates multiple small areas of enhancement likely due to subacute infarcts. Recommend follow-up to resolution to exclude a more progressive etiology.  3.  Left frontoparietal centrum semiovale white matter demonstrate multiple  "small chronic infarcts.  4.  These acute, subacute, and chronic infarcts are within the same location.  * Echo -> The left ventricle is normal in size. There is normal left ventricular wall thickness. The visual ejection fraction is 55-60%. Septal motion is consistent with conduction abnormality.  *RRT called again on 5/16 for aphasia confusion.  * CT head obtained 5/16 revealed new interval small L frontal SAH.   * CTA H/N unrevealing.    Plan  -- Neuro following: input appreciated. \"SAH ; Asymptomatic, incidentally found, main concern is for seizures as etiology for fluctuating exam. Likely iatrogenic and related to procedure as reperfusion injury from dysregulated cerebral autoregulation from chronically stenotic intracranial vessel.\"   - continous EEG underway.   -- Patient to continue on Daily aspirin 325 (continue indefinitely) and Plavix 75mg (Given patients ischemic risk, benefits outweigh the risk)  -- DAPT 90 days then can consider asa + cilastazol after 90 day  -- Statin: lipitor 40mg    -- SLP/PT/OT  -- SBP goal 100 - 140  -- was on Midodrine 10 mg TID for hypotension (now resolved), now discontinued to meet current SBP goal.      Mechanical fall  Acute comminuted fracture of the right hip  Acute comminuted fracture of the right distal radius  S/p ORIF R hip and R wrist 5/2/2023  Post-operative acute blood loss anemia, improved  Patient with mechanical fall, pushed by elevator door, no head trauma, no LOC, no premonitory symptoms. Workup in ED with fractures above and leukocytosis 16.1k. S/p ORIF of R hip and R wrist on 5/2/2023. Postoperatively complicated by anemia and delirium. Hgb 8.7 prior to surgery, Hgb 4.8 on 5/3 post op, s/p 3u pRBC now Hgb stable in 8s.  - Ortho consult appreciated                         - Post op management                - Pain control  - Per ortho, ASA and Plavix held and were to be restarted at discharge; patient was on lovenox for DVT ppx. DAPT was restarted on 5/9/23 due " to repeat CVA   - if patient still in the hospital at her 6 week f/u; ortho plans to see her in the hospital  - PT/OT/Speech path      Acute metabolic encephalopathy, improved  Acute hypoxic respiratory failure - resolved  Probable Pneumonia, treated  Patient initially requiring 3-4L O2 to maintain O2 sats 92%. Patient was also notably delirious on 5/3. The etiology of this could be multifaceted, from post-operative state, to acute metabolic encephalopathy, to hypoperfusion due to anemia. CT head no acute pathology. Delirium improved on 5/5, but continued to be hypoxic on 5/5. CXR done showed possible pneumonia - left base consolidation.  Plan:  - Wean O2 as able  -Continue to encourage use of incentive spirometer.  - completed 7 days of ABX on 5/15      Leukocytosis - resolved  UTI - resolved  -- UA positive and follow up urine cultures -> 50,000-100,000 CFU/mL Enterococcus faecalis -> completed 7 days of levofloxacin     Hypocalcemia: Oscal started here     Metastatic lung cancer  Sees Dr. Pantoja at [facility], this was diagnosed via thoracentesis. No disease progression noted per latest office visit 4/6/2023.  - Okay to resume alectinib at discharge  - No plans for chemotherapy in setting of active infections.     Elevated LFTs  In setting of cancer, followed by Onocology. LFT trend here appears to be improving  - Outpatient follow up with Oncology        Diet: Snacks/Supplements Adult: Magic Cup; With Meals  Diet  Regular Diet Adult  Room Service    DVT Prophylaxis: Pneumatic Compression Devices  Mcallister Catheter: Not present  Lines: None     Cardiac Monitoring: None  Code Status: No CPR- Do NOT Intubate      Clinically Significant Risk Factors              # Hypoalbuminemia: Lowest albumin = 2.7 g/dL at 5/9/2023  6:53 PM, will monitor as appropriate            # Overweight: Estimated body mass index is 26.13 kg/m  as calculated from the following:    Height as of this encounter: 1.524 m (5').    Weight as  of this encounter: 60.7 kg (133 lb 13.1 oz).           Disposition Plan  likely TCU, pending final recommendations from neurology regarding seizure risk and continuous EEG.      Expected Discharge Date: 05/22/2023        Discharge Comments: TCU rec          Colleen Larson MD  Hospitalist Service  [facility]  Securely message with [employer] (more info)  Text page via [employer] Paging/Directory   ______________________________________________________________________    Interval History   Patient laying in bed, states she dosent feel good but is unable to elaborate painful.  Fabi who is POA is at bedside-updated.  There is question regarding duration of continuous EEG, will reach out to neurology regarding this.     Physical Exam   Vital Signs: Temp: 98.4  F (36.9  C) Temp src: Oral BP: 138/71 Pulse: 98   Resp: 18 SpO2: 98 % O2 Device: None (Room air)    Weight: 133 lbs 13.11 oz    General Appearance: Well appearing for stated age.  Respiratory: CTAB, no rales or ronchi  Cardiovascular: S1, S2 normal, no murmurs  GI: non-tender on palpation, BS present      Medical Decision Making       55 MINUTES SPENT BY ME on the date of service doing chart review, history, exam, documentation & further activities per the note.      Data     I have personally reviewed the following data over the past 24 hrs:    10.4  \   8.9 (L)   / 641 (H)     138 101 9.2 /  139 (H)   3.7 28 0.50 (L) \       Imaging results reviewed over the past 24 hrs:   Recent Results (from the past 24 hour(s))   CT Head w/o Contrast    Narrative    CT OF THE HEAD WITHOUT CONTRAST 5/18/2023 12:56 PM     COMPARISON: Head CT 5/17/2023    HISTORY: Stability scan.    TECHNIQUE: 5 mm thick axial CT images of the head were acquired  without IV contrast material.    FINDINGS: Tiny subarachnoid hemorrhage in the anterior aspect of the  left frontal lobe has decreased in conspicuity. No evidence for new or  increasing hemorrhage.     There is moderate  diffuse cerebral volume loss. There are subtle  patchy areas of decreased density in the cerebral white matter  bilaterally that are consistent with sequela of chronic small vessel  ischemic disease. The ventricles and basal cisterns are within normal  limits in configuration given the degree of cerebral volume loss.   There is no midline shift.     No intracranial mass or recent infarct.    The visualized paranasal sinuses are well-aerated. There is no  mastoiditis. There are no fractures of the visualized bones.       Impression    IMPRESSION:   1. Interval decrease in conspicuity of the subarachnoid hemorrhage at  the anterior aspect of the left frontal lobe.  2. Diffuse cerebral volume loss and cerebral white matter changes  consistent with chronic small vessel ischemic disease.        Radiation dose for this scan was reduced using automated exposure  control, adjustment of the mA and/or kV according to patient size, or  iterative reconstruction technique.    GAGE VERDUGO MD         SYSTEM ID:  [id number]   CT Head Perfusion w Contrast    Narrative    CT BRAIN PERFUSION 5/18/2023 2:54 PM    COMPARISON: None.    HISTORY: Evaluate perfusion post balloon angioplasty given ongoing  fluctuating aphasia.    TECHNIQUE: Time sequential axial CT images of the head were acquired  during the administration of intravenous contrast (50mL [id number]).  CTA images of the [place] as well as color perfusion maps of  the brain were created from this time sequential axial source data.    FINDINGS: There are no focal or regional perfusion defects in the  brain.      Impression    IMPRESSION: Normal CT perfusion of the brain.    Radiation dose for this scan was reduced using automated exposure  control, adjustment of the mA and/or kV according to patient size, or  iterative reconstruction technique.      GAGE VERDUGO MD         SYSTEM ID:  [id number]

## 2023-05-19 NOTE — PLAN OF CARE
Reason for Admission: L MCA CVA, L SAH, R wrist and hip fractures    Cognitive/Mentation: A/Ox 4, forgetful  Neuros/CMS: Intact ex expressive aphasia, WFD, R sided weakness  VS: Stable, SBP<140.  GI: BS +, + flatus, last BM yesterday. Incontinent.  : Purewick in place. Incontinent.  Pulmonary: LS clear.  Pain: Chronic back pain controlled with tylenol and oxycodone.     Drains: None  Skin: Scattered bruising, R wrist brace, R hip incision CDI, R groin site, mepilex to sacrum and mid back  Activity: Assist x 2 with lift, turn and repo.  Diet: Regular with thin liquids. Takes pills whole in applesauce. Assist to feed.     Aggression Stoplight Score: Green  Therapies recs: Pending  Discharge: Pending    End of shift summary: IV hydralazine available for SBP>140. EEG continued. Frequent repositioning and PRN pain meds given for chronic back pain.

## 2023-05-19 NOTE — PROGRESS NOTES
SPEECH-LANGUAGE EVALUATION       05/19/23 4417   Appointment Info   Signing Clinician's Name / Credentials (SLP) Mary Saucedo M.A., CCC-SLP, [facility]   General Information   Onset of Illness/Injury or Date of Surgery 05/01/23   Referring Physician Dr. Gutierres   Patient/Family Therapy Goal Statement (SLP) Patient would like frequent therapy as she is aware she is significantly below her language baseline.   Pertinent History of Current Problem Chiquita Berry is a 82 year old female with PMH of metastatic lung cancer, hx CVA Feb 2023, who presents with a fall and fractures. Found to have acute comminuted fracture of the right hip and an acute comminuted fracture of the right distal radius - POD #1 ORIF of both. SLP consulted for swallow eval post-op in the setting of AMS/lethargy post-anesthesia. SLP services saw in Feb 2023 s/p CVA for swallow (recommended regular/thin) and communication (mild expresive aphasia).  complicated by   repeat Stroke on 5/13/23 in L posterior MCA (watershed distribution). She was subsequently transferred to ICU on 5/13 for pressors due to hypotension and worsening neuro findings. She underwent neuro IR angioplasty and stenting of L MCA M2 segment on 5/14/23.   Stroke code activated on 5/16/2023 upon transfer to floor for aphasia. CT head showed small L frontal SAH.  SLP consulted today to assess language dysfunction.   Type of Evaluation   Type of Evaluation Speech, Language, Cognition   Motor Speech   Vocal Loudness (Motor Speech) WNL   Speech Intelligibility (Motor Speech) WNL   Breath Support (Motor Speech) intact   Respiration (motor speech) None   Western Aphasia Battery- Revised Bedside Record From   Spontaneous Speech Content Score (out of 10) 3   Spontaneous Speech Fluency Score (out of 10) 5   Auditory Verbal Comprehension Score (out of 10) 9   Sequential Commands Score (out of 10) 9   Repetition Score (out of 10) 2   Object Naming Score (out of 10) 5   Bedside Aphasia Sum  "33   WAB-R Bedside Aphasia Score 55   Reading Score (out of 10) 7   Aphasia Severity Level Moderate Aphasia   Comments Moderately-severe expressive deficits   SLP Total Evaluation Time   Eval: Sound production with lang comprehension and expression Minutes (89597) 30   SLP Goals   Therapy Frequency (SLP Eval) daily   SLP Predicted Duration/Target Date for Goal Attainment 06/02/23   SLP Goals Communication;SLP Goal 1   SLP: Communicate basic wants and needs moderate assist;verbally   SLP: Goal 1 Patient will name objects and express personal information for 9/10 opportunities independently.   Interventions   Interventions Quick Adds Speech, Language, Voice & Communication   Speech, Language, Voice Communication&/or Auditory Processing   Treatment of Speech, Language, Voice Communication&/or Auditory Processing Minutes (03551) 79   Symptoms Noted During/After Treatment Fatigue   Treatment Detail/Skilled Intervention Patient highly motivated to improve language, expresses thank you and \"i want to\" when discussion therapy frequency.  Patient required minimal cueing to identify aphasic errors in personal information, performance improving in object naming when providing a F:2, with 80%  accuracy.   SLP Discharge Planning   SLP Plan Language treatment daily   SLP Discharge Recommendation Transitional Care Facility   SLP Rationale for DC Rec Significantly below baseline language function, despite some expressive aphasia from pre-admission CVA.   SLP Brief overview of current status  Recommend daily expressive language therapy, as patient is highly motivated to improve her communication function.  Nursing team and patient deny new swallowing issues, and she continues to tolerate a regular diet and pills safely.   Total Session Time   Total Session Time (sum of timed and untimed services) 40       "

## 2023-05-19 NOTE — TELEPHONE ENCOUNTER
----- Message from Maximus Corey MD sent at 5/19/2023  8:05 AM CDT -----  Regarding: ESN Clinic Follow-Up  Jacobo Fernandez schedule this patient for a follow-up in our ESN Clinic under Dr. Leigh in one month. She is s/p Balloon angioplasty for Left M2 stenosis.     Maximus Corey MD, MPH  Neuroendovascular Surgery Fellow  [facility]  Pager: [phone]

## 2023-05-19 NOTE — TELEPHONE ENCOUNTER
Patient currently admitted. Will have scheduling reach out after discharged. Celine Arredondo RN 5/19/2023 2:59 PM

## 2023-05-19 NOTE — PROGRESS NOTES
Neuro IR Progress Note     No acute events overnight. Stable neurological examination. BP Overnight 100-140s, required one time hydralazine for BP over 140. CTH shows resolving SAH, CTP is normal.      HPI:   81 yo F pt with known L MCA M2 segment symptomatic stenosis, admitted for femur fracture, L MCA territory infarct while DAPT held for ORIF. Tonight, baseline aphasia worsened in the setting of hypotension, despite DAPT having been restarted. Pressures raised with laying flat and bolus, but still not reverted to baseline. She will likely need intracranial angioplasty, possibly stenting. S/p Angioplasty only on 5/14/2023    /66 (BP Location: Right arm, Patient Position: Semi-Tate's, Cuff Size: Adult Regular)   Pulse 103   Temp 98.6  F (37  C) (Oral)   Resp 16   Ht 1.524 m (5')   Wt 60.7 kg (133 lb 13.1 oz)   LMP  (LMP Unknown)   SpO2 94%   BMI 26.13 kg/m         Plan  - SBP Goal 100-140  - Continue DAPT  - Ok to discharge when clinically stable.   - Follow- up in Clinic with Dr. Leigh in 1 month.     Case discussed with Dr. Leigh.      Maximus Corey MD, MPH  Neuroendovascular Surgery Fellow  [facility]  Pager: [phone]

## 2023-05-20 NOTE — PROGRESS NOTES
[facility]    Medicine Progress Note - Hospitalist Service    Date of Admission:  5/1/2023    Assessment & Plan     Chiquita Berry is a 82 year old female with PMH  metastatic lung cancer, hx CVA Feb 2023, who was admitted 5/2/2023 with a fall and R hip and R wrist fractures. S/p ORIF. Dual anitplatelets held pre and post-operatively. Unfortunately, post op course complicated by significant blood loss (Hb 4.6). Also complicated by repeat Stroke on 5/13/23 in L posterior MCA (watershed distribution). She was subsequently transferred to ICU on 5/13 for pressors due to hypotension and worsening neuro findings. She underwent neuro IR angioplasty and stenting of L MCA M2 segment on 5/14/23.  Titrated off pressors AM of 5/16. Stroke code activated on 5/16/23 upon transfer to floor for aphasia. CT head showed small L frontal SAH on 5/18/23.       Acute ischemic CVA  Acute SAH  Hx CVA Feb 2023   S/p L MCA angioplasty on 5/14/23  * Developed stroke like symptoms post ORIF while DAPT on hold on 5/8  * RRT 05/13 for worsening aphasia -> transferred to ICU and on Levophed to maintain pressures > 110. Titrated off 5/15  * ASA and Plavix had been on hold since orthopedic surgery on 5/2/23, had been on Lovenox for prophylaxis instead.  Resumed DAPT on 5/8 due to repeat CVA   * Repeat head CT obtained 5/8/2023 for new word finding and lower extremity weakness. Reportedly was  present with her initial stroke in Februrary but symptms had resolved.  Repeat head CT was negative for acute stroke.  * MRI/MRA reveals 1.  Left frontoparietal centrum semiovale white matter demonstrates multiple small and prominent acute infarcts.  2.  Left frontoparietal centrum semiovale white matter demonstrates multiple small areas of enhancement likely due to subacute infarcts. Recommend follow-up to resolution to exclude a more progressive etiology.  3.  Left frontoparietal centrum semiovale white matter demonstrate  "multiple small chronic infarcts.  4.  These acute, subacute, and chronic infarcts are within the same location.  * Echo -> The left ventricle is normal in size. There is normal left ventricular wall thickness. The visual ejection fraction is 55-60%. Septal motion is consistent with conduction abnormality.  *RRT called again on 5/16 for aphasia confusion.  * CT head obtained 5/16 revealed new interval small L frontal SAH.   * CTA H/N 5/17 and 5/18 unrevealing.    Plan  -- Neuro following: input appreciated. \"SAH ; Asymptomatic, incidentally found, main concern is for seizures as etiology for fluctuating exam. Likely iatrogenic and related to procedure as reperfusion injury from dysregulated cerebral autoregulation from chronically stenotic intracranial vessel.\"   -- neuro signed off 5/19  -- Patient to continue on Daily aspirin 325 (continue indefinitely) and Plavix 75mg (Given patients ischemic risk, benefits outweigh the risk)  -- DAPT 90 days then can consider asa + cilastazol after 90 day  -- Statin: lipitor 40mg    -- SLP/PT/OT  -- SBP goal 100 - 140  -- was on Midodrine 10 mg TID for hypotension (now resolved), now discontinued to meet current SBP goal.      Mechanical fall  Acute comminuted fracture of the right hip  Acute comminuted fracture of the right distal radius  S/p ORIF R hip and R wrist 5/2/2023  Post-operative acute blood loss anemia, improved  Patient with mechanical fall, pushed by elevator door, no head trauma, no LOC, no premonitory symptoms. Workup in ED with fractures above and leukocytosis 16.1k. S/p ORIF of R hip and R wrist on 5/2/2023. Postoperatively complicated by anemia and delirium. Hgb 8.7 prior to surgery, Hgb 4.8 on 5/3 post op, s/p 3u pRBC now Hgb stable in 8s.  - Ortho consult appreciated                         - Post op management                - Pain control  - Per ortho, ASA and Plavix held and were to be restarted at discharge; patient was on lovenox for DVT ppx. DAPT was " restarted on 5/9/23 due to repeat CVA   - if patient still in the hospital at her 6 week f/u; ortho plans to see her in the hospital  - PT/OT/Speech path      Acute metabolic encephalopathy, improved  Acute hypoxic respiratory failure - resolved  Probable Pneumonia, treated  Patient initially requiring 3-4L O2 to maintain O2 sats 92%. Patient was also notably delirious on 5/3. The etiology of this could be multifaceted, from post-operative state, to acute metabolic encephalopathy, to hypoperfusion due to anemia. CT head no acute pathology. Delirium improved on 5/5, but continued to be hypoxic on 5/5. CXR done showed possible pneumonia - left base consolidation.  Plan:  - Wean O2 as able  -Continue to encourage use of incentive spirometer.  - completed 7 days of ABX on 5/15      Leukocytosis - resolved  UTI - resolved  -- UA positive and follow up urine cultures -> 50,000-100,000 CFU/mL Enterococcus faecalis -> completed 7 days of levofloxacin     Hypocalcemia: Oscal started here     Metastatic lung cancer  Sees Dr. Pantoja at [facility], this was diagnosed via thoracentesis. No disease progression noted per latest office visit 4/6/2023.  - Okay to resume alectinib at discharge  - No plans for chemotherapy in setting of active infections.     Elevated LFTs  In setting of cancer, followed by Onocology. LFT trend here appears to be improving  - Outpatient follow up with Oncology        Diet: Snacks/Supplements Adult: Magic Cup; With Meals  Diet  Regular Diet Adult  Room Service    DVT Prophylaxis: Pneumatic Compression Devices  Mcallister Catheter: Not present  Lines: None     Cardiac Monitoring: None  Code Status: No CPR- Do NOT Intubate      Clinically Significant Risk Factors              # Hypoalbuminemia: Lowest albumin = 2.7 g/dL at 5/9/2023  6:53 PM, will monitor as appropriate            # Overweight: Estimated body mass index is 26.13 kg/m  as calculated from the following:    Height as of this encounter:  1.524 m (5').    Weight as of this encounter: 60.7 kg (133 lb 13.1 oz).           Disposition Plan  TCU, medically ready     Expected Discharge Date: 05/21/2023    Discharge Delays: Placement - TCU    Discharge Comments: TCU rec          Keagan Pettit MD  Hospitalist Service  [facility]  Securely message with [employer] (more info)  Text page via [employer] Paging/Directory   ______________________________________________________________________    Interval History   Feels well. Working with therapy. Ongoing exp aphasia. Neuro signed off yesterday.     Physical Exam   Vital Signs: Temp: 98.5  F (36.9  C) Temp src: Oral BP: (!) 150/67 Pulse: 91   Resp: 16 SpO2: 97 % O2 Device: None (Room air)    Weight: 133 lbs 13.11 oz    General Appearance: Well appearing for stated age.  Respiratory: CTAB, no rales or ronchi  Cardiovascular: S1, S2 normal, no murmurs  GI: non-tender on palpation, BS present      Medical Decision Making       ------------------ MEDICAL DECISION MAKING ------------------------------------------------------------------------------------------------------  MANAGEMENT DISCUSSED with the following over the past 24 hours: RN, patient, PT   NOTE(S)/MEDICAL RECORDS REVIEWED over the past 24 hours: neuro notes, prog notes, therapy notes,   Tests ORDERED & REVIEWED in the past 24 hours:  - BMP  - CBC  Tests personally interpreted in the past 24 hours:  - HEAD CT showing SAH  Medical complexity over the past 24 hours:  - Intensive monitoring for MEDICATION TOXICITY      Data     I have personally reviewed the following data over the past 24 hrs:    N/A  \   8.5 (L)   / N/A     N/A N/A N/A /  N/A   N/A N/A N/A \       Imaging results reviewed over the past 24 hrs:   No results found for this or any previous visit (from the past 24 hour(s)).

## 2023-05-20 NOTE — PLAN OF CARE
Goal Outcome Evaluation:            Reason for Admission: L MCA CVA, L SAH, R wrist and hop fx     Cognitive/Mentation: A/Ox 4, forgetful  Neuros/CMS: Intact ex expressive aphasia, R sided weakness  VS: stable, SBP <140.   Tele: NSR.  GI: BS active x4, passing flatus, last BM 5/20/23. inContinent.  : Incontinent, purewick in place.  Pulmonary: LS clear throughout.  Pain: reports back pain 6/10, receiving tylenol and oxy PRN.      Drains/Lines: PIV w/ LR at 75 ml/h  Skin: R hip incision, back wound  Activity: Assist x 2 with lift.  Diet: Regular with thin liquids. Takes pills whole with applesauce. Tolerated well.   Therapies recs: TCU  Discharge: pending, final recs from neuro      Aggression Stoplight Tool: green

## 2023-05-20 NOTE — PROGRESS NOTES
Care Management Follow Up    Length of Stay (days): 19    Expected Discharge Date: 05/21/2023     Concerns to be Addressed:       Patient plan of care discussed at interdisciplinary rounds: Yes    Anticipated Discharge Disposition: Transitional Care     Anticipated Discharge Services: None  Anticipated Discharge DME: None    Patient/family educated on Medicare website which has current facility and service quality ratings: no  Education Provided on the Discharge Plan:    Patient/Family in Agreement with the Plan: yes    Referrals Placed by CM/SW: Post Acute Facilities  Private pay costs discussed: Not applicable    Additional Information:  SW placed call to [facility] to confirm patient accepted and bed available, no answer and left VM requesting return call. Per Epic, patient's referral accepted but last communication was 05/11/2023 and it appears admissions office is closed for the weekend. Patient will require new icore authorization.       JULIO Wolfe

## 2023-05-20 NOTE — PLAN OF CARE
Reason for Admission: L MCA CVA, L SAH, R wrist and hop fx     Cognitive/Mentation: A/Ox 4, forgetful  Neuros/CMS: Intact ex expressive aphasia, R sided weakness  VS: stable, SBP <140.   Tele: NSR.  GI: BS active x4, passing flatus, last BM 5/19/23. inContinent.  : Incontinent, purewick in place.  Pulmonary: LS clear throughout.  Pain: reports back pain 6/10, receiving tylenol and oxy PRN.      Drains/Lines: PIV w/ LR at 75 ml/h  Skin: R hip incision, back wound  Activity: Assist x 2 with SS  Diet: Regular with thin liquids. Takes pills whole with applesauce.      Therapies recs: TCU  Discharge: pending, waiting for final acceptance from TCU     Aggression Stoplight Tool: green       Pt up in the chair during shift. Neuros remain unchanged. Pt had multiple visits at bedside throughout day.

## 2023-05-21 NOTE — PROGRESS NOTES
Endovascular surgical neuroradiology note     82-year-old woman with past medical history of metastatic lung cancer and a prior stroke in February 2023 due to intracranial atherosclerotic disease.  She was readmitted on 5/2/2023 with a fall and right hip and wrist fractures requiring surgical fixation complicated by acute blood loss anemia and hypertension that resulted in more ischemic injury in the left watershed territory.  She then subsequently underwent a angioplasty on 5/13 with postoperative course complicated by fluctuating neurological symptoms requiring vasopressor support.  The symptoms triggered multiple surveillance CT scans, 1 of which demonstrated new left frontal sulcal subarachnoid hemorrhage.  The patient subsequently had a CT perfusion that did not demonstrate any evidence of cerebral hyperperfusion.  She has subsequently recovered well, and transferred to the floor.  She remains off all vasopressor support.  She is currently on aspirin 325 mg daily, Plavix 75 mg daily and atorvastatin 40 mg nightly.  Her only significant neurological deficit is persistent expressive aphasia - naming, repetition and comprehension are intact.    ESN fellow  Pager [phone]

## 2023-05-21 NOTE — PLAN OF CARE
Goal Outcome Evaluation:         Reason for Admission: L MCA CVA, L SAH, R wrist and hop fx     Cognitive/Mentation: A/Ox 4, forgetful  Neuros/CMS: Intact ex expressive aphasia, R sided weakness  VS: stable, SBP <140.   Tele: NSR.  GI: BS active x4, passing flatus, last BM 5/19/23. inContinent.  : Incontinent, purewick in place.  Pulmonary: LS clear throughout.  Pain: reports back pain 6/10, receiving tylenol and oxy PRN.      Drains/Lines: PIV SL  Skin: R hip incision, back wound  Activity: Assist x 2 with SS  Diet: Regular with thin liquids. Takes pills whole with applesauce.      Therapies recs: TCU  Discharge: pending, waiting for final acceptance from TCU     Aggression Stoplight Tool: green

## 2023-05-21 NOTE — PLAN OF CARE
Reason for Admission: L MCA CVA, L SAH, R wrist and hop fx     Cognitive/Mentation: A/Ox 4, forgetful  Neuros/CMS: Intact ex expressive aphasia, R sided weakness  VS: stable, SBP <140.   Tele: NSR.  GI: BS active x4, passing flatus, last BM 5/21/23. inContinent.  : Incontinent, purewick in place.  Pulmonary: LS clear throughout.  Pain: reports back pain 6-7/10, receiving tylenol and oxy PRN.      Drains/Lines: PIV SL  Skin: R hip incision, back wound, Wrist incision  Activity: Assist x 2 with SS  Diet: Regular with thin liquids. Takes pills crushed in applesauce. Can take pills whole that cannot be crushed     Therapies recs: TCU  Discharge: pending, waiting for final acceptance from TCU     Aggression Stoplight Tool: green         Pt up in the chair during shift. Neuros remain unchanged. Pt had multiple visits at bedside throughout day.

## 2023-05-21 NOTE — PROGRESS NOTES
[facility]    Medicine Progress Note - Hospitalist Service    Date of Admission:  5/1/2023    Assessment & Plan     Chiquita Berry is a 82 year old female with PMH  metastatic lung cancer, hx CVA Feb 2023, who was admitted 5/2/2023 with a fall and R hip and R wrist fractures. S/p ORIF. Dual anitplatelets held pre and post-operatively. Unfortunately, post op course complicated by significant blood loss (Hb 4.6). Also complicated by repeat Stroke on 5/13/23 in L posterior MCA (watershed distribution). She was subsequently transferred to ICU on 5/13 for pressors due to hypotension and worsening neuro findings. She underwent neuro IR angioplasty and stenting of L MCA M2 segment on 5/14/23.  Titrated off pressors AM of 5/16. Stroke code activated on 5/16/23 upon transfer to floor for aphasia. CT head showed small L frontal SAH on 5/18/23.       Acute ischemic CVA  Acute SAH  Hx CVA Feb 2023   S/p L MCA angioplasty on 5/14/23  * Developed stroke like symptoms post ORIF while DAPT on hold on 5/8  * RRT 05/13 for worsening aphasia -> transferred to ICU and on Levophed to maintain pressures > 110. Titrated off 5/15  * ASA and Plavix had been on hold since orthopedic surgery on 5/2/23, had been on Lovenox for prophylaxis instead.  Resumed DAPT on 5/8 due to repeat CVA   * Repeat head CT obtained 5/8/2023 for new word finding and lower extremity weakness. Reportedly was  present with her initial stroke in Februrary but symptms had resolved.  Repeat head CT was negative for acute stroke.  * MRI/MRA reveals 1.  Left frontoparietal centrum semiovale white matter demonstrates multiple small and prominent acute infarcts.  2.  Left frontoparietal centrum semiovale white matter demonstrates multiple small areas of enhancement likely due to subacute infarcts. Recommend follow-up to resolution to exclude a more progressive etiology.  3.  Left frontoparietal centrum semiovale white matter demonstrate  "multiple small chronic infarcts.  4.  These acute, subacute, and chronic infarcts are within the same location.  * Echo -> The left ventricle is normal in size. There is normal left ventricular wall thickness. The visual ejection fraction is 55-60%. Septal motion is consistent with conduction abnormality.  *RRT called again on 5/16 for aphasia confusion.  * CT head obtained 5/16 revealed new interval small L frontal SAH.   * CTA H/N 5/17 and 5/18 unrevealing.    Plan  -- Neuro following: input appreciated. \"SAH ; Asymptomatic, incidentally found, main concern is for seizures as etiology for fluctuating exam. Likely iatrogenic and related to procedure as reperfusion injury from dysregulated cerebral autoregulation from chronically stenotic intracranial vessel.\"   -- neuro signed off 5/19  -- Patient to continue on Daily aspirin 325 (continue indefinitely) and Plavix 75mg (Given patients ischemic risk, benefits outweigh the risk)  -- DAPT 90 days then can consider asa + cilastazol after 90 day  -- Statin: lipitor 40mg    -- SLP/PT/OT  -- chronic SBP goal 100 - 140  -- was on Midodrine 10 mg TID for hypotension (now resolved), now discontinued to meet current SBP goal.      Mechanical fall  Acute comminuted fracture of the right hip  Acute comminuted fracture of the right distal radius  S/p ORIF R hip and R wrist 5/2/2023  Post-operative acute blood loss anemia, improved  Patient with mechanical fall, pushed by elevator door, no head trauma, no LOC, no premonitory symptoms. Workup in ED with fractures above and leukocytosis 16.1k. S/p ORIF of R hip and R wrist on 5/2/2023. Postoperatively complicated by anemia and delirium. Hgb 8.7 prior to surgery, Hgb 4.8 on 5/3 post op, s/p 3u pRBC now Hgb stable in 8s.  - Ortho consult appreciated                         - Post op management                - Pain control  - Per ortho, ASA and Plavix held and were to be restarted at discharge; patient was on lovenox for DVT ppx. " DAPT was restarted on 5/9/23 due to repeat CVA   - if patient still in the hospital at her 6 week f/u; ortho plans to see her in the hospital  - PT/OT/Speech path      Acute metabolic encephalopathy, improved  Acute hypoxic respiratory failure - resolved  Probable Pneumonia, treated  Patient initially requiring 3-4L O2 to maintain O2 sats 92%. Patient was also notably delirious on 5/3. The etiology of this could be multifaceted, from post-operative state, to acute metabolic encephalopathy, to hypoperfusion due to anemia. CT head no acute pathology. Delirium improved on 5/5, but continued to be hypoxic on 5/5. CXR done showed possible pneumonia - left base consolidation.  Plan:  - Wean O2 as able  -Continue to encourage use of incentive spirometer.  - completed 7 days of ABX on 5/15      Leukocytosis - resolved  UTI - resolved  -- UA positive and follow up urine cultures -> 50,000-100,000 CFU/mL Enterococcus faecalis -> completed 7 days of levofloxacin     Hypocalcemia: Oscal started here     Metastatic lung cancer  Sees Dr. Pantoja at [facility], this was diagnosed via thoracentesis. No disease progression noted per latest office visit 4/6/2023.  - Okay to resume alectinib at discharge  - No plans for chemotherapy in setting of active infections.     Elevated LFTs  In setting of cancer, followed by Onocology. LFT trend here appears to be improving  - Outpatient follow up with Oncology        Diet: Snacks/Supplements Adult: Magic Cup; With Meals  Diet  Regular Diet Adult  Room Service    DVT Prophylaxis: Pneumatic Compression Devices  Mcallister Catheter: Not present  Lines: None     Cardiac Monitoring: None  Code Status: No CPR- Do NOT Intubate      Clinically Significant Risk Factors              # Hypoalbuminemia: Lowest albumin = 2.7 g/dL at 5/9/2023  6:53 PM, will monitor as appropriate            # Overweight: Estimated body mass index is 29.61 kg/m  as calculated from the following:    Height as of this  encounter: 1.524 m (5').    Weight as of this encounter: 68.8 kg (151 lb 9.6 oz).           Disposition Plan  TCU, medically ready     Expected Discharge Date: 05/22/2023    Discharge Delays: Placement - TCU    Discharge Comments: TCU          Keagan Pettit MD  Hospitalist Service  [facility]  Securely message with [employer] (more info)  Text page via [employer] Paging/Directory   ______________________________________________________________________    Interval History   Feels well. Working with therapy. Ongoing exp aphasia.  No complaints.     Discussed with RN.    Physical Exam   Vital Signs: Temp: 97.8  F (36.6  C) Temp src: Oral BP: 108/63 Pulse: 80   Resp: 16 SpO2: 94 % O2 Device: None (Room air)    Weight: 151 lbs 9.6 oz    General Appearance: Well appearing for stated age.  Respiratory: CTAB, no rales or ronchi  Cardiovascular: S1, S2 normal, no murmurs  GI: non-tender on palpation, BS present      Medical Decision Making       35 MINUTES SPENT BY ME on the date of service doing chart review, history, exam, documentation & further activities per the note.      Data     I have personally reviewed the following data over the past 24 hrs:    N/A  \   8.7 (L)   / N/A     N/A N/A N/A /  N/A   N/A N/A N/A \       Imaging results reviewed over the past 24 hrs:   No results found for this or any previous visit (from the past 24 hour(s)).

## 2023-05-22 NOTE — PLAN OF CARE
Patient here with left MCA and left SAH. Recent Right hip ORIF and right wrist fracture; brace worn throughout shift. A&Ox4. Severe expressive aphasia noted, answers yes/no well when given choices. Weak in all extremities, Weak grasp in RUE. Slow/deliberate with complex commands. VSS with SBP > 140 one time; corrected self upon recheck without intervention. Reporting ongoing lower back pain. PRN oxycodone and Tylenol given throughout shift with intermittent heat packs. Up in chair for breakfast, requesting to get back into bed before lunch and declining recliner at dinnertime. Fair appetite, requiring total assist with feeding. Tolerating regular diet, medications floating in applesauce, one at a time. Up with heavy assist x2 with jenny marte. [facility] pending insurance auth. SW following. Patient scoring green on aggression score tool.

## 2023-05-22 NOTE — PROGRESS NOTES
SPIRITUAL HEALTH SERVICES Progress Note  [facility]  Neurosciences    Visited with PT per request for follow-up from previous visit. Chiquita accepted the offer of a prayer for her recovery and well-being and a reading from scripture. communicated recognition of her courage through her current illness and several previous illnesses. Sat quietly at bedside with Chiquita as she experienced difficulty sustaining conversation.    Chiquita indicates her appreciation of visit by  for prayer, devotional reading, and quiet presence.    SH continues to follow while Chiquita is in hospital.     Giovani Sloan  Associate

## 2023-05-22 NOTE — PROGRESS NOTES
[facility]    Medicine Progress Note - Hospitalist Service    Date of Admission:  5/1/2023    Assessment & Plan     Chiquita Berry is a 82 year old female with PMH  metastatic lung cancer, hx CVA Feb 2023, who was admitted 5/2/2023 with a fall and R hip and R wrist fractures. S/p ORIF. Dual anitplatelets held pre and post-operatively. Unfortunately, post op course complicated by significant blood loss (Hb 4.6). Also complicated by repeat Stroke on 5/13/23 in L posterior MCA (watershed distribution). She was subsequently transferred to ICU on 5/13 for pressors due to hypotension and worsening neuro findings. She underwent neuro IR angioplasty and stenting of L MCA M2 segment on 5/14/23.  Titrated off pressors AM of 5/16. Stroke code activated on 5/16/23 upon transfer to floor for aphasia. CT head showed small L frontal SAH on 5/18/23.       Acute ischemic CVA  Acute SAH  Hx CVA Feb 2023   S/p L MCA angioplasty on 5/14/23  * Developed stroke like symptoms post ORIF while DAPT on hold on 5/8  * RRT 05/13 for worsening aphasia -> transferred to ICU and on Levophed to maintain pressures > 110. Titrated off 5/15  * ASA and Plavix had been on hold since orthopedic surgery on 5/2/23, had been on Lovenox for prophylaxis instead.  Resumed DAPT on 5/8 due to repeat CVA   * Repeat head CT obtained 5/8/2023 for new word finding and lower extremity weakness. Reportedly was  present with her initial stroke in Februrary but symptms had resolved.  Repeat head CT was negative for acute stroke.  * MRI/MRA reveals 1.  Left frontoparietal centrum semiovale white matter demonstrates multiple small and prominent acute infarcts.  2.  Left frontoparietal centrum semiovale white matter demonstrates multiple small areas of enhancement likely due to subacute infarcts. Recommend follow-up to resolution to exclude a more progressive etiology.  3.  Left frontoparietal centrum semiovale white matter demonstrate  "multiple small chronic infarcts.  4.  These acute, subacute, and chronic infarcts are within the same location.  * Echo -> The left ventricle is normal in size. There is normal left ventricular wall thickness. The visual ejection fraction is 55-60%. Septal motion is consistent with conduction abnormality.  *RRT called again on 5/16 for aphasia confusion.  * CT head obtained 5/16 revealed new interval small L frontal SAH.   * CTA H/N 5/17 and 5/18 unrevealing.    Plan  -- Neuro following: input appreciated. \"SAH ; Asymptomatic, incidentally found, main concern is for seizures as etiology for fluctuating exam. Likely iatrogenic and related to procedure as reperfusion injury from dysregulated cerebral autoregulation from chronically stenotic intracranial vessel.\"   -- neuro signed off 5/19  -- Patient to continue on Daily aspirin 325 (continue indefinitely) and Plavix 75mg (Given patients ischemic risk, benefits outweigh the risk)  -- DAPT 90 days then can consider asa + cilastazol after 90 day  -- Statin: lipitor 40mg    -- SLP/PT/OT  -- chronic SBP goal 100 - 140  -- was on Midodrine 10 mg TID for hypotension (now resolved), now discontinued to meet current SBP goal.      Mechanical fall  Acute comminuted fracture of the right hip  Acute comminuted fracture of the right distal radius  S/p ORIF R hip and R wrist 5/2/2023  Post-operative acute blood loss anemia, improved  Patient with mechanical fall, pushed by elevator door, no head trauma, no LOC, no premonitory symptoms. Workup in ED with fractures above and leukocytosis 16.1k. S/p ORIF of R hip and R wrist on 5/2/2023. Postoperatively complicated by anemia and delirium. Hgb 8.7 prior to surgery, Hgb 4.8 on 5/3 post op, s/p 3u pRBC now Hgb stable in 8s.  - Ortho consult appreciated   - DAPT was restarted on 5/9/23 due to repeat CVA   - if patient still in the hospital at her 6 week f/u; ortho plans to see her in the hospital  - PT/OT/Speech path  - scheduled " tylenol  - PRN oxycodone for pain.       Acute metabolic encephalopathy, improved  Acute hypoxic respiratory failure - resolved  Probable Pneumonia, treated  Patient initially requiring 3-4L O2 to maintain O2 sats 92%. Patient was also notably delirious on 5/3. The etiology of this could be multifaceted, from post-operative state, to acute metabolic encephalopathy, to hypoperfusion due to anemia. CT head no acute pathology. Delirium improved on 5/5, but continued to be hypoxic on 5/5. CXR done showed possible pneumonia - left base consolidation.  Plan:  -Continue to encourage use of incentive spirometer.  - completed 7 days of ABX on 5/15      Leukocytosis - resolved  UTI - resolved  -- UA positive and follow up urine cultures -> 50,000-100,000 CFU/mL Enterococcus faecalis -> completed 7 days of levofloxacin     Hypocalcemia: Oscal started here     Metastatic lung cancer  Sees Dr. Pantoja at [facility], this was diagnosed via thoracentesis. No disease progression noted per latest office visit 4/6/2023.  - Okay to resume alectinib at discharge  - No plans for chemotherapy in setting of active infections.     Elevated LFTs  In setting of cancer, followed by Onocology. LFT trend here appears to be improving  - Outpatient follow up with Oncology        Diet: Snacks/Supplements Adult: Magic Cup; With Meals  Diet  Regular Diet Adult  Room Service    DVT Prophylaxis: Pneumatic Compression Devices  Mcallister Catheter: Not present  Lines: None     Cardiac Monitoring: None  Code Status: No CPR- Do NOT Intubate      Clinically Significant Risk Factors              # Hypoalbuminemia: Lowest albumin = 2.7 g/dL at 5/9/2023  6:53 PM, will monitor as appropriate            # Overweight: Estimated body mass index is 29.61 kg/m  as calculated from the following:    Height as of this encounter: 1.524 m (5').    Weight as of this encounter: 68.8 kg (151 lb 9.6 oz).           Disposition Plan  TCU, medically ready     Expected  Discharge Date: 05/23/2023    Discharge Delays: Placement - TCU    Discharge Comments: TCU          Keagan Pettit MD  Hospitalist Service  [facility]  Securely message with [employer] (more info)  Text page via [employer] Paging/Directory   ______________________________________________________________________    Interval History   No complaints. RN reports back pain main issue. No improvement in her exp aphasia. Working well with therapy.    Discussed with RN.    Physical Exam   Vital Signs: Temp: 97  F (36.1  C) Temp src: Axillary BP: (!) 146/70 Pulse: 88   Resp: 16 SpO2: 93 % O2 Device: None (Room air)    Weight: 151 lbs 9.6 oz    General Appearance: Well appearing for stated age.  Respiratory: CTAB, no rales or ronchi  Cardiovascular: S1, S2 normal, no murmurs  GI: non-tender on palpation, BS present      Medical Decision Making       35 MINUTES SPENT BY ME on the date of service doing chart review, history, exam, documentation & further activities per the note.      Data     I have personally reviewed the following data over the past 24 hrs:    N/A  \   N/A   / N/A     N/A N/A N/A /  N/A   4.1 N/A N/A \       Imaging results reviewed over the past 24 hrs:   No results found for this or any previous visit (from the past 24 hour(s)).

## 2023-05-22 NOTE — PROGRESS NOTES
BRIEF NUTRITION REASSESSMENT      CURRENT DIET AND INTAKE:  Diet:  Regular            Room Service with Assist            Magic Cup with breakfast and dinner              Chart reviewed  Waiting for final acceptance from TCU    Visited with pt this afternoon  Notes that she is tolerating po  Eating % of her meals TID  States that she likes the magic cup supplements    ANTHROPOMETRICS:  05/21/23 0710 68.8 kg (151 lb 9.6 oz) --   05/16/23 0543 60.7 kg (133 lb 13.1 oz) Bed scale   05/15/23 0345 60.5 kg (133 lb 6.1 oz) Bed scale   05/13/23 2200 60 kg (132 lb 4.4 oz) Bed scale   05/08/23 0641 58.8 kg (129 lb 10.1 oz) Bed scale   05/05/23 0701 63.8 kg (140 lb 10.5 oz) Bed scale   05/04/23 2138 66.3 kg (146 lb 2.6 oz) --         LABS:  Labs noted    MALNUTRITION:  Patient does not meet two of the criteria necessary for diagnosing malnutrition.       NUTRITION INTERVENTION:  Nutrition Diagnosis:  No nutrition diagnosis at this time.    Implementation:  Continue to see daily for meal ordering assistance  Will continue to send magic cup supplement with breakfast and dinner    FOLLOW UP/MONITORING:   Will re-evaluate in 7 - 10 days, or sooner, if re-consulted.

## 2023-05-22 NOTE — PLAN OF CARE
Goal Outcome Evaluation:  Reason for Admission: L MCA CVA, L SAH, R wrist and R hip fx (fall)     Cognitive/Mentation: D/O to time, forgetful  Neuros/CMS: Intact x expressive aphasia, R sided weakness. Waves vs finger/nose  VS: stable, SBP <140.   Tele: none  GI: BS active x4, passing flatus, last BM 5/21/23. incontinent.  : Incontinent, purewick in place.  Pulmonary: LS clear throughout.  Pain: reports back pain 7/10, receiving tylenol and oxycodone PRN. Added Robaxin this a.m. because she didn't get adequate pain control from Tylenol/Oxycodone from her last dose     Drains/Lines: SL  Skin: R hip incision, R groin hematoma, back wound-mepilex on- blanchable red  under.  Activity: Assist x 2 with sliding scale/ turns  Diet: Regular with thin liquids. Takes pills whole with applesauce.      Therapies recs: TCU  Discharge: pending, waiting for final acceptance from TCU     Aggression Stoplight Tool: brady Ferraro RN 5/21/2023 [id number]

## 2023-05-23 NOTE — PROVIDER NOTIFICATION
"MD Notification    Notified Person: MD    Notified Person Name: Dr. Pettit    Notification Date/Time: 5/23/23 at 1009    Notification Interaction: jenelle    Purpose of Notification: \"Would you mind ordering PRN Tums for patient? Reporting \"internal abdominal pain\" and says it might be acid reflux\"    Orders Received:    Comments:    "

## 2023-05-23 NOTE — PROGRESS NOTES
Care Management Follow Up    Length of Stay (days): 22    Expected Discharge Date: 05/24/2023     Concerns to be Addressed:       Patient plan of care discussed at interdisciplinary rounds: Yes    Anticipated Discharge Disposition: Transitional Care     Anticipated Discharge Services: None  Anticipated Discharge DME: None    Patient/family educated on Medicare website which has current facility and service quality ratings: no  Education Provided on the Discharge Plan:    Patient/Family in Agreement with the Plan: yes    Referrals Placed by CM/SW: Post Acute Facilities  Private pay costs discussed:     Additional Information:  INDU called admissions at [facility] to have them review pt for admission. INDU was requested to send updated notes. Updates sent via fax at [phone]. They are reviewing. [facility]      JULIO Crowell  [facility]  Care Transitions  [phone]

## 2023-05-23 NOTE — CONSULTS
"  [facility]    Stroke Telephone Note    I was called by Keagan Pettit on 05/23/23 regarding patient Chiquita Berry. The patient is a 82 year old female with a PMHx ICAD, metastatic lung cancer who had a fall 5/1, she has had multiple L MCA territory strokes secondary to L M1 stenosis. She had a stroke prior to this admission and was on DAPT which was stopped after her fall and had hypotension with blood loss. Her second stroke was after worsening aphasia and found to have watershed stroke on imaging. She was restarted on DAPT and was treated for blood pressure. She had another stroke code on 5/13 due to worsening aphasia and her repeat CTA showed worsening M2 stenosis. She is s/p L MCA angioplasty on 5/14 and had fluctuating exam with postoperative course. She had multiple subsequent scans which showed a left frontal sulcal SAH on 5/16.         Impression  # Recurrent L MCA watershed strokes- due to underlying L M2 stenosis s/p DAPT with medical management failure now s/p angioplasty without stent. Currently on DAPT.   Prior P2Y12 :10  LDL: 99, A1c 4.9  She has had multiple episodes of a fluctuating exam with prior work-up for seizures with EEG that showed focal slowing over left hemisphere but no epileptiform discharges. She has also had some pressure dependence in the past.     # L sulcal SAH- cortical in nature       Recommendations       -Continue Lipitor   -Continue DAPT  -PTOT  -Stat head CT for any new neuro changes  -Q4h        My recommendations are based on the information provided over the phone by Chiquita Berry's in-person providers. They are not intended to replace the clinical judgment of her in-person providers. I was not requested to personally see or examine the patient at this time.    The Stroke Staff is Dr. Joey Johnston MD  Vascular Neurology Fellow    To page me or covering stroke neurology team member, click here: [facility]  Choose \"On Call\" " "tab at top, then select \"NEUROLOGY/ALL SITES\" from middle drop-down box, press Enter, then look for \"stroke\" or \"telestroke\" for your site.        "

## 2023-05-23 NOTE — PLAN OF CARE
Pt here with left MCA and L SAH with recent history of ORIF to right hip and a right wrist fracture. A&Ox4, expressive aphasia and needing choices to be able to say yes/no to in order to assess orientation. Generalized weakness noted with more weakness noted to RUE. Brace in place. VSS with SBP < 140 throughout shift. Increased needs for pain medications noted and MD made aware. Reports ongoing pain to lower back and to abdominal area. BS active and BM today. Xrays ordered for thoracic and lumbar spine. PRN oxycodone, Robaxin, hot packs and scheduled tylenol given for back pain. Newly ordered Pepcid and PRN Tums given for abdominal comfort, as well as heat pack; slightly effective. Foam dressings to spine changed today. Blanchable redness continues to thoracic spinal area. Tolerating regular diet; requires total feed. Expressive aphasia slightly worse this afternoon; patient also mentioned concern. MD made aware and Neurostroke reconsulted.  Patient went down for x-rays shortly after 1800 tonight; awaiting results. Plan for neurostroke consult tomorrow. Discharge plan to TCU pending. Patient scoring green on aggression tool score.

## 2023-05-23 NOTE — PLAN OF CARE
Goal Outcome Evaluation:    A&Ox3. VSS on RA. Sling on R arm. R sided weakness. Noted severe expressive aphasia. R hip incision CDI. Up with Ax2 with sera steady. Incontinent of B/B. Mepilex on coccyx. Back pain managed with oxycodone, robaxin and tylenol. Turn & repo. Total feed. No IV access. Pending discharge to TCU.

## 2023-05-23 NOTE — PROGRESS NOTES
[facility]    Medicine Progress Note - Hospitalist Service    Date of Admission:  5/1/2023    Assessment & Plan     Chiquita Berry is a 82 year old female with PMH  metastatic lung cancer, hx CVA Feb 2023, who was admitted 5/2/2023 with a fall and R hip and R wrist fractures. S/p ORIF. Dual anitplatelets held pre and post-operatively. Unfortunately, post op course complicated by significant blood loss (Hb 4.6). Also complicated by repeat Stroke on 5/13/23 in L posterior MCA (watershed distribution). She was subsequently transferred to ICU on 5/13 for pressors due to hypotension and worsening neuro findings. She underwent neuro IR angioplasty and stenting of L MCA M2 segment on 5/14/23.  Titrated off pressors AM of 5/16. Stroke code activated on 5/16/23 upon transfer to floor for aphasia. CT head showed small L frontal SAH on 5/18/23.       Acute ischemic CVA  Acute SAH  Hx CVA Feb 2023   S/p L MCA angioplasty on 5/14/23  * Developed stroke like symptoms post ORIF while DAPT on hold on 5/8  * RRT 05/13 for worsening aphasia -> transferred to ICU and on Levophed to maintain pressures > 110. Titrated off 5/15  * ASA and Plavix had been on hold since orthopedic surgery on 5/2/23, had been on Lovenox for prophylaxis instead.  Resumed DAPT on 5/8 due to repeat CVA   * Repeat head CT obtained 5/8/2023 for new word finding and lower extremity weakness. Reportedly was  present with her initial stroke in Februrary but symptms had resolved.  Repeat head CT was negative for acute stroke.  * MRI/MRA reveals 1.  Left frontoparietal centrum semiovale white matter demonstrates multiple small and prominent acute infarcts.  2.  Left frontoparietal centrum semiovale white matter demonstrates multiple small areas of enhancement likely due to subacute infarcts. Recommend follow-up to resolution to exclude a more progressive etiology.  3.  Left frontoparietal centrum semiovale white matter demonstrate  "multiple small chronic infarcts.  4.  These acute, subacute, and chronic infarcts are within the same location.  * Echo -> The left ventricle is normal in size. There is normal left ventricular wall thickness. The visual ejection fraction is 55-60%. Septal motion is consistent with conduction abnormality.  *RRT called again on 5/16 for aphasia confusion.  * CT head obtained 5/16 revealed new interval small L frontal SAH.   * CTA H/N 5/17 and 5/18 unrevealing.    Plan  -- Neuro following: input appreciated. \"SAH ; Asymptomatic, incidentally found, main concern is for seizures as etiology for fluctuating exam. Likely iatrogenic and related to procedure as reperfusion injury from dysregulated cerebral autoregulation from chronically stenotic intracranial vessel.\"   -- neuro signed off 5/19  -- Patient to continue on Daily aspirin 325 (continue indefinitely) and Plavix 75mg (Given patients ischemic risk, benefits outweigh the risk)  -- DAPT 90 days then can consider asa + cilastazol after 90 day  -- Statin: lipitor 40mg    -- SLP/PT/OT  -- chronic SBP goal 100 - 140  -- was on Midodrine 10 mg TID for hypotension (now resolved), now discontinued to meet current SBP goal.     -- will reconsult neuro on 5/23 due to worsening expressive aphasia noted 5/23     Mechanical fall  Acute comminuted fracture of the right hip  Acute comminuted fracture of the right distal radius  S/p ORIF R hip and R wrist 5/2/2023  Post-operative acute blood loss anemia, improved  Patient with mechanical fall, pushed by elevator door, no head trauma, no LOC, no premonitory symptoms. Workup in ED with fractures above and leukocytosis 16.1k. S/p ORIF of R hip and R wrist on 5/2/2023. Postoperatively complicated by anemia and delirium. Hgb 8.7 prior to surgery, Hgb 4.8 on 5/3 post op, s/p 3u pRBC now Hgb stable in 8s.  - Ortho consult appreciated   - DAPT was restarted on 5/9/23 due to repeat CVA   - if patient still in the hospital at her 6 week " f/u; ortho plans to see her in the hospital  - PT/OT/Speech path  - scheduled tylenol  - PRN oxycodone for pain.       Back pain  Hx of compression fractures  * mid thoracic vs high lumbar back pain progressively worse 5/20-5/23  - continue current pain regimen, patient reports it is typically effective for her pain, will adjust if needed.  - lumbar and thoracic XR    Acute metabolic encephalopathy, improved  Acute hypoxic respiratory failure - resolved  Probable Pneumonia, treated  Patient initially requiring 3-4L O2 to maintain O2 sats 92%. Patient was also notably delirious on 5/3. The etiology of this could be multifaceted, from post-operative state, to acute metabolic encephalopathy, to hypoperfusion due to anemia. CT head no acute pathology. Delirium improved on 5/5, but continued to be hypoxic on 5/5. CXR done showed possible pneumonia - left base consolidation.  Plan:  -Continue to encourage use of incentive spirometer.  - completed 7 days of ABX on 5/15      Leukocytosis - resolved  UTI - resolved  -- UA positive and follow up urine cultures -> 50,000-100,000 CFU/mL Enterococcus faecalis -> completed 7 days of levofloxacin     Hypocalcemia: Oscal started here     Metastatic lung cancer  Sees Dr. Pantoja at [facility], this was diagnosed via thoracentesis. No disease progression noted per latest office visit 4/6/2023.  - Okay to resume alectinib at discharge  - No plans for chemotherapy in setting of active infections.     Elevated LFTs  In setting of cancer, followed by Onocology. LFT trend here appears to be improving  - Outpatient follow up with Oncology        Diet: Snacks/Supplements Adult: Magic Cup; With Meals  Diet  Regular Diet Adult  Room Service    DVT Prophylaxis: Pneumatic Compression Devices  Mcallister Catheter: Not present  Lines: None     Cardiac Monitoring: None  Code Status: No CPR- Do NOT Intubate      Clinically Significant Risk Factors              # Hypoalbuminemia: Lowest albumin =  2.7 g/dL at 5/9/2023  6:53 PM, will monitor as appropriate            # Overweight: Estimated body mass index is 29.61 kg/m  as calculated from the following:    Height as of this encounter: 1.524 m (5').    Weight as of this encounter: 68.8 kg (151 lb 9.6 oz).           Disposition Plan  TCU, medically ready     Expected Discharge Date: 05/24/2023    Discharge Delays: Placement - TCU  *Medically Ready for Discharge  Insurance Authorization needed    Discharge Comments: TCU          Keagan Pettit MD  Hospitalist Service  [facility]  Securely message with [employer] (more info)  Text page via [employer] Paging/Directory   ______________________________________________________________________    Interval History   RN, patient and I note worsening expressive aphasia today. Will reconsult neuro  Ongoing back pain. Midline aching pain with intermittent electric quality. Does intermittently shoot down right leg in electric quality. It hurts in every position, but maybe a bit better on her side.     Discussed with RN.    Physical Exam   Vital Signs: Temp: 99.4  F (37.4  C) Temp src: Oral BP: 128/69 Pulse: 104   Resp: 16 SpO2: 95 % O2 Device: None (Room air)    Weight: 151 lbs 9.6 oz    General Appearance: Well appearing for stated age.  Respiratory: CTAB, no rales or ronchi  Cardiovascular: S1, S2 normal, no murmurs  GI: non-tender on palpation, BS present  No midline pain noted    Medical Decision Making       53 MINUTES SPENT BY ME on the date of service doing chart review, history, exam, documentation & further activities per the note.      Data     I have personally reviewed the following data over the past 24 hrs:    N/A  \   N/A   / N/A     N/A N/A N/A /  N/A   4.0 N/A N/A \       Imaging results reviewed over the past 24 hrs:   No results found for this or any previous visit (from the past 24 hour(s)).

## 2023-05-24 NOTE — PROGRESS NOTES
[facility]    Stroke Progress Note    Interval EventsWorsening aphasia     HPI Summary  The patient is a 82 year old female with a PMHx ICAD, metastatic lung cancer who had a fall 5/1, she has had multiple L MCA territory strokes secondary to L M1 stenosis. She had a stroke prior to this admission and was on DAPT which was stopped after her fall and had hypotension with blood loss. Her second stroke was after worsening aphasia and found to have watershed stroke on imaging. She was restarted on DAPT and was treated for blood pressure. She had another stroke code on 5/13 due to worsening aphasia and her repeat CTA showed worsening M2 stenosis. She is s/p L MCA angioplasty on 5/14 and had fluctuating exam with postoperative course. She had multiple subsequent scans which showed a left frontal sulcal SAH on 5/16.    Stroke Evaluation Summarized    MRI/Head CT Stable evolving subacute infarcts within the left centrum semiovale.    Intracranial Vasculature HEAD CTA:   1.  Negative for thrombotic occlusion.  2.  Similar multifocal intracranial atherosclerosis   Cervical Vasculature NECK CTA:  1.  No hemodynamically significant stenosis in the neck vessels.   2.  No evidence for dissection.  3.  Similar findings of fibromuscular dysplasia at the distal right carotid and left vertebral arteries.  4.  Small left pleural effusion.     Echocardiogram 1. Normal biventricular size and function. Left ventricular ejection fraction  of 60-65%.  2. No segmental wall motion abnormalities noted.  3. No hemodynamically significant valvular disease. Aortic sclerosis without  stenosis.  Compared to prior study on 9/11/2013, no change.   EKG/Telemetry SR   Other Testing Not Applicable      LDL  5/10/2023: 99 mg/dL   A1C  5/10/2023: 4.9 %   Troponin No lab value available in past 48 hrs       Impression   # Recurrent L MCA watershed strokes- due to underlying L M2 stenosis s/p DAPT with medical management  "failure now s/p angioplasty without stent. Currently on DAPT.   Prior P2Y12 :10  LDL: 99, A1c 4.9  She has had multiple episodes of a fluctuating exam with prior work-up for seizures with EEG that showed focal slowing over left hemisphere but no epileptiform discharges. She has also had some pressure dependence in the past.      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     The Stroke Staff is Dr. Cook  .    Purnima Johnston MD  Vascular Neurology Fellow    To page me or covering stroke neurology team member, click here: [employer]  Choose \"On Call\" tab at top, then select \"NEUROLOGY/ALL SITES\" from middle drop-down box, press Enter, then look for \"stroke\" or \"telestroke\" for your site.    ______________________________________________________    Clinically Significant Risk Factors              # Hypoalbuminemia: Lowest albumin = 2.7 g/dL at 5/9/2023  6:53 PM, will monitor as appropriate            # Overweight: Estimated body mass index is 29.61 kg/m  as calculated from the following:    Height as of this encounter: 1.524 m (5').    Weight as of this encounter: 68.8 kg (151 lb 9.6 oz).             Medications   Scheduled Meds    acetaminophen  975 mg Oral TID     aspirin  325 mg Oral Daily     atorvastatin  40 mg Oral QPM     calcium carbonate  600 mg Oral BID w/meals     clopidogrel  75 mg Oral or NG Tube Daily     famotidine  10 mg Oral BID     latanoprost  1 drop Both Eyes QPM     pantoprazole  40 mg Oral Daily     polyethylene glycol  17 g Oral Daily     senna-docusate  1 tablet Oral BID     sodium chloride (PF)  3 mL Intracatheter Q8H     cholecalciferol  50 mcg Oral Daily       Infusion Meds    - MEDICATION INSTRUCTIONS -         PRN Meds  sodium chloride 0.9%, sodium " chloride 0.9%, sore throat, bisacodyl, calcium carbonate, - MEDICATION INSTRUCTIONS -, hydrALAZINE, HYDROmorphone **OR** HYDROmorphone, lidocaine 4%, lidocaine (buffered or not buffered), magnesium hydroxide, melatonin, methocarbamol, naloxone **OR** naloxone **OR** naloxone **OR** naloxone, ondansetron **OR** ondansetron, oxyCODONE **OR** oxyCODONE, petrolatum-zinc oxide, polyethylene glycol, prochlorperazine **OR** prochlorperazine, QUEtiapine, simethicone, sodium chloride (PF), sodium chloride (PF)       PHYSICAL EXAMINATION  Temp:  [98  F (36.7  C)-99.4  F (37.4  C)] 98  F (36.7  C)  Pulse:  [] 101  Resp:  [16-18] 16  BP: (124-149)/(52-75) 129/64  SpO2:  [93 %-95 %] 95 %      Neurologic  Mental Status:  follows commands, alert, oriented to person, severe expressive aphasia, names 3/4 objects, unable to fully repeat,   Cranial Nerves:  visual fields intact, EOMI with normal smooth pursuit, hearing not formally tested but intact to conversation, no dysarthria, rigth face droop  Motor:  normal muscle tone and bulk, no abnormal movements, able to move all limbs spontaneously, strength 5/5 throughout upper and lower extremities, no pronator drift  Sensory:  light touch sensation intact and symmetric throughout upper and lower extremities, no extinction on double simultaneous stimulation   Coordination:  normal finger-to-nose and unable to understand heel-to-shin  Station/Gait:  deferred    Stroke Scales    NIHSS  1a. Level of Consciousness 0-->Alert, keenly responsive   1b. LOC Questions 0-->Answers both questions correctly   1c. LOC Commands 0-->Performs both tasks correctly   2.   Best Gaze 0-->Normal   3.   Visual 0-->No visual loss   4.   Facial Palsy 0-->Normal symmetrical movements   5a. Motor Arm, Left 0-->No drift, limb holds 90 (or 45) degrees for full 10 secs   5b. Motor Arm, Right 0-->No drift, limb holds 90 (or 45) degrees for full 10 secs   6a. Motor Leg, Left 0-->No drift, leg holds 30 degree  position for full 5 secs   6b. Motor Leg, right 0-->No drift, leg holds 30 degree position for full 5 secs   7.   Limb Ataxia 0-->Absent   8.   Sensory 0-->Normal, no sensory loss   9.   Best Language 2-->Severe aphasia, all communication is through fragmentary expression, great need for inference, questioning, and guessing by the listener. Range of information that can be exchanged is limited, listener carries burden of. . . (see row details)   10. Dysarthria 0-->Normal   11. Extinction and Inattention  0-->No abnormality   Total 2 (05/22/23 1630)       Modified [place] Score (Pre-morbid)    -      Imaging  I personally reviewed all imaging; relevant findings per HPI.     Lab Results Data   CBC  Recent Labs   Lab 05/21/23  0627 05/20/23  0712 05/19/23  0808 05/18/23  1543   WBC  --   --   --  10.4   RBC  --   --   --  2.74*   HGB 8.7* 8.5* 8.9* 8.5*   HCT  --   --   --  26.6*   PLT  --   --   --  641*     Basic Metabolic Panel    Recent Labs   Lab 05/23/23  0811 05/22/23  0725 05/21/23  0826 05/18/23  1543   NA  --   --   --  138   POTASSIUM 4.0 4.1 4.0 3.7   CHLORIDE  --   --   --  101   CO2  --   --   --  28   BUN  --   --   --  9.2   CR  --   --   --  0.50*   GLC  --   --   --  139*   SAGE  --   --   --  8.3*     Liver Panel  No results for input(s): PROTTOTAL, ALBUMIN, BILITOTAL, ALKPHOS, AST, ALT, BILIDIRECT in the last 168 hours.  INR    Recent Labs   Lab Test 02/07/23  1125 02/06/23  1443   INR 1.06 0.97      Lipid Profile    Recent Labs   Lab Test 05/10/23  0859 02/06/23  1537 10/08/19  1200 12/11/15  0959 06/01/15  1205   CHOL 171 171 174   < > 137   HDL 34* 42* 45*   < > 44*   LDL 99 104* 90   < > 67   TRIG 190* 124 197*   < > 129   CHOLHDLRATIO  --   --   --   --  3.1    < > = values in this interval not displayed.     A1C    Recent Labs   Lab Test 05/10/23  0859 02/05/23  0750   A1C 4.9 6.3*     Troponin  No results for input(s): CTROPT, TROPONINIS, TROPONINI, GHTROP in the last 168 hours.       Data

## 2023-05-24 NOTE — PROGRESS NOTES
SPIRITUAL HEALTH SERVICES Progress Note  [facility]    Visited with Chiquita and friend Fabi. Chiquita indicated that she is experiencing some discomfort. Chiquita had difficulty finding words at times and asked friend to assist her several times during visit.    Chiquita and friend report that Chiquita is considering moving to comfort care and hospice but that no decision has been made. They spoke to the doctor today about possible options and about the prospects for continuing restorative care. Chiquita wishes to consider the matter with the help of her  and possibly other individuals who can proved insight about the options. Per friend the results of an MRI today are likely to play a role in the decision.    Chiquita accepted the offer of prayer and devotional readings and indicates that she finds these comforting.    PT and friend request additional visits for conversation and prayer. SH will continue to follow with regular visits while PT is in hospital.    Giovani Sloan  Associate

## 2023-05-24 NOTE — PROGRESS NOTES
[facility]  WOC Nurse Inpatient Assessment     Consulted for: Spine    Patient History (according to provider note(s):      82 year old female with PMH  metastatic lung cancer, hx CVA Feb 2023, who was adnutted 5/2/2023 with a fall and R hip and R wrist fractures. S/p ORIF. Dual anitplatelets held. Unfortunately,post op course complicated by repeat Stroke in same area as previous strokes. Neuro reconsulted. Course has also been complicated by pneumonia and UTI. Started on abx.   Anticipate discharge to TCU when acute issues      Mechanical fall  Acute comminuted fracture of the right hip  Acute comminuted fracture of the right distal radius  S/p ORIF R hip and R wrist 5/2/2023  Post-operative acute blood loss anemia, improved    Assessment:      Areas visualized during today's visit: spine    Pressure Injury Location: Spine    Last photo: 5-24-23 5/18/23      Wound type: Pressure Injury     Pressure Injury Stage: Deep Tissue Pressure Injury (DTPI), hospital acquired        Wound history/plan of care:  Patient admitted after a fall at home on 5/1. [facility] began following patient on 5/11 for wounds to spine. 3 linear wounds to spine identified on initial visit. Superior and medial wounds are now mostly resolved.  Distal wound remains with intact epidermis.      Wound base: 100 % non-blanchable and maroon, epidermis remains lightly intact     Palpation of the wound bed: normal to slightly boggy     Drainage: none     Description of drainage: none     Measurements (length x width x depth, in cm) 1  x 1  x  0 cm      Tunneling N/A     Undermining N/A  Periwound skin: Erythema- blanchable      Color: pink      Temperature: normal   Odor: none  Pain: absent and denies , none  Pain intervention prior to dressing change: N/A  Treatment goal: Heal  and Protection  STATUS: improving  Supplies ordered: supplies stored on unit        Treatment Plan:     Spine wound(s): Every 3 days and prn, peel  "back Q12H for assessment  1. Clean wound with saline or MicroKlenz Spray, pat dry  2. Wipe / \"clean\" the surrounding periwound tissue with skin prep (Cavilon No Sting Skin Prep #[id number]) and allow to dry. This will help protect periwound and help dressing adherence  3. Press a Mepilex 4x4 to the area, making sure to conform nicely to skin curvatures.   4. Time and date dressing change  NOTE  -Reposition pt side to side only when in bed, every 2 hours-get the pt way over on side to completely offload pressure. This will benefit skin and respiratory function   -Keep heels elevated and floating on pillows at all times.    -When up to the chair pt needs to fully offload every 2 hours and use a chair cushion if needed   -Pillow behind back when up in chair  -Low air loss mattress     Orders: Reviewed    RECOMMEND PRIMARY TEAM ORDER: None, at this time  Education provided: importance of repositioning, plan of care and Off-loading pressure  Discussed plan of care with: Patient and Nurse  WOC nurse follow-up plan: weekly and prn  Notify WOC if wound(s) deteriorate.  Nursing to notify the Provider(s) and re-consult the WOC Nurse if new skin concern.    DATA:     Current support surface: Standard  Standard gel/foam mattress (IsoFlex, Atmos air, etc)    Containment of urine/stool: Incontinent pad in bed  BMI: Body mass index is 29.61 kg/m .   Active diet order: Orders Placed This Encounter      Diet      Regular Diet Adult     Output: I/O last 3 completed shifts:  In: 700 [P.O.:700]  Out: 500 [Urine:500]     Labs:   Recent Labs   Lab 05/21/23  0627 05/19/23  0808 05/18/23  1543   HGB 8.7*   < > 8.5*   WBC  --   --  10.4    < > = values in this interval not displayed.     Pressure injury risk assessment:   Sensory Perception: 4-->no impairment  Moisture: 3-->occasionally moist  Activity: 2-->chairfast  Mobility: 2-->very limited  Nutrition: 2-->probably inadequate  Friction and Shear: 2-->potential problem  Jese Score: " 15    Sanjuanita Ellis RN CWOCN  -Securely message with [employer] (more info) - can reach individually by name or search 'WOC Nurse' ([place]) to reach all current WOCs on duty.  -WOC Office Phone: [phone]

## 2023-05-24 NOTE — PROGRESS NOTES
[facility]    Medicine Progress Note - Hospitalist Service    Date of Admission:  5/1/2023    Assessment & Plan     Chiquita Berry is a 82 year old female with PMH  metastatic lung cancer, hx CVA Feb 2023, who was admitted 5/2/2023 with a fall and R hip and R wrist fractures. S/p ORIF. Dual anitplatelets held pre and post-operatively. Unfortunately, post op course complicated by significant blood loss (Hb 4.6). Also complicated by repeat Stroke on 5/13/23 in L posterior MCA (watershed distribution). She was subsequently transferred to ICU on 5/13 for pressors due to hypotension and worsening neuro findings. She underwent neuro IR angioplasty and stenting of L MCA M2 segment on 5/14/23.  Titrated off pressors AM of 5/16. Stroke code activated on 5/16/23 upon transfer to floor for aphasia. CT head showed small L frontal SAH on 5/18/23.       Acute ischemic CVA  Acute SAH  Hx CVA Feb 2023   S/p L MCA angioplasty on 5/14/23  * Developed stroke like symptoms post ORIF while DAPT on hold on 5/8  * RRT 05/13 for worsening aphasia -> transferred to ICU and on Levophed to maintain pressures > 110. Titrated off 5/15  * ASA and Plavix had been on hold since orthopedic surgery on 5/2/23, had been on Lovenox for prophylaxis instead.  Resumed DAPT on 5/8 due to repeat CVA   * Repeat head CT obtained 5/8/2023 for new word finding and lower extremity weakness. Reportedly was  present with her initial stroke in Februrary but symptms had resolved.  Repeat head CT was negative for acute stroke.  * MRI/MRA reveals 1.  Left frontoparietal centrum semiovale white matter demonstrates multiple small and prominent acute infarcts.  2.  Left frontoparietal centrum semiovale white matter demonstrates multiple small areas of enhancement likely due to subacute infarcts. Recommend follow-up to resolution to exclude a more progressive etiology.  3.  Left frontoparietal centrum semiovale white matter demonstrate  "multiple small chronic infarcts.  4.  These acute, subacute, and chronic infarcts are within the same location.  * Echo -> The left ventricle is normal in size. There is normal left ventricular wall thickness. The visual ejection fraction is 55-60%. Septal motion is consistent with conduction abnormality.  *RRT called again on 5/16 for aphasia confusion.  * CT head obtained 5/16 revealed new interval small L frontal SAH.   * CTA H/N 5/17 and 5/18 unrevealing.    Plan  -- Neuro following: input appreciated. \"SAH ; Asymptomatic, incidentally found, main concern is for seizures as etiology for fluctuating exam. Likely iatrogenic and related to procedure as reperfusion injury from dysregulated cerebral autoregulation from chronically stenotic intracranial vessel.\"   -- neuro signed off 5/19  -- Patient to continue on Daily aspirin 325 (continue indefinitely) and Plavix 75mg (Given patients ischemic risk, benefits outweigh the risk)  -- DAPT 90 days then can consider asa + cilastazol after 90 day  -- Statin: lipitor 40mg    -- SLP/PT/OT  -- chronic SBP goal 100 - 140  -- was on Midodrine 10 mg TID for hypotension (now resolved), now discontinued to meet current SBP goal.   -- repeat MRI brain w/o 5/24 per neuro    Mechanical fall  Acute comminuted fracture of the right hip  Acute comminuted fracture of the right distal radius  S/p ORIF R hip and R wrist 5/2/2023  Post-operative acute blood loss anemia, improved  Patient with mechanical fall, pushed by elevator door, no head trauma, no LOC, no premonitory symptoms. Workup in ED with fractures above and leukocytosis 16.1k. S/p ORIF of R hip and R wrist on 5/2/2023. Postoperatively complicated by anemia and delirium. Hgb 8.7 prior to surgery, Hgb 4.8 on 5/3 post op, s/p 3u pRBC now Hgb stable in 8s.  - Ortho consult appreciated   - DAPT was restarted on 5/9/23 due to repeat CVA   - if patient still in the hospital at her 6 week f/u; ortho plans to see her in the hospital  - " PT/OT/Speech path  - scheduled tylenol  - PRN oxycodone for pain.       Back pain  Hx of compression fractures  * mid thoracic vs high lumbar back pain progressively worse 5/20-5/23  * lumbar and thoracic XR 5/23 without acute change  - pain is tolerable at times but worsening.     Acute metabolic encephalopathy, improved  Acute hypoxic respiratory failure - resolved  Probable Pneumonia, treated  Patient initially requiring 3-4L O2 to maintain O2 sats 92%. Patient was also notably delirious on 5/3. The etiology of this could be multifaceted, from post-operative state, to acute metabolic encephalopathy, to hypoperfusion due to anemia. CT head no acute pathology. Delirium improved on 5/5, but continued to be hypoxic on 5/5. CXR done showed possible pneumonia - left base consolidation.  Plan:  -Continue to encourage use of incentive spirometer.  - completed 7 days of ABX on 5/15      Leukocytosis - resolved  UTI - resolved  -- UA positive and follow up urine cultures -> 50,000-100,000 CFU/mL Enterococcus faecalis -> completed 7 days of levofloxacin     Hypocalcemia: Oscal started here     Metastatic lung cancer  Sees Dr. Pantoja at [facility], this was diagnosed via thoracentesis. No disease progression noted per latest office visit 4/6/2023.  - Okay to resume alectinib at discharge  - No plans for chemotherapy in setting of active infections.     Elevated LFTs  In setting of cancer, followed by Onocology. LFT trend here appears to be improving  - Outpatient follow up with Oncology     Goals of care  * DNR/DNI  * contemplative of idea of comfort cares/hospice. Will address further 5/25       Diet: Snacks/Supplements Adult: Magic Cup; With Meals  Diet  Regular Diet Adult  Room Service    DVT Prophylaxis: Pneumatic Compression Devices  Mcallister Catheter: Not present  Lines: None     Cardiac Monitoring: None  Code Status: No CPR- Do NOT Intubate      Clinically Significant Risk Factors              # Hypoalbuminemia:  Lowest albumin = 2.7 g/dL at 5/9/2023  6:53 PM, will monitor as appropriate            # Overweight: Estimated body mass index is 29.61 kg/m  as calculated from the following:    Height as of this encounter: 1.524 m (5').    Weight as of this encounter: 68.8 kg (151 lb 9.6 oz).           Disposition Plan  TCU, medically ready     Expected Discharge Date: 05/27/2023    Discharge Delays: Placement - TCU  *Medically Ready for Discharge  Insurance Authorization needed    Discharge Comments: TCU          Keagan Pettit MD  Hospitalist Service  [facility]  Securely message with [employer] (more info)  Text page via [employer] Paging/Directory   ______________________________________________________________________    Interval History   Aphasia maybe a bit better today. She is still able to make her needs known and has clear understanding of what is being said to her. Friend is at bedside.    We discussed that MRI is planned for this evening to evaluate if new changes causing worsening aphasia. Discussed this may be the nature of her disease.     Patient voiced that she would be okay if she were to die and is not afraid if she dies. We discussed that she is in significant pain and has been in significant pain. Discussed that pain control is and will be difficult due to pain meds often being a stroke mimic. Discussed that curative pathway would be difficult. She needs to heal from her fractures and from expressive aphasia. She is very open to the idea of comfort cares. She and friend both voiced a clear understanding of comfort and hospice philosophy. She is not ready to transition to comfort cares yet, but is very contemplative. We will address this again tomorrow morning.    Discussed with RN.  Additional hx from friend at bedside.    Physical Exam   Vital Signs: Temp: 97.6  F (36.4  C) Temp src: Oral BP: 127/61 Pulse: 94   Resp: 16 SpO2: 95 % O2 Device: None (Room air)    Weight: 151 lbs 9.6  oz    General Appearance: Well appearing for stated age.  Respiratory: CTAB, no rales or ronchi  Cardiovascular: S1, S2 normal, no murmurs  GI: non-tender on palpation, BS present  No midline pain noted    Medical Decision Making       53 MINUTES SPENT BY ME on the date of service doing chart review, history, exam, documentation & further activities per the note.      Data     I have personally reviewed the following data over the past 24 hrs:    N/A  \   N/A   / N/A     138 105 15.1 /  107 (H)   3.9; 3.9 23 0.50 (L) \       Imaging results reviewed over the past 24 hrs:   Recent Results (from the past 24 hour(s))   XR Lumbar Spine 2/3 Views    Narrative    EXAM: XR LUMBAR SPINE 2/3 VIEWS, XR THORACIC SPINE 3 VIEWS  LOCATION: [facility]  DATE/TIME: 5/23/2023 6:42 PM CDT    INDICATION: worsening back pain prior hx of compression fx  COMPARISON: Thoracic and lumbar spine radiographs 02/04/2023      Impression    IMPRESSION:   THORACIC SPINE:  Mild to moderate thoracic dextroscoliosis. Mild multilevel spondylosis. Diffuse bony demineralization.     Vertebral body heights within normal limits. No significant subluxations.    No significant interval change compared to prior exam.      LUMBAR SPINE:  Moderate thoracolumbar levoscoliosis. Thoracolumbar severe kyphosis. Multilevel spondylosis. Diffuse bony demineralization.    L4 vertebral body severe compression deformity similar compared to prior exam.    Remaining vertebral body heights within normal limits. No significant subluxations. Bilateral SI joints intact.    No significant interval change compared to prior exam.   XR Thoracic Spine 3 Views    Narrative    EXAM: XR LUMBAR SPINE 2/3 VIEWS, XR THORACIC SPINE 3 VIEWS  LOCATION: [facility]  DATE/TIME: 5/23/2023 6:42 PM CDT    INDICATION: worsening back pain prior hx of compression fx  COMPARISON: Thoracic and lumbar spine radiographs 02/04/2023      Impression     IMPRESSION:   THORACIC SPINE:  Mild to moderate thoracic dextroscoliosis. Mild multilevel spondylosis. Diffuse bony demineralization.     Vertebral body heights within normal limits. No significant subluxations.    No significant interval change compared to prior exam.      LUMBAR SPINE:  Moderate thoracolumbar levoscoliosis. Thoracolumbar severe kyphosis. Multilevel spondylosis. Diffuse bony demineralization.    L4 vertebral body severe compression deformity similar compared to prior exam.    Remaining vertebral body heights within normal limits. No significant subluxations. Bilateral SI joints intact.    No significant interval change compared to prior exam.

## 2023-05-24 NOTE — PLAN OF CARE
Reason for Admission: L MCA CVA, L SAH    Cognitive/Mentation: A/Ox 4 with Y/N or multiple choice questions  Neuros/CMS: Intact ex expressive aphasia, generalized weakness.   VS: Stable RA.  GI: BS +, + flatus, last BM today. Incontinent.  : Purewick, adequate output. Incontinent.  Pulmonary: LS clear.  Pain: Tylenol, robaxin, and oxycodone given for back pain. Ice packs and repositioning for back pain.     Drains: None  Skin: Scattered bruising. R hip ORIF, R wrist brace. Redness to back, WOC following  Activity: Assist x 2 with Anjelica steady.  Diet: Regular with thin liquids. Takes pills whole in applesauce. Assist to feed.     Aggression Stoplight Score: Green  Therapies recs: TCU  Discharge: Pending    End of shift summary: STAT MRI ordered for patient by stroke neuro for worsening aphasia, yet to be performed. Patient considering moving towards palliative/comfort cares after speaking with hospitalist, will discuss more tomorrow. Spiritual health met with patient this evening.

## 2023-05-24 NOTE — PLAN OF CARE
[id number]  Pt here with L MCA and L SAH. A&Ox3. Neuros are expressive aphasia. VSS.  Regular diet, thin liquids. Takes pills whole. Up with A2/ jenny steady. Pulsate mattress in place. Pure wick in place. Denies pain. Pt scoring green on the Aggression Stop Light Tool. Plan is for TCU. Discharge pending.

## 2023-05-25 NOTE — PROGRESS NOTES
SPIRITUAL HEALTH SERVICES Progress Note  [facility]  Neuroscience    Visited patient per Epic consult request for end of life concerns. Friend from [facility] at bedside.    Chiquita indicates that she has made the decision to transition to hospice, preferably in her own home. She states that she is comfortable with decision and not afraid of dying. Chiquita states that she prefers to die in the near future rather than later. She expects to be reunited with God and with those who have preceded her in death.    Offered several prayers and readings from scripture appropriate for those nearing end of life.    Chiquita would like a visit from  prior to discharging from hospital. Will alert unit  about request for visit tomorrow (05/26).    Giovani Sloan  Associate

## 2023-05-25 NOTE — PLAN OF CARE
Physical Therapy Discharge Summary    Reason for therapy discharge:    No further expectations of functional progress.    Progress towards therapy goal(s). See goals on Care Plan in [facility] electronic health record for goal details.  Goals not met.  Barriers to achieving goals:   comfort cares.    Therapy recommendation(s):    No further therapy is recommended.

## 2023-05-25 NOTE — PROGRESS NOTES
05/25/2023 [id number]  Pt now on comfort cares. Severe aphasia. Answers yes/no. Pain managed with prn oxy, and robaxin as well as scheduled tylenol. Complained of stomach cramps managed with simethicone and hot packs. New order for oxycontin BID, starting at 2000 tonight. Regular diet, total feed. Takes pills whole, one at a time, with applesauce. No IV, MD aware. Mcallister placed for end of life. Wants to discharge back to assisted living on hospice, SW following. Many family and friend visited today.

## 2023-05-25 NOTE — PROGRESS NOTES
[facility]    Medicine Progress Note - Hospitalist Service    Date of Admission:  5/1/2023    Assessment & Plan     Chiquita Berry is a 82 year old female with PMH  metastatic lung cancer, hx CVA Feb 2023, who was admitted 5/2/2023 with a fall and R hip and R wrist fractures. S/p ORIF. Dual anitplatelets held pre and post-operatively. Unfortunately, post op course complicated by significant blood loss (Hb 4.6). Also complicated by repeat Stroke on 5/13/23 in L posterior MCA (watershed distribution). She was subsequently transferred to ICU on 5/13 for pressors due to hypotension and worsening neuro findings. She underwent neuro IR angioplasty and stenting of L MCA M2 segment on 5/14/23.  Titrated off pressors AM of 5/16. Stroke code activated on 5/16/23 upon transfer to floor for aphasia. CT head showed small L frontal SAH on 5/18/23. 5/24 MRI brain showed new acute stroke in corpus callosum. Due to acute recurrent stroke while on DAPT, significant pain, expected long road to recovery with uncertain prognosis for recovery, patient opted to transition to comfort cares on 5/25/23.    Comfort cares  * patient opting to transition to comfort cares on 5/25/23  * she is accepting of her impending death, and would like to die quickly and quitely. She wants to discharge to [facility] home LTC with hospice if able.  - comfort care order set in place  - oxycodone 10-20mg q3h PRN and oxycontin 10mg BID, titrate to affect. May need to transition to SL morphine pending clinical picture.   - lorazepam for comfort  - DNR/DNI  - suspect she had days to weeks (possibly a few short months) to live, but patient is very accepting of death and may pass more quickly.     See below for previously addressed issues this stay:   Acute ischemic CVA  Acute CVA of corpus callosum while on DAPT  Acute SAH  Hx CVA Feb 2023   S/p L MCA angioplasty on 5/14/23  Mechanical fall  Acute comminuted fracture of the right hip  Acute  "comminuted fracture of the right distal radius  S/p ORIF R hip and R wrist 5/2/2023  Post-operative acute blood loss anemia, improved  Back pain  Acute metabolic encephalopathy, improved  Acute hypoxic respiratory failure - resolved  Probable Pneumonia, treated  Leukocytosis - resolved  UTI - resolved  Hypocalcemia  Metastatic lung cancer  Elevated LFTs  Goals of care       Diet: Snacks/Supplements Adult: Magic Cup; With Meals  Diet  Regular Diet Adult  Room Service    DVT Prophylaxis: Pneumatic Compression Devices  Mcallister Catheter: Not present  Lines: None     Cardiac Monitoring: None  Code Status: No CPR- Do NOT Intubate      Clinically Significant Risk Factors              # Hypoalbuminemia: Lowest albumin = 2.7 g/dL at 5/9/2023  6:53 PM, will monitor as appropriate            # Overweight: Estimated body mass index is 29.61 kg/m  as calculated from the following:    Height as of this encounter: 1.524 m (5').    Weight as of this encounter: 68.8 kg (151 lb 9.6 oz).           Disposition Plan  TCU, medically ready     Expected Discharge Date: 05/27/2023    Discharge Delays: Placement - TCU  Insurance Authorization needed    Discharge Comments: TCU          Keagan Pettit MD  Hospitalist Service  [facility]  Securely message with [employer] (more info)  Text page via [employer] Paging/Directory   ______________________________________________________________________    Interval History   Aphasia seem stable today. Still able to clearly make her needs known, answer yes/no questions completely appropriately, and getting some short sentences out intermittently    Discussed again at bedside with patient and her friend. We discussed new CVA seen on MRI. Discussed it is unfortunate she is still having CVA on DAPT. Discussed it is possibly related to lung Ca.    Friend at bedside reports the first thing Chiquita said today was she wants to be comfortable. Chiquita states to me \"I want to be ... comfortable.\" " discussed hospice, hospice philosophy. Discussed that patient will likely remain in the hospital for a few days working on dispo plan but goal will be to focus solely on comfort going forward.     Discussed with RN, neuro.  Additional hx from friend at bedside.    Physical Exam   Vital Signs: Temp: 98  F (36.7  C) Temp src: Axillary BP: 113/62 Pulse: 90   Resp: 15 SpO2: 96 % O2 Device: None (Room air)    Weight: 151 lbs 9.6 oz    General Appearance: NAD. Appears uncomfortable at times.  Respiratory: comfortable breaths  Cardiovascular: no cyanosis.   GI: not distended.     Medical Decision Making       55 MINUTES SPENT BY ME on the date of service doing chart review, history, exam, documentation & further activities per the note.      Data     I have personally reviewed the following data over the past 24 hrs:    9.8  \   9.6 (L)   / 607 (H)     N/A N/A N/A /  N/A   4.7 N/A N/A \       Imaging results reviewed over the past 24 hrs:   Recent Results (from the past 24 hour(s))   MR Brain w/o Contrast    Narrative    EXAM: MR BRAIN W/O CONTRAST  LOCATION: [facility]  DATE/TIME: 5/24/2023 9:54 PM CDT    INDICATION: aphasia, possible stroke  COMPARISON: CT head 05/18/2023, MRI brain 05/13/2023  TECHNIQUE: Routine multiplanar multisequence head MRI without intravenous contrast.    FINDINGS:  INTRACRANIAL CONTENTS:   Left corpus callosum genu JOSI territory small acute infarct. (Restricted diffusion, positive FLAIR). No microhemorrhage on GRE.    Left frontoparietal white matter prominent area of increased DWI signal that is more confluent compared to prior examination. ADC map is isointense. Findings likely due to evolving subacute infarcts. Small patchy superimposed acute infarcts possible.    Left frontal parietal white matter demonstrates multiple small chronic infarcts.    No additional acute infarcts.     No midline shift. Patchy and confluent nonspecific T2/FLAIR hyperintensities within the  cerebral white matter most consistent with moderate chronic microvascular ischemic change. Moderate generalized cerebral atrophy. No hydrocephalus. Normal position   of the cerebellar tonsils. Prominent retrocerebellar arachnoid cyst. Pronounced increased FLAIR signal surrounds the upper cervical spine, brainstem, supersellar cistern, sylvian fissures likely related to pronounced flow-related artifact.    SELLA: No abnormality accounting for technique.    OSSEOUS STRUCTURES/SOFT TISSUES: Normal marrow signal. The major intracranial vascular flow voids are maintained.     ORBITS: No abnormality accounting for technique.     SINUSES/MASTOIDS: Mild mucosal thickening scattered about the paranasal sinuses. No middle ear or mastoid effusion.       Impression    IMPRESSION:  1.  Left corpus callosum genu JOSI territory small acute infarct.    2.  Left frontoparietal white matter prominent area of increased DWI signal that is more confluent compared to prior examination. ADC map is isointense. Findings likely due to evolving subacute infarcts. Small patchy superimposed acute infarcts possible.    3.  Left frontal parietal white matter demonstrates multiple small chronic infarcts.    4.  Age-related changes described above.

## 2023-05-25 NOTE — TELEPHONE ENCOUNTER
Per Dr. Pettit's 5/25/23 note:    * patient opting to transition to comfort cares on 5/25/23  * she is accepting of her impending death, and would like to die quickly and quitely. She wants to discharge to [facility] home LTC with hospice if able.    No outreach to schedule will be made. Will close encounter.

## 2023-05-25 NOTE — PLAN OF CARE
Pt here with L MCA and L SAH. A&Ox4 with yes/no questions. Neuros are expressive aphasia, Right sided weakness 3/5, slow and deliberate movements. VSS on RA, -140. No IV access. Regular diet, thin liquids, total feed assist. Takes pills whole with applesauce. Up with Ax2/ jenny steady. Pain in back, oxycodone & Seroquel given before patient went to MRI, as she was distressed about laying in MRI. This made patient very sleepy throughout night. Much more alert and cooperative this AM. Robaxin given and ice applied to back this morning. NWB in RUE, as tolerated RLE. MRI resulted. Plan for TCU vs comfort cares, pending palliative conversation today.

## 2023-05-25 NOTE — CONSULTS
Patient has Medicare Advantage through [facility].    Eliquis/Xarelto  --Upon receipt of RX, Discharge Pharmacy can provide 1 mo free.  --Subsequent fills will be $47/mo ($94 for 3 months)  --lf/when total drug costs exceed $4,660, price will increase to a 25% coinsurance, equivalent to $149/mo.    Stephanie Sutton  Pharmacy Technician/Liaison, Discharge Pharmacy   [phone] (voice or text)  [email or web address]

## 2023-05-25 NOTE — PLAN OF CARE
Occupational Therapy Discharge Summary    Reason for therapy discharge:    No further expectations of functional progress.  Patient/family request discontinuation of services.   Comfort cares.    Progress towards therapy goal(s). See goals on Care Plan in [facility] electronic health record for goal details.  discontinuation of services d/t transition to comfort cares per chart     Therapy recommendation(s):    No further therapy is recommended.   OT Rationale for DC Rec: Pt below baseline and is limited due to impaired balance, strength, R UE post surgical precautions, pain, aphasia, and apraxia.  Writing therapist did not treat pt, note written based on previous treating therapist notes and recommendations. Please see chart and flow sheet for additional details.

## 2023-05-25 NOTE — PROGRESS NOTES
Care Management Follow Up    Length of Stay (days): 24    Expected Discharge Date: 05/27/2023     Concerns to be Addressed:       Patient plan of care discussed at interdisciplinary rounds: Yes    Anticipated Discharge Disposition: Transitional Care     Anticipated Discharge Services: None  Anticipated Discharge DME: None    Patient/family educated on Medicare website which has current facility and service quality ratings: no  Education Provided on the Discharge Plan:    Patient/Family in Agreement with the Plan: yes    Referrals Placed by CM/SW: Post Acute Facilities  Private pay costs discussed: Not applicable    Additional Information:  Physician updated writer that patient has decided to go comfort care and would like to discharge back to [facility] with hospice. INDU called [facility] to ask about the possibility of patient moving to LTC with hospice. Mishel in admissions to follow up with DON and will call back to further discuss options.     Addendum:  Mishel from [facility] left a message stating that they would have a room in LTC for patient but wanted to further discuss. INDU left a message for her and will await a call back. INDU did speak with pt and friend that this would be a possibility and they were happy about this. Unsure of timeline and details at this time.     INDU will continue to follow.       JULIO Buck

## 2023-05-26 NOTE — PLAN OF CARE
Speech Language Therapy Discharge Summary    Reason for therapy discharge:    Patient/family request discontinuation of services.    Progress towards therapy goal(s). See goals on Care Plan in Epic electronic health record for goal details.  Goals not met.  Barriers to achieving goals:   transition to comfort care.    Therapy recommendation(s):    No further therapy is recommended.

## 2023-05-26 NOTE — PLAN OF CARE
Problem: Surgery Nonspecified  Goal: Optimal Pain Control and Function  Intervention: Prevent or Manage Pain  Recent Flowsheet Documentation  Taken 5/26/2023 1500 by Tami Burnham RN  Pain Management Interventions:    medication (see MAR)    repositioned   Goal Outcome Evaluation:    Orientations: alert and orientated x 3; patient with severe aphasia and limited assessment completed due comfort care status. Patient restless and moving from bed to chair and back again due to back pain. Patient received scheduled Tylenol and 10 mg oxycodone PRN with minimal relief of pain. Patient using hot and cold packs in addition to pain mediation. Patient having intermittent period of rest and visiting with friend.    Vitals/Pain: /87 (BP Location: Right arm)   Pulse 89   Temp 99  F (37.2  C) (Oral)   Resp 16   Ht 1.524 m (5')   Wt 68.8 kg (151 lb 9.6 oz)   LMP  (LMP Unknown)   SpO2 91%   BMI 29.61 kg/m    Regular heart rate   Resp: Clear, equal bilaterally  Lines/Drains: chronic bruno   Skin/Wounds: pressure ulcer on spine; foam dressing changed this shift.   GI/: Bruno; incontinent of stool   Labs: Abnormal/Trends, Electrolyte Replacement- none  Ambulation/Assist: SBA with walker and gait belt   Plan: Discharge next week on hospice     Tami Burnham, RN

## 2023-05-26 NOTE — PROGRESS NOTES
SPIRITUAL HEALTH SERVICES Progress Note     SH  72    Completed follow-up visit today with Chiquita and friends at the bedside. Chiquita is planning to discharge on hospice and continues to note that Scripture reading and prayer are meaningful for her at this time. I offered a reading from Dmitry Byers and a prayer today at the end of this visit.    AYDEE continues to follow this pt while at [facility]. Please consult as needs arise.      ------  Ghassan Joshi M.Div.  Resident   [facility] Pager: [phone]

## 2023-05-26 NOTE — PROGRESS NOTES
[facility]    Medicine Progress Note - Hospitalist Service    Date of Admission:  5/1/2023    Assessment & Plan     Chiquita Berry is a 82 year old female with PMH  metastatic lung cancer, hx CVA Feb 2023, who was admitted 5/2/2023 with a fall and R hip and R wrist fractures. S/p ORIF. Dual anitplatelets held pre and post-operatively. Unfortunately, post op course complicated by significant blood loss (Hb 4.6). Also complicated by repeat Stroke on 5/13/23 in L posterior MCA (watershed distribution). She was subsequently transferred to ICU on 5/13 for pressors due to hypotension and worsening neuro findings. She underwent neuro IR angioplasty and stenting of L MCA M2 segment on 5/14/23.  Titrated off pressors AM of 5/16. Stroke code activated on 5/16/23 upon transfer to floor for aphasia. CT head showed small L frontal SAH on 5/18/23. 5/24 MRI brain showed new acute stroke in corpus callosum. Due to acute recurrent stroke while on DAPT, significant pain, expected long road to recovery with uncertain prognosis for recovery, patient opted to transition to comfort cares on 5/25/23.     Comfort cares  * patient opting to transition to comfort cares on 5/25/23  * she is accepting of her impending death, and would like to die quickly and quitely. She wants to discharge to [facility] home LTC with hospice if able.  - comfort care order set in place  - oxycodone 10-15mg q3h PRN and oxycontin 10mg BID, titrate to affect. May need to transition to SL morphine pending clinical picture.   - lorazepam for comfort  - DNR/DNI  - patient is very accepting of death.     See below for previously addressed issues this stay:     Acute ischemic CVA  Acute CVA of corpus callosum while on DAPT  Acute SAH  Hx CVA Feb 2023   S/p L MCA angioplasty on 5/14/23  Mechanical fall  Acute comminuted fracture of the right hip  Acute comminuted fracture of the right distal radius  S/p ORIF R hip and R wrist  5/2/2023  Post-operative acute blood loss anemia, improved  Back pain  Acute metabolic encephalopathy, improved  Acute hypoxic respiratory failure - resolved  Probable Pneumonia, treated  Leukocytosis - resolved  UTI - resolved  Hypocalcemia  Metastatic lung cancer  Elevated LFTs  Goals of care           Diet: Snacks/Supplements Adult: Magic Cup; With Meals  Diet  Regular Diet Adult  Room Service    DVT Prophylaxis: Pneumatic Compression Devices  Mcallister Catheter: Not present  Lines: None     Cardiac Monitoring: None  Code Status: No CPR- Do NOT Intubate          Diet: Snacks/Supplements Adult: Magic Cup; With Meals  Diet  Regular Diet Adult  Room Service    DVT Prophylaxis: Pneumatic Compression Devices  Mcallister Catheter: PRESENT, indication: End of Life, Retention  Lines: None     Cardiac Monitoring: None  Code Status: No CPR- Do NOT Intubate      Clinically Significant Risk Factors              # Hypoalbuminemia: Lowest albumin = 2.7 g/dL at 5/9/2023  6:53 PM, will monitor as appropriate            # Overweight: Estimated body mass index is 29.61 kg/m  as calculated from the following:    Height as of this encounter: 1.524 m (5').    Weight as of this encounter: 68.8 kg (151 lb 9.6 oz).           Disposition Plan  Likely discharge on Tuesday to facility with hospice.      Expected Discharge Date: 05/27/2023    Discharge Delays: Comfort Care/Hospice    Discharge Comments: 5/25 discharge to [facility] with hospice          Colleen Larson MD  Hospitalist Service  [facility]  Securely message with [employer] (more info)  Text page via [employer] Paging/Directory   ______________________________________________________________________    Interval History   Patient comfortable at the time I am seeing her, she is requesting some water to drink.      Physical Exam   Vital Signs: Temp: 99  F (37.2  C) Temp src: Oral BP: 134/87 Pulse: 89   Resp: 16 SpO2: 91 % O2 Device: None (Room air)    Weight: 151 lbs 9.6  oz    Exam deferred. Patient comfort cares.     Medical Decision Making       45 MINUTES SPENT BY ME on the date of service doing chart review, history, exam, documentation & further activities per the note.      Data         Imaging results reviewed over the past 24 hrs:   No results found for this or any previous visit (from the past 24 hour(s)).

## 2023-05-26 NOTE — PROGRESS NOTES
Notified provider about indwelling bruno catheter discussed removal or continued need.    Did provider choose to remove indwelling bruno catheter? NO    Provider's bruno indication for keeping indwelling bruno catheter: Indication for continued use: End of Live    Is there an order for indwelling bruno catheter? YES    *If there is a plan to keep bruno catheter in place at discharge daily notification with provider is not necessary, but please add a notation in the treatment team sticky note that the patient will be discharging with the catheter.     Tami Burnham RN

## 2023-05-26 NOTE — PROGRESS NOTES
ORAL CHEMOTHERAPY DISCONTINUATION       Primary Oncologist:  Dr. Pantoja  Primary Oncology Clinic: [facility]  Cancer Diagnosis:  NSCLC  Therapy History:  1/26/23 - Started Alectinib  May 2023 - Admitted to ED with stroke/infections  5/26/23 - Transitioning to hospice    Therapy Ended On:  5/26/2023  Reason For Discontinuation: patient enrolled into hospice      Rachel Moniz, PharmD  Hematology/Oncology Pharmacist Intern  [facility]  [phone]

## 2023-05-26 NOTE — PLAN OF CARE
Goal Outcome Evaluation: 1930-0700    Full assessment and VS deferred r/t comfort care order. Pain managed with PRN oxy x1, scheduled oxy and Tylenol x1, Robaxin x2. No IV. Mcallister in place with adequate output.     Likely discharging to [facility] LTC with hospice.

## 2023-05-27 NOTE — PLAN OF CARE
Goal Outcome Evaluation:      Plan of Care Reviewed With: patient    Overall Patient Progress: improving    Patient is here with Fall Rt hip fracture ,Rt hip fracture post operative course complicated by Lt MCA stroke and anemia now on comfort care. Patient denied pain sleeping in between the care multiple repositions done, chronic bruno intact mild red area around the perineum barrier cream applied bruno care done , Blanchable redness in the back covered with mepilex. Plan to discharge [facility] with hosp[facility]

## 2023-05-27 NOTE — PROGRESS NOTES
Notified provider about indwelling bruno catheter discussed removal or continued need.    Did provider choose to remove indwelling bruno catheter? YES    Provider's bruno indication for keeping indwelling bruno catheter: Indication for continued use: End of Live    Is there an order for indwelling bruno catheter? YES    *If there is a plan to keep bruno catheter in place at discharge daily notification with provider is not necessary, but please add a notation in the treatment team sticky note that the patient will be discharging with the catheter.     Tami Burnham RN

## 2023-05-27 NOTE — PROGRESS NOTES
[facility]    Medicine Progress Note - Hospitalist Service    Date of Admission:  5/1/2023    Assessment & Plan   Chiquita Berry is a 82 year old female with PMH  metastatic lung cancer, hx CVA Feb 2023, who was admitted 5/2/2023 with a fall and R hip and R wrist fractures. S/p ORIF. Dual anitplatelets held pre and post-operatively. Unfortunately, post op course complicated by significant blood loss (Hb 4.6). Also complicated by repeat Stroke on 5/13/23 in L posterior MCA (watershed distribution). She was subsequently transferred to ICU on 5/13 for pressors due to hypotension and worsening neuro findings. She underwent neuro IR angioplasty and stenting of L MCA M2 segment on 5/14/23.  Titrated off pressors AM of 5/16. Stroke code activated on 5/16/23 upon transfer to floor for aphasia. CT head showed small L frontal SAH on 5/18/23. 5/24 MRI brain showed new acute stroke in corpus callosum. Due to acute recurrent stroke while on DAPT, significant pain, expected long road to recovery with uncertain prognosis for recovery, patient opted to transition to comfort cares on 5/25/23.     Comfort cares  * patient opting to transition to comfort cares on 5/25/23  * She [facility] with [facility] if able.  - comfort care order set in place  - was started on oxycodone 10-15mg q3h PRN and oxycontin 10mg BID, titrate to affect.   5/27: Pain not controlled: will add Holistic pain Mx: scheduled Tylenolol, robaxin, warm packs. Change Oxycontin to Fentanyl patch 25mcg.  Ct prn Oxy     - lorazepam for comfort  * Will request Oncology to clarify if Alectinib can be resumed as per family request    See below for previously addressed issues this stay:      Acute ischemic CVA  Acute CVA of corpus callosum while on DAPT  Acute SAH  Hx CVA Feb 2023   S/p L MCA angioplasty on 5/14/23  Mechanical fall  Acute comminuted fracture of the right hip  Acute comminuted fracture of the right distal  radius  S/p ORIF R hip and R wrist 5/2/2023  Post-operative acute blood loss anemia, improved  Back pain  Acute metabolic encephalopathy, improved  Acute hypoxic respiratory failure - resolved  Probable Pneumonia, treated  Leukocytosis - resolved  UTI - resolved  Hypocalcemia  Metastatic lung cancer  Elevated LFTs  Goals of care        Diet: Snacks/Supplements Adult: Magic Cup; With Meals  Diet  Regular Diet Adult  Room Service    DVT Prophylaxis: Pneumatic Compression Devices  Mcallister Catheter: PRESENT, indication: End of Life, Retention  Lines: None     Cardiac Monitoring: None  Code Status: No CPR- Do NOT Intubate      Clinically Significant Risk Factors              # Hypoalbuminemia: Lowest albumin = 2.7 g/dL at 5/9/2023  6:53 PM, will monitor as appropriate            # Overweight: Estimated body mass index is 29.61 kg/m  as calculated from the following:    Height as of this encounter: 1.524 m (5').    Weight as of this encounter: 68.8 kg (151 lb 9.6 oz).           Disposition Plan      Expected Discharge Date: 05/30/2023    Discharge Delays: Comfort Care/Hospice    Discharge Comments: 5/25 discharge to [facility] with hospice          Kyler Garnica MD  Hospitalist Service  [facility]  Securely message with [employer] (more info)  Text page via [employer] Paging/Directory   ______________________________________________________________________    Interval History   C/o persistent significant pain in back   No fever/ chills    Physical Exam   BP (!) 150/68 (BP Location: Right arm)   Pulse 96   Temp 97.8  F (36.6  C) (Axillary)   Resp 16   Ht 1.524 m (5')   Wt 68.8 kg (151 lb 9.6 oz)   LMP  (LMP Unknown)   SpO2 95%   BMI 29.61 kg/m    Gen- pleasant   HEENT- NAD, YENY  Neck- supple  CVS- I+II+ no m/r/g  RS- non labored  Abdo- soft, no tenderness . No g/r/r       Medical Decision Making       55 MINUTES SPENT BY ME on the date of service doing chart review, history, exam, documentation &  further activities per the note.      Data   ------------------------- PAST 24 HR DATA REVIEWED -----------------------------------------------        Imaging results reviewed over the past 24 hrs:   No results found for this or any previous visit (from the past 24 hour(s)).

## 2023-05-27 NOTE — PROGRESS NOTES
S: Verbal order received today, to the on-call orthotics service for 2 right wrist braces.  One to wear and one to wash.    O/G: To reduce right wrist movement and pain.    A.  At this time, I have measured and fit the patient with 2 right size small 434B-RT-S suede lacing wrist braces.  Patient was happy with the overall fit/comfort.  I have provided the manufacturers instructions with the brace and went through them.    P.  Pt/Staff have been instructed to contact our facility with any future questions and/or concerns.    Pablo ELLER/MARI

## 2023-05-27 NOTE — PLAN OF CARE
Problem: Surgery Non specified  Goal: Optimal Pain Control and Function  Intervention: Prevent or Manage Pain  Recent Flowsheet Documentation  Taken 5/27/2023 0758 by Tami Burnham, RN  Pain Management Interventions:   medication (see MAR)   repositioned   Goal Outcome Evaluation:  Orientations: alert and orientated x 3; patient with severe aphasia and limited assessment completed due comfort care status. Patient having multiple visitors this shift. Patient tolerating new pain regimen; appears more comfortable with less restlessness. Patient having adequate PO intake. Patient up to reclining chair multiple times this shift and tolerating activity. Bruno cares performed as ordered. Patient incontinent of stool multiple times this shift; patient placed on bedside commode but unable to go. Will continue with ordered pain regimen.   Vitals/Pain: /58 (BP Location: Left arm)   Pulse 82   Temp 98.6  F (37  C) (Oral)   Resp 18   Ht 1.524 m (5')   Wt 68.8 kg (151 lb 9.6 oz)   LMP  (LMP Unknown)   SpO2 92%   BMI 29.61 kg/m    Resp: Clear, equal bilaterally  Lines/Drains: chronic bruno   Skin/Wounds: pressure ulcer on spine;  GI/: Bruno; incontinent of stool x 3 this shift  Labs: Abnormal/Trends, Electrolyte Replacement- none  Ambulation/Assist: SBA with walker and gait belt   Plan: Discharge next week on hospice      Tami Burnham, RN

## 2023-05-27 NOTE — CONSULTS
[facility]    Hematology / Oncology Consultation     Date of Admission:  5/1/2023  Date of Consult (When I saw the patient): 05/27/23    Assessment & Plan   Chiquita Berry is a 82 year old female who was admitted on 5/1/2023. I was asked to see the patient for metastatic lung adenocarcinoma.    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 Follow up chest CT showed left upper lobe nodule, right upper lobe consolidation and mediastinal adenopathy in addition to the pleural effusion. Cytology from thoracentesis confirmed lung adenocarcinoma with ALK gene re-arrangement. She has been started on alectinib since Jan 2023. She had a stroke in Feb of this year.     She has been admitted on 5-23 after a fall with right hip and right wrist fractures.  She had open reduction with internal fixation done.  Unfortunately her postoperative course has been complicated with significant blood loss and a repeat CVA involving left posterior MCA territory on 5/13/2023.  She needed vasopressors due to hypotension and had to be moved to the MICU on 5/13/2023.  She had angioplasty and stenting of the left MCA M2 segment on 5/14/2023.  She had a repeat episode of aphasia and her CT head showed a small left frontal subarachnoid hemorrhage on 5/18/2023.  She had another recurrent stroke on 5/24/2023 in the corpus callosum.  She has opted to transition to comfort cares on 5/25/2023.    She does not have any family around but has a lot of friend who have scheduled someone to be with her all the time. Patient and friends apparently had questions about her alectinib for lung cancer.  Since that she is moving to comfort cares and alectinib is not providing any symptom relief at this time, it would be better to discontinue this  medication. She expressed a desire to move to hospice. She is at peace and wishes to [facility]. She is a devout [employer] and person of denise. She is having a difficult time as she has marked dysphasia. She used to be very articulate and now she cannot complete simple sentences and is looking for words. She had multiple repeat strokes during this admission and hospice would be a good choice give her poor quality of life.     I have updated Dr. Pantoja as promised to patient about her choosing hospice and discontinuing alectinib and he is in complete agreement.     We will sign off. Please do not hesitate to call me with questions.     Over 90 min spent on day of visit including review of tests, obtaining/reviewing separately obtained history/physical exam, counseling patient, ordering medications/tests/procedures, communicating with PCP/consultants, and documenting in electronic medical record.    Ethan Branch  Hematologist and Medical Oncologist  [facility]       Ethan Branch MD, MD    Code Status    No CPR- Do NOT Intubate    Reason for Consult   Reason for consult: I was asked by Dr. Garnica to evaluate this patient for metastatic lung adenocarcinoma.    Primary Care Physician   Naz Jackson    Chief Complaint   Fall    History is obtained from the patient and per charts as patient having severe dysphasia and difficulty communicating/ word finding.     History of Present Illness   Chiquita Berry is a 82 year old female who presents after a fall where she fractured her right hip and right wrist. She had surgeries for both fractures but post operatively she had recurrent multiple strokes. She now has difficulty with word finding.     Past Medical History   I have reviewed this patient's medical history and updated it with pertinent information if needed.   Past Medical History:   Diagnosis Date     Anxiety     related to cancer diagnosis     Chronic back pain     due to compression fracture      Osteoporosis     followed by outside endocrinologist     Primary lung adenocarcinoma (H)     stage IV       Past Surgical History   I have reviewed this patient's surgical history and updated it with pertinent information if needed.  Past Surgical History:   Procedure Laterality Date     CATARACT IOL, RT/LT Bilateral      OPEN REDUCTION INTERNAL FIXATION HIP NAILING Right 2023    Procedure: OPEN REDUCTION INTERNAL FIXATION RIGHT HIP FRACTURE USING INTRAMEDULLARY NAIL;  Surgeon: Mehdi Vega MD;  Location:  OR     OPEN REDUCTION INTERNAL FIXATION HUMERUS DISTAL Right      OPEN REDUCTION INTERNAL FIXATION WRIST Right 2023    Procedure: OPEN REDUCTION INTERNAL FIXATION RIGHT DISTAL RADIUS FRACTURE;  Surgeon: Mehdi Vega MD;  Location:  OR     PHACOEMULSIFICATION CLEAR CORNEA WITH STANDARD INTRAOCULAR LENS IMPLANT Left 2015    Procedure: PHACOEMULSIFICATION CLEAR CORNEA WITH STANDARD INTRAOCULAR LENS IMPLANT;  Surgeon: Latrell Jessica MD;  Location:  EC     TONSILLECTOMY & ADENOIDECTOMY         Prior to Admission Medications   Prior to Admission Medications   Prescriptions Last Dose Informant Patient Reported? Taking?   Multiple Vitamins-Minerals (PRESERVISION AREDS 2) CAPS 2023 at am  No Yes   Sig: TAKE 1 CAPSULE BY MOUTH TWICE A DAY   acetaminophen (TYLENOL) 325 MG tablet 2023 at am  No No   Sig: Take 2 tablets (650 mg) by mouth 4 times daily   alectinib (ALECENSA) 150 MG CAPS 2023 at am  Yes Yes   Sig: Take 4 capsules (600 mg) by mouth 2 times daily (with meals)   aspirin (ASA) 81 MG EC tablet 2023  No Yes   Sig: Take 1 tablet (81 mg) by mouth daily Take baby aspirin indefinitely for stroke   atorvastatin (LIPITOR) 40 MG tablet 2023  No Yes   Sig: Take 1 tablet (40 mg) by mouth every evening   clopidogrel (PLAVIX) 75 MG tablet 2023  No No   Si tablet (75 mg) by Oral or NG Tube route daily for 83 days Take baby aspirin and Plavix daily for total of  90 days.  Then stop Plavix continue baby aspirin daily indefinitely for stroke prevention   hydrOXYzine (ATARAX) 10 MG tablet   No Yes   Sig: Take 1 tablet (10 mg) by mouth 3 times daily as needed for anxiety   latanoprost (XALATAN) 0.005 % ophthalmic solution 4/30/2023 Self Yes Yes   Sig: Place 1 drop into both eyes every evening    melatonin 3 MG CAPS 4/30/2023  No Yes   Sig: Take 3 mg by mouth At Bedtime And 3mg prn po before 0200 hrs (total of 6mg)   multivitamin w/minerals (THERA-VIT-M) tablet 5/1/2023  No Yes   Sig: Take 1 tablet by mouth daily   ondansetron (ZOFRAN) 4 MG tablet   No Yes   Sig: Take 1 tablet (4 mg) by mouth every 6 hours as needed for nausea   pantoprazole (PROTONIX) 40 MG EC tablet 5/1/2023  No Yes   Sig: Take 1 tablet (40 mg) by mouth daily   polyethylene glycol (MIRALAX) 17 GM/Dose powder 5/1/2023 Self Yes Yes   Sig: Take 17 g by mouth daily   polyethylene glycol 400 (BLINK TEARS) 0.25 % SOLN ophthalmic solution 5/1/2023 at am Self Yes Yes   Sig: Place 1 drop into both eyes daily And Q2H PRN   traMADol (ULTRAM) 50 MG tablet   No No   Sig: Take 1 tablet (50 mg) by mouth every 6 hours as needed for severe pain (7-10)   traZODone (DESYREL) 50 MG tablet 4/30/2023  No Yes   Sig: TAKE ONE TABLET BY MOUTH EVERY NIGHT AT BEDTIME   vitamin D3 (CHOLECALCIFEROL) 50 mcg (2000 units) tablet   No Yes   Sig: Take 1 tablet (50 mcg) by mouth daily      Facility-Administered Medications: None     Allergies   Allergies   Allergen Reactions     Adhesive Tape Dermatitis     Skin Sensitivity to Adhesive ie. Lidocaine patch, tele patches, tapes     Amoxicillin-Pot Clavulanate Diarrhea     Celebrex [Celecoxib] Rash     Lidocaine      Skin sensitivity to Lidocaine patch adhesive       Social History   I have reviewed this patient's social history and updated it with pertinent information if needed. Chiquita Berry  reports that she has never smoked. She has never used smokeless tobacco. She reports that she does  not currently use alcohol. She reports that she does not use drugs.    Family History   Family history reviewed with patient and is noncontributory.    Review of Systems   The 10 point Review of Systems is negative other than noted in the HPI or here.     Physical Exam   Temp: 98.6  F (37  C) Temp src: Oral BP: 120/58 Pulse: 82   Resp: 18 SpO2: 92 % O2 Device: None (Room air)    Vital Signs with Ranges  Temp:  [97.8  F (36.6  C)-99.6  F (37.6  C)] 98.6  F (37  C)  Pulse:  [82-96] 82  Resp:  [14-20] 18  BP: (120-155)/(58-70) 120/58  SpO2:  [92 %-95 %] 92 %  151 lbs 9.6 oz      Data   Recent Labs   Lab Test 05/25/23  1005 05/24/23  0739 05/23/23  0811 05/22/23  0725 05/21/23  0826 05/18/23  1543 05/16/23  1850 05/16/23  0539 05/16/23  0413 05/15/23  2022 05/15/23  1216 05/14/23  0457   NA  --  138  --   --   --  138  --  132*  --   --  137 137   POTASSIUM 4.7 3.9  3.9 4.0 4.1 4.0 3.7  --  3.5  --   --  4.0 4.0   CHLORIDE  --  105  --   --   --  101  --  93*  --   --  104 102   CO2  --  23  --   --   --  28  --  30*  --   --  23 25   ANIONGAP  --  10  --   --   --  9  --  9  --   --  10 10   BUN  --  15.1  --   --   --  9.2  --  7.5*  --   --  10.5 14.7   CR  --  0.50*  --   --   --  0.50*  --  0.73  --   --  0.50* 0.58   GLC  --  107*  --   --   --  139* 119* 99 93   < > 130* 122*   SAGE  --  8.9  --   --   --  8.3*  --  9.1  --   --  8.1* 8.3*    < > = values in this interval not displayed.     Recent Labs   Lab Test 02/02/23  0534 12/15/22  0803 12/13/22  0659 12/12/22  0750 12/11/22  0518   MAG 2.1 2.3 2.3 2.2 2.2   PHOS 3.0  --  2.9  --   --      Recent Labs   Lab Test 05/25/23  1005 05/21/23  0627 05/20/23  0712 05/19/23  0808 05/18/23  1543 05/17/23  0748 05/16/23  0539 05/15/23  1216 05/14/23  0457 05/11/23  0727 05/10/23  0859 05/07/23  1545 05/07/23  0704 05/02/23  0929 05/01/23  1315 03/28/23  1155   WBC 9.8  --   --   --  10.4  --  13.3* 13.5* 15.7*  --  14.9*   < > 10.8   < > 16.1* 8.2   HGB 9.6* 8.7* 8.5*  8.9* 8.5*   < > 8.0* 7.8* 9.0*  9.0*   < > 8.8*  8.8*   < > 8.6*  8.6*   < > 9.8* 11.0*   *  --   --   --  641*  --  589* 634* 706*  --  599*   < > 388   < > 310 356   *  --   --   --  97  --  96 97 96  --  94   < > 93   < > 95 99   NEUTROPHIL  --   --   --   --  68  --   --   --   --   --  78  --  66  --  75 55    < > = values in this interval not displayed.     Recent Labs   Lab Test 05/16/23  0539 05/09/23  1853 05/04/23  0657 12/11/22  0518 12/10/22  2258   BILITOTAL 0.5 1.1 1.1   < >  --    ALKPHOS 171* 112* 80   < >  --    ALT 14 19 23   < >  --    AST 29 35 45*   < >  --    ALBUMIN 2.9* 2.7* 2.7*   < >  --    LDH  --   --   --   --  230    < > = values in this interval not displayed.     TSH   Date Value Ref Range Status   02/02/2023 5.00 (H) 0.30 - 4.20 uIU/mL Final   12/10/2022 1.39 0.40 - 4.00 mU/L Final   04/16/2019 3.10 0.40 - 4.00 mU/L Final   12/30/2012 3.19 0.4 - 5.0 mU/L Final     No results for input(s): CEA in the last 37210 hours.  Results for orders placed or performed during the hospital encounter of 05/01/23   XR Wrist Right G/E 3 Views    Narrative    WRIST RIGHT THREE OR MORE VIEWS May 1, 2023 1:30 PM     HISTORY: Fall, pain, deformity.    COMPARISON: None.      Impression    IMPRESSION:  1. Comminuted intra-articular fracture of the distal radius. There is  1 cm of displacement and impaction and there is moderate to marked  dorsal tilt of the distal radial articular surface.  2. Mildly displaced comminuted fracture of the ulnar styloid process.  3. Secondary to the radius impaction there is positive ulnar variance.  4. Diffuse bone demineralization.  5. Multifocal osteoarthrosis particularly at the STT joint, first CMC  joint and first MCP joint.    CHARLEY SOLOMON MD         SYSTEM ID:  [id number]   XR Pelvis w Hip Right 1 View    Narrative    PELVIS AND HIP RIGHT ONE VIEW   5/1/2023 1:43 PM     HISTORY: Fall, right hip pain.    COMPARISON: None.      Impression     IMPRESSION: There is a comminuted intertrochanteric fracture of the  right hip. There is several centimeters of displacement and impaction.  There is moderate apex superolateral angulation. Extensive  degenerative changes in the spine, diffuse bone demineralization, and  convex left curvature in the spine incidentally noted.    CHARLEY SOLOMON MD         SYSTEM ID:  [id number]   XR Hand Left G/E 3 Views    Narrative    HAND LEFT THREE OR MORE VIEWS May 1, 2023 2:48 PM     INDICATION: Left hand pain and swelling after a fall.     COMPARISON: None.      Impression    IMPRESSION:  1.  No acute fracture or joint malalignment.  2.  Moderate thumb CMC degenerative arthrosis.  3.  Probable old healed fracture of the fourth finger distal phalanx  dorsal base.  4.  Bone demineralization.  5.  Catheter in the dorsal wrist.    RONAL SILVESTRE MD         SYSTEM ID:  [id number]   XR Wrist Right 2 Views    Narrative    WRIST RIGHT TWO VIEWS  DATE/TIME: 5/1/2023 4:01 PM     INDICATION: Left wrist fractures. Interval closed reduction.   COMPARISON: 5/1/2023.      Impression    IMPRESSION:  1.  Comminuted intra-articular fracture of the right distal radius.  Improved alignment of the fracture, with decreased impaction. The  distal articular surface is congruent.  2.  Mildly displaced fracture of the ulnar styloid process, unchanged.  3.  Resolution of ulna positive variance.  4.  New wrist splint.  5.  Remainder unchanged.    RONAL SILVESTRE MD         SYSTEM ID:  [id number]   XR Femur Right 2 Views    Narrative    EXAM: XR FEMUR RIGHT 2 VIEWS  LOCATION: [facility]  DATE/TIME: 5/1/2023 6:53 PM CDT    INDICATION: preop planning  COMPARISON: 02/04/2023      Impression    IMPRESSION: The proximal femur is not imaged. No acute fracture or malalignment. Osteopenia.   XR Surgery LOIDA L/T 5 Min Fluoro w Stills    Narrative    XR SURGERY LOIDA FLUORO LESS THAN 5 MIN W STILLS 5/2/2023 4:17 PM     HISTORY: Open  Reduction Internal Fixation Right Hip and Open Reduction  Internal Fixation Right Distal Radius    NUMBER OF IMAGES ACQUIRED: 9    VIEWS: 4 views of the right hip and 5 views of the right wrist    FLUOROSCOPY TIME: 1.4      Impression    IMPRESSION: Intramedullary nail with compression locking screw  fixation has been placed across the right intertrochanteric fracture.  Volar plate and screw fixation have been placed across the distal  right radial fracture.    JONO BLACK MD         SYSTEM ID:  [id number]   XR Pelvis w Hip Port Right 1 View    Narrative    EXAM: XR PELVIS AND HIP PORTABLE RIGHT 1 VIEW  LOCATION: [facility]  DATE/TIME: 5/2/2023 5:33 PM CDT    INDICATION: Right hip postoperative follow-up.  COMPARISON: 05/01/2023.      Impression    IMPRESSION:  1.  Interval ORIF right femur intertrochanteric fracture with intramedullary nail and locking femoral neck screw fixation. The hardware is intact and the fracture is reduced.  2.  Superomedially displaced lesser trochanteric fracture fragment.  3.  Normal right hip joint spacing and alignment.  4.  Postoperative soft tissue gas about the right hip.  5.  Bone demineralization.   XR Wrist Port Right 2 Views    Narrative    EXAM: XR WRIST PORT RIGHT 2 VIEWS  LOCATION: [facility]  DATE/TIME: 5/2/2023 5:33 PM CDT    INDICATION: Right wrist postoperative follow-up.  COMPARISON: 05/01/2023.      Impression    IMPRESSION:  1.  Interval ORIF right distal radius fracture with volar plate/screw fixation. The hardware is intact and the fracture is reduced. The distal radius articular surface appears grossly congruent.  2.  Tiny nondisplaced fracture of the ulna styloid process, unchanged.  3.  Bone demineralization.  4.  Wrist splint.   CT Head w/o Contrast    Narrative    CT HEAD W/O CONTRAST 5/4/2023 2:01 PM    INDICATION: ongoing encephalopathy  TECHNIQUE: CT scan of the head without contrast. Dose  reduction  techniques were used.  CONTRAST: None.  COMPARISON: 2/13/2023 brain MRI.    FINDINGS:   No intracranial hemorrhage, extraaxial collection, mass effect or CT  evidence of acute infarct.  Moderate presumed chronic small vessel  ischemic changes. Chronic lacunar infarct in the posterior right  centrum semiovale. Moderate generalized volume loss. The ventricles  are proportional to the sulci. No skull fracture. No scalp hematoma.  Postoperative changes to the bilateral lenses. Paranasal sinuses are  free of significant disease. Clear mastoid air cells.      Impression    IMPRESSION:  1.  No acute intracranial abnormality.    2.  Moderate diffuse parenchymal volume loss with a moderate burden  scattered chronic small vessel ischemic change.    3.  Now chronic appearance of the small lacunar infarcts of the  posterior left centrum semiovale.    ASIYA PEDERSEN MD         SYSTEM ID:  [id number]   XR Chest Port 1 View    Narrative    XR CHEST PORT 1 VIEW  5/5/2023 12:07 PM       INDICATION: hypoxia  COMPARISON: 2/7/2023       Impression    IMPRESSION: Small left pleural effusion slightly decreased from  previous. Underlying atelectasis/consolidation left lung base similar  to previous. Discoid atelectasis right lung base as well. No definite  pulmonary edema.    CINTHIA LAWRENCE MD         SYSTEM ID:  [id number]   XR Chest Port 1 View    Narrative    CHEST ONE VIEW  5/8/2023 4:09 PM     HISTORY: Hypoxia    COMPARISON: May 5, 2023      Impression    IMPRESSION: Slight improvement in atelectasis and/or infiltrate at the  left base with associated pleural fluid. Stable minimal linear  atelectasis and/or fibrosis on the right.    JOSSIE HUGHES MD         SYSTEM ID:  [id number]   CT Head w/o Contrast    Narrative    EXAM: CT HEAD W/O CONTRAST  LOCATION: [facility]  DATE/TIME: 5/8/2023 9:24 PM CDT    INDICATION: increased aphasia  COMPARISON: CT 05/04/2023  TECHNIQUE: Routine CT Head without IV  contrast. Multiplanar reformats. Dose reduction techniques were used.    FINDINGS:  INTRACRANIAL CONTENTS: No intracranial hemorrhage, extraaxial collection, or mass effect.  No CT evidence of acute infarct. Mild to moderate presumed chronic small vessel ischemic changes. Moderate generalized volume loss. No hydrocephalus.   Retrocerebellar CSF space is stable.    VISUALIZED ORBITS/SINUSES/MASTOIDS: No intraorbital abnormality. No paranasal sinus mucosal disease. No middle ear or mastoid effusion.    BONES/SOFT TISSUES: No acute abnormality.      Impression    IMPRESSION:  1.  No CT evidence for acute intracranial process.  2.  Brain atrophy and presumed chronic microvascular ischemic changes as above.   MR Brain w/o & w Contrast    Narrative    EXAM: MR BRAIN W/O and W CONTRAST  LOCATION: [facility]  DATE/TIME: 5/10/2023 4:25 AM CDT    INDICATION: increased aphasia, history of stroke  COMPARISON: MRI brain 02/13/2023  CONTRAST: 10mL Gadavist  TECHNIQUE: Routine multiplanar multisequence head MRI without and with intravenous contrast.    FINDINGS:  INTRACRANIAL CONTENTS:  Left frontoparietal centrum semiovale white matter demonstrates multiple small and prominent acute infarcts. (Restricted diffusion, positive FLAIR).    Left frontoparietal centrum semiovale white matter demonstrates multiple small areas of enhancement likely due to subacute infarcts. Recommend follow-up to resolution to exclude a more progressive etiology.    Left frontoparietal centrum semiovale white matter demonstrate multiple small chronic infarcts.     No midline shift. No mass, acute hemorrhage, or extra-axial fluid collections. Patchy nonspecific T2/FLAIR hyperintensities within the cerebral white matter most consistent with mild to moderate chronic microvascular ischemic change. Moderate   generalized cerebral atrophy. No hydrocephalus. Mild to moderate cerebellar atrophy. Prominent retrocerebellar arachnoid cyst  redemonstrated.    Otherwise, no intracranial pathologic enhancement.    SELLA: No abnormality accounting for technique.    OSSEOUS STRUCTURES/SOFT TISSUES: Normal marrow signal. The major intracranial vascular flow voids are maintained.     ORBITS: No abnormality accounting for technique.     SINUSES/MASTOIDS: Mild to moderate mucosal thickening scattered about the paranasal sinuses. No middle ear or mastoid effusion.       Impression    IMPRESSION:  1.  Left frontoparietal centrum semiovale white matter demonstrates multiple small and prominent acute infarcts.    2.  Left frontoparietal centrum semiovale white matter demonstrates multiple small areas of enhancement likely due to subacute infarcts. Recommend follow-up to resolution to exclude a more progressive etiology.    3.  Left frontoparietal centrum semiovale white matter demonstrate multiple small chronic infarcts.    4.  These acute, subacute, and chronic infarcts are within the same location.    5.  Chronic intracranial changes described above.     MRA Brain ([place]) w/o Contrast    Narrative    EXAM: MRA BRAIN ([place]) W/O CONTRAST  LOCATION: [facility]  DATE/TIME: 5/10/2023 4:25 AM CDT    INDICATION: possible stroke, increased aphasia  COMPARISON: CTA head and neck 02/17/2023  TECHNIQUE: 3D time-of-flight head MRA without intravenous contrast.    FINDINGS:  Left distal A2/A3 occlusion or near occlusion with reconstitution. This is new compared to prior exam.    Left M2 segment inferior division proximal aspect occlusion with reconstitution. This is stable compared to prior exam.    Left proximal anterior temporal artery occlusion or near occlusion with reconstitution. A severe stenosis was notified on prior exam.    Left distal V4 segment severe stenosis. This appears worse compared to prior exam.    Right proximal P2 segment moderate stenosis. This is similar compared to prior exam.    Left proximal P2  segment severe stenosis. This is similar compared to prior exam.    No additional significant stenosis/occlusion.    No brain aneurysm. No AVM/AVF.    Left posterior communicating artery infundibulum with visualized apical artery.    Left fetal origin PCA.    Dominant right and smaller left vertebral artery contribute to a normal basilar artery.       Impression    IMPRESSION:  Multiple intracranial stenoses and occlusions described above. Please see above for discussion.   MRA Neck (Carotids) wo & w Contrast    Narrative    EXAM: MRA NECK (CAROTIDS) W/O and W CONTRAST  LOCATION: [facility]  DATE/TIME: 5/10/2023 4:25 AM CDT    INDICATION: possible stroke, increased aphasia  COMPARISON: CT head 05/04/2023  CONTRAST: 10mL Gadavist  TECHNIQUE: Neck MRA without and with IV contrast. Stenosis measurements made according to NASCET criteria unless otherwise specified.    FINDINGS:  RIGHT CAROTID: No measurable stenosis or dissection.    LEFT CAROTID: Left carotid bulb plaque. No measurable stenosis or dissection.    VERTEBRAL ARTERIES: Bilateral extradural vertebral arteries demonstrate no significant stenosis, occlusion, dissection. Dominant right and smaller left vertebral arteries.    AORTIC ARCH: Classic aortic arch anatomy with no significant stenosis at the origin of the great vessels.    Small left pleural effusion. Right thyroid lobe nodule measuring 1.2 cm.      Impression    IMPRESSION:  1.  No significant stenosis, occlusion, dissection.  2.  Right thyroid lobe nodule measuring 1.2 cm.   CT Head w/o Contrast    Narrative    EXAM: CT HEAD W/O CONTRAST, CTA HEAD NECK W CONTRAST, CT HEAD PERFUSION W CONTRAST  LOCATION: [facility]  DATE/TIME: 5/13/2023 7:00 PM CDT    INDICATION: code stroke  COMPARISON: CT head 05/08/2023 and MR brain 05/10/2023.  CONTRAST: 70mL Isovue 370 (accession [id number]),  50 ml Isovue 370 (accession [id number]), 70mL Isovue 370 (accession  [id number])  TECHNIQUE: Head and neck CT angiogram with IV contrast. Noncontrast head CT followed by axial helical CT images of the head and neck vessels obtained during the arterial phase of intravenous contrast administration. Axial 2D reconstructed images and   multiplanar 3D MIP reconstructed images of the head and neck vessels were performed by the technologist. Additional CT cerebral perfusion was performed utilizing a second contrast bolus. Perfusion data were post processed with generation of standard   perfusion maps and estimation of ischemic/infarcted volumes utilizing standard threshold values. Dose reduction techniques were used. All stenosis measurements made according to NASCET criteria unless otherwise specified.    FINDINGS:    NONCONTRAST HEAD CT:   INTRACRANIAL CONTENTS: Patchy subcortical hypodensities in the left posterior corona radiata and centrum semiovale most likely corresponding with the known subacute on chronic ischemic insults as seen to better advantage on recent comparison MRI. No   definite new additional acute transcortical infarct. No hemorrhage or extraaxial collection. No mass effect. Subarachnoid cisterns are patent. Patchy white matter hypodensities are, while nonspecific, most compatible with chronic microvascular ischemic   changes. Unchanged proportional prominence of the ventricles and sulci is compatible with diffuse cerebral volume loss. Mehdi cisterna magna.    VISUALIZED ORBITS/SINUSES/MASTOIDS: Bilateral lens replacements. Paranasal sinuses are clear. No middle ear or mastoid effusion.    BONES/SOFT TISSUES: No acute abnormality.    HEAD CTA:  ANTERIOR CIRCULATION: No proximal branch vessel occlusion. Redemonstrated multifocal atherosclerosis, including focal severe stenosis at the distal A2/A3 segment left JOSI as well as severe tandem stenoses at the proximal and mid inferior division M2   branch of the left MCA. Additional mild M1 segment narrowing and scattered  "atherosclerotic calcification about the carotid siphons. Similar presumed infundibular origin of the left posterior communicating artery.    POSTERIOR CIRCULATION: No proximal branch vessel occlusion. Severe stenosis at the distal V4 segment of the left vertebral artery. Mild atherosclerotic irregularity and tandem narrowings about the basilar artery. Essentially exclusive supply of left PCA   via posterior communicating artery with moderate stenosis at the P1/P2 junction as well as tandem severe stenoses at the proximal P3 branches. Moderate narrowing at the P1 segment of the right PCA with moderate distal P2 stenosis and severe proximal P3   stenosis. No aneurysm or high-flow vascular malformation.    DURAL VENOUS SINUSES: Expected enhancement of the major dural venous sinuses.    NECK CTA:  RIGHT CAROTID: Mild atherosclerosis without hemodynamically significant stenosis by NASCET criteria at the carotid bifurcation. Segment of luminal irregularity in a \"beading\" pattern at the distal right cervical ICA compatible with fibromuscular   dysplasia. No andrew dissection.    LEFT CAROTID: Patent without flow-limiting stenosis by NASCET criteria at the carotid bifurcation. No dissection.    VERTEBRAL ARTERIES: Codominant vertebral arteries. No flow-limiting stenosis. Luminal irregularity with alternating narrowings and ectasia at the near the V2/V3 segment of the left vertebral artery can be compatible with fibromuscular dysplasia. No andrew   dissection.    AORTIC ARCH: Three-vessel aortic arch configuration. Mild atherosclerosis at the visualized arch and great vessel origins without flow-limiting stenosis. Mild eccentric plaque at the left proximal subclavian artery causes mild (less than 50%) focal   stenosis.    NONVASCULAR STRUCTURES: Atelectasis in the visualized upper lungs. No neck mass or lymphadenopathy. Thyroid gland is mildly heterogeneous with suspected nodules. No acute or aggressive appearing osseous " abnormalities. Multilevel degenerative changes in   the visualized cervical spine. Grade 1 retrolisthesis at C3-C4. Osseous structures appear diffusely demineralized.    CT PERFUSION:  PERFUSION MAPS: There is asymmetric prolong transit time corresponding with the region of known infarcts within the left posterior corona radiata/centrum semiovale. There is also slight increase in cerebral blood volume as well as decrease in cerebral   blood flow at this region.    RAPID ANALYSIS:  CBF<30%: 0 mL  Tmax>6sec: 8 mL  Mismatch volume: 8 mL  Mismatch ratio: Infinite      Impression    IMPRESSION:   HEAD CT:  1.  CT correlate of known recent ischemic insult in the left posterior corona radiata/centrum semiovale seen to better advantage on recent comparison MRI.  2.  No definite new acute transcortical infarct or acute intracranial hemorrhage.  3.  Moderate diffuse cerebral volume loss and features compatible with mild chronic microangiopathic ischemic white matter changes.    HEAD CTA:  1.  No proximal branch vessel occlusion, aneurysm, or vascular malformation.   2.  Intracranial atherosclerosis with multifocal stenoses, as detailed. Briefly, severe stenoses at the A2/A3 segment left JOSI, inferior division M2 segment left MCA, P3 segments of bilateral PCAs, and distal V4 segment left vertebral artery.    NECK CTA:  1.  No flow-limiting stenosis or findings of dissection.   2.  Question features compatible with fibromuscular dysplasia at the distal right cervical ICA and V2/V3 junction of left vertebral artery.    CT PERFUSION:  1.  Alterations on perfusion maps corresponding with region of known infarct in left posterior corona radiata/centrum semiovale, as detailed.    Faustino Pittman MD notified provider, LISA ROGERS, of CTA/CTA results via telephone at 5/13/2023 7:16 PM CDT, who acknowledge understanding.   CTA Head Neck w Contrast    Narrative    EXAM: CT HEAD W/O CONTRAST, CTA HEAD NECK W CONTRAST, CT HEAD  PERFUSION W CONTRAST  LOCATION: [facility]  DATE/TIME: 5/13/2023 7:00 PM CDT    INDICATION: code stroke  COMPARISON: CT head 05/08/2023 and MR brain 05/10/2023.  CONTRAST: 70mL Isovue 370 (accession [id number]),  50 ml Isovue 370 (accession [id number]), 70mL Isovue 370 (accession [id number])  TECHNIQUE: Head and neck CT angiogram with IV contrast. Noncontrast head CT followed by axial helical CT images of the head and neck vessels obtained during the arterial phase of intravenous contrast administration. Axial 2D reconstructed images and   multiplanar 3D MIP reconstructed images of the head and neck vessels were performed by the technologist. Additional CT cerebral perfusion was performed utilizing a second contrast bolus. Perfusion data were post processed with generation of standard   perfusion maps and estimation of ischemic/infarcted volumes utilizing standard threshold values. Dose reduction techniques were used. All stenosis measurements made according to NASCET criteria unless otherwise specified.    FINDINGS:    NONCONTRAST HEAD CT:   INTRACRANIAL CONTENTS: Patchy subcortical hypodensities in the left posterior corona radiata and centrum semiovale most likely corresponding with the known subacute on chronic ischemic insults as seen to better advantage on recent comparison MRI. No   definite new additional acute transcortical infarct. No hemorrhage or extraaxial collection. No mass effect. Subarachnoid cisterns are patent. Patchy white matter hypodensities are, while nonspecific, most compatible with chronic microvascular ischemic   changes. Unchanged proportional prominence of the ventricles and sulci is compatible with diffuse cerebral volume loss. Mehdi cisterna magna.    VISUALIZED ORBITS/SINUSES/MASTOIDS: Bilateral lens replacements. Paranasal sinuses are clear. No middle ear or mastoid effusion.    BONES/SOFT TISSUES: No acute abnormality.    HEAD CTA:  ANTERIOR CIRCULATION: No  "proximal branch vessel occlusion. Redemonstrated multifocal atherosclerosis, including focal severe stenosis at the distal A2/A3 segment left JOSI as well as severe tandem stenoses at the proximal and mid inferior division M2   branch of the left MCA. Additional mild M1 segment narrowing and scattered atherosclerotic calcification about the carotid siphons. Similar presumed infundibular origin of the left posterior communicating artery.    POSTERIOR CIRCULATION: No proximal branch vessel occlusion. Severe stenosis at the distal V4 segment of the left vertebral artery. Mild atherosclerotic irregularity and tandem narrowings about the basilar artery. Essentially exclusive supply of left PCA   via posterior communicating artery with moderate stenosis at the P1/P2 junction as well as tandem severe stenoses at the proximal P3 branches. Moderate narrowing at the P1 segment of the right PCA with moderate distal P2 stenosis and severe proximal P3   stenosis. No aneurysm or high-flow vascular malformation.    DURAL VENOUS SINUSES: Expected enhancement of the major dural venous sinuses.    NECK CTA:  RIGHT CAROTID: Mild atherosclerosis without hemodynamically significant stenosis by NASCET criteria at the carotid bifurcation. Segment of luminal irregularity in a \"beading\" pattern at the distal right cervical ICA compatible with fibromuscular   dysplasia. No andrew dissection.    LEFT CAROTID: Patent without flow-limiting stenosis by NASCET criteria at the carotid bifurcation. No dissection.    VERTEBRAL ARTERIES: Codominant vertebral arteries. No flow-limiting stenosis. Luminal irregularity with alternating narrowings and ectasia at the near the V2/V3 segment of the left vertebral artery can be compatible with fibromuscular dysplasia. No andrew   dissection.    AORTIC ARCH: Three-vessel aortic arch configuration. Mild atherosclerosis at the visualized arch and great vessel origins without flow-limiting stenosis. Mild eccentric " plaque at the left proximal subclavian artery causes mild (less than 50%) focal   stenosis.    NONVASCULAR STRUCTURES: Atelectasis in the visualized upper lungs. No neck mass or lymphadenopathy. Thyroid gland is mildly heterogeneous with suspected nodules. No acute or aggressive appearing osseous abnormalities. Multilevel degenerative changes in   the visualized cervical spine. Grade 1 retrolisthesis at C3-C4. Osseous structures appear diffusely demineralized.    CT PERFUSION:  PERFUSION MAPS: There is asymmetric prolong transit time corresponding with the region of known infarcts within the left posterior corona radiata/centrum semiovale. There is also slight increase in cerebral blood volume as well as decrease in cerebral   blood flow at this region.    RAPID ANALYSIS:  CBF<30%: 0 mL  Tmax>6sec: 8 mL  Mismatch volume: 8 mL  Mismatch ratio: Infinite      Impression    IMPRESSION:   HEAD CT:  1.  CT correlate of known recent ischemic insult in the left posterior corona radiata/centrum semiovale seen to better advantage on recent comparison MRI.  2.  No definite new acute transcortical infarct or acute intracranial hemorrhage.  3.  Moderate diffuse cerebral volume loss and features compatible with mild chronic microangiopathic ischemic white matter changes.    HEAD CTA:  1.  No proximal branch vessel occlusion, aneurysm, or vascular malformation.   2.  Intracranial atherosclerosis with multifocal stenoses, as detailed. Briefly, severe stenoses at the A2/A3 segment left JOSI, inferior division M2 segment left MCA, P3 segments of bilateral PCAs, and distal V4 segment left vertebral artery.    NECK CTA:  1.  No flow-limiting stenosis or findings of dissection.   2.  Question features compatible with fibromuscular dysplasia at the distal right cervical ICA and V2/V3 junction of left vertebral artery.    CT PERFUSION:  1.  Alterations on perfusion maps corresponding with region of known infarct in left posterior corona  radiata/centrum semiovale, as detailed.    Faustino Pittman MD notified provider, LISA ROGERS, of CTA/CTA results via telephone at 5/13/2023 7:16 PM CDT, who acknowledge understanding.   CT Head Perfusion w Contrast    Narrative    EXAM: CT HEAD W/O CONTRAST, CTA HEAD NECK W CONTRAST, CT HEAD PERFUSION W CONTRAST  LOCATION: [facility]  DATE/TIME: 5/13/2023 7:00 PM CDT    INDICATION: code stroke  COMPARISON: CT head 05/08/2023 and MR brain 05/10/2023.  CONTRAST: 70mL Isovue 370 (accession [id number]),  50 ml Isovue 370 (accession [id number]), 70mL Isovue 370 (accession [id number])  TECHNIQUE: Head and neck CT angiogram with IV contrast. Noncontrast head CT followed by axial helical CT images of the head and neck vessels obtained during the arterial phase of intravenous contrast administration. Axial 2D reconstructed images and   multiplanar 3D MIP reconstructed images of the head and neck vessels were performed by the technologist. Additional CT cerebral perfusion was performed utilizing a second contrast bolus. Perfusion data were post processed with generation of standard   perfusion maps and estimation of ischemic/infarcted volumes utilizing standard threshold values. Dose reduction techniques were used. All stenosis measurements made according to NASCET criteria unless otherwise specified.    FINDINGS:    NONCONTRAST HEAD CT:   INTRACRANIAL CONTENTS: Patchy subcortical hypodensities in the left posterior corona radiata and centrum semiovale most likely corresponding with the known subacute on chronic ischemic insults as seen to better advantage on recent comparison MRI. No   definite new additional acute transcortical infarct. No hemorrhage or extraaxial collection. No mass effect. Subarachnoid cisterns are patent. Patchy white matter hypodensities are, while nonspecific, most compatible with chronic microvascular ischemic   changes. Unchanged proportional prominence of the ventricles and  "sulci is compatible with diffuse cerebral volume loss. Mehdi cisterna magna.    VISUALIZED ORBITS/SINUSES/MASTOIDS: Bilateral lens replacements. Paranasal sinuses are clear. No middle ear or mastoid effusion.    BONES/SOFT TISSUES: No acute abnormality.    HEAD CTA:  ANTERIOR CIRCULATION: No proximal branch vessel occlusion. Redemonstrated multifocal atherosclerosis, including focal severe stenosis at the distal A2/A3 segment left JOSI as well as severe tandem stenoses at the proximal and mid inferior division M2   branch of the left MCA. Additional mild M1 segment narrowing and scattered atherosclerotic calcification about the carotid siphons. Similar presumed infundibular origin of the left posterior communicating artery.    POSTERIOR CIRCULATION: No proximal branch vessel occlusion. Severe stenosis at the distal V4 segment of the left vertebral artery. Mild atherosclerotic irregularity and tandem narrowings about the basilar artery. Essentially exclusive supply of left PCA   via posterior communicating artery with moderate stenosis at the P1/P2 junction as well as tandem severe stenoses at the proximal P3 branches. Moderate narrowing at the P1 segment of the right PCA with moderate distal P2 stenosis and severe proximal P3   stenosis. No aneurysm or high-flow vascular malformation.    DURAL VENOUS SINUSES: Expected enhancement of the major dural venous sinuses.    NECK CTA:  RIGHT CAROTID: Mild atherosclerosis without hemodynamically significant stenosis by NASCET criteria at the carotid bifurcation. Segment of luminal irregularity in a \"beading\" pattern at the distal right cervical ICA compatible with fibromuscular   dysplasia. No andrew dissection.    LEFT CAROTID: Patent without flow-limiting stenosis by NASCET criteria at the carotid bifurcation. No dissection.    VERTEBRAL ARTERIES: Codominant vertebral arteries. No flow-limiting stenosis. Luminal irregularity with alternating narrowings and ectasia at the near " the V2/V3 segment of the left vertebral artery can be compatible with fibromuscular dysplasia. No andrew   dissection.    AORTIC ARCH: Three-vessel aortic arch configuration. Mild atherosclerosis at the visualized arch and great vessel origins without flow-limiting stenosis. Mild eccentric plaque at the left proximal subclavian artery causes mild (less than 50%) focal   stenosis.    NONVASCULAR STRUCTURES: Atelectasis in the visualized upper lungs. No neck mass or lymphadenopathy. Thyroid gland is mildly heterogeneous with suspected nodules. No acute or aggressive appearing osseous abnormalities. Multilevel degenerative changes in   the visualized cervical spine. Grade 1 retrolisthesis at C3-C4. Osseous structures appear diffusely demineralized.    CT PERFUSION:  PERFUSION MAPS: There is asymmetric prolong transit time corresponding with the region of known infarcts within the left posterior corona radiata/centrum semiovale. There is also slight increase in cerebral blood volume as well as decrease in cerebral   blood flow at this region.    RAPID ANALYSIS:  CBF<30%: 0 mL  Tmax>6sec: 8 mL  Mismatch volume: 8 mL  Mismatch ratio: Infinite      Impression    IMPRESSION:   HEAD CT:  1.  CT correlate of known recent ischemic insult in the left posterior corona radiata/centrum semiovale seen to better advantage on recent comparison MRI.  2.  No definite new acute transcortical infarct or acute intracranial hemorrhage.  3.  Moderate diffuse cerebral volume loss and features compatible with mild chronic microangiopathic ischemic white matter changes.    HEAD CTA:  1.  No proximal branch vessel occlusion, aneurysm, or vascular malformation.   2.  Intracranial atherosclerosis with multifocal stenoses, as detailed. Briefly, severe stenoses at the A2/A3 segment left JOSI, inferior division M2 segment left MCA, P3 segments of bilateral PCAs, and distal V4 segment left vertebral artery.    NECK CTA:  1.  No flow-limiting stenosis  or findings of dissection.   2.  Question features compatible with fibromuscular dysplasia at the distal right cervical ICA and V2/V3 junction of left vertebral artery.    CT PERFUSION:  1.  Alterations on perfusion maps corresponding with region of known infarct in left posterior corona radiata/centrum semiovale, as detailed.    Faustino Pittman MD notified provider, LISA ROGERS, of CTA/CTA results via telephone at 5/13/2023 7:16 PM CDT, who acknowledge understanding.   MR Brain w/o Contrast    Narrative    EXAM: MR BRAIN W/O CONTRAST  LOCATION: [facility]  DATE/TIME: 5/13/2023 9:38 PM CDT    INDICATION: Recurrent expressive aphasia.  COMPARISON: Brain MRI dated 05/10/2023, CTA head and neck and CT perfusion dated 05/13/2023.  TECHNIQUE: Routine multiplanar multisequence head MRI without intravenous contrast.    FINDINGS:  INTRACRANIAL CONTENTS: Stable evolving subacute infarcts within the left centrum semiovale. No new diffusion restriction. No acute hemorrhage. Stable volume loss and sequelae of chronic microvascular ischemic disease. Stable retrocerebellar arachnoid   cyst. Normal position of the cerebellar tonsils.     SELLA: No abnormality accounting for technique.    OSSEOUS STRUCTURES/SOFT TISSUES: Normal marrow signal. The major intracranial vascular flow voids are maintained.     ORBITS: No abnormality accounting for technique.     SINUSES/MASTOIDS: Mild mucosal thickening scattered about the paranasal sinuses. No middle ear or mastoid effusion.       Impression    IMPRESSION:  1.  No significant interval change.   IR Carotid Cerebral Angiogram Bilateral    Narrative    CHIQUITA YADAV  [id number]  1940    History: Chiquita Yadav is an 82-year-old female patient with  history of symptomatic intracranial atherosclerosis, namely left  middle cerebral artery M1 segment stenosis, who was admitted for ORIF  of both the right radius and right femur. 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 Despite reinitiation  of the lateral platelet therapy, the patient continued to have  progressive symptoms during the same hospitalization, that fail to  regress despite elevating the systolic blood pressure.    Indication for the procedure: Cerebral angiogram with intent to stent  and/or perform balloon angioplasty of the left middle cerebral artery  M1 segment stenosis, for secondary stroke prevention.    : Nakul Leigh MD  [place]: Swati Lacy MD  Anesthesia: Local anesthesia and conscious sedation  Contrast used: 84 ml of Visipaque 320  Fluoro time: 19.9 minutes, 500.24 mGy  Sedation time: 65 minutes  Sedatives: Midazolam 1.5 mg IV, Fentanyl 75 mcg IV  Other medications: Lidocaine 1% 14 ml intradermal, heparin 4500 units  IV    Procedure:  1.  Diagnostic cerebral angiography and interpretation of the images.  2.  Diagnostic angiography of the right common femoral artery.  3.  Selective catheterization and diagnostic cerebral angiography of  the left common carotid artery, in the left internal carotid artery.  4.  Balloon angioplasty of the left middle cerebral artery  5.  Percutaneous closure of the right femoral arteriotomy using a 6F  Angio-seal closure device.    Consent: The risks and benefits of cerebral angiography, intracranial  angioplasty, and stenting were discussed with the patient who agreed  to proceed. The risks discussed included stroke, arterial dissection,  intracranial hemorrhage, death, groin hematoma, arteriovenous fistula  of the groin and pseudoaneurysm of the femoral artery. Subsequently,  verbal and written informed consent was obtained.    Technique: The patient was brought to the angiography suite and placed  in the supine position. Medications were administered by the  radiology  nursing staff. The nursing staff independently monitored the patient's  vital signs during the procedure.    The patient 's right groin was prepped and draped in the standard  fashion. The right common femoral artery was palpated. Ultrasound was  used to image the common femoral artery. The femoral artery was shown  to be patent. Lidocaine was injected locally and a small skin incision  was made over the artery using a scalpel. Using real-time ultrasound  guidance, a 21 gauge needle was placed into the femoral artery. The  needle was exchanged using Seldinger technique for a dilator and 19  gauge introducer, then exchanged for a 6 Barb[place] Shuttle over a  glidewire. The shuttle was advanced over the Glidewire into the  abdominal and thoracic aorta. The shuttle was connected rotating  hemostatic valve and continuous flush of heparinized saline. A 5  Barbadian Vert diagnostic catheter was advanced through the sheath, over  the Glidewire. The diagnostic catheter was used to selectively  catheterize the left common carotid artery. The Shuttle was advanced  over the diagnostic catheter into the proximal common carotid artery,  then the diagnostic catheter was removed from the body. Angiography  was performed over the neck. A 5 Barbadian 125 cm Joselyn distal access  catheter was advanced over a Traxcess microwire to the tip of the  Shuttle. The microwire was used to selectively catheterize the left  internal carotid artery. The distal axis catheter was advanced over  the microwire into the proximal cervical internal carotid artery.  Angiography was performed over the cranium in multiple projections.  The left middle cerebral artery frontal M2 is severely stenotic in its  horizontal segment, measuring 80% stenosis by WASID criteria. We  selected a 1.5 x 15 mm Takeru over-the-wire balloon. The balloon was  advanced over the microwire into the proximal M2 segment, and was  positioned at the site of stenosis. It was  gradually inflated to 1.6  mm, increasing by an atmosphere per minute, then it was gradually  deflated by an atmosphere for 30 seconds. It was withdrawn into the  tip of the distal access catheter. Repeat angiography demonstrated  resolution of the most stenotic segment of the lesion, but a residual  mild stenosis at the proximal edge. Angioplasty was again performed in  this location, using the same inflation and deflation technique.  Repeat angiography demonstrated resolution of the residual stenosis.  The balloon was exchanged out of the body. Guide catheter injection  demonstrates normal lumen caliber and appearance after angioplasty.  Microwire access in the distal M2 was lost and angiography was again  performed by injecting the distal access catheter. There was no  evidence of vessel recoil. Final demagnified angiography was performed  approximately 10 minutes after final angioplasty, and the vessel  caliber remained patent.      Findings:    Series #1  Left common carotid artery injection: Cervical view  Under fluoroscopic guidance, the sheath was advanced over the  diagnostic catheter and glidewire into the left common carotid artery.  Angiography was performed over the neck. Cervical view of the left  common carotid artery in the DENA projection demonstrates a normal left  common carotid artery. The common carotid artery bifurcation is at the  C3 level. There is a smooth atherosclerotic plaque at the origin of  the left internal carotid artery, but this does not result in luminal  stenosis based on NASCET criteria. The left external carotid artery  and its branches are normal.  Series #25: This injection was repeated at the completion of the  procedure, demonstrating no change from baseline vessel  characteristics.    Series #2, 3, 4, 5, 6  Left internal carotid artery injection: Cranial view   Under fluoroscopic guidance and using roadmap technique, the distal  access catheter was advanced over the  microwire into the left internal  carotid artery. Angiography was performed over the cranium. Cranial  view of the left internal carotid artery injection in the AP, lateral,  and oblique projections demonstrates normal cervical, petrous,  laceral, cavernous, clinoid, ophthalmic, and communicating segments of  the left internal carotid artery. The left internal carotid artery  bifurcates into the left anterior cerebral artery and left middle  cerebral artery. The proximal segments and distal branches of the left  anterior cerebral artery are normal. The left middle cerebral artery  has a dominant superior division which later bifurcates into the  frontal and temporal M2 segments. The frontal M2 is severely stenotic  starting in its distal horizontal segment, and extending as it occurs  into its vertical sylvian segment. It measures 80% stenotic at its  narrowest point, based on WASID criteria. There is arterial phase  delay in the in the M3 and M4 branches arising from this vessel.     Series #9: Left middle cerebral artery frontal M2 branch  microinjection through the balloon microcatheter.  Series #10: Left middle cerebral artery distal M1 segment injection  through the distal access catheter.  Series #11, 12, 13, 15: Fluoroscopic single shots demonstrating  gradual balloon inflation during angioplasty of the left middle  cerebral artery frontal M2 branch.  Series #16: Left middle cerebral artery distal M1 segment injection  through the distal access catheter after first balloon angioplasty,  demonstrating improvement in vessel caliber with residual proximal  stenosis.  Series #17, 18, 19: Fluoroscopic single shots demonstrating gradual  balloon inflation during second angioplasty of the left middle  cerebral artery frontal M2 branch.  Series #20, 21, 22: Left middle cerebral artery distal M1 segment  injection through the distal axis catheter after second balloon  angioplasty, demonstrating resolution of focal  stenosis.    Series #23, 24: Left internal carotid artery injections: Cranial view,  lateral and AP projections  After the distal axis catheter was removed, angiography was performed  over the cranium by injecting the sheath. There is resolution of the  left middle cerebral artery frontal M1 segment stenosis and resolution  of arterial phase delay of the left middle cerebral artery M3 and 4  segments. There is a fetal configuration left posterior cerebral  artery, with moderate to severe stenosis of the proximal  parieto-occipital branch, and moderate stenosis of the mid calcarine  branch. The capillary and venous phases are normal.    Right common femoral artery. Pelvic view  Through the 6 [place] sheath, angiography was performed over the right  groin. Pelvic view of the right common femoral artery in the VALENZUELA  projection demonstrates a normal right common femoral artery,  superficial femoral artery, and deep femoral artery. The sheath is  located above the bifurcation and below the inferior epigastric  artery. There is no significant stenosis, dissection or  pseudoaneurysm.    Upon completion of the procedure, the 6 [place] Shuttle was removed.  Hemostasis was obtained with a 6 [place] Angioseal closure device. The  procedure was completed without complication. The patient was then  transferred to the ICU in stable condition.    Nakul Leigh MD was present for the entire procedure.      Impression    Impression:  1.  Left middle cerebral artery, frontal M2 segment severe stenosis,  80%, now status post balloon angioplasty x2 with 0% residual stenosis.  2.  Left posterior cerebral artery multifocal stenosis.    Plan: Continue dual antiplatelet therapy indefinitely, systolic blood  pressure goal 120-160.    Swati Lacy MD  Endovascular Surgical Neuroradiology Fellow  Pager: [phone]   XR Wrist Right G/E 3 Views    Narrative    XR WRIST RIGHT G/E 3 VIEWS  5/15/2023 9:16 AM     HISTORY: Routine postop  x-rays of right wrist (s/p distal radius ORIF)  COMPARISON: 5/2/2023      Impression    IMPRESSION: Overlying splint material limits fine bony detail. Status  post plate and screw fixation of the distal radial fracture with  similar alignment. Interval healing with increased osseous bridging.  No hardware complication. Nondisplaced ulnar styloid process fracture  without healing. Otherwise unchanged.     SIGRID BOO MD         SYSTEM ID:  [id number]   XR Pelvis w Hip Right 1 View    Narrative    XR PELVIS AND HIP RIGHT 1 VIEW  5/15/2023 9:17 AM     HISTORY: S/p right IT femur fracture IM nail, routine films  COMPARISON: 5/2/2023      Impression    IMPRESSION: Status post intramedullary nail and screw fixation of the  intertrochanteric right femur fracture with similar alignment. No  hardware complication. Interval resolution of subcutaneous emphysema.  No significant callus formation. Otherwise unchanged.     SIGRID BOO MD         SYSTEM ID:  [id number]   CT Head w/o Contrast     Value    Radiologist flags Acute intracranial hemorrhage (AA)    Narrative    EXAM: CTA HEAD NECK W CONTRAST, CT HEAD W/O CONTRAST, CT HEAD PERFUSION W CONTRAST  LOCATION: [facility]  DATE/TIME: 5/16/2023 7:18 PM CDT    INDICATION: code stroke, achalasia and numbness  COMPARISON: None.  TECHNIQUE: Head and neck CT angiogram with IV contrast. Noncontrast head CT followed by axial helical CT images of the head and neck vessels obtained during the arterial phase of intravenous contrast administration. Axial 2D reconstructed images and   multiplanar 3D MIP reconstructed images of the head and neck vessels were performed by the technologist. Additional CT cerebral perfusion was performed utilizing a second contrast bolus. Perfusion data were post processed with generation of standard   perfusion maps and estimation of ischemic/infarcted volumes utilizing standard threshold values. Dose reduction techniques  were used. All stenosis measurements made according to NASCET criteria unless otherwise specified.  CONTRAST: 75 mL Isovue 370 (accession [id number]), 75 mL Isovue 370 (accession [id number]), 50 mL Isovue 370 (accession [id number])    FINDINGS:   NONCONTRAST HEAD CT:   INTRACRANIAL CONTENTS: There is nonspecific ill-defined hyperdensity at the left frontal convexity best seen axial image 17 and 18 and coronal image 15 and 16. This may represent artifact or small acute subarachnoid hemorrhage. No large extra-axial   hematoma. No CT evidence of acute infarct. Expected interval evolution of known left white matter infarcts. Mild presumed chronic small vessel ischemic changes. Mild generalized volume loss. No hydrocephalus. Stable retrocerebellar arachnoid cyst.     VISUALIZED ORBITS/SINUSES/MASTOIDS: Prior bilateral cataract surgery. Visualized portions of the orbits are otherwise unremarkable. No paranasal sinus mucosal disease. No middle ear or mastoid effusion.    BONES/SOFT TISSUES: No acute abnormality.    HEAD CTA:  ANTERIOR CIRCULATION: No occlusion, aneurysm, or high flow vascular malformation. Multifocal intracranial atherosclerosis are again noted and similar to prior, most prominent involving the anterior cerebral arteries. There is nonstenotic atherosclerosis   calcification of bilateral carotid siphons Fetal origin of the left posterior cerebral artery from the anterior circulation.    POSTERIOR CIRCULATION: No occlusion, aneurysm, or high flow vascular malformation. Multifocal intracranial atherosclerosis are again noted and similar prior, most prominent at the P3 segments bilaterally. Balanced vertebral arteries supply a normal   basilar artery.     DURAL VENOUS SINUSES: Expected enhancement of the major dural venous sinuses.    NECK CTA:  RIGHT CAROTID: Atherosclerotic plaque results in less than 50% stenosis in the right ICA. No dissection. Similar alternating irregularities at the distal right cervical  internal carotid artery suggestive of fibromuscular dysplasia.    LEFT CAROTID: Atherosclerotic plaque results in less than 50% stenosis in the left ICA. No dissection.    VERTEBRAL ARTERIES: No focal stenosis or dissection. Similar alternating irregularities at the junctions of the left V2 and V3 segments suggestive of fibromuscular dysplasia. Balanced vertebral arteries.    AORTIC ARCH: Classic aortic arch anatomy with no significant stenosis at the origin of the great vessels.    NONVASCULAR STRUCTURES: 9 mm low-density right thyroid nodule. Small left pleural effusion.    CT PERFUSION:  PERFUSION MAPS: Small area of apparent elevated Tmax at the right posterior fossa is favored to be artifactual. Otherwise symmetrical cerebral perfusion. No focal deficits in cerebral blood flow or volume to suggest ischemia/oligemia.    RAPID ANALYSIS:  CBF<30%: 0  Tmax>6sec: 3 mL, likely artifactual  Mismatch volume: 3 mL, likely artifactual  Mismatch ratio: Infinite      Impression    IMPRESSION:   HEAD CT:  1.  Subtle hyperdensity at the left frontal convexity, acute subarachnoid hemorrhage not excluded.    HEAD CTA:   1.  Negative for thrombotic occlusion.  2.  Similar multifocal intracranial atherosclerosis.    NECK CTA:  1.  No hemodynamically significant stenosis in the neck vessels.   2.  No evidence for dissection.  3.  Similar findings of fibromuscular dysplasia at the distal right carotid and left vertebral arteries.  4.  Small left pleural effusion.    CT PERFUSION:  1.  Normal cerebral perfusion.      [Critical Result: Acute intracranial hemorrhage]    Finding was identified on 5/16/2023 7:24 PM CDT.     1.  Dr. Barney was contacted by me on 5/16/2023 7:43 PM CDT and verbalized understanding of the critical result.    CTA Head Neck w Contrast     Value    Radiologist flags Acute intracranial hemorrhage (AA)    Narrative    EXAM: CTA HEAD NECK W CONTRAST, CT HEAD W/O CONTRAST, CT HEAD PERFUSION W CONTRAST  LOCATION: [facility]  DATE/TIME: 5/16/2023 7:18 PM CDT    INDICATION: code stroke, achalasia and numbness  COMPARISON: None.  TECHNIQUE: Head and neck CT angiogram with IV contrast. Noncontrast head CT followed by axial helical CT images of the head and neck vessels obtained during the arterial phase of intravenous contrast administration. Axial 2D reconstructed images and   multiplanar 3D MIP reconstructed images of the head and neck vessels were performed by the technologist. Additional CT cerebral perfusion was performed utilizing a second contrast bolus. Perfusion data were post processed with generation of standard   perfusion maps and estimation of ischemic/infarcted volumes utilizing standard threshold values. Dose reduction techniques were used. All stenosis measurements made according to NASCET criteria unless otherwise specified.  CONTRAST: 75 mL Isovue 370 (accession [id number]), 75 mL Isovue 370 (accession [id number]), 50 mL Isovue 370 (accession [id number])    FINDINGS:   NONCONTRAST HEAD CT:   INTRACRANIAL CONTENTS: There is nonspecific ill-defined hyperdensity at the left frontal convexity best seen axial image 17 and 18 and coronal image 15 and 16. This may represent artifact or small acute subarachnoid hemorrhage. No large extra-axial   hematoma. No CT evidence of acute infarct. Expected interval evolution of known left white matter infarcts. Mild presumed chronic small vessel ischemic changes. Mild generalized volume loss. No hydrocephalus. Stable retrocerebellar arachnoid cyst.     VISUALIZED ORBITS/SINUSES/MASTOIDS: Prior bilateral cataract surgery. Visualized portions of the orbits are otherwise unremarkable. No paranasal sinus mucosal disease. No middle ear or mastoid effusion.    BONES/SOFT TISSUES: No acute abnormality.    HEAD CTA:  ANTERIOR CIRCULATION: No occlusion, aneurysm, or high flow vascular malformation. Multifocal intracranial atherosclerosis are again noted and similar  to prior, most prominent involving the anterior cerebral arteries. There is nonstenotic atherosclerosis   calcification of bilateral carotid siphons Fetal origin of the left posterior cerebral artery from the anterior circulation.    POSTERIOR CIRCULATION: No occlusion, aneurysm, or high flow vascular malformation. Multifocal intracranial atherosclerosis are again noted and similar prior, most prominent at the P3 segments bilaterally. Balanced vertebral arteries supply a normal   basilar artery.     DURAL VENOUS SINUSES: Expected enhancement of the major dural venous sinuses.    NECK CTA:  RIGHT CAROTID: Atherosclerotic plaque results in less than 50% stenosis in the right ICA. No dissection. Similar alternating irregularities at the distal right cervical internal carotid artery suggestive of fibromuscular dysplasia.    LEFT CAROTID: Atherosclerotic plaque results in less than 50% stenosis in the left ICA. No dissection.    VERTEBRAL ARTERIES: No focal stenosis or dissection. Similar alternating irregularities at the junctions of the left V2 and V3 segments suggestive of fibromuscular dysplasia. Balanced vertebral arteries.    AORTIC ARCH: Classic aortic arch anatomy with no significant stenosis at the origin of the great vessels.    NONVASCULAR STRUCTURES: 9 mm low-density right thyroid nodule. Small left pleural effusion.    CT PERFUSION:  PERFUSION MAPS: Small area of apparent elevated Tmax at the right posterior fossa is favored to be artifactual. Otherwise symmetrical cerebral perfusion. No focal deficits in cerebral blood flow or volume to suggest ischemia/oligemia.    RAPID ANALYSIS:  CBF<30%: 0  Tmax>6sec: 3 mL, likely artifactual  Mismatch volume: 3 mL, likely artifactual  Mismatch ratio: Infinite      Impression    IMPRESSION:   HEAD CT:  1.  Subtle hyperdensity at the left frontal convexity, acute subarachnoid hemorrhage not excluded.    HEAD CTA:   1.  Negative for thrombotic occlusion.  2.  Similar  multifocal intracranial atherosclerosis.    NECK CTA:  1.  No hemodynamically significant stenosis in the neck vessels.   2.  No evidence for dissection.  3.  Similar findings of fibromuscular dysplasia at the distal right carotid and left vertebral arteries.  4.  Small left pleural effusion.    CT PERFUSION:  1.  Normal cerebral perfusion.      [Critical Result: Acute intracranial hemorrhage]    Finding was identified on 5/16/2023 7:24 PM CDT.     1.  Dr. Barney was contacted by me on 5/16/2023 7:43 PM CDT and verbalized understanding of the critical result.    CT Head Perfusion w Contrast     Value    Radiologist flags Acute intracranial hemorrhage (AA)    Narrative    EXAM: CTA HEAD NECK W CONTRAST, CT HEAD W/O CONTRAST, CT HEAD PERFUSION W CONTRAST  LOCATION: [facility]  DATE/TIME: 5/16/2023 7:18 PM CDT    INDICATION: code stroke, achalasia and numbness  COMPARISON: None.  TECHNIQUE: Head and neck CT angiogram with IV contrast. Noncontrast head CT followed by axial helical CT images of the head and neck vessels obtained during the arterial phase of intravenous contrast administration. Axial 2D reconstructed images and   multiplanar 3D MIP reconstructed images of the head and neck vessels were performed by the technologist. Additional CT cerebral perfusion was performed utilizing a second contrast bolus. Perfusion data were post processed with generation of standard   perfusion maps and estimation of ischemic/infarcted volumes utilizing standard threshold values. Dose reduction techniques were used. All stenosis measurements made according to NASCET criteria unless otherwise specified.  CONTRAST: 75 mL Isovue 370 (accession [id number]), 75 mL Isovue 370 (accession [id number]), 50 mL Isovue 370 (accession [id number])    FINDINGS:   NONCONTRAST HEAD CT:   INTRACRANIAL CONTENTS: There is nonspecific ill-defined hyperdensity at the left frontal convexity best seen axial image 17 and 18 and  coronal image 15 and 16. This may represent artifact or small acute subarachnoid hemorrhage. No large extra-axial   hematoma. No CT evidence of acute infarct. Expected interval evolution of known left white matter infarcts. Mild presumed chronic small vessel ischemic changes. Mild generalized volume loss. No hydrocephalus. Stable retrocerebellar arachnoid cyst.     VISUALIZED ORBITS/SINUSES/MASTOIDS: Prior bilateral cataract surgery. Visualized portions of the orbits are otherwise unremarkable. No paranasal sinus mucosal disease. No middle ear or mastoid effusion.    BONES/SOFT TISSUES: No acute abnormality.    HEAD CTA:  ANTERIOR CIRCULATION: No occlusion, aneurysm, or high flow vascular malformation. Multifocal intracranial atherosclerosis are again noted and similar to prior, most prominent involving the anterior cerebral arteries. There is nonstenotic atherosclerosis   calcification of bilateral carotid siphons Fetal origin of the left posterior cerebral artery from the anterior circulation.    POSTERIOR CIRCULATION: No occlusion, aneurysm, or high flow vascular malformation. Multifocal intracranial atherosclerosis are again noted and similar prior, most prominent at the P3 segments bilaterally. Balanced vertebral arteries supply a normal   basilar artery.     DURAL VENOUS SINUSES: Expected enhancement of the major dural venous sinuses.    NECK CTA:  RIGHT CAROTID: Atherosclerotic plaque results in less than 50% stenosis in the right ICA. No dissection. Similar alternating irregularities at the distal right cervical internal carotid artery suggestive of fibromuscular dysplasia.    LEFT CAROTID: Atherosclerotic plaque results in less than 50% stenosis in the left ICA. No dissection.    VERTEBRAL ARTERIES: No focal stenosis or dissection. Similar alternating irregularities at the junctions of the left V2 and V3 segments suggestive of fibromuscular dysplasia. Balanced vertebral arteries.    AORTIC ARCH: Classic  aortic arch anatomy with no significant stenosis at the origin of the great vessels.    NONVASCULAR STRUCTURES: 9 mm low-density right thyroid nodule. Small left pleural effusion.    CT PERFUSION:  PERFUSION MAPS: Small area of apparent elevated Tmax at the right posterior fossa is favored to be artifactual. Otherwise symmetrical cerebral perfusion. No focal deficits in cerebral blood flow or volume to suggest ischemia/oligemia.    RAPID ANALYSIS:  CBF<30%: 0  Tmax>6sec: 3 mL, likely artifactual  Mismatch volume: 3 mL, likely artifactual  Mismatch ratio: Infinite      Impression    IMPRESSION:   HEAD CT:  1.  Subtle hyperdensity at the left frontal convexity, acute subarachnoid hemorrhage not excluded.    HEAD CTA:   1.  Negative for thrombotic occlusion.  2.  Similar multifocal intracranial atherosclerosis.    NECK CTA:  1.  No hemodynamically significant stenosis in the neck vessels.   2.  No evidence for dissection.  3.  Similar findings of fibromuscular dysplasia at the distal right carotid and left vertebral arteries.  4.  Small left pleural effusion.    CT PERFUSION:  1.  Normal cerebral perfusion.      [Critical Result: Acute intracranial hemorrhage]    Finding was identified on 5/16/2023 7:24 PM CDT.     1.  Dr. Barney was contacted by me on 5/16/2023 7:43 PM CDT and verbalized understanding of the critical result.    CT Head w/o Contrast    Narrative    CT SCAN OF THE HEAD WITHOUT CONTRAST   5/17/2023 9:57 AM     HISTORY: Interval evaluation.    TECHNIQUE: Axial images of the head and coronal reformations without  IV contrast material. Radiation dose for this scan was reduced using  automated exposure control, adjustment of the mA and/or kV according  to patient size, or iterative reconstruction technique.    COMPARISON: Head CT 5/16/2023      Impression    IMPRESSION: Small volume subarachnoid hemorrhage along the left  frontal pole, unchanged. No new areas of hemorrhage. No significant  mass effect or  evidence of hydrocephalus. Recent left hemispheric  infarct in the middle cerebral artery distribution, not appreciably  changed. Volume loss and background of white matter hypoattenuation  likely represents chronic small vessel ischemic change. Incidental  retrocerebellar cyst. No acute osseous abnormality.    ASIYA SALINAS MD         SYSTEM ID:  [id number]   CT Head w/o Contrast    Narrative    CT OF THE HEAD WITHOUT CONTRAST 5/18/2023 12:56 PM     COMPARISON: Head CT 5/17/2023    HISTORY: Stability scan.    TECHNIQUE: 5 mm thick axial CT images of the head were acquired  without IV contrast material.    FINDINGS: Tiny subarachnoid hemorrhage in the anterior aspect of the  left frontal lobe has decreased in conspicuity. No evidence for new or  increasing hemorrhage.     There is moderate diffuse cerebral volume loss. There are subtle  patchy areas of decreased density in the cerebral white matter  bilaterally that are consistent with sequela of chronic small vessel  ischemic disease. The ventricles and basal cisterns are within normal  limits in configuration given the degree of cerebral volume loss.   There is no midline shift.     No intracranial mass or recent infarct.    The visualized paranasal sinuses are well-aerated. There is no  mastoiditis. There are no fractures of the visualized bones.       Impression    IMPRESSION:   1. Interval decrease in conspicuity of the subarachnoid hemorrhage at  the anterior aspect of the left frontal lobe.  2. Diffuse cerebral volume loss and cerebral white matter changes  consistent with chronic small vessel ischemic disease.        Radiation dose for this scan was reduced using automated exposure  control, adjustment of the mA and/or kV according to patient size, or  iterative reconstruction technique.    GAGE VERDUGO MD         SYSTEM ID:  [id number]   CT Head Perfusion w Contrast    Narrative    CT BRAIN PERFUSION 5/18/2023 2:54 PM    COMPARISON: None.    HISTORY:  Evaluate perfusion post balloon angioplasty given ongoing  fluctuating aphasia.    TECHNIQUE: Time sequential axial CT images of the head were acquired  during the administration of intravenous contrast (50mL Isovue-370).  CTA images of the [place] as well as color perfusion maps of  the brain were created from this time sequential axial source data.    FINDINGS: There are no focal or regional perfusion defects in the  brain.      Impression    IMPRESSION: Normal CT perfusion of the brain.    Radiation dose for this scan was reduced using automated exposure  control, adjustment of the mA and/or kV according to patient size, or  iterative reconstruction technique.      GAGE VERDUGO MD         SYSTEM ID:  [id number]   XR Lumbar Spine 2/3 Views    Narrative    EXAM: XR LUMBAR SPINE 2/3 VIEWS, XR THORACIC SPINE 3 VIEWS  LOCATION: [facility]  DATE/TIME: 5/23/2023 6:42 PM CDT    INDICATION: worsening back pain prior hx of compression fx  COMPARISON: Thoracic and lumbar spine radiographs 02/04/2023      Impression    IMPRESSION:   THORACIC SPINE:  Mild to moderate thoracic dextroscoliosis. Mild multilevel spondylosis. Diffuse bony demineralization.     Vertebral body heights within normal limits. No significant subluxations.    No significant interval change compared to prior exam.      LUMBAR SPINE:  Moderate thoracolumbar levoscoliosis. Thoracolumbar severe kyphosis. Multilevel spondylosis. Diffuse bony demineralization.    L4 vertebral body severe compression deformity similar compared to prior exam.    Remaining vertebral body heights within normal limits. No significant subluxations. Bilateral SI joints intact.    No significant interval change compared to prior exam.   XR Thoracic Spine 3 Views    Narrative    EXAM: XR LUMBAR SPINE 2/3 VIEWS, XR THORACIC SPINE 3 VIEWS  LOCATION: [facility]  DATE/TIME: 5/23/2023 6:42 PM CDT    INDICATION: worsening back pain  prior hx of compression fx  COMPARISON: Thoracic and lumbar spine radiographs 02/04/2023      Impression    IMPRESSION:   THORACIC SPINE:  Mild to moderate thoracic dextroscoliosis. Mild multilevel spondylosis. Diffuse bony demineralization.     Vertebral body heights within normal limits. No significant subluxations.    No significant interval change compared to prior exam.      LUMBAR SPINE:  Moderate thoracolumbar levoscoliosis. Thoracolumbar severe kyphosis. Multilevel spondylosis. Diffuse bony demineralization.    L4 vertebral body severe compression deformity similar compared to prior exam.    Remaining vertebral body heights within normal limits. No significant subluxations. Bilateral SI joints intact.    No significant interval change compared to prior exam.   MR Brain w/o Contrast    Narrative    EXAM: MR BRAIN W/O CONTRAST  LOCATION: [facility]  DATE/TIME: 5/24/2023 9:54 PM CDT    INDICATION: aphasia, possible stroke  COMPARISON: CT head 05/18/2023, MRI brain 05/13/2023  TECHNIQUE: Routine multiplanar multisequence head MRI without intravenous contrast.    FINDINGS:  INTRACRANIAL CONTENTS:   Left corpus callosum genu JOSI territory small acute infarct. (Restricted diffusion, positive FLAIR). No microhemorrhage on GRE.    Left frontoparietal white matter prominent area of increased DWI signal that is more confluent compared to prior examination. ADC map is isointense. Findings likely due to evolving subacute infarcts. Small patchy superimposed acute infarcts possible.    Left frontal parietal white matter demonstrates multiple small chronic infarcts.    No additional acute infarcts.     No midline shift. Patchy and confluent nonspecific T2/FLAIR hyperintensities within the cerebral white matter most consistent with moderate chronic microvascular ischemic change. Moderate generalized cerebral atrophy. No hydrocephalus. Normal position   of the cerebellar tonsils. Prominent  retrocerebellar arachnoid cyst. Pronounced increased FLAIR signal surrounds the upper cervical spine, brainstem, supersellar cistern, sylvian fissures likely related to pronounced flow-related artifact.    SELLA: No abnormality accounting for technique.    OSSEOUS STRUCTURES/SOFT TISSUES: Normal marrow signal. The major intracranial vascular flow voids are maintained.     ORBITS: No abnormality accounting for technique.     SINUSES/MASTOIDS: Mild mucosal thickening scattered about the paranasal sinuses. No middle ear or mastoid effusion.       Impression    IMPRESSION:  1.  Left corpus callosum genu JOSI territory small acute infarct.    2.  Left frontoparietal white matter prominent area of increased DWI signal that is more confluent compared to prior examination. ADC map is isointense. Findings likely due to evolving subacute infarcts. Small patchy superimposed acute infarcts possible.    3.  Left frontal parietal white matter demonstrates multiple small chronic infarcts.    4.  Age-related changes described above.     Echo Limited     Value    LVEF  55-60%    Narrative    [id number]  [id number]  [id number]  [id number]^MISBAH     [facility]  Echocardiography Laboratory  [address] [place] [address]     Name: REFUGIO YADAV  MRN: [id number]  : 1940  Study Date: 2023 10:23 AM  Age: 82 yrs  Gender: Female  Patient Location: [facility]  Reason For Study: CVA  Ordering Physician: MISBAH CABAN  Referring Physician: MISBAH CABAN  Performed By: TALIA Soares     BSA: 1.5 m2  Height: 60 in  Weight: 129 lb  HR: 91  BP: 131/85 mmHg  ______________________________________________________________________________  Procedure  Limited Portable Echo Adult.  ______________________________________________________________________________  Interpretation Summary     The rhythm was undetermined. NSR in recent  study.  ______________________________________________________________________________  Left Ventricle  The left ventricle is normal in size. There is normal left ventricular wall  thickness. The visual ejection fraction is 55-60%. Septal motion is consistent  with conduction abnormality.     Right Ventricle  The right ventricle is normal in structure, function and size.     Atria  Normal left atrial size. Right atrial size is normal.     Mitral Valve  There is mild to moderate mitral annular calcification. The mitral valve  leaflets appear thickened, but open well. There is mild to moderate (1-2+)  mitral regurgitation.     Tricuspid Valve  The tricuspid valve is not well visualized, but is grossly normal.     Aortic Valve  No aortic stenosis is present.     Pulmonic Valve  Normal pulmonic valve.     Vessels  The aortic root is normal size. Normal size ascending aorta. The inferior vena  cava is normal.     Pericardium  There is no pericardial effusion.     Rhythm  The rhythm was undetermined.  ______________________________________________________________________________  MMode/2D Measurements & Calculations  IVSd: 1.0 cm     LVIDd: 4.2 cm  LVIDs: 2.3 cm  LVPWd: 1.1 cm  FS: 45.2 %  LV mass(C)d: 147.0 grams  LV mass(C)dI: 94.9 grams/m2  Ao root diam: 2.6 cm  asc Aorta Diam: 2.5 cm  RV Base: 2.6 cm  RWT: 0.52  TAPSE: 2.7 cm     Doppler Measurements & Calculations  PA acc time: 0.11 sec     ______________________________________________________________________________  Report approved by: Josiah Hale 05/11/2023 12:08 PM

## 2023-05-27 NOTE — PLAN OF CARE
Goal Outcome Evaluation:        Pt a&ox3 - severe aphasia, patient is restless in bed, scheduled pain meds given, chronic bruno patent, up with 1 and walker, discharge pending

## 2023-05-28 NOTE — PROGRESS NOTES
Notified provider about indwelling bruno catheter discussed removal or continued need.    Did provider choose to remove indwelling bruno catheter? YES    Provider's bruno indication for keeping indwelling bruno catheter: End of Life     Is there an order for indwelling bruno catheter? YES    *If there is a plan to keep bruno catheter in place at discharge daily notification with provider is not necessary, but please add a notation in the treatment team sticky note that the patient will be discharging with the catheter.       Tami Burnham RN

## 2023-05-28 NOTE — PLAN OF CARE
"  Problem: Pain Acute  Goal: Optimal Pain Control and Function  Intervention: Develop Pain Management Plan  Recent Flowsheet Documentation  Taken 5/28/2023 0934 by Tami Burnham, RN  Pain Management Interventions:   medication (see MAR)   repositioned   Goal Outcome Evaluation:    Orientations: alert and orientated x 3; patient with severe aphasia and limited assessment completed due comfort care status. Patient continues to be restless with pain; when asking if patient feels the fentanyl patch is helping she stated, \"somewhat.\" Patient continues to be restless and unable to settle in comfortably. Paged Dr. Garnica to see if PRN oxycodone could be increased back to 15 mg; order changed as requested. Patient incontinent of small amounts of stool this; patient also stating having \"upset belly,\" PRN Milk of Magnesia with relief. Patient continues to have stool incontinence and is unable to tell if/when she has to go. Will continue to work on pain management. Patient asking that staff please offer water when in room due to increased dry mouth from oxycodone.   Vitals/Pain: BP (!) 145/71 (BP Location: Left arm)   Pulse 90   Temp 99.2  F (37.3  C) (Oral)   Resp 16   Ht 1.524 m (5')   Wt 68.8 kg (151 lb 9.6 oz)   LMP  (LMP Unknown)   SpO2 93%   BMI 29.61 kg/m    Resp: Clear, equal bilaterally  Lines/Drains: chronic bruno; bruno cares done per protocol   Skin/Wounds: pressure ulcer on spine; foam dressing changed this shift   GI/: Bruno; incontinent of stool x 3 this shift  Labs: Abnormal/Trends, Electrolyte Replacement- none  Ambulation/Assist: SBA with walker and gait belt   Plan: Discharge next week on hospice      Tami Burnham, RN  "

## 2023-05-28 NOTE — PLAN OF CARE
Goal Outcome Evaluation:    [id number]    Pt is A&OX3. Patient has severe aphasia. She is on comfort cares. She was given prn oxycodone two times during the night for pain. She slept and rested most of the night. She appeared comfortable. Mcallister in place. Discharge pending.

## 2023-05-28 NOTE — PROGRESS NOTES
[facility]    Medicine Progress Note - Hospitalist Service    Date of Admission:  5/1/2023    Assessment & Plan   Chiquita Berry is a 82 year old female with PMH  metastatic lung cancer, hx CVA Feb 2023, who was admitted 5/2/2023 with a fall and R hip and R wrist fractures. S/p ORIF. Dual anitplatelets held pre and post-operatively. Unfortunately, post op course complicated by significant blood loss (Hb 4.6). Also complicated by repeat Stroke on 5/13/23 in L posterior MCA (watershed distribution). She was subsequently transferred to ICU on 5/13 for pressors due to hypotension and worsening neuro findings. She underwent neuro IR angioplasty and stenting of L MCA M2 segment on 5/14/23.  Titrated off pressors AM of 5/16. Stroke code activated on 5/16/23 upon transfer to floor for aphasia. CT head showed small L frontal SAH on 5/18/23. 5/24 MRI brain showed new acute stroke in corpus callosum. Due to acute recurrent stroke while on DAPT, significant pain, expected long road to recovery with uncertain prognosis for recovery, patient opted to transition to comfort cares on 5/25/23.     Comfort cares  * patient opting to transition to comfort cares on 5/25/23  * [facility] if able.  - comfort care order set in place  - was started on oxycodone 10-15mg q3h PRN and oxycontin 10mg BID, titrate to affect.   5/27: Pain not controlled:  added Holistic pain Mx: scheduled Tylenolol, robaxin, warm packs. Change Oxycontin to Fentanyl patch 25mcg.  Ct prn Oxy   5/28: Add gabapentin.  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  Acute SAH  Hx CVA Feb 2023   S/p L MCA angioplasty on 5/14/23  Mechanical fall  Acute comminuted fracture of the right hip  Acute  comminuted fracture of the right distal radius  S/p ORIF R hip and R wrist 5/2/2023  Post-operative acute blood loss anemia, improved  Back pain  Acute metabolic encephalopathy, improved  Acute hypoxic respiratory failure - resolved  Probable Pneumonia, treated  Leukocytosis - resolved  UTI - resolved  Hypocalcemia  Metastatic lung cancer  Elevated LFTs  Goals of care        Diet: Snacks/Supplements Adult: Magic Cup; With Meals  Diet  Regular Diet Adult  Room Service    DVT Prophylaxis: Pneumatic Compression Devices  Mcallister Catheter: PRESENT, indication: End of Life, Retention  Lines: None     Cardiac Monitoring: None  Code Status: No CPR- Do NOT Intubate      Clinically Significant Risk Factors              # Hypoalbuminemia: Lowest albumin = 2.7 g/dL at 5/9/2023  6:53 PM, will monitor as appropriate            # Overweight: Estimated body mass index is 29.61 kg/m  as calculated from the following:    Height as of this encounter: 1.524 m (5').    Weight as of this encounter: 68.8 kg (151 lb 9.6 oz).           Disposition Plan      Expected Discharge Date: 05/30/2023    Discharge Delays: Comfort Care/Hospice    Discharge Comments: 5/25 discharge [facility] with hospice          Kyler Garnica MD  Hospitalist Service  [facility]  Securely message with [employer] (more info)  Text page via [employer] Paging/Directory   ______________________________________________________________________    Interval History   Pain in the back is better than yesterday but still not adequately controlled    Physical Exam   BP (!) 145/71 (BP Location: Left arm)   Pulse 90   Temp 99.2  F (37.3  C) (Oral)   Resp 16   Ht 1.524 m (5')   Wt 68.8 kg (151 lb 9.6 oz)   LMP  (LMP Unknown)   SpO2 93%   BMI 29.61 kg/m    Gen- pleasant   HEENT- NAD, YENY  Neck- supple  CVS- I+II+ no m/r/g  RS- non labored  Abdo- soft, no tenderness . No g/r/r       Medical Decision Making       51 MINUTES SPENT BY ME on the date of  service doing chart review, history, exam, documentation & further activities per the note.      Data   ------------------------- PAST 24 HR DATA REVIEWED -----------------------------------------------        Imaging results reviewed over the past 24 hrs:   No results found for this or any previous visit (from the past 24 hour(s)).

## 2023-05-29 NOTE — PLAN OF CARE
Date & Time: 5/29/23 [id number]  Orientation/Cognitive Concerns: A/O x3. Severe expressive aphagia  Abnl VS/O2: deferred comfort cares  Tele: N/A  Pain Management: Oxycodone 15 mg q3. Sched tylenol, robaxin, and gabapentin. Fentanyl patch to R shoulder- dose increased today. Pt more restless this afternoon attempted nonpharm options without relief, ativan given with relief.   Abnl Labs/BG: N/A  Behavior/Aggression Tool Color: green  Mobility: A/2 GB/W  Diet: regular. Pill whole in applesauce  Bowel/Bladder: Mcallister, incontinent of bowel  Test/Procedures: N/A  Anticipated DC date: pending placement. Possibly return to [facility] 5/30 with hospice.

## 2023-05-29 NOTE — PROGRESS NOTES
[facility]    Medicine Progress Note - Hospitalist Service    Date of Admission:  5/1/2023    Assessment & Plan   Chiquita Berry is a 82 year old female with PMH  metastatic lung cancer, hx CVA Feb 2023, who was admitted 5/2/2023 with a fall and R hip and R wrist fractures. S/p ORIF. Dual anitplatelets held pre and post-operatively. Unfortunately, post op course complicated by significant blood loss (Hb 4.6). Also complicated by repeat Stroke on 5/13/23 in L posterior MCA (watershed distribution). She was subsequently transferred to ICU on 5/13 for pressors due to hypotension and worsening neuro findings. She underwent neuro IR angioplasty and stenting of L MCA M2 segment on 5/14/23.  Titrated off pressors AM of 5/16. Stroke code activated on 5/16/23 upon transfer to floor for aphasia. CT head showed small L frontal SAH on 5/18/23. 5/24 MRI brain showed new acute stroke in corpus callosum. Due to acute recurrent stroke while on DAPT, significant pain, expected long road to recovery with uncertain prognosis for recovery, patient opted to transition to comfort cares on 5/25/23.     Comfort cares  * patient opting to transition to comfort cares on 5/25/23  * She [facility] if able.  - comfort care order set in place  - was started on oxycodone 10-15mg q3h PRN and oxycontin 10mg BID, titrate to affect.   5/27: Pain not controlled:  added Holistic pain Mx: scheduled Tylenolol, robaxin, warm packs. Change Oxycontin to Fentanyl patch 25mcg.  Ct prn Oxy   5/28: Added gabapentin.    5/29: pain still not controlled so added 12mcg fentanyl patch to 25mcg. 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  Acute SAH  Hx CVA Feb 2023   S/p L MCA angioplasty on  5/14/23  Mechanical fall  Acute comminuted fracture of the right hip  Acute comminuted fracture of the right distal radius  S/p ORIF R hip and R wrist 5/2/2023  Post-operative acute blood loss anemia, improved  Back pain  Acute metabolic encephalopathy, improved  Acute hypoxic respiratory failure - resolved  Probable Pneumonia, treated  Leukocytosis - resolved  UTI - resolved  Hypocalcemia  Metastatic lung cancer  Elevated LFTs  Goals of care        Diet: Snacks/Supplements Adult: Magic Cup; With Meals  Diet  Regular Diet Adult  Room Service    DVT Prophylaxis: Pneumatic Compression Devices  Mcallister Catheter: PRESENT, indication: End of Life, Retention  Lines: None     Cardiac Monitoring: None  Code Status: No CPR- Do NOT Intubate      Clinically Significant Risk Factors              # Hypoalbuminemia: Lowest albumin = 2.7 g/dL at 5/9/2023  6:53 PM, will monitor as appropriate            # Overweight: Estimated body mass index is 29.61 kg/m  as calculated from the following:    Height as of this encounter: 1.524 m (5').    Weight as of this encounter: 68.8 kg (151 lb 9.6 oz).           Disposition Plan      Expected Discharge Date: 05/30/2023    Discharge Delays: Comfort Care/Hospice    Discharge Comments: [facility] with hospice          Kyler Garnica MD  Hospitalist Service  [facility]  Securely message with [employer] (more info)  Text page via [employer] Paging/Directory   ______________________________________________________________________    Interval History   Pain in the back is better than yesterday but still not adequately controlled  Discussed further options    Physical Exam   /62 (BP Location: Left arm)   Pulse 92   Temp 98.9  F (37.2  C) (Oral)   Resp 16   Ht 1.524 m (5')   Wt 68.8 kg (151 lb 9.6 oz)   LMP  (LMP Unknown)   SpO2 92%   BMI 29.61 kg/m    Gen- pleasant   HEENT- NAD, YENY  Neck- supple  CVS- I+II+ no m/r/g  RS- non labored  Abdo- soft, no tenderness . No  g/r/r     Medical Decision Making       51 MINUTES SPENT BY ME on the date of service doing chart review, history, exam, documentation & further activities per the note.      Data   ------------------------- PAST 24 HR DATA REVIEWED -----------------------------------------------        Imaging results reviewed over the past 24 hrs:   No results found for this or any previous visit (from the past 24 hour(s)).

## 2023-05-29 NOTE — PLAN OF CARE
Goal Outcome Evaluation:      Plan of Care Reviewed With: patient    Overall Patient Progress: no changeOverall Patient Progress: no change    Outcome Evaluation: Ongoing pain.  Word finding difficulties making it difficult to guage degree of pain control.    DATE & TIME: 5/28/23 1900 - 5/29/23 0700    COGNITION/BEHAVIOR: Alert, grossly oriented  MOBILITY: Turn/repo; not getting out of bed  PAIN: PRN Oxycodone given every three hours.  At the 0330 dose, she was a bit more difficult to arouse to drink fluids.  In the evening, very restless, moving around in bed quite a bit in discomfort.  Fentanyl patch in place to right upper arm.  DIET: Regular.  Likes water offered with each encounter and drinks well.  Takes meds crushed in applesauce.  GI/: Mcallister in place draining pale yellow urine.  Incontinent of a small soft bowel movement.  SKIN: Scattered bruising.  Mepilex to spine as preventative padding.  OTHER: Comfort Care.  Likes PRN Trazodone given every night.

## 2023-05-30 NOTE — PLAN OF CARE
Goal Outcome Evaluation:      Orientation: Alert this AM severe expressive aphagia. Pt was very lethargic throughout the night.    Vitals/Tele: spot checked o2 sats during the night pts O2 sats were 88% put pt on 2l NC.     IV Access/drains: bruno with good output     Diet: Reg pills whole in apple sauce     Mobility: A1 gb/w    GI/: bruno incontinent of BM small smear during shift     Wound/Skin: scattered bruising mepilex in place on back     Discharge Plan: possible discharge today to [facility] with hospice

## 2023-05-30 NOTE — PROGRESS NOTES
[facility]    Medicine Progress Note - Hospitalist Service    Date of Admission:  5/1/2023    Assessment & Plan   Chiquita Berry is a 82 year old female with PMH  metastatic lung cancer, hx CVA Feb 2023, who was admitted 5/2/2023 with a fall and R hip and R wrist fractures. S/p ORIF. Dual anitplatelets held pre and post-operatively. Unfortunately, post op course complicated by significant blood loss (Hb 4.6). Also complicated by repeat Stroke on 5/13/23 in L posterior MCA (watershed distribution). She was subsequently transferred to ICU on 5/13 for pressors due to hypotension and worsening neuro findings. She underwent neuro IR angioplasty and stenting of L MCA M2 segment on 5/14/23.  Titrated off pressors AM of 5/16. Stroke code activated on 5/16/23 upon transfer to floor for aphasia. CT head showed small L frontal SAH on 5/18/23. 5/24 MRI brain showed new acute stroke in corpus callosum. Due to acute recurrent stroke while on DAPT, significant pain, expected long road to recovery with uncertain prognosis for recovery, patient opted to transition to comfort cares on 5/25/23.     Comfort cares  * patient opting to transition to comfort cares on 5/25/23  * She [facility] if able.  - comfort care order set in place  - was started on oxycodone 10-15mg q3h PRN and oxycontin 10mg BID, titrate to affect.   5/27: Pain not controlled:  added Holistic pain Mx: scheduled Tylenolol, robaxin, warm packs. Change Oxycontin to Fentanyl patch 25mcg.  Ct prn Oxy   5/28: Added gabapentin.    5/29: pain still not controlled so added 12mcg fentanyl patch to 25mcg. Will place palliative consult for tomorrow    - lorazepam for comfort  *Appreciate Oncology review for Alectinib, now discontinued as patient hospice/comfort care.    Palliative care consulted by Dr. Garnica, evaluation pending at this time.     Acute ischemic CVA  Acute CVA of corpus callosum while on  DAPT  Acute SAH  Hx CVA Feb 2023   S/p L MCA angioplasty on 5/14/23  Mechanical fall  Acute comminuted fracture of the right hip  Acute comminuted fracture of the right distal radius  S/p ORIF R hip and R wrist 5/2/2023  Post-operative acute blood loss anemia, improved  Back pain  Acute metabolic encephalopathy, improved  Acute hypoxic respiratory failure - resolved  Probable Pneumonia, treated  Leukocytosis - resolved  UTI - resolved  Hypocalcemia  Metastatic lung cancer  Elevated LFTs  Goals of care        Diet: Snacks/Supplements Adult: Magic Cup; With Meals  Diet  Regular Diet Adult  Room Service    DVT Prophylaxis: Pneumatic Compression Devices  Mcallister Catheter: PRESENT, indication: End of Life;Retention, Retention  Lines: None     Cardiac Monitoring: None  Code Status: No CPR- Do NOT Intubate      Clinically Significant Risk Factors              # Hypoalbuminemia: Lowest albumin = 2.7 g/dL at 5/9/2023  6:53 PM, will monitor as appropriate            # Overweight: Estimated body mass index is 29.61 kg/m  as calculated from the following:    Height as of this encounter: 1.524 m (5').    Weight as of this encounter: 68.8 kg (151 lb 9.6 oz).           Disposition Plan      Expected Discharge Date: 05/31/2023    Discharge Delays: Comfort Care/Hospice    Discharge Comments: [facility] with hospice          Jose Alfredo Smith MD  Hospitalist Service  [facility]  Securely message with [employer] (more info)  Text page via [employer] Paging/Directory   ______________________________________________________________________    Interval History   Patient seen and evaluated in her room today, she does have some expressive aphasia.  Unable to explain or give history fully.  Still have some pain but better controlled now.    No other significant event overnight    Physical Exam   /64 (BP Location: Left arm, Patient Position: Semi-Tate's, Cuff Size: Adult Regular)   Pulse 90   Temp 98.3  F (36.8  C)  (Oral)   Resp 16   Ht 1.524 m (5')   Wt 68.8 kg (151 lb 9.6 oz)   LMP  (LMP Unknown)   SpO2 92%   BMI 29.61 kg/m    Gen- pleasant   HEENT- NAD, YENY  Neck- supple  CVS- I+II+ no m/r/g  RS- non labored  Abdo- soft, no tenderness . No g/r/r     Medical Decision Making       51 MINUTES SPENT BY ME on the date of service doing chart review, history, exam, documentation & further activities per the note.      Data   ------------------------- PAST 24 HR DATA REVIEWED -----------------------------------------------        Imaging results reviewed over the past 24 hrs:   No results found for this or any previous visit (from the past 24 hour(s)).

## 2023-05-30 NOTE — PROGRESS NOTES
1. Set up fasting lab work in the near future   2. Augmentin one tablet twice daily with food for the next 10 days  3 eat a yogurt daily or take a probiotic   4 Flonase one spray each nostril daily for the next two weeks   5. Continue to exercise and eat a balanced diet   6. Monthly self testicular exams    Patient Education     Acute Sinusitis    Acute sinusitis is irritation and swelling of the sinuses. It is usually caused by a viral infection after a common cold. Your doctor can help you find relief.  What is acute sinusitis?  Sinuses are air-filled spaces in the skull behind the face. They are kept moist and clean by a lining of mucosa. Things such as pollen, smoke, and chemical fumes can irritate the mucosa. It can then swell up. As a response to irritation, the mucosa makes more mucus and other fluids. Tiny hairlike cilia cover the mucosa. Cilia help carry mucus toward the opening of the sinus. Too much mucus may cause the cilia to stop working. This blocks the sinus opening. A buildup of fluid in the sinuses then causes pain and pressure. It can also encourage bacteria to grow in the sinuses.  Common symptoms of acute sinusitis  You may have:  · Facial soreness pain  · Headache  · Fever  · Fluid draining in the back of the throat (postnasal drip)  · Congestion  · Drainage that is thick and colored, instead of clear  · Cough  Diagnosing acute sinusitis  Your doctor will ask about your symptoms and health history. He or she will look at your ear, nose, and throat. You usually won't need to have X-rays taken.    The doctor may take a sample of mucus to check for bacteria. If you have sinusitis that keeps coming back, you may need imaging tests such as X-rays or CAT scans. This will help your doctor check for a structural problem that may be causing the infection.  Treating acute sinusitis  Treatment is aimed at unblocking the sinus opening and helping the cilia work again. You may need to take antihistamine and  Care Management Follow Up    Length of Stay (days): 29    Expected Discharge Date: 05/31/2023     Concerns to be Addressed:       Patient plan of care discussed at interdisciplinary rounds: Yes    Anticipated Discharge Disposition: LTC with hospice     Anticipated Discharge Services: None  Anticipated Discharge DME: None    Patient/family educated on Medicare website which has current facility and service quality ratings: no  Education Provided on the Discharge Plan:    Patient/Family in Agreement with the Plan: yes    Referrals Placed by CM/SW:[facility]  Private pay costs discussed:     Additional Information: SW placed call to [facility] admissions regarding possible discharge to [facility] on hospice.  SW received return call from [facility] indicating that they do have a bed for pt tomorrow.  SW made referral to hospices and reached Katina  [facility]. Referral information sent for review. SW called intake and they are assessing pt for admit tomorrow.  SW updated that pt has stretcher ride scheduled for tomorrow at 10:40am-11:20am.  Hospitalist updated of early discharge.  INDU called [facility] to request confirmation of hospice intake tomorrow and time of intake. Awaiting return call from Katina GOLDBERG.  Katina GOLDBERG stopped to see pt and indicates that they will be admitting pt tomorrow at around 13:00. She is requesting 3 days of comfort meds to be filled and sent with pt at discharge.    JULIO Crowell  [facility]  Care Transitions  [phone]         decongestant medicine. These can reduce inflammation and decrease the amount of fluid your sinuses make. If you have a bacterial infection, you will need to take antibiotic medicine for 10 to 14 days. Take this medicine until it is gone, even if you feel better.  Date Last Reviewed: 10/1/2016  © 2884-7951 [employer]. [address] [place] [address], [place] [address]. All rights reserved. This information is not intended as a substitute for professional medical care. Always follow your healthcare professional's instructions.

## 2023-05-30 NOTE — PROGRESS NOTES
Pt here from fall, right fracture on wrist and hip and multiple strokes. Pt now on comfort care. Expressive aphasia which causes anxiety for patient not being able to communicate her needs as well. Ativan on board along with several other pain medications PRN. Takes medications whole with apple sauce. Regular diet, thin liquids- Pt enjoys water. Cmallister in place for end of life. Plan for patient to discharge to [facility] on hospice tomorrow morning with transport around 11 am.

## 2023-05-30 NOTE — CONSULTS
Palliative Care Consultation Note  [facility]      Patient: Chiquita Berry  Date of Admission:  5/1/2023    Requesting Clinician / Team: Kyler Garnica MD  / Hospitalist  Reason for consult: Pain management  Decisional support  Patient and family support     Recommendations & Counseling     GOALS OF CARE:     Comfort focused  and planning for discharge to LTC with hospice    ADVANCE CARE PLANNING:    Patient has an advance directive dated 4/18/02.  Primary Health Care Agent Candelaria Berry.  Alternate is  Robb Berry.    Patient now has expressive aphasia making communication difficult, while she appears to be communicating appropriately with understanding, Decisions are supported by [facility].    Code status: No CPR- Do NOT Intubate    MEDICAL MANAGEMENT:   #Pain, Currently lower abdomen/pelvic area but this comes and goes, unknown cause, may be radicular in setting of chronic back pain, compression fracture (not on chronic opioid therapy), but no specific pathology noted at T12-L1 on most recent xray. Chiquita has BMs daily and catheter is draining urine. No history of cancer related pain, denies uncontrolled post-op pain in R wrist or hip.      Would not usually escalate opioids in this setting of non cancer pain, but patient appears to be tolerating well and has been on 60+ OME x 2 weeks and goal is comfort.     At this point I would give the Fentanyl patch time to reach steady state (72hrs on 6/1) before attempting further titration/adjustment.    Continue oxycodone PRN- reduce dose to 10mg PRN.    Acetaminophen (Tylenol), Scheduled    Muscle Relaxers:Currently on Robaxin scheduled QID, unclear benefit in this case, without improved pain since started consider stopping.    Anti-convulsants:  Gabapentin (Neurontin) 200mg TID, can titrate up over time every 5-7 days.      Topical medicines:  Lidocaine Patch 4% to abdomen    Positioning has been helpful     #Anxiety, Situational Anxiety  with pain and change of goals of care.    lorazepam (Ativan) trialed yesterday with reported improvement.    Pain management as above.     #Dyspnea,Hx of dyspnea with pleural fluid accumulating requiring thoracentesis monthly, This has not been needed while on current cancer therapy.      Chiquita and Candelaria would like to discuss with hospice the possibility of continueing oral cancer therapy on hospice  with goal of prevent malignant pleural effusion.  We discussed she will likely be able to continue using her current suppy, or possibly continue if cancer is not her main hospcie diagnosis (failure to thrive with recurrent stroke despite DAPT?).    We also discussed the more likely scenario of symptom management if the pleural effusion reoccurs at some point. Chiquita could certainly have a thoracentesis for comfort on hospice, decisions to come in for that procedure would be weighed with Chiquita's current status and ability to make the trip.  We discussed medical symptom management for dyspnea with pleural effusion if this occurred and she was unable to tolerate thoracentesis.     #Constipation, risk for opioid induced, currently stooling daily.  Sennoside (Senokot)  Polyethylene glycol (MiraLAX)     PSYCHOSOCIAL/SPIRITUAL:    Appreciate care of Ghassan Joshi MDiv    Palliative Care will continue to follow. Thank you for the consult and allowing us to aid in the care of Chiquita Berry.    These recommendations have been discussed with bedside nurse Marilyn TEJEDA And Hospitalist Jose Alfredo Smith.    NIMCO Florence CNP  Securely message with [employer] (more info)  Text page via [facility] Paging/Directory       Palliative Summary/HPI     Chiquita Berry is a 82 year old female with a past medical history of metastatic lung cancer, hx CVA Feb 2023,  who presented on 5/1 with a fall and right hip and wrist fractures now s/p ORIF and wrist repair. Post op course complicated by significant blood loss (Hb 4.6) and repeat Stroke on  5/13/23 in L posterior MCA (watershed distribution). She spent time in  ICU on 5/13 for pressors due to hypotension and worsening neuro findings. She is S/P neuro IR angioplasty and stenting of L MCA M2 segment on 5/14/23.  Stroke code activated on 5/16/23 upon transfer to floor for aphasia. CT head showed small L frontal SAH on 5/18/23. 5/24 MRI brain showed new acute stroke in corpus callosum.   Due to acute recurrent stroke while on DAPT, significant pain, expected long road to recovery with uncertain prognosis for recovery, patient opted to transition to comfort cares on 5/25/23 and is planning on discharge to LTC with hospice.    Today, the patient was seen for:  Pain management    Palliative Care Summary:   I introduced our role as an extra layer of support and how we help patients and families dealing with serious, potentially life-limiting illnesses. I explained the composition of the palliative care team.  Palliative care helps patients and families navigate their care while focusing on the whole person; providing emotional, social and spiritual support  Palliative care often assists with symptom management, information sharing about what to expect from the illness, available treatment options and what effect those options may have on the disease course, and provide effective communication and caring support.    Met with patient and [facility] Candelaria at bedside.  They have a good understanding of her current diagnosis, and prognosis.  They ask about hospice care and specifics about what hospice would add at LTC.   We discussed current pain, pain managemtn, concerns about recurrent pleural effusion and management on hospice.  PAtient is involved and appears to be following/ engaged in conversation despite aphasia.  She is clear she does not want therapy to prolong her life and wants to make the time she has left meaning ful with social interactions.  We discussed unknown prognosis and timing of another stroke would  be unknown, but realistically weeks to months.        Prognosis, Goals, & Planning:      Functional Status just prior to this current hospitalization:     Palliative Performance Scale (PPS): 80%  Some evidence of disease. Full/normal ambulation, self-care & LOC; normal activity/work w/effort; normal or reduced intake.  Estimated Median Survival in Days: N/A to 108 days.       Prognosis, Goals, and/or Advance Care Planning:    Chiquita has decided on comfort measures only.  Education provided on transition to comfort-focused goals of care would be including discontinuation of interventions that do not directly promote comfort.  Anticipatory guidance was given regarding feedings, hunger, fluid at end of life, specifically with risk of further stroke.       Code StatusDNR/DNI - NOT  addressed today      Patient's decision making preferences: shared with support from loved ones          Patient has decision-making capacity today for complex decisions:Questionable only due to aphasia.            Coping, Meaning, & Spirituality:     Mood, coping, and/or meaning in the context of serious illness were addressed today: Yes Chiquita would like to spend time socially with friends.    Social:   Occupation: Retired nurse  Areas of fulfillment/xin: Friends    Medications:  I have reviewed this patient's medication profile and medications from this hospitalization. Notable medications:     ROS:  Pain is in lower abdomen/pelvis, bilateral.  Positioning makes it better. Can be crampy but mostly superficial.  This pain comes and goes.  Chronic back pain is usually at 3/10, now at 5/10.  Improved with positioning tylenol, and Ibuprofen at home.   Denies wrist pain.  Does have pain in R hip surgical site but well controlled.     Physical Exam   Vital Signs with Ranges  Temp:  [98.3  F (36.8  C)-98.5  F (36.9  C)] 98.3  F (36.8  C)  Pulse:  [78-90] 90  Resp:  [16] 16  BP: (110-133)/(51-64) 133/64  SpO2:  [88 %-95 %] 92 %  151 lbs 9.6  oz    PHYSICAL EXAM:  GEN:  Alert,appears oriented, follows commands.  Restless movemnts in bed.  HEENT:  Normocephalic/atraumatic, no scleral icterus, no nasal discharge, mouth moist. Pupils normal and reactive.  LUNGS:   Symmetric chest rise on inhalation noted. Non labored  ABD:  soft, non-tender/non-distended.  No rebound/guarding/rigidity.  EXT:  No edema or cyanosis.  No joint synovitis noted.  SKIN:  Dry to touch, no exanthems noted in the visualized areas.  Urinary: Mcallister in place draining    Data reviewed:  EXAM: XR LUMBAR SPINE 2/3 VIEWS, XR THORACIC SPINE 3 VIEWS  LOCATION: [facility]  DATE/TIME: 5/23/2023 6:42 PM CDT     INDICATION: worsening back pain prior hx of compression fx  COMPARISON: Thoracic and lumbar spine radiographs 02/04/2023                                                                      IMPRESSION:   THORACIC SPINE:  Mild to moderate thoracic dextroscoliosis. Mild multilevel spondylosis. Diffuse bony demineralization.      Vertebral body heights within normal limits. No significant subluxations.     No significant interval change compared to prior exam.        LUMBAR SPINE:  Moderate thoracolumbar levoscoliosis. Thoracolumbar severe kyphosis. Multilevel spondylosis. Diffuse bony demineralization.     L4 vertebral body severe compression deformity similar compared to prior exam.     Remaining vertebral body heights within normal limits. No significant subluxations. Bilateral SI joints intact.     No significant interval change compared to prior exam.    Lab Results   Component Value Date    WBC 9.8 05/25/2023    WBC 10.5 02/15/2019     Lab Results   Component Value Date    RBC 2.96 05/25/2023    RBC 4.08 02/15/2019     Lab Results   Component Value Date    HGB 9.6 05/25/2023    HGB 13.3 02/15/2019     Lab Results   Component Value Date    HCT 30.4 05/25/2023    HCT 39.2 02/15/2019     Lab Results   Component Value Date     05/25/2023    MCV 96  02/15/2019     Lab Results   Component Value Date    MCH 32.4 05/25/2023    MCH 32.6 02/15/2019     Lab Results   Component Value Date    MCHC 31.6 05/25/2023    MCHC 33.9 02/15/2019     Lab Results   Component Value Date    RDW 22.4 05/25/2023    RDW 13.4 02/15/2019     Lab Results   Component Value Date     05/25/2023     02/15/2019     Last Comprehensive Metabolic Panel:  Lab Results   Component Value Date     05/24/2023    POTASSIUM 4.7 05/25/2023    CHLORIDE 105 05/24/2023    CO2 23 05/24/2023    ANIONGAP 10 05/24/2023     (H) 05/24/2023    BUN 15.1 05/24/2023    CR 0.50 (L) 05/24/2023    GFRESTIMATED >90 05/24/2023    SAGE 8.9 05/24/2023           Medical Decision Making       100 MINUTES SPENT BY ME on the date of service doing chart review, history, exam, documentation & further activities per the note.

## 2023-05-31 NOTE — PLAN OF CARE
Pt. discharged at 1140 to [facility], and left with personal belongings. Pt. received complete discharge paperwork and all medications as filled by discharge pharmacy. Pt received and signed for all medications. Pt. was given times of last dose for all discharge medications in writing on discharge medication sheets. Discharge teaching included all medication, pain management, activity restrictions, dressing changes, and signs and symptoms of infection. Pt. to follow up with hospice regularly. Pt. had no further questions at the time of discharge and no unmet needs were identified. Patient transported by EMS on stretcher.

## 2023-05-31 NOTE — PROGRESS NOTES
[facility]  Palliative Care Daily Progress Note       Recommendations & Counseling     GOALS OF CARE:     Comfort focused  and planning for discharge to LTC with hospice     ADVANCE CARE PLANNING:    Patient has an advance directive dated 4/18/02.  Primary Health Care Agent Candelaria Berry.  Alternate is  Robb Berry.    Patient now has expressive aphasia making communication difficult, while she appears to be communicating appropriately with understanding, Decisions are supported by [facility] Cand[facility].    Code status: No CPR- Do NOT Intubate     MEDICAL MANAGEMENT:   #Pain, Currently lower abdomen/pelvic area but this comes and goes, unknown cause, may be radicular in setting of chronic back pain, compression fracture (not on chronic opioid therapy), but no specific pathology noted at T12-L1 on most recent xray. Chiquita has BMs daily and catheter is draining urine. No history of cancer related pain, denies uncontrolled post-op pain in R wrist or hip.      Would not usually escalate opioids in this setting of non cancer pain, but patient appears to be tolerating well and has been on 60+ OME x 2 weeks and goal is comfort.     At this point I would give the Fentanyl patch time to reach steady state (72hrs on 6/1) before attempting further titration/adjustment.    Continue oxycodone PRN- reduce dose to 10mg PRN.    Acetaminophen (Tylenol), Scheduled    Muscle Relaxers:Currently on Robaxin scheduled QID, unclear benefit in this case, without improved pain since started consider stopping.    Anti-convulsants:  Gabapentin (Neurontin) 200mg TID, can titrate up over time every 5-7 days.      Topical medicines: None    Positioning and time in chair has been helpful     #Anxiety, Situational Anxiety with pain and change of goals of care.    lorazepam (Ativan) trialed 5/29 with reported improvement.    Pain management as above.     #Dyspnea,Hx of dyspnea with pleural fluid accumulating requiring thoracentesis  monthly, This has not been needed while on current cancer therapy.      Chiquita and Candelaria would like to discuss with hospice the possibility of continueing oral cancer therapy on hospice  with goal of prevent malignant pleural effusion.  We discussed she will likely be able to continue using her current suppy, or possibly continue if cancer is not her main hospcie diagnosis (failure to thrive with recurrent stroke despite DAPT?).  Hospice intake today.    We also discussed the more likely scenario of symptom management if the pleural effusion reoccurs at some point. Chiquita could certainly have a thoracentesis for comfort on hospice, decisions to come in for that procedure would be weighed with Chiquita's current status and ability to make the trip.  We discussed medical symptom management for dyspnea with pleural effusion if this occurred and she was unable to tolerate thoracentesis.      #Constipation, risk for opioid induced, currently stooling daily.  Sennoside (Senokot)  Polyethylene glycol (MiraLAX)      PSYCHOSOCIAL/SPIRITUAL:    Appreciate care of Ghassan Joshi, MDiv     Palliative Care will continue to follow. Thank you for the consult and allowing us to aid in the care of Chiquita Berry.     These recommendations have been discussed with bedside nurse Faustino AGRAWAL And Hospitalist Jose Alfredo Smith.     NIMCO Florence CNP  Securely message with [employer] (more info)  Text page via [facility] Paging/Directory         Assessments          Chiquita Berry is a 82 year old female with a past medical history of metastatic lung cancer, hx CVA Feb 2023,  who presented on 5/1 with a fall and right hip and wrist fractures now s/p ORIF and wrist repair. Post op course complicated by significant blood loss (Hb 4.6) and repeat Stroke on 5/13/23 in L posterior MCA (watershed distribution). She spent time in  ICU on 5/13 for pressors due to hypotension and worsening neuro findings. She is S/P neuro IR angioplasty and stenting of L MCA  "M2 segment on 5/14/23.  Stroke code activated on 5/16/23 upon transfer to floor for aphasia. CT head showed small L frontal SAH on 5/18/23. 5/24 MRI brain showed new acute stroke in corpus callosum.   Due to acute recurrent stroke while on DAPT, significant pain, expected long road to recovery with uncertain prognosis for recovery, patient opted to transition to comfort cares on 5/25/23 and is planning on discharge to LTC with hospice.     Today, the patient was seen for:  Pain management            Interval History:     Chart review/discussion with unit or clinical team members:   Chart review, no events overnight  No concerns from Dr. Smith or Faustino GOLDBERG  On 2 L Oxygen/NC  Last BM 5/30    Per patient or family/caregivers today:  Chiquita expresses pain continues in abdomen, but \"better\"  Pain also continues in back.  It is helpful for her to get out of bed and reposition in chair.    Denies dyspnea               Review of Systems:     Denies chest pain, HA.    Mild anxiety reported, r/t transition today.          Medications:     I have reviewed this patient's medication profile and medications during this hospitalization.    Noted meds:  Oxycodone 40mg yesterday + 37mcg Fnetanyl not yet at steady state. (135OME)           Physical Exam:   Vitals were reviewed  Temp: 98.2  F (36.8  C) Temp src: Oral BP: 115/62 Pulse: 81   Resp: 16 SpO2: 92 % O2 Device: Nasal cannula    PHYSICAL EXAM:  GEN:  Alert,appears oriented, follows commands.  Restless movemnts in bed reduced from yesterday.  HEENT:  Normocephalic/atraumatic, no scleral icterus, no nasal discharge, mouth moist. Pupils normal and reactive.  LUNGS:   Symmetric chest rise on inhalation noted. Non labored  ABD:  soft, non-tender/non-distended.  No rebound/guarding/rigidity.  EXT:  No edema or cyanosis.  No joint synovitis noted.  SKIN:  Dry to touch, no exanthems noted in the visualized areas.  Urinary: Mcallister in place draining             Data Reviewed:     Reviewed " recent pertinent imaging, comments:   none    Reviewed recent labs, comments:   none      TTS: I have personally spent a total of 30 minutes on the date of service reviewing patient's medical record, history, exam, documentation, consultation with the medical providers, assessing symptoms and providing emotional support. Time spend counseling with patient consisted of the following topics, care planning for discharge and symptom management.  Time spent in coordination of care with Bedside Nurse Faustino and Hospitalist Dr. Smith.

## 2023-05-31 NOTE — PLAN OF CARE
Goal Outcome Evaluation:    Orientation: Alert this AM severe expressive aphagia.      Vitals/Tele: VSS on RA    IV Access/drains: bruno with good output      Diet: Reg pills whole in apple sauce      Mobility: A1 gb/w     GI/: Incontinent of BM      Wound/Skin: scattered bruising mepilex in place on back      Discharge Plan: possible discharge today to [facility] with hospice @ 11am

## 2023-05-31 NOTE — PROGRESS NOTES
Care Management Discharge Note    Discharge Date: 05/31/2023       Discharge Disposition: Transitional Care    Discharge Services: None    Discharge DME: None    Discharge Transportation: other (see comments) (provided by facility)    Private pay costs discussed: Not applicable    Does the patient's insurance plan have a 3 day qualifying hospital stay waiver?  Yes   Will the waiver be used for post-acute placement? No    PAS Confirmation Code: [id number]  Patient/family educated on Medicare website which has current facility and service quality ratings: no    Education Provided on the Discharge Plan:    Persons Notified of Discharge Plans: LTC  Patient/Family in Agreement with the Plan: yes    Handoff Referral Completed: Yes    Additional Information:  INDU Roman received a call from [facility] and she updated them on the transport time and what time the hospice agency will follow the patient. Writer sent over orders via DOD to [facility] [place] and to [facility]. Writer filed out and faxed out [facility], [phone]. Writer completed PAS, faxed it over to [facility] [phone], and placed it on the patient's chart. Writer went to update the patient and patient stated that she wanted writer to update [facility]. Writer called [facility] [phone] updated her on the time the patient will be discharging. Patient will be discharging from [facility] to [facility] with [facility] between [phone]. [facility] will follow up with the patient over at [facility] around 1300.     JULIO Morris  [facility]  Social Work

## 2023-05-31 NOTE — DISCHARGE SUMMARY
[facility]    Discharge Summary  Hospitalist    Date of Admission:  5/1/2023  Date of Discharge:  5/31/2023 11:46 AM  Discharging Provider: Jose Alfredo Smith MD, MD  Date of Service (when I saw the patient): 05/31/23    Discharge Diagnoses   Please refer below    History of Present Illness   Chiquita Berry is an 82 year old female who presented with fall    Hospital Course   Chiquita Berry is a 82 year old female with PMH  metastatic lung cancer, hx CVA Feb 2023, who was admitted 5/2/2023 with a fall and R hip and R wrist fractures. S/p ORIF. Dual anitplatelets held pre and post-operatively. Unfortunately, post op course complicated by significant blood loss (Hb 4.6). Also complicated by repeat Stroke on 5/13/23 in L posterior MCA (watershed distribution). She was subsequently transferred to ICU on 5/13 for pressors due to hypotension and worsening neuro findings. She underwent neuro IR angioplasty and stenting of L MCA M2 segment on 5/14/23.  Titrated off pressors AM of 5/16. Stroke code activated on 5/16/23 upon transfer to floor for aphasia. CT head showed small L frontal SAH on 5/18/23. 5/24 MRI brain showed new acute stroke in corpus callosum. Due to acute recurrent stroke while on DAPT, significant pain, expected long road to recovery with uncertain prognosis for recovery, patient opted to transition to comfort cares on 5/25/23.    Patient is now discharged to [facility] with home hospice.    Final discharge diagnoses and hospital course     Comfort cares  * patient opting to transition to comfort cares on 5/25/23  * She wants to [facility] with hosp[facility] if able.  - comfort care order set in place  - was started on oxycodone q3h PRN and oxycontin 10mg BID, titrate to affect.   5/27: Pain not controlled:  added Holistic pain Mx: scheduled Tylenolol,, warm packs. Change Oxycontin to Fentanyl patch 25mcg.  Ct prn Oxy   5/28: Added gabapentin.      Patient pain  is now much better controlled, on fentanyl 25 mcg every 3 days, as needed Tylenol and oxycodone.  She will be on as needed lorazepam at discharge.     Appreciate Oncology review for Alectinib, now discontinued as patient hospice/comfort care.     Patient discharged to [facility] with home hospice, pain is much better controlled at this time.     Acute ischemic CVA  Acute CVA of corpus callosum while on DAPT  Acute SAH  Hx CVA Feb 2023   S/p L MCA angioplasty on 5/14/23  Mechanical fall  Acute comminuted fracture of the right hip  Acute comminuted fracture of the right distal radius  S/p ORIF R hip and R wrist 5/2/2023  Post-operative acute blood loss anemia, improved  Back pain  Acute metabolic encephalopathy, improved  Acute hypoxic respiratory failure - resolved  Probable Pneumonia, treated  Leukocytosis - resolved  UTI - resolved  Hypocalcemia  Metastatic lung cancer  Elevated LFTs  Goals of care        Jose Alfredo Smith MD, MD    Significant Results and Procedures       Pending Results   These results will be followed up by PCP  Unresulted Labs Ordered in the Past 30 Days of this Admission     No orders found from 4/1/2023 to 5/2/2023.          Code Status   Comfort Care       Primary Care Physician   Naz Jackson    Physical Exam   Temp: 98.2  F (36.8  C) Temp src: Oral BP: 115/62 Pulse: 81   Resp: 16 SpO2: 92 % O2 Device: Nasal cannula    Vitals:    05/15/23 0345 05/16/23 0543 05/21/23 0710   Weight: 60.5 kg (133 lb 6.1 oz) 60.7 kg (133 lb 13.1 oz) 68.8 kg (151 lb 9.6 oz)     Vital Signs with Ranges  Temp:  [98.2  F (36.8  C)-99.1  F (37.3  C)] 98.2  F (36.8  C)  Pulse:  [] 81  Resp:  [16-17] 16  BP: (115-151)/(62-75) 115/62  SpO2:  [92 %] 92 %  I/O last 3 completed shifts:  In: 240 [P.O.:240]  Out: 1800 [Urine:1800]    Constitutional: awake, alert, cooperative, no apparent distress, and appears stated age  Eyes: Lids and lashes normal, pupils equal, round and reactive to light, extra ocular  muscles intact, sclera clear, conjunctiva normal  Respiratory: No increased work of breathing, good air exchange, clear to auscultation bilaterally, no crackles or wheezing  Cardiovascular: Normal apical impulse, regular rate and rhythm, normal S1 and S2, no S3 or S4, and no murmur noted    Discharge Disposition   Discharged to short-term care facility  Condition at discharge: Stable    Consultations This Hospital Stay   ORTHOPEDIC SURGERY IP CONSULT  PHYSICAL THERAPY ADULT IP CONSULT  OCCUPATIONAL THERAPY ADULT IP CONSULT  SPEECH LANGUAGE PATH ADULT IP CONSULT  CARE MANAGEMENT / SOCIAL WORK IP CONSULT  PHYSICAL THERAPY ADULT IP CONSULT  OCCUPATIONAL THERAPY ADULT IP CONSULT  OCCUPATIONAL THERAPY ADULT IP CONSULT  PHYSICAL THERAPY ADULT IP CONSULT  OCCUPATIONAL THERAPY ADULT IP CONSULT  NEUROLOGY IP STROKE CONSULT  WOUND OSTOMY CONTINENCE NURSE  IP CONSULT  SPEECH LANGUAGE PATH ADULT IP CONSULT  OCCUPATIONAL THERAPY ADULT IP CONSULT  OCCUPATIONAL THERAPY ADULT IP CONSULT  ORTHOSIS EXTREMITY UPPER REFERRAL IP CONSULT  OCCUPATIONAL THERAPY ADULT IP CONSULT  VASCULAR ACCESS ADULT IP CONSULT  VASCULAR ACCESS ADULT IP CONSULT  SPEECH LANGUAGE PATH ADULT IP CONSULT  VASCULAR ACCESS ADULT IP CONSULT  NEUROLOGY IP STROKE CONSULT  PHARMACY LIAISON FOR MEDICATION COVERAGE CONSULT  SPIRITUAL HEALTH SERVICES IP CONSULT  HEMATOLOGY & ONCOLOGY IP CONSULT  PALLIATIVE CARE ADULT IP CONSULT  PHYSICAL THERAPY ADULT IP CONSULT    Time Spent on this Encounter   IJose Alfredo MD, personally saw the patient today and spent greater than 30 minutes discharging this patient.    Discharge Orders      Medication Therapy Management Referral      General info for SNF    Length of Stay Estimate: Short Term Care: Estimated # of Days <30  Condition at Discharge: Improving  Level of care:skilled   Rehabilitation Potential: Good  Admission H&P remains valid and up-to-date: Yes  Recent Chemotherapy: N/A  Use Nursing Home Standing Orders: Yes      Mantoux instructions    Give two-step Mantoux (PPD) Per Facility Policy Yes     Activity - Up with assistive device    Up with platform walker/assist     Intake and output    Every shift     Daily weights    Call Provider for weight gain of more than 2 pounds per day or 5 pounds per week.     Activity - Up with assistive device     Weight bearing status    WBAT Right LE with platform walker. No lifting more than 1-2 pounds with the right hand/wrist until 6 weeks postop, then progress as tolerated and can begin using a regular walker at that time. Okay for gentle/AROM of the right wrist at 2 weeks postop and progress to AAROM/PROM as tolerated at 6 weeks postop.    Right wrist brace to be worn at all times aside from hygiene and for exercises until 6 weeks postop, then can gradually wean.     Wound care (specify)    Right hip: Open to air. Okay to shower. No soaking until 4-5 weeks postop.    Right wrist: Sutures removed on 5/16/23. Okay to shower. Steri-Strips to fall off naturally. No soaking until 4-5 weeks postop.     General info for SNF    Length of Stay Estimate: Short Term Care: Estimated # of Days <30  Condition at Discharge: Stable  Level of care:skilled   Rehabilitation Potential: Poor  Admission H&P remains valid and up-to-date: Yes  Recent Chemotherapy: N/A  Use Nursing Home Standing Orders: Yes     Follow Up and recommended labs and tests    Follow up with Hospice.  No follow up labs or test are needed.     Reason for your hospital stay    Acute ischemic CVA  Acute CVA of corpus callosum while on DAPT  Acute SAH  Hx CVA Feb 2023   S/p L MCA angioplasty on 5/14/23  Mechanical fall  Acute comminuted fracture of the right hip  Acute comminuted fracture of the right distal radius  S/p ORIF R hip and R wrist 5/2/2023  Post-operative acute blood loss anemia, improved  Back pain  Acute metabolic encephalopathy, improved  Acute hypoxic respiratory failure - resolved     No CPR- Do NOT Intubate    Comfort  care only/Hospice     Speech Language Path Adult Consult    Evaluate and treat as clinically indicated.    Reason:  CVA     Fall precautions     Fall precautions     Walker DME    : DME Documentation: Describe the reason for need to support medical necessity: Impaired gait status post hip surgery. I, the undersigned, certify that the above prescribed supplies are medically necessary for this patient and is both reasonable and necessary in reference to accepted standards of medical practice in the treatment of this patient's condition and is not prescribed as a convenience.     Diet    Follow this diet upon discharge: Orders Placed This Encounter      Snacks/Supplements Adult: Magic Cup; With Meals      Regular Diet Adult     Diet    Follow this diet upon discharge: Orders Placed This Encounter      Snacks/Supplements Adult: Magic Cup; With Meals      Room Service      Diet      Regular Diet Adult     Stroke Hospital Follow Up (for neurologist use only)    [facility] will call you to coordinate care as prescribed by your provider. If you don t hear from a representative within 2 business days, please call [phone].    [facility] will call you to coordinate care as prescribed by your provider. If you don t hear from a representative within 2 business days, please call [phone].    [facility] will call you to coordinate care as prescribed by your provider. If you don t hear from a representative within 2 business days, please call [phone].       Stroke Hospital Follow Up (for neurologist use only)    [facility] will call you to coordinate care as prescribed by your provider. If you don t hear from a representative within 2 business days, please call [phone].       Discharge Medications   Discharge Medication List as of 5/31/2023 10:08 AM      START taking these medications    Details   fentaNYL (DURAGESIC) 25 mcg/hr 72 hr patch Place 1 patch onto the skin every 72 hours  "remove old patch., Disp-4 patch, R-0, E-Prescribe      gabapentin (NEURONTIN) 100 MG capsule Take 1 capsule (100 mg) by mouth 3 times daily for 30 days, Disp-90 capsule, R-0, E-Prescribe      LORazepam (ATIVAN) 1 MG tablet Place 1 tablet (1 mg) under the tongue every 3 hours as needed for anxiety, Disp-12 tablet, R-0, E-Prescribe      senna-docusate (SENOKOT-S/PERICOLACE) 8.6-50 MG tablet Take 1 tablet by mouth 2 times daily as needed for constipation, Disp-20 tablet, R-0, Transitional         CONTINUE these medications which have CHANGED    Details   acetaminophen (TYLENOL) 325 MG tablet Take 2 tablets (650 mg) by mouth every 4 hours as needed for other (For optimal non-opioid multimodal pain management to improve pain control.), Disp-30 tablet, R-0, Transitional      clopidogrel (PLAVIX) 75 MG tablet 1 tablet (75 mg) by Oral or NG Tube route daily Take baby aspirin and Plavix daily for total of 90 days.  Then stop Plavix continue baby aspirin daily indefinitely for stroke prevention, Disp-83 tablet, R-0, Transitional      oxyCODONE (ROXICODONE) 5 MG tablet Take 0.5-1 tablets (2.5-5 mg) by mouth every 4 hours as needed for moderate pain 1 tab po q 4 hrs prn pain scale \"3-5\"  2 tabs po q 4 hrs prn pain scale \"6-10\", Disp-20 tablet, R-0, E-Prescribe      QUEtiapine (SEROQUEL) 25 MG tablet Take 1 tablet (25 mg) by mouth daily as needed (agitation), Disp-10 tablet, R-0, E-Prescribe         CONTINUE these medications which have NOT CHANGED    Details   aspirin (ASA) 81 MG EC tablet Take 1 tablet (81 mg) by mouth daily Take baby aspirin indefinitely for stroke, Disp-30 tablet, R-11, E-Prescribe      hydrOXYzine (ATARAX) 10 MG tablet Take 1 tablet (10 mg) by mouth 3 times daily as needed for anxiety, Disp-30 tablet, R-1, E-Prescribe      latanoprost (XALATAN) 0.005 % ophthalmic solution Place 1 drop into both eyes every evening , R-2, Historical      melatonin 3 MG CAPS Take 3 mg by mouth At Bedtime And 3mg prn po before " 0200 hrs (total of 6mg), Disp-60 capsule, R-11, E-Prescribe      Multiple Vitamins-Minerals (PRESERVISION AREDS 2) CAPS TAKE 1 CAPSULE BY MOUTH TWICE A DAY, Disp-60 capsule, R-0, E-Prescribe PLEASE RESEND SCRIPT FOR #60 OR #56. #53 NOT ENOUGH TO FILL COMPLETE CYCLE       multivitamin w/minerals (THERA-VIT-M) tablet Take 1 tablet by mouth daily, Disp-30 tablet, R-11, E-Prescribe      ondansetron (ZOFRAN) 4 MG tablet Take 1 tablet (4 mg) by mouth every 6 hours as needed for nausea, Disp-30 tablet, R-0, E-Prescribe      pantoprazole (PROTONIX) 40 MG EC tablet Take 1 tablet (40 mg) by mouth daily, Disp-30 tablet, R-11, E-Prescribe      polyethylene glycol (MIRALAX) 17 GM/Dose powder Take 17 g by mouth daily, Disp-510 g, Historical      polyethylene glycol 400 (BLINK TEARS) 0.25 % SOLN ophthalmic solution Place 1 drop into both eyes daily And Q2H PRN, Historical      traZODone (DESYREL) 50 MG tablet TAKE ONE TABLET BY MOUTH EVERY NIGHT AT BEDTIME, Disp-29 tablet, R-0, E-Prescribe      vitamin D3 (CHOLECALCIFEROL) 50 mcg (2000 units) tablet Take 1 tablet (50 mcg) by mouth daily, Disp-30 tablet, R-11, E-Prescribe         STOP taking these medications       alectinib (ALECENSA) 150 MG CAPS Comments:   Reason for Stopping:         atorvastatin (LIPITOR) 40 MG tablet Comments:   Reason for Stopping:         traMADol (ULTRAM) 50 MG tablet Comments:   Reason for Stopping:             Allergies   Allergies   Allergen Reactions     Adhesive Tape Dermatitis     Skin Sensitivity to Adhesive ie. Lidocaine patch, tele patches, tapes     Amoxicillin-Pot Clavulanate Diarrhea     Celebrex [Celecoxib] Rash     Lidocaine      Skin sensitivity to Lidocaine patch adhesive     Data   Most Recent 3 CBC's:Recent Labs   Lab Test 05/25/23  1005 05/21/23  0627 05/20/23  0712 05/19/23  0808 05/18/23  1543 05/17/23  0748 05/16/23  0539   WBC 9.8  --   --   --  10.4  --  13.3*   HGB 9.6* 8.7* 8.5*   < > 8.5*   < > 8.0*   *  --   --   --  97   --  96   *  --   --   --  641*  --  589*    < > = values in this interval not displayed.      Most Recent 3 BMP's:  Recent Labs   Lab Test 05/25/23  1005 05/24/23  0739 05/23/23  0811 05/21/23  0826 05/18/23  1543 05/16/23  1850 05/16/23  0539   NA  --  138  --   --  138  --  132*   POTASSIUM 4.7 3.9  3.9 4.0   < > 3.7  --  3.5   CHLORIDE  --  105  --   --  101  --  93*   CO2  --  23  --   --  28  --  30*   BUN  --  15.1  --   --  9.2  --  7.5*   CR  --  0.50*  --   --  0.50*  --  0.73   ANIONGAP  --  10  --   --  9  --  9   SAGE  --  8.9  --   --  8.3*  --  9.1   GLC  --  107*  --   --  139* 119* 99    < > = values in this interval not displayed.     Most Recent 2 LFT's:  Recent Labs   Lab Test 05/16/23  0539 05/09/23  1853   AST 29 35   ALT 14 19   ALKPHOS 171* 112*   BILITOTAL 0.5 1.1     Most Recent INR's and Anticoagulation Dosing History:  Anticoagulation Dose History         Latest Ref Rng & Units 2/6/2023 2/7/2023   Recent Dosing and Labs   INR 0.85 - 1.15 0.97   1.06                Most Recent 3 Troponin's:  Recent Labs   Lab Test 02/15/19  1549   TROPI <0.015     Most Recent Cholesterol Panel:  Recent Labs   Lab Test 05/10/23  0859   CHOL 171   LDL 99   HDL 34*   TRIG 190*     Most Recent 6 Bacteria Isolates From Any Culture (See EPIC Reports for Culture Details):No lab results found.  Most Recent TSH, T4 and A1c Labs:  Recent Labs   Lab Test 05/10/23  0859 02/05/23  0750 02/02/23  0534   TSH  --   --  5.00*   T4  --   --  1.24   A1C 4.9   < >  --     < > = values in this interval not displayed.     Results for orders placed or performed during the hospital encounter of 05/01/23   XR Wrist Right G/E 3 Views    Narrative    WRIST RIGHT THREE OR MORE VIEWS May 1, 2023 1:30 PM     HISTORY: Fall, pain, deformity.    COMPARISON: None.      Impression    IMPRESSION:  1. Comminuted intra-articular fracture of the distal radius. There is  1 cm of displacement and impaction and there is moderate to  marked  dorsal tilt of the distal radial articular surface.  2. Mildly displaced comminuted fracture of the ulnar styloid process.  3. Secondary to the radius impaction there is positive ulnar variance.  4. Diffuse bone demineralization.  5. Multifocal osteoarthrosis particularly at the STT joint, first CMC  joint and first MCP joint.    CHARLEY SOLOMON MD         SYSTEM ID:  [id number]   XR Pelvis w Hip Right 1 View    Narrative    PELVIS AND HIP RIGHT ONE VIEW   5/1/2023 1:43 PM     HISTORY: Fall, right hip pain.    COMPARISON: None.      Impression    IMPRESSION: There is a comminuted intertrochanteric fracture of the  right hip. There is several centimeters of displacement and impaction.  There is moderate apex superolateral angulation. Extensive  degenerative changes in the spine, diffuse bone demineralization, and  convex left curvature in the spine incidentally noted.    CHARLEY SOLOMON MD         SYSTEM ID:  [id number]   XR Hand Left G/E 3 Views    Narrative    HAND LEFT THREE OR MORE VIEWS May 1, 2023 2:48 PM     INDICATION: Left hand pain and swelling after a fall.     COMPARISON: None.      Impression    IMPRESSION:  1.  No acute fracture or joint malalignment.  2.  Moderate thumb CMC degenerative arthrosis.  3.  Probable old healed fracture of the fourth finger distal phalanx  dorsal base.  4.  Bone demineralization.  5.  Catheter in the dorsal wrist.    RONAL SILVESTRE MD         SYSTEM ID:  [id number]   XR Wrist Right 2 Views    Narrative    WRIST RIGHT TWO VIEWS  DATE/TIME: 5/1/2023 4:01 PM     INDICATION: Left wrist fractures. Interval closed reduction.   COMPARISON: 5/1/2023.      Impression    IMPRESSION:  1.  Comminuted intra-articular fracture of the right distal radius.  Improved alignment of the fracture, with decreased impaction. The  distal articular surface is congruent.  2.  Mildly displaced fracture of the ulnar styloid process, unchanged.  3.  Resolution of ulna positive variance.  4.  New  wrist splint.  5.  Remainder unchanged.    RONAL SILVESTRE MD         SYSTEM ID:  [id number]   XR Femur Right 2 Views    Narrative    EXAM: XR FEMUR RIGHT 2 VIEWS  LOCATION: [facility]  DATE/TIME: 5/1/2023 6:53 PM CDT    INDICATION: preop planning  COMPARISON: 02/04/2023      Impression    IMPRESSION: The proximal femur is not imaged. No acute fracture or malalignment. Osteopenia.   XR Surgery LOIDA L/T 5 Min Fluoro w Stills    Narrative    XR SURGERY LOIDA FLUORO LESS THAN 5 MIN W STILLS 5/2/2023 4:17 PM     HISTORY: Open Reduction Internal Fixation Right Hip and Open Reduction  Internal Fixation Right Distal Radius    NUMBER OF IMAGES ACQUIRED: 9    VIEWS: 4 views of the right hip and 5 views of the right wrist    FLUOROSCOPY TIME: 1.4      Impression    IMPRESSION: Intramedullary nail with compression locking screw  fixation has been placed across the right intertrochanteric fracture.  Volar plate and screw fixation have been placed across the distal  right radial fracture.    JONO BLACK MD         SYSTEM ID:  [id number]   XR Pelvis w Hip Port Right 1 View    Narrative    EXAM: XR PELVIS AND HIP PORTABLE RIGHT 1 VIEW  LOCATION: [facility]  DATE/TIME: 5/2/2023 5:33 PM CDT    INDICATION: Right hip postoperative follow-up.  COMPARISON: 05/01/2023.      Impression    IMPRESSION:  1.  Interval ORIF right femur intertrochanteric fracture with intramedullary nail and locking femoral neck screw fixation. The hardware is intact and the fracture is reduced.  2.  Superomedially displaced lesser trochanteric fracture fragment.  3.  Normal right hip joint spacing and alignment.  4.  Postoperative soft tissue gas about the right hip.  5.  Bone demineralization.   XR Wrist Port Right 2 Views    Narrative    EXAM: XR WRIST PORT RIGHT 2 VIEWS  LOCATION: [facility]  DATE/TIME: 5/2/2023 5:33 PM CDT    INDICATION: Right wrist postoperative  follow-up.  COMPARISON: 05/01/2023.      Impression    IMPRESSION:  1.  Interval ORIF right distal radius fracture with volar plate/screw fixation. The hardware is intact and the fracture is reduced. The distal radius articular surface appears grossly congruent.  2.  Tiny nondisplaced fracture of the ulna styloid process, unchanged.  3.  Bone demineralization.  4.  Wrist splint.   CT Head w/o Contrast    Narrative    CT HEAD W/O CONTRAST 5/4/2023 2:01 PM    INDICATION: ongoing encephalopathy  TECHNIQUE: CT scan of the head without contrast. Dose reduction  techniques were used.  CONTRAST: None.  COMPARISON: 2/13/2023 brain MRI.    FINDINGS:   No intracranial hemorrhage, extraaxial collection, mass effect or CT  evidence of acute infarct.  Moderate presumed chronic small vessel  ischemic changes. Chronic lacunar infarct in the posterior right  centrum semiovale. Moderate generalized volume loss. The ventricles  are proportional to the sulci. No skull fracture. No scalp hematoma.  Postoperative changes to the bilateral lenses. Paranasal sinuses are  free of significant disease. Clear mastoid air cells.      Impression    IMPRESSION:  1.  No acute intracranial abnormality.    2.  Moderate diffuse parenchymal volume loss with a moderate burden  scattered chronic small vessel ischemic change.    3.  Now chronic appearance of the small lacunar infarcts of the  posterior left centrum semiovale.    ASIYA PEDERSEN MD         SYSTEM ID:  [id number]   XR Chest Port 1 View    Narrative    XR CHEST PORT 1 VIEW  5/5/2023 12:07 PM       INDICATION: hypoxia  COMPARISON: 2/7/2023       Impression    IMPRESSION: Small left pleural effusion slightly decreased from  previous. Underlying atelectasis/consolidation left lung base similar  to previous. Discoid atelectasis right lung base as well. No definite  pulmonary edema.    CINTHIA LAWRENCE MD         SYSTEM ID:  [id number]   XR Chest Port 1 View    Narrative    CHEST ONE VIEW  5/8/2023  4:09 PM     HISTORY: Hypoxia    COMPARISON: May 5, 2023      Impression    IMPRESSION: Slight improvement in atelectasis and/or infiltrate at the  left base with associated pleural fluid. Stable minimal linear  atelectasis and/or fibrosis on the right.    JOSSIE HUGHES MD         SYSTEM ID:  [id number]   CT Head w/o Contrast    Narrative    EXAM: CT HEAD W/O CONTRAST  LOCATION: [facility]  DATE/TIME: 5/8/2023 9:24 PM CDT    INDICATION: increased aphasia  COMPARISON: CT 05/04/2023  TECHNIQUE: Routine CT Head without IV contrast. Multiplanar reformats. Dose reduction techniques were used.    FINDINGS:  INTRACRANIAL CONTENTS: No intracranial hemorrhage, extraaxial collection, or mass effect.  No CT evidence of acute infarct. Mild to moderate presumed chronic small vessel ischemic changes. Moderate generalized volume loss. No hydrocephalus.   Retrocerebellar CSF space is stable.    VISUALIZED ORBITS/SINUSES/MASTOIDS: No intraorbital abnormality. No paranasal sinus mucosal disease. No middle ear or mastoid effusion.    BONES/SOFT TISSUES: No acute abnormality.      Impression    IMPRESSION:  1.  No CT evidence for acute intracranial process.  2.  Brain atrophy and presumed chronic microvascular ischemic changes as above.   MR Brain w/o & w Contrast    Narrative    EXAM: MR BRAIN W/O and W CONTRAST  LOCATION: [facility]  DATE/TIME: 5/10/2023 4:25 AM CDT    INDICATION: increased aphasia, history of stroke  COMPARISON: MRI brain 02/13/2023  CONTRAST: 10mL Gadavist  TECHNIQUE: Routine multiplanar multisequence head MRI without and with intravenous contrast.    FINDINGS:  INTRACRANIAL CONTENTS:  Left frontoparietal centrum semiovale white matter demonstrates multiple small and prominent acute infarcts. (Restricted diffusion, positive FLAIR).    Left frontoparietal centrum semiovale white matter demonstrates multiple small areas of enhancement likely due to subacute infarcts.  Recommend follow-up to resolution to exclude a more progressive etiology.    Left frontoparietal centrum semiovale white matter demonstrate multiple small chronic infarcts.     No midline shift. No mass, acute hemorrhage, or extra-axial fluid collections. Patchy nonspecific T2/FLAIR hyperintensities within the cerebral white matter most consistent with mild to moderate chronic microvascular ischemic change. Moderate   generalized cerebral atrophy. No hydrocephalus. Mild to moderate cerebellar atrophy. Prominent retrocerebellar arachnoid cyst redemonstrated.    Otherwise, no intracranial pathologic enhancement.    SELLA: No abnormality accounting for technique.    OSSEOUS STRUCTURES/SOFT TISSUES: Normal marrow signal. The major intracranial vascular flow voids are maintained.     ORBITS: No abnormality accounting for technique.     SINUSES/MASTOIDS: Mild to moderate mucosal thickening scattered about the paranasal sinuses. No middle ear or mastoid effusion.       Impression    IMPRESSION:  1.  Left frontoparietal centrum semiovale white matter demonstrates multiple small and prominent acute infarcts.    2.  Left frontoparietal centrum semiovale white matter demonstrates multiple small areas of enhancement likely due to subacute infarcts. Recommend follow-up to resolution to exclude a more progressive etiology.    3.  Left frontoparietal centrum semiovale white matter demonstrate multiple small chronic infarcts.    4.  These acute, subacute, and chronic infarcts are within the same location.    5.  Chronic intracranial changes described above.     MRA Brain ([place] [facility] [place]) w/o Contrast    Narrative    EXAM: MRA BRAIN ([place]) W/O CONTRAST  LOCATION: [facility]  DATE/TIME: 5/10/2023 4:25 AM CDT    INDICATION: possible stroke, increased aphasia  COMPARISON: CTA head and neck 02/17/2023  TECHNIQUE: 3D time-of-flight head MRA without intravenous contrast.    FINDINGS:  Left distal  A2/A3 occlusion or near occlusion with reconstitution. This is new compared to prior exam.    Left M2 segment inferior division proximal aspect occlusion with reconstitution. This is stable compared to prior exam.    Left proximal anterior temporal artery occlusion or near occlusion with reconstitution. A severe stenosis was notified on prior exam.    Left distal V4 segment severe stenosis. This appears worse compared to prior exam.    Right proximal P2 segment moderate stenosis. This is similar compared to prior exam.    Left proximal P2 segment severe stenosis. This is similar compared to prior exam.    No additional significant stenosis/occlusion.    No brain aneurysm. No AVM/AVF.    Left posterior communicating artery infundibulum with visualized apical artery.    Left fetal origin PCA.    Dominant right and smaller left vertebral artery contribute to a normal basilar artery.       Impression    IMPRESSION:  Multiple intracranial stenoses and occlusions described above. Please see above for discussion.   MRA Neck (Carotids) wo & w Contrast    Narrative    EXAM: MRA NECK (CAROTIDS) W/O and W CONTRAST  LOCATION: [facility]  DATE/TIME: 5/10/2023 4:25 AM CDT    INDICATION: possible stroke, increased aphasia  COMPARISON: CT head 05/04/2023  CONTRAST: 10mL Gadavist  TECHNIQUE: Neck MRA without and with IV contrast. Stenosis measurements made according to NASCET criteria unless otherwise specified.    FINDINGS:  RIGHT CAROTID: No measurable stenosis or dissection.    LEFT CAROTID: Left carotid bulb plaque. No measurable stenosis or dissection.    VERTEBRAL ARTERIES: Bilateral extradural vertebral arteries demonstrate no significant stenosis, occlusion, dissection. Dominant right and smaller left vertebral arteries.    AORTIC ARCH: Classic aortic arch anatomy with no significant stenosis at the origin of the great vessels.    Small left pleural effusion. Right thyroid lobe nodule measuring 1.2  cm.      Impression    IMPRESSION:  1.  No significant stenosis, occlusion, dissection.  2.  Right thyroid lobe nodule measuring 1.2 cm.   CT Head w/o Contrast    Narrative    EXAM: CT HEAD W/O CONTRAST, CTA HEAD NECK W CONTRAST, CT HEAD PERFUSION W CONTRAST  LOCATION: [facility]  DATE/TIME: 5/13/2023 7:00 PM CDT    INDICATION: code stroke  COMPARISON: CT head 05/08/2023 and MR brain 05/10/2023.  CONTRAST: 70mL Isovue 370 (accession [id number]),  50 ml Isovue 370 (accession [id number]), 70mL Isovue 370 (accession [id number])  TECHNIQUE: Head and neck CT angiogram with IV contrast. Noncontrast head CT followed by axial helical CT images of the head and neck vessels obtained during the arterial phase of intravenous contrast administration. Axial 2D reconstructed images and   multiplanar 3D MIP reconstructed images of the head and neck vessels were performed by the technologist. Additional CT cerebral perfusion was performed utilizing a second contrast bolus. Perfusion data were post processed with generation of standard   perfusion maps and estimation of ischemic/infarcted volumes utilizing standard threshold values. Dose reduction techniques were used. All stenosis measurements made according to NASCET criteria unless otherwise specified.    FINDINGS:    NONCONTRAST HEAD CT:   INTRACRANIAL CONTENTS: Patchy subcortical hypodensities in the left posterior corona radiata and centrum semiovale most likely corresponding with the known subacute on chronic ischemic insults as seen to better advantage on recent comparison MRI. No   definite new additional acute transcortical infarct. No hemorrhage or extraaxial collection. No mass effect. Subarachnoid cisterns are patent. Patchy white matter hypodensities are, while nonspecific, most compatible with chronic microvascular ischemic   changes. Unchanged proportional prominence of the ventricles and sulci is compatible with diffuse cerebral volume loss.  "Mehdi cisterna magna.    VISUALIZED ORBITS/SINUSES/MASTOIDS: Bilateral lens replacements. Paranasal sinuses are clear. No middle ear or mastoid effusion.    BONES/SOFT TISSUES: No acute abnormality.    HEAD CTA:  ANTERIOR CIRCULATION: No proximal branch vessel occlusion. Redemonstrated multifocal atherosclerosis, including focal severe stenosis at the distal A2/A3 segment left JOSI as well as severe tandem stenoses at the proximal and mid inferior division M2   branch of the left MCA. Additional mild M1 segment narrowing and scattered atherosclerotic calcification about the carotid siphons. Similar presumed infundibular origin of the left posterior communicating artery.    POSTERIOR CIRCULATION: No proximal branch vessel occlusion. Severe stenosis at the distal V4 segment of the left vertebral artery. Mild atherosclerotic irregularity and tandem narrowings about the basilar artery. Essentially exclusive supply of left PCA   via posterior communicating artery with moderate stenosis at the P1/P2 junction as well as tandem severe stenoses at the proximal P3 branches. Moderate narrowing at the P1 segment of the right PCA with moderate distal P2 stenosis and severe proximal P3   stenosis. No aneurysm or high-flow vascular malformation.    DURAL VENOUS SINUSES: Expected enhancement of the major dural venous sinuses.    NECK CTA:  RIGHT CAROTID: Mild atherosclerosis without hemodynamically significant stenosis by NASCET criteria at the carotid bifurcation. Segment of luminal irregularity in a \"beading\" pattern at the distal right cervical ICA compatible with fibromuscular   dysplasia. No andrew dissection.    LEFT CAROTID: Patent without flow-limiting stenosis by NASCET criteria at the carotid bifurcation. No dissection.    VERTEBRAL ARTERIES: Codominant vertebral arteries. No flow-limiting stenosis. Luminal irregularity with alternating narrowings and ectasia at the near the V2/V3 segment of the left vertebral artery can be " compatible with fibromuscular dysplasia. No andrew   dissection.    AORTIC ARCH: Three-vessel aortic arch configuration. Mild atherosclerosis at the visualized arch and great vessel origins without flow-limiting stenosis. Mild eccentric plaque at the left proximal subclavian artery causes mild (less than 50%) focal   stenosis.    NONVASCULAR STRUCTURES: Atelectasis in the visualized upper lungs. No neck mass or lymphadenopathy. Thyroid gland is mildly heterogeneous with suspected nodules. No acute or aggressive appearing osseous abnormalities. Multilevel degenerative changes in   the visualized cervical spine. Grade 1 retrolisthesis at C3-C4. Osseous structures appear diffusely demineralized.    CT PERFUSION:  PERFUSION MAPS: There is asymmetric prolong transit time corresponding with the region of known infarcts within the left posterior corona radiata/centrum semiovale. There is also slight increase in cerebral blood volume as well as decrease in cerebral   blood flow at this region.    RAPID ANALYSIS:  CBF<30%: 0 mL  Tmax>6sec: 8 mL  Mismatch volume: 8 mL  Mismatch ratio: Infinite      Impression    IMPRESSION:   HEAD CT:  1.  CT correlate of known recent ischemic insult in the left posterior corona radiata/centrum semiovale seen to better advantage on recent comparison MRI.  2.  No definite new acute transcortical infarct or acute intracranial hemorrhage.  3.  Moderate diffuse cerebral volume loss and features compatible with mild chronic microangiopathic ischemic white matter changes.    HEAD CTA:  1.  No proximal branch vessel occlusion, aneurysm, or vascular malformation.   2.  Intracranial atherosclerosis with multifocal stenoses, as detailed. Briefly, severe stenoses at the A2/A3 segment left JOSI, inferior division M2 segment left MCA, P3 segments of bilateral PCAs, and distal V4 segment left vertebral artery.    NECK CTA:  1.  No flow-limiting stenosis or findings of dissection.   2.  Question features  compatible with fibromuscular dysplasia at the distal right cervical ICA and V2/V3 junction of left vertebral artery.    CT PERFUSION:  1.  Alterations on perfusion maps corresponding with region of known infarct in left posterior corona radiata/centrum semiovale, as detailed.    Faustino Pittman MD notified provider, LISA ROGERS, of CTA/CTA results via telephone at 5/13/2023 7:16 PM CDT, who acknowledge understanding.   CTA Head Neck w Contrast    Narrative    EXAM: CT HEAD W/O CONTRAST, CTA HEAD NECK W CONTRAST, CT HEAD PERFUSION W CONTRAST  LOCATION: [facility]  DATE/TIME: 5/13/2023 7:00 PM CDT    INDICATION: code stroke  COMPARISON: CT head 05/08/2023 and MR brain 05/10/2023.  CONTRAST: 70mL Isovue 370 (accession [id number]),  50 ml Isovue 370 (accession [id number]), 70mL Isovue 370 (accession [id number])  TECHNIQUE: Head and neck CT angiogram with IV contrast. Noncontrast head CT followed by axial helical CT images of the head and neck vessels obtained during the arterial phase of intravenous contrast administration. Axial 2D reconstructed images and   multiplanar 3D MIP reconstructed images of the head and neck vessels were performed by the technologist. Additional CT cerebral perfusion was performed utilizing a second contrast bolus. Perfusion data were post processed with generation of standard   perfusion maps and estimation of ischemic/infarcted volumes utilizing standard threshold values. Dose reduction techniques were used. All stenosis measurements made according to NASCET criteria unless otherwise specified.    FINDINGS:    NONCONTRAST HEAD CT:   INTRACRANIAL CONTENTS: Patchy subcortical hypodensities in the left posterior corona radiata and centrum semiovale most likely corresponding with the known subacute on chronic ischemic insults as seen to better advantage on recent comparison MRI. No   definite new additional acute transcortical infarct. No hemorrhage or extraaxial  "collection. No mass effect. Subarachnoid cisterns are patent. Patchy white matter hypodensities are, while nonspecific, most compatible with chronic microvascular ischemic   changes. Unchanged proportional prominence of the ventricles and sulci is compatible with diffuse cerebral volume loss. Mehdi cisterna magna.    VISUALIZED ORBITS/SINUSES/MASTOIDS: Bilateral lens replacements. Paranasal sinuses are clear. No middle ear or mastoid effusion.    BONES/SOFT TISSUES: No acute abnormality.    HEAD CTA:  ANTERIOR CIRCULATION: No proximal branch vessel occlusion. Redemonstrated multifocal atherosclerosis, including focal severe stenosis at the distal A2/A3 segment left JOSI as well as severe tandem stenoses at the proximal and mid inferior division M2   branch of the left MCA. Additional mild M1 segment narrowing and scattered atherosclerotic calcification about the carotid siphons. Similar presumed infundibular origin of the left posterior communicating artery.    POSTERIOR CIRCULATION: No proximal branch vessel occlusion. Severe stenosis at the distal V4 segment of the left vertebral artery. Mild atherosclerotic irregularity and tandem narrowings about the basilar artery. Essentially exclusive supply of left PCA   via posterior communicating artery with moderate stenosis at the P1/P2 junction as well as tandem severe stenoses at the proximal P3 branches. Moderate narrowing at the P1 segment of the right PCA with moderate distal P2 stenosis and severe proximal P3   stenosis. No aneurysm or high-flow vascular malformation.    DURAL VENOUS SINUSES: Expected enhancement of the major dural venous sinuses.    NECK CTA:  RIGHT CAROTID: Mild atherosclerosis without hemodynamically significant stenosis by NASCET criteria at the carotid bifurcation. Segment of luminal irregularity in a \"beading\" pattern at the distal right cervical ICA compatible with fibromuscular   dysplasia. No andrew dissection.    LEFT CAROTID: Patent without " flow-limiting stenosis by NASCET criteria at the carotid bifurcation. No dissection.    VERTEBRAL ARTERIES: Codominant vertebral arteries. No flow-limiting stenosis. Luminal irregularity with alternating narrowings and ectasia at the near the V2/V3 segment of the left vertebral artery can be compatible with fibromuscular dysplasia. No andrew   dissection.    AORTIC ARCH: Three-vessel aortic arch configuration. Mild atherosclerosis at the visualized arch and great vessel origins without flow-limiting stenosis. Mild eccentric plaque at the left proximal subclavian artery causes mild (less than 50%) focal   stenosis.    NONVASCULAR STRUCTURES: Atelectasis in the visualized upper lungs. No neck mass or lymphadenopathy. Thyroid gland is mildly heterogeneous with suspected nodules. No acute or aggressive appearing osseous abnormalities. Multilevel degenerative changes in   the visualized cervical spine. Grade 1 retrolisthesis at C3-C4. Osseous structures appear diffusely demineralized.    CT PERFUSION:  PERFUSION MAPS: There is asymmetric prolong transit time corresponding with the region of known infarcts within the left posterior corona radiata/centrum semiovale. There is also slight increase in cerebral blood volume as well as decrease in cerebral   blood flow at this region.    RAPID ANALYSIS:  CBF<30%: 0 mL  Tmax>6sec: 8 mL  Mismatch volume: 8 mL  Mismatch ratio: Infinite      Impression    IMPRESSION:   HEAD CT:  1.  CT correlate of known recent ischemic insult in the left posterior corona radiata/centrum semiovale seen to better advantage on recent comparison MRI.  2.  No definite new acute transcortical infarct or acute intracranial hemorrhage.  3.  Moderate diffuse cerebral volume loss and features compatible with mild chronic microangiopathic ischemic white matter changes.    HEAD CTA:  1.  No proximal branch vessel occlusion, aneurysm, or vascular malformation.   2.  Intracranial atherosclerosis with multifocal  stenoses, as detailed. Briefly, severe stenoses at the A2/A3 segment left JOSI, inferior division M2 segment left MCA, P3 segments of bilateral PCAs, and distal V4 segment left vertebral artery.    NECK CTA:  1.  No flow-limiting stenosis or findings of dissection.   2.  Question features compatible with fibromuscular dysplasia at the distal right cervical ICA and V2/V3 junction of left vertebral artery.    CT PERFUSION:  1.  Alterations on perfusion maps corresponding with region of known infarct in left posterior corona radiata/centrum semiovale, as detailed.    Faustino Pittman MD notified provider, LISA ROGERS, of CTA/CTA results via telephone at 5/13/2023 7:16 PM CDT, who acknowledge understanding.   CT Head Perfusion w Contrast    Narrative    EXAM: CT HEAD W/O CONTRAST, CTA HEAD NECK W CONTRAST, CT HEAD PERFUSION W CONTRAST  LOCATION: [facility]  DATE/TIME: 5/13/2023 7:00 PM CDT    INDICATION: code stroke  COMPARISON: CT head 05/08/2023 and MR brain 05/10/2023.  CONTRAST: 70mL Isovue 370 (accession [id number]),  50 ml Isovue 370 (accession [id number]), 70mL Isovue 370 (accession [id number])  TECHNIQUE: Head and neck CT angiogram with IV contrast. Noncontrast head CT followed by axial helical CT images of the head and neck vessels obtained during the arterial phase of intravenous contrast administration. Axial 2D reconstructed images and   multiplanar 3D MIP reconstructed images of the head and neck vessels were performed by the technologist. Additional CT cerebral perfusion was performed utilizing a second contrast bolus. Perfusion data were post processed with generation of standard   perfusion maps and estimation of ischemic/infarcted volumes utilizing standard threshold values. Dose reduction techniques were used. All stenosis measurements made according to NASCET criteria unless otherwise specified.    FINDINGS:    NONCONTRAST HEAD CT:   INTRACRANIAL CONTENTS: Patchy subcortical  hypodensities in the left posterior corona radiata and centrum semiovale most likely corresponding with the known subacute on chronic ischemic insults as seen to better advantage on recent comparison MRI. No   definite new additional acute transcortical infarct. No hemorrhage or extraaxial collection. No mass effect. Subarachnoid cisterns are patent. Patchy white matter hypodensities are, while nonspecific, most compatible with chronic microvascular ischemic   changes. Unchanged proportional prominence of the ventricles and sulci is compatible with diffuse cerebral volume loss. Mehdi cisterna magna.    VISUALIZED ORBITS/SINUSES/MASTOIDS: Bilateral lens replacements. Paranasal sinuses are clear. No middle ear or mastoid effusion.    BONES/SOFT TISSUES: No acute abnormality.    HEAD CTA:  ANTERIOR CIRCULATION: No proximal branch vessel occlusion. Redemonstrated multifocal atherosclerosis, including focal severe stenosis at the distal A2/A3 segment left JOSI as well as severe tandem stenoses at the proximal and mid inferior division M2   branch of the left MCA. Additional mild M1 segment narrowing and scattered atherosclerotic calcification about the carotid siphons. Similar presumed infundibular origin of the left posterior communicating artery.    POSTERIOR CIRCULATION: No proximal branch vessel occlusion. Severe stenosis at the distal V4 segment of the left vertebral artery. Mild atherosclerotic irregularity and tandem narrowings about the basilar artery. Essentially exclusive supply of left PCA   via posterior communicating artery with moderate stenosis at the P1/P2 junction as well as tandem severe stenoses at the proximal P3 branches. Moderate narrowing at the P1 segment of the right PCA with moderate distal P2 stenosis and severe proximal P3   stenosis. No aneurysm or high-flow vascular malformation.    DURAL VENOUS SINUSES: Expected enhancement of the major dural venous sinuses.    NECK CTA:  RIGHT CAROTID: Mild  "atherosclerosis without hemodynamically significant stenosis by NASCET criteria at the carotid bifurcation. Segment of luminal irregularity in a \"beading\" pattern at the distal right cervical ICA compatible with fibromuscular   dysplasia. No andrew dissection.    LEFT CAROTID: Patent without flow-limiting stenosis by NASCET criteria at the carotid bifurcation. No dissection.    VERTEBRAL ARTERIES: Codominant vertebral arteries. No flow-limiting stenosis. Luminal irregularity with alternating narrowings and ectasia at the near the V2/V3 segment of the left vertebral artery can be compatible with fibromuscular dysplasia. No andrew   dissection.    AORTIC ARCH: Three-vessel aortic arch configuration. Mild atherosclerosis at the visualized arch and great vessel origins without flow-limiting stenosis. Mild eccentric plaque at the left proximal subclavian artery causes mild (less than 50%) focal   stenosis.    NONVASCULAR STRUCTURES: Atelectasis in the visualized upper lungs. No neck mass or lymphadenopathy. Thyroid gland is mildly heterogeneous with suspected nodules. No acute or aggressive appearing osseous abnormalities. Multilevel degenerative changes in   the visualized cervical spine. Grade 1 retrolisthesis at C3-C4. Osseous structures appear diffusely demineralized.    CT PERFUSION:  PERFUSION MAPS: There is asymmetric prolong transit time corresponding with the region of known infarcts within the left posterior corona radiata/centrum semiovale. There is also slight increase in cerebral blood volume as well as decrease in cerebral   blood flow at this region.    RAPID ANALYSIS:  CBF<30%: 0 mL  Tmax>6sec: 8 mL  Mismatch volume: 8 mL  Mismatch ratio: Infinite      Impression    IMPRESSION:   HEAD CT:  1.  CT correlate of known recent ischemic insult in the left posterior corona radiata/centrum semiovale seen to better advantage on recent comparison MRI.  2.  No definite new acute transcortical infarct or acute " intracranial hemorrhage.  3.  Moderate diffuse cerebral volume loss and features compatible with mild chronic microangiopathic ischemic white matter changes.    HEAD CTA:  1.  No proximal branch vessel occlusion, aneurysm, or vascular malformation.   2.  Intracranial atherosclerosis with multifocal stenoses, as detailed. Briefly, severe stenoses at the A2/A3 segment left JOSI, inferior division M2 segment left MCA, P3 segments of bilateral PCAs, and distal V4 segment left vertebral artery.    NECK CTA:  1.  No flow-limiting stenosis or findings of dissection.   2.  Question features compatible with fibromuscular dysplasia at the distal right cervical ICA and V2/V3 junction of left vertebral artery.    CT PERFUSION:  1.  Alterations on perfusion maps corresponding with region of known infarct in left posterior corona radiata/centrum semiovale, as detailed.    Faustino Pittman MD notified provider, LISA ROGERS, of CTA/CTA results via telephone at 5/13/2023 7:16 PM CDT, who acknowledge understanding.   MR Brain w/o Contrast    Narrative    EXAM: MR BRAIN W/O CONTRAST  LOCATION: [facility]  DATE/TIME: 5/13/2023 9:38 PM CDT    INDICATION: Recurrent expressive aphasia.  COMPARISON: Brain MRI dated 05/10/2023, CTA head and neck and CT perfusion dated 05/13/2023.  TECHNIQUE: Routine multiplanar multisequence head MRI without intravenous contrast.    FINDINGS:  INTRACRANIAL CONTENTS: Stable evolving subacute infarcts within the left centrum semiovale. No new diffusion restriction. No acute hemorrhage. Stable volume loss and sequelae of chronic microvascular ischemic disease. Stable retrocerebellar arachnoid   cyst. Normal position of the cerebellar tonsils.     SELLA: No abnormality accounting for technique.    OSSEOUS STRUCTURES/SOFT TISSUES: Normal marrow signal. The major intracranial vascular flow voids are maintained.     ORBITS: No abnormality accounting for technique.     SINUSES/MASTOIDS:  Mild mucosal thickening scattered about the paranasal sinuses. No middle ear or mastoid effusion.       Impression    IMPRESSION:  1.  No significant interval change.   IR Carotid Cerebral Angiogram Bilateral    Narrative    REFUGIO YADAV  [id number]  1940    History: Refugio Yadav is an 82-year-old female patient with  history of symptomatic intracranial atherosclerosis, namely left  middle cerebral artery M1 segment stenosis, who was admitted for ORIF  of both the right radius and right femur. Her hospital course was  complicated by aphasia secondary to left frontal lobe infarct, stroke  mechanism is symptomatic intracranial atherosclerosis, in the setting  of discontinuation of dual antiplatelet therapy. Despite reinitiation  of the lateral platelet therapy, the patient continued to have  progressive symptoms during the same hospitalization, that fail to  regress despite elevating the systolic blood pressure.    Indication for the procedure: Cerebral angiogram with intent to stent  and/or perform balloon angioplasty of the left middle cerebral artery  M1 segment stenosis, for secondary stroke prevention.    : Nakul Leigh MD  [place]: Swati Lacy MD  Anesthesia: Local anesthesia and conscious sedation  Contrast used: 84 ml of Visipaque 320  Fluoro time: 19.9 minutes, 500.24 mGy  Sedation time: 65 minutes  Sedatives: Midazolam 1.5 mg IV, Fentanyl 75 mcg IV  Other medications: Lidocaine 1% 14 ml intradermal, heparin 4500 units  IV    Procedure:  1.  Diagnostic cerebral angiography and interpretation of the images.  2.  Diagnostic angiography of the right common femoral artery.  3.  Selective catheterization and diagnostic cerebral angiography of  the left common carotid artery, in the left internal carotid artery.  4.  Balloon angioplasty of the left middle cerebral artery  5.  Percutaneous closure of the right femoral arteriotomy using a 6F  Angio-seal closure device.    Consent: The  risks and benefits of cerebral angiography, intracranial  angioplasty, and stenting were discussed with the patient who agreed  to proceed. The risks discussed included stroke, arterial dissection,  intracranial hemorrhage, death, groin hematoma, arteriovenous fistula  of the groin and pseudoaneurysm of the femoral artery. Subsequently,  verbal and written informed consent was obtained.    Technique: The patient was brought to the angiography suite and placed  in the supine position. Medications were administered by the radiology  nursing staff. The nursing staff independently monitored the patient's  vital signs during the procedure.    The patient 's right groin was prepped and draped in the standard  fashion. The right common femoral artery was palpated. Ultrasound was  used to image the common femoral artery. The femoral artery was shown  to be patent. Lidocaine was injected locally and a small skin incision  was made over the artery using a scalpel. Using real-time ultrasound  guidance, a 21 gauge needle was placed into the femoral artery. The  needle was exchanged using Seldinger technique for a dilator and 19  gauge introducer, then exchanged for a 6 Nigerian Shuttle over a  glidewire. The shuttle was advanced over the Glidewire into the  abdominal and thoracic aorta. The shuttle was connected rotating  hemostatic valve and continuous flush of heparinized saline. A 5  Nigerian Vert diagnostic catheter was advanced through the sheath, over  the Glidewire. The diagnostic catheter was used to selectively  catheterize the left common carotid artery. The Shuttle was advanced  over the diagnostic catheter into the proximal common carotid artery,  then the diagnostic catheter was removed from the body. Angiography  was performed over the neck. A 5 Nigerian 125 cm Joselyn distal access  catheter was advanced over a Traxcess microwire to the tip of the  Shuttle. The microwire was used to selectively catheterize the  left  internal carotid artery. The distal axis catheter was advanced over  the microwire into the proximal cervical internal carotid artery.  Angiography was performed over the cranium in multiple projections.  The left middle cerebral artery frontal M2 is severely stenotic in its  horizontal segment, measuring 80% stenosis by WASID criteria. We  selected a 1.5 x 15 mm Takeru over-the-wire balloon. The balloon was  advanced over the microwire into the proximal M2 segment, and was  positioned at the site of stenosis. It was gradually inflated to 1.6  mm, increasing by an atmosphere per minute, then it was gradually  deflated by an atmosphere for 30 seconds. It was withdrawn into the  tip of the distal access catheter. Repeat angiography demonstrated  resolution of the most stenotic segment of the lesion, but a residual  mild stenosis at the proximal edge. Angioplasty was again performed in  this location, using the same inflation and deflation technique.  Repeat angiography demonstrated resolution of the residual stenosis.  The balloon was exchanged out of the body. Guide catheter injection  demonstrates normal lumen caliber and appearance after angioplasty.  Microwire access in the distal M2 was lost and angiography was again  performed by injecting the distal access catheter. There was no  evidence of vessel recoil. Final demagnified angiography was performed  approximately 10 minutes after final angioplasty, and the vessel  caliber remained patent.      Findings:    Series #1  Left common carotid artery injection: Cervical view  Under fluoroscopic guidance, the sheath was advanced over the  diagnostic catheter and glidewire into the left common carotid artery.  Angiography was performed over the neck. Cervical view of the left  common carotid artery in the DENA projection demonstrates a normal left  common carotid artery. The common carotid artery bifurcation is at the  C3 level. There is a smooth atherosclerotic  plaque at the origin of  the left internal carotid artery, but this does not result in luminal  stenosis based on NASCET criteria. The left external carotid artery  and its branches are normal.  Series #25: This injection was repeated at the completion of the  procedure, demonstrating no change from baseline vessel  characteristics.    Series #2, 3, 4, 5, 6  Left internal carotid artery injection: Cranial view   Under fluoroscopic guidance and using roadmap technique, the distal  access catheter was advanced over the microwire into the left internal  carotid artery. Angiography was performed over the cranium. Cranial  view of the left internal carotid artery injection in the AP, lateral,  and oblique projections demonstrates normal cervical, petrous,  laceral, cavernous, clinoid, ophthalmic, and communicating segments of  the left internal carotid artery. The left internal carotid artery  bifurcates into the left anterior cerebral artery and left middle  cerebral artery. The proximal segments and distal branches of the left  anterior cerebral artery are normal. The left middle cerebral artery  has a dominant superior division which later bifurcates into the  frontal and temporal M2 segments. The frontal M2 is severely stenotic  starting in its distal horizontal segment, and extending as it occurs  into its vertical sylvian segment. It measures 80% stenotic at its  narrowest point, based on WASID criteria. There is arterial phase  delay in the in the M3 and M4 branches arising from this vessel.     Series #9: Left middle cerebral artery frontal M2 branch  microinjection through the balloon microcatheter.  Series #10: Left middle cerebral artery distal M1 segment injection  through the distal access catheter.  Series #11, 12, 13, 15: Fluoroscopic single shots demonstrating  gradual balloon inflation during angioplasty of the left middle  cerebral artery frontal M2 branch.  Series #16: Left middle cerebral artery  distal M1 segment injection  through the distal access catheter after first balloon angioplasty,  demonstrating improvement in vessel caliber with residual proximal  stenosis.  Series #17, 18, 19: Fluoroscopic single shots demonstrating gradual  balloon inflation during second angioplasty of the left middle  cerebral artery frontal M2 branch.  Series #20, 21, 22: Left middle cerebral artery distal M1 segment  injection through the distal axis catheter after second balloon  angioplasty, demonstrating resolution of focal stenosis.    Series #23, 24: Left internal carotid artery injections: Cranial view,  lateral and AP projections  After the distal axis catheter was removed, angiography was performed  over the cranium by injecting the sheath. There is resolution of the  left middle cerebral artery frontal M1 segment stenosis and resolution  of arterial phase delay of the left middle cerebral artery M3 and 4  segments. There is a fetal configuration left posterior cerebral  artery, with moderate to severe stenosis of the proximal  parieto-occipital branch, and moderate stenosis of the mid calcarine  branch. The capillary and venous phases are normal.    Right common femoral artery. Pelvic view  Through the 6 Czech sheath, angiography was performed over the right  groin. Pelvic view of the right common femoral artery in the VALENZUELA  projection demonstrates a normal right common femoral artery,  superficial femoral artery, and deep femoral artery. The sheath is  located above the bifurcation and below the inferior epigastric  artery. There is no significant stenosis, dissection or  pseudoaneurysm.    Upon completion of the procedure, the 6 Czech Shuttle was removed.  Hemostasis was obtained with a 6 Czech Angioseal closure device. The  procedure was completed without complication. The patient was then  transferred to the ICU in stable condition.    Nakul Leigh MD was present for the entire procedure.       Impression    Impression:  1.  Left middle cerebral artery, frontal M2 segment severe stenosis,  80%, now status post balloon angioplasty x2 with 0% residual stenosis.  2.  Left posterior cerebral artery multifocal stenosis.    Plan: Continue dual antiplatelet therapy indefinitely, systolic blood  pressure goal 120-160.    Swati Lacy MD  Endovascular Surgical Neuroradiology Fellow  Pager: [phone]   XR Wrist Right G/E 3 Views    Narrative    XR WRIST RIGHT G/E 3 VIEWS  5/15/2023 9:16 AM     HISTORY: Routine postop x-rays of right wrist (s/p distal radius ORIF)  COMPARISON: 5/2/2023      Impression    IMPRESSION: Overlying splint material limits fine bony detail. Status  post plate and screw fixation of the distal radial fracture with  similar alignment. Interval healing with increased osseous bridging.  No hardware complication. Nondisplaced ulnar styloid process fracture  without healing. Otherwise unchanged.     SIGRID BOO MD         SYSTEM ID:  [id number]   XR Pelvis w Hip Right 1 View    Narrative    XR PELVIS AND HIP RIGHT 1 VIEW  5/15/2023 9:17 AM     HISTORY: S/p right IT femur fracture IM nail, routine films  COMPARISON: 5/2/2023      Impression    IMPRESSION: Status post intramedullary nail and screw fixation of the  intertrochanteric right femur fracture with similar alignment. No  hardware complication. Interval resolution of subcutaneous emphysema.  No significant callus formation. Otherwise unchanged.     SIGRID BOO MD         SYSTEM ID:  [id number]   CT Head w/o Contrast     Value    Radiologist flags Acute intracranial hemorrhage (AA)    Narrative    EXAM: CTA HEAD NECK W CONTRAST, CT HEAD W/O CONTRAST, CT HEAD PERFUSION W CONTRAST  LOCATION: [facility]  DATE/TIME: 5/16/2023 7:18 PM CDT    INDICATION: code stroke, achalasia and numbness  COMPARISON: None.  TECHNIQUE: Head and neck CT angiogram with IV contrast. Noncontrast head CT followed by axial  helical CT images of the head and neck vessels obtained during the arterial phase of intravenous contrast administration. Axial 2D reconstructed images and   multiplanar 3D MIP reconstructed images of the head and neck vessels were performed by the technologist. Additional CT cerebral perfusion was performed utilizing a second contrast bolus. Perfusion data were post processed with generation of standard   perfusion maps and estimation of ischemic/infarcted volumes utilizing standard threshold values. Dose reduction techniques were used. All stenosis measurements made according to NASCET criteria unless otherwise specified.  CONTRAST: 75 mL Isovue 370 (accession [id number]), 75 mL Isovue 370 (accession [id number]), 50 mL Isovue 370 (accession [id number])    FINDINGS:   NONCONTRAST HEAD CT:   INTRACRANIAL CONTENTS: There is nonspecific ill-defined hyperdensity at the left frontal convexity best seen axial image 17 and 18 and coronal image 15 and 16. This may represent artifact or small acute subarachnoid hemorrhage. No large extra-axial   hematoma. No CT evidence of acute infarct. Expected interval evolution of known left white matter infarcts. Mild presumed chronic small vessel ischemic changes. Mild generalized volume loss. No hydrocephalus. Stable retrocerebellar arachnoid cyst.     VISUALIZED ORBITS/SINUSES/MASTOIDS: Prior bilateral cataract surgery. Visualized portions of the orbits are otherwise unremarkable. No paranasal sinus mucosal disease. No middle ear or mastoid effusion.    BONES/SOFT TISSUES: No acute abnormality.    HEAD CTA:  ANTERIOR CIRCULATION: No occlusion, aneurysm, or high flow vascular malformation. Multifocal intracranial atherosclerosis are again noted and similar to prior, most prominent involving the anterior cerebral arteries. There is nonstenotic atherosclerosis   calcification of bilateral carotid siphons Fetal origin of the left posterior cerebral artery from the anterior  circulation.    POSTERIOR CIRCULATION: No occlusion, aneurysm, or high flow vascular malformation. Multifocal intracranial atherosclerosis are again noted and similar prior, most prominent at the P3 segments bilaterally. Balanced vertebral arteries supply a normal   basilar artery.     DURAL VENOUS SINUSES: Expected enhancement of the major dural venous sinuses.    NECK CTA:  RIGHT CAROTID: Atherosclerotic plaque results in less than 50% stenosis in the right ICA. No dissection. Similar alternating irregularities at the distal right cervical internal carotid artery suggestive of fibromuscular dysplasia.    LEFT CAROTID: Atherosclerotic plaque results in less than 50% stenosis in the left ICA. No dissection.    VERTEBRAL ARTERIES: No focal stenosis or dissection. Similar alternating irregularities at the junctions of the left V2 and V3 segments suggestive of fibromuscular dysplasia. Balanced vertebral arteries.    AORTIC ARCH: Classic aortic arch anatomy with no significant stenosis at the origin of the great vessels.    NONVASCULAR STRUCTURES: 9 mm low-density right thyroid nodule. Small left pleural effusion.    CT PERFUSION:  PERFUSION MAPS: Small area of apparent elevated Tmax at the right posterior fossa is favored to be artifactual. Otherwise symmetrical cerebral perfusion. No focal deficits in cerebral blood flow or volume to suggest ischemia/oligemia.    RAPID ANALYSIS:  CBF<30%: 0  Tmax>6sec: 3 mL, likely artifactual  Mismatch volume: 3 mL, likely artifactual  Mismatch ratio: Infinite      Impression    IMPRESSION:   HEAD CT:  1.  Subtle hyperdensity at the left frontal convexity, acute subarachnoid hemorrhage not excluded.    HEAD CTA:   1.  Negative for thrombotic occlusion.  2.  Similar multifocal intracranial atherosclerosis.    NECK CTA:  1.  No hemodynamically significant stenosis in the neck vessels.   2.  No evidence for dissection.  3.  Similar findings of fibromuscular dysplasia at the distal right  carotid and left vertebral arteries.  4.  Small left pleural effusion.    CT PERFUSION:  1.  Normal cerebral perfusion.      [Critical Result: Acute intracranial hemorrhage]    Finding was identified on 5/16/2023 7:24 PM CDT.     1.  Dr. Barney was contacted by me on 5/16/2023 7:43 PM CDT and verbalized understanding of the critical result.    CTA Head Neck w Contrast     Value    Radiologist flags Acute intracranial hemorrhage (AA)    Narrative    EXAM: CTA HEAD NECK W CONTRAST, CT HEAD W/O CONTRAST, CT HEAD PERFUSION W CONTRAST  LOCATION: [facility]  DATE/TIME: 5/16/2023 7:18 PM CDT    INDICATION: code stroke, achalasia and numbness  COMPARISON: None.  TECHNIQUE: Head and neck CT angiogram with IV contrast. Noncontrast head CT followed by axial helical CT images of the head and neck vessels obtained during the arterial phase of intravenous contrast administration. Axial 2D reconstructed images and   multiplanar 3D MIP reconstructed images of the head and neck vessels were performed by the technologist. Additional CT cerebral perfusion was performed utilizing a second contrast bolus. Perfusion data were post processed with generation of standard   perfusion maps and estimation of ischemic/infarcted volumes utilizing standard threshold values. Dose reduction techniques were used. All stenosis measurements made according to NASCET criteria unless otherwise specified.  CONTRAST: 75 mL Isovue 370 (accession [id number]), 75 mL Isovue 370 (accession [id number]), 50 mL Isovue 370 (accession [id number])    FINDINGS:   NONCONTRAST HEAD CT:   INTRACRANIAL CONTENTS: There is nonspecific ill-defined hyperdensity at the left frontal convexity best seen axial image 17 and 18 and coronal image 15 and 16. This may represent artifact or small acute subarachnoid hemorrhage. No large extra-axial   hematoma. No CT evidence of acute infarct. Expected interval evolution of known left white matter infarcts. Mild  presumed chronic small vessel ischemic changes. Mild generalized volume loss. No hydrocephalus. Stable retrocerebellar arachnoid cyst.     VISUALIZED ORBITS/SINUSES/MASTOIDS: Prior bilateral cataract surgery. Visualized portions of the orbits are otherwise unremarkable. No paranasal sinus mucosal disease. No middle ear or mastoid effusion.    BONES/SOFT TISSUES: No acute abnormality.    HEAD CTA:  ANTERIOR CIRCULATION: No occlusion, aneurysm, or high flow vascular malformation. Multifocal intracranial atherosclerosis are again noted and similar to prior, most prominent involving the anterior cerebral arteries. There is nonstenotic atherosclerosis   calcification of bilateral carotid siphons Fetal origin of the left posterior cerebral artery from the anterior circulation.    POSTERIOR CIRCULATION: No occlusion, aneurysm, or high flow vascular malformation. Multifocal intracranial atherosclerosis are again noted and similar prior, most prominent at the P3 segments bilaterally. Balanced vertebral arteries supply a normal   basilar artery.     DURAL VENOUS SINUSES: Expected enhancement of the major dural venous sinuses.    NECK CTA:  RIGHT CAROTID: Atherosclerotic plaque results in less than 50% stenosis in the right ICA. No dissection. Similar alternating irregularities at the distal right cervical internal carotid artery suggestive of fibromuscular dysplasia.    LEFT CAROTID: Atherosclerotic plaque results in less than 50% stenosis in the left ICA. No dissection.    VERTEBRAL ARTERIES: No focal stenosis or dissection. Similar alternating irregularities at the junctions of the left V2 and V3 segments suggestive of fibromuscular dysplasia. Balanced vertebral arteries.    AORTIC ARCH: Classic aortic arch anatomy with no significant stenosis at the origin of the great vessels.    NONVASCULAR STRUCTURES: 9 mm low-density right thyroid nodule. Small left pleural effusion.    CT PERFUSION:  PERFUSION MAPS: Small area of  apparent elevated Tmax at the right posterior fossa is favored to be artifactual. Otherwise symmetrical cerebral perfusion. No focal deficits in cerebral blood flow or volume to suggest ischemia/oligemia.    RAPID ANALYSIS:  CBF<30%: 0  Tmax>6sec: 3 mL, likely artifactual  Mismatch volume: 3 mL, likely artifactual  Mismatch ratio: Infinite      Impression    IMPRESSION:   HEAD CT:  1.  Subtle hyperdensity at the left frontal convexity, acute subarachnoid hemorrhage not excluded.    HEAD CTA:   1.  Negative for thrombotic occlusion.  2.  Similar multifocal intracranial atherosclerosis.    NECK CTA:  1.  No hemodynamically significant stenosis in the neck vessels.   2.  No evidence for dissection.  3.  Similar findings of fibromuscular dysplasia at the distal right carotid and left vertebral arteries.  4.  Small left pleural effusion.    CT PERFUSION:  1.  Normal cerebral perfusion.      [Critical Result: Acute intracranial hemorrhage]    Finding was identified on 5/16/2023 7:24 PM CDT.     1.  Dr. Barney was contacted by me on 5/16/2023 7:43 PM CDT and verbalized understanding of the critical result.    CT Head Perfusion w Contrast     Value    Radiologist flags Acute intracranial hemorrhage (AA)    Narrative    EXAM: CTA HEAD NECK W CONTRAST, CT HEAD W/O CONTRAST, CT HEAD PERFUSION W CONTRAST  LOCATION: [facility]  DATE/TIME: 5/16/2023 7:18 PM CDT    INDICATION: code stroke, achalasia and numbness  COMPARISON: None.  TECHNIQUE: Head and neck CT angiogram with IV contrast. Noncontrast head CT followed by axial helical CT images of the head and neck vessels obtained during the arterial phase of intravenous contrast administration. Axial 2D reconstructed images and   multiplanar 3D MIP reconstructed images of the head and neck vessels were performed by the technologist. Additional CT cerebral perfusion was performed utilizing a second contrast bolus. Perfusion data were post processed with  generation of standard   perfusion maps and estimation of ischemic/infarcted volumes utilizing standard threshold values. Dose reduction techniques were used. All stenosis measurements made according to NASCET criteria unless otherwise specified.  CONTRAST: 75 mL Isovue 370 (accession [id number]), 75 mL Isovue 370 (accession [id number]), 50 mL Isovue 370 (accession [id number])    FINDINGS:   NONCONTRAST HEAD CT:   INTRACRANIAL CONTENTS: There is nonspecific ill-defined hyperdensity at the left frontal convexity best seen axial image 17 and 18 and coronal image 15 and 16. This may represent artifact or small acute subarachnoid hemorrhage. No large extra-axial   hematoma. No CT evidence of acute infarct. Expected interval evolution of known left white matter infarcts. Mild presumed chronic small vessel ischemic changes. Mild generalized volume loss. No hydrocephalus. Stable retrocerebellar arachnoid cyst.     VISUALIZED ORBITS/SINUSES/MASTOIDS: Prior bilateral cataract surgery. Visualized portions of the orbits are otherwise unremarkable. No paranasal sinus mucosal disease. No middle ear or mastoid effusion.    BONES/SOFT TISSUES: No acute abnormality.    HEAD CTA:  ANTERIOR CIRCULATION: No occlusion, aneurysm, or high flow vascular malformation. Multifocal intracranial atherosclerosis are again noted and similar to prior, most prominent involving the anterior cerebral arteries. There is nonstenotic atherosclerosis   calcification of bilateral carotid siphons Fetal origin of the left posterior cerebral artery from the anterior circulation.    POSTERIOR CIRCULATION: No occlusion, aneurysm, or high flow vascular malformation. Multifocal intracranial atherosclerosis are again noted and similar prior, most prominent at the P3 segments bilaterally. Balanced vertebral arteries supply a normal   basilar artery.     DURAL VENOUS SINUSES: Expected enhancement of the major dural venous sinuses.    NECK CTA:  RIGHT CAROTID:  Atherosclerotic plaque results in less than 50% stenosis in the right ICA. No dissection. Similar alternating irregularities at the distal right cervical internal carotid artery suggestive of fibromuscular dysplasia.    LEFT CAROTID: Atherosclerotic plaque results in less than 50% stenosis in the left ICA. No dissection.    VERTEBRAL ARTERIES: No focal stenosis or dissection. Similar alternating irregularities at the junctions of the left V2 and V3 segments suggestive of fibromuscular dysplasia. Balanced vertebral arteries.    AORTIC ARCH: Classic aortic arch anatomy with no significant stenosis at the origin of the great vessels.    NONVASCULAR STRUCTURES: 9 mm low-density right thyroid nodule. Small left pleural effusion.    CT PERFUSION:  PERFUSION MAPS: Small area of apparent elevated Tmax at the right posterior fossa is favored to be artifactual. Otherwise symmetrical cerebral perfusion. No focal deficits in cerebral blood flow or volume to suggest ischemia/oligemia.    RAPID ANALYSIS:  CBF<30%: 0  Tmax>6sec: 3 mL, likely artifactual  Mismatch volume: 3 mL, likely artifactual  Mismatch ratio: Infinite      Impression    IMPRESSION:   HEAD CT:  1.  Subtle hyperdensity at the left frontal convexity, acute subarachnoid hemorrhage not excluded.    HEAD CTA:   1.  Negative for thrombotic occlusion.  2.  Similar multifocal intracranial atherosclerosis.    NECK CTA:  1.  No hemodynamically significant stenosis in the neck vessels.   2.  No evidence for dissection.  3.  Similar findings of fibromuscular dysplasia at the distal right carotid and left vertebral arteries.  4.  Small left pleural effusion.    CT PERFUSION:  1.  Normal cerebral perfusion.      [Critical Result: Acute intracranial hemorrhage]    Finding was identified on 5/16/2023 7:24 PM CDT.     1.  Dr. Barney was contacted by me on 5/16/2023 7:43 PM CDT and verbalized understanding of the critical result.    CT Head w/o Contrast    Narrative    CT SCAN OF  THE HEAD WITHOUT CONTRAST   5/17/2023 9:57 AM     HISTORY: Interval evaluation.    TECHNIQUE: Axial images of the head and coronal reformations without  IV contrast material. Radiation dose for this scan was reduced using  automated exposure control, adjustment of the mA and/or kV according  to patient size, or iterative reconstruction technique.    COMPARISON: Head CT 5/16/2023      Impression    IMPRESSION: Small volume subarachnoid hemorrhage along the left  frontal pole, unchanged. No new areas of hemorrhage. No significant  mass effect or evidence of hydrocephalus. Recent left hemispheric  infarct in the middle cerebral artery distribution, not appreciably  changed. Volume loss and background of white matter hypoattenuation  likely represents chronic small vessel ischemic change. Incidental  retrocerebellar cyst. No acute osseous abnormality.    ASIYA SALINAS MD         SYSTEM ID:  [id number]   CT Head w/o Contrast    Narrative    CT OF THE HEAD WITHOUT CONTRAST 5/18/2023 12:56 PM     COMPARISON: Head CT 5/17/2023    HISTORY: Stability scan.    TECHNIQUE: 5 mm thick axial CT images of the head were acquired  without IV contrast material.    FINDINGS: Tiny subarachnoid hemorrhage in the anterior aspect of the  left frontal lobe has decreased in conspicuity. No evidence for new or  increasing hemorrhage.     There is moderate diffuse cerebral volume loss. There are subtle  patchy areas of decreased density in the cerebral white matter  bilaterally that are consistent with sequela of chronic small vessel  ischemic disease. The ventricles and basal cisterns are within normal  limits in configuration given the degree of cerebral volume loss.   There is no midline shift.     No intracranial mass or recent infarct.    The visualized paranasal sinuses are well-aerated. There is no  mastoiditis. There are no fractures of the visualized bones.       Impression    IMPRESSION:   1. Interval decrease in conspicuity of the  subarachnoid hemorrhage at  the anterior aspect of the left frontal lobe.  2. Diffuse cerebral volume loss and cerebral white matter changes  consistent with chronic small vessel ischemic disease.        Radiation dose for this scan was reduced using automated exposure  control, adjustment of the mA and/or kV according to patient size, or  iterative reconstruction technique.    GAGE VERDUGO MD         SYSTEM ID:  [id number]   CT Head Perfusion w Contrast    Narrative    CT BRAIN PERFUSION 5/18/2023 2:54 PM    COMPARISON: None.    HISTORY: Evaluate perfusion post balloon angioplasty given ongoing  fluctuating aphasia.    TECHNIQUE: Time sequential axial CT images of the head were acquired  during the administration of intravenous contrast (50mL [id number]).  CTA images of the [place] as well as color perfusion maps of  the brain were created from this time sequential axial source data.    FINDINGS: There are no focal or regional perfusion defects in the  brain.      Impression    IMPRESSION: Normal CT perfusion of the brain.    Radiation dose for this scan was reduced using automated exposure  control, adjustment of the mA and/or kV according to patient size, or  iterative reconstruction technique.      GAGE VERDUGO MD         SYSTEM ID:  [id number]   XR Lumbar Spine 2/3 Views    Narrative    EXAM: XR LUMBAR SPINE 2/3 VIEWS, XR THORACIC SPINE 3 VIEWS  LOCATION: [facility]  DATE/TIME: 5/23/2023 6:42 PM CDT    INDICATION: worsening back pain prior hx of compression fx  COMPARISON: Thoracic and lumbar spine radiographs 02/04/2023      Impression    IMPRESSION:   THORACIC SPINE:  Mild to moderate thoracic dextroscoliosis. Mild multilevel spondylosis. Diffuse bony demineralization.     Vertebral body heights within normal limits. No significant subluxations.    No significant interval change compared to prior exam.      LUMBAR SPINE:  Moderate thoracolumbar levoscoliosis. Thoracolumbar  severe kyphosis. Multilevel spondylosis. Diffuse bony demineralization.    L4 vertebral body severe compression deformity similar compared to prior exam.    Remaining vertebral body heights within normal limits. No significant subluxations. Bilateral SI joints intact.    No significant interval change compared to prior exam.   XR Thoracic Spine 3 Views    Narrative    EXAM: XR LUMBAR SPINE 2/3 VIEWS, XR THORACIC SPINE 3 VIEWS  LOCATION: [facility]  DATE/TIME: 5/23/2023 6:42 PM CDT    INDICATION: worsening back pain prior hx of compression fx  COMPARISON: Thoracic and lumbar spine radiographs 02/04/2023      Impression    IMPRESSION:   THORACIC SPINE:  Mild to moderate thoracic dextroscoliosis. Mild multilevel spondylosis. Diffuse bony demineralization.     Vertebral body heights within normal limits. No significant subluxations.    No significant interval change compared to prior exam.      LUMBAR SPINE:  Moderate thoracolumbar levoscoliosis. Thoracolumbar severe kyphosis. Multilevel spondylosis. Diffuse bony demineralization.    L4 vertebral body severe compression deformity similar compared to prior exam.    Remaining vertebral body heights within normal limits. No significant subluxations. Bilateral SI joints intact.    No significant interval change compared to prior exam.   MR Brain w/o Contrast    Narrative    EXAM: MR BRAIN W/O CONTRAST  LOCATION: [facility]  DATE/TIME: 5/24/2023 9:54 PM CDT    INDICATION: aphasia, possible stroke  COMPARISON: CT head 05/18/2023, MRI brain 05/13/2023  TECHNIQUE: Routine multiplanar multisequence head MRI without intravenous contrast.    FINDINGS:  INTRACRANIAL CONTENTS:   Left corpus callosum genu JOSI territory small acute infarct. (Restricted diffusion, positive FLAIR). No microhemorrhage on GRE.    Left frontoparietal white matter prominent area of increased DWI signal that is more confluent compared to prior examination.  ADC map is isointense. Findings likely due to evolving subacute infarcts. Small patchy superimposed acute infarcts possible.    Left frontal parietal white matter demonstrates multiple small chronic infarcts.    No additional acute infarcts.     No midline shift. Patchy and confluent nonspecific T2/FLAIR hyperintensities within the cerebral white matter most consistent with moderate chronic microvascular ischemic change. Moderate generalized cerebral atrophy. No hydrocephalus. Normal position   of the cerebellar tonsils. Prominent retrocerebellar arachnoid cyst. Pronounced increased FLAIR signal surrounds the upper cervical spine, brainstem, supersellar cistern, sylvian fissures likely related to pronounced flow-related artifact.    SELLA: No abnormality accounting for technique.    OSSEOUS STRUCTURES/SOFT TISSUES: Normal marrow signal. The major intracranial vascular flow voids are maintained.     ORBITS: No abnormality accounting for technique.     SINUSES/MASTOIDS: Mild mucosal thickening scattered about the paranasal sinuses. No middle ear or mastoid effusion.       Impression    IMPRESSION:  1.  Left corpus callosum genu JOSI territory small acute infarct.    2.  Left frontoparietal white matter prominent area of increased DWI signal that is more confluent compared to prior examination. ADC map is isointense. Findings likely due to evolving subacute infarcts. Small patchy superimposed acute infarcts possible.    3.  Left frontal parietal white matter demonstrates multiple small chronic infarcts.    4.  Age-related changes described above.     Echo Limited     Value    LVEF  55-60%    Narrative    [id number]  [id number]  [id number]  [id number]^MEE^[facility]  Echocardiography Laboratory  [address] [place], [place] [id number]     Name: REFUGIO YADAV  MRN: [id number]  : 1940  Study Date: 2023 10:23 AM  Age: 82 yrs  Gender: Female  Patient Location: [facility]  Reason For  Study: CVA  Ordering Physician: MISBAH CABAN  Referring Physician: MISBAH CABAN  Performed By: [facility] Zuri Soares     BSA: 1.5 m2  Height: 60 in  Weight: 129 lb  HR: 91  BP: 131/85 mmHg  ______________________________________________________________________________  Procedure  Limited Portable Echo Adult.  ______________________________________________________________________________  Interpretation Summary     The rhythm was undetermined. NSR in recent study.  ______________________________________________________________________________  Left Ventricle  The left ventricle is normal in size. There is normal left ventricular wall  thickness. The visual ejection fraction is 55-60%. Septal motion is consistent  with conduction abnormality.     Right Ventricle  The right ventricle is normal in structure, function and size.     Atria  Normal left atrial size. Right atrial size is normal.     Mitral Valve  There is mild to moderate mitral annular calcification. The mitral valve  leaflets appear thickened, but open well. There is mild to moderate (1-2+)  mitral regurgitation.     Tricuspid Valve  The tricuspid valve is not well visualized, but is grossly normal.     Aortic Valve  No aortic stenosis is present.     Pulmonic Valve  Normal pulmonic valve.     Vessels  The aortic root is normal size. Normal size ascending aorta. The inferior vena  cava is normal.     Pericardium  There is no pericardial effusion.     Rhythm  The rhythm was undetermined.  ______________________________________________________________________________  MMode/2D Measurements & Calculations  IVSd: 1.0 cm     LVIDd: 4.2 cm  LVIDs: 2.3 cm  LVPWd: 1.1 cm  FS: 45.2 %  LV mass(C)d: 147.0 grams  LV mass(C)dI: 94.9 grams/m2  Ao root diam: 2.6 cm  asc Aorta Diam: 2.5 cm  RV Base: 2.6 cm  RWT: 0.52  TAPSE: 2.7 cm     Doppler Measurements & Calculations  PA acc time: 0.11 sec      ______________________________________________________________________________  Report approved by: Josiah Hale 05/11/2023 12:08 PM             Most Recent 3 CBC's:Recent Labs   Lab Test 05/25/23  1005 05/21/23  0627 05/20/23  0712 05/19/23  0808 05/18/23  1543 05/17/23  0748 05/16/23  0539   WBC 9.8  --   --   --  10.4  --  13.3*   HGB 9.6* 8.7* 8.5*   < > 8.5*   < > 8.0*   *  --   --   --  97  --  96   *  --   --   --  641*  --  589*    < > = values in this interval not displayed.     Most Recent 3 BMP's:Recent Labs   Lab Test 05/25/23  1005 05/24/23  0739 05/23/23  0811 05/21/23  0826 05/18/23  1543 05/16/23  1850 05/16/23  0539   NA  --  138  --   --  138  --  132*   POTASSIUM 4.7 3.9  3.9 4.0   < > 3.7  --  3.5   CHLORIDE  --  105  --   --  101  --  93*   CO2  --  23  --   --  28  --  30*   BUN  --  15.1  --   --  9.2  --  7.5*   CR  --  0.50*  --   --  0.50*  --  0.73   ANIONGAP  --  10  --   --  9  --  9   SAGE  --  8.9  --   --  8.3*  --  9.1   GLC  --  107*  --   --  139* 119* 99    < > = values in this interval not displayed.     Most Recent 2 LFT's:Recent Labs   Lab Test 05/16/23  0539 05/09/23  1853   AST 29 35   ALT 14 19   ALKPHOS 171* 112*   BILITOTAL 0.5 1.1

## 2023-06-02 PROBLEM — S52.601D: Status: ACTIVE | Noted: 2023-01-01

## 2023-06-02 PROBLEM — S52.501D UNSPECIFIED FRACTURE OF THE LOWER END OF RIGHT RADIUS, SUBSEQUENT ENCOUNTER FOR CLOSED FRACTURE WITH ROUTINE HEALING: Status: ACTIVE | Noted: 2023-01-01

## 2023-06-02 PROBLEM — M54.50 CHRONIC BILATERAL LOW BACK PAIN, UNSPECIFIED WHETHER SCIATICA PRESENT: Status: ACTIVE | Noted: 2023-01-01

## 2023-06-02 PROBLEM — G89.29 CHRONIC BILATERAL LOW BACK PAIN, UNSPECIFIED WHETHER SCIATICA PRESENT: Status: ACTIVE | Noted: 2023-01-01

## 2023-06-02 PROBLEM — Z86.73 HISTORY OF MULTIPLE STROKES: Status: ACTIVE | Noted: 2023-01-01

## 2023-06-02 PROBLEM — G93.41 METABOLIC ENCEPHALOPATHY: Status: ACTIVE | Noted: 2023-01-01

## 2023-06-02 PROBLEM — D62 ACUTE POSTHEMORRHAGIC ANEMIA: Status: ACTIVE | Noted: 2023-01-01

## 2023-06-02 PROBLEM — C34.90 PRIMARY MALIGNANT NEOPLASM OF LUNG METASTATIC TO OTHER SITE, UNSPECIFIED LATERALITY (H): Status: ACTIVE | Noted: 2023-01-01

## 2023-06-02 PROBLEM — S72.144D CLOSED NONDISPLACED INTERTROCHANTERIC FRACTURE OF RIGHT FEMUR WITH ROUTINE HEALING, SUBSEQUENT ENCOUNTER: Status: ACTIVE | Noted: 2023-01-01

## 2023-06-02 PROBLEM — Z91.81 PERSONAL HISTORY OF FALL: Status: ACTIVE | Noted: 2023-01-01

## 2023-06-02 PROBLEM — I60.9: Status: ACTIVE | Noted: 2023-01-01

## 2023-06-02 PROBLEM — R47.01 APHASIA DUE TO ACUTE CEREBROVASCULAR ACCIDENT (CVA) (H): Status: ACTIVE | Noted: 2023-01-01

## 2023-06-02 PROBLEM — Z51.5 HOSPICE CARE PATIENT: Status: ACTIVE | Noted: 2023-01-01

## 2023-06-02 PROBLEM — I63.9 APHASIA DUE TO ACUTE CEREBROVASCULAR ACCIDENT (CVA) (H): Status: ACTIVE | Noted: 2023-01-01

## 2023-06-02 NOTE — PROGRESS NOTES
[facility]  PRIMARY CARE PROVIDER AND CLINIC:  Naz Jackson, NIMCO CNP, [address] [facility] [address]  Chief Complaint   Patient presents with     [facility]     [facility] Medical Record Number:  [id number]  Place of Service where encounter took place:  [facility] () [[id number]]    Chiquita Berry  is a 82 year old  (1940), admitted to the above facility from  [facility]. Hospital stay 5/1/23 through 5/31/23..  Admitted to this facility for  rehab, medical management and nursing care.     Nontraumatic subarachnoid hemorrhage, unspecified (H)  History of multiple strokes  Aphasia due to acute cerebrovascular accident (CVA) (H)  Personal history of fall  Closed nondisplaced intertrochanteric fracture of right femur with routine healing, subsequent encounter  Unspecified fracture of the lower end of right radius, subsequent encounter for closed fracture with routine healing  Unspecified fracture of lower end of right ulna, subsequent encounter for closed fracture with routine healing  Acute posthemorrhagic anemia  Chronic bilateral low back pain, unspecified whether sciatica present  Primary malignant neoplasm of lung metastatic to other site, unspecified laterality (H)  Hypoxia  Hospice care patient    HPI:    HPI information obtained from: facility chart records, facility staff, patient report, [facility] chart review and Care Everywhere [facility] chart review.   Brief Summary of Hospital Course: please see EPIC notes, pt has a long medical history and a long complex hospitalization from 5/2 thru 5/31/23, when she was sent to LTC at [facility] for Hospice care.  Pt fell in her apt at [facility] here. She broke her right hip and right wrist--ORIF done and had large blood loss. She then had a stroke on 5/13, had an IR angioplasty with stent on 5/14 of left MCA. She had hypotension, required pressors, which came off on  "5/16. She then had another stroke, this time was noted to have aphasia and a CT showed small left frontal SAH. She had this stroke while on DTAP.  She was seen by hospice as she felt along with family that she had a corwin long road to recovery and unlikely to live independently. She was seen by [facility] in the hospital and admitted the comfort cares on 5/25. Back pain, right hip/wrist pain not controlled but this improved after adjustment to pain meds by them.  Her cancer med alectinib was stopped.    TODAY DURING EXAM HPI and ROS: limited as pt severely aphasic.  Did better with \"YES and No\" questions. No  CP, HA, N/V. Has bruno. Appetite is down.  No pain except back, right hip--shook head that right head did not hurt.      CODE STATUS/ADVANCE DIRECTIVES DISCUSSION:   DNR / DNI  Patient's living condition: lives alone  ALLERGIES: Adhesive tape, Amoxicillin-pot clavulanate, Celebrex [celecoxib], and Lidocaine  PAST MEDICAL HISTORY:  has a past medical history of Anxiety, Chronic back pain, Osteoporosis, and Primary lung adenocarcinoma (H).  PAST SURGICAL HISTORY:   has a past surgical history that includes cataract iol, rt/lt (Bilateral); Phacoemulsification clear cornea with standard intraocular lens implant (Left, 11/16/2015); tonsillectomy & adenoidectomy; Open reduction internal fixation humerus distal (Right); Open reduction internal fixation hip nailing (Right, 5/2/2023); and Open reduction internal fixation wrist (Right, 5/2/2023).  FAMILY HISTORY: family history includes Breast Cancer in her sister; Dementia in her mother; Myocardial Infarction in her father; Stomach Cancer in her father.  SOCIAL HISTORY:   reports that she has never smoked. She has never used smokeless tobacco. She reports that she does not currently use alcohol. She reports that she does not use drugs.    Post Discharge Medication Reconciliation Status: discharge medications reconciled and changed, per note/orders     Current " Outpatient Medications   Medication Sig Dispense Refill     acetaminophen (TYLENOL) 325 MG tablet Take 2 tablets (650 mg) by mouth every 4 hours as needed for other (For optimal non-opioid multimodal pain management to improve pain control.) 30 tablet 0     aspirin (ASA) 81 MG EC tablet Take 1 tablet (81 mg) by mouth daily Take baby aspirin indefinitely for stroke 30 tablet 11     clopidogrel (PLAVIX) 75 MG tablet 1 tablet (75 mg) by Oral or NG Tube route daily Take baby aspirin and Plavix daily for total of 90 days.  Then stop Plavix continue baby aspirin daily indefinitely for stroke prevention 83 tablet 0     fentaNYL (DURAGESIC) 25 mcg/hr 72 hr patch Place 1 patch onto the skin every 72 hours remove old patch. 4 patch 0     gabapentin (NEURONTIN) 100 MG capsule Take 1 capsule (100 mg) by mouth 3 times daily for 30 days 90 capsule 0     hydrOXYzine (ATARAX) 10 MG tablet Take 1 tablet (10 mg) by mouth 3 times daily as needed for anxiety 30 tablet 1     latanoprost (XALATAN) 0.005 % ophthalmic solution Place 1 drop into both eyes every evening   2     LORazepam (ATIVAN) 1 MG tablet Place 1 tablet (1 mg) under the tongue every 3 hours as needed for anxiety 12 tablet 0     melatonin 3 MG CAPS Take 3 mg by mouth At Bedtime And 3mg prn po before 0200 hrs (total of 6mg) 60 capsule 11     Multiple Vitamins-Minerals (PRESERVISION AREDS 2) CAPS TAKE 1 CAPSULE BY MOUTH TWICE A DAY 60 capsule 0     multivitamin w/minerals (THERA-VIT-M) tablet Take 1 tablet by mouth daily 30 tablet 11     naloxone (NARCAN) 4 MG/0.1ML nasal spray Spray 1 spray (4 mg) into one nostril alternating nostrils as needed for opioid reversal every 2-3 minutes until assistance arrives 0.2 mL 1     ondansetron (ZOFRAN) 4 MG tablet Take 1 tablet (4 mg) by mouth every 6 hours as needed for nausea 30 tablet 0     oxyCODONE (ROXICODONE) 5 MG tablet Take 0.5-1 tablets (2.5-5 mg) by mouth every 4 hours as needed for moderate pain 1 tab po q 4 hrs prn pain  "scale \"3-5\"  2 tabs po q 4 hrs prn pain scale \"6-10\" (Patient taking differently: Take 2.5-5 mg by mouth every 4 hours as needed for moderate pain 2.5 mgpo q 4 hrs prn pain scale \"1-5\" and 5mg po q 4 hrs prn pain scale \"6-10\") 20 tablet 0     pantoprazole (PROTONIX) 40 MG EC tablet Take 1 tablet (40 mg) by mouth daily 30 tablet 11     polyethylene glycol (MIRALAX) 17 GM/Dose powder Take 17 g by mouth daily 510 g      polyethylene glycol 400 (BLINK TEARS) 0.25 % SOLN ophthalmic solution Place 1 drop into both eyes daily And Q2H PRN       QUEtiapine (SEROQUEL) 25 MG tablet Take 1 tablet (25 mg) by mouth daily as needed (agitation) 10 tablet 0     senna-docusate (SENOKOT-S/PERICOLACE) 8.6-50 MG tablet Take 1 tablet by mouth 2 times daily as needed for constipation 20 tablet 0     traZODone (DESYREL) 50 MG tablet TAKE ONE TABLET BY MOUTH EVERY NIGHT AT BEDTIME 29 tablet 0     vitamin D3 (CHOLECALCIFEROL) 50 mcg (2000 units) tablet Take 1 tablet (50 mcg) by mouth daily 30 tablet 11      Vitals:  /61   Pulse 89   Temp 98.1  F (36.7  C)   Resp 21   Ht 1.524 m (5')   Wt 56.4 kg (124 lb 6.4 oz)   LMP  (LMP Unknown)   SpO2 94%   BMI 24.30 kg/m    Exam:  GENERAL APPEARANCE:  Alert,aphasia cooperative  ENT:  Mouth with moist mucous membranes, normal hearing acuity  EYES:  Conjunctiva and lids normal--no drainage  RESP:  respiratory effort  of chest normal, lungs Clear but mildly decreased, has no respiratory distress.  CV:  Auscultation of heart done ,RRR no murmur or  edema, +2 pedal pulses  ABDOMEN:  normal bowel sounds, soft, nontender  M/S:   WEBSTER equally, seen in bed: healed incision right thigh/hip,  Right wrist wit brace on and good CSM. Weaker  right hand and equal  but weak leg strength  SKIN:  Inspection of skin and subcutaneous tissue baseline except as noted under M/S  NEURO:   Cranial nerves  grossly intact.  Aphasia,  PSYCH:  Oriented, recognizes me    Lab/Diagnostic data:  Recent Labs   Lab Test " 05/25/23  1005 05/24/23  0739 05/18/23  1543   NA  --  138 138   POTASSIUM 4.7 3.9  3.9 3.7   CHLORIDE  --  105 101   CO2  --  23 28   ANIONGAP  --  10 9   GLC  --  107* 139*   BUN  --  15.1 9.2   CR  --  0.50* 0.50*   SAGE  --  8.9 8.3*    < > = values in this interval not displayed.     Hemoglobin   Date Value Ref Range Status   05/25/2023 9.6 (L) 11.7 - 15.7 g/dL Final   05/21/2023 8.7 (L) 11.7 - 15.7 g/dL Final        ASSESSMENT / PLAN:  (I60.9) Nontraumatic subarachnoid hemorrhage, unspecified (H)  (primary encounter diagnosis)   (Z86.73) History of multiple strokes   (I63.9,  R47.01) Aphasia due to acute cerebrovascular accident (CVA) (H)  Comment/Plan: conts on meds for sz prevention, anti coag, no further f/u. Monitor.    (Z91.81) Personal history of fall: 5/2/2023   (S72.144D) Closed nondisplaced intertrochanteric fracture of right femur with routine healing, subsequent encounter: 5/2/23   (S52.501D) Unspecified fracture of the lower end of right radius, subsequent encounter for closed fracture with routine healing   (S52.601D) Unspecified fracture of lower end of right ulna, subsequent encounter for closed fracture with routine healing  Comment/Plan: goal is pain control, will have PT see to assess mobility and if needs platform walker as did in hospital.  Cont with right wrist brace--will ask OLIVIA Elena to see if nec.monitor.    (D62) Acute posthemorrhagic anemia  Comment/Plan: as on Hospice now will not recheck Hgb, goal is comfort    (M54.50,  G89.29) Chronic bilateral low back pain, unspecified whether sciatica present  Comment/Plan: cont meds for chronic and the post Fx pains--adjust as needed.    (C34.90) Primary malignant neoplasm of lung metastatic to other site, unspecified laterality (H)   (R09.02) Hypoxia  Comment/Plan: O2 for comfort, no further visits with oncology. Monitor.    (Z51.5) Hospice care patient  Comment/Plan: appreciate hospice care and will change to [facility] per family  request.      Total time spent with patient visit at the skilled nursing facility was 55 min including patient visit, review of past records and phone call to patient contact Regla. All questions answered. They wish to have [facility] now that she is back at [facility]. She spoke with Oncology yest and agree with stopping chemo drug. Greater than 50% of total time spent with counseling and coordinating care..     Electronically signed by:  NIMCO Bain CNP

## 2023-06-05 NOTE — TELEPHONE ENCOUNTER
----- Message from Fer Pantoja MD sent at 6/2/2023  4:49 PM CDT -----  Regarding: RE: Hospice?  Spoke to them. Patient going on hospice.    bk  ----- Message -----  From: Isabela Schwartz RN  Sent: 6/1/2023   3:45 PM CDT  To: Fer Pantoja MD  Subject: Hospice?                                         Hi Dr. Pantoja,     I received a call from patient's close friend, Regla,  who has been at past appointments.  Patient has been hospitalized  with multiple strokes and is thinking hospice is her goal. Dr. Peters placed the referral but the patient and Regla would pooja to know your thoughts and have questions.      Please call patient & Regla when able at [phone]    Thank you,  Fiona

## 2023-06-08 NOTE — PROGRESS NOTES
Chief Complaint: f/u on pt after admit to [facility] on 6/6/23    HPI:    Chiquita Berry is a 82 year old  (1940), who is being seen today for an episodic care visit at [facility] (). Today's concern is: med changes made for comfort. She is eating while being fed by staff. Recognized me but aphasic     History of multiple strokes  Aphasia due to acute cerebrovascular accident (CVA) (H)  Personal history of fall  Closed nondisplaced intertrochanteric fracture of right femur with routine healing, subsequent encounter  Unspecified fracture of the lower end of right radius, subsequent encounter for closed fracture with routine healing  Unspecified fracture of lower end of right ulna, subsequent encounter for closed fracture with routine healing  Chronic bilateral low back pain, unspecified whether sciatica present  Primary malignant neoplasm of lung metastatic to other site, unspecified laterality (H)  Hypoxia  Hospice care patient      /68   Pulse 87   Temp 98.1  F (36.7  C)   Resp 19   Ht 1.524 m (5')   Wt 56.4 kg (124 lb 6.4 oz)   LMP  (LMP Unknown)   SpO2 96%   BMI 24.30 kg/m    TODAY'S EXAM:  SUBJECTIVE:   She shakes head: that has No CP, SOB, Cough, HA, N/V. Has bruno.  Appetite per staff is 50-75% usually.  Pain controlled per staff: back, right wrists and right femur areas.    OBJECTIVE DATA:   GENERAL APPEARANCE:  Alert,oriented--aphasic but recognizes me. Is  in no distress. Lungs mostly clear but with decreased, on O2/nc , RRR, no edema.weaker right hand  but pt has brace on. Abdomen is soft, +BTS .neuros as noted: aphasia.     Current Outpatient Medications   Medication Sig Dispense Refill     atropine 1 % ophthalmic solution Place 2 drops under the tongue every 4 hours as needed for secretions       clonazePAM (KLONOPIN) 0.25 MG TBDP ODT tab Take 0.25 mg by mouth every 12 hours       fentaNYL (DURAGESIC) 12 mcg/hr 72 hr patch Place 1 patch  "onto the skin every 72 hours remove old patch.  With a 25 mcg patch for total of 37mcg       fentaNYL (DURAGESIC) 25 mcg/hr 72 hr patch Place 25 mcg onto the skin every 72 hours remove old patch.  With 12 mcg patch for total of 37mcg       acetaminophen (TYLENOL) 325 MG tablet Take 2 tablets (650 mg) by mouth every 4 hours as needed for other (For optimal non-opioid multimodal pain management to improve pain control.) 30 tablet 0     aspirin (ASA) 81 MG EC tablet Take 1 tablet (81 mg) by mouth daily Take baby aspirin indefinitely for stroke 30 tablet 11     clopidogrel (PLAVIX) 75 MG tablet 1 tablet (75 mg) by Oral or NG Tube route daily Take baby aspirin and Plavix daily for total of 90 days.  Then stop Plavix continue baby aspirin daily indefinitely for stroke prevention 83 tablet 0     gabapentin (NEURONTIN) 100 MG capsule Take 1 capsule (100 mg) by mouth 3 times daily for 30 days 90 capsule 0     latanoprost (XALATAN) 0.005 % ophthalmic solution Place 1 drop into both eyes every evening   2     LORazepam (ATIVAN) 1 MG tablet Place 1 tablet (1 mg) under the tongue every 3 hours as needed for anxiety 20 tablet 0     Multiple Vitamins-Minerals (PRESERVISION AREDS 2) CAPS TAKE 1 CAPSULE BY MOUTH TWICE A DAY 60 capsule 0     multivitamin w/minerals (THERA-VIT-M) tablet Take 1 tablet by mouth daily 30 tablet 11     naloxone (NARCAN) 4 MG/0.1ML nasal spray Spray 1 spray (4 mg) into one nostril alternating nostrils as needed for opioid reversal every 2-3 minutes until assistance arrives 0.2 mL 1     ondansetron (ZOFRAN) 4 MG tablet Take 1 tablet (4 mg) by mouth every 6 hours as needed for nausea 30 tablet 0     oxyCODONE (ROXICODONE) 5 MG tablet Take 0.5-1 tablets (2.5-5 mg) by mouth every 4 hours as needed for moderate pain 1 tab po q 4 hrs prn pain scale \"3-5\"  2 tabs po q 4 hrs prn pain scale \"6-10\" (Patient taking differently: Take 2.5-5 mg by mouth every 4 hours as needed for moderate pain 2.5 mgpo q 4 hrs prn pain " "scale \"1-5\" and 5mg po q 4 hrs prn pain scale \"6-10\") 20 tablet 0     pantoprazole (PROTONIX) 40 MG EC tablet Take 1 tablet (40 mg) by mouth daily 30 tablet 11     polyethylene glycol (MIRALAX) 17 GM/Dose powder Take 17 g by mouth daily 510 g      polyethylene glycol 400 (BLINK TEARS) 0.25 % SOLN ophthalmic solution Place 1 drop into both eyes daily And Q2H PRN       senna-docusate (SENOKOT-S/PERICOLACE) 8.6-50 MG tablet Take 1 tablet by mouth 2 times daily as needed for constipation 20 tablet 0     traZODone (DESYREL) 50 MG tablet TAKE ONE TABLET BY MOUTH EVERY NIGHT AT BEDTIME 29 tablet 0     vitamin D3 (CHOLECALCIFEROL) 50 mcg (2000 units) tablet Take 1 tablet (50 mcg) by mouth daily 30 tablet 11     ASSESSMENT / PLAN:  (Z86.73) History of multiple strokes  (primary encounter diagnosis)   (I63.9,  R47.01) Aphasia due to acute cerebrovascular accident (CVA) (H)  Comment/Plan: goal is comfort, cont with current meds--asa, Plavix,  Monitor.    (Z91.81) Personal history of fall: 5/2/2023   (S72.144D) Closed nondisplaced intertrochanteric fracture of right femur with routine healing, subsequent encounter: 5/2/23   (S52.501D) Unspecified fracture of the lower end of right radius, subsequent encounter for closed fracture with routine healing   (S52.601D) Unspecified fracture of lower end of right ulna, subsequent encounter for closed fracture with routine healing   (M54.50,  G89.29) Chronic bilateral low back pain, unspecified whether sciatica present  Comment/Plan: cont with pain control per hospice.. brace off prn right wrist.  Therapies to see to assist with mobility and transfers.    (C34.90) Primary malignant neoplasm of lung metastatic to other site, unspecified laterality (H)   (R09.02) Hypoxia  Comment/Plan:  O2 as needed for comfort, monitor. No further f/u with oncology or chemo meds.    (Z51.5) Hospice care patient  Comment/Plan: appreciate Hospice care with goal of comfort for pt.        Electronically " Signed by:  NIMCO Bain CNP

## 2023-06-27 ENCOUNTER — TELEPHONE (OUTPATIENT)
Dept: NEUROSURGERY | Facility: CLINIC | Age: 83
End: 2023-06-27
Payer: OTHER MISCELLANEOUS

## 2023-06-27 DIAGNOSIS — I66.02 STENOSIS OF MIDDLE CEREBRAL ARTERY, LEFT: Primary | ICD-10-CM

## 2023-06-27 NOTE — PROGRESS NOTES
CTA orders entered per Dr. Leigh,       At some point in the next month, we should get a CTA head on this patient and assess for restenosis of the left MCA.    Sent message to scheduling to let patient know.     Lexi Mauricio RN 6/27/2023 3:53 PM

## 2024-05-16 NOTE — TELEPHONE ENCOUNTER
Patient to stop augmentin due to diarrhea and will start amoxicillin.   Follow up PCP if symptoms persist.    LEX Bravo PA-C     Patient: Rizwana Plunkett  : 1934    Encounter Date: 2024    PROGRESS NOTE    Subjective  Chief complaint: Rizwana Plunkett is a 89 y.o. female who is an acute skilled patient being seen and evaluated for weakness    HPI:  HPI  Patient is continue to work in therapy following hospitalization for sacral and lumbar vertebral fractures.  Patient continues to work on gait training with front wheel walker and CGA.  Patient is ambulating up to 100 feet.  Patient is transferring requiring CGA to min assist from various surfaces.  Patient was seen and examined at the bedside, appears to be in no acute distress.  Nursing staff voicing no new concerns.  Patient denies nausea or vomiting.  Denies fever or chills.    Objective  Vital signs: 149/82, 97.0, 79, 18, 99%    Physical Exam  Constitutional:       General: She is not in acute distress.  Eyes:      Extraocular Movements: Extraocular movements intact.   Cardiovascular:      Rate and Rhythm: Normal rate and regular rhythm.   Pulmonary:      Effort: Pulmonary effort is normal.      Breath sounds: Normal breath sounds.   Abdominal:      General: Bowel sounds are normal.      Palpations: Abdomen is soft.   Musculoskeletal:      Cervical back: Neck supple.      Right lower leg: No edema.      Left lower leg: No edema.   Neurological:      Mental Status: She is alert.      Motor: Weakness present.   Psychiatric:         Mood and Affect: Mood normal.         Behavior: Behavior is cooperative.         Assessment/Plan  Problem List Items Addressed This Visit       Atrial fibrillation (Multi)     Eliquis  Bleeding precautions  Metoprolol         Hypertension     Monitor blood pressure  Losartan  Amlodipine  Metoprolol         Unspecified fracture of sacrum, subsequent encounter for fracture with routine healing     Therapy  Pain control         Unspecified fracture of unspecified lumbar vertebra, subsequent encounter for fracture with routine healing     Therapy  Pain  control         Weakness - Primary     Continue working towards goals in therapy          Medications, treatments, and labs reviewed  Continue medications and treatments as listed in EMR      Scribe Attestation  I, August Gupta   attest that this documentation has been prepared under the direction and in the presence of KEV Starr    Provider Attestation - Scribe documentation  All medical record entries made by the Scribe were at my direction and personally dictated by me. I have reviewed the chart and agree that the record accurately reflects my personal performance of the history, physical exam, discussion and plan.   KEV Starr        Electronically Signed By: KEV Starr   5/27/24  7:48 PM

## (undated) DEVICE — SOL NACL 0.9% IRRIG 1000ML BOTTLE 2F7124

## (undated) DEVICE — DRILL BIL CANNULATED 6MM/9MM 03.037.022

## (undated) DEVICE — DRILL BIT ACUMED QUICK COUPLER 2.0MM 80-0318

## (undated) DEVICE — PACK HAND WRIST SOP15HWFSP

## (undated) DEVICE — LINEN TOWEL PACK X5 5464

## (undated) DEVICE — WIRE GUIDE 3.2X400MM  357.399

## (undated) DEVICE — DRSG XEROFORM 1X8"

## (undated) DEVICE — DRAPE C-ARMOR 5 SIDED 5523

## (undated) DEVICE — ESU GROUND PAD UNIVERSAL W/O CORD

## (undated) DEVICE — DRAPE C-ARM 60X42" 1013

## (undated) DEVICE — SUCTION MANIFOLD NEPTUNE SGL

## (undated) DEVICE — Device

## (undated) DEVICE — SU ETHILON 4-0 FS-2 18" 662H

## (undated) DEVICE — SLING ARM MED 79-99155

## (undated) DEVICE — SU VICRYL 0 CT-1 27" UND J260H

## (undated) DEVICE — MANIFOLD NEPTUNE 4 PORT 700-20

## (undated) DEVICE — SU VICRYL 2-0 CT-1 27" UND J259H

## (undated) DEVICE — SU MONOCRYL 3-0 PS-2 27" Y427H

## (undated) DEVICE — GLOVE BIOGEL PI MICRO INDICATOR UNDERGLOVE SZ 7.5 48975

## (undated) DEVICE — SU VICRYL 3-0 SH 27" J316H

## (undated) DEVICE — DRILL BIT QUICK COUPLING 3 FLUTE 4.2MMX330/100MM CALIBRATE

## (undated) DEVICE — GLOVE BIOGEL PI ULTRATOUCH SZ 7.5 41175

## (undated) DEVICE — PREP CHLORAPREP 26ML TINTED HI-LITE ORANGE 930815

## (undated) DEVICE — BLADE CLIPPER 4412A

## (undated) DEVICE — CAST PLASTER SPLINT 3X15" 7393

## (undated) DEVICE — STPL SKIN 35W ROTATING HEAD PRW35

## (undated) DEVICE — GOWN IMPERVIOUS SPECIALTY XL/XLONG 39049

## (undated) DEVICE — BNDG ELASTIC 4"X5YDS UNSTERILE 6611-40

## (undated) DEVICE — GLOVE BIOGEL PI ORTHOPRO SZ 7.5 47675

## (undated) DEVICE — KIT PATIENT CARE HANA TABLE PROFX SUPINE 6855

## (undated) DEVICE — DRILL BIT 2.8MM QUICK RELEASE MS-DC28

## (undated) DEVICE — CAST PLASTER ROLL 4"  7374

## (undated) DEVICE — DRSG TEGADERM 6X8" 1628

## (undated) DEVICE — SOL WATER IRRIG 1000ML BOTTLE 2F7114

## (undated) DEVICE — SCREW NON-LOCKING HEXALOBE 3.5MM X 14MM: Type: IMPLANTABLE DEVICE | Site: WRIST | Status: NON-FUNCTIONAL

## (undated) DEVICE — WIRE GUIDE 0.054X6" ACUTRAK SGL TROCAR TIP SS WS-1406ST

## (undated) DEVICE — SU VICRYL 0 CT-1 27" J340H

## (undated) DEVICE — SU VICRYL 3-0 SH 27" UND J416H

## (undated) DEVICE — SU VICRYL 2-0 FS-1 27" UND  J443H

## (undated) DEVICE — SU VICRYL 2-0 CT-2 27" UND J269H

## (undated) RX ORDER — HEPARIN SODIUM 200 [USP'U]/100ML
INJECTION, SOLUTION INTRAVENOUS
Status: DISPENSED
Start: 2023-01-01

## (undated) RX ORDER — NEOSTIGMINE METHYLSULFATE 1 MG/ML
VIAL (ML) INJECTION
Status: DISPENSED
Start: 2023-01-01

## (undated) RX ORDER — FENTANYL CITRATE 50 UG/ML
INJECTION, SOLUTION INTRAMUSCULAR; INTRAVENOUS
Status: DISPENSED
Start: 2023-01-01

## (undated) RX ORDER — CEFAZOLIN SODIUM/WATER 2 G/20 ML
SYRINGE (ML) INTRAVENOUS
Status: DISPENSED
Start: 2023-01-01

## (undated) RX ORDER — HYDROMORPHONE HYDROCHLORIDE 1 MG/ML
INJECTION, SOLUTION INTRAMUSCULAR; INTRAVENOUS; SUBCUTANEOUS
Status: DISPENSED
Start: 2023-01-01

## (undated) RX ORDER — HEPARIN SODIUM 1000 [USP'U]/ML
INJECTION, SOLUTION INTRAVENOUS; SUBCUTANEOUS
Status: DISPENSED
Start: 2023-01-01

## (undated) RX ORDER — BUPIVACAINE HYDROCHLORIDE AND EPINEPHRINE 5; 5 MG/ML; UG/ML
INJECTION, SOLUTION EPIDURAL; INTRACAUDAL; PERINEURAL
Status: DISPENSED
Start: 2023-01-01

## (undated) RX ORDER — TRANEXAMIC ACID 10 MG/ML
INJECTION, SOLUTION INTRAVENOUS
Status: DISPENSED
Start: 2023-01-01

## (undated) RX ORDER — FENTANYL CITRATE 0.05 MG/ML
INJECTION, SOLUTION INTRAMUSCULAR; INTRAVENOUS
Status: DISPENSED
Start: 2023-01-01

## (undated) RX ORDER — GLYCOPYRROLATE 0.2 MG/ML
INJECTION, SOLUTION INTRAMUSCULAR; INTRAVENOUS
Status: DISPENSED
Start: 2023-01-01

## (undated) RX ORDER — LIDOCAINE HYDROCHLORIDE 10 MG/ML
INJECTION, SOLUTION INFILTRATION; PERINEURAL
Status: DISPENSED
Start: 2023-01-01